# Patient Record
Sex: FEMALE | Race: BLACK OR AFRICAN AMERICAN | NOT HISPANIC OR LATINO | URBAN - METROPOLITAN AREA
[De-identification: names, ages, dates, MRNs, and addresses within clinical notes are randomized per-mention and may not be internally consistent; named-entity substitution may affect disease eponyms.]

---

## 2017-02-04 ENCOUNTER — INPATIENT (INPATIENT)
Facility: HOSPITAL | Age: 82
LOS: 3 days | Discharge: HOME | End: 2017-02-08
Attending: INTERNAL MEDICINE | Admitting: INTERNAL MEDICINE
Payer: MEDICARE

## 2017-02-04 VITALS
WEIGHT: 134.92 LBS | SYSTOLIC BLOOD PRESSURE: 211 MMHG | DIASTOLIC BLOOD PRESSURE: 105 MMHG | RESPIRATION RATE: 13 BRPM | OXYGEN SATURATION: 99 % | HEART RATE: 60 BPM | HEIGHT: 65 IN

## 2017-02-04 LAB
ALBUMIN SERPL ELPH-MCNC: 3.6 G/DL — SIGNIFICANT CHANGE UP (ref 3.3–5)
ALP SERPL-CCNC: 61 U/L — SIGNIFICANT CHANGE UP (ref 40–120)
ALT FLD-CCNC: 41 U/L — SIGNIFICANT CHANGE UP (ref 12–78)
ANION GAP SERPL CALC-SCNC: 7 MMOL/L — SIGNIFICANT CHANGE UP (ref 5–17)
APTT BLD: 31.5 SEC — SIGNIFICANT CHANGE UP (ref 27.5–37.4)
AST SERPL-CCNC: 39 U/L — HIGH (ref 15–37)
BASOPHILS # BLD AUTO: 0.1 K/UL — SIGNIFICANT CHANGE UP (ref 0–0.2)
BASOPHILS NFR BLD AUTO: 1.7 % — SIGNIFICANT CHANGE UP (ref 0–2)
BILIRUB SERPL-MCNC: 1.1 MG/DL — SIGNIFICANT CHANGE UP (ref 0.2–1.2)
BLD GP AB SCN SERPL QL: SIGNIFICANT CHANGE UP
BUN SERPL-MCNC: 10 MG/DL — SIGNIFICANT CHANGE UP (ref 7–23)
CALCIUM SERPL-MCNC: 8.5 MG/DL — SIGNIFICANT CHANGE UP (ref 8.5–10.1)
CHLORIDE SERPL-SCNC: 108 MMOL/L — SIGNIFICANT CHANGE UP (ref 96–108)
CK MB BLD-MCNC: 1.5 % — SIGNIFICANT CHANGE UP (ref 0–3.5)
CK MB CFR SERPL CALC: 1.2 NG/ML — SIGNIFICANT CHANGE UP (ref 0.5–3.6)
CK SERPL-CCNC: 80 U/L — SIGNIFICANT CHANGE UP (ref 26–192)
CO2 SERPL-SCNC: 31 MMOL/L — SIGNIFICANT CHANGE UP (ref 22–31)
CREAT SERPL-MCNC: 0.93 MG/DL — SIGNIFICANT CHANGE UP (ref 0.5–1.3)
EOSINOPHIL # BLD AUTO: 0.1 K/UL — SIGNIFICANT CHANGE UP (ref 0–0.5)
EOSINOPHIL NFR BLD AUTO: 1.3 % — SIGNIFICANT CHANGE UP (ref 0–6)
GLUCOSE SERPL-MCNC: 114 MG/DL — HIGH (ref 70–99)
HCT VFR BLD CALC: 41.1 % — SIGNIFICANT CHANGE UP (ref 34.5–45)
HGB BLD-MCNC: 13.9 G/DL — SIGNIFICANT CHANGE UP (ref 11.5–15.5)
INR BLD: 1.29 RATIO — HIGH (ref 0.88–1.16)
LYMPHOCYTES # BLD AUTO: 2.8 K/UL — SIGNIFICANT CHANGE UP (ref 1–3.3)
LYMPHOCYTES # BLD AUTO: 52.8 % — HIGH (ref 13–44)
MCHC RBC-ENTMCNC: 28.2 PG — SIGNIFICANT CHANGE UP (ref 27–34)
MCHC RBC-ENTMCNC: 33.7 GM/DL — SIGNIFICANT CHANGE UP (ref 32–36)
MCV RBC AUTO: 83.6 FL — SIGNIFICANT CHANGE UP (ref 80–100)
MONOCYTES # BLD AUTO: 0.4 K/UL — SIGNIFICANT CHANGE UP (ref 0–0.9)
MONOCYTES NFR BLD AUTO: 7.3 % — SIGNIFICANT CHANGE UP (ref 2–14)
NEUTROPHILS # BLD AUTO: 1.9 K/UL — SIGNIFICANT CHANGE UP (ref 1.8–7.4)
NEUTROPHILS NFR BLD AUTO: 36.9 % — LOW (ref 43–77)
PLATELET # BLD AUTO: 200 K/UL — SIGNIFICANT CHANGE UP (ref 150–400)
POTASSIUM SERPL-MCNC: 3.8 MMOL/L — SIGNIFICANT CHANGE UP (ref 3.5–5.3)
POTASSIUM SERPL-SCNC: 3.8 MMOL/L — SIGNIFICANT CHANGE UP (ref 3.5–5.3)
PROT SERPL-MCNC: 7 GM/DL — SIGNIFICANT CHANGE UP (ref 6–8.3)
PROTHROM AB SERPL-ACNC: 14.5 SEC — HIGH (ref 10–13.1)
RBC # BLD: 4.92 M/UL — SIGNIFICANT CHANGE UP (ref 3.8–5.2)
RBC # FLD: 13.1 % — SIGNIFICANT CHANGE UP (ref 11–15)
SODIUM SERPL-SCNC: 146 MMOL/L — HIGH (ref 135–145)
TROPONIN I SERPL-MCNC: 0.14 NG/ML — HIGH (ref 0.01–0.04)
WBC # BLD: 5.3 K/UL — SIGNIFICANT CHANGE UP (ref 3.8–10.5)
WBC # FLD AUTO: 5.3 K/UL — SIGNIFICANT CHANGE UP (ref 3.8–10.5)

## 2017-02-04 PROCEDURE — 99291 CRITICAL CARE FIRST HOUR: CPT

## 2017-02-04 PROCEDURE — 70450 CT HEAD/BRAIN W/O DYE: CPT | Mod: 26

## 2017-02-04 PROCEDURE — 71010: CPT | Mod: 26

## 2017-02-04 RX ORDER — ASPIRIN/CALCIUM CARB/MAGNESIUM 324 MG
325 TABLET ORAL ONCE
Qty: 0 | Refills: 0 | Status: COMPLETED | OUTPATIENT
Start: 2017-02-04 | End: 2017-02-04

## 2017-02-04 RX ORDER — HYDRALAZINE HCL 50 MG
10 TABLET ORAL ONCE
Qty: 0 | Refills: 0 | Status: COMPLETED | OUTPATIENT
Start: 2017-02-04 | End: 2017-02-04

## 2017-02-04 RX ORDER — HEPARIN SODIUM 5000 [USP'U]/ML
INJECTION INTRAVENOUS; SUBCUTANEOUS
Qty: 25000 | Refills: 0 | Status: DISCONTINUED | OUTPATIENT
Start: 2017-02-04 | End: 2017-02-05

## 2017-02-04 RX ORDER — SODIUM CHLORIDE 9 MG/ML
3 INJECTION INTRAMUSCULAR; INTRAVENOUS; SUBCUTANEOUS ONCE
Qty: 0 | Refills: 0 | Status: COMPLETED | OUTPATIENT
Start: 2017-02-04 | End: 2017-02-04

## 2017-02-04 RX ADMIN — Medication 325 MILLIGRAM(S): at 20:15

## 2017-02-04 RX ADMIN — Medication 0.1 MILLIGRAM(S): at 20:15

## 2017-02-04 RX ADMIN — SODIUM CHLORIDE 3 MILLILITER(S): 9 INJECTION INTRAMUSCULAR; INTRAVENOUS; SUBCUTANEOUS at 20:10

## 2017-02-04 RX ADMIN — HEPARIN SODIUM 750 UNIT(S)/HR: 5000 INJECTION INTRAVENOUS; SUBCUTANEOUS at 21:44

## 2017-02-04 RX ADMIN — Medication 0.1 MILLIGRAM(S): at 22:26

## 2017-02-04 NOTE — ED ADULT NURSE NOTE - OBJECTIVE STATEMENT
left sided facial droop with weakness at 6:30 pm.awake .talking in [place],family at bedside

## 2017-02-04 NOTE — ED PROVIDER NOTE - OBJECTIVE STATEMENT
81yoF; with pmh signif for HTN; now p/w slurred speech and left sided weakness. patient last known normal at 1800.  at 1830 family noticed patient with slurred speech, left sided facial droop and left arm weakness. upon arrival to ED patient with residual slurred speech. upon return from CT patient back to baseline with NIH normal. denies headache. denies cp/sob/palp. denies abd pain. denies numbness/tingling. denies focal weakness currently. denies back pain. denies f/c/s.

## 2017-02-04 NOTE — ED PROVIDER NOTE - CRITICAL CARE PROVIDED
direct patient care (not related to procedure)/additional history taking/documentation/interpretation of diagnostic studies/consultation with other physicians

## 2017-02-04 NOTE — ED PROVIDER NOTE - PHYSICAL EXAMINATION
Gen: Alert, NAD  Head: NC, AT, PERRL, EOMI, normal lids/conjunctiva  ENT: B TM WNL, normal hearing, patent oropharynx without erythema/exudate, uvula midline  Neck: +supple, no tenderness/meningismus/JVD, +Trachea midline  Pulm: Bilateral BS, normal resp effort, no wheeze/stridor/retractions  CV: RRR, no M/R/G, +dist pulses  Abd: soft, NT/ND, +BS, no hepatosplenomegaly  Mskel: no edema/erythema/cyanosis  Skin: no rash  Neuro: see stroke exam above

## 2017-02-04 NOTE — ED ADULT TRIAGE NOTE - CHIEF COMPLAINT QUOTE
Patient BIBA: Patient was unable to speak with Left sided facial droop. Symptoms began during dinner at 630pm Patient evealuated by Dr. Healy and sent to CT prior to VS measurement

## 2017-02-04 NOTE — CONSULT NOTE ADULT - SUBJECTIVE AND OBJECTIVE BOX
HPI:  HPI:  81 year old woman with PMH of HTN; now p/w slurred speech and left sided weakness. patient last known normal at 1800.  at 1830 family noticed patient with slurred speech, left sided facial droop and left arm weakness. upon arrival to ED patient with residual slurred speech. upon return from CT patient back to baseline with NIH normal. denies headache. denies cp/sob/palp. denies abd pain. denies numbness/tingling. denies focal weakness currently. denies back pain. denies f/c/s.    As per patient, was on losartan but stopped taking "months ago" for unclear reasons.  As per son, he takes care of her at home and would like to discuss HHA. (05 Feb 2017 00:41)      Chief Complaint:  Patient is a 81y old  Female who presents with a chief complaint of weakness, facial droop (05 Feb 2017 00:41)      Review of Systems:      ENT:  No sore throat, pain, runny nose, dysphagia  CV:  No pain, palpitations, hypo/hypertension  Resp:  No dyspnea, cough, tachypnea, wheezing  GI:  No pain, nausea, vomiting, diarrhea, constipation  :  No pain, bleeding, incontinence, nocturia             Social History/Family History  SOCHX:   tobacco,  -  alcohol    FMHX: FA/MO  - contributory       Discussed with:  PMD, Family    Physical Exam:    Vital Signs:  Vital Signs Last 24 Hrs  T(C): 36, Max: 36.5 (02-05 @ 02:40)  T(F): 96.8, Max: 97.7 (02-05 @ 02:40)  HR: 41 (40 - 60)  BP: 182/81 (176/98 - 220/101)  BP(mean): --  RR: 17 (13 - 24)  SpO2: 97% (97% - 100%)  Daily Height in cm: 165.1 (05 Feb 2017 02:40)    Daily   I&O's Summary    I & Os for current day (as of 05 Feb 2017 09:07)  =============================================  IN: 153 ml / OUT: 500 ml / NET: -347 ml      Chest:  Full & symmetric excursion, no increased effort, breath sounds clear  Cardiovascular:  Regular rhythm, S1, S2, no murmur/rub/S3/S4, no carotid/femoral/abdominal bruit, radial/pedal pulses 2+, no edema  Abdomen:  Soft, non-tender, non-distended, normoactive bowel sounds, no HSM  Extremities:  Gait & station:   Digits:   Nails:   Joints, Bones, Muscles:   ROM:   Stability:  Neuro/Psych:  weakness, speech slur    Laboratory:                          14.7   4.2   )-----------( 193      ( 05 Feb 2017 08:25 )             44.2     05 Feb 2017 08:25    144    |  106    |  8      ----------------------------<  74     3.3     |  31     |  0.77     Ca    8.8        05 Feb 2017 08:25    TPro  7.0    /  Alb  3.6    /  TBili  1.1    /  DBili  x      /  AST  39     /  ALT  41     /  AlkPhos  61     04 Feb 2017 19:58      CARDIAC MARKERS ( 05 Feb 2017 04:54 )  .150 ng/mL / x     / 39 U/L / x     / 0.8 ng/mL  CARDIAC MARKERS ( 04 Feb 2017 19:58 )  .145 ng/mL / x     / 80 U/L / x     / 1.2 ng/mL      CAPILLARY BLOOD GLUCOSE  131 (04 Feb 2017 19:14)    LIVER FUNCTIONS - ( 04 Feb 2017 19:58 )  Alb: 3.6 g/dL / Pro: 7.0 gm/dL / ALK PHOS: 61 U/L / ALT: 41 U/L / AST: 39 U/L / GGT: x           PT/INR - ( 04 Feb 2017 19:58 )   PT: 14.5 sec;   INR: 1.29 ratio         PTT - ( 05 Feb 2017 08:25 )  PTT:41.1 sec          Assessment:  HTN  TIA/r/o CVA, Neuro noted  Heparin initiated  ASA  Slow reduction in SBP

## 2017-02-04 NOTE — CONSULT NOTE ADULT - ASSESSMENT
consulr dict  awake alert speech fleunt arm leg 4/5 new afib had slurred sepeech  and facial weakness resolved no tpa  discuss with family at bed side symptoms resolved no tpa  for work up mri brai n echo new afibb iv heparin cardiology eval .

## 2017-02-04 NOTE — ED PROVIDER NOTE - CARE PLAN
Principal Discharge DX:	TIA (transient ischemic attack)  Secondary Diagnosis:	New onset atrial fibrillation  Secondary Diagnosis:	Hypertensive urgency, malignant

## 2017-02-05 DIAGNOSIS — I48.91 UNSPECIFIED ATRIAL FIBRILLATION: ICD-10-CM

## 2017-02-05 DIAGNOSIS — R00.1 BRADYCARDIA, UNSPECIFIED: ICD-10-CM

## 2017-02-05 DIAGNOSIS — G45.9 TRANSIENT CEREBRAL ISCHEMIC ATTACK, UNSPECIFIED: ICD-10-CM

## 2017-02-05 DIAGNOSIS — I10 ESSENTIAL (PRIMARY) HYPERTENSION: ICD-10-CM

## 2017-02-05 DIAGNOSIS — Z29.9 ENCOUNTER FOR PROPHYLACTIC MEASURES, UNSPECIFIED: ICD-10-CM

## 2017-02-05 LAB
ANION GAP SERPL CALC-SCNC: 7 MMOL/L — SIGNIFICANT CHANGE UP (ref 5–17)
APTT BLD: 41.1 SEC — HIGH (ref 27.5–37.4)
APTT BLD: 44.5 SEC — HIGH (ref 27.5–37.4)
APTT BLD: 59.9 SEC — HIGH (ref 27.5–37.4)
BUN SERPL-MCNC: 8 MG/DL — SIGNIFICANT CHANGE UP (ref 7–23)
CALCIUM SERPL-MCNC: 8.8 MG/DL — SIGNIFICANT CHANGE UP (ref 8.5–10.1)
CHLORIDE SERPL-SCNC: 106 MMOL/L — SIGNIFICANT CHANGE UP (ref 96–108)
CHOLEST SERPL-MCNC: 180 MG/DL — SIGNIFICANT CHANGE UP (ref 10–199)
CK MB BLD-MCNC: 2.1 % — SIGNIFICANT CHANGE UP (ref 0–3.5)
CK MB CFR SERPL CALC: 0.8 NG/ML — SIGNIFICANT CHANGE UP (ref 0.5–3.6)
CK SERPL-CCNC: 39 U/L — SIGNIFICANT CHANGE UP (ref 26–192)
CO2 SERPL-SCNC: 31 MMOL/L — SIGNIFICANT CHANGE UP (ref 22–31)
CREAT SERPL-MCNC: 0.77 MG/DL — SIGNIFICANT CHANGE UP (ref 0.5–1.3)
GLUCOSE SERPL-MCNC: 74 MG/DL — SIGNIFICANT CHANGE UP (ref 70–99)
HCT VFR BLD CALC: 38.2 % — SIGNIFICANT CHANGE UP (ref 34.5–45)
HCT VFR BLD CALC: 39.8 % — SIGNIFICANT CHANGE UP (ref 34.5–45)
HCT VFR BLD CALC: 44.2 % — SIGNIFICANT CHANGE UP (ref 34.5–45)
HDLC SERPL-MCNC: 52 MG/DL — SIGNIFICANT CHANGE UP (ref 40–125)
HGB BLD-MCNC: 13.2 G/DL — SIGNIFICANT CHANGE UP (ref 11.5–15.5)
HGB BLD-MCNC: 13.7 G/DL — SIGNIFICANT CHANGE UP (ref 11.5–15.5)
HGB BLD-MCNC: 14.7 G/DL — SIGNIFICANT CHANGE UP (ref 11.5–15.5)
LIPID PNL WITH DIRECT LDL SERPL: 116 MG/DL — SIGNIFICANT CHANGE UP
MCHC RBC-ENTMCNC: 27.7 PG — SIGNIFICANT CHANGE UP (ref 27–34)
MCHC RBC-ENTMCNC: 28.1 PG — SIGNIFICANT CHANGE UP (ref 27–34)
MCHC RBC-ENTMCNC: 28.4 PG — SIGNIFICANT CHANGE UP (ref 27–34)
MCHC RBC-ENTMCNC: 33.2 GM/DL — SIGNIFICANT CHANGE UP (ref 32–36)
MCHC RBC-ENTMCNC: 34.5 GM/DL — SIGNIFICANT CHANGE UP (ref 32–36)
MCHC RBC-ENTMCNC: 34.6 GM/DL — SIGNIFICANT CHANGE UP (ref 32–36)
MCV RBC AUTO: 81.5 FL — SIGNIFICANT CHANGE UP (ref 80–100)
MCV RBC AUTO: 82 FL — SIGNIFICANT CHANGE UP (ref 80–100)
MCV RBC AUTO: 83.4 FL — SIGNIFICANT CHANGE UP (ref 80–100)
PLATELET # BLD AUTO: 176 K/UL — SIGNIFICANT CHANGE UP (ref 150–400)
PLATELET # BLD AUTO: 193 K/UL — SIGNIFICANT CHANGE UP (ref 150–400)
PLATELET # BLD AUTO: 201 K/UL — SIGNIFICANT CHANGE UP (ref 150–400)
POTASSIUM SERPL-MCNC: 3.3 MMOL/L — LOW (ref 3.5–5.3)
POTASSIUM SERPL-SCNC: 3.3 MMOL/L — LOW (ref 3.5–5.3)
RBC # BLD: 4.66 M/UL — SIGNIFICANT CHANGE UP (ref 3.8–5.2)
RBC # BLD: 4.88 M/UL — SIGNIFICANT CHANGE UP (ref 3.8–5.2)
RBC # BLD: 5.3 M/UL — HIGH (ref 3.8–5.2)
RBC # FLD: 12.5 % — SIGNIFICANT CHANGE UP (ref 11–15)
RBC # FLD: 12.9 % — SIGNIFICANT CHANGE UP (ref 11–15)
RBC # FLD: 13.2 % — SIGNIFICANT CHANGE UP (ref 11–15)
SODIUM SERPL-SCNC: 144 MMOL/L — SIGNIFICANT CHANGE UP (ref 135–145)
T4 FREE SERPL-MCNC: 1.3 NG/DL — SIGNIFICANT CHANGE UP (ref 0.9–1.8)
TOTAL CHOLESTEROL/HDL RATIO MEASUREMENT: 3.5 RATIO — SIGNIFICANT CHANGE UP (ref 3.3–7.1)
TRIGL SERPL-MCNC: 62 MG/DL — SIGNIFICANT CHANGE UP (ref 10–149)
TROPONIN I SERPL-MCNC: 0.15 NG/ML — HIGH (ref 0.01–0.04)
TSH SERPL-MCNC: 2.47 UU/ML — SIGNIFICANT CHANGE UP (ref 0.36–3.74)
WBC # BLD: 4.2 K/UL — SIGNIFICANT CHANGE UP (ref 3.8–10.5)
WBC # BLD: 4.3 K/UL — SIGNIFICANT CHANGE UP (ref 3.8–10.5)
WBC # BLD: 4.7 K/UL — SIGNIFICANT CHANGE UP (ref 3.8–10.5)
WBC # FLD AUTO: 4.2 K/UL — SIGNIFICANT CHANGE UP (ref 3.8–10.5)
WBC # FLD AUTO: 4.3 K/UL — SIGNIFICANT CHANGE UP (ref 3.8–10.5)
WBC # FLD AUTO: 4.7 K/UL — SIGNIFICANT CHANGE UP (ref 3.8–10.5)

## 2017-02-05 RX ORDER — LOSARTAN POTASSIUM 100 MG/1
50 TABLET, FILM COATED ORAL DAILY
Qty: 0 | Refills: 0 | Status: DISCONTINUED | OUTPATIENT
Start: 2017-02-05 | End: 2017-02-05

## 2017-02-05 RX ORDER — HYDRALAZINE HCL 50 MG
25 TABLET ORAL EVERY 8 HOURS
Qty: 0 | Refills: 0 | Status: DISCONTINUED | OUTPATIENT
Start: 2017-02-05 | End: 2017-02-07

## 2017-02-05 RX ORDER — HEPARIN SODIUM 5000 [USP'U]/ML
INJECTION INTRAVENOUS; SUBCUTANEOUS
Qty: 25000 | Refills: 0 | Status: DISCONTINUED | OUTPATIENT
Start: 2017-02-05 | End: 2017-02-08

## 2017-02-05 RX ORDER — SIMVASTATIN 20 MG/1
40 TABLET, FILM COATED ORAL AT BEDTIME
Qty: 0 | Refills: 0 | Status: DISCONTINUED | OUTPATIENT
Start: 2017-02-05 | End: 2017-02-08

## 2017-02-05 RX ORDER — HEPARIN SODIUM 5000 [USP'U]/ML
3200 INJECTION INTRAVENOUS; SUBCUTANEOUS ONCE
Qty: 0 | Refills: 0 | Status: DISCONTINUED | OUTPATIENT
Start: 2017-02-05 | End: 2017-02-05

## 2017-02-05 RX ORDER — HYDRALAZINE HCL 50 MG
10 TABLET ORAL EVERY 8 HOURS
Qty: 0 | Refills: 0 | Status: DISCONTINUED | OUTPATIENT
Start: 2017-02-05 | End: 2017-02-05

## 2017-02-05 RX ORDER — POTASSIUM CHLORIDE 20 MEQ
20 PACKET (EA) ORAL ONCE
Qty: 0 | Refills: 0 | Status: COMPLETED | OUTPATIENT
Start: 2017-02-05 | End: 2017-02-05

## 2017-02-05 RX ORDER — HYDRALAZINE HCL 50 MG
20 TABLET ORAL EVERY 4 HOURS
Qty: 0 | Refills: 0 | Status: DISCONTINUED | OUTPATIENT
Start: 2017-02-05 | End: 2017-02-08

## 2017-02-05 RX ORDER — HEPARIN SODIUM 5000 [USP'U]/ML
INJECTION INTRAVENOUS; SUBCUTANEOUS
Qty: 25000 | Refills: 0 | Status: DISCONTINUED | OUTPATIENT
Start: 2017-02-05 | End: 2017-02-05

## 2017-02-05 RX ORDER — HEPARIN SODIUM 5000 [USP'U]/ML
3200 INJECTION INTRAVENOUS; SUBCUTANEOUS EVERY 6 HOURS
Qty: 0 | Refills: 0 | Status: DISCONTINUED | OUTPATIENT
Start: 2017-02-05 | End: 2017-02-05

## 2017-02-05 RX ORDER — ASPIRIN/CALCIUM CARB/MAGNESIUM 324 MG
81 TABLET ORAL DAILY
Qty: 0 | Refills: 0 | Status: DISCONTINUED | OUTPATIENT
Start: 2017-02-05 | End: 2017-02-08

## 2017-02-05 RX ADMIN — LOSARTAN POTASSIUM 50 MILLIGRAM(S): 100 TABLET, FILM COATED ORAL at 05:43

## 2017-02-05 RX ADMIN — Medication 20 MILLIGRAM(S): at 21:59

## 2017-02-05 RX ADMIN — Medication 81 MILLIGRAM(S): at 11:09

## 2017-02-05 RX ADMIN — Medication 20 MILLIEQUIVALENT(S): at 17:29

## 2017-02-05 RX ADMIN — Medication 10 MILLIGRAM(S): at 17:26

## 2017-02-05 RX ADMIN — HEPARIN SODIUM 650 UNIT(S)/HR: 5000 INJECTION INTRAVENOUS; SUBCUTANEOUS at 05:42

## 2017-02-05 RX ADMIN — HEPARIN SODIUM 650 UNIT(S)/HR: 5000 INJECTION INTRAVENOUS; SUBCUTANEOUS at 12:19

## 2017-02-05 RX ADMIN — SIMVASTATIN 40 MILLIGRAM(S): 20 TABLET, FILM COATED ORAL at 22:12

## 2017-02-05 NOTE — H&P ADULT. - NEGATIVE CARDIOVASCULAR SYMPTOMS
no peripheral edema/no paroxysmal nocturnal dyspnea/no claudication/no palpitations/no dyspnea on exertion/no orthopnea/no chest pain

## 2017-02-05 NOTE — H&P ADULT. - PROBLEM SELECTOR PLAN 2
Telemetry Monitoring, Vital signs as per unit protocol  Start Heparin Drip, monitor PTT/INR Q6hrs, bleeding precautions  Serial cardiac enzyme with EKG x7khawsd x 3 sets--> first set mildly elevated  Get TSH, Free T4  Monitor BMP/electrolytes and supplement as needed  Get transthoracic ECHO in am  Cardiology evaluation  DVT prophylaxis with Heparin drip  Diet: DASH

## 2017-02-05 NOTE — H&P ADULT. - NEUROLOGICAL DETAILS
responds to pain/deep reflexes intact/cranial nerves intact/no spontaneous movement/sensation intact/responds to verbal commands/alert and oriented x 3/normal strength

## 2017-02-05 NOTE — H&P ADULT. - PROBLEM SELECTOR PLAN 1
Telemetry  Neurochecks Q 2 hrly.  Aspirin 81 mg PO daily  Monitor BP Closely, to maintain -100/ DBP 60-90 (MAP ).  ECHO, carotid doppler in am.  Zocor 40mg PO QHS.  Aspiration, Fall, Seizure precautions.  Neurology consult.  PT Evaluation  Speech & swallow evaluation in am.  Rehab Consult  Repeat CT Head in 48 hrs if symptoms persists.

## 2017-02-05 NOTE — H&P ADULT. - NEGATIVE NEUROLOGICAL SYMPTOMS
no loss of sensation/no difficulty walking/no tremors/no syncope/no headache/no facial palsy/no generalized seizures/no vertigo/no weakness/no hemiparesis/no paresthesias/no confusion/no loss of consciousness/no focal seizures

## 2017-02-05 NOTE — H&P ADULT. - HISTORY OF PRESENT ILLNESS
81 year old woman with PMH of HTN; now p/w slurred speech and left sided weakness. patient last known normal at 1800.  at 1830 family noticed patient with slurred speech, left sided facial droop and left arm weakness. upon arrival to ED patient with residual slurred speech. upon return from CT patient back to baseline with NIH normal. denies headache. denies cp/sob/palp. denies abd pain. denies numbness/tingling. denies focal weakness currently. denies back pain. denies f/c/s.    As per patient, was on losartan but stopped taking "months ago" for unclear reasons.  As per son, he takes care of her at home and would like to discuss HHA.

## 2017-02-05 NOTE — ED ADULT NURSE REASSESSMENT NOTE - NS ED NURSE REASSESS COMMENT FT1
made aware of heart rate of 40,spoke with nurses on the floor 2c ,spoke with Munira in 2C,aware that patient is going up

## 2017-02-06 DIAGNOSIS — R79.89 OTHER SPECIFIED ABNORMAL FINDINGS OF BLOOD CHEMISTRY: ICD-10-CM

## 2017-02-06 LAB
ANION GAP SERPL CALC-SCNC: 7 MMOL/L — SIGNIFICANT CHANGE UP (ref 5–17)
APTT BLD: 39.7 SEC — HIGH (ref 27.5–37.4)
APTT BLD: 53.5 SEC — HIGH (ref 27.5–37.4)
BUN SERPL-MCNC: 10 MG/DL — SIGNIFICANT CHANGE UP (ref 7–23)
CALCIUM SERPL-MCNC: 8.9 MG/DL — SIGNIFICANT CHANGE UP (ref 8.5–10.1)
CHLORIDE SERPL-SCNC: 105 MMOL/L — SIGNIFICANT CHANGE UP (ref 96–108)
CK SERPL-CCNC: 44 U/L — SIGNIFICANT CHANGE UP (ref 26–192)
CO2 SERPL-SCNC: 29 MMOL/L — SIGNIFICANT CHANGE UP (ref 22–31)
CREAT SERPL-MCNC: 0.88 MG/DL — SIGNIFICANT CHANGE UP (ref 0.5–1.3)
GLUCOSE SERPL-MCNC: 166 MG/DL — HIGH (ref 70–99)
HBA1C BLD-MCNC: 5.9 % — HIGH (ref 4–5.6)
HCT VFR BLD CALC: 44.6 % — SIGNIFICANT CHANGE UP (ref 34.5–45)
HGB BLD-MCNC: 15 G/DL — SIGNIFICANT CHANGE UP (ref 11.5–15.5)
MAGNESIUM SERPL-MCNC: 2.3 MG/DL — SIGNIFICANT CHANGE UP (ref 1.8–2.4)
MCHC RBC-ENTMCNC: 28 PG — SIGNIFICANT CHANGE UP (ref 27–34)
MCHC RBC-ENTMCNC: 33.6 GM/DL — SIGNIFICANT CHANGE UP (ref 32–36)
MCV RBC AUTO: 83.3 FL — SIGNIFICANT CHANGE UP (ref 80–100)
PLATELET # BLD AUTO: 231 K/UL — SIGNIFICANT CHANGE UP (ref 150–400)
POTASSIUM SERPL-MCNC: 3.6 MMOL/L — SIGNIFICANT CHANGE UP (ref 3.5–5.3)
POTASSIUM SERPL-SCNC: 3.6 MMOL/L — SIGNIFICANT CHANGE UP (ref 3.5–5.3)
RBC # BLD: 5.36 M/UL — HIGH (ref 3.8–5.2)
RBC # FLD: 13.1 % — SIGNIFICANT CHANGE UP (ref 11–15)
SODIUM SERPL-SCNC: 141 MMOL/L — SIGNIFICANT CHANGE UP (ref 135–145)
TROPONIN I SERPL-MCNC: 0.17 NG/ML — HIGH (ref 0.01–0.04)
WBC # BLD: 5.8 K/UL — SIGNIFICANT CHANGE UP (ref 3.8–10.5)
WBC # FLD AUTO: 5.8 K/UL — SIGNIFICANT CHANGE UP (ref 3.8–10.5)

## 2017-02-06 PROCEDURE — 93880 EXTRACRANIAL BILAT STUDY: CPT | Mod: 26

## 2017-02-06 PROCEDURE — 70551 MRI BRAIN STEM W/O DYE: CPT | Mod: 26

## 2017-02-06 PROCEDURE — 99233 SBSQ HOSP IP/OBS HIGH 50: CPT

## 2017-02-06 RX ADMIN — Medication 20 MILLIGRAM(S): at 16:54

## 2017-02-06 RX ADMIN — Medication 25 MILLIGRAM(S): at 22:30

## 2017-02-06 RX ADMIN — Medication 81 MILLIGRAM(S): at 11:34

## 2017-02-06 RX ADMIN — Medication 25 MILLIGRAM(S): at 05:16

## 2017-02-06 RX ADMIN — HEPARIN SODIUM 550 UNIT(S)/HR: 5000 INJECTION INTRAVENOUS; SUBCUTANEOUS at 12:00

## 2017-02-06 RX ADMIN — SIMVASTATIN 40 MILLIGRAM(S): 20 TABLET, FILM COATED ORAL at 22:30

## 2017-02-06 RX ADMIN — HEPARIN SODIUM 550 UNIT(S)/HR: 5000 INJECTION INTRAVENOUS; SUBCUTANEOUS at 19:28

## 2017-02-06 NOTE — DISCHARGE NOTE ADULT - CARE PROVIDERS DIRECT ADDRESSES
,DirectAddress_Unknown,[email or web address],DirectAddress_Unknown,DirectAddress_Unknown

## 2017-02-06 NOTE — PHYSICAL THERAPY INITIAL EVALUATION ADULT - MODIFIED CLINICAL TEST OF SENSORY INTEGRATION IN BALANCE TEST
Barthel Index: Feeding Score _10__, Bathing Score _5__, Grooming Score _5__, Dressing Score _10__, Bowels Score _10__, Bladder Score _10__, Toilet Score _10__, Transfers Score _15__, Mobility Score _15__, Stairs Score _5__,     Total Score _95__

## 2017-02-06 NOTE — DISCHARGE NOTE ADULT - PLAN OF CARE
avoid future episodes take medications as directed. eat low sodium/low fat diet.  follow with PCP and Neurology Dr. Lamb take medications as directed and follow with cardiologist.  Patient and her son Pepito counseled and educated about possible side effects of anticoagulation and in case of bleeding to go to nearest ED immediately. Resolved  Pt. counseled and educated for dietary and medications compliance. take medications as directed.  eat low sodium diet  check BP at home 1-2 times every day.  Follow with PCP and cardiology,  Your target BP is less than 130 Resolved.  Beta blockers not started given recent bradycardia upon admission.  Follow with cardiologist for further work up as outpatient in setting of acute stroke and accelerated HTN, likely demand ischemia, follow with cardiology as outpatient for further work up as outpatient.

## 2017-02-06 NOTE — DISCHARGE NOTE ADULT - PROVIDER TOKENS
TOKEN:'[id number]',TOKEN:'[id number]',FREE:[LAST:[PCP],PHONE:[(   )    -],FAX:[(   )    -],ADDRESS:[Follow with PCP Dr. Hyde x 3 days]]

## 2017-02-06 NOTE — DISCHARGE NOTE ADULT - PATIENT PORTAL LINK FT
“You can access the [facility] Patient Portal, offered by [facility], by registering with the following website: [email or web address]”

## 2017-02-06 NOTE — DISCHARGE NOTE ADULT - SECONDARY DIAGNOSIS.
New onset atrial fibrillation Hypertensive urgency, malignant Essential hypertension Bradycardia Elevated troponin

## 2017-02-06 NOTE — DISCHARGE NOTE ADULT - ADDITIONAL INSTRUCTIONS
Follow with PCP Dr. Dugan x 3 days  Follow with Cardiology x 1 week  Follow with Neurology x 1 week.

## 2017-02-06 NOTE — DISCHARGE NOTE ADULT - MEDICATION SUMMARY - MEDICATIONS TO STOP TAKING
I will STOP taking the medications listed below when I get home from the hospital:    losartan 50 mg oral tablet  -- 1 tab(s) by mouth once a day

## 2017-02-06 NOTE — DISCHARGE NOTE ADULT - NS AS DC STROKE ED MATERIALS
Stroke Education Booklet/Call 911 for Stroke/Risk Factors for Stroke/Need for Followup After Discharge/Stroke Warning Signs and Symptoms/Prescribed Medications

## 2017-02-06 NOTE — DISCHARGE NOTE ADULT - HOSPITAL COURSE
82 y/o F with PMH of HTN, non compliance to medications(for few months stopped losartan as she was in [place] and ran out of meds, did not want to buy out of pocket), p/w left sided weakness and slurred speech, resolved while coming back from CT/radiology, CTH negative for acute pathology, MRI with acute and subacute strokes, on arrival she was also found to be in A. Fib with elevated Troponins, in setting of TIA/accelerated HTN. Also in accelerated HTN, NIHSS during ED provider encounter normal, neurology and cardiology consulted, aspirin/statin started, anti HTNves started, monitored on telemetry, had PT evaluation, also s/p heparin gtt for a. fib, was bradycardiac upon arrival to ED, but HR stable during stay, now clear for Oral AC as per discussion with neurology Dr. Lamb, discussed with cardiology recommended coumadin vs xarelto as per family/pt. choice, had a detailed discussion about different options for oral AC as well as possible side effects and benefits of long term AC, patient and family wishes to start long term AC and want to start xarelto, heparin gtt D/C'd and started xarelto.  As per PT DC disposition home with outpatient PT, Pt. with no residual weakness, no speech difficulty.  d/w cardio/neuro-> stable for DC home today, son and patient in agreement.

## 2017-02-06 NOTE — DISCHARGE NOTE ADULT - CONDITION (STATED IN TERMS THAT PERMIT A SPECIFIC MEASURABLE COMPARISON WITH CONDITION ON ADMISSION):
Patient is stable: tolerating diet, voiding, ambulating, denies pain   70 minutes spent in direct patient care including physical examination, discharge instructions , medication instructions and follow up, patient and son Pepito verbalized understanding and agreed.

## 2017-02-06 NOTE — DISCHARGE NOTE ADULT - CARE PLAN
Principal Discharge DX:	Cerebrovascular accident (CVA) due to embolism of left middle cerebral artery  Goal:	avoid future episodes  Instructions for follow-up, activity and diet:	take medications as directed. eat low sodium/low fat diet.  follow with PCP and Neurology Dr. Lamb  Secondary Diagnosis:	New onset atrial fibrillation  Instructions for follow-up, activity and diet:	take medications as directed and follow with cardiologist.  Patient and her son Pepito counseled and educated about possible side effects of anticoagulation and in case of bleeding to go to nearest ED immediately.  Secondary Diagnosis:	Hypertensive urgency, malignant  Instructions for follow-up, activity and diet:	Resolved  Pt. counseled and educated for dietary and medications compliance.  Secondary Diagnosis:	Essential hypertension  Instructions for follow-up, activity and diet:	take medications as directed.  eat low sodium diet  check BP at home 1-2 times every day.  Follow with PCP and cardiology,  Your target BP is less than 130  Secondary Diagnosis:	Bradycardia  Instructions for follow-up, activity and diet:	Resolved.  Beta blockers not started given recent bradycardia upon admission.  Follow with cardiologist for further work up as outpatient  Secondary Diagnosis:	Elevated troponin  Instructions for follow-up, activity and diet:	in setting of acute stroke and accelerated HTN, likely demand ischemia, follow with cardiology as outpatient for further work up as outpatient.

## 2017-02-06 NOTE — DISCHARGE NOTE ADULT - CONDITIONS AT DISCHARGE
Called, but had to leave a v/m.  Pt will need to seek his pmd.     Patient is stable: tolerating diet, voiding, ambulating, denies pain   70 minutes spent in direct patient care including physical examination, discharge instructions , medication instructions and follow up, patient and son Pepito verbalized understanding and agreed.

## 2017-02-06 NOTE — DISCHARGE NOTE ADULT - MEDICATION SUMMARY - MEDICATIONS TO TAKE
I will START or STAY ON the medications listed below when I get home from the hospital:    aspirin 81 mg oral tablet, chewable  -- 1 tab(s) by mouth once a day  -- Indication: For stroke    Xarelto 20 mg oral tablet  -- 1 tab(s) by mouth once a day with evening meal.  -- Check with your doctor before becoming pregnant.  It is very important that you take or use this exactly as directed.  Do not skip doses or discontinue unless directed by your doctor.  Obtain medical advice before taking any non-prescription drugs as some may affect the action of this medication.  Take with food.    -- Indication: For atrial fibrillation    simvastatin 40 mg oral tablet  -- 1 tab(s) by mouth once a day (at bedtime)  -- Indication: For for cholesterol    hydroCHLOROthiazide 12.5 mg oral capsule  -- 1 cap(s) by mouth once a day  -- Indication: For Essential hypertension    hydrALAZINE 50 mg oral tablet  -- 1 tab(s) by mouth 3 times a day, HOLD if systolic BP less than 120  -- Indication: For Essential hypertension

## 2017-02-06 NOTE — PHYSICAL THERAPY INITIAL EVALUATION ADULT - PERTINENT HX OF CURRENT PROBLEM, REHAB EVAL
Patient admitted to hospital secondary to sudden onset of L sided weakenss and slurred speech found by patient's son Pepito.

## 2017-02-07 LAB
APTT BLD: 35.4 SEC — SIGNIFICANT CHANGE UP (ref 27.5–37.4)
APTT BLD: 42.5 SEC — HIGH (ref 27.5–37.4)
APTT BLD: 55.7 SEC — HIGH (ref 27.5–37.4)
HCT VFR BLD CALC: 45.1 % — HIGH (ref 34.5–45)
HGB BLD-MCNC: 15.1 G/DL — SIGNIFICANT CHANGE UP (ref 11.5–15.5)
MCHC RBC-ENTMCNC: 27.6 PG — SIGNIFICANT CHANGE UP (ref 27–34)
MCHC RBC-ENTMCNC: 33.6 GM/DL — SIGNIFICANT CHANGE UP (ref 32–36)
MCV RBC AUTO: 82.3 FL — SIGNIFICANT CHANGE UP (ref 80–100)
PLATELET # BLD AUTO: 254 K/UL — SIGNIFICANT CHANGE UP (ref 150–400)
RBC # BLD: 5.48 M/UL — HIGH (ref 3.8–5.2)
RBC # FLD: 12.7 % — SIGNIFICANT CHANGE UP (ref 11–15)
WBC # BLD: 6.5 K/UL — SIGNIFICANT CHANGE UP (ref 3.8–10.5)
WBC # FLD AUTO: 6.5 K/UL — SIGNIFICANT CHANGE UP (ref 3.8–10.5)

## 2017-02-07 PROCEDURE — 99223 1ST HOSP IP/OBS HIGH 75: CPT

## 2017-02-07 PROCEDURE — 99233 SBSQ HOSP IP/OBS HIGH 50: CPT

## 2017-02-07 RX ORDER — HYDRALAZINE HCL 50 MG
50 TABLET ORAL EVERY 8 HOURS
Qty: 0 | Refills: 0 | Status: DISCONTINUED | OUTPATIENT
Start: 2017-02-07 | End: 2017-02-08

## 2017-02-07 RX ORDER — RIVAROXABAN 15 MG-20MG
1 KIT ORAL
Qty: 30 | Refills: 0 | OUTPATIENT
Start: 2017-02-07 | End: 2017-03-09

## 2017-02-07 RX ADMIN — SIMVASTATIN 40 MILLIGRAM(S): 20 TABLET, FILM COATED ORAL at 21:16

## 2017-02-07 RX ADMIN — Medication 50 MILLIGRAM(S): at 21:16

## 2017-02-07 RX ADMIN — HEPARIN SODIUM 650 UNIT(S)/HR: 5000 INJECTION INTRAVENOUS; SUBCUTANEOUS at 04:02

## 2017-02-07 RX ADMIN — Medication 81 MILLIGRAM(S): at 11:29

## 2017-02-07 RX ADMIN — Medication 25 MILLIGRAM(S): at 13:08

## 2017-02-07 RX ADMIN — HEPARIN SODIUM 550 UNIT(S)/HR: 5000 INJECTION INTRAVENOUS; SUBCUTANEOUS at 18:12

## 2017-02-07 RX ADMIN — HEPARIN SODIUM 550 UNIT(S)/HR: 5000 INJECTION INTRAVENOUS; SUBCUTANEOUS at 11:24

## 2017-02-08 VITALS
OXYGEN SATURATION: 99 % | DIASTOLIC BLOOD PRESSURE: 63 MMHG | RESPIRATION RATE: 18 BRPM | SYSTOLIC BLOOD PRESSURE: 123 MMHG | HEART RATE: 79 BPM | TEMPERATURE: 98 F

## 2017-02-08 DIAGNOSIS — I63.9 CEREBRAL INFARCTION, UNSPECIFIED: ICD-10-CM

## 2017-02-08 LAB
APTT BLD: 127.6 SEC — CRITICAL HIGH (ref 27.5–37.4)
APTT BLD: 36.6 SEC — SIGNIFICANT CHANGE UP (ref 27.5–37.4)
HCT VFR BLD CALC: 45.8 % — HIGH (ref 34.5–45)
HGB BLD-MCNC: 15.3 G/DL — SIGNIFICANT CHANGE UP (ref 11.5–15.5)
MCHC RBC-ENTMCNC: 27.9 PG — SIGNIFICANT CHANGE UP (ref 27–34)
MCHC RBC-ENTMCNC: 33.4 GM/DL — SIGNIFICANT CHANGE UP (ref 32–36)
MCV RBC AUTO: 83.6 FL — SIGNIFICANT CHANGE UP (ref 80–100)
PLATELET # BLD AUTO: 228 K/UL — SIGNIFICANT CHANGE UP (ref 150–400)
RBC # BLD: 5.48 M/UL — HIGH (ref 3.8–5.2)
RBC # FLD: 13.2 % — SIGNIFICANT CHANGE UP (ref 11–15)
WBC # BLD: 7 K/UL — SIGNIFICANT CHANGE UP (ref 3.8–10.5)
WBC # FLD AUTO: 7 K/UL — SIGNIFICANT CHANGE UP (ref 3.8–10.5)

## 2017-02-08 PROCEDURE — 99239 HOSP IP/OBS DSCHRG MGMT >30: CPT

## 2017-02-08 RX ORDER — HYDRALAZINE HCL 50 MG
1 TABLET ORAL
Qty: 90 | Refills: 0
Start: 2017-02-08 | End: 2017-03-10

## 2017-02-08 RX ORDER — HYDRALAZINE HCL 50 MG
50 TABLET ORAL
Qty: 0 | Refills: 0 | Status: DISCONTINUED | OUTPATIENT
Start: 2017-02-08 | End: 2017-02-08

## 2017-02-08 RX ORDER — LOSARTAN POTASSIUM 100 MG/1
1 TABLET, FILM COATED ORAL
Qty: 0 | Refills: 0 | COMMUNITY

## 2017-02-08 RX ORDER — RIVAROXABAN 15 MG-20MG
1 KIT ORAL
Qty: 30 | Refills: 0 | OUTPATIENT
Start: 2017-02-08 | End: 2017-03-10

## 2017-02-08 RX ORDER — RIVAROXABAN 15 MG-20MG
20 KIT ORAL DAILY
Qty: 0 | Refills: 0 | Status: DISCONTINUED | OUTPATIENT
Start: 2017-02-08 | End: 2017-02-08

## 2017-02-08 RX ORDER — ASPIRIN/CALCIUM CARB/MAGNESIUM 324 MG
1 TABLET ORAL
Qty: 30 | Refills: 0 | OUTPATIENT
Start: 2017-02-08 | End: 2017-03-10

## 2017-02-08 RX ORDER — SIMVASTATIN 20 MG/1
1 TABLET, FILM COATED ORAL
Qty: 30 | Refills: 0 | OUTPATIENT
Start: 2017-02-08 | End: 2017-03-10

## 2017-02-08 RX ADMIN — RIVAROXABAN 20 MILLIGRAM(S): KIT at 12:19

## 2017-02-08 RX ADMIN — Medication 50 MILLIGRAM(S): at 05:14

## 2017-02-08 RX ADMIN — HEPARIN SODIUM 350 UNIT(S)/HR: 5000 INJECTION INTRAVENOUS; SUBCUTANEOUS at 02:28

## 2017-02-08 RX ADMIN — HEPARIN SODIUM 0 UNIT(S)/HR: 5000 INJECTION INTRAVENOUS; SUBCUTANEOUS at 01:16

## 2017-02-08 RX ADMIN — Medication 81 MILLIGRAM(S): at 12:19

## 2017-02-08 NOTE — PROGRESS NOTE ADULT - ATTENDING COMMENTS
Dc plan for am, if remains stable, will follow with cardio/neuro.
for Dc home with outpatient PT  70 minutes spent in direct patient care including physical examination, discharge instructions , medication instructions and follow up, patient and son both verbalized understanding and agreed.
labs and imaging reviewed, care discussed with consultants ,and family

## 2017-02-08 NOTE — PROGRESS NOTE ADULT - PROBLEM SELECTOR PLAN 5
likely demand ischemia in setting of TIA and elevated BP  cardio following
likely demand ischemia in setting of TIA and elevated BP  cardio following, recommend outpatient f/u
likely demand ischemia in setting of TIA and elevated BP  follow TTE  cardio following

## 2017-02-08 NOTE — PROGRESS NOTE ADULT - SUBJECTIVE AND OBJECTIVE BOX
Assessment:  HTN  TIA/r/o CVA, Neuro noted  Heparin initiated  ASA  Slow reduction in SBP  Added Hydralazine IVP as needed  HR noted, Clonidine was given, now held, slow improvement HR,   HR improved off Clonidine  SBP remains labile, will monitor  Long term AC recommended, however, given her age, Eliquis may not be best choice, Xarelto or Warfarin are better choices. To be discussed with her care takers as to f/u and best choice for her.
Assessment:  HTN  TIA/r/o CVA, Neuro noted  Heparin initiated  ASA  Slow reduction in SBP  Added Hydralazine IVP as needed  HR noted, Clonidine was given, now held, slow improvement HR,   HR improved off Clonidine  SBP remains labile, will monitor  Long term AC recommended, however, given her age, Xarelto started
Assessment:  HTN  TIA/r/o CVA, Neuro noted  Heparin initiated  ASA  Slow reduction in SBP  Added Hydralazine IVP as needed  HR noted, Clonidine was given, now held, slow improvement HR, Will monitor closely over next 24 hours
Assessment:  HTN  TIA/r/o CVA, Neuro noted  Heparin initiated  ASA  Slow reduction in SBP  Added Hydralazine IVP as needed  HR noted, Clonidine was given, now held, slow improvement HR,     HR improved off Clonidine  SBP remains labile, will monitor
CHIEF COMPLAINT/INTERVAL HISTORY:    Patient is a 81y old  Female who presents with a chief complaint of weakness, facial droop (06 Feb 2017 09:40)      HPI:  81 year old woman with PMH of HTN; now p/w slurred speech and left sided weakness. patient last known normal at 1800.  at 1830 family noticed patient with slurred speech, left sided facial droop and left arm weakness. upon arrival to ED patient with residual slurred speech. upon return from CT patient back to baseline with NIH normal. denies headache. denies cp/sob/palp. denies abd pain. denies numbness/tingling. denies focal weakness currently. denies back pain. denies f/c/s.    As per patient, was on losartan but stopped taking "months ago" for unclear reasons.  As per son, he takes care of her at home and would like to discuss HHA. (05 Feb 2017 00:41)      SUBJECTIVE & OBJECTIVE: Pt seen and examined at bedside.   Denies chest pain/SOB, nausea/vomiting/diarrhea, No cough, dizziness, HA or blurry vision, all other ROS negative.      Vital Signs Last 24 Hrs  T(C): 37.1, Max: 37.1 (02-06 @ 05:06)  T(F): 98.8, Max: 98.8 (02-06 @ 05:06)  HR: 74 (53 - 75)  BP: 174/80 (162/82 - 192/90)  BP(mean): --  ABP: --  ABP(mean): --  RR: 17 (17 - 17)  SpO2: 96% (96% - 100%)        MEDICATIONS  (STANDING):  aspirin  chewable 81milliGRAM(s) Oral daily  simvastatin 40milliGRAM(s) Oral at bedtime  heparin  Infusion. Unit(s)/Hr IV Continuous <Continuous>  hydrALAZINE 25milliGRAM(s) Oral every 8 hours    MEDICATIONS  (PRN):  hydrALAZINE Injectable 20milliGRAM(s) IV Push every 4 hours PRN only if SBP greater than 180        PHYSICAL EXAM:    GENERAL: NAD, AAOx3  HEAD:  Atraumatic, Normocephalic  EYES: EOMI, PERRLA, conjunctiva and sclera clear  ENMT: Moist mucous membranes  NECK: Supple, No JVD  NERVOUS SYSTEM:  Alert & Oriented X3, Motor Strength 5/5 B/L upper and lower extremities; speech clear  CHEST/LUNG: Clear to auscultation bilaterally; No rales, rhonchi, wheezing, or rubs  HEART: irregular rhythm, mild bradycardia on arrival, now HR better in 70s   ABDOMEN: Soft, Nontender, Nondistended; Bowel sounds present  EXTREMITIES:  no cyanosis or edema    LABS:                        15.0   5.8   )-----------( 231      ( 06 Feb 2017 11:17 )             44.6     06 Feb 2017 11:17    141    |  105    |  10     ----------------------------<  166    3.6     |  29     |  0.88     Ca    8.9        06 Feb 2017 11:17  Mg     2.3       06 Feb 2017 11:17    TPro  7.0    /  Alb  3.6    /  TBili  1.1    /  DBili  x      /  AST  39     /  ALT  41     /  AlkPhos  61     04 Feb 2017 19:58    PT/INR - ( 04 Feb 2017 19:58 )   PT: 14.5 sec;   INR: 1.29 ratio         PTT - ( 06 Feb 2017 11:18 )  PTT:53.5 sec      CT head:  IMPRESSION:     No acute intracranial hemorrhage, mass effect or CT evidence for acute   territorial infarction.      carotid doppler:  IMPRESSION:   No hemodynamically significant carotidartery stenoses.
CHIEF COMPLAINT/INTERVAL HISTORY:    Patient is a 81y old  Female who presents with a chief complaint of weakness, facial droop (06 Feb 2017 09:40)      HPI:  81 year old woman with PMH of HTN; now p/w slurred speech and left sided weakness. patient last known normal at 1800.  at 1830 family noticed patient with slurred speech, left sided facial droop and left arm weakness. upon arrival to ED patient with residual slurred speech. upon return from CT patient back to baseline with NIH normal. denies headache. denies cp/sob/palp. denies abd pain. denies numbness/tingling. denies focal weakness currently. denies back pain. denies f/c/s.    As per patient, was on losartan but stopped taking "months ago" for unclear reasons.  As per son, he takes care of her at home and would like to discuss HHA. (05 Feb 2017 00:41)      SUBJECTIVE & OBJECTIVE: Pt seen and examined at bedside.   Spoke to Pt. with help of Creole interpretor Aroldo  Pt. denies chest pain/SOB, nausea/vomiting/diarrhea, No cough, dizziness, HA or blurry vision, all other ROS negative.  No weakness/dizziness       Vital Signs Last 24 Hrs  T(C): 36.6, Max: 36.8 (02-06 @ 16:45)  T(F): 97.8, Max: 98.2 (02-06 @ 16:45)  HR: 78 (61 - 78)  BP: 171/112 (137/64 - 192/91)  BP(mean): --  ABP: --  ABP(mean): --  RR: 17 (16 - 17)  SpO2: 100% (97% - 100%)        MEDICATIONS  (STANDING):  aspirin  chewable 81milliGRAM(s) Oral daily  simvastatin 40milliGRAM(s) Oral at bedtime  heparin  Infusion. Unit(s)/Hr IV Continuous <Continuous>  hydrALAZINE 25milliGRAM(s) Oral every 8 hours    MEDICATIONS  (PRN):  hydrALAZINE Injectable 20milliGRAM(s) IV Push every 4 hours PRN only if SBP greater than 180          MEDICATIONS  (STANDING):  aspirin  chewable 81milliGRAM(s) Oral daily  simvastatin 40milliGRAM(s) Oral at bedtime  heparin  Infusion. Unit(s)/Hr IV Continuous <Continuous>  hydrALAZINE 25milliGRAM(s) Oral every 8 hours    MEDICATIONS  (PRN):  hydrALAZINE Injectable 20milliGRAM(s) IV Push every 4 hours PRN only if SBP greater than 180        PHYSICAL EXAM:    GENERAL: NAD, AAOx3  HEAD:  Atraumatic, Normocephalic  EYES: EOMI, PERRLA, conjunctiva and sclera clear  ENMT: Moist mucous membranes  NECK: Supple, No JVD  NERVOUS SYSTEM:  Alert & Oriented X3, Motor Strength 5/5 B/L upper and lower extremities; speech clear  CHEST/LUNG: Clear to auscultation bilaterally; No rales, rhonchi, wheezing, or rubs  HEART: irregular rhythm; regular rate, S1, S2 present  ABDOMEN: Soft, Nontender, Nondistended; Bowel sounds present  EXTREMITIES:  No cyanosis, or edema    LABS:                        15.1   6.5   )-----------( 254      ( 07 Feb 2017 08:19 )             45.1     06 Feb 2017 11:17    141    |  105    |  10     ----------------------------<  166    3.6     |  29     |  0.88     Ca    8.9        06 Feb 2017 11:17  Mg     2.3       06 Feb 2017 11:17      PTT - ( 07 Feb 2017 11:02 )  PTT:55.7 sec  PTT - ( 07 Feb 2017 11:02 )  PTT:55.7 sec
CHIEF COMPLAINT/INTERVAL HISTORY:    Patient is a 81y old  Female who presents with a chief complaint of weakness, facial droop (06 Feb 2017 09:40)      HPI:  81 year old woman with PMH of HTN; now p/w slurred speech and left sided weakness. patient last known normal at 1800.  at 1830 family noticed patient with slurred speech, left sided facial droop and left arm weakness. upon arrival to ED patient with residual slurred speech. upon return from CT patient back to baseline with NIH normal. denies headache. denies cp/sob/palp. denies abd pain. denies numbness/tingling. denies focal weakness currently. denies back pain. denies f/c/s.    As per patient, was on losartan but stopped taking "months ago" for unclear reasons.  As per son, he takes care of her at home and would like to discuss HHA. (05 Feb 2017 00:41)      SUBJECTIVE & OBJECTIVE: Pt seen and examined at bedside. No weakness/numbness or speech difficulty.  Denies chest pain/SOB, nausea/vomiting/diarrhea, No cough, dizziness, HA or blurry vision, all other ROS negative.  (creol interpretor unit manager Mr. Graves, pt. does not like using cyra com phone)       Vital Signs Last 24 Hrs  T(C): 36.5, Max: 37.1 (02-07 @ 17:40)  T(F): 97.7, Max: 98.8 (02-07 @ 17:40)  HR: 79 (64 - 79)  BP: 123/63 (123/63 - 189/89)  BP(mean): --  ABP: --  ABP(mean): --  RR: 18 (17 - 18)  SpO2: 99% (99% - 100%)        MEDICATIONS  (STANDING):  aspirin  chewable 81milliGRAM(s) Oral daily  simvastatin 40milliGRAM(s) Oral at bedtime  hydrochlorothiazide    Capsule 12.5milliGRAM(s) Oral daily  hydrALAZINE 50milliGRAM(s) Oral four times a day  rivaroxaban 20milliGRAM(s) Oral daily    MEDICATIONS  (PRN):  hydrALAZINE Injectable 20milliGRAM(s) IV Push every 4 hours PRN only if SBP greater than 180        PHYSICAL EXAM:    GENERAL: NAD, AAOx3  HEAD:  Atraumatic, Normocephalic  EYES: EOMI, PERRLA, conjunctiva and sclera clear  ENMT: Moist mucous membranes  NECK: Supple, No JVD  NERVOUS SYSTEM:  Alert & Oriented X3, Motor Strength 4/5 B/L upper and lower extremities; no facial weakness, speech normal, cranial nerves intact  CHEST/LUNG: Clear to auscultation bilaterally; No rales, rhonchi, wheezing, or rubs  HEART: Irregular rhythm; S1/S2, rate controlled  ABDOMEN: Soft, Nontender, Nondistended; Bowel sounds present  EXTREMITIES:  no cyanosis, or edema    LABS:                        15.3   7.0   )-----------( 228      ( 08 Feb 2017 08:25 )             45.8           PTT - ( 08 Feb 2017 09:34 )  PTT:36.6 sec    MRI    IMPRESSION:  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    No acute intracranial hemorrhage

## 2017-02-08 NOTE — PROGRESS NOTE ADULT - PROBLEM SELECTOR PLAN 4
Resolved  will avoid BB for now given recent bradycardia.
Resolved  will avoid BB for now given recent bradycardia.
Resolved  will avoid BB for now given recent bradycardia.  Keep tele monitoring.

## 2017-02-08 NOTE — PROGRESS NOTE ADULT - PROVIDER SPECIALTY LIST ADULT
Cardiology
Hospitalist
Neurology
Cardiology

## 2017-02-08 NOTE — PROGRESS NOTE ADULT - ASSESSMENT
awaek alert speech fluent arm leg 4/5 tia for mri brain echo doppler  asa 81 mg  pt
awake alert speech  fluent ledft cva hx of afib  on eliquis 2,5 mg bid  arm leg 4/5 no seziure  will follow in office
awakem alertr  speech fluent  afib in iv heparin mri brain left small multiple  infarct  embolic  for anticoagulation  arm leg 4/5
80 y/o F with PMH of HTN, non compliance to medications p/w left sided weakness and slurred speech, resolved now, CTH negative for acute pathology, MRI with acute and subacute strokes, on arrival she was also found to be in A. Fib with elevated Troponins, in setting of TIA.
80 y/o F with PMH of HTN, non compliance to medications p/w left sided weakness and slurred speech, resolved now, CTH negative for acute pathology, c/w TIA, also found to be in A. Fib with elevated Troponins, in setting of TIA.
82 y/o F with PMH of HTN, non compliance to medications p/w left sided weakness and slurred speech, resolved now, CTH negative for acute pathology, MRI with acute and subacute strokes in left MCA territory, on arrival she was also found to be in A. Fib with elevated Troponins, in setting of TIA.

## 2017-02-08 NOTE — PROGRESS NOTE ADULT - NSHPATTENDINGPLANDISCUSS_GEN_ALL_CORE
Pepito fernandez
Son Pepito in detail over phone, all questions answered, he agreed with plan of care.
patient, son, CM/SW. RN

## 2017-02-08 NOTE — PROGRESS NOTE ADULT - PROBLEM SELECTOR PLAN 2
as above  rate controlled
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 and son both aware she is at increased risk for  bleeding including but not limited to intracranial bleed, paralysis, hemorrhagic stroke, Gi bleed and death. They wish to start xarelto for atrial fibrillation.
heparin gtt, cardio following.  monitor PTT  will follow MRI brain and echo, will discuss with Cardio for starting oral AC

## 2017-02-10 DIAGNOSIS — R00.1 BRADYCARDIA, UNSPECIFIED: ICD-10-CM

## 2017-02-10 DIAGNOSIS — I24.8 OTHER FORMS OF ACUTE ISCHEMIC HEART DISEASE: ICD-10-CM

## 2017-02-10 DIAGNOSIS — R79.1 ABNORMAL COAGULATION PROFILE: ICD-10-CM

## 2017-02-10 DIAGNOSIS — I16.0 HYPERTENSIVE URGENCY: ICD-10-CM

## 2017-02-10 DIAGNOSIS — I48.91 UNSPECIFIED ATRIAL FIBRILLATION: ICD-10-CM

## 2017-02-10 DIAGNOSIS — Z79.82 LONG TERM (CURRENT) USE OF ASPIRIN: ICD-10-CM

## 2017-02-10 DIAGNOSIS — I63.412 CEREBRAL INFARCTION DUE TO EMBOLISM OF LEFT MIDDLE CEREBRAL ARTERY: ICD-10-CM

## 2017-02-10 DIAGNOSIS — Z79.01 LONG TERM (CURRENT) USE OF ANTICOAGULANTS: ICD-10-CM

## 2017-02-10 DIAGNOSIS — Z91.14 PATIENT'S OTHER NONCOMPLIANCE WITH MEDICATION REGIMEN: ICD-10-CM

## 2017-02-10 DIAGNOSIS — R79.89 OTHER SPECIFIED ABNORMAL FINDINGS OF BLOOD CHEMISTRY: ICD-10-CM

## 2017-02-10 DIAGNOSIS — G45.9 TRANSIENT CEREBRAL ISCHEMIC ATTACK, UNSPECIFIED: ICD-10-CM

## 2017-08-30 NOTE — PATIENT PROFILE ADULT. - ABILITY TO HEAR (WITH HEARING AID OR HEARING APPLIANCE IF NORMALLY USED):
Labs pending - urine normal.   Note given that was seen today. Stool negative for blood. Start omeprazole 1 daily. Stop ibuprofen, take Tylenol if needed for the headache. Decrease smoking. Stop caffeine, marijuana, and alcohol.   Recheck in 2 weeks If blood is coming out in your stool, you may have a lower GI tract problem or a very fast upper GI tract bleed. Bleeding from the GI tract can be bright red.  Or it may look dark and tarry. Tests may also find blood in your stool that can’t be seen with th Adequate: hears normal conversation without difficulty · Signs of fluid loss (dehydration). These include a dry, sticky mouth, decreased urine output; and very dark urine. · Belly (abdominal) pain   Date Last Reviewed: 7/1/2016  © [id number] The [address] [place] [address], [address] [place] [address].

## 2018-02-11 NOTE — PROGRESS NOTE ADULT - PROBLEM SELECTOR PLAN 1
s/p trach per CCT team    will need peg GI consult noted    continue with care pulmonary [facility] per Dr. watson (pulm)
Neurology following  d/w Dr. Lamb , MRI/C. doppler , echo results reviewed and d/w neuro, as per neuro Ok to switch to Oral AC, ana  d/w cardio Dr. Navarrete, suggest to start xarelto vs coumadin , called family, await discussion with them to decide about AC, will also have SW to check with pharmacy/insurance for approval for Noval AC.  echo pending.  neuro checks  tele monitoring  PT evaluation  check HbA1C  avoid extreme lowering of BP in setting of TIA.  ASA/statin
acute stroke  ASA/statin  neuro/cardio eval appreciated  PT eval appreciated  for Dc home today
Neurology following  d/w Dr. Lamb to hold heparin drip for few hours to get MRI brain   carotid doppler result noted  echo pending.  neuro checks  tele monitoring  PT evaluation  check HbA1C  avoid extreme lowering of BP in setting of TIA.  ASA/statin

## 2019-10-01 ENCOUNTER — OUTPATIENT (OUTPATIENT)
Dept: OUTPATIENT SERVICES | Facility: HOSPITAL | Age: 84
LOS: 1 days | End: 2019-10-01
Payer: MEDICARE

## 2019-10-01 PROCEDURE — G9001: CPT

## 2019-10-22 ENCOUNTER — INPATIENT (INPATIENT)
Facility: HOSPITAL | Age: 84
LOS: 2 days | Discharge: HOME HEALTH | End: 2019-10-25
Attending: GENERAL ACUTE CARE HOSPITAL | Admitting: GENERAL ACUTE CARE HOSPITAL
Payer: MEDICARE

## 2019-10-22 VITALS
HEART RATE: 67 BPM | SYSTOLIC BLOOD PRESSURE: 200 MMHG | OXYGEN SATURATION: 99 % | TEMPERATURE: 98 F | RESPIRATION RATE: 16 BRPM | DIASTOLIC BLOOD PRESSURE: 87 MMHG

## 2019-10-22 DIAGNOSIS — G45.9 TRANSIENT CEREBRAL ISCHEMIC ATTACK, UNSPECIFIED: ICD-10-CM

## 2019-10-22 DIAGNOSIS — E78.5 HYPERLIPIDEMIA, UNSPECIFIED: ICD-10-CM

## 2019-10-22 DIAGNOSIS — I16.0 HYPERTENSIVE URGENCY: ICD-10-CM

## 2019-10-22 DIAGNOSIS — I48.20 CHRONIC ATRIAL FIBRILLATION, UNSPECIFIED: ICD-10-CM

## 2019-10-22 PROBLEM — I10 ESSENTIAL (PRIMARY) HYPERTENSION: Chronic | Status: ACTIVE | Noted: 2017-02-04

## 2019-10-22 LAB
ALBUMIN SERPL ELPH-MCNC: 4.2 G/DL — SIGNIFICANT CHANGE UP (ref 3.3–5)
ALP SERPL-CCNC: 61 U/L — SIGNIFICANT CHANGE UP (ref 40–120)
ALT FLD-CCNC: 22 U/L — SIGNIFICANT CHANGE UP (ref 12–78)
ANION GAP SERPL CALC-SCNC: 4 MMOL/L — LOW (ref 5–17)
APTT BLD: 41.1 SEC — HIGH (ref 28.5–37)
AST SERPL-CCNC: 20 U/L — SIGNIFICANT CHANGE UP (ref 15–37)
BASOPHILS # BLD AUTO: 0.04 K/UL — SIGNIFICANT CHANGE UP (ref 0–0.2)
BASOPHILS NFR BLD AUTO: 0.7 % — SIGNIFICANT CHANGE UP (ref 0–2)
BILIRUB SERPL-MCNC: 1.2 MG/DL — SIGNIFICANT CHANGE UP (ref 0.2–1.2)
BUN SERPL-MCNC: 12 MG/DL — SIGNIFICANT CHANGE UP (ref 7–23)
CALCIUM SERPL-MCNC: 9.4 MG/DL — SIGNIFICANT CHANGE UP (ref 8.5–10.1)
CHLORIDE SERPL-SCNC: 109 MMOL/L — HIGH (ref 96–108)
CO2 SERPL-SCNC: 31 MMOL/L — SIGNIFICANT CHANGE UP (ref 22–31)
CREAT SERPL-MCNC: 0.82 MG/DL — SIGNIFICANT CHANGE UP (ref 0.5–1.3)
EOSINOPHIL # BLD AUTO: 0.12 K/UL — SIGNIFICANT CHANGE UP (ref 0–0.5)
EOSINOPHIL NFR BLD AUTO: 2 % — SIGNIFICANT CHANGE UP (ref 0–6)
GLUCOSE BLDC GLUCOMTR-MCNC: 86 MG/DL — SIGNIFICANT CHANGE UP (ref 70–99)
GLUCOSE SERPL-MCNC: 87 MG/DL — SIGNIFICANT CHANGE UP (ref 70–99)
HCT VFR BLD CALC: 43.4 % — SIGNIFICANT CHANGE UP (ref 34.5–45)
HGB BLD-MCNC: 13.3 G/DL — SIGNIFICANT CHANGE UP (ref 11.5–15.5)
IMM GRANULOCYTES NFR BLD AUTO: 0 % — SIGNIFICANT CHANGE UP (ref 0–1.5)
INR BLD: 2.61 RATIO — HIGH (ref 0.88–1.16)
LACTATE SERPL-SCNC: 1.4 MMOL/L — SIGNIFICANT CHANGE UP (ref 0.7–2)
LYMPHOCYTES # BLD AUTO: 3.23 K/UL — SIGNIFICANT CHANGE UP (ref 1–3.3)
LYMPHOCYTES # BLD AUTO: 54.3 % — HIGH (ref 13–44)
MCHC RBC-ENTMCNC: 26.5 PG — LOW (ref 27–34)
MCHC RBC-ENTMCNC: 30.6 GM/DL — LOW (ref 32–36)
MCV RBC AUTO: 86.6 FL — SIGNIFICANT CHANGE UP (ref 80–100)
MONOCYTES # BLD AUTO: 0.54 K/UL — SIGNIFICANT CHANGE UP (ref 0–0.9)
MONOCYTES NFR BLD AUTO: 9.1 % — SIGNIFICANT CHANGE UP (ref 2–14)
NEUTROPHILS # BLD AUTO: 2.02 K/UL — SIGNIFICANT CHANGE UP (ref 1.8–7.4)
NEUTROPHILS NFR BLD AUTO: 33.9 % — LOW (ref 43–77)
NRBC # BLD: 0 /100 WBCS — SIGNIFICANT CHANGE UP (ref 0–0)
PLATELET # BLD AUTO: 257 K/UL — SIGNIFICANT CHANGE UP (ref 150–400)
POTASSIUM SERPL-MCNC: 3.6 MMOL/L — SIGNIFICANT CHANGE UP (ref 3.5–5.3)
POTASSIUM SERPL-SCNC: 3.6 MMOL/L — SIGNIFICANT CHANGE UP (ref 3.5–5.3)
PROT SERPL-MCNC: 7.9 GM/DL — SIGNIFICANT CHANGE UP (ref 6–8.3)
PROTHROM AB SERPL-ACNC: 30.1 SEC — HIGH (ref 10–12.9)
RBC # BLD: 5.01 M/UL — SIGNIFICANT CHANGE UP (ref 3.8–5.2)
RBC # FLD: 14.7 % — HIGH (ref 10.3–14.5)
SODIUM SERPL-SCNC: 144 MMOL/L — SIGNIFICANT CHANGE UP (ref 135–145)
TROPONIN I SERPL-MCNC: 0.24 NG/ML — HIGH (ref 0.01–0.04)
WBC # BLD: 5.95 K/UL — SIGNIFICANT CHANGE UP (ref 3.8–10.5)
WBC # FLD AUTO: 5.95 K/UL — SIGNIFICANT CHANGE UP (ref 3.8–10.5)

## 2019-10-22 PROCEDURE — 99285 EMERGENCY DEPT VISIT HI MDM: CPT

## 2019-10-22 PROCEDURE — 71045 X-RAY EXAM CHEST 1 VIEW: CPT | Mod: 26

## 2019-10-22 PROCEDURE — 99223 1ST HOSP IP/OBS HIGH 75: CPT | Mod: AI

## 2019-10-22 PROCEDURE — 70498 CT ANGIOGRAPHY NECK: CPT | Mod: 26

## 2019-10-22 PROCEDURE — 70450 CT HEAD/BRAIN W/O DYE: CPT | Mod: 26,59

## 2019-10-22 PROCEDURE — 93880 EXTRACRANIAL BILAT STUDY: CPT | Mod: 26

## 2019-10-22 PROCEDURE — 93010 ELECTROCARDIOGRAM REPORT: CPT

## 2019-10-22 PROCEDURE — 70496 CT ANGIOGRAPHY HEAD: CPT | Mod: 26

## 2019-10-22 RX ORDER — HYDRALAZINE HCL 50 MG
10 TABLET ORAL ONCE
Refills: 0 | Status: COMPLETED | OUTPATIENT
Start: 2019-10-22 | End: 2019-10-22

## 2019-10-22 RX ORDER — SIMVASTATIN 20 MG/1
40 TABLET, FILM COATED ORAL AT BEDTIME
Refills: 0 | Status: DISCONTINUED | OUTPATIENT
Start: 2019-10-22 | End: 2019-10-25

## 2019-10-22 RX ORDER — CARVEDILOL PHOSPHATE 80 MG/1
6.25 CAPSULE, EXTENDED RELEASE ORAL EVERY 12 HOURS
Refills: 0 | Status: DISCONTINUED | OUTPATIENT
Start: 2019-10-22 | End: 2019-10-23

## 2019-10-22 RX ORDER — ASPIRIN/CALCIUM CARB/MAGNESIUM 324 MG
81 TABLET ORAL DAILY
Refills: 0 | Status: DISCONTINUED | OUTPATIENT
Start: 2019-10-22 | End: 2019-10-25

## 2019-10-22 RX ORDER — RIVAROXABAN 15 MG-20MG
20 KIT ORAL
Refills: 0 | Status: DISCONTINUED | OUTPATIENT
Start: 2019-10-22 | End: 2019-10-25

## 2019-10-22 RX ORDER — HYDRALAZINE HCL 50 MG
50 TABLET ORAL THREE TIMES A DAY
Refills: 0 | Status: DISCONTINUED | OUTPATIENT
Start: 2019-10-22 | End: 2019-10-25

## 2019-10-22 RX ORDER — HYDROCHLOROTHIAZIDE 25 MG
12.5 TABLET ORAL DAILY
Refills: 0 | Status: DISCONTINUED | OUTPATIENT
Start: 2019-10-22 | End: 2019-10-23

## 2019-10-22 RX ADMIN — CARVEDILOL PHOSPHATE 6.25 MILLIGRAM(S): 80 CAPSULE, EXTENDED RELEASE ORAL at 20:23

## 2019-10-22 RX ADMIN — Medication 50 MILLIGRAM(S): at 20:23

## 2019-10-22 RX ADMIN — Medication 10 MILLIGRAM(S): at 16:03

## 2019-10-22 NOTE — H&P ADULT - PROBLEM SELECTOR PLAN 1
- admit to observation, Hospitalist service Dr. Prakash  - Diet: Cardiac Diet  - Activity: bedrest  - possible D/C in AM  - Folic Acid, TSH, T4, Lipid profile, HgA1C in AM  - UA x stat  - c/w Hydralazine 50mg TID, HCTZ 12.5mg, will add Coreg

## 2019-10-22 NOTE — ED ADULT NURSE REASSESSMENT NOTE - NS ED NURSE REASSESS COMMENT FT1
Pt back from ct scan bp elevated Dr akbar aware pt medicated as ordered will continue to monitor pt family at bedside

## 2019-10-22 NOTE — H&P ADULT - NSHPLABSRESULTS_GEN_ALL_CORE
LABS:                        13.3   5.95  )-----------( 257      ( 22 Oct 2019 14:04 )             43.4     10-22    144  |  109<H>  |  12  ----------------------------<  87  3.6   |  31  |  0.82    Ca    9.4      22 Oct 2019 14:04    TPro  7.9  /  Alb  4.2  /  TBili  1.2  /  DBili  x   /  AST  20  /  ALT  22  /  AlkPhos  61  10-22    PT/INR - ( 22 Oct 2019 14:04 )   PT: 30.1 sec;   INR: 2.61 ratio         PTT - ( 22 Oct 2019 14:04 )  PTT:41.1 sec  CARDIAC MARKERS ( 22 Oct 2019 14:04 )  .242 ng/mL / x     / x     / x     / x    - EKG reviewed A.Fib @ 57bpm w/ +LBBB.  QRS interval 132ms.  QT/QTC interval: 438/426ms

## 2019-10-22 NOTE — H&P ADULT - ATTENDING COMMENTS
Patient seen and examined with PA    Vitals noted. Hypertensive on arrival. BP 200s/100s    General: NAD   Neuro:  Alert & oriented x 3, clear speech, no facial droop, strength 5/5 throughout, finger-nose neg   HEENT: NCAT, EOMI, conjunctiva clear  CV: irreg irreg   Lung: CTAB  Abdomen: soft, NTND. Normactive BS  Extremities: no pedal edema or calf tenderness noted b/l     Labs and imaging reviewed.    Hypertensive emergency vs TIA  BP control   Cardiology consult  Neurology consult       the rest of management per h&p , agree with above Patient seen and examined with PA    Vitals noted. Hypertensive on arrival. BP 200s/100s    General: NAD   Neuro:  Alert & oriented x 3, clear speech, no facial droop, strength 5/5 throughout, finger-nose neg   HEENT: NCAT, EOMI, conjunctiva clear  CV: irreg irreg   Lung: CTAB  Abdomen: soft, NTND. Normactive BS  Extremities: no pedal edema or calf tenderness noted b/l     Labs and imaging reviewed.    Hypertensive emergency vs TIA  unclear if patient compliant with meds, per daughters at bedside they do not believe she is.   BP control   Cardiology consult  Neurology consult       the rest of management per h&p , agree with above

## 2019-10-22 NOTE — H&P ADULT - NSHPPHYSICALEXAM_GEN_ALL_CORE
T(F): 98.2 (10-22-19 @ 15:28), Max: 98.2 (10-22-19 @ 15:28)  HR: 75 (10-22-19 @ 16:34) (67 - 75)  BP: 184/83 (10-22-19 @ 16:34) (184/83 - 215/95)  RR: 21 (10-22-19 @ 16:34) (16 - 24)  SpO2: 98% (10-22-19 @ 16:34) (98% - 100%)  Wt(kg): --    PHYSICAL EXAM:  General: NAD  Neuro:  Alert & oriented x 3  HEENT: NCAT, EOMI, conjunctiva clear  CV: +irregular Irregular no R/G/M  Lung: clear to ausculation bilaterally, respirations nonlabored, good inspiratory effort  Abdomen: soft, NTND. Normactive BS  Extremities: no pedal edema or calf tenderness noted

## 2019-10-22 NOTE — ED ADULT NURSE NOTE - OBJECTIVE STATEMENT
pt BIBA complaining of dizziness and not feeling well since this am. history of htn and dm. blood pressure 200/97 at triage. fs 86 at triage. Complaining of dizziness and not feeling well since this am. history of htn and dm. blood pressure 200/97 at triage.

## 2019-10-22 NOTE — ED ADULT TRIAGE NOTE - CHIEF COMPLAINT QUOTE
pt BIBA complaining of dizziness and not feeling well since this am. history of htn and dm. blood pressure 200/97 at triage. fs 86 at triage.

## 2019-10-22 NOTE — H&P ADULT - NSICDXPASTMEDICALHX_GEN_ALL_CORE_FT
PAST MEDICAL HISTORY:  Atrial fibrillation     Elevated cholesterol     HTN (hypertension)     Hyperlipidemia

## 2019-10-22 NOTE — ED PROVIDER NOTE - CARE PLAN
Principal Discharge DX:	Chest pain  Secondary Diagnosis:	HTN (hypertension)  Secondary Diagnosis:	Afib

## 2019-10-22 NOTE — ED PROVIDER NOTE - OBJECTIVE STATEMENT
85 yo female with history of cva? 4 years ago presents with possible episode of slowed speech sometime this morning, witnessed by aide who is not present. Patient drank coffee instead of usual tea and ate noddle soup. Patient denies chest pain, sob, nausea, vomiting, syncope, fall, headache, weakness, slurred speech. Daughter at bedside with patient, denies any complaints.

## 2019-10-22 NOTE — ED ADULT NURSE NOTE - CHIEF COMPLAINT QUOTE
[facility] [place] [facility]  [address] [place] [address] [address] [id number]  Cardiology Follow Up    Yamel Jose  1946  [id number]      1  Hypertension, essential     2  Thoracic aortic aneurysm without rupture [facility])         Chief Complaint   Patient presents with    Follow-up    Palpitations       Interval History:   Patient presents for follow-up visit  Patient denies any history of chest pain shortness of breath  Patient denies any history of leg edema or orthopnea PND  No history of presyncope syncope  Patient states compliance with the present list of medications  Patient's anxiety is stable    Patient Active Problem List   Diagnosis    Hypertension, essential    Thoracic aortic aneurysm without rupture [facility])     Past Medical History:   Diagnosis Date    Anxiety     Bradycardia     Cerebral infarction ([facility] )     Diabetes mellitus ([facility] )     Diabetic neuropathy ([facility] )     Dilated aortic root ([facility] )     Essential hypertension     Exercise-induced shortness of breath      Social History     Social History    Marital status:       Spouse name: N/A    Number of children: N/A    Years of education: N/A     Occupational History    Retired      Social History Main Topics    Smoking status: Never Smoker    Smokeless tobacco: Never Used    Alcohol use Yes      Comment: rarely consumes alcohol    Drug use: No    Sexual activity: Not on file     Other Topics Concern    Not on file     Social History Narrative    No narrative on file      Family History   Problem Relation Age of Onset    Ovarian cancer Mother     Cancer Sister      Past Surgical History:   Procedure Laterality Date    APPENDECTOMY         Current Outpatient Prescriptions:     amLODIPine (NORVASC) 10 mg tablet, daily, Disp: , Rfl:     aspirin (ASPIR-LOW) 81 mg EC tablet, Take 1 tablet by mouth daily, Disp: , Rfl:     gabapentin (NEURONTIN) 300 mg capsule, Take 1 capsule by mouth 2 (two) times a day, Disp: pt BIBA complaining of dizziness and not feeling well since this am. history of htn and dm. blood pressure 200/97 at triage. fs 86 at triage. , Rfl:     glipiZIDE (GLUCOTROL XL) 2 5 mg 24 hr tablet, Take 1 tablet by mouth daily, Disp: , Rfl:     losartan (COZAAR) 50 mg tablet, one, Disp: , Rfl:     metoprolol succinate (TOPROL-XL) 50 mg 24 hr tablet, Take 50 mg by mouth 2 (two) times a day, Disp: , Rfl: 0    sitaGLIPtin (JANUVIA) 100 mg tablet, Take 1 tablet by mouth daily, Disp: , Rfl:   No Known Allergies    Labs:  No visits with results within 2 Month(s) from this visit     Latest known visit with results is:   Conversion Encounter Outpatient on 08/01/2015   Component Date Value    GLUCOSE, GLUCOMETER 08/02/2015 127     Sodium 08/02/2015 142     Potassium 08/02/2015 3 9     Chloride 08/02/2015 110*    CO2 08/02/2015 24     Anion Gap 08/02/2015 8     Glucose 08/02/2015 123     BUN 08/02/2015 13     Calcium 08/02/2015 9 1     Creatinine 08/02/2015 1 05     AAGFR 08/02/2015 >60     NAAGFR 08/02/2015 >60     Triglycerides 08/02/2015 74     Cholesterol 08/02/2015 144     HDL 08/02/2015 50     LDL Calculated 08/02/2015 79     Troponin I 08/02/2015 <0 04     GLUCOSE, GLUCOMETER 08/02/2015 149*    WBC 08/02/2015 3 63*    RBC 08/02/2015 5 45     Hemoglobin 08/02/2015 15 0     Hematocrit 08/02/2015 45 4     MCV 08/02/2015 83     MCH 08/02/2015 27 5     MCHC 08/02/2015 33 0     RDW 08/02/2015 13 6     Platelets 90/62/9740 158     MPV 08/02/2015 10 3     Troponin I 08/01/2015 <0 04     GLUCOSE, GLUCOMETER 08/01/2015 162*    Troponin I 08/01/2015 <0 04     Magnesium 08/01/2015 2 1     Sodium 08/01/2015 141     Potassium 08/01/2015 3 7     Chloride 08/01/2015 108     CO2 08/01/2015 25     Anion Gap 08/01/2015 8     Total Bilirubin 08/01/2015 0 30     Total Protein 08/01/2015 7 7     Alkaline Phosphatase 08/01/2015 83     ALT 08/01/2015 31     AST 08/01/2015 15     Glucose 08/01/2015 155*    Albumin 08/01/2015 3 7     BUN 08/01/2015 11     Calcium 08/01/2015 9 2     Creatinine 08/01/2015 1 18     AAGFR 08/01/2015 >60  NAAGFR 08/01/2015 >60     Protime 08/01/2015 13 4     INR 08/01/2015 1 04     PTT 08/01/2015 28     WBC 08/01/2015 3 74*    RBC 08/01/2015 5 57     Hemoglobin 08/01/2015 15 8     Hematocrit 08/01/2015 45 9     MCV 08/01/2015 82     MCH 08/01/2015 28 4     MCHC 08/01/2015 34 4     RDW 08/01/2015 13 4     Platelets 86/27/6466 158     nRBC 08/01/2015 0     Neutrophils Relative 08/01/2015 41*    Lymphocytes Relative 08/01/2015 51*    Monocytes Relative 08/01/2015 7     Eosinophils Relative 08/01/2015 1     Neutrophils Absolute 08/01/2015 1 53*    Lymphocytes Absolute 08/01/2015 1 91     Monocytes Absolute 08/01/2015 0 26     Eosinophils Absolute 08/01/2015 0 04     Basophils Absolute 08/01/2015 0 01     MPV 08/01/2015 10 2     Ventricular Rate 08/01/2015 63     Atrial Rate 08/01/2015 63     ME Interval 08/01/2015 188     QRSD Interval 08/01/2015 74     QT Interval 08/01/2015 424     QTC Interval 08/01/2015 433     P Axis 08/01/2015 61     QRS Axis 08/01/2015 5     T Wave Axis 08/01/2015 21     Ventricular Rate 08/02/2015 57     Atrial Rate 08/02/2015 57     ME Interval 08/02/2015 220     QRSD Interval 08/02/2015 82     QT Interval 08/02/2015 448     QTC Interval 08/02/2015 436     P Axis 08/02/2015 45     QRS Axis 08/02/2015 -11     T Wave Axis 08/02/2015 -2      Imaging: No results found      Review of Systems:  Review of Systems   REVIEW OF SYSTEMS:  Constitutional:  Denies fever or chills   Eyes:  Denies change in visual acuity   HENT:  Denies nasal congestion or sore throat   Respiratory:  Denies cough or shortness of breath   Cardiovascular:  Denies chest pain or edema   GI:  Denies abdominal pain, nausea, vomiting, bloody stools or diarrhea   :  Denies dysuria, frequency, difficulty in micturition and nocturia  Musculoskeletal:  Denies back pain or joint pain   Neurologic:  Denies headache, focal weakness or sensory changes   Endocrine:  Denies polyuria or polydipsia Lymphatic:  Denies swollen glands   Psychiatric:   anxiety     Physical Exam:    /74   Pulse 58   Ht 5' 11" (1 803 m)   Wt 97 5 kg (215 lb)   SpO2 98%   BMI 29 99 kg/m²     Physical Exam   PHYSICAL EXAM:  General:  Patient is not in acute distress   Head: Normocephalic, Atraumatic  HEENT:  Both pupils normal-size atraumatic, normocephalic, nonicteric  Neck:  JVP not raised  Trachea central  No carotid bruit  Respiratory:  normal breath sounds no crackles  no rhonchi  Cardiovascular:  Regular rate and rhythm no S3 no murmurs  GI:  Abdomen soft nontender  No organomegaly  Lymphatic:  No cervical or inguinal lymphadenopathy  Neurologic:  Patient is awake alert, oriented   Grossly nonfocal    Discussion/Summary:  Patient overall doing well from a cardiovascular standpoint  Symptoms to watch out from cardiac standpoint which would indicate the need for further cardiac evaluation also discussed with the patient  Previous cardiovascular studies reviewed  Patient does have thoracic aortic aneurysm which has been followed with serial imaging  Follow-up in 6 months  Follow-up with primary care physician  Patient had a few questions which were answered

## 2019-10-22 NOTE — ED ADULT NURSE NOTE - NSIMPLEMENTINTERV_GEN_ALL_ED
Implemented All Fall with Harm Risk Interventions:  [place] to call system. Call bell, personal items and telephone within reach. Instruct patient to call for assistance. Room bathroom lighting operational. Non-slip footwear when patient is off stretcher. Physically safe environment: no spills, clutter or unnecessary equipment. Stretcher in lowest position, wheels locked, appropriate side rails in place. Provide visual cue, wrist band, yellow gown, etc. Monitor gait and stability. Monitor for mental status changes and reorient to person, place, and time. Review medications for side effects contributing to fall risk. Reinforce activity limits and safety measures with patient and family. Provide visual clues: red socks.

## 2019-10-22 NOTE — H&P ADULT - HISTORY OF PRESENT ILLNESS
83 y/o Female w/ PMHX of HLD, HTN, A.Fib (on Xarelto) presenting w/ slurred speech today.  Family member at bedside states slurred speech started today, improved after 15-20 mins, BIBA to the hospital for further evaluation, found to have systolic blood pressure of 200s.  It is unclear if patient is taking her medications daily.  Patient is placed on observation for TIA and Hypertensive Urgency.

## 2019-10-22 NOTE — H&P ADULT - ASSESSMENT
85 y/o Female w/ PMHX of HLD, HTN, A.Fib (on Xarelto) presenting w/ slurred speech today.  Family member at bedside states slurred speech started today, improved after 15-20 mins, BIBA to the hospital for further evaluation, found to have systolic blood pressure of 200s.  It is unclear if patient is taking her medications daily.  Patient is placed on observation for TIA and Hypertensive Urgency.

## 2019-10-23 DIAGNOSIS — I63.9 CEREBRAL INFARCTION, UNSPECIFIED: ICD-10-CM

## 2019-10-23 DIAGNOSIS — R00.1 BRADYCARDIA, UNSPECIFIED: ICD-10-CM

## 2019-10-23 LAB
ALBUMIN SERPL ELPH-MCNC: 3.6 G/DL — SIGNIFICANT CHANGE UP (ref 3.3–5)
ALP SERPL-CCNC: 40 U/L — SIGNIFICANT CHANGE UP (ref 40–120)
ALT FLD-CCNC: 15 U/L — SIGNIFICANT CHANGE UP (ref 12–78)
ANION GAP SERPL CALC-SCNC: 4 MMOL/L — LOW (ref 5–17)
APPEARANCE UR: ABNORMAL
AST SERPL-CCNC: 22 U/L — SIGNIFICANT CHANGE UP (ref 15–37)
BACTERIA # UR AUTO: ABNORMAL
BILIRUB SERPL-MCNC: 1.5 MG/DL — HIGH (ref 0.2–1.2)
BILIRUB UR-MCNC: NEGATIVE — SIGNIFICANT CHANGE UP
BUN SERPL-MCNC: 9 MG/DL — SIGNIFICANT CHANGE UP (ref 7–23)
CALCIUM SERPL-MCNC: 9.1 MG/DL — SIGNIFICANT CHANGE UP (ref 8.5–10.1)
CHLORIDE SERPL-SCNC: 109 MMOL/L — HIGH (ref 96–108)
CHOLEST SERPL-MCNC: 127 MG/DL — SIGNIFICANT CHANGE UP (ref 10–199)
CO2 SERPL-SCNC: 29 MMOL/L — SIGNIFICANT CHANGE UP (ref 22–31)
COLOR SPEC: YELLOW — SIGNIFICANT CHANGE UP
CREAT SERPL-MCNC: 0.64 MG/DL — SIGNIFICANT CHANGE UP (ref 0.5–1.3)
DIFF PNL FLD: ABNORMAL
EPI CELLS # UR: ABNORMAL
FOLATE RBC-MCNC: 1208 NG/ML — SIGNIFICANT CHANGE UP (ref 499–1504)
GLUCOSE SERPL-MCNC: 84 MG/DL — SIGNIFICANT CHANGE UP (ref 70–99)
GLUCOSE UR QL: NEGATIVE MG/DL — SIGNIFICANT CHANGE UP
GRAN CASTS # UR COMP ASSIST: ABNORMAL /LPF
HCT VFR BLD CALC: 41 % — SIGNIFICANT CHANGE UP (ref 34.5–45)
HCT VFR BLD CALC: 41.9 % — SIGNIFICANT CHANGE UP (ref 34.5–45)
HDLC SERPL-MCNC: 50 MG/DL — SIGNIFICANT CHANGE UP
HGB BLD-MCNC: 12.7 G/DL — SIGNIFICANT CHANGE UP (ref 11.5–15.5)
KETONES UR-MCNC: ABNORMAL
LEUKOCYTE ESTERASE UR-ACNC: ABNORMAL
LIPID PNL WITH DIRECT LDL SERPL: 65 MG/DL — SIGNIFICANT CHANGE UP
MCHC RBC-ENTMCNC: 26.4 PG — LOW (ref 27–34)
MCHC RBC-ENTMCNC: 31 GM/DL — LOW (ref 32–36)
MCV RBC AUTO: 85.2 FL — SIGNIFICANT CHANGE UP (ref 80–100)
NITRITE UR-MCNC: NEGATIVE — SIGNIFICANT CHANGE UP
NRBC # BLD: 0 /100 WBCS — SIGNIFICANT CHANGE UP (ref 0–0)
PH UR: 8 — SIGNIFICANT CHANGE UP (ref 5–8)
PLATELET # BLD AUTO: 235 K/UL — SIGNIFICANT CHANGE UP (ref 150–400)
POTASSIUM SERPL-MCNC: 3.9 MMOL/L — SIGNIFICANT CHANGE UP (ref 3.5–5.3)
POTASSIUM SERPL-SCNC: 3.9 MMOL/L — SIGNIFICANT CHANGE UP (ref 3.5–5.3)
PROT SERPL-MCNC: 6.9 GM/DL — SIGNIFICANT CHANGE UP (ref 6–8.3)
PROT UR-MCNC: 15 MG/DL
RBC # BLD: 4.81 M/UL — SIGNIFICANT CHANGE UP (ref 3.8–5.2)
RBC # FLD: 14.6 % — HIGH (ref 10.3–14.5)
RBC CASTS # UR COMP ASSIST: SIGNIFICANT CHANGE UP /HPF (ref 0–4)
SODIUM SERPL-SCNC: 142 MMOL/L — SIGNIFICANT CHANGE UP (ref 135–145)
SP GR SPEC: 1.01 — SIGNIFICANT CHANGE UP (ref 1.01–1.02)
T4 AB SER-ACNC: 7.4 UG/DL — SIGNIFICANT CHANGE UP (ref 4.6–12)
TOTAL CHOLESTEROL/HDL RATIO MEASUREMENT: 2.5 RATIO — LOW (ref 3.3–7.1)
TRIGL SERPL-MCNC: 59 MG/DL — SIGNIFICANT CHANGE UP (ref 10–149)
TROPONIN I SERPL-MCNC: 0.25 NG/ML — HIGH (ref 0.01–0.04)
TSH SERPL-MCNC: 2.71 UU/ML — SIGNIFICANT CHANGE UP (ref 0.36–3.74)
UROBILINOGEN FLD QL: NEGATIVE MG/DL — SIGNIFICANT CHANGE UP
WBC # BLD: 4.99 K/UL — SIGNIFICANT CHANGE UP (ref 3.8–10.5)
WBC # FLD AUTO: 4.99 K/UL — SIGNIFICANT CHANGE UP (ref 3.8–10.5)
WBC UR QL: ABNORMAL

## 2019-10-23 PROCEDURE — 99283 EMERGENCY DEPT VISIT LOW MDM: CPT

## 2019-10-23 PROCEDURE — 70551 MRI BRAIN STEM W/O DYE: CPT | Mod: 26

## 2019-10-23 PROCEDURE — 93306 TTE W/DOPPLER COMPLETE: CPT | Mod: 26

## 2019-10-23 PROCEDURE — 99223 1ST HOSP IP/OBS HIGH 75: CPT

## 2019-10-23 PROCEDURE — 12345: CPT | Mod: NC

## 2019-10-23 RX ORDER — CARVEDILOL PHOSPHATE 80 MG/1
3.12 CAPSULE, EXTENDED RELEASE ORAL EVERY 12 HOURS
Refills: 0 | Status: DISCONTINUED | OUTPATIENT
Start: 2019-10-23 | End: 2019-10-24

## 2019-10-23 RX ORDER — HYDROCHLOROTHIAZIDE 25 MG
12.5 TABLET ORAL ONCE
Refills: 0 | Status: COMPLETED | OUTPATIENT
Start: 2019-10-23 | End: 2019-10-24

## 2019-10-23 RX ORDER — HYDROCHLOROTHIAZIDE 25 MG
25 TABLET ORAL DAILY
Refills: 0 | Status: DISCONTINUED | OUTPATIENT
Start: 2019-10-24 | End: 2019-10-25

## 2019-10-23 RX ADMIN — CARVEDILOL PHOSPHATE 6.25 MILLIGRAM(S): 80 CAPSULE, EXTENDED RELEASE ORAL at 05:36

## 2019-10-23 RX ADMIN — RIVAROXABAN 20 MILLIGRAM(S): KIT at 17:19

## 2019-10-23 RX ADMIN — SIMVASTATIN 40 MILLIGRAM(S): 20 TABLET, FILM COATED ORAL at 00:05

## 2019-10-23 RX ADMIN — Medication 81 MILLIGRAM(S): at 12:14

## 2019-10-23 RX ADMIN — Medication 50 MILLIGRAM(S): at 21:32

## 2019-10-23 RX ADMIN — Medication 50 MILLIGRAM(S): at 05:35

## 2019-10-23 RX ADMIN — Medication 12.5 MILLIGRAM(S): at 05:36

## 2019-10-23 RX ADMIN — Medication 50 MILLIGRAM(S): at 14:43

## 2019-10-23 RX ADMIN — SIMVASTATIN 40 MILLIGRAM(S): 20 TABLET, FILM COATED ORAL at 21:32

## 2019-10-23 RX ADMIN — CARVEDILOL PHOSPHATE 3.12 MILLIGRAM(S): 80 CAPSULE, EXTENDED RELEASE ORAL at 17:19

## 2019-10-23 NOTE — PHYSICAL THERAPY INITIAL EVALUATION ADULT - PERTINENT HX OF CURRENT PROBLEM, REHAB EVAL
Patient admitted with slurred speech, found to have tiny acute cortically based infarct in L inferior frontal lobe.

## 2019-10-23 NOTE — PROGRESS NOTE ADULT - SUBJECTIVE AND OBJECTIVE BOX
Patient is a 84y old  Female who presents with a chief complaint of Slurred Speech (23 Oct 2019 12:58)      INTERVAL HPI/OVERNIGHT EVENTS: dysarthria improving, eating well     MEDICATIONS  (STANDING):  aspirin  chewable 81 milliGRAM(s) Oral daily  carvedilol 3.125 milliGRAM(s) Oral every 12 hours  hydrALAZINE 50 milliGRAM(s) Oral three times a day  hydrochlorothiazide 12.5 milliGRAM(s) Oral daily  rivaroxaban 20 milliGRAM(s) Oral with dinner  simvastatin 40 milliGRAM(s) Oral at bedtime    MEDICATIONS  (PRN):      Allergies    No Known Allergies    Intolerances        REVIEW OF SYSTEMS:  CONSTITUTIONAL: No fever, weight loss, or fatigue  EYES: No eye pain, visual disturbances, or discharge  ENMT:  No difficulty hearing, tinnitus, vertigo; No sinus or throat pain  NECK: No pain or stiffness  BREASTS: No pain, masses, or nipple discharge  RESPIRATORY: No cough, wheezing, chills or hemoptysis; No shortness of breath  CARDIOVASCULAR: No chest pain, palpitations, dizziness, or leg swelling  GASTROINTESTINAL: No abdominal or epigastric pain. No nausea, vomiting, or hematemesis; No diarrhea or constipation. No melena or hematochezia.  GENITOURINARY: No dysuria, frequency, hematuria, or incontinence  NEUROLOGICAL: No headaches, memory loss, loss of strength, numbness, or tremors  SKIN: No itching, burning, rashes, or lesions   LYMPH NODES: No enlarged glands  ENDOCRINE: No heat or cold intolerance; No hair loss  MUSCULOSKELETAL: No joint pain or swelling; No muscle, back, or extremity pain  PSYCHIATRIC: No depression, anxiety, mood swings, or difficulty sleeping  HEME/LYMPH: No easy bruising, or bleeding gums  ALLERGY AND IMMUNOLOGIC: No hives or eczema    Vital Signs Last 24 Hrs  T(C): 35.8 (23 Oct 2019 11:30), Max: 37.6 (22 Oct 2019 18:14)  T(F): 96.5 (23 Oct 2019 11:30), Max: 99.7 (22 Oct 2019 18:14)  HR: 61 (23 Oct 2019 14:38) (55 - 75)  BP: 177/82 (23 Oct 2019 14:38) (134/65 - 209/-)  BP(mean): --  RR: 20 (23 Oct 2019 11:30) (16 - 25)  SpO2: 100% (23 Oct 2019 11:30) (95% - 100%)    PHYSICAL EXAM:  GENERAL: NAD, well-groomed, well-developed  HEAD:  Atraumatic, Normocephalic  EYES: EOMI, PERRLA, conjunctiva and sclera clear  ENMT: No tonsillar erythema, exudates, or enlargement; Moist mucous membranes, Good dentition, No lesions  NECK: Supple, No JVD, Normal thyroid  NERVOUS SYSTEM:  Alert & Oriented X3, otor Strength 5/5 B/L upper and lower extremities; DTRs 2+ intact and symmetric.  CHEST/LUNG: Clear to percussion bilaterally; No rales, rhonchi, wheezing, or rubs  HEART: Regular rate and rhythm; No murmurs, rubs, or gallops  ABDOMEN: Soft, Nontender, Nondistended; Bowel sounds present  EXTREMITIES:  2+ Peripheral Pulses, No clubbing, cyanosis, or edema  LYMPH: No lymphadenopathy noted  SKIN: No rashes or lesions    LABS:                        x      x     )-----------( x        ( 23 Oct 2019 08:39 )             41.9     10-23    142  |  109<H>  |  9   ----------------------------<  84  3.9   |  29  |  0.64    Ca    9.1      23 Oct 2019 06:26    TPro  6.9  /  Alb  3.6  /  TBili  1.5<H>  /  DBili  x   /  AST  22  /  ALT  15  /  AlkPhos  40  10-23    PT/INR - ( 22 Oct 2019 14:04 )   PT: 30.1 sec;   INR: 2.61 ratio         PTT - ( 22 Oct 2019 14:04 )  PTT:41.1 sec  Urinalysis Basic - ( 23 Oct 2019 07:43 )    Color: Yellow / Appearance: Slightly Turbid / S.010 / pH: x  Gluc: x / Ketone: Trace  / Bili: Negative / Urobili: Negative mg/dL   Blood: x / Protein: 15 mg/dL / Nitrite: Negative   Leuk Esterase: Trace / RBC: TNTC /HPF / WBC 6-10   Sq Epi: x / Non Sq Epi: Moderate / Bacteria: Few      CAPILLARY BLOOD GLUCOSE          RADIOLOGY & ADDITIONAL TESTS:    Imaging Personally Reviewed:  [ ] YES  [ ] NO    Consultant(s) Notes Reviewed:  [ ] YES  [ ] NO    Care Discussed with Consultants/Other Providers [ ] YES  [ ] NO

## 2019-10-23 NOTE — PHYSICAL THERAPY INITIAL EVALUATION ADULT - CRITERIA FOR SKILLED THERAPEUTIC INTERVENTIONS
therapy frequency/anticipated discharge recommendation/impairments found/rehab potential/functional limitations in following categories/predicted duration of therapy intervention/risk reduction/prevention

## 2019-10-23 NOTE — PHYSICAL THERAPY INITIAL EVALUATION ADULT - BALANCE TRAINING, PT EVAL
Patient will be normal in static and dynamic standing balance without device in 2 weeks to improve safety and decrease risk of falls. Patient will ascend/descend 1 flight of stairs with R rail up/L rail down independently to safely navigate home environment.

## 2019-10-23 NOTE — PHYSICAL THERAPY INITIAL EVALUATION ADULT - ADDITIONAL COMMENTS
As per patient's daughter Nickie, patient lives in an apartment with no steps to enter and 1 flight up, + rail. Patient's daughter report patient uses a cane.

## 2019-10-23 NOTE — PROGRESS NOTE ADULT - ASSESSMENT
85 y/o Female w/ PMHX of HLD, HTN, A.Fib (on Xarelto) presenting w/ slurred speech today.  Family member at bedside states slurred speech started today, improved after 15-20 mins, BIBA to the hospital for further evaluation, found to have systolic blood pressure of 200s.  It is unclear if patient is taking her medications daily.  MRI is positive for acute CVA and remains HDS. Eating well and family feels her speech is back to normal

## 2019-10-23 NOTE — CONSULT NOTE ADULT - ASSESSMENT
Subjective Complaints:      Consult requested by ER doctor:                  Attending:     History of Present Illness:  Chief Complaint/Reason for Admission:  History of Present Illness:  HPI:  85 y/o Female w/ PMHX of HLD, HTN, A.Fib (on Xarelto) presenting w/ slurred speech today.  Family member at bedside states slurred speech started today, improved after 15-20 mins, BIBA to the hospital for further evaluation, found to have systolic blood pressure of 200s.  It is unclear if patient is taking her medications daily.  Patient is placed on observation for TIA and Hypertensive Urgency. (22 Oct 2019 18:04)        PAST MEDICAL & SURGICAL HISTORY:  Hyperlipidemia  Atrial fibrillation  Elevated cholesterol  HTN (hypertension)  No significant past surgical history  84yFemale    MEDICATIONS  (STANDING):  aspirin  chewable 81 milliGRAM(s) Oral daily  carvedilol 3.125 milliGRAM(s) Oral every 12 hours  hydrALAZINE 50 milliGRAM(s) Oral three times a day  hydrochlorothiazide 25 milliGRAM(s) Oral daily  hydrochlorothiazide 12.5 milliGRAM(s) Oral once  rivaroxaban 20 milliGRAM(s) Oral with dinner  simvastatin 40 milliGRAM(s) Oral at bedtime    MEDICATIONS  (PRN):      Allergies    No Known Allergies    Intolerances      FAMILY HISTORY:      REVIEW OF SYSTEMS:  General:  No wt loss, fevers, chills, night sweats  Eyes:  Good vision, no reported pain  ENT:  No sore throat, pain, runny nose, dysphagia  CV:  No pain, palpitatioins, hypo/hypertension  Resp:  No dyspnea, cough, tachypnea, wheezing  GI:  No pain, nausea, vomiting, diarrhea, constipatiion  :  No pain, bleeding, incontinence, nocturia  Muscle:  No pain, weakness  Breast:  No pain, abscess, mass, discharge  Neuro:  No weakness, tingling, memory problems  Psych:  No fatigue, insomnia, mood problems, depression  Endocrine:  No polyuria, polydypsia, cold/heat intolerance  Heme:  No petechiae, ecchymosis, easy bruisability  Skin:  No rash, tattoos, scars, edema      Vital Signs Last 24 Hrs  T(C): 36.4 (23 Oct 2019 16:13), Max: 37 (23 Oct 2019 00:33)  T(F): 97.5 (23 Oct 2019 16:13), Max: 98.6 (23 Oct 2019 00:33)  HR: 66 (23 Oct 2019 21:29) (55 - 70)  BP: 166/72 (23 Oct 2019 21:29) (134/65 - 177/82)  BP(mean): --  RR: 18 (23 Oct 2019 18:51) (16 - 20)  SpO2: 99% (23 Oct 2019 18:51) (95% - 100%)    GENERAL PHYSICAL EXAM:  General:  Appears stated age, well-groomed, well-nourished, no distress  HEENT:  NC/AT, patent nares w/ pink mucosa, OP clear w/o lesions, PERRL, EOMI, conjunctivae clear, no thyromegaly, nodules, adenopathy, no JVD  Chest:  Full & symmetric excursion, no increased effort, breath sounds clear  Cardiovascular:  Regular rhythm, S1, S2, no murmur/rub/S3/S4, no carotid/femoral/abdominal bruit, radial/pedal pulses 2+, no edema  Abdomen:  Soft, non-tender, non-distended, normoactive bowel sounds, no HSM  Extremities:  Gait & station:   Digits:   Nails:   Joints, Bones, Muscles:   ROM:   Stability:  Skin:  No rash/erythema/ecchymoses/petechiae/wounds/abscess/warm/dry  Musculoskeletal:  Full ROM in all joints w/o swelling/tenderness/effusion    NEUROLOGICAL EXAM:  HENT:  Normocephalic head; atraumatic head.  Neck supple.  ENT: normal looking.  Mental State:    Alert.  Fully oriented to person, place and date.  Coherent.  Speech clear and intact.  Cooperative.  Responds appropriately.    Cranial Nerves:  II-XII:   Pupils round and reactive to light and accommodation.  Extraocular movements full.  Visual fields full (no homonymous hemianopsia).  Visual acuity wnl.  Facial symmetry intact.  Tongue midline.  Motor Functions:  Moves all extremities.  No pronator drift of UE.  Claps hand well.  Hand  intact bilaterally.  Ambulatory.    Sensory Functions:   Intact to touch and pinprick to face and extremities.    Reflexes:  Deep tendon reflexes normoactive to biceps, knees and ankles.  Babinski absent (present).  Cerebellar Testing:    Finger to nose intact.  Nystagmus absent.  Neurovascular: Carotid auscultation full without bruits.      LABS:                        x      x     )-----------( x        ( 23 Oct 2019 08:39 )             41.9     10-    142  |  109<H>  |  9   ----------------------------<  84  3.9   |  29  |  0.64    Ca    9.1      23 Oct 2019 06:26    TPro  6.9  /  Alb  3.6  /  TBili  1.5<H>  /  DBili  x   /  AST  22  /  ALT  15  /  AlkPhos  40  10    PT/INR - ( 22 Oct 2019 14:04 )   PT: 30.1 sec;   INR: 2.61 ratio         PTT - ( 22 Oct 2019 14:04 )  PTT:41.1 sec    Urinalysis Basic - ( 23 Oct 2019 07:43 )    Color: Yellow / Appearance: Slightly Turbid / S.010 / pH: x  Gluc: x / Ketone: Trace  / Bili: Negative / Urobili: Negative mg/dL   Blood: x / Protein: 15 mg/dL / Nitrite: Negative   Leuk Esterase: Trace / RBC: TNTC /HPF / WBC 6-10   Sq Epi: x / Non Sq Epi: Moderate / Bacteria: Few        RADIOLOGY & ADDITIONAL STUDIES:      Assessment & Opinion: events noted  awaekmalert speech fluent   folows commands arm leg 4/5 sesnoory intact mri   brain noted left small,cva  for echo doppler afib on xeralto     Recommendations:  Brain MRI.  Carotid doppler.  Echocardiogram.  EEG.   DVT prophylaxis as ordered. will follow   Medications:
85 y/o lady w/ PMH of HTN, HL, A.Fib (on Xarelto), prior CVA; presenting w/ slurred speech.  Improved after 15-20 mins; BIBA to the hospital for further evaluation, found to have systolic blood pressure of 200s.  Started on coreg for improved BP/HR control.  Asked to eval bradycardia.  EKG: afib 57bpm  Tele: afib 30-40s overnight; HR 60s this AM; 6 beats NSVT    -would monitor on tele for now; no PPM at this time.  -decrease bb; switch to either coreg 3.125mg BID or metoprolol 12.5mg BID  -brief episode of NSVT on higher dose of bb; monitor on lower dose  -permissive HTN w/ h/o CVA  -cont Xarelto for Afib  -2D echo to r/o ASD/PFO w hx of Afib and CVA (bubble study)

## 2019-10-23 NOTE — CONSULT NOTE ADULT - SUBJECTIVE AND OBJECTIVE BOX
CARDIOLOGY CONSULT NOTE    Patient is a 84y Female with a known history of :  Hyperlipidemia, unspecified hyperlipidemia type (E78.5)  Chronic atrial fibrillation (I48.20)  TIA (transient ischemic attack) (G45.9)  Hypertensive urgency (I16.0)    HPI:  83 y/o Female w/ PMHX of HLD, HTN, A.Fib (on Xarelto) presenting w/ slurred speech today.  Family member at bedside states slurred speech started today, improved after 15-20 mins, BIBA to the hospital for further evaluation, found to have systolic blood pressure of 200s.  It is unclear if patient is taking her medications daily.  Patient is placed on observation for TIA and Hypertensive Urgency. (22 Oct 2019 18:04)    EKG: afib 57bpm  Tele: afib 30-40s overnight; HR 60s this AM; 6 beats NSVT    REVIEW OF SYSTEMS:    CONSTITUTIONAL: No fever, weight loss, or fatigue  EYES: No eye pain, visual disturbances, or discharge  ENMT:  No difficulty hearing, tinnitus, vertigo; No sinus or throat pain  NECK: No pain or stiffness  BREASTS: No pain, masses, or nipple discharge  RESPIRATORY: No cough, wheezing, chills or hemoptysis; No shortness of breath  CARDIOVASCULAR: No chest pain, palpitations, dizziness, or leg swelling  GASTROINTESTINAL: No abdominal or epigastric pain. No nausea, vomiting, or hematemesis; No diarrhea or constipation. No melena or hematochezia.  GENITOURINARY: No dysuria, frequency, hematuria, or incontinence  NEUROLOGICAL: No headaches, memory loss, loss of strength, numbness, or tremors  SKIN: No itching, burning, rashes, or lesions   LYMPH NODES: No enlarged glands  ENDOCRINE: No heat or cold intolerance; No hair loss  MUSCULOSKELETAL: No joint pain or swelling; No muscle, back, or extremity pain  PSYCHIATRIC: No depression, anxiety, mood swings, or difficulty sleeping  HEME/LYMPH: No easy bruising, or bleeding gums  ALLERGY AND IMMUNOLOGIC: No hives or eczema    MEDICATIONS  (STANDING):  aspirin  chewable 81 milliGRAM(s) Oral daily  carvedilol 6.25 milliGRAM(s) Oral every 12 hours  hydrALAZINE 50 milliGRAM(s) Oral three times a day  hydrochlorothiazide 12.5 milliGRAM(s) Oral daily  rivaroxaban 20 milliGRAM(s) Oral with dinner  simvastatin 40 milliGRAM(s) Oral at bedtime    MEDICATIONS  (PRN):      ALLERGIES: No Known Allergies      FAMILY HISTORY:      PHYSICAL EXAMINATION:  -----------------------------  T(C): 35.8 (10-23-19 @ 11:30), Max: 37.6 (10-22-19 @ 18:14)  HR: 57 (10-23-19 @ 11:30) (55 - 75)  BP: 172/82 (10-23-19 @ 11:30) (134/65 - 215/95)  RR: 20 (10-23-19 @ 11:30) (16 - 25)  SpO2: 100% (10-23-19 @ 11:30) (95% - 100%)  Wt(kg): --    Height (cm): 162.6 (10-22 @ 20:32)  Weight (kg): 55.1 (10-22 @ 20:32)  BMI (kg/m2): 20.8 (10-22 @ 20:32)  BSA (m2): 1.58 (10-22 @ 20:32)    Constitutional: well developed, normal appearance, well groomed, well nourished, no deformities and no acute distress.   Eyes: the conjunctiva exhibited no abnormalities and the eyelids demonstrated no xanthelasmas.   HEENT: normal oral mucosa, no oral pallor and no oral cyanosis.   Neck: normal jugular venous A waves present, normal jugular venous V waves present and no jugular venous ramirez A waves.   Pulmonary: no respiratory distress, normal respiratory rhythm and effort, no accessory muscle use and lungs were clear to auscultation bilaterally.   Cardiovascular: heart rate and rhythm were normal, normal S1 and S2 and no murmur, gallop, rub, heave or thrill are present.   Abdomen: soft, non-tender, no hepato-splenomegaly and no abdominal mass palpated.   Musculoskeletal: the gait could not be assessed..   Extremities: no clubbing of the fingernails, no localized cyanosis, no petechial hemorrhages and no ischemic changes.   Skin: normal skin color and pigmentation, no rash, no venous stasis, no skin lesions, no skin ulcer and no xanthoma was observed.   Psychiatric: oriented to person, place, and time, the affect was normal, the mood was normal and not feeling anxious.     LABS:   --------  10-23    142  |  109<H>  |  9   ----------------------------<  84  3.9   |  29  |  0.64    Ca    9.1      23 Oct 2019 06:26    TPro  6.9  /  Alb  3.6  /  TBili  1.5<H>  /  DBili  x   /  AST  22  /  ALT  15  /  AlkPhos  40  10-23                         x      x     )-----------( x        ( 23 Oct 2019 08:39 )             41.9     PT/INR - ( 22 Oct 2019 14:04 )   PT: 30.1 sec;   INR: 2.61 ratio         PTT - ( 22 Oct 2019 14:04 )  PTT:41.1 sec    10-22 @ 14:04 CPK total:--, CKMB --, Troponin I - .242 ng/mL<H>    127 mg/dL, 65 mg/dL, 50 mg/dL, 59 mg/dL        RADIOLOGY:  -----------------    ECG:      Tele: afib 30-40s overnight; HR 60s this AM; 6 beats NSVT CARDIOLOGY CONSULT NOTE    Patient is a 84y Female with a known history of :  Hyperlipidemia, unspecified hyperlipidemia type (E78.5)  Chronic atrial fibrillation (I48.20)  TIA (transient ischemic attack) (G45.9)  Hypertensive urgency (I16.0)    HPI:  85 y/o lady w/ PMH of HTN, HL, A.Fib (on Xarelto), prior CVA; presenting w/ slurred speech.  Improved after 15-20 mins; BIBA to the hospital for further evaluation, found to have systolic blood pressure of 200s.  Started on coreg for improved BP/HR control.  Asked to eval bradycardia.  EKG: afib 57bpm  Tele: afib 30-40s overnight; HR 60s this AM; 6 beats NSVT    REVIEW OF SYSTEMS:  CONSTITUTIONAL: No fever, weight loss, or fatigue  EYES: No eye pain, visual disturbances, or discharge  ENMT:  No difficulty hearing, tinnitus, vertigo; No sinus or throat pain  NECK: No pain or stiffness  BREASTS: No pain, masses, or nipple discharge  RESPIRATORY: No cough, wheezing, chills or hemoptysis; No shortness of breath  CARDIOVASCULAR: No chest pain, palpitations, dizziness, or leg swelling  GASTROINTESTINAL: No abdominal or epigastric pain. No nausea, vomiting, or hematemesis; No diarrhea or constipation. No melena or hematochezia.  GENITOURINARY: No dysuria, frequency, hematuria, or incontinence  NEUROLOGICAL: No headaches, memory loss, loss of strength, numbness, or tremors  SKIN: No itching, burning, rashes, or lesions   LYMPH NODES: No enlarged glands  ENDOCRINE: No heat or cold intolerance; No hair loss  MUSCULOSKELETAL: No joint pain or swelling; No muscle, back, or extremity pain  PSYCHIATRIC: No depression, anxiety, mood swings, or difficulty sleeping  HEME/LYMPH: No easy bruising, or bleeding gums  ALLERGY AND IMMUNOLOGIC: No hives or eczema    MEDICATIONS  (STANDING):  aspirin  chewable 81 milliGRAM(s) Oral daily  carvedilol 6.25 milliGRAM(s) Oral every 12 hours  hydrALAZINE 50 milliGRAM(s) Oral three times a day  hydrochlorothiazide 12.5 milliGRAM(s) Oral daily  rivaroxaban 20 milliGRAM(s) Oral with dinner  simvastatin 40 milliGRAM(s) Oral at bedtime    MEDICATIONS  (PRN):      ALLERGIES: No Known Allergies      FAMILY HISTORY:      PHYSICAL EXAMINATION:  -----------------------------  T(C): 35.8 (10-23-19 @ 11:30), Max: 37.6 (10-22-19 @ 18:14)  HR: 57 (10-23-19 @ 11:30) (55 - 75)  BP: 172/82 (10-23-19 @ 11:30) (134/65 - 215/95)  RR: 20 (10-23-19 @ 11:30) (16 - 25)  SpO2: 100% (10-23-19 @ 11:30) (95% - 100%)    Height (cm): 162.6 (10-22 @ 20:32)  Weight (kg): 55.1 (10-22 @ 20:32)  BMI (kg/m2): 20.8 (10-22 @ 20:32)  BSA (m2): 1.58 (10-22 @ 20:32)    Constitutional: well developed, normal appearance, well groomed, well nourished, no deformities and no acute distress.   Eyes: the conjunctiva exhibited no abnormalities and the eyelids demonstrated no xanthelasmas.   HEENT: normal oral mucosa, no oral pallor and no oral cyanosis.   Neck: normal jugular venous A waves present, normal jugular venous V waves present and no jugular venous ramirez A waves.   Pulmonary: no respiratory distress, normal respiratory rhythm and effort, no accessory muscle use and lungs were clear to auscultation bilaterally.   Cardiovascular: heart rate and rhythm were normal, normal S1 and S2 and no murmur, gallop, rub, heave or thrill are present.   Abdomen: soft, non-tender, no hepato-splenomegaly and no abdominal mass palpated.   Musculoskeletal: the gait could not be assessed..   Extremities: no clubbing of the fingernails, no localized cyanosis, no petechial hemorrhages and no ischemic changes.   Skin: normal skin color and pigmentation, no rash, no venous stasis, no skin lesions, no skin ulcer and no xanthoma was observed.   Psychiatric: oriented to person, place, and time, the affect was normal, the mood was normal and not feeling anxious.     LABS:   --------  10-23    142  |  109<H>  |  9   ----------------------------<  84  3.9   |  29  |  0.64    Ca    9.1      23 Oct 2019 06:26    TPro  6.9  /  Alb  3.6  /  TBili  1.5<H>  /  DBili  x   /  AST  22  /  ALT  15  /  AlkPhos  40  10-23                         x      x     )-----------( x        ( 23 Oct 2019 08:39 )             41.9     PT/INR - ( 22 Oct 2019 14:04 )   PT: 30.1 sec;   INR: 2.61 ratio         PTT - ( 22 Oct 2019 14:04 )  PTT:41.1 sec    10-22 @ 14:04 CPK total:--, CKMB --, Troponin I - .242 ng/mL<H>    127 mg/dL, 65 mg/dL, 50 mg/dL, 59 mg/dL        RADIOLOGY:  -----------------    Tele: afib 30-40s overnight; HR 60s this AM; 6 beats NSVT

## 2019-10-24 LAB
ANION GAP SERPL CALC-SCNC: 7 MMOL/L — SIGNIFICANT CHANGE UP (ref 5–17)
BUN SERPL-MCNC: 19 MG/DL — SIGNIFICANT CHANGE UP (ref 7–23)
CALCIUM SERPL-MCNC: 8.9 MG/DL — SIGNIFICANT CHANGE UP (ref 8.5–10.1)
CHLORIDE SERPL-SCNC: 107 MMOL/L — SIGNIFICANT CHANGE UP (ref 96–108)
CO2 SERPL-SCNC: 28 MMOL/L — SIGNIFICANT CHANGE UP (ref 22–31)
CREAT SERPL-MCNC: 0.88 MG/DL — SIGNIFICANT CHANGE UP (ref 0.5–1.3)
GLUCOSE SERPL-MCNC: 86 MG/DL — SIGNIFICANT CHANGE UP (ref 70–99)
HCT VFR BLD CALC: 40.2 % — SIGNIFICANT CHANGE UP (ref 34.5–45)
HGB BLD-MCNC: 12.6 G/DL — SIGNIFICANT CHANGE UP (ref 11.5–15.5)
MAGNESIUM SERPL-MCNC: 2.2 MG/DL — SIGNIFICANT CHANGE UP (ref 1.6–2.6)
MCHC RBC-ENTMCNC: 26.6 PG — LOW (ref 27–34)
MCHC RBC-ENTMCNC: 31.3 GM/DL — LOW (ref 32–36)
MCV RBC AUTO: 85 FL — SIGNIFICANT CHANGE UP (ref 80–100)
NRBC # BLD: 0 /100 WBCS — SIGNIFICANT CHANGE UP (ref 0–0)
PHOSPHATE SERPL-MCNC: 3.8 MG/DL — SIGNIFICANT CHANGE UP (ref 2.5–4.5)
PLATELET # BLD AUTO: 233 K/UL — SIGNIFICANT CHANGE UP (ref 150–400)
POTASSIUM SERPL-MCNC: 3.6 MMOL/L — SIGNIFICANT CHANGE UP (ref 3.5–5.3)
POTASSIUM SERPL-SCNC: 3.6 MMOL/L — SIGNIFICANT CHANGE UP (ref 3.5–5.3)
RBC # BLD: 4.73 M/UL — SIGNIFICANT CHANGE UP (ref 3.8–5.2)
RBC # FLD: 14.5 % — SIGNIFICANT CHANGE UP (ref 10.3–14.5)
SODIUM SERPL-SCNC: 142 MMOL/L — SIGNIFICANT CHANGE UP (ref 135–145)
WBC # BLD: 5.03 K/UL — SIGNIFICANT CHANGE UP (ref 3.8–10.5)
WBC # FLD AUTO: 5.03 K/UL — SIGNIFICANT CHANGE UP (ref 3.8–10.5)

## 2019-10-24 PROCEDURE — 99233 SBSQ HOSP IP/OBS HIGH 50: CPT

## 2019-10-24 PROCEDURE — 99232 SBSQ HOSP IP/OBS MODERATE 35: CPT

## 2019-10-24 PROCEDURE — 93308 TTE F-UP OR LMTD: CPT | Mod: 26

## 2019-10-24 RX ORDER — HYDRALAZINE HCL 50 MG
10 TABLET ORAL ONCE
Refills: 0 | Status: COMPLETED | OUTPATIENT
Start: 2019-10-24 | End: 2019-10-24

## 2019-10-24 RX ORDER — POTASSIUM CHLORIDE 20 MEQ
40 PACKET (EA) ORAL ONCE
Refills: 0 | Status: COMPLETED | OUTPATIENT
Start: 2019-10-24 | End: 2019-10-24

## 2019-10-24 RX ADMIN — RIVAROXABAN 20 MILLIGRAM(S): KIT at 18:22

## 2019-10-24 RX ADMIN — SIMVASTATIN 40 MILLIGRAM(S): 20 TABLET, FILM COATED ORAL at 22:05

## 2019-10-24 RX ADMIN — Medication 50 MILLIGRAM(S): at 06:33

## 2019-10-24 RX ADMIN — Medication 50 MILLIGRAM(S): at 22:05

## 2019-10-24 RX ADMIN — Medication 10 MILLIGRAM(S): at 15:38

## 2019-10-24 RX ADMIN — Medication 12.5 MILLIGRAM(S): at 12:20

## 2019-10-24 RX ADMIN — Medication 81 MILLIGRAM(S): at 12:04

## 2019-10-24 RX ADMIN — Medication 40 MILLIEQUIVALENT(S): at 12:04

## 2019-10-24 RX ADMIN — Medication 25 MILLIGRAM(S): at 06:33

## 2019-10-24 RX ADMIN — Medication 50 MILLIGRAM(S): at 15:35

## 2019-10-24 NOTE — PROGRESS NOTE ADULT - ASSESSMENT
83 y/o Female w/ PMHX of HLD, HTN, A.Fib (on Xarelto) presenting w/ slurred speech today.  Family member at bedside states slurred speech started today, improved after 15-20 mins, BIBA to the hospital for further evaluation, found to have systolic blood pressure of 200s.  It is unclear if patient is taking her medications daily.  MRI is positive for acute CVA and remains HDS. no focal deficits. Eating well and family feels her speech is back to normal.   WIll trend tele and bp till tomorrow   f.u bubble study.   likely d/c tomorrow with op pt and neuro f.u

## 2019-10-24 NOTE — PROGRESS NOTE ADULT - PROBLEM SELECTOR PLAN 1
- likely 2/2 to cva. will allow for permissive htn for now, but still above 160  -c/w Hydralazine 50mg TID, increase HCTZ 12.5mg to 25 mg, add amlodipine 5 mg, dc  Coreg started by admitter but hr went to the 30-40s
- likely 2/2 to cva. will allow for permissive htn for now   - Diet: Cardiac Diet  - Folic Acid, TSH, T4, Lipid profile, HgA1C i  - c/w Hydralazine 50mg TID, HCTZ 12.5mg, dc  Coreg started by admitter but hr went to the 30-40s overnight.

## 2019-10-24 NOTE — SWALLOW BEDSIDE ASSESSMENT ADULT - SWALLOW EVAL: PATIENT/FAMILY GOALS STATEMENT
pt stated "they took blood yesterday and my arm is still bothering me" pt stated "they took blood yesterday and my arm is still bothering me", pt denies any difficulty with eating or drinking

## 2019-10-24 NOTE — PROGRESS NOTE ADULT - SUBJECTIVE AND OBJECTIVE BOX
Neurology Progress Note    No acute events. Dysarthria resolved in 20 minutes at time of presentation.     Neuro Exam: AOx2. Follows commands. No dysarthria. Moving all four extremities.    MRI brain- tiny acute left inferior frontal lobe  Carotid doppler- Moderate soft and calcified plaque bilaterally  CTA head/neck- no significant stenosis  Echo- EF normal  LDL- 65  HgbA1c-     A/P:  Acute small left frontal lobe stroke  HTN  A-fib  HLD    - continue Xarelto, aspirin, and Zocor for secondary stroke prevention  - PT  - D/C planning

## 2019-10-24 NOTE — SWALLOW BEDSIDE ASSESSMENT ADULT - SLP GENERAL OBSERVATIONS
pt was seen bedside alert and oriented x3, pt responded to autobiographical questions, verbalized wants and followed directions. noted good speech intelligibility and vocal quality.

## 2019-10-24 NOTE — PROGRESS NOTE ADULT - SUBJECTIVE AND OBJECTIVE BOX
Patient is a 84y old  Female who presents with a chief complaint of Slurred Speech (23 Oct 2019 21:58)    PAST MEDICAL & SURGICAL HISTORY:  Hyperlipidemia  Atrial fibrillation  Elevated cholesterol  HTN (hypertension)  No significant past surgical history    INTERVAL HISTORY: Resting in bed, not in any acute distress   	  MEDICATIONS:  MEDICATIONS  (STANDING):  aspirin  chewable 81 milliGRAM(s) Oral daily  carvedilol 3.125 milliGRAM(s) Oral every 12 hours  hydrALAZINE 50 milliGRAM(s) Oral three times a day  hydrochlorothiazide 25 milliGRAM(s) Oral daily  hydrochlorothiazide 12.5 milliGRAM(s) Oral once  rivaroxaban 20 milliGRAM(s) Oral with dinner  simvastatin 40 milliGRAM(s) Oral at bedtime    MEDICATIONS  (PRN):    Vitals:  T(F): 97.8 (10-24-19 @ 05:37), Max: 97.8 (10-23-19 @ 21:29)  HR: 52 (10-24-19 @ 05:37) (48 - 66)  BP: 174/69 (10-24-19 @ 05:37) (140/56 - 177/82)  RR: 18 (10-24-19 @ 05:37) (18 - 20)  SpO2: 98% (10-24-19 @ 05:37) (97% - 100%)    10-23 @ 07:01  -  10-24 @ 07:00  --------------------------------------------------------  IN:    Oral Fluid: 118 mL  Total IN: 118 mL    OUT:  Total OUT: 0 mL    Total NET: 118 mL    Weight (kg): 55.1 (10-22 @ 20:32)  BMI (kg/m2): 20.8 (10-22 @ 20:32)    PHYSICAL EXAM:  Neuro: Awake, responsive  CV: S1 S2 RRR  Lungs: CTABL  GI: Soft, BS +, ND, NT  Extremities: No edema    TELEMETRY: atrial fibrillation  	  RADIOLOGY: < from: MR Head No Cont (10.23.19 @ 11:14) >  Tiny acute cortically-based infarct in the left inferior frontal lobe. No   associated hemorrhage.    < end of copied text >  < from: US Duplex Carotid Arteries Complete, Bilateral (10.22.19 @ 19:39) >  Moderate soft and calcified plaque bilaterally. No   hemodynamically significant carotid artery stenoses.     < end of copied text >    < from: Xray Chest 1 View- PORTABLE-Urgent (10.22.19 @ 14:34) >  Severe cardiac enlargement. No acute airspace disease suggested    < end of copied text >  DIAGNOSTIC TESTING:    [ ] Echocardiogram:  [ ]  Catheterization:  [ ] Stress Test:      LABS:	 	    CARDIAC MARKERS:  Troponin I, Serum: .251 ng/mL (10-23 @ 16:48)  Troponin I, Serum: .242 ng/mL (10-22 @ 14:04)    24 Oct 2019 07:11    142    |  107    |  19     ----------------------------<  86     3.6     |  28     |  0.88   23 Oct 2019 06:26    142    |  109    |  9      ----------------------------<  84     3.9     |  29     |  0.64   22 Oct 2019 14:04    144    |  109    |  12     ----------------------------<  87     3.6     |  31     |  0.82     Ca    8.9        24 Oct 2019 07:11  Phos  3.8       24 Oct 2019 07:11  Mg     2.2       24 Oct 2019 07:11    TPro  6.9    /  Alb  3.6    /  TBili  1.5    /  DBili  x      /  AST  22     /  ALT  15     /  AlkPhos  40     23 Oct 2019 06:26                          12.6   5.03  )-----------( 233      ( 24 Oct 2019 07:11 )             40.2 ,                       x      x     )-----------( x        ( 23 Oct 2019 08:39 )             41.9 ,                       12.7   4.99  )-----------( 235      ( 23 Oct 2019 06:26 )             41.0 ,                       13.3   5.95  )-----------( 257      ( 22 Oct 2019 14:04 )             43.4     Lipid Profile: 10-23 @ 08:38  HDL/Total Cholesterol: --  HDL Chol:              50 mg/dL  Serum Chol:            127 mg/dL  Direct LDL:            65 mg/dL  Triglycerides:         59 mg/dL    TSH: Thyroid Stimulating Hormone, Serum: 2.710 uU/mL (10-23 @ 06:26)  INR: 2.61 ratio (10-22 @ 14:04) Patient is a 84y old  Female who presents with a chief complaint of Slurred Speech (23 Oct 2019 21:58)    PAST MEDICAL & SURGICAL HISTORY:  Hyperlipidemia  Atrial fibrillation  Elevated cholesterol  HTN (hypertension)  No significant past surgical history    INTERVAL HISTORY: Resting in bed, not in any acute distress   	  MEDICATIONS:  MEDICATIONS  (STANDING):  aspirin  chewable 81 milliGRAM(s) Oral daily  carvedilol 3.125 milliGRAM(s) Oral every 12 hours  hydrALAZINE 50 milliGRAM(s) Oral three times a day  hydrochlorothiazide 25 milliGRAM(s) Oral daily  hydrochlorothiazide 12.5 milliGRAM(s) Oral once  rivaroxaban 20 milliGRAM(s) Oral with dinner  simvastatin 40 milliGRAM(s) Oral at bedtime    MEDICATIONS  (PRN):    Vitals:  T(F): 97.8 (10-24-19 @ 05:37), Max: 97.8 (10-23-19 @ 21:29)  HR: 52 (10-24-19 @ 05:37) (48 - 66)  BP: 174/69 (10-24-19 @ 05:37) (140/56 - 177/82)  RR: 18 (10-24-19 @ 05:37) (18 - 20)  SpO2: 98% (10-24-19 @ 05:37) (97% - 100%)    10-23 @ 07:01  -  10-24 @ 07:00  --------------------------------------------------------  IN:    Oral Fluid: 118 mL  Total IN: 118 mL    OUT:  Total OUT: 0 mL    Total NET: 118 mL    Weight (kg): 55.1 (10-22 @ 20:32)  BMI (kg/m2): 20.8 (10-22 @ 20:32)    PHYSICAL EXAM:  Neuro: Awake, responsive  CV: S1 S2 RRR  Lungs: CTABL  GI: Soft, BS +, ND, NT  Extremities: No edema    TELEMETRY: atrial fibrillation  	  RADIOLOGY: < from: MR Head No Cont (10.23.19 @ 11:14) >  Tiny acute cortically-based infarct in the left inferior frontal lobe. No   associated hemorrhage.    < end of copied text >  < from: US Duplex Carotid Arteries Complete, Bilateral (10.22.19 @ 19:39) >  Moderate soft and calcified plaque bilaterally. No   hemodynamically significant carotid artery stenoses.     < end of copied text >    < from: Xray Chest 1 View- PORTABLE-Urgent (10.22.19 @ 14:34) >  Severe cardiac enlargement. No acute airspace disease suggested    < end of copied text >  DIAGNOSTIC TESTING:    [ x] Echocardiogram: < from: TTE Echo Doppler w/o Cont (10.23.19 @ 08:41) >   1. Left ventricular ejection fraction, by visual estimation, is 50 to   55%.   2. Mildly decreased segmental left ventricular systolic function.   3. There is mild concentric left ventricular hypertrophy.   4. Mild mitral and pulmonic valve regurgitation.   5. Mild-moderate tricuspid regurgitation.   6. Estimated pulmonary artery systolic pressure is 50.8 mmHg assuming a   right atrial pressure of 5 mmHg, which is consistent with moderate   pulmonary hypertension.    < end of copied text >    LABS:	 	    CARDIAC MARKERS:  Troponin I, Serum: .251 ng/mL (10-23 @ 16:48)  Troponin I, Serum: .242 ng/mL (10-22 @ 14:04)    24 Oct 2019 07:11    142    |  107    |  19     ----------------------------<  86     3.6     |  28     |  0.88   23 Oct 2019 06:26    142    |  109    |  9      ----------------------------<  84     3.9     |  29     |  0.64   22 Oct 2019 14:04    144    |  109    |  12     ----------------------------<  87     3.6     |  31     |  0.82     Ca    8.9        24 Oct 2019 07:11  Phos  3.8       24 Oct 2019 07:11  Mg     2.2       24 Oct 2019 07:11    TPro  6.9    /  Alb  3.6    /  TBili  1.5    /  DBili  x      /  AST  22     /  ALT  15     /  AlkPhos  40     23 Oct 2019 06:26                          12.6   5.03  )-----------( 233      ( 24 Oct 2019 07:11 )             40.2 ,                       x      x     )-----------( x        ( 23 Oct 2019 08:39 )             41.9 ,                       12.7   4.99  )-----------( 235      ( 23 Oct 2019 06:26 )             41.0 ,                       13.3   5.95  )-----------( 257      ( 22 Oct 2019 14:04 )             43.4     Lipid Profile: 10-23 @ 08:38  HDL/Total Cholesterol: --  HDL Chol:              50 mg/dL  Serum Chol:            127 mg/dL  Direct LDL:            65 mg/dL  Triglycerides:         59 mg/dL    TSH: Thyroid Stimulating Hormone, Serum: 2.710 uU/mL (10-23 @ 06:26)  INR: 2.61 ratio (10-22 @ 14:04) Patient is a 84y old  Female who presents with a chief complaint of Slurred Speech (23 Oct 2019 21:58)    PAST MEDICAL & SURGICAL HISTORY:  Hyperlipidemia  Atrial fibrillation  Elevated cholesterol  HTN (hypertension)  No significant past surgical history    INTERVAL HISTORY: Resting in bed, not in any acute distress   	  MEDICATIONS:  MEDICATIONS  (STANDING):  aspirin  chewable 81 milliGRAM(s) Oral daily  carvedilol 3.125 milliGRAM(s) Oral every 12 hours  hydrALAZINE 50 milliGRAM(s) Oral three times a day  hydrochlorothiazide 25 milliGRAM(s) Oral daily  hydrochlorothiazide 12.5 milliGRAM(s) Oral once  rivaroxaban 20 milliGRAM(s) Oral with dinner  simvastatin 40 milliGRAM(s) Oral at bedtime    MEDICATIONS  (PRN):    Vitals:  T(F): 97.8 (10-24-19 @ 05:37), Max: 97.8 (10-23-19 @ 21:29)  HR: 52 (10-24-19 @ 05:37) (48 - 66)  BP: 174/69 (10-24-19 @ 05:37) (140/56 - 177/82)  RR: 18 (10-24-19 @ 05:37) (18 - 20)  SpO2: 98% (10-24-19 @ 05:37) (97% - 100%)    10-23 @ 07:01  -  10-24 @ 07:00  --------------------------------------------------------  IN:    Oral Fluid: 118 mL  Total IN: 118 mL    OUT:  Total OUT: 0 mL    Total NET: 118 mL    Weight (kg): 55.1 (10-22 @ 20:32)  BMI (kg/m2): 20.8 (10-22 @ 20:32)    PHYSICAL EXAM:  Neuro: Awake, responsive  CV: S1 S2 irregular   Lungs: CTABL  GI: Soft, BS +, ND, NT  Extremities: No edema    TELEMETRY: atrial fibrillation  	  RADIOLOGY: < from: MR Head No Cont (10.23.19 @ 11:14) >  Tiny acute cortically-based infarct in the left inferior frontal lobe. No   associated hemorrhage.    < end of copied text >  < from: US Duplex Carotid Arteries Complete, Bilateral (10.22.19 @ 19:39) >  Moderate soft and calcified plaque bilaterally. No   hemodynamically significant carotid artery stenoses.     < end of copied text >    < from: Xray Chest 1 View- PORTABLE-Urgent (10.22.19 @ 14:34) >  Severe cardiac enlargement. No acute airspace disease suggested    < end of copied text >  DIAGNOSTIC TESTING:    [ x] Echocardiogram: < from: TTE Echo Doppler w/o Cont (10.23.19 @ 08:41) >   1. Left ventricular ejection fraction, by visual estimation, is 50 to   55%.   2. Mildly decreased segmental left ventricular systolic function.   3. There is mild concentric left ventricular hypertrophy.   4. Mild mitral and pulmonic valve regurgitation.   5. Mild-moderate tricuspid regurgitation.   6. Estimated pulmonary artery systolic pressure is 50.8 mmHg assuming a   right atrial pressure of 5 mmHg, which is consistent with moderate   pulmonary hypertension.    < end of copied text >    LABS:	 	    CARDIAC MARKERS:  Troponin I, Serum: .251 ng/mL (10-23 @ 16:48)  Troponin I, Serum: .242 ng/mL (10-22 @ 14:04)    24 Oct 2019 07:11    142    |  107    |  19     ----------------------------<  86     3.6     |  28     |  0.88   23 Oct 2019 06:26    142    |  109    |  9      ----------------------------<  84     3.9     |  29     |  0.64   22 Oct 2019 14:04    144    |  109    |  12     ----------------------------<  87     3.6     |  31     |  0.82     Ca    8.9        24 Oct 2019 07:11  Phos  3.8       24 Oct 2019 07:11  Mg     2.2       24 Oct 2019 07:11    TPro  6.9    /  Alb  3.6    /  TBili  1.5    /  DBili  x      /  AST  22     /  ALT  15     /  AlkPhos  40     23 Oct 2019 06:26                          12.6   5.03  )-----------( 233      ( 24 Oct 2019 07:11 )             40.2 ,                       x      x     )-----------( x        ( 23 Oct 2019 08:39 )             41.9 ,                       12.7   4.99  )-----------( 235      ( 23 Oct 2019 06:26 )             41.0 ,                       13.3   5.95  )-----------( 257      ( 22 Oct 2019 14:04 )             43.4     Lipid Profile: 10-23 @ 08:38  HDL/Total Cholesterol: --  HDL Chol:              50 mg/dL  Serum Chol:            127 mg/dL  Direct LDL:            65 mg/dL  Triglycerides:         59 mg/dL    TSH: Thyroid Stimulating Hormone, Serum: 2.710 uU/mL (10-23 @ 06:26)  INR: 2.61 ratio (10-22 @ 14:04)

## 2019-10-24 NOTE — SWALLOW BEDSIDE ASSESSMENT ADULT - H & P REVIEW
83 y/o Female w/ PMHX of HLD, HTN, A.Fib (on Xarelto) presenting w/ slurred speech today.  Family member at bedside states slurred speech started today, improved after 15-20 mins, BIBA to the hospital for further evaluation, found to have systolic blood pressure of 200s.  It is unclear if patient is taking her medications daily.  Patient is placed on observation for TIA and Hypertensive Urgency./yes

## 2019-10-24 NOTE — PROGRESS NOTE ADULT - PROBLEM SELECTOR PLAN 5
possibly  2/2 to starting coreg. d/c coreg. hr improving   cardiology consult appreciated   trend tele till tomorrow   mild elevated troponin likely 2.2 to demand ischemia and hypertension
possibly  2/2 to starting coreg. d/c coreg   cardiology consult   trend tele  trend troponin

## 2019-10-24 NOTE — PROGRESS NOTE ADULT - SUBJECTIVE AND OBJECTIVE BOX
Patient is a 84y old  Female who presents with a chief complaint of Slurred Speech (23 Oct 2019 12:58)      INTERVAL HPI/OVERNIGHT EVENTS: none     MEDICATIONS  (STANDING):  aspirin  chewable 81 milliGRAM(s) Oral daily  hydrALAZINE 50 milliGRAM(s) Oral three times a day  hydrochlorothiazide 25 milliGRAM(s) Oral daily  rivaroxaban 20 milliGRAM(s) Oral with dinner  simvastatin 40 milliGRAM(s) Oral at bedtime    MEDICATIONS  (PRN):        Allergies    No Known Allergies    Intolerances        REVIEW OF SYSTEMS:  CONSTITUTIONAL: No fever, weight loss, or fatigue  EYES: No eye pain, visual disturbances, or discharge  ENMT:  No difficulty hearing, tinnitus, vertigo; No sinus or throat pain  NECK: No pain or stiffness  BREASTS: No pain, masses, or nipple discharge  RESPIRATORY: No cough, wheezing, chills or hemoptysis; No shortness of breath  CARDIOVASCULAR: No chest pain, palpitations, dizziness, or leg swelling  GASTROINTESTINAL: No abdominal or epigastric pain. No nausea, vomiting, or hematemesis; No diarrhea or constipation. No melena or hematochezia.  GENITOURINARY: No dysuria, frequency, hematuria, or incontinence  NEUROLOGICAL: No headaches, memory loss, loss of strength, numbness, or tremors  SKIN: No itching, burning, rashes, or lesions   LYMPH NODES: No enlarged glands  ENDOCRINE: No heat or cold intolerance; No hair loss  MUSCULOSKELETAL: No joint pain or swelling; No muscle, back, or extremity pain  PSYCHIATRIC: No depression, anxiety, mood swings, or difficulty sleeping  HEME/LYMPH: No easy bruising, or bleeding gums  ALLERGY AND IMMUNOLOGIC: No hives or eczema    Vital Signs Last 24 Hrs  T(C): 36.4 (24 Oct 2019 12:00), Max: 36.6 (23 Oct 2019 21:29)  T(F): 97.5 (24 Oct 2019 12:00), Max: 97.8 (23 Oct 2019 21:29)  HR: 66 (24 Oct 2019 12:00) (48 - 66)  BP: 192/76 (24 Oct 2019 12:00) (140/56 - 192/76)  BP(mean): --  RR: 22 (24 Oct 2019 12:00) (18 - 22)  SpO2: 96% (24 Oct 2019 12:00) (96% - 99%)    PHYSICAL EXAM:  GENERAL: NAD, well-groomed, well-developed  HEAD:  Atraumatic, Normocephalic  EYES: EOMI, PERRLA, conjunctiva and sclera clear  ENMT: No tonsillar erythema, exudates, or enlargement; Moist mucous membranes, Good dentition, No lesions  NECK: Supple, No JVD, Normal thyroid  NERVOUS SYSTEM:  Alert & Oriented X3, otor Strength 5/5 B/L upper and lower extremities; DTRs 2+ intact and symmetric.  CHEST/LUNG: Clear to percussion bilaterally; No rales, rhonchi, wheezing, or rubs  HEART: Regular rate and rhythm; No murmurs, rubs, or gallops  ABDOMEN: Soft, Nontender, Nondistended; Bowel sounds present  EXTREMITIES:  2+ Peripheral Pulses, No clubbing, cyanosis, or edema  LYMPH: No lymphadenopathy noted  SKIN: No rashes or lesions    LABS:                                    12.6   5.03  )-----------( 233      ( 24 Oct 2019 07:11 )             40.2     10-24    142  |  107  |  19  ----------------------------<  86  3.6   |  28  |  0.88    Ca    8.9      24 Oct 2019 07:11  Phos  3.8     10-24  Mg     2.2     10-24    TPro  6.9  /  Alb  3.6  /  TBili  1.5<H>  /  DBili  x   /  AST  22  /  ALT  15  /  AlkPhos  40  10-23    PT/INR - ( 22 Oct 2019 14:04 )   PT: 30.1 sec;   INR: 2.61 ratio         PTT - ( 22 Oct 2019 14:04 )  PTT:41.1 sec  Urinalysis Basic - ( 23 Oct 2019 07:43 )    Color: Yellow / Appearance: Slightly Turbid / S.010 / pH: x  Gluc: x / Ketone: Trace  / Bili: Negative / Urobili: Negative mg/dL   Blood: x / Protein: 15 mg/dL / Nitrite: Negative   Leuk Esterase: Trace / RBC: TNTC /HPF / WBC 6-10   Sq Epi: x / Non Sq Epi: Moderate / Bacteria: Few          CAPILLARY BLOOD GLUCOSE          RADIOLOGY & ADDITIONAL TESTS:    Imaging Personally Reviewed:  [ ] YES  [ ] NO    Consultant(s) Notes Reviewed:  [ ] YES  [ ] NO    Care Discussed with Consultants/Other Providers [ ] YES  [ ] NO

## 2019-10-24 NOTE — SWALLOW BEDSIDE ASSESSMENT ADULT - COMMENTS
MRI Head No Contrast 10/23/2019 IMPRESSION: Tiny acute cortically-based infarct in the left inferior frontal lobe. No associated hemorrhage.  CXR 10/23/2019 IMPRESSION: Severe cardiac enlargement. No acute airspace disease suggested

## 2019-10-24 NOTE — PROGRESS NOTE ADULT - ASSESSMENT
85 y/o lady w/ PMH of HTN, HL, A.Fib (on Xarelto), prior CVA; presenting w/ slurred speech.  Improved after 15-20 mins; BIBA to the hospital for further evaluation, found to have systolic blood pressure of 200s with acute CVA  MRI Brain: Tiny acute cortically-based infarct in the left inferior frontal lobe  Started on coreg for improved BP/HR control.  Asked to eval bradycardia.  EKG: afib 57bpm  Tele: afib 40s-50S overnight; HR 50s this AM; 6 beats NSVT 10/23/20    -would monitor on tele for now; no PPM at this time.  -decrease bb; switch to either coreg 3.125mg BID or metoprolol 12.5mg BID  -brief episode of NSVT on higher dose of bb; monitor on lower dose  -permissive HTN w/ h/o CVA  -cont Xarelto for Afib  -2D echo to r/o ASD/PFO w hx of Afib and CVA (bubble study) 83 y/o lady w/ PMH of HTN, HL, A.Fib (on Xarelto), prior CVA; presenting w/ slurred speech.  Improved after 15-20 mins; BIBA to the hospital for further evaluation, found to have systolic blood pressure of 200s with acute CVA  MRI Brain: Tiny acute cortically-based infarct in the left inferior frontal lobe  Started on coreg for improved BP/HR control.  Asked to eval bradycardia.  EKG: afib 57bpm  Tele: afib 40s-50S overnight; HR 50s this AM; 6 beats NSVT 10/23/20    Plan  -would monitor on tele for now; no PPM at this time.  -bbl held this am for low HR  -cont with asa/sattin  -Currently on hydralazine/HCTZ for BP  -permissive HTN w/ acute CVA  -cont Xarelto for Afib  -2D echo to r/o ASD/PFO w hx of Afib and CVA (bubble study) 85 y/o lady w/ PMH of HTN, HL, A.Fib (on Xarelto), prior CVA; presenting w/ slurred speech.  Improved after 15-20 mins; BIBA to the hospital for further evaluation, found to have systolic blood pressure of 200s with acute CVA  MRI Brain: Tiny acute cortically-based infarct in the left inferior frontal lobe  Started on coreg for improved BP/HR control.  Asked to eval bradycardia.  EKG: afib 57bpm  ECHO; EF 50-55%, mild AR, mild-mod TR, mod pHTN  Tele: afib 40s-50S overnight; HR 50s this AM; 6 beats NSVT 10/23/20, no further events     Plan  -would monitor on tele for now; no PPM at this time.  -bbl held this am for low HR  -cont with asa/ statin  -Currently on hydralazine/HCTZ for BP  -permissive HTN w/ acute CVA  -cont Xarelto for Afib  -2D echo to r/o ASD/PFO w hx of Afib and CVA (bubble study) 83 y/o lady w/ PMH of HTN, HL, A.Fib (on Xarelto), prior CVA; presenting w/ slurred speech.  Improved after 15-20 mins; BIBA to the hospital for further evaluation, found to have systolic blood pressure of 200s with acute CVA  MRI Brain: Tiny acute cortically-based infarct in the left inferior frontal lobe  Started on coreg for improved BP/HR control.  Asked to eval bradycardia.  EKG: afib 57bpm  ECHO; EF 50-55%, mild KS, mild-mod TR, mod pHTN  Tele: afib 40s-50S overnight; HR 50s-60s this AM; 6 beats NSVT 10/23/20, no further events     Plan  -would monitor on tele for now; no PPM at this time.  -bbl held this am for low HR, will consider discontinuing coreg in setting of bradycardia, events of bradycardia in the past as well and was not on any AVN blockers    -cont with asa/ statin  -Currently on hydralazine/HCTZ for BP  -permissive HTN w/ acute CVA  -cont Xarelto for Afib  -2D echo to r/o ASD/PFO w hx of Afib and CVA (bubble study) 83 y/o lady w/ PMH of HTN, HL, A.Fib (on Xarelto), prior CVA; presenting w/ slurred speech.  Improved after 15-20 mins; BIBA to the hospital for further evaluation, found to have systolic blood pressure of 200s with acute CVA  MRI Brain: Tiny acute cortically-based infarct in the left inferior frontal lobe  Started on coreg for improved BP/HR control.  Asked to eval bradycardia.  EKG: afib 57bpm  ECHO; EF 50-55%, mild OK, mild-mod TR, mod pHTN  Tele: afib 40s-50S overnight; HR 50s-60s this AM; 6 beats NSVT 10/23/20, no further events     Plan  -would monitor on tele for now; no PPM at this time.  -bbl held this am for low HR, will consider discontinuing coreg in setting of bradycardia, events of bradycardia in the past as well and was not on any AVN blockers    -cont with asa/ statin  -Currently on hydralazine/HCTZ for BP  -permissive HTN w/ acute CVA  -cont Xarelto for Afib  -2D echo to r/o ASD/PFO w hx of Afib and CVA (bubble study)  -pt will follow up with Dr. Uribe as outpatient

## 2019-10-25 VITALS
HEART RATE: 62 BPM | DIASTOLIC BLOOD PRESSURE: 65 MMHG | SYSTOLIC BLOOD PRESSURE: 164 MMHG | RESPIRATION RATE: 18 BRPM | TEMPERATURE: 98 F | OXYGEN SATURATION: 99 %

## 2019-10-25 LAB
CHOLEST SERPL-MCNC: 127 MG/DL — SIGNIFICANT CHANGE UP (ref 10–199)
HBA1C BLD-MCNC: 5.7 % — HIGH (ref 4–5.6)
HDLC SERPL-MCNC: 51 MG/DL — SIGNIFICANT CHANGE UP
LIPID PNL WITH DIRECT LDL SERPL: 64 MG/DL — SIGNIFICANT CHANGE UP
TOTAL CHOLESTEROL/HDL RATIO MEASUREMENT: 2.5 RATIO — LOW (ref 3.3–7.1)
TRIGL SERPL-MCNC: 58 MG/DL — SIGNIFICANT CHANGE UP (ref 10–149)

## 2019-10-25 PROCEDURE — 99239 HOSP IP/OBS DSCHRG MGMT >30: CPT

## 2019-10-25 RX ORDER — HYDRALAZINE HCL 50 MG
1 TABLET ORAL
Qty: 90 | Refills: 0
Start: 2019-10-25 | End: 2019-11-23

## 2019-10-25 RX ADMIN — Medication 81 MILLIGRAM(S): at 12:28

## 2019-10-25 RX ADMIN — Medication 25 MILLIGRAM(S): at 05:54

## 2019-10-25 RX ADMIN — Medication 50 MILLIGRAM(S): at 17:32

## 2019-10-25 RX ADMIN — RIVAROXABAN 20 MILLIGRAM(S): KIT at 17:32

## 2019-10-25 RX ADMIN — Medication 50 MILLIGRAM(S): at 05:52

## 2019-10-25 NOTE — OCCUPATIONAL THERAPY INITIAL EVALUATION ADULT - LIVES WITH, PROFILE
alone/in a private house with no steps to enter. Once inside, pt has to negotiate a flight of stairs to access her apartment. The bathroom has a tub/shower combination, fixed shower head , shower chair and standard toilet.

## 2019-10-25 NOTE — OCCUPATIONAL THERAPY INITIAL EVALUATION ADULT - RANGE OF MOTION EXAMINATION, UPPER EXTREMITY
Left UE Passive ROM was WNL (within normal limits)/Left UE Active ROM was WNL (within normal limits)/Right UE Active ROM was WFL (within functional limits)/Right UE Passive ROM was WFL  (within functional limits)

## 2019-10-25 NOTE — DISCHARGE NOTE PROVIDER - NSDCCPCAREPLAN_GEN_ALL_CORE_FT
PRINCIPAL DISCHARGE DIAGNOSIS  Diagnosis: Cerebrovascular accident (CVA)  Assessment and Plan of Treatment:       SECONDARY DISCHARGE DIAGNOSES  Diagnosis: Afib  Assessment and Plan of Treatment:     Diagnosis: HTN (hypertension)  Assessment and Plan of Treatment:

## 2019-10-25 NOTE — OCCUPATIONAL THERAPY INITIAL EVALUATION ADULT - SOCIAL CONCERNS
Pt voiced concerns about her recovery at home.  Pt has a very supportive family/Complex psychosocial needs/coping issues

## 2019-10-25 NOTE — PROGRESS NOTE ADULT - SUBJECTIVE AND OBJECTIVE BOX
Patient is a 84y old  Female who presents with a chief complaint of Slurred Speech (24 Oct 2019 14:00)    PAST MEDICAL & SURGICAL HISTORY:  Hyperlipidemia  Atrial fibrillation  Elevated cholesterol  CVA  HTN (hypertension)  No significant past surgical history    INTERVAL HISTORY: Resting in bed, not in any distress   	  MEDICATIONS:  MEDICATIONS  (STANDING):  aspirin  chewable 81 milliGRAM(s) Oral daily  hydrALAZINE 50 milliGRAM(s) Oral three times a day  hydrochlorothiazide 25 milliGRAM(s) Oral daily  rivaroxaban 20 milliGRAM(s) Oral with dinner  simvastatin 40 milliGRAM(s) Oral at bedtime    Vitals:  T(F): 96.8 (10-25-19 @ 05:22), Max: 98.8 (10-24-19 @ 17:04)  HR: 60 (10-25-19 @ 05:22) (59 - 67)  BP: 167/80 (10-25-19 @ 05:22) (148/68 - 192/76)  RR: 17 (10-25-19 @ 05:22) (17 - 22)  SpO2: 96% (10-25-19 @ 05:22) (96% - 97%)    10-24 @ 07:01  -  10-25 @ 07:00  --------------------------------------------------------  IN:    Oral Fluid: 720 mL  Total IN: 720 mL    OUT:  Total OUT: 0 mL    Total NET: 720 mL    Weight (kg): 55.1 (10-22 @ 20:32)  BMI (kg/m2): 20.8 (10-22 @ 20:32)    PHYSICAL EXAM:  Neuro: Awake, responsive  CV: S1 S2 irregular   Lungs: CTABL  GI: Soft, BS +, ND, NT  Extremities: No edema    TELEMETRY: atrial fibrillation     RADIOLOGY:  < from: MR Head No Cont (10.23.19 @ 11:14) >    Tiny acute cortically-based infarct in the left inferior frontal lobe. No   associated hemorrhage.    < end of copied text >    DIAGNOSTIC TESTING:    [x ] Echocardiogram: < from: TTE Echo Doppler w/o Cont (10.23.19 @ 08:41) >   1. Left ventricular ejection fraction, by visual estimation, is 50 to   55%.   2. Mildly decreased segmental left ventricular systolic function.   3. There is mild concentric left ventricular hypertrophy.   4. Mild mitral and pulmonic valve regurgitation.   5. Mild-moderate tricuspid regurgitation.   6. Estimated pulmonary artery systolic pressure is 50.8 mmHg assuming a   right atrial pressure of 5 mmHg, which is consistent with moderate   pulmonary hypertension.    < end of copied text >    LABS:	 	    CARDIAC MARKERS:  Troponin I, Serum: .251 ng/mL (10-23 @ 16:48)  Troponin I, Serum: .242 ng/mL (10-22 @ 14:04)    24 Oct 2019 07:11    142    |  107    |  19     ----------------------------<  86     3.6     |  28     |  0.88   23 Oct 2019 06:26    142    |  109    |  9      ----------------------------<  84     3.9     |  29     |  0.64   22 Oct 2019 14:04    144    |  109    |  12     ----------------------------<  87     3.6     |  31     |  0.82     Ca    8.9        24 Oct 2019 07:11  Phos  3.8       24 Oct 2019 07:11  Mg     2.2       24 Oct 2019 07:11                          12.6   5.03  )-----------( 233      ( 24 Oct 2019 07:11 )             40.2 ,                       x      x     )-----------( x        ( 23 Oct 2019 08:39 )             41.9 ,                       12.7   4.99  )-----------( 235      ( 23 Oct 2019 06:26 )             41.0 ,                       13.3   5.95  )-----------( 257      ( 22 Oct 2019 14:04 )             43.4     Lipid Profile: 10-25 @ 09:33  HDL/Total Cholesterol: --  HDL Chol:              51 mg/dL  Serum Chol:            127 mg/dL  Direct LDL:            64 mg/dL  Triglycerides:         58 mg/dL    HgA1c: Hemoglobin A1C, Whole Blood: 5.7 % (10-25 @ 09:45)    TSH: Thyroid Stimulating Hormone, Serum: 2.710 uU/mL (10-23 @ 06:26)    INR: 2.61 ratio (10-22 @ 14:04)

## 2019-10-25 NOTE — DISCHARGE NOTE PROVIDER - HOSPITAL COURSE
85 y/o Female w/ PMHX of HLD, HTN, A.Fib (on Xarelto) presenting w/ slurred speech today.  Family member at bedside states slurred speech started today, improved after 15-20 mins, BIBA to the hospital for further evaluation, found to have systolic blood pressure of 200s.  It is unclear if patient is taking her medications daily.  MRI is positive for acute CVA and remains HDS. no focal deficits. Eating well and family feels her speech is back to normal.     ECHO with bubble study negative             DISCHARGE DIAGNOSES:             Problem/Plan - 1:    ·  Problem: Hypertensive urgency.  Plan: - likely 2/2 to cva. will allow for permissive htn for now, but still above 160    -c/w Hydralazine 50mg TID, HCTZ 25 mg     coreg held sec to hr went to the 30-40s.         Problem/Plan - 2:    ·  Problem: CVA (cerebral vascular accident).  Plan: asa/statin     neuro consult , Dr. Lupis amos bubble study echo, carotid shows moderate plaque     - outpatient PT     LDL 65.         Problem/Plan - 3:    ·  Problem: Chronic atrial fibrillation.  Plan: on xarelto. son reports shes compliant.         Problem/Plan - 4:    ·  Problem: Hyperlipidemia, unspecified hyperlipidemia type.         Problem/Plan - 5:    ·  Problem: Bradycardia.  Plan: possibly  2/2 to starting coreg. d/c coreg. hr improving     cardiology consult appreciated     trend tele till tomorrow     mild elevated troponin likely 2.2 to demand ischemia and hypertension.         RETURN PARAMETERS DISCUSSED WITH PATIENT, PATIENT EXPRESSED UNDERSTANDING AND IS AGREEABLE.        Discharge time : 40 min

## 2019-10-25 NOTE — OCCUPATIONAL THERAPY INITIAL EVALUATION ADULT - ADDITIONAL COMMENTS
Prior to admission, pt was functioning in her roles, self sufficient & ambulating independently with SAC sometime. Pt has a HHA for 4 hours daily to assist with ADL management. Pt's son and daughter check on her after the HHA leaves each day. Pt is able to reach out of base of support in sitting / standing without loss of balance . Dexterity and fine motor skills are diminished in right hand. Pt is right hand dominant and wears glasses for reading.

## 2019-10-25 NOTE — DISCHARGE NOTE PROVIDER - PROVIDER TOKENS
FREE:[LAST:[PCP],PHONE:[(   )    -],FAX:[(   )    -],ADDRESS:[Dr. Lux Hyde MD  [address] [place] [address]],FOLLOWUP:[1 week]]

## 2019-10-25 NOTE — DISCHARGE NOTE NURSING/CASE MANAGEMENT/SOCIAL WORK - NSDCPEPTSTRK_GEN_ALL_CORE
Need for follow up after discharge/Stroke education booklet/Call 911 for stroke/Prescribed medications/Risk factors for stroke/Stroke support groups for patients, families, and friends/Stroke warning signs and symptoms/Signs and symptoms of stroke

## 2019-10-25 NOTE — DISCHARGE NOTE PROVIDER - CARE PROVIDER_API CALL
PCP,   Dr. Lux Hyde MD  [address] [place] [address]  Phone: (   )    -  Fax: (   )    -  Follow Up Time: 1 week

## 2019-10-25 NOTE — DISCHARGE NOTE NURSING/CASE MANAGEMENT/SOCIAL WORK - PATIENT PORTAL LINK FT
You can access the FollowMyHealth Patient Portal offered by [facility] by registering at the following website: [email or web address]. By joining [facility]’s FollowMyHealth portal, you will also be able to view your health information using other applications (apps) compatible with our system.

## 2019-10-25 NOTE — PROGRESS NOTE ADULT - ASSESSMENT
85 y/o lady w/ PMH of HTN, HL, A.Fib (on Xarelto), prior CVA; presenting w/ slurred speech.  Improved after 15-20 mins; BIBA to the hospital for further evaluation, found to have systolic blood pressure of 200s with acute CVA  MRI Brain: Tiny acute cortically-based infarct in the left inferior frontal lobe  Started on coreg for improved BP/HR control.  Asked to eval bradycardia.  EKG: afib 57bpm  ECHO; EF 50-55%, mild TN, mild-mod TR, mod pHTN  Tele: afib 50S overnight; HR 50s-60s this AM    Plan  -Avoid AVN blockers in setting of bradycardia   -cont with asa/ statin  -Currently on hydralazine/HCTZ for BP  -permissive HTN w/ acute CVA  -cont Xarelto for Afib  -2D echo with bubbles to r/o ASD/PFO w hx of Afib and CVA done, pending read   -pt will follow up with Dr. Uribe as outpatient 85 y/o lady w/ PMH of HTN, HL, A.Fib (on Xarelto), prior CVA; presenting w/ slurred speech.  Improved after 15-20 mins; BIBA to the hospital for further evaluation, found to have systolic blood pressure of 200s with acute CVA  MRI Brain: Tiny acute cortically-based infarct in the left inferior frontal lobe  Started on coreg for improved BP/HR control.  Asked to eval bradycardia.  EKG: afib 57bpm  ECHO; EF 50-55%, mild FL, mild-mod TR, mod pHTN  Tele: afib 50S overnight; HR 50s-60s this AM    Plan  -Avoid AVN blockers in setting of bradycardia   -cont with asa/ statin  -Currently on hydralazine/HCTZ for BP, can add Norvasc if needed for better BP control    -permissive HTN w/ acute CVA  -cont Xarelto for Afib  -2D echo with bubbles to r/o ASD/PFO w hx of Afib and CVA done, pending read   -pt will follow up with Dr. Uribe as outpatient

## 2019-10-25 NOTE — OCCUPATIONAL THERAPY INITIAL EVALUATION ADULT - GENERAL OBSERVATIONS, REHAB EVAL
Pt was seen for initial OT consult, encountered standing without any adaptive ; pt is on cardiac monitor. IV heplock to right elbow. Pt denied any pain, numbness of tingling. Pt was AA&Ox4, cooperative & followed commands in Cymro creole.Translation was provided by pt's son Williams Hernandez via telephone. Pt moves all extremites without difficulties,  make needs & wants known. Pt presents with mild deficits with activity tolerance ,balance, ADL management and functional mobility.

## 2019-10-25 NOTE — OCCUPATIONAL THERAPY INITIAL EVALUATION ADULT - PERTINENT HX OF CURRENT PROBLEM, REHAB EVAL
Pt presented to ER due to acute onset of neurological deficit. Pt is diagnosed with dizziness. MRI on 10/23/19 results confirm tiny acute cortically-based infarct in the left inferior frontal lobe

## 2019-10-25 NOTE — OCCUPATIONAL THERAPY INITIAL EVALUATION ADULT - PLANNED THERAPY INTERVENTIONS, OT EVAL
strengthening/stretching/fine motor coordination training/parent/caregiver training.../ADL retraining/bed mobility training/neuromuscular re-education/balance training/energy conservation techniques/cognitive, visual perceptual/transfer training

## 2019-10-29 DIAGNOSIS — I48.20 CHRONIC ATRIAL FIBRILLATION, UNSPECIFIED: ICD-10-CM

## 2019-10-29 DIAGNOSIS — I63.9 CEREBRAL INFARCTION, UNSPECIFIED: ICD-10-CM

## 2019-10-29 DIAGNOSIS — R47.81 SLURRED SPEECH: ICD-10-CM

## 2019-10-29 DIAGNOSIS — I10 ESSENTIAL (PRIMARY) HYPERTENSION: ICD-10-CM

## 2019-10-29 DIAGNOSIS — Z79.01 LONG TERM (CURRENT) USE OF ANTICOAGULANTS: ICD-10-CM

## 2019-10-29 DIAGNOSIS — E78.5 HYPERLIPIDEMIA, UNSPECIFIED: ICD-10-CM

## 2019-10-29 DIAGNOSIS — Z79.82 LONG TERM (CURRENT) USE OF ASPIRIN: ICD-10-CM

## 2019-10-29 DIAGNOSIS — I47.2 VENTRICULAR TACHYCARDIA: ICD-10-CM

## 2019-10-29 DIAGNOSIS — I24.8 OTHER FORMS OF ACUTE ISCHEMIC HEART DISEASE: ICD-10-CM

## 2019-10-29 DIAGNOSIS — R00.1 BRADYCARDIA, UNSPECIFIED: ICD-10-CM

## 2019-10-29 DIAGNOSIS — R47.1 DYSARTHRIA AND ANARTHRIA: ICD-10-CM

## 2019-10-29 DIAGNOSIS — I16.0 HYPERTENSIVE URGENCY: ICD-10-CM

## 2019-10-30 DIAGNOSIS — Z71.89 OTHER SPECIFIED COUNSELING: ICD-10-CM

## 2019-10-30 PROBLEM — Z00.00 ENCOUNTER FOR PREVENTIVE HEALTH EXAMINATION: Status: ACTIVE | Noted: 2019-10-30

## 2019-11-25 NOTE — PATIENT PROFILE ADULT. - HEALTHCARE INFORMATION NEEDED, PROFILE
patient ate 1 chocolate pudding without nausea or vomiting. patients aid and sister at bedside. patient family educated on plan of care, awaiting attending evaluation.
none

## 2020-03-02 NOTE — ED ADULT NURSE NOTE - BREATH SOUNDS, MLM
Clear
Keith: 32 yof woke up with left sided headache. Pt was diagnosed with migraines almost 10 years ago but only takes tylenol with good relief. Pt is 7 weeks post-partum and started to have typical headache but tylenol did not help. Pt saw neurologist 5 years ago, negative MRI at that time, no f/u since. Pt is well appearing no distress, PERRL, EOMI, FROM of neck no meningismus. clear lungs, normal cardiac, abd osft and non tender, neuro exam unremarkable. Pt appears to have typical migraine type symptoms, will treat and if not better consider imaging. If better, outpt neuro follow up and list given. I spoke with pt for need for f/u and possible repeat imaging, small change of psuedotumor but if improved, outpt followup.

## 2022-01-13 NOTE — PROGRESS NOTE ADULT - SUBJECTIVE AND OBJECTIVE BOX
Neurology Progress Note    No acute events.     Neuro Exam: AOx2. Follows commands. No dysarthria. Moving all four extremities.    MRI brain- tiny acute left inferior frontal lobe  Carotid doppler- Moderate soft and calcified plaque bilaterally  CTA head/neck- no significant stenosis  Echo- EF normal  LDL- 65  HgbA1c- 5.7    A/P:  Acute small left frontal lobe stroke  HTN  A-fib  HLD    - continue Xarelto, aspirin, and Zocor for secondary stroke prevention  - PT  - D/C planning Render In Strict Bullet Format?: No Initiate Treatment: pharmacy compounding accessory Bid(Chemo Cream)\\nQuantity: 30.0 g  Days Supply: 30\\nSig: Calcipotriene/fluorouracil cream 0.005%/4.5% cream Detail Level: Zone Initiate Treatment: triamcinolone acetonide 0.1 % topical cream BID

## 2022-01-19 NOTE — H&P ADULT - PROBLEM SELECTOR PROBLEM 1
Patient is needing visit today to discuss positive Covid test and medication. Patient complains of cough and body aches.      Medication Reconciliation Complete    Karen Neumann LPN  1/19/2022 2:19 PM  
Hypertensive urgency

## 2022-09-23 NOTE — ED ADULT NURSE NOTE - GASTROINTESTINAL ASSESSMENT
Clinic Care Coordination Contact  [facility]/Voicemail    Clinical Data: Patient discharged from [facility] 9/13/22 with [facility] home infusion/nursing services. Patient was seen in clinic on 9/16/22 by Dr. Zepeda for her post hospital/TCU follow up appointment. CC RN reaching out to patient as she is an identified care coordination patient.     Outreach attempted x 2.  Left message on patient's voicemail with call back information and requested return call.    Plan: Care Coordinator sent care coordination introduction letter on 9/21/22 via [employer]. Care Coordinator will do no further outreaches at this time.    Erum Soto RN, BSN, PHN Care Coordinator  [place], [place], and [place]   Phone: [phone]      - - -

## 2022-11-07 NOTE — PATIENT PROFILE ADULT - NSPROMEDSBROUGHTTOHOSP_GEN_A_NUR
LOV 11/12/2021  Last labs 12/9/21    NOV 11/28/2022  Labs pending for MD review if appropriate     no

## 2023-01-01 ENCOUNTER — INPATIENT (INPATIENT)
Facility: HOSPITAL | Age: 88
LOS: 76 days | End: 2024-01-11
Attending: HOSPITALIST | Admitting: HOSPITALIST
Payer: MEDICARE

## 2023-01-01 VITALS
OXYGEN SATURATION: 100 % | DIASTOLIC BLOOD PRESSURE: 87 MMHG | RESPIRATION RATE: 24 BRPM | WEIGHT: 159.84 LBS | HEIGHT: 64 IN | SYSTOLIC BLOOD PRESSURE: 178 MMHG | TEMPERATURE: 98 F | HEART RATE: 88 BPM

## 2023-01-01 DIAGNOSIS — Z51.5 ENCOUNTER FOR PALLIATIVE CARE: ICD-10-CM

## 2023-01-01 DIAGNOSIS — I48.91 UNSPECIFIED ATRIAL FIBRILLATION: ICD-10-CM

## 2023-01-01 DIAGNOSIS — I63.511 CEREBRAL INFARCTION DUE TO UNSPECIFIED OCCLUSION OR STENOSIS OF RIGHT MIDDLE CEREBRAL ARTERY: ICD-10-CM

## 2023-01-01 DIAGNOSIS — J96.01 ACUTE RESPIRATORY FAILURE WITH HYPOXIA: ICD-10-CM

## 2023-01-01 LAB
A1C WITH ESTIMATED AVERAGE GLUCOSE RESULT: 5.5 % — SIGNIFICANT CHANGE UP (ref 4–5.6)
A1C WITH ESTIMATED AVERAGE GLUCOSE RESULT: 5.5 % — SIGNIFICANT CHANGE UP (ref 4–5.6)
ALBUMIN SERPL ELPH-MCNC: 1.4 G/DL — LOW (ref 3.3–5)
ALBUMIN SERPL ELPH-MCNC: 1.4 G/DL — LOW (ref 3.3–5)
ALBUMIN SERPL ELPH-MCNC: 1.8 G/DL — LOW (ref 3.3–5)
ALBUMIN SERPL ELPH-MCNC: 1.8 G/DL — LOW (ref 3.3–5)
ALBUMIN SERPL ELPH-MCNC: 2 G/DL — LOW (ref 3.3–5)
ALBUMIN SERPL ELPH-MCNC: 2 G/DL — LOW (ref 3.3–5)
ALBUMIN SERPL ELPH-MCNC: 2.1 G/DL — LOW (ref 3.3–5)
ALBUMIN SERPL ELPH-MCNC: 2.1 G/DL — LOW (ref 3.3–5)
ALBUMIN SERPL ELPH-MCNC: 2.2 G/DL — LOW (ref 3.3–5)
ALBUMIN SERPL ELPH-MCNC: 2.2 G/DL — LOW (ref 3.3–5)
ALBUMIN SERPL ELPH-MCNC: 2.3 G/DL — LOW (ref 3.3–5)
ALBUMIN SERPL ELPH-MCNC: 2.3 G/DL — LOW (ref 3.3–5)
ALBUMIN SERPL ELPH-MCNC: 2.4 G/DL — LOW (ref 3.3–5)
ALBUMIN SERPL ELPH-MCNC: 2.5 G/DL — LOW (ref 3.3–5)
ALBUMIN SERPL ELPH-MCNC: 2.5 G/DL — LOW (ref 3.3–5)
ALBUMIN SERPL ELPH-MCNC: 2.6 G/DL — LOW (ref 3.3–5)
ALBUMIN SERPL ELPH-MCNC: 2.6 G/DL — LOW (ref 3.3–5)
ALBUMIN SERPL ELPH-MCNC: 2.7 G/DL — LOW (ref 3.3–5)
ALBUMIN SERPL ELPH-MCNC: 2.7 G/DL — LOW (ref 3.3–5)
ALBUMIN SERPL ELPH-MCNC: 2.8 G/DL — LOW (ref 3.3–5)
ALBUMIN SERPL ELPH-MCNC: 3.4 G/DL — SIGNIFICANT CHANGE UP (ref 3.3–5)
ALBUMIN SERPL ELPH-MCNC: 3.4 G/DL — SIGNIFICANT CHANGE UP (ref 3.3–5)
ALBUMIN SERPL ELPH-MCNC: 3.5 G/DL — SIGNIFICANT CHANGE UP (ref 3.3–5)
ALBUMIN SERPL ELPH-MCNC: 3.5 G/DL — SIGNIFICANT CHANGE UP (ref 3.3–5)
ALP SERPL-CCNC: 120 U/L — SIGNIFICANT CHANGE UP (ref 40–120)
ALP SERPL-CCNC: 120 U/L — SIGNIFICANT CHANGE UP (ref 40–120)
ALP SERPL-CCNC: 123 U/L — HIGH (ref 40–120)
ALP SERPL-CCNC: 123 U/L — HIGH (ref 40–120)
ALP SERPL-CCNC: 19 U/L — LOW (ref 40–120)
ALP SERPL-CCNC: 19 U/L — LOW (ref 40–120)
ALP SERPL-CCNC: 32 U/L — LOW (ref 40–120)
ALP SERPL-CCNC: 32 U/L — LOW (ref 40–120)
ALP SERPL-CCNC: 41 U/L — SIGNIFICANT CHANGE UP (ref 40–120)
ALP SERPL-CCNC: 41 U/L — SIGNIFICANT CHANGE UP (ref 40–120)
ALP SERPL-CCNC: 42 U/L — SIGNIFICANT CHANGE UP (ref 40–120)
ALP SERPL-CCNC: 42 U/L — SIGNIFICANT CHANGE UP (ref 40–120)
ALP SERPL-CCNC: 45 U/L — SIGNIFICANT CHANGE UP (ref 40–120)
ALP SERPL-CCNC: 45 U/L — SIGNIFICANT CHANGE UP (ref 40–120)
ALP SERPL-CCNC: 47 U/L — SIGNIFICANT CHANGE UP (ref 40–120)
ALP SERPL-CCNC: 47 U/L — SIGNIFICANT CHANGE UP (ref 40–120)
ALP SERPL-CCNC: 48 U/L — SIGNIFICANT CHANGE UP (ref 40–120)
ALP SERPL-CCNC: 48 U/L — SIGNIFICANT CHANGE UP (ref 40–120)
ALP SERPL-CCNC: 49 U/L — SIGNIFICANT CHANGE UP (ref 40–120)
ALP SERPL-CCNC: 50 U/L — SIGNIFICANT CHANGE UP (ref 40–120)
ALP SERPL-CCNC: 50 U/L — SIGNIFICANT CHANGE UP (ref 40–120)
ALP SERPL-CCNC: 51 U/L — SIGNIFICANT CHANGE UP (ref 40–120)
ALP SERPL-CCNC: 51 U/L — SIGNIFICANT CHANGE UP (ref 40–120)
ALP SERPL-CCNC: 52 U/L — SIGNIFICANT CHANGE UP (ref 40–120)
ALP SERPL-CCNC: 52 U/L — SIGNIFICANT CHANGE UP (ref 40–120)
ALP SERPL-CCNC: 55 U/L — SIGNIFICANT CHANGE UP (ref 40–120)
ALP SERPL-CCNC: 55 U/L — SIGNIFICANT CHANGE UP (ref 40–120)
ALP SERPL-CCNC: 58 U/L — SIGNIFICANT CHANGE UP (ref 40–120)
ALP SERPL-CCNC: 58 U/L — SIGNIFICANT CHANGE UP (ref 40–120)
ALP SERPL-CCNC: 63 U/L — SIGNIFICANT CHANGE UP (ref 40–120)
ALP SERPL-CCNC: 63 U/L — SIGNIFICANT CHANGE UP (ref 40–120)
ALP SERPL-CCNC: 64 U/L — SIGNIFICANT CHANGE UP (ref 40–120)
ALP SERPL-CCNC: 64 U/L — SIGNIFICANT CHANGE UP (ref 40–120)
ALP SERPL-CCNC: 81 U/L — SIGNIFICANT CHANGE UP (ref 40–120)
ALP SERPL-CCNC: 81 U/L — SIGNIFICANT CHANGE UP (ref 40–120)
ALP SERPL-CCNC: 91 U/L — SIGNIFICANT CHANGE UP (ref 40–120)
ALP SERPL-CCNC: 91 U/L — SIGNIFICANT CHANGE UP (ref 40–120)
ALP SERPL-CCNC: 92 U/L — SIGNIFICANT CHANGE UP (ref 40–120)
ALP SERPL-CCNC: 92 U/L — SIGNIFICANT CHANGE UP (ref 40–120)
ALT FLD-CCNC: 10 U/L — LOW (ref 12–78)
ALT FLD-CCNC: 10 U/L — LOW (ref 12–78)
ALT FLD-CCNC: 129 U/L — HIGH (ref 12–78)
ALT FLD-CCNC: 129 U/L — HIGH (ref 12–78)
ALT FLD-CCNC: 13 U/L — SIGNIFICANT CHANGE UP (ref 12–78)
ALT FLD-CCNC: 13 U/L — SIGNIFICANT CHANGE UP (ref 12–78)
ALT FLD-CCNC: 137 U/L — HIGH (ref 12–78)
ALT FLD-CCNC: 137 U/L — HIGH (ref 12–78)
ALT FLD-CCNC: 144 U/L — HIGH (ref 12–78)
ALT FLD-CCNC: 144 U/L — HIGH (ref 12–78)
ALT FLD-CCNC: 158 U/L — HIGH (ref 12–78)
ALT FLD-CCNC: 158 U/L — HIGH (ref 12–78)
ALT FLD-CCNC: 17 U/L — SIGNIFICANT CHANGE UP (ref 12–78)
ALT FLD-CCNC: 17 U/L — SIGNIFICANT CHANGE UP (ref 12–78)
ALT FLD-CCNC: 18 U/L — SIGNIFICANT CHANGE UP (ref 12–78)
ALT FLD-CCNC: 18 U/L — SIGNIFICANT CHANGE UP (ref 12–78)
ALT FLD-CCNC: 31 U/L — SIGNIFICANT CHANGE UP (ref 12–78)
ALT FLD-CCNC: 32 U/L — SIGNIFICANT CHANGE UP (ref 12–78)
ALT FLD-CCNC: 32 U/L — SIGNIFICANT CHANGE UP (ref 12–78)
ALT FLD-CCNC: 33 U/L — SIGNIFICANT CHANGE UP (ref 12–78)
ALT FLD-CCNC: 33 U/L — SIGNIFICANT CHANGE UP (ref 12–78)
ALT FLD-CCNC: 36 U/L — SIGNIFICANT CHANGE UP (ref 12–78)
ALT FLD-CCNC: 39 U/L — SIGNIFICANT CHANGE UP (ref 12–78)
ALT FLD-CCNC: 39 U/L — SIGNIFICANT CHANGE UP (ref 12–78)
ALT FLD-CCNC: 41 U/L — SIGNIFICANT CHANGE UP (ref 12–78)
ALT FLD-CCNC: 41 U/L — SIGNIFICANT CHANGE UP (ref 12–78)
ALT FLD-CCNC: 42 U/L — SIGNIFICANT CHANGE UP (ref 12–78)
ALT FLD-CCNC: 42 U/L — SIGNIFICANT CHANGE UP (ref 12–78)
ALT FLD-CCNC: 46 U/L — SIGNIFICANT CHANGE UP (ref 12–78)
ALT FLD-CCNC: 46 U/L — SIGNIFICANT CHANGE UP (ref 12–78)
ALT FLD-CCNC: 49 U/L — SIGNIFICANT CHANGE UP (ref 12–78)
ALT FLD-CCNC: 49 U/L — SIGNIFICANT CHANGE UP (ref 12–78)
ALT FLD-CCNC: 53 U/L — SIGNIFICANT CHANGE UP (ref 12–78)
ALT FLD-CCNC: 53 U/L — SIGNIFICANT CHANGE UP (ref 12–78)
ALT FLD-CCNC: 70 U/L — SIGNIFICANT CHANGE UP (ref 12–78)
ALT FLD-CCNC: 70 U/L — SIGNIFICANT CHANGE UP (ref 12–78)
AMMONIA BLD-MCNC: 50 UMOL/L — HIGH (ref 11–32)
AMMONIA BLD-MCNC: 50 UMOL/L — HIGH (ref 11–32)
AMPHET UR-MCNC: NEGATIVE — SIGNIFICANT CHANGE UP
AMPHET UR-MCNC: NEGATIVE — SIGNIFICANT CHANGE UP
ANION GAP SERPL CALC-SCNC: 1 MMOL/L — LOW (ref 5–17)
ANION GAP SERPL CALC-SCNC: 1 MMOL/L — LOW (ref 5–17)
ANION GAP SERPL CALC-SCNC: 2 MMOL/L — LOW (ref 5–17)
ANION GAP SERPL CALC-SCNC: 3 MMOL/L — LOW (ref 5–17)
ANION GAP SERPL CALC-SCNC: 4 MMOL/L — LOW (ref 5–17)
ANION GAP SERPL CALC-SCNC: 5 MMOL/L — SIGNIFICANT CHANGE UP (ref 5–17)
ANION GAP SERPL CALC-SCNC: 6 MMOL/L — SIGNIFICANT CHANGE UP (ref 5–17)
ANION GAP SERPL CALC-SCNC: 6 MMOL/L — SIGNIFICANT CHANGE UP (ref 5–17)
ANION GAP SERPL CALC-SCNC: 7 MMOL/L — SIGNIFICANT CHANGE UP (ref 5–17)
ANION GAP SERPL CALC-SCNC: 7 MMOL/L — SIGNIFICANT CHANGE UP (ref 5–17)
ANION GAP SERPL CALC-SCNC: 8 MMOL/L — SIGNIFICANT CHANGE UP (ref 5–17)
ANION GAP SERPL CALC-SCNC: 8 MMOL/L — SIGNIFICANT CHANGE UP (ref 5–17)
ANION GAP SERPL CALC-SCNC: 9 MMOL/L — SIGNIFICANT CHANGE UP (ref 5–17)
ANION GAP SERPL CALC-SCNC: 9 MMOL/L — SIGNIFICANT CHANGE UP (ref 5–17)
APPEARANCE UR: CLEAR — SIGNIFICANT CHANGE UP
APPEARANCE UR: CLEAR — SIGNIFICANT CHANGE UP
APTT BLD: 30.7 SEC — SIGNIFICANT CHANGE UP (ref 24.5–35.6)
APTT BLD: 30.7 SEC — SIGNIFICANT CHANGE UP (ref 24.5–35.6)
AST SERPL-CCNC: 102 U/L — HIGH (ref 15–37)
AST SERPL-CCNC: 102 U/L — HIGH (ref 15–37)
AST SERPL-CCNC: 105 U/L — HIGH (ref 15–37)
AST SERPL-CCNC: 105 U/L — HIGH (ref 15–37)
AST SERPL-CCNC: 106 U/L — HIGH (ref 15–37)
AST SERPL-CCNC: 106 U/L — HIGH (ref 15–37)
AST SERPL-CCNC: 121 U/L — HIGH (ref 15–37)
AST SERPL-CCNC: 121 U/L — HIGH (ref 15–37)
AST SERPL-CCNC: 128 U/L — HIGH (ref 15–37)
AST SERPL-CCNC: 128 U/L — HIGH (ref 15–37)
AST SERPL-CCNC: 129 U/L — HIGH (ref 15–37)
AST SERPL-CCNC: 129 U/L — HIGH (ref 15–37)
AST SERPL-CCNC: 132 U/L — HIGH (ref 15–37)
AST SERPL-CCNC: 132 U/L — HIGH (ref 15–37)
AST SERPL-CCNC: 17 U/L — SIGNIFICANT CHANGE UP (ref 15–37)
AST SERPL-CCNC: 17 U/L — SIGNIFICANT CHANGE UP (ref 15–37)
AST SERPL-CCNC: 204 U/L — HIGH (ref 15–37)
AST SERPL-CCNC: 204 U/L — HIGH (ref 15–37)
AST SERPL-CCNC: 24 U/L — SIGNIFICANT CHANGE UP (ref 15–37)
AST SERPL-CCNC: 24 U/L — SIGNIFICANT CHANGE UP (ref 15–37)
AST SERPL-CCNC: 26 U/L — SIGNIFICANT CHANGE UP (ref 15–37)
AST SERPL-CCNC: 26 U/L — SIGNIFICANT CHANGE UP (ref 15–37)
AST SERPL-CCNC: 31 U/L — SIGNIFICANT CHANGE UP (ref 15–37)
AST SERPL-CCNC: 31 U/L — SIGNIFICANT CHANGE UP (ref 15–37)
AST SERPL-CCNC: 39 U/L — HIGH (ref 15–37)
AST SERPL-CCNC: 39 U/L — HIGH (ref 15–37)
AST SERPL-CCNC: 72 U/L — HIGH (ref 15–37)
AST SERPL-CCNC: 72 U/L — HIGH (ref 15–37)
AST SERPL-CCNC: 76 U/L — HIGH (ref 15–37)
AST SERPL-CCNC: 76 U/L — HIGH (ref 15–37)
AST SERPL-CCNC: 81 U/L — HIGH (ref 15–37)
AST SERPL-CCNC: 81 U/L — HIGH (ref 15–37)
AST SERPL-CCNC: 85 U/L — HIGH (ref 15–37)
AST SERPL-CCNC: 87 U/L — HIGH (ref 15–37)
AST SERPL-CCNC: 98 U/L — HIGH (ref 15–37)
AST SERPL-CCNC: 98 U/L — HIGH (ref 15–37)
BACTERIA # UR AUTO: ABNORMAL
BACTERIA # UR AUTO: ABNORMAL
BARBITURATES UR SCN-MCNC: NEGATIVE — SIGNIFICANT CHANGE UP
BARBITURATES UR SCN-MCNC: NEGATIVE — SIGNIFICANT CHANGE UP
BASE EXCESS BLDA CALC-SCNC: 1 MMOL/L — SIGNIFICANT CHANGE UP (ref -2–3)
BASE EXCESS BLDA CALC-SCNC: 1 MMOL/L — SIGNIFICANT CHANGE UP (ref -2–3)
BASE EXCESS BLDA CALC-SCNC: 1.1 MMOL/L — SIGNIFICANT CHANGE UP (ref -2–3)
BASE EXCESS BLDA CALC-SCNC: 1.1 MMOL/L — SIGNIFICANT CHANGE UP (ref -2–3)
BASOPHILS # BLD AUTO: 0.01 K/UL — SIGNIFICANT CHANGE UP (ref 0–0.2)
BASOPHILS # BLD AUTO: 0.01 K/UL — SIGNIFICANT CHANGE UP (ref 0–0.2)
BASOPHILS # BLD AUTO: 0.02 K/UL — SIGNIFICANT CHANGE UP (ref 0–0.2)
BASOPHILS # BLD AUTO: 0.03 K/UL — SIGNIFICANT CHANGE UP (ref 0–0.2)
BASOPHILS # BLD AUTO: 0.03 K/UL — SIGNIFICANT CHANGE UP (ref 0–0.2)
BASOPHILS # BLD AUTO: 0.04 K/UL — SIGNIFICANT CHANGE UP (ref 0–0.2)
BASOPHILS # BLD AUTO: 0.04 K/UL — SIGNIFICANT CHANGE UP (ref 0–0.2)
BASOPHILS # BLD AUTO: 0.05 K/UL — SIGNIFICANT CHANGE UP (ref 0–0.2)
BASOPHILS # BLD AUTO: 0.06 K/UL — SIGNIFICANT CHANGE UP (ref 0–0.2)
BASOPHILS # BLD AUTO: 0.07 K/UL — SIGNIFICANT CHANGE UP (ref 0–0.2)
BASOPHILS # BLD AUTO: 0.07 K/UL — SIGNIFICANT CHANGE UP (ref 0–0.2)
BASOPHILS NFR BLD AUTO: 0.1 % — SIGNIFICANT CHANGE UP (ref 0–2)
BASOPHILS NFR BLD AUTO: 0.1 % — SIGNIFICANT CHANGE UP (ref 0–2)
BASOPHILS NFR BLD AUTO: 0.2 % — SIGNIFICANT CHANGE UP (ref 0–2)
BASOPHILS NFR BLD AUTO: 0.3 % — SIGNIFICANT CHANGE UP (ref 0–2)
BASOPHILS NFR BLD AUTO: 0.4 % — SIGNIFICANT CHANGE UP (ref 0–2)
BASOPHILS NFR BLD AUTO: 0.5 % — SIGNIFICANT CHANGE UP (ref 0–2)
BASOPHILS NFR BLD AUTO: 0.5 % — SIGNIFICANT CHANGE UP (ref 0–2)
BASOPHILS NFR BLD AUTO: 0.6 % — SIGNIFICANT CHANGE UP (ref 0–2)
BASOPHILS NFR BLD AUTO: 0.6 % — SIGNIFICANT CHANGE UP (ref 0–2)
BASOPHILS NFR BLD AUTO: 1 % — SIGNIFICANT CHANGE UP (ref 0–2)
BASOPHILS NFR BLD AUTO: 1 % — SIGNIFICANT CHANGE UP (ref 0–2)
BENZODIAZ UR-MCNC: NEGATIVE — SIGNIFICANT CHANGE UP
BENZODIAZ UR-MCNC: NEGATIVE — SIGNIFICANT CHANGE UP
BILIRUB SERPL-MCNC: 0.6 MG/DL — SIGNIFICANT CHANGE UP (ref 0.2–1.2)
BILIRUB SERPL-MCNC: 0.7 MG/DL — SIGNIFICANT CHANGE UP (ref 0.2–1.2)
BILIRUB SERPL-MCNC: 0.8 MG/DL — SIGNIFICANT CHANGE UP (ref 0.2–1.2)
BILIRUB SERPL-MCNC: 0.9 MG/DL — SIGNIFICANT CHANGE UP (ref 0.2–1.2)
BILIRUB SERPL-MCNC: 0.9 MG/DL — SIGNIFICANT CHANGE UP (ref 0.2–1.2)
BILIRUB SERPL-MCNC: 1 MG/DL — SIGNIFICANT CHANGE UP (ref 0.2–1.2)
BILIRUB SERPL-MCNC: 1.5 MG/DL — HIGH (ref 0.2–1.2)
BILIRUB SERPL-MCNC: 1.5 MG/DL — HIGH (ref 0.2–1.2)
BILIRUB SERPL-MCNC: 1.7 MG/DL — HIGH (ref 0.2–1.2)
BILIRUB SERPL-MCNC: 1.7 MG/DL — HIGH (ref 0.2–1.2)
BILIRUB UR-MCNC: NEGATIVE — SIGNIFICANT CHANGE UP
BILIRUB UR-MCNC: NEGATIVE — SIGNIFICANT CHANGE UP
BLD GP AB SCN SERPL QL: SIGNIFICANT CHANGE UP
BLOOD GAS COMMENTS ARTERIAL: SIGNIFICANT CHANGE UP
BUN SERPL-MCNC: 14 MG/DL — SIGNIFICANT CHANGE UP (ref 7–23)
BUN SERPL-MCNC: 15 MG/DL — SIGNIFICANT CHANGE UP (ref 7–23)
BUN SERPL-MCNC: 15 MG/DL — SIGNIFICANT CHANGE UP (ref 7–23)
BUN SERPL-MCNC: 16 MG/DL — SIGNIFICANT CHANGE UP (ref 7–23)
BUN SERPL-MCNC: 20 MG/DL — SIGNIFICANT CHANGE UP (ref 7–23)
BUN SERPL-MCNC: 21 MG/DL — SIGNIFICANT CHANGE UP (ref 7–23)
BUN SERPL-MCNC: 21 MG/DL — SIGNIFICANT CHANGE UP (ref 7–23)
BUN SERPL-MCNC: 22 MG/DL — SIGNIFICANT CHANGE UP (ref 7–23)
BUN SERPL-MCNC: 23 MG/DL — SIGNIFICANT CHANGE UP (ref 7–23)
BUN SERPL-MCNC: 23 MG/DL — SIGNIFICANT CHANGE UP (ref 7–23)
BUN SERPL-MCNC: 24 MG/DL — HIGH (ref 7–23)
BUN SERPL-MCNC: 25 MG/DL — HIGH (ref 7–23)
BUN SERPL-MCNC: 27 MG/DL — HIGH (ref 7–23)
BUN SERPL-MCNC: 27 MG/DL — HIGH (ref 7–23)
BUN SERPL-MCNC: 28 MG/DL — HIGH (ref 7–23)
BUN SERPL-MCNC: 29 MG/DL — HIGH (ref 7–23)
BUN SERPL-MCNC: 42 MG/DL — HIGH (ref 7–23)
BUN SERPL-MCNC: 42 MG/DL — HIGH (ref 7–23)
BUN SERPL-MCNC: 52 MG/DL — HIGH (ref 7–23)
BUN SERPL-MCNC: 52 MG/DL — HIGH (ref 7–23)
CALCIUM SERPL-MCNC: 10.1 MG/DL — SIGNIFICANT CHANGE UP (ref 8.5–10.1)
CALCIUM SERPL-MCNC: 10.1 MG/DL — SIGNIFICANT CHANGE UP (ref 8.5–10.1)
CALCIUM SERPL-MCNC: 7.2 MG/DL — LOW (ref 8.5–10.1)
CALCIUM SERPL-MCNC: 7.2 MG/DL — LOW (ref 8.5–10.1)
CALCIUM SERPL-MCNC: 8 MG/DL — LOW (ref 8.5–10.1)
CALCIUM SERPL-MCNC: 8 MG/DL — LOW (ref 8.5–10.1)
CALCIUM SERPL-MCNC: 8.4 MG/DL — LOW (ref 8.5–10.1)
CALCIUM SERPL-MCNC: 8.4 MG/DL — LOW (ref 8.5–10.1)
CALCIUM SERPL-MCNC: 8.5 MG/DL — SIGNIFICANT CHANGE UP (ref 8.5–10.1)
CALCIUM SERPL-MCNC: 8.5 MG/DL — SIGNIFICANT CHANGE UP (ref 8.5–10.1)
CALCIUM SERPL-MCNC: 8.6 MG/DL — SIGNIFICANT CHANGE UP (ref 8.5–10.1)
CALCIUM SERPL-MCNC: 8.6 MG/DL — SIGNIFICANT CHANGE UP (ref 8.5–10.1)
CALCIUM SERPL-MCNC: 8.8 MG/DL — SIGNIFICANT CHANGE UP (ref 8.5–10.1)
CALCIUM SERPL-MCNC: 8.8 MG/DL — SIGNIFICANT CHANGE UP (ref 8.5–10.1)
CALCIUM SERPL-MCNC: 8.9 MG/DL — SIGNIFICANT CHANGE UP (ref 8.5–10.1)
CALCIUM SERPL-MCNC: 8.9 MG/DL — SIGNIFICANT CHANGE UP (ref 8.5–10.1)
CALCIUM SERPL-MCNC: 9 MG/DL — SIGNIFICANT CHANGE UP (ref 8.5–10.1)
CALCIUM SERPL-MCNC: 9.1 MG/DL — SIGNIFICANT CHANGE UP (ref 8.5–10.1)
CALCIUM SERPL-MCNC: 9.2 MG/DL — SIGNIFICANT CHANGE UP (ref 8.5–10.1)
CALCIUM SERPL-MCNC: 9.6 MG/DL — SIGNIFICANT CHANGE UP (ref 8.5–10.1)
CALCIUM SERPL-MCNC: 9.7 MG/DL — SIGNIFICANT CHANGE UP (ref 8.5–10.1)
CALCIUM SERPL-MCNC: 9.7 MG/DL — SIGNIFICANT CHANGE UP (ref 8.5–10.1)
CALCIUM SERPL-MCNC: 9.8 MG/DL — SIGNIFICANT CHANGE UP (ref 8.5–10.1)
CALCIUM SERPL-MCNC: 9.8 MG/DL — SIGNIFICANT CHANGE UP (ref 8.5–10.1)
CALCIUM SERPL-MCNC: <3 MG/DL (ref 8.5–10.1)
CALCIUM SERPL-MCNC: <3 MG/DL (ref 8.5–10.1)
CHLORIDE SERPL-SCNC: 100 MMOL/L — SIGNIFICANT CHANGE UP (ref 96–108)
CHLORIDE SERPL-SCNC: 102 MMOL/L — SIGNIFICANT CHANGE UP (ref 96–108)
CHLORIDE SERPL-SCNC: 102 MMOL/L — SIGNIFICANT CHANGE UP (ref 96–108)
CHLORIDE SERPL-SCNC: 103 MMOL/L — SIGNIFICANT CHANGE UP (ref 96–108)
CHLORIDE SERPL-SCNC: 104 MMOL/L — SIGNIFICANT CHANGE UP (ref 96–108)
CHLORIDE SERPL-SCNC: 107 MMOL/L — SIGNIFICANT CHANGE UP (ref 96–108)
CHLORIDE SERPL-SCNC: 107 MMOL/L — SIGNIFICANT CHANGE UP (ref 96–108)
CHLORIDE SERPL-SCNC: 108 MMOL/L — SIGNIFICANT CHANGE UP (ref 96–108)
CHLORIDE SERPL-SCNC: 109 MMOL/L — HIGH (ref 96–108)
CHLORIDE SERPL-SCNC: 110 MMOL/L — HIGH (ref 96–108)
CHLORIDE SERPL-SCNC: 111 MMOL/L — HIGH (ref 96–108)
CHLORIDE SERPL-SCNC: 111 MMOL/L — HIGH (ref 96–108)
CHLORIDE SERPL-SCNC: 121 MMOL/L — HIGH (ref 96–108)
CHLORIDE SERPL-SCNC: 121 MMOL/L — HIGH (ref 96–108)
CHOLEST SERPL-MCNC: 120 MG/DL — SIGNIFICANT CHANGE UP
CHOLEST SERPL-MCNC: 120 MG/DL — SIGNIFICANT CHANGE UP
CK MB BLD-MCNC: 1.7 % — SIGNIFICANT CHANGE UP (ref 0–3.5)
CK MB BLD-MCNC: 1.7 % — SIGNIFICANT CHANGE UP (ref 0–3.5)
CK MB CFR SERPL CALC: 1.7 NG/ML — SIGNIFICANT CHANGE UP (ref 0.5–3.6)
CK MB CFR SERPL CALC: 1.7 NG/ML — SIGNIFICANT CHANGE UP (ref 0.5–3.6)
CK SERPL-CCNC: 99 U/L — SIGNIFICANT CHANGE UP (ref 26–192)
CK SERPL-CCNC: 99 U/L — SIGNIFICANT CHANGE UP (ref 26–192)
CO2 BLDA-SCNC: 27 MMOL/L — HIGH (ref 19–24)
CO2 BLDA-SCNC: 27 MMOL/L — HIGH (ref 19–24)
CO2 BLDA-SCNC: 30 MMOL/L — HIGH (ref 19–24)
CO2 BLDA-SCNC: 30 MMOL/L — HIGH (ref 19–24)
CO2 SERPL-SCNC: 18 MMOL/L — LOW (ref 22–31)
CO2 SERPL-SCNC: 18 MMOL/L — LOW (ref 22–31)
CO2 SERPL-SCNC: 22 MMOL/L — SIGNIFICANT CHANGE UP (ref 22–31)
CO2 SERPL-SCNC: 22 MMOL/L — SIGNIFICANT CHANGE UP (ref 22–31)
CO2 SERPL-SCNC: 25 MMOL/L — SIGNIFICANT CHANGE UP (ref 22–31)
CO2 SERPL-SCNC: 25 MMOL/L — SIGNIFICANT CHANGE UP (ref 22–31)
CO2 SERPL-SCNC: 26 MMOL/L — SIGNIFICANT CHANGE UP (ref 22–31)
CO2 SERPL-SCNC: 26 MMOL/L — SIGNIFICANT CHANGE UP (ref 22–31)
CO2 SERPL-SCNC: 27 MMOL/L — SIGNIFICANT CHANGE UP (ref 22–31)
CO2 SERPL-SCNC: 28 MMOL/L — SIGNIFICANT CHANGE UP (ref 22–31)
CO2 SERPL-SCNC: 29 MMOL/L — SIGNIFICANT CHANGE UP (ref 22–31)
CO2 SERPL-SCNC: 29 MMOL/L — SIGNIFICANT CHANGE UP (ref 22–31)
CO2 SERPL-SCNC: 30 MMOL/L — SIGNIFICANT CHANGE UP (ref 22–31)
CO2 SERPL-SCNC: 31 MMOL/L — SIGNIFICANT CHANGE UP (ref 22–31)
CO2 SERPL-SCNC: 32 MMOL/L — HIGH (ref 22–31)
CO2 SERPL-SCNC: 34 MMOL/L — HIGH (ref 22–31)
CO2 SERPL-SCNC: 34 MMOL/L — HIGH (ref 22–31)
CO2 SERPL-SCNC: 36 MMOL/L — HIGH (ref 22–31)
CO2 SERPL-SCNC: 36 MMOL/L — HIGH (ref 22–31)
COCAINE METAB.OTHER UR-MCNC: NEGATIVE — SIGNIFICANT CHANGE UP
COCAINE METAB.OTHER UR-MCNC: NEGATIVE — SIGNIFICANT CHANGE UP
COLOR SPEC: YELLOW — SIGNIFICANT CHANGE UP
COLOR SPEC: YELLOW — SIGNIFICANT CHANGE UP
CREAT SERPL-MCNC: 0.36 MG/DL — LOW (ref 0.5–1.3)
CREAT SERPL-MCNC: 0.36 MG/DL — LOW (ref 0.5–1.3)
CREAT SERPL-MCNC: 0.53 MG/DL — SIGNIFICANT CHANGE UP (ref 0.5–1.3)
CREAT SERPL-MCNC: 0.53 MG/DL — SIGNIFICANT CHANGE UP (ref 0.5–1.3)
CREAT SERPL-MCNC: 0.56 MG/DL — SIGNIFICANT CHANGE UP (ref 0.5–1.3)
CREAT SERPL-MCNC: 0.56 MG/DL — SIGNIFICANT CHANGE UP (ref 0.5–1.3)
CREAT SERPL-MCNC: 0.57 MG/DL — SIGNIFICANT CHANGE UP (ref 0.5–1.3)
CREAT SERPL-MCNC: 0.57 MG/DL — SIGNIFICANT CHANGE UP (ref 0.5–1.3)
CREAT SERPL-MCNC: 0.58 MG/DL — SIGNIFICANT CHANGE UP (ref 0.5–1.3)
CREAT SERPL-MCNC: 0.58 MG/DL — SIGNIFICANT CHANGE UP (ref 0.5–1.3)
CREAT SERPL-MCNC: 0.64 MG/DL — SIGNIFICANT CHANGE UP (ref 0.5–1.3)
CREAT SERPL-MCNC: 0.64 MG/DL — SIGNIFICANT CHANGE UP (ref 0.5–1.3)
CREAT SERPL-MCNC: 0.66 MG/DL — SIGNIFICANT CHANGE UP (ref 0.5–1.3)
CREAT SERPL-MCNC: 0.66 MG/DL — SIGNIFICANT CHANGE UP (ref 0.5–1.3)
CREAT SERPL-MCNC: 0.71 MG/DL — SIGNIFICANT CHANGE UP (ref 0.5–1.3)
CREAT SERPL-MCNC: 0.71 MG/DL — SIGNIFICANT CHANGE UP (ref 0.5–1.3)
CREAT SERPL-MCNC: 0.73 MG/DL — SIGNIFICANT CHANGE UP (ref 0.5–1.3)
CREAT SERPL-MCNC: 0.73 MG/DL — SIGNIFICANT CHANGE UP (ref 0.5–1.3)
CREAT SERPL-MCNC: 0.75 MG/DL — SIGNIFICANT CHANGE UP (ref 0.5–1.3)
CREAT SERPL-MCNC: 0.75 MG/DL — SIGNIFICANT CHANGE UP (ref 0.5–1.3)
CREAT SERPL-MCNC: 0.78 MG/DL — SIGNIFICANT CHANGE UP (ref 0.5–1.3)
CREAT SERPL-MCNC: 0.83 MG/DL — SIGNIFICANT CHANGE UP (ref 0.5–1.3)
CREAT SERPL-MCNC: 0.83 MG/DL — SIGNIFICANT CHANGE UP (ref 0.5–1.3)
CREAT SERPL-MCNC: 0.86 MG/DL — SIGNIFICANT CHANGE UP (ref 0.5–1.3)
CREAT SERPL-MCNC: 0.86 MG/DL — SIGNIFICANT CHANGE UP (ref 0.5–1.3)
CREAT SERPL-MCNC: 0.89 MG/DL — SIGNIFICANT CHANGE UP (ref 0.5–1.3)
CREAT SERPL-MCNC: 0.91 MG/DL — SIGNIFICANT CHANGE UP (ref 0.5–1.3)
CREAT SERPL-MCNC: 0.92 MG/DL — SIGNIFICANT CHANGE UP (ref 0.5–1.3)
CREAT SERPL-MCNC: 0.92 MG/DL — SIGNIFICANT CHANGE UP (ref 0.5–1.3)
CREAT SERPL-MCNC: 0.94 MG/DL — SIGNIFICANT CHANGE UP (ref 0.5–1.3)
CREAT SERPL-MCNC: 0.94 MG/DL — SIGNIFICANT CHANGE UP (ref 0.5–1.3)
CREAT SERPL-MCNC: 0.97 MG/DL — SIGNIFICANT CHANGE UP (ref 0.5–1.3)
CREAT SERPL-MCNC: 0.97 MG/DL — SIGNIFICANT CHANGE UP (ref 0.5–1.3)
CREAT SERPL-MCNC: 0.99 MG/DL — SIGNIFICANT CHANGE UP (ref 0.5–1.3)
CREAT SERPL-MCNC: 0.99 MG/DL — SIGNIFICANT CHANGE UP (ref 0.5–1.3)
CREAT SERPL-MCNC: 1.08 MG/DL — SIGNIFICANT CHANGE UP (ref 0.5–1.3)
CREAT SERPL-MCNC: 1.08 MG/DL — SIGNIFICANT CHANGE UP (ref 0.5–1.3)
CREAT SERPL-MCNC: 1.49 MG/DL — HIGH (ref 0.5–1.3)
CREAT SERPL-MCNC: 1.49 MG/DL — HIGH (ref 0.5–1.3)
CULTURE RESULTS: ABNORMAL
CULTURE RESULTS: ABNORMAL
CULTURE RESULTS: NO GROWTH — SIGNIFICANT CHANGE UP
CULTURE RESULTS: NO GROWTH — SIGNIFICANT CHANGE UP
CULTURE RESULTS: SIGNIFICANT CHANGE UP
DIFF PNL FLD: NEGATIVE — SIGNIFICANT CHANGE UP
DIFF PNL FLD: NEGATIVE — SIGNIFICANT CHANGE UP
EGFR: 34 ML/MIN/1.73M2 — LOW
EGFR: 34 ML/MIN/1.73M2 — LOW
EGFR: 50 ML/MIN/1.73M2 — LOW
EGFR: 50 ML/MIN/1.73M2 — LOW
EGFR: 55 ML/MIN/1.73M2 — LOW
EGFR: 55 ML/MIN/1.73M2 — LOW
EGFR: 57 ML/MIN/1.73M2 — LOW
EGFR: 57 ML/MIN/1.73M2 — LOW
EGFR: 59 ML/MIN/1.73M2 — LOW
EGFR: 59 ML/MIN/1.73M2 — LOW
EGFR: 60 ML/MIN/1.73M2 — SIGNIFICANT CHANGE UP
EGFR: 60 ML/MIN/1.73M2 — SIGNIFICANT CHANGE UP
EGFR: 61 ML/MIN/1.73M2 — SIGNIFICANT CHANGE UP
EGFR: 63 ML/MIN/1.73M2 — SIGNIFICANT CHANGE UP
EGFR: 65 ML/MIN/1.73M2 — SIGNIFICANT CHANGE UP
EGFR: 65 ML/MIN/1.73M2 — SIGNIFICANT CHANGE UP
EGFR: 68 ML/MIN/1.73M2 — SIGNIFICANT CHANGE UP
EGFR: 68 ML/MIN/1.73M2 — SIGNIFICANT CHANGE UP
EGFR: 73 ML/MIN/1.73M2 — SIGNIFICANT CHANGE UP
EGFR: 77 ML/MIN/1.73M2 — SIGNIFICANT CHANGE UP
EGFR: 77 ML/MIN/1.73M2 — SIGNIFICANT CHANGE UP
EGFR: 80 ML/MIN/1.73M2 — SIGNIFICANT CHANGE UP
EGFR: 80 ML/MIN/1.73M2 — SIGNIFICANT CHANGE UP
EGFR: 82 ML/MIN/1.73M2 — SIGNIFICANT CHANGE UP
EGFR: 82 ML/MIN/1.73M2 — SIGNIFICANT CHANGE UP
EGFR: 85 ML/MIN/1.73M2 — SIGNIFICANT CHANGE UP
EGFR: 88 ML/MIN/1.73M2 — SIGNIFICANT CHANGE UP
EGFR: 89 ML/MIN/1.73M2 — SIGNIFICANT CHANGE UP
EGFR: 89 ML/MIN/1.73M2 — SIGNIFICANT CHANGE UP
EGFR: 98 ML/MIN/1.73M2 — SIGNIFICANT CHANGE UP
EGFR: 98 ML/MIN/1.73M2 — SIGNIFICANT CHANGE UP
EOSINOPHIL # BLD AUTO: 0 K/UL — SIGNIFICANT CHANGE UP (ref 0–0.5)
EOSINOPHIL # BLD AUTO: 0.01 K/UL — SIGNIFICANT CHANGE UP (ref 0–0.5)
EOSINOPHIL # BLD AUTO: 0.01 K/UL — SIGNIFICANT CHANGE UP (ref 0–0.5)
EOSINOPHIL # BLD AUTO: 0.02 K/UL — SIGNIFICANT CHANGE UP (ref 0–0.5)
EOSINOPHIL # BLD AUTO: 0.07 K/UL — SIGNIFICANT CHANGE UP (ref 0–0.5)
EOSINOPHIL # BLD AUTO: 0.07 K/UL — SIGNIFICANT CHANGE UP (ref 0–0.5)
EOSINOPHIL # BLD AUTO: 0.18 K/UL — SIGNIFICANT CHANGE UP (ref 0–0.5)
EOSINOPHIL # BLD AUTO: 0.18 K/UL — SIGNIFICANT CHANGE UP (ref 0–0.5)
EOSINOPHIL # BLD AUTO: 0.27 K/UL — SIGNIFICANT CHANGE UP (ref 0–0.5)
EOSINOPHIL # BLD AUTO: 0.27 K/UL — SIGNIFICANT CHANGE UP (ref 0–0.5)
EOSINOPHIL # BLD AUTO: 0.29 K/UL — SIGNIFICANT CHANGE UP (ref 0–0.5)
EOSINOPHIL # BLD AUTO: 0.29 K/UL — SIGNIFICANT CHANGE UP (ref 0–0.5)
EOSINOPHIL # BLD AUTO: 0.31 K/UL — SIGNIFICANT CHANGE UP (ref 0–0.5)
EOSINOPHIL # BLD AUTO: 0.31 K/UL — SIGNIFICANT CHANGE UP (ref 0–0.5)
EOSINOPHIL NFR BLD AUTO: 0 % — SIGNIFICANT CHANGE UP (ref 0–6)
EOSINOPHIL NFR BLD AUTO: 0.1 % — SIGNIFICANT CHANGE UP (ref 0–6)
EOSINOPHIL NFR BLD AUTO: 0.2 % — SIGNIFICANT CHANGE UP (ref 0–6)
EOSINOPHIL NFR BLD AUTO: 0.2 % — SIGNIFICANT CHANGE UP (ref 0–6)
EOSINOPHIL NFR BLD AUTO: 1.4 % — SIGNIFICANT CHANGE UP (ref 0–6)
EOSINOPHIL NFR BLD AUTO: 1.4 % — SIGNIFICANT CHANGE UP (ref 0–6)
EOSINOPHIL NFR BLD AUTO: 1.5 % — SIGNIFICANT CHANGE UP (ref 0–6)
EOSINOPHIL NFR BLD AUTO: 1.5 % — SIGNIFICANT CHANGE UP (ref 0–6)
EOSINOPHIL NFR BLD AUTO: 2.4 % — SIGNIFICANT CHANGE UP (ref 0–6)
EOSINOPHIL NFR BLD AUTO: 2.4 % — SIGNIFICANT CHANGE UP (ref 0–6)
EOSINOPHIL NFR BLD AUTO: 2.6 % — SIGNIFICANT CHANGE UP (ref 0–6)
EOSINOPHIL NFR BLD AUTO: 2.6 % — SIGNIFICANT CHANGE UP (ref 0–6)
EOSINOPHIL NFR BLD AUTO: 2.8 % — SIGNIFICANT CHANGE UP (ref 0–6)
EOSINOPHIL NFR BLD AUTO: 2.8 % — SIGNIFICANT CHANGE UP (ref 0–6)
EPI CELLS # UR: SIGNIFICANT CHANGE UP
EPI CELLS # UR: SIGNIFICANT CHANGE UP
ESTIMATED AVERAGE GLUCOSE: 111 MG/DL — SIGNIFICANT CHANGE UP (ref 68–114)
ESTIMATED AVERAGE GLUCOSE: 111 MG/DL — SIGNIFICANT CHANGE UP (ref 68–114)
ETHANOL SERPL-MCNC: <10 MG/DL — SIGNIFICANT CHANGE UP (ref 0–10)
ETHANOL SERPL-MCNC: <10 MG/DL — SIGNIFICANT CHANGE UP (ref 0–10)
GAS PNL BLDA: SIGNIFICANT CHANGE UP
GLUCOSE BLDC GLUCOMTR-MCNC: 101 MG/DL — HIGH (ref 70–99)
GLUCOSE BLDC GLUCOMTR-MCNC: 101 MG/DL — HIGH (ref 70–99)
GLUCOSE BLDC GLUCOMTR-MCNC: 186 MG/DL — HIGH (ref 70–99)
GLUCOSE BLDC GLUCOMTR-MCNC: 186 MG/DL — HIGH (ref 70–99)
GLUCOSE BLDC GLUCOMTR-MCNC: 198 MG/DL — HIGH (ref 70–99)
GLUCOSE BLDC GLUCOMTR-MCNC: 198 MG/DL — HIGH (ref 70–99)
GLUCOSE BLDC GLUCOMTR-MCNC: 210 MG/DL — HIGH (ref 70–99)
GLUCOSE BLDC GLUCOMTR-MCNC: 210 MG/DL — HIGH (ref 70–99)
GLUCOSE BLDC GLUCOMTR-MCNC: 95 MG/DL — SIGNIFICANT CHANGE UP (ref 70–99)
GLUCOSE BLDC GLUCOMTR-MCNC: 95 MG/DL — SIGNIFICANT CHANGE UP (ref 70–99)
GLUCOSE SERPL-MCNC: 109 MG/DL — HIGH (ref 70–99)
GLUCOSE SERPL-MCNC: 109 MG/DL — HIGH (ref 70–99)
GLUCOSE SERPL-MCNC: 112 MG/DL — HIGH (ref 70–99)
GLUCOSE SERPL-MCNC: 112 MG/DL — HIGH (ref 70–99)
GLUCOSE SERPL-MCNC: 118 MG/DL — HIGH (ref 70–99)
GLUCOSE SERPL-MCNC: 118 MG/DL — HIGH (ref 70–99)
GLUCOSE SERPL-MCNC: 122 MG/DL — HIGH (ref 70–99)
GLUCOSE SERPL-MCNC: 122 MG/DL — HIGH (ref 70–99)
GLUCOSE SERPL-MCNC: 123 MG/DL — HIGH (ref 70–99)
GLUCOSE SERPL-MCNC: 123 MG/DL — HIGH (ref 70–99)
GLUCOSE SERPL-MCNC: 125 MG/DL — HIGH (ref 70–99)
GLUCOSE SERPL-MCNC: 125 MG/DL — HIGH (ref 70–99)
GLUCOSE SERPL-MCNC: 128 MG/DL — HIGH (ref 70–99)
GLUCOSE SERPL-MCNC: 128 MG/DL — HIGH (ref 70–99)
GLUCOSE SERPL-MCNC: 135 MG/DL — HIGH (ref 70–99)
GLUCOSE SERPL-MCNC: 135 MG/DL — HIGH (ref 70–99)
GLUCOSE SERPL-MCNC: 141 MG/DL — HIGH (ref 70–99)
GLUCOSE SERPL-MCNC: 141 MG/DL — HIGH (ref 70–99)
GLUCOSE SERPL-MCNC: 142 MG/DL — HIGH (ref 70–99)
GLUCOSE SERPL-MCNC: 142 MG/DL — HIGH (ref 70–99)
GLUCOSE SERPL-MCNC: 144 MG/DL — HIGH (ref 70–99)
GLUCOSE SERPL-MCNC: 144 MG/DL — HIGH (ref 70–99)
GLUCOSE SERPL-MCNC: 145 MG/DL — HIGH (ref 70–99)
GLUCOSE SERPL-MCNC: 145 MG/DL — HIGH (ref 70–99)
GLUCOSE SERPL-MCNC: 147 MG/DL — HIGH (ref 70–99)
GLUCOSE SERPL-MCNC: 147 MG/DL — HIGH (ref 70–99)
GLUCOSE SERPL-MCNC: 152 MG/DL — HIGH (ref 70–99)
GLUCOSE SERPL-MCNC: 152 MG/DL — HIGH (ref 70–99)
GLUCOSE SERPL-MCNC: 153 MG/DL — HIGH (ref 70–99)
GLUCOSE SERPL-MCNC: 153 MG/DL — HIGH (ref 70–99)
GLUCOSE SERPL-MCNC: 154 MG/DL — HIGH (ref 70–99)
GLUCOSE SERPL-MCNC: 154 MG/DL — HIGH (ref 70–99)
GLUCOSE SERPL-MCNC: 157 MG/DL — HIGH (ref 70–99)
GLUCOSE SERPL-MCNC: 157 MG/DL — HIGH (ref 70–99)
GLUCOSE SERPL-MCNC: 159 MG/DL — HIGH (ref 70–99)
GLUCOSE SERPL-MCNC: 159 MG/DL — HIGH (ref 70–99)
GLUCOSE SERPL-MCNC: 161 MG/DL — HIGH (ref 70–99)
GLUCOSE SERPL-MCNC: 161 MG/DL — HIGH (ref 70–99)
GLUCOSE SERPL-MCNC: 162 MG/DL — HIGH (ref 70–99)
GLUCOSE SERPL-MCNC: 180 MG/DL — HIGH (ref 70–99)
GLUCOSE SERPL-MCNC: 180 MG/DL — HIGH (ref 70–99)
GLUCOSE SERPL-MCNC: 190 MG/DL — HIGH (ref 70–99)
GLUCOSE SERPL-MCNC: 190 MG/DL — HIGH (ref 70–99)
GLUCOSE SERPL-MCNC: 195 MG/DL — HIGH (ref 70–99)
GLUCOSE SERPL-MCNC: 195 MG/DL — HIGH (ref 70–99)
GLUCOSE UR QL: NEGATIVE MG/DL — SIGNIFICANT CHANGE UP
GLUCOSE UR QL: NEGATIVE MG/DL — SIGNIFICANT CHANGE UP
GRAM STN FLD: ABNORMAL
HCO3 BLDA-SCNC: 26 MMOL/L — SIGNIFICANT CHANGE UP (ref 21–28)
HCO3 BLDA-SCNC: 26 MMOL/L — SIGNIFICANT CHANGE UP (ref 21–28)
HCO3 BLDA-SCNC: 28 MMOL/L — SIGNIFICANT CHANGE UP (ref 21–28)
HCO3 BLDA-SCNC: 28 MMOL/L — SIGNIFICANT CHANGE UP (ref 21–28)
HCT VFR BLD CALC: 31.2 % — LOW (ref 34.5–45)
HCT VFR BLD CALC: 31.2 % — LOW (ref 34.5–45)
HCT VFR BLD CALC: 31.3 % — LOW (ref 34.5–45)
HCT VFR BLD CALC: 31.3 % — LOW (ref 34.5–45)
HCT VFR BLD CALC: 32.8 % — LOW (ref 34.5–45)
HCT VFR BLD CALC: 32.8 % — LOW (ref 34.5–45)
HCT VFR BLD CALC: 33.7 % — LOW (ref 34.5–45)
HCT VFR BLD CALC: 33.7 % — LOW (ref 34.5–45)
HCT VFR BLD CALC: 36.2 % — SIGNIFICANT CHANGE UP (ref 34.5–45)
HCT VFR BLD CALC: 36.2 % — SIGNIFICANT CHANGE UP (ref 34.5–45)
HCT VFR BLD CALC: 37.1 % — SIGNIFICANT CHANGE UP (ref 34.5–45)
HCT VFR BLD CALC: 37.1 % — SIGNIFICANT CHANGE UP (ref 34.5–45)
HCT VFR BLD CALC: 37.3 % — SIGNIFICANT CHANGE UP (ref 34.5–45)
HCT VFR BLD CALC: 37.3 % — SIGNIFICANT CHANGE UP (ref 34.5–45)
HCT VFR BLD CALC: 37.5 % — SIGNIFICANT CHANGE UP (ref 34.5–45)
HCT VFR BLD CALC: 37.5 % — SIGNIFICANT CHANGE UP (ref 34.5–45)
HCT VFR BLD CALC: 37.7 % — SIGNIFICANT CHANGE UP (ref 34.5–45)
HCT VFR BLD CALC: 37.7 % — SIGNIFICANT CHANGE UP (ref 34.5–45)
HCT VFR BLD CALC: 38.6 % — SIGNIFICANT CHANGE UP (ref 34.5–45)
HCT VFR BLD CALC: 38.6 % — SIGNIFICANT CHANGE UP (ref 34.5–45)
HCT VFR BLD CALC: 38.8 % — SIGNIFICANT CHANGE UP (ref 34.5–45)
HCT VFR BLD CALC: 38.8 % — SIGNIFICANT CHANGE UP (ref 34.5–45)
HCT VFR BLD CALC: 39 % — SIGNIFICANT CHANGE UP (ref 34.5–45)
HCT VFR BLD CALC: 39 % — SIGNIFICANT CHANGE UP (ref 34.5–45)
HCT VFR BLD CALC: 39.1 % — SIGNIFICANT CHANGE UP (ref 34.5–45)
HCT VFR BLD CALC: 39.1 % — SIGNIFICANT CHANGE UP (ref 34.5–45)
HCT VFR BLD CALC: 39.3 % — SIGNIFICANT CHANGE UP (ref 34.5–45)
HCT VFR BLD CALC: 39.8 % — SIGNIFICANT CHANGE UP (ref 34.5–45)
HCT VFR BLD CALC: 39.8 % — SIGNIFICANT CHANGE UP (ref 34.5–45)
HCT VFR BLD CALC: 39.9 % — SIGNIFICANT CHANGE UP (ref 34.5–45)
HCT VFR BLD CALC: 39.9 % — SIGNIFICANT CHANGE UP (ref 34.5–45)
HCT VFR BLD CALC: 40 % — SIGNIFICANT CHANGE UP (ref 34.5–45)
HCT VFR BLD CALC: 40 % — SIGNIFICANT CHANGE UP (ref 34.5–45)
HCT VFR BLD CALC: 41.4 % — SIGNIFICANT CHANGE UP (ref 34.5–45)
HCT VFR BLD CALC: 41.4 % — SIGNIFICANT CHANGE UP (ref 34.5–45)
HCT VFR BLD CALC: 42.7 % — SIGNIFICANT CHANGE UP (ref 34.5–45)
HCT VFR BLD CALC: 42.7 % — SIGNIFICANT CHANGE UP (ref 34.5–45)
HCT VFR BLD CALC: 42.8 % — SIGNIFICANT CHANGE UP (ref 34.5–45)
HCT VFR BLD CALC: 42.8 % — SIGNIFICANT CHANGE UP (ref 34.5–45)
HDLC SERPL-MCNC: 49 MG/DL — LOW
HDLC SERPL-MCNC: 49 MG/DL — LOW
HGB BLD-MCNC: 10.2 G/DL — LOW (ref 11.5–15.5)
HGB BLD-MCNC: 10.2 G/DL — LOW (ref 11.5–15.5)
HGB BLD-MCNC: 10.5 G/DL — LOW (ref 11.5–15.5)
HGB BLD-MCNC: 10.5 G/DL — LOW (ref 11.5–15.5)
HGB BLD-MCNC: 11.4 G/DL — LOW (ref 11.5–15.5)
HGB BLD-MCNC: 11.4 G/DL — LOW (ref 11.5–15.5)
HGB BLD-MCNC: 11.5 G/DL — SIGNIFICANT CHANGE UP (ref 11.5–15.5)
HGB BLD-MCNC: 11.9 G/DL — SIGNIFICANT CHANGE UP (ref 11.5–15.5)
HGB BLD-MCNC: 12.1 G/DL — SIGNIFICANT CHANGE UP (ref 11.5–15.5)
HGB BLD-MCNC: 12.2 G/DL — SIGNIFICANT CHANGE UP (ref 11.5–15.5)
HGB BLD-MCNC: 12.3 G/DL — SIGNIFICANT CHANGE UP (ref 11.5–15.5)
HGB BLD-MCNC: 12.3 G/DL — SIGNIFICANT CHANGE UP (ref 11.5–15.5)
HGB BLD-MCNC: 12.4 G/DL — SIGNIFICANT CHANGE UP (ref 11.5–15.5)
HGB BLD-MCNC: 12.7 G/DL — SIGNIFICANT CHANGE UP (ref 11.5–15.5)
HGB BLD-MCNC: 12.7 G/DL — SIGNIFICANT CHANGE UP (ref 11.5–15.5)
HGB BLD-MCNC: 12.8 G/DL — SIGNIFICANT CHANGE UP (ref 11.5–15.5)
HGB BLD-MCNC: 13.2 G/DL — SIGNIFICANT CHANGE UP (ref 11.5–15.5)
HGB BLD-MCNC: 13.2 G/DL — SIGNIFICANT CHANGE UP (ref 11.5–15.5)
HGB BLD-MCNC: 13.4 G/DL — SIGNIFICANT CHANGE UP (ref 11.5–15.5)
HGB BLD-MCNC: 13.4 G/DL — SIGNIFICANT CHANGE UP (ref 11.5–15.5)
HGB BLD-MCNC: 9.5 G/DL — LOW (ref 11.5–15.5)
HGB BLD-MCNC: 9.5 G/DL — LOW (ref 11.5–15.5)
HGB BLD-MCNC: 9.7 G/DL — LOW (ref 11.5–15.5)
HGB BLD-MCNC: 9.7 G/DL — LOW (ref 11.5–15.5)
HOROWITZ INDEX BLDA+IHG-RTO: 100 — SIGNIFICANT CHANGE UP
HOROWITZ INDEX BLDA+IHG-RTO: 100 — SIGNIFICANT CHANGE UP
HOROWITZ INDEX BLDA+IHG-RTO: 50 — SIGNIFICANT CHANGE UP
HOROWITZ INDEX BLDA+IHG-RTO: 50 — SIGNIFICANT CHANGE UP
IMM GRANULOCYTES NFR BLD AUTO: 0.2 % — SIGNIFICANT CHANGE UP (ref 0–0.9)
IMM GRANULOCYTES NFR BLD AUTO: 0.5 % — SIGNIFICANT CHANGE UP (ref 0–0.9)
IMM GRANULOCYTES NFR BLD AUTO: 0.7 % — SIGNIFICANT CHANGE UP (ref 0–0.9)
IMM GRANULOCYTES NFR BLD AUTO: 0.8 % — SIGNIFICANT CHANGE UP (ref 0–0.9)
IMM GRANULOCYTES NFR BLD AUTO: 0.8 % — SIGNIFICANT CHANGE UP (ref 0–0.9)
INR BLD: 1.11 RATIO — SIGNIFICANT CHANGE UP (ref 0.85–1.18)
INR BLD: 1.11 RATIO — SIGNIFICANT CHANGE UP (ref 0.85–1.18)
KETONES UR-MCNC: NEGATIVE — SIGNIFICANT CHANGE UP
KETONES UR-MCNC: NEGATIVE — SIGNIFICANT CHANGE UP
LACTATE SERPL-SCNC: 1.8 MMOL/L — SIGNIFICANT CHANGE UP (ref 0.7–2)
LACTATE SERPL-SCNC: 1.8 MMOL/L — SIGNIFICANT CHANGE UP (ref 0.7–2)
LACTATE SERPL-SCNC: 2.3 MMOL/L — HIGH (ref 0.7–2)
LACTATE SERPL-SCNC: 2.3 MMOL/L — HIGH (ref 0.7–2)
LACTATE SERPL-SCNC: 3 MMOL/L — HIGH (ref 0.7–2)
LACTATE SERPL-SCNC: 3 MMOL/L — HIGH (ref 0.7–2)
LEGIONELLA AG UR QL: NEGATIVE — SIGNIFICANT CHANGE UP
LEGIONELLA AG UR QL: NEGATIVE — SIGNIFICANT CHANGE UP
LEUKOCYTE ESTERASE UR-ACNC: NEGATIVE — SIGNIFICANT CHANGE UP
LEUKOCYTE ESTERASE UR-ACNC: NEGATIVE — SIGNIFICANT CHANGE UP
LIPID PNL WITH DIRECT LDL SERPL: 54 MG/DL — SIGNIFICANT CHANGE UP
LIPID PNL WITH DIRECT LDL SERPL: 54 MG/DL — SIGNIFICANT CHANGE UP
LYMPHOCYTES # BLD AUTO: 0.57 K/UL — LOW (ref 1–3.3)
LYMPHOCYTES # BLD AUTO: 0.57 K/UL — LOW (ref 1–3.3)
LYMPHOCYTES # BLD AUTO: 0.79 K/UL — LOW (ref 1–3.3)
LYMPHOCYTES # BLD AUTO: 0.79 K/UL — LOW (ref 1–3.3)
LYMPHOCYTES # BLD AUTO: 1.13 K/UL — SIGNIFICANT CHANGE UP (ref 1–3.3)
LYMPHOCYTES # BLD AUTO: 1.13 K/UL — SIGNIFICANT CHANGE UP (ref 1–3.3)
LYMPHOCYTES # BLD AUTO: 1.32 K/UL — SIGNIFICANT CHANGE UP (ref 1–3.3)
LYMPHOCYTES # BLD AUTO: 1.32 K/UL — SIGNIFICANT CHANGE UP (ref 1–3.3)
LYMPHOCYTES # BLD AUTO: 1.37 K/UL — SIGNIFICANT CHANGE UP (ref 1–3.3)
LYMPHOCYTES # BLD AUTO: 1.37 K/UL — SIGNIFICANT CHANGE UP (ref 1–3.3)
LYMPHOCYTES # BLD AUTO: 1.58 K/UL — SIGNIFICANT CHANGE UP (ref 1–3.3)
LYMPHOCYTES # BLD AUTO: 1.58 K/UL — SIGNIFICANT CHANGE UP (ref 1–3.3)
LYMPHOCYTES # BLD AUTO: 1.88 K/UL — SIGNIFICANT CHANGE UP (ref 1–3.3)
LYMPHOCYTES # BLD AUTO: 1.88 K/UL — SIGNIFICANT CHANGE UP (ref 1–3.3)
LYMPHOCYTES # BLD AUTO: 1.99 K/UL — SIGNIFICANT CHANGE UP (ref 1–3.3)
LYMPHOCYTES # BLD AUTO: 1.99 K/UL — SIGNIFICANT CHANGE UP (ref 1–3.3)
LYMPHOCYTES # BLD AUTO: 11.4 % — LOW (ref 13–44)
LYMPHOCYTES # BLD AUTO: 11.4 % — LOW (ref 13–44)
LYMPHOCYTES # BLD AUTO: 13.7 % — SIGNIFICANT CHANGE UP (ref 13–44)
LYMPHOCYTES # BLD AUTO: 13.7 % — SIGNIFICANT CHANGE UP (ref 13–44)
LYMPHOCYTES # BLD AUTO: 13.8 % — SIGNIFICANT CHANGE UP (ref 13–44)
LYMPHOCYTES # BLD AUTO: 13.8 % — SIGNIFICANT CHANGE UP (ref 13–44)
LYMPHOCYTES # BLD AUTO: 14.2 % — SIGNIFICANT CHANGE UP (ref 13–44)
LYMPHOCYTES # BLD AUTO: 14.2 % — SIGNIFICANT CHANGE UP (ref 13–44)
LYMPHOCYTES # BLD AUTO: 16.8 % — SIGNIFICANT CHANGE UP (ref 13–44)
LYMPHOCYTES # BLD AUTO: 16.8 % — SIGNIFICANT CHANGE UP (ref 13–44)
LYMPHOCYTES # BLD AUTO: 2.31 K/UL — SIGNIFICANT CHANGE UP (ref 1–3.3)
LYMPHOCYTES # BLD AUTO: 2.31 K/UL — SIGNIFICANT CHANGE UP (ref 1–3.3)
LYMPHOCYTES # BLD AUTO: 21 % — SIGNIFICANT CHANGE UP (ref 13–44)
LYMPHOCYTES # BLD AUTO: 21 % — SIGNIFICANT CHANGE UP (ref 13–44)
LYMPHOCYTES # BLD AUTO: 3.12 K/UL — SIGNIFICANT CHANGE UP (ref 1–3.3)
LYMPHOCYTES # BLD AUTO: 3.12 K/UL — SIGNIFICANT CHANGE UP (ref 1–3.3)
LYMPHOCYTES # BLD AUTO: 4.4 % — LOW (ref 13–44)
LYMPHOCYTES # BLD AUTO: 4.4 % — LOW (ref 13–44)
LYMPHOCYTES # BLD AUTO: 62.5 % — HIGH (ref 13–44)
LYMPHOCYTES # BLD AUTO: 62.5 % — HIGH (ref 13–44)
LYMPHOCYTES # BLD AUTO: 7.8 % — LOW (ref 13–44)
LYMPHOCYTES # BLD AUTO: 7.8 % — LOW (ref 13–44)
LYMPHOCYTES # BLD AUTO: 9.8 % — LOW (ref 13–44)
LYMPHOCYTES # BLD AUTO: 9.8 % — LOW (ref 13–44)
MAGNESIUM SERPL-MCNC: 1.4 MG/DL — LOW (ref 1.6–2.6)
MAGNESIUM SERPL-MCNC: 1.4 MG/DL — LOW (ref 1.6–2.6)
MAGNESIUM SERPL-MCNC: 1.9 MG/DL — SIGNIFICANT CHANGE UP (ref 1.6–2.6)
MAGNESIUM SERPL-MCNC: 1.9 MG/DL — SIGNIFICANT CHANGE UP (ref 1.6–2.6)
MAGNESIUM SERPL-MCNC: 2.2 MG/DL — SIGNIFICANT CHANGE UP (ref 1.6–2.6)
MAGNESIUM SERPL-MCNC: 2.2 MG/DL — SIGNIFICANT CHANGE UP (ref 1.6–2.6)
MAGNESIUM SERPL-MCNC: 2.3 MG/DL — SIGNIFICANT CHANGE UP (ref 1.6–2.6)
MAGNESIUM SERPL-MCNC: 2.4 MG/DL — SIGNIFICANT CHANGE UP (ref 1.6–2.6)
MAGNESIUM SERPL-MCNC: 2.5 MG/DL — SIGNIFICANT CHANGE UP (ref 1.6–2.6)
MAGNESIUM SERPL-MCNC: 2.6 MG/DL — SIGNIFICANT CHANGE UP (ref 1.6–2.6)
MAGNESIUM SERPL-MCNC: 2.7 MG/DL — HIGH (ref 1.6–2.6)
MAGNESIUM SERPL-MCNC: 2.8 MG/DL — HIGH (ref 1.6–2.6)
MAGNESIUM SERPL-MCNC: 2.8 MG/DL — HIGH (ref 1.6–2.6)
MCHC RBC-ENTMCNC: 26.7 PG — LOW (ref 27–34)
MCHC RBC-ENTMCNC: 26.8 PG — LOW (ref 27–34)
MCHC RBC-ENTMCNC: 26.8 PG — LOW (ref 27–34)
MCHC RBC-ENTMCNC: 27 PG — SIGNIFICANT CHANGE UP (ref 27–34)
MCHC RBC-ENTMCNC: 27.1 PG — SIGNIFICANT CHANGE UP (ref 27–34)
MCHC RBC-ENTMCNC: 27.1 PG — SIGNIFICANT CHANGE UP (ref 27–34)
MCHC RBC-ENTMCNC: 27.2 PG — SIGNIFICANT CHANGE UP (ref 27–34)
MCHC RBC-ENTMCNC: 27.3 PG — SIGNIFICANT CHANGE UP (ref 27–34)
MCHC RBC-ENTMCNC: 27.3 PG — SIGNIFICANT CHANGE UP (ref 27–34)
MCHC RBC-ENTMCNC: 27.4 PG — SIGNIFICANT CHANGE UP (ref 27–34)
MCHC RBC-ENTMCNC: 27.6 PG — SIGNIFICANT CHANGE UP (ref 27–34)
MCHC RBC-ENTMCNC: 27.7 PG — SIGNIFICANT CHANGE UP (ref 27–34)
MCHC RBC-ENTMCNC: 27.7 PG — SIGNIFICANT CHANGE UP (ref 27–34)
MCHC RBC-ENTMCNC: 27.9 PG — SIGNIFICANT CHANGE UP (ref 27–34)
MCHC RBC-ENTMCNC: 27.9 PG — SIGNIFICANT CHANGE UP (ref 27–34)
MCHC RBC-ENTMCNC: 29.8 G/DL — LOW (ref 32–36)
MCHC RBC-ENTMCNC: 29.8 G/DL — LOW (ref 32–36)
MCHC RBC-ENTMCNC: 30 G/DL — LOW (ref 32–36)
MCHC RBC-ENTMCNC: 30 G/DL — LOW (ref 32–36)
MCHC RBC-ENTMCNC: 30.2 G/DL — LOW (ref 32–36)
MCHC RBC-ENTMCNC: 30.2 G/DL — LOW (ref 32–36)
MCHC RBC-ENTMCNC: 30.4 G/DL — LOW (ref 32–36)
MCHC RBC-ENTMCNC: 30.4 G/DL — LOW (ref 32–36)
MCHC RBC-ENTMCNC: 30.7 G/DL — LOW (ref 32–36)
MCHC RBC-ENTMCNC: 30.7 G/DL — LOW (ref 32–36)
MCHC RBC-ENTMCNC: 30.8 G/DL — LOW (ref 32–36)
MCHC RBC-ENTMCNC: 30.8 G/DL — LOW (ref 32–36)
MCHC RBC-ENTMCNC: 30.9 G/DL — LOW (ref 32–36)
MCHC RBC-ENTMCNC: 30.9 G/DL — LOW (ref 32–36)
MCHC RBC-ENTMCNC: 31 G/DL — LOW (ref 32–36)
MCHC RBC-ENTMCNC: 31.1 G/DL — LOW (ref 32–36)
MCHC RBC-ENTMCNC: 31.2 G/DL — LOW (ref 32–36)
MCHC RBC-ENTMCNC: 31.2 G/DL — LOW (ref 32–36)
MCHC RBC-ENTMCNC: 31.3 G/DL — LOW (ref 32–36)
MCHC RBC-ENTMCNC: 31.3 G/DL — LOW (ref 32–36)
MCHC RBC-ENTMCNC: 31.4 G/DL — LOW (ref 32–36)
MCHC RBC-ENTMCNC: 31.4 G/DL — LOW (ref 32–36)
MCHC RBC-ENTMCNC: 31.8 G/DL — LOW (ref 32–36)
MCHC RBC-ENTMCNC: 32.2 G/DL — SIGNIFICANT CHANGE UP (ref 32–36)
MCHC RBC-ENTMCNC: 32.2 G/DL — SIGNIFICANT CHANGE UP (ref 32–36)
MCHC RBC-ENTMCNC: 32.5 G/DL — SIGNIFICANT CHANGE UP (ref 32–36)
MCHC RBC-ENTMCNC: 32.5 G/DL — SIGNIFICANT CHANGE UP (ref 32–36)
MCHC RBC-ENTMCNC: 32.6 G/DL — SIGNIFICANT CHANGE UP (ref 32–36)
MCHC RBC-ENTMCNC: 32.6 G/DL — SIGNIFICANT CHANGE UP (ref 32–36)
MCHC RBC-ENTMCNC: 33 G/DL — SIGNIFICANT CHANGE UP (ref 32–36)
MCHC RBC-ENTMCNC: 33 G/DL — SIGNIFICANT CHANGE UP (ref 32–36)
MCV RBC AUTO: 83.8 FL — SIGNIFICANT CHANGE UP (ref 80–100)
MCV RBC AUTO: 83.8 FL — SIGNIFICANT CHANGE UP (ref 80–100)
MCV RBC AUTO: 84.4 FL — SIGNIFICANT CHANGE UP (ref 80–100)
MCV RBC AUTO: 84.4 FL — SIGNIFICANT CHANGE UP (ref 80–100)
MCV RBC AUTO: 84.8 FL — SIGNIFICANT CHANGE UP (ref 80–100)
MCV RBC AUTO: 84.8 FL — SIGNIFICANT CHANGE UP (ref 80–100)
MCV RBC AUTO: 84.9 FL — SIGNIFICANT CHANGE UP (ref 80–100)
MCV RBC AUTO: 84.9 FL — SIGNIFICANT CHANGE UP (ref 80–100)
MCV RBC AUTO: 85.8 FL — SIGNIFICANT CHANGE UP (ref 80–100)
MCV RBC AUTO: 85.8 FL — SIGNIFICANT CHANGE UP (ref 80–100)
MCV RBC AUTO: 86.1 FL — SIGNIFICANT CHANGE UP (ref 80–100)
MCV RBC AUTO: 86.1 FL — SIGNIFICANT CHANGE UP (ref 80–100)
MCV RBC AUTO: 86.4 FL — SIGNIFICANT CHANGE UP (ref 80–100)
MCV RBC AUTO: 86.4 FL — SIGNIFICANT CHANGE UP (ref 80–100)
MCV RBC AUTO: 86.9 FL — SIGNIFICANT CHANGE UP (ref 80–100)
MCV RBC AUTO: 87 FL — SIGNIFICANT CHANGE UP (ref 80–100)
MCV RBC AUTO: 87 FL — SIGNIFICANT CHANGE UP (ref 80–100)
MCV RBC AUTO: 87.2 FL — SIGNIFICANT CHANGE UP (ref 80–100)
MCV RBC AUTO: 87.2 FL — SIGNIFICANT CHANGE UP (ref 80–100)
MCV RBC AUTO: 87.5 FL — SIGNIFICANT CHANGE UP (ref 80–100)
MCV RBC AUTO: 87.7 FL — SIGNIFICANT CHANGE UP (ref 80–100)
MCV RBC AUTO: 87.7 FL — SIGNIFICANT CHANGE UP (ref 80–100)
MCV RBC AUTO: 87.9 FL — SIGNIFICANT CHANGE UP (ref 80–100)
MCV RBC AUTO: 87.9 FL — SIGNIFICANT CHANGE UP (ref 80–100)
MCV RBC AUTO: 88 FL — SIGNIFICANT CHANGE UP (ref 80–100)
MCV RBC AUTO: 88 FL — SIGNIFICANT CHANGE UP (ref 80–100)
MCV RBC AUTO: 89.3 FL — SIGNIFICANT CHANGE UP (ref 80–100)
MCV RBC AUTO: 89.3 FL — SIGNIFICANT CHANGE UP (ref 80–100)
MCV RBC AUTO: 89.5 FL — SIGNIFICANT CHANGE UP (ref 80–100)
MCV RBC AUTO: 89.5 FL — SIGNIFICANT CHANGE UP (ref 80–100)
MCV RBC AUTO: 89.9 FL — SIGNIFICANT CHANGE UP (ref 80–100)
MCV RBC AUTO: 89.9 FL — SIGNIFICANT CHANGE UP (ref 80–100)
MCV RBC AUTO: 90.2 FL — SIGNIFICANT CHANGE UP (ref 80–100)
MCV RBC AUTO: 90.2 FL — SIGNIFICANT CHANGE UP (ref 80–100)
MCV RBC AUTO: 90.8 FL — SIGNIFICANT CHANGE UP (ref 80–100)
MCV RBC AUTO: 90.8 FL — SIGNIFICANT CHANGE UP (ref 80–100)
METHADONE UR-MCNC: NEGATIVE — SIGNIFICANT CHANGE UP
METHADONE UR-MCNC: NEGATIVE — SIGNIFICANT CHANGE UP
MONOCYTES # BLD AUTO: 0.4 K/UL — SIGNIFICANT CHANGE UP (ref 0–0.9)
MONOCYTES # BLD AUTO: 0.4 K/UL — SIGNIFICANT CHANGE UP (ref 0–0.9)
MONOCYTES # BLD AUTO: 0.7 K/UL — SIGNIFICANT CHANGE UP (ref 0–0.9)
MONOCYTES # BLD AUTO: 0.7 K/UL — SIGNIFICANT CHANGE UP (ref 0–0.9)
MONOCYTES # BLD AUTO: 0.85 K/UL — SIGNIFICANT CHANGE UP (ref 0–0.9)
MONOCYTES # BLD AUTO: 0.85 K/UL — SIGNIFICANT CHANGE UP (ref 0–0.9)
MONOCYTES # BLD AUTO: 1.04 K/UL — HIGH (ref 0–0.9)
MONOCYTES # BLD AUTO: 1.04 K/UL — HIGH (ref 0–0.9)
MONOCYTES # BLD AUTO: 1.13 K/UL — HIGH (ref 0–0.9)
MONOCYTES # BLD AUTO: 1.13 K/UL — HIGH (ref 0–0.9)
MONOCYTES # BLD AUTO: 1.14 K/UL — HIGH (ref 0–0.9)
MONOCYTES # BLD AUTO: 1.14 K/UL — HIGH (ref 0–0.9)
MONOCYTES # BLD AUTO: 1.34 K/UL — HIGH (ref 0–0.9)
MONOCYTES # BLD AUTO: 1.34 K/UL — HIGH (ref 0–0.9)
MONOCYTES # BLD AUTO: 1.35 K/UL — HIGH (ref 0–0.9)
MONOCYTES # BLD AUTO: 1.35 K/UL — HIGH (ref 0–0.9)
MONOCYTES # BLD AUTO: 1.37 K/UL — HIGH (ref 0–0.9)
MONOCYTES # BLD AUTO: 1.37 K/UL — HIGH (ref 0–0.9)
MONOCYTES # BLD AUTO: 2.69 K/UL — HIGH (ref 0–0.9)
MONOCYTES # BLD AUTO: 2.69 K/UL — HIGH (ref 0–0.9)
MONOCYTES NFR BLD AUTO: 10.9 % — SIGNIFICANT CHANGE UP (ref 2–14)
MONOCYTES NFR BLD AUTO: 10.9 % — SIGNIFICANT CHANGE UP (ref 2–14)
MONOCYTES NFR BLD AUTO: 11.2 % — SIGNIFICANT CHANGE UP (ref 2–14)
MONOCYTES NFR BLD AUTO: 11.2 % — SIGNIFICANT CHANGE UP (ref 2–14)
MONOCYTES NFR BLD AUTO: 11.4 % — SIGNIFICANT CHANGE UP (ref 2–14)
MONOCYTES NFR BLD AUTO: 11.4 % — SIGNIFICANT CHANGE UP (ref 2–14)
MONOCYTES NFR BLD AUTO: 11.7 % — SIGNIFICANT CHANGE UP (ref 2–14)
MONOCYTES NFR BLD AUTO: 11.7 % — SIGNIFICANT CHANGE UP (ref 2–14)
MONOCYTES NFR BLD AUTO: 12.2 % — SIGNIFICANT CHANGE UP (ref 2–14)
MONOCYTES NFR BLD AUTO: 12.2 % — SIGNIFICANT CHANGE UP (ref 2–14)
MONOCYTES NFR BLD AUTO: 18.5 % — HIGH (ref 2–14)
MONOCYTES NFR BLD AUTO: 18.5 % — HIGH (ref 2–14)
MONOCYTES NFR BLD AUTO: 6.3 % — SIGNIFICANT CHANGE UP (ref 2–14)
MONOCYTES NFR BLD AUTO: 6.3 % — SIGNIFICANT CHANGE UP (ref 2–14)
MONOCYTES NFR BLD AUTO: 7.6 % — SIGNIFICANT CHANGE UP (ref 2–14)
MONOCYTES NFR BLD AUTO: 7.6 % — SIGNIFICANT CHANGE UP (ref 2–14)
MONOCYTES NFR BLD AUTO: 8 % — SIGNIFICANT CHANGE UP (ref 2–14)
MONOCYTES NFR BLD AUTO: 8 % — SIGNIFICANT CHANGE UP (ref 2–14)
MONOCYTES NFR BLD AUTO: 8.7 % — SIGNIFICANT CHANGE UP (ref 2–14)
MONOCYTES NFR BLD AUTO: 8.7 % — SIGNIFICANT CHANGE UP (ref 2–14)
MRSA PCR RESULT.: SIGNIFICANT CHANGE UP
MRSA PCR RESULT.: SIGNIFICANT CHANGE UP
NEUTROPHILS # BLD AUTO: 1.34 K/UL — LOW (ref 1.8–7.4)
NEUTROPHILS # BLD AUTO: 1.34 K/UL — LOW (ref 1.8–7.4)
NEUTROPHILS # BLD AUTO: 11.15 K/UL — HIGH (ref 1.8–7.4)
NEUTROPHILS # BLD AUTO: 11.15 K/UL — HIGH (ref 1.8–7.4)
NEUTROPHILS # BLD AUTO: 6.89 K/UL — SIGNIFICANT CHANGE UP (ref 1.8–7.4)
NEUTROPHILS # BLD AUTO: 6.89 K/UL — SIGNIFICANT CHANGE UP (ref 1.8–7.4)
NEUTROPHILS # BLD AUTO: 7.18 K/UL — SIGNIFICANT CHANGE UP (ref 1.8–7.4)
NEUTROPHILS # BLD AUTO: 7.18 K/UL — SIGNIFICANT CHANGE UP (ref 1.8–7.4)
NEUTROPHILS # BLD AUTO: 8.02 K/UL — HIGH (ref 1.8–7.4)
NEUTROPHILS # BLD AUTO: 8.02 K/UL — HIGH (ref 1.8–7.4)
NEUTROPHILS # BLD AUTO: 8.2 K/UL — HIGH (ref 1.8–7.4)
NEUTROPHILS # BLD AUTO: 8.2 K/UL — HIGH (ref 1.8–7.4)
NEUTROPHILS # BLD AUTO: 8.45 K/UL — HIGH (ref 1.8–7.4)
NEUTROPHILS # BLD AUTO: 8.45 K/UL — HIGH (ref 1.8–7.4)
NEUTROPHILS # BLD AUTO: 8.99 K/UL — HIGH (ref 1.8–7.4)
NEUTROPHILS # BLD AUTO: 8.99 K/UL — HIGH (ref 1.8–7.4)
NEUTROPHILS # BLD AUTO: 9 K/UL — HIGH (ref 1.8–7.4)
NEUTROPHILS # BLD AUTO: 9 K/UL — HIGH (ref 1.8–7.4)
NEUTROPHILS # BLD AUTO: 9.63 K/UL — HIGH (ref 1.8–7.4)
NEUTROPHILS # BLD AUTO: 9.63 K/UL — HIGH (ref 1.8–7.4)
NEUTROPHILS NFR BLD AUTO: 26.9 % — LOW (ref 43–77)
NEUTROPHILS NFR BLD AUTO: 26.9 % — LOW (ref 43–77)
NEUTROPHILS NFR BLD AUTO: 62.9 % — SIGNIFICANT CHANGE UP (ref 43–77)
NEUTROPHILS NFR BLD AUTO: 62.9 % — SIGNIFICANT CHANGE UP (ref 43–77)
NEUTROPHILS NFR BLD AUTO: 66.5 % — SIGNIFICANT CHANGE UP (ref 43–77)
NEUTROPHILS NFR BLD AUTO: 66.5 % — SIGNIFICANT CHANGE UP (ref 43–77)
NEUTROPHILS NFR BLD AUTO: 72 % — SIGNIFICANT CHANGE UP (ref 43–77)
NEUTROPHILS NFR BLD AUTO: 72 % — SIGNIFICANT CHANGE UP (ref 43–77)
NEUTROPHILS NFR BLD AUTO: 74.9 % — SIGNIFICANT CHANGE UP (ref 43–77)
NEUTROPHILS NFR BLD AUTO: 74.9 % — SIGNIFICANT CHANGE UP (ref 43–77)
NEUTROPHILS NFR BLD AUTO: 75 % — SIGNIFICANT CHANGE UP (ref 43–77)
NEUTROPHILS NFR BLD AUTO: 75 % — SIGNIFICANT CHANGE UP (ref 43–77)
NEUTROPHILS NFR BLD AUTO: 75.8 % — SIGNIFICANT CHANGE UP (ref 43–77)
NEUTROPHILS NFR BLD AUTO: 75.8 % — SIGNIFICANT CHANGE UP (ref 43–77)
NEUTROPHILS NFR BLD AUTO: 77.6 % — HIGH (ref 43–77)
NEUTROPHILS NFR BLD AUTO: 77.6 % — HIGH (ref 43–77)
NEUTROPHILS NFR BLD AUTO: 80.4 % — HIGH (ref 43–77)
NEUTROPHILS NFR BLD AUTO: 80.4 % — HIGH (ref 43–77)
NEUTROPHILS NFR BLD AUTO: 86.2 % — HIGH (ref 43–77)
NEUTROPHILS NFR BLD AUTO: 86.2 % — HIGH (ref 43–77)
NITRITE UR-MCNC: NEGATIVE — SIGNIFICANT CHANGE UP
NITRITE UR-MCNC: NEGATIVE — SIGNIFICANT CHANGE UP
NON HDL CHOLESTEROL: 71 MG/DL — SIGNIFICANT CHANGE UP
NON HDL CHOLESTEROL: 71 MG/DL — SIGNIFICANT CHANGE UP
NRBC # BLD: 0 /100 WBCS — SIGNIFICANT CHANGE UP (ref 0–0)
OPIATES UR-MCNC: NEGATIVE — SIGNIFICANT CHANGE UP
OPIATES UR-MCNC: NEGATIVE — SIGNIFICANT CHANGE UP
PCO2 BLDA: 42 MMHG — SIGNIFICANT CHANGE UP (ref 32–46)
PCO2 BLDA: 42 MMHG — SIGNIFICANT CHANGE UP (ref 32–46)
PCO2 BLDA: 55 MMHG — HIGH (ref 32–46)
PCO2 BLDA: 55 MMHG — HIGH (ref 32–46)
PCP SPEC-MCNC: SIGNIFICANT CHANGE UP
PCP SPEC-MCNC: SIGNIFICANT CHANGE UP
PCP UR-MCNC: NEGATIVE — SIGNIFICANT CHANGE UP
PCP UR-MCNC: NEGATIVE — SIGNIFICANT CHANGE UP
PH BLDA: 7.32 — LOW (ref 7.35–7.45)
PH BLDA: 7.32 — LOW (ref 7.35–7.45)
PH BLDA: 7.4 — SIGNIFICANT CHANGE UP (ref 7.35–7.45)
PH BLDA: 7.4 — SIGNIFICANT CHANGE UP (ref 7.35–7.45)
PH UR: 7 — SIGNIFICANT CHANGE UP (ref 5–8)
PH UR: 7 — SIGNIFICANT CHANGE UP (ref 5–8)
PHOSPHATE SERPL-MCNC: 1 MG/DL — CRITICAL LOW (ref 2.5–4.5)
PHOSPHATE SERPL-MCNC: 1 MG/DL — CRITICAL LOW (ref 2.5–4.5)
PHOSPHATE SERPL-MCNC: 2.2 MG/DL — LOW (ref 2.5–4.5)
PHOSPHATE SERPL-MCNC: 2.2 MG/DL — LOW (ref 2.5–4.5)
PHOSPHATE SERPL-MCNC: 2.4 MG/DL — LOW (ref 2.5–4.5)
PHOSPHATE SERPL-MCNC: 2.5 MG/DL — SIGNIFICANT CHANGE UP (ref 2.5–4.5)
PHOSPHATE SERPL-MCNC: 2.5 MG/DL — SIGNIFICANT CHANGE UP (ref 2.5–4.5)
PHOSPHATE SERPL-MCNC: 2.6 MG/DL — SIGNIFICANT CHANGE UP (ref 2.5–4.5)
PHOSPHATE SERPL-MCNC: 2.7 MG/DL — SIGNIFICANT CHANGE UP (ref 2.5–4.5)
PHOSPHATE SERPL-MCNC: 2.8 MG/DL — SIGNIFICANT CHANGE UP (ref 2.5–4.5)
PHOSPHATE SERPL-MCNC: 2.8 MG/DL — SIGNIFICANT CHANGE UP (ref 2.5–4.5)
PHOSPHATE SERPL-MCNC: 2.9 MG/DL — SIGNIFICANT CHANGE UP (ref 2.5–4.5)
PHOSPHATE SERPL-MCNC: 3.1 MG/DL — SIGNIFICANT CHANGE UP (ref 2.5–4.5)
PHOSPHATE SERPL-MCNC: 3.2 MG/DL — SIGNIFICANT CHANGE UP (ref 2.5–4.5)
PHOSPHATE SERPL-MCNC: 3.3 MG/DL — SIGNIFICANT CHANGE UP (ref 2.5–4.5)
PHOSPHATE SERPL-MCNC: 3.3 MG/DL — SIGNIFICANT CHANGE UP (ref 2.5–4.5)
PHOSPHATE SERPL-MCNC: 3.9 MG/DL — SIGNIFICANT CHANGE UP (ref 2.5–4.5)
PHOSPHATE SERPL-MCNC: 3.9 MG/DL — SIGNIFICANT CHANGE UP (ref 2.5–4.5)
PLATELET # BLD AUTO: 168 K/UL — SIGNIFICANT CHANGE UP (ref 150–400)
PLATELET # BLD AUTO: 168 K/UL — SIGNIFICANT CHANGE UP (ref 150–400)
PLATELET # BLD AUTO: 198 K/UL — SIGNIFICANT CHANGE UP (ref 150–400)
PLATELET # BLD AUTO: 198 K/UL — SIGNIFICANT CHANGE UP (ref 150–400)
PLATELET # BLD AUTO: 216 K/UL — SIGNIFICANT CHANGE UP (ref 150–400)
PLATELET # BLD AUTO: 216 K/UL — SIGNIFICANT CHANGE UP (ref 150–400)
PLATELET # BLD AUTO: 230 K/UL — SIGNIFICANT CHANGE UP (ref 150–400)
PLATELET # BLD AUTO: 230 K/UL — SIGNIFICANT CHANGE UP (ref 150–400)
PLATELET # BLD AUTO: 233 K/UL — SIGNIFICANT CHANGE UP (ref 150–400)
PLATELET # BLD AUTO: 233 K/UL — SIGNIFICANT CHANGE UP (ref 150–400)
PLATELET # BLD AUTO: 251 K/UL — SIGNIFICANT CHANGE UP (ref 150–400)
PLATELET # BLD AUTO: 251 K/UL — SIGNIFICANT CHANGE UP (ref 150–400)
PLATELET # BLD AUTO: 252 K/UL — SIGNIFICANT CHANGE UP (ref 150–400)
PLATELET # BLD AUTO: 252 K/UL — SIGNIFICANT CHANGE UP (ref 150–400)
PLATELET # BLD AUTO: 259 K/UL — SIGNIFICANT CHANGE UP (ref 150–400)
PLATELET # BLD AUTO: 259 K/UL — SIGNIFICANT CHANGE UP (ref 150–400)
PLATELET # BLD AUTO: 266 K/UL — SIGNIFICANT CHANGE UP (ref 150–400)
PLATELET # BLD AUTO: 266 K/UL — SIGNIFICANT CHANGE UP (ref 150–400)
PLATELET # BLD AUTO: 302 K/UL — SIGNIFICANT CHANGE UP (ref 150–400)
PLATELET # BLD AUTO: 302 K/UL — SIGNIFICANT CHANGE UP (ref 150–400)
PLATELET # BLD AUTO: 324 K/UL — SIGNIFICANT CHANGE UP (ref 150–400)
PLATELET # BLD AUTO: 324 K/UL — SIGNIFICANT CHANGE UP (ref 150–400)
PLATELET # BLD AUTO: 328 K/UL — SIGNIFICANT CHANGE UP (ref 150–400)
PLATELET # BLD AUTO: 328 K/UL — SIGNIFICANT CHANGE UP (ref 150–400)
PLATELET # BLD AUTO: 339 K/UL — SIGNIFICANT CHANGE UP (ref 150–400)
PLATELET # BLD AUTO: 339 K/UL — SIGNIFICANT CHANGE UP (ref 150–400)
PLATELET # BLD AUTO: 353 K/UL — SIGNIFICANT CHANGE UP (ref 150–400)
PLATELET # BLD AUTO: 353 K/UL — SIGNIFICANT CHANGE UP (ref 150–400)
PLATELET # BLD AUTO: 367 K/UL — SIGNIFICANT CHANGE UP (ref 150–400)
PLATELET # BLD AUTO: 367 K/UL — SIGNIFICANT CHANGE UP (ref 150–400)
PLATELET # BLD AUTO: 403 K/UL — HIGH (ref 150–400)
PLATELET # BLD AUTO: 403 K/UL — HIGH (ref 150–400)
PLATELET # BLD AUTO: 422 K/UL — HIGH (ref 150–400)
PLATELET # BLD AUTO: 422 K/UL — HIGH (ref 150–400)
PLATELET # BLD AUTO: 432 K/UL — HIGH (ref 150–400)
PLATELET # BLD AUTO: 432 K/UL — HIGH (ref 150–400)
PLATELET # BLD AUTO: 454 K/UL — HIGH (ref 150–400)
PLATELET # BLD AUTO: 454 K/UL — HIGH (ref 150–400)
PLATELET # BLD AUTO: 460 K/UL — HIGH (ref 150–400)
PLATELET # BLD AUTO: 460 K/UL — HIGH (ref 150–400)
PLATELET # BLD AUTO: 478 K/UL — HIGH (ref 150–400)
PLATELET # BLD AUTO: 478 K/UL — HIGH (ref 150–400)
PO2 BLDA: 116 MMHG — HIGH (ref 83–108)
PO2 BLDA: 116 MMHG — HIGH (ref 83–108)
PO2 BLDA: 146 MMHG — HIGH (ref 83–108)
PO2 BLDA: 146 MMHG — HIGH (ref 83–108)
POTASSIUM SERPL-MCNC: 3 MMOL/L — LOW (ref 3.5–5.3)
POTASSIUM SERPL-MCNC: 3 MMOL/L — LOW (ref 3.5–5.3)
POTASSIUM SERPL-MCNC: 3.1 MMOL/L — LOW (ref 3.5–5.3)
POTASSIUM SERPL-MCNC: 3.1 MMOL/L — LOW (ref 3.5–5.3)
POTASSIUM SERPL-MCNC: 3.2 MMOL/L — LOW (ref 3.5–5.3)
POTASSIUM SERPL-MCNC: 3.2 MMOL/L — LOW (ref 3.5–5.3)
POTASSIUM SERPL-MCNC: 3.7 MMOL/L — SIGNIFICANT CHANGE UP (ref 3.5–5.3)
POTASSIUM SERPL-MCNC: 3.7 MMOL/L — SIGNIFICANT CHANGE UP (ref 3.5–5.3)
POTASSIUM SERPL-MCNC: 3.8 MMOL/L — SIGNIFICANT CHANGE UP (ref 3.5–5.3)
POTASSIUM SERPL-MCNC: 3.8 MMOL/L — SIGNIFICANT CHANGE UP (ref 3.5–5.3)
POTASSIUM SERPL-MCNC: 3.9 MMOL/L — SIGNIFICANT CHANGE UP (ref 3.5–5.3)
POTASSIUM SERPL-MCNC: 3.9 MMOL/L — SIGNIFICANT CHANGE UP (ref 3.5–5.3)
POTASSIUM SERPL-MCNC: 4 MMOL/L — SIGNIFICANT CHANGE UP (ref 3.5–5.3)
POTASSIUM SERPL-MCNC: 4.1 MMOL/L — SIGNIFICANT CHANGE UP (ref 3.5–5.3)
POTASSIUM SERPL-MCNC: 4.2 MMOL/L — SIGNIFICANT CHANGE UP (ref 3.5–5.3)
POTASSIUM SERPL-MCNC: 4.2 MMOL/L — SIGNIFICANT CHANGE UP (ref 3.5–5.3)
POTASSIUM SERPL-MCNC: 4.3 MMOL/L — SIGNIFICANT CHANGE UP (ref 3.5–5.3)
POTASSIUM SERPL-MCNC: 4.3 MMOL/L — SIGNIFICANT CHANGE UP (ref 3.5–5.3)
POTASSIUM SERPL-MCNC: 4.4 MMOL/L — SIGNIFICANT CHANGE UP (ref 3.5–5.3)
POTASSIUM SERPL-MCNC: 4.5 MMOL/L — SIGNIFICANT CHANGE UP (ref 3.5–5.3)
POTASSIUM SERPL-MCNC: 4.5 MMOL/L — SIGNIFICANT CHANGE UP (ref 3.5–5.3)
POTASSIUM SERPL-MCNC: 4.6 MMOL/L — SIGNIFICANT CHANGE UP (ref 3.5–5.3)
POTASSIUM SERPL-MCNC: 4.9 MMOL/L — SIGNIFICANT CHANGE UP (ref 3.5–5.3)
POTASSIUM SERPL-MCNC: 4.9 MMOL/L — SIGNIFICANT CHANGE UP (ref 3.5–5.3)
POTASSIUM SERPL-MCNC: 6.1 MMOL/L — HIGH (ref 3.5–5.3)
POTASSIUM SERPL-MCNC: 6.1 MMOL/L — HIGH (ref 3.5–5.3)
POTASSIUM SERPL-SCNC: 3 MMOL/L — LOW (ref 3.5–5.3)
POTASSIUM SERPL-SCNC: 3 MMOL/L — LOW (ref 3.5–5.3)
POTASSIUM SERPL-SCNC: 3.1 MMOL/L — LOW (ref 3.5–5.3)
POTASSIUM SERPL-SCNC: 3.1 MMOL/L — LOW (ref 3.5–5.3)
POTASSIUM SERPL-SCNC: 3.2 MMOL/L — LOW (ref 3.5–5.3)
POTASSIUM SERPL-SCNC: 3.2 MMOL/L — LOW (ref 3.5–5.3)
POTASSIUM SERPL-SCNC: 3.7 MMOL/L — SIGNIFICANT CHANGE UP (ref 3.5–5.3)
POTASSIUM SERPL-SCNC: 3.7 MMOL/L — SIGNIFICANT CHANGE UP (ref 3.5–5.3)
POTASSIUM SERPL-SCNC: 3.8 MMOL/L — SIGNIFICANT CHANGE UP (ref 3.5–5.3)
POTASSIUM SERPL-SCNC: 3.8 MMOL/L — SIGNIFICANT CHANGE UP (ref 3.5–5.3)
POTASSIUM SERPL-SCNC: 3.9 MMOL/L — SIGNIFICANT CHANGE UP (ref 3.5–5.3)
POTASSIUM SERPL-SCNC: 3.9 MMOL/L — SIGNIFICANT CHANGE UP (ref 3.5–5.3)
POTASSIUM SERPL-SCNC: 4 MMOL/L — SIGNIFICANT CHANGE UP (ref 3.5–5.3)
POTASSIUM SERPL-SCNC: 4.1 MMOL/L — SIGNIFICANT CHANGE UP (ref 3.5–5.3)
POTASSIUM SERPL-SCNC: 4.2 MMOL/L — SIGNIFICANT CHANGE UP (ref 3.5–5.3)
POTASSIUM SERPL-SCNC: 4.2 MMOL/L — SIGNIFICANT CHANGE UP (ref 3.5–5.3)
POTASSIUM SERPL-SCNC: 4.3 MMOL/L — SIGNIFICANT CHANGE UP (ref 3.5–5.3)
POTASSIUM SERPL-SCNC: 4.3 MMOL/L — SIGNIFICANT CHANGE UP (ref 3.5–5.3)
POTASSIUM SERPL-SCNC: 4.4 MMOL/L — SIGNIFICANT CHANGE UP (ref 3.5–5.3)
POTASSIUM SERPL-SCNC: 4.5 MMOL/L — SIGNIFICANT CHANGE UP (ref 3.5–5.3)
POTASSIUM SERPL-SCNC: 4.5 MMOL/L — SIGNIFICANT CHANGE UP (ref 3.5–5.3)
POTASSIUM SERPL-SCNC: 4.6 MMOL/L — SIGNIFICANT CHANGE UP (ref 3.5–5.3)
POTASSIUM SERPL-SCNC: 4.9 MMOL/L — SIGNIFICANT CHANGE UP (ref 3.5–5.3)
POTASSIUM SERPL-SCNC: 4.9 MMOL/L — SIGNIFICANT CHANGE UP (ref 3.5–5.3)
POTASSIUM SERPL-SCNC: 6.1 MMOL/L — HIGH (ref 3.5–5.3)
POTASSIUM SERPL-SCNC: 6.1 MMOL/L — HIGH (ref 3.5–5.3)
PROCALCITONIN SERPL-MCNC: 0.05 NG/ML — SIGNIFICANT CHANGE UP (ref 0.02–0.1)
PROCALCITONIN SERPL-MCNC: 0.05 NG/ML — SIGNIFICANT CHANGE UP (ref 0.02–0.1)
PROCALCITONIN SERPL-MCNC: 0.17 NG/ML — HIGH (ref 0.02–0.1)
PROCALCITONIN SERPL-MCNC: 0.17 NG/ML — HIGH (ref 0.02–0.1)
PROT SERPL-MCNC: 3.3 GM/DL — LOW (ref 6–8.3)
PROT SERPL-MCNC: 3.3 GM/DL — LOW (ref 6–8.3)
PROT SERPL-MCNC: 6.1 GM/DL — SIGNIFICANT CHANGE UP (ref 6–8.3)
PROT SERPL-MCNC: 6.1 GM/DL — SIGNIFICANT CHANGE UP (ref 6–8.3)
PROT SERPL-MCNC: 6.2 GM/DL — SIGNIFICANT CHANGE UP (ref 6–8.3)
PROT SERPL-MCNC: 6.2 GM/DL — SIGNIFICANT CHANGE UP (ref 6–8.3)
PROT SERPL-MCNC: 6.3 GM/DL — SIGNIFICANT CHANGE UP (ref 6–8.3)
PROT SERPL-MCNC: 6.3 GM/DL — SIGNIFICANT CHANGE UP (ref 6–8.3)
PROT SERPL-MCNC: 6.4 GM/DL — SIGNIFICANT CHANGE UP (ref 6–8.3)
PROT SERPL-MCNC: 6.5 GM/DL — SIGNIFICANT CHANGE UP (ref 6–8.3)
PROT SERPL-MCNC: 6.5 GM/DL — SIGNIFICANT CHANGE UP (ref 6–8.3)
PROT SERPL-MCNC: 6.6 GM/DL — SIGNIFICANT CHANGE UP (ref 6–8.3)
PROT SERPL-MCNC: 6.7 GM/DL — SIGNIFICANT CHANGE UP (ref 6–8.3)
PROT SERPL-MCNC: 6.7 GM/DL — SIGNIFICANT CHANGE UP (ref 6–8.3)
PROT SERPL-MCNC: 6.8 GM/DL — SIGNIFICANT CHANGE UP (ref 6–8.3)
PROT SERPL-MCNC: 6.9 GM/DL — SIGNIFICANT CHANGE UP (ref 6–8.3)
PROT SERPL-MCNC: 6.9 GM/DL — SIGNIFICANT CHANGE UP (ref 6–8.3)
PROT SERPL-MCNC: 7.1 GM/DL — SIGNIFICANT CHANGE UP (ref 6–8.3)
PROT SERPL-MCNC: 7.2 GM/DL — SIGNIFICANT CHANGE UP (ref 6–8.3)
PROT SERPL-MCNC: 7.4 GM/DL — SIGNIFICANT CHANGE UP (ref 6–8.3)
PROT SERPL-MCNC: 7.4 GM/DL — SIGNIFICANT CHANGE UP (ref 6–8.3)
PROT SERPL-MCNC: 8 GM/DL — SIGNIFICANT CHANGE UP (ref 6–8.3)
PROT SERPL-MCNC: 8 GM/DL — SIGNIFICANT CHANGE UP (ref 6–8.3)
PROT UR-MCNC: 30 MG/DL
PROT UR-MCNC: 30 MG/DL
PROTHROM AB SERPL-ACNC: 13.3 SEC — HIGH (ref 9.5–13)
PROTHROM AB SERPL-ACNC: 13.3 SEC — HIGH (ref 9.5–13)
RAPID RVP RESULT: SIGNIFICANT CHANGE UP
RAPID RVP RESULT: SIGNIFICANT CHANGE UP
RBC # BLD: 3.47 M/UL — LOW (ref 3.8–5.2)
RBC # BLD: 3.47 M/UL — LOW (ref 3.8–5.2)
RBC # BLD: 3.58 M/UL — LOW (ref 3.8–5.2)
RBC # BLD: 3.58 M/UL — LOW (ref 3.8–5.2)
RBC # BLD: 3.65 M/UL — LOW (ref 3.8–5.2)
RBC # BLD: 3.65 M/UL — LOW (ref 3.8–5.2)
RBC # BLD: 3.93 M/UL — SIGNIFICANT CHANGE UP (ref 3.8–5.2)
RBC # BLD: 3.93 M/UL — SIGNIFICANT CHANGE UP (ref 3.8–5.2)
RBC # BLD: 4.22 M/UL — SIGNIFICANT CHANGE UP (ref 3.8–5.2)
RBC # BLD: 4.22 M/UL — SIGNIFICANT CHANGE UP (ref 3.8–5.2)
RBC # BLD: 4.23 M/UL — SIGNIFICANT CHANGE UP (ref 3.8–5.2)
RBC # BLD: 4.23 M/UL — SIGNIFICANT CHANGE UP (ref 3.8–5.2)
RBC # BLD: 4.29 M/UL — SIGNIFICANT CHANGE UP (ref 3.8–5.2)
RBC # BLD: 4.29 M/UL — SIGNIFICANT CHANGE UP (ref 3.8–5.2)
RBC # BLD: 4.41 M/UL — SIGNIFICANT CHANGE UP (ref 3.8–5.2)
RBC # BLD: 4.42 M/UL — SIGNIFICANT CHANGE UP (ref 3.8–5.2)
RBC # BLD: 4.42 M/UL — SIGNIFICANT CHANGE UP (ref 3.8–5.2)
RBC # BLD: 4.45 M/UL — SIGNIFICANT CHANGE UP (ref 3.8–5.2)
RBC # BLD: 4.46 M/UL — SIGNIFICANT CHANGE UP (ref 3.8–5.2)
RBC # BLD: 4.46 M/UL — SIGNIFICANT CHANGE UP (ref 3.8–5.2)
RBC # BLD: 4.49 M/UL — SIGNIFICANT CHANGE UP (ref 3.8–5.2)
RBC # BLD: 4.49 M/UL — SIGNIFICANT CHANGE UP (ref 3.8–5.2)
RBC # BLD: 4.52 M/UL — SIGNIFICANT CHANGE UP (ref 3.8–5.2)
RBC # BLD: 4.52 M/UL — SIGNIFICANT CHANGE UP (ref 3.8–5.2)
RBC # BLD: 4.56 M/UL — SIGNIFICANT CHANGE UP (ref 3.8–5.2)
RBC # BLD: 4.56 M/UL — SIGNIFICANT CHANGE UP (ref 3.8–5.2)
RBC # BLD: 4.62 M/UL — SIGNIFICANT CHANGE UP (ref 3.8–5.2)
RBC # BLD: 4.62 M/UL — SIGNIFICANT CHANGE UP (ref 3.8–5.2)
RBC # BLD: 4.63 M/UL — SIGNIFICANT CHANGE UP (ref 3.8–5.2)
RBC # BLD: 4.89 M/UL — SIGNIFICANT CHANGE UP (ref 3.8–5.2)
RBC # BLD: 4.89 M/UL — SIGNIFICANT CHANGE UP (ref 3.8–5.2)
RBC # BLD: 4.91 M/UL — SIGNIFICANT CHANGE UP (ref 3.8–5.2)
RBC # BLD: 4.91 M/UL — SIGNIFICANT CHANGE UP (ref 3.8–5.2)
RBC # FLD: 14.9 % — HIGH (ref 10.3–14.5)
RBC # FLD: 15 % — HIGH (ref 10.3–14.5)
RBC # FLD: 15 % — HIGH (ref 10.3–14.5)
RBC # FLD: 15.1 % — HIGH (ref 10.3–14.5)
RBC # FLD: 15.1 % — HIGH (ref 10.3–14.5)
RBC # FLD: 15.2 % — HIGH (ref 10.3–14.5)
RBC # FLD: 15.3 % — HIGH (ref 10.3–14.5)
RBC # FLD: 15.4 % — HIGH (ref 10.3–14.5)
RBC # FLD: 15.5 % — HIGH (ref 10.3–14.5)
RBC # FLD: 15.5 % — HIGH (ref 10.3–14.5)
RBC # FLD: 15.7 % — HIGH (ref 10.3–14.5)
RBC # FLD: 15.7 % — HIGH (ref 10.3–14.5)
RBC # FLD: 15.9 % — HIGH (ref 10.3–14.5)
RBC # FLD: 15.9 % — HIGH (ref 10.3–14.5)
RBC # FLD: 16.2 % — HIGH (ref 10.3–14.5)
RBC # FLD: 16.2 % — HIGH (ref 10.3–14.5)
RBC # FLD: 17.2 % — HIGH (ref 10.3–14.5)
RBC # FLD: 17.2 % — HIGH (ref 10.3–14.5)
RBC # FLD: 17.3 % — HIGH (ref 10.3–14.5)
RBC # FLD: 17.3 % — HIGH (ref 10.3–14.5)
RBC # FLD: 17.5 % — HIGH (ref 10.3–14.5)
RBC # FLD: 17.5 % — HIGH (ref 10.3–14.5)
RBC # FLD: 18.4 % — HIGH (ref 10.3–14.5)
RBC # FLD: 18.4 % — HIGH (ref 10.3–14.5)
RBC CASTS # UR COMP ASSIST: SIGNIFICANT CHANGE UP /HPF (ref 0–4)
RBC CASTS # UR COMP ASSIST: SIGNIFICANT CHANGE UP /HPF (ref 0–4)
S AUREUS DNA NOSE QL NAA+PROBE: DETECTED
S AUREUS DNA NOSE QL NAA+PROBE: DETECTED
SAO2 % BLDA: 98.7 % — HIGH (ref 94–98)
SAO2 % BLDA: 98.7 % — HIGH (ref 94–98)
SAO2 % BLDA: 99.3 % — HIGH (ref 94–98)
SAO2 % BLDA: 99.3 % — HIGH (ref 94–98)
SARS-COV-2 RNA SPEC QL NAA+PROBE: SIGNIFICANT CHANGE UP
SARS-COV-2 RNA SPEC QL NAA+PROBE: SIGNIFICANT CHANGE UP
SODIUM SERPL-SCNC: 132 MMOL/L — LOW (ref 135–145)
SODIUM SERPL-SCNC: 132 MMOL/L — LOW (ref 135–145)
SODIUM SERPL-SCNC: 134 MMOL/L — LOW (ref 135–145)
SODIUM SERPL-SCNC: 134 MMOL/L — LOW (ref 135–145)
SODIUM SERPL-SCNC: 135 MMOL/L — SIGNIFICANT CHANGE UP (ref 135–145)
SODIUM SERPL-SCNC: 136 MMOL/L — SIGNIFICANT CHANGE UP (ref 135–145)
SODIUM SERPL-SCNC: 137 MMOL/L — SIGNIFICANT CHANGE UP (ref 135–145)
SODIUM SERPL-SCNC: 137 MMOL/L — SIGNIFICANT CHANGE UP (ref 135–145)
SODIUM SERPL-SCNC: 140 MMOL/L — SIGNIFICANT CHANGE UP (ref 135–145)
SODIUM SERPL-SCNC: 141 MMOL/L — SIGNIFICANT CHANGE UP (ref 135–145)
SODIUM SERPL-SCNC: 142 MMOL/L — SIGNIFICANT CHANGE UP (ref 135–145)
SODIUM SERPL-SCNC: 143 MMOL/L — SIGNIFICANT CHANGE UP (ref 135–145)
SODIUM SERPL-SCNC: 144 MMOL/L — SIGNIFICANT CHANGE UP (ref 135–145)
SODIUM SERPL-SCNC: 145 MMOL/L — SIGNIFICANT CHANGE UP (ref 135–145)
SODIUM SERPL-SCNC: 146 MMOL/L — HIGH (ref 135–145)
SODIUM SERPL-SCNC: 146 MMOL/L — HIGH (ref 135–145)
SODIUM SERPL-SCNC: 149 MMOL/L — HIGH (ref 135–145)
SODIUM SERPL-SCNC: 149 MMOL/L — HIGH (ref 135–145)
SP GR SPEC: 1.01 — SIGNIFICANT CHANGE UP (ref 1.01–1.02)
SP GR SPEC: 1.01 — SIGNIFICANT CHANGE UP (ref 1.01–1.02)
SPECIMEN SOURCE: SIGNIFICANT CHANGE UP
THC UR QL: NEGATIVE — SIGNIFICANT CHANGE UP
THC UR QL: NEGATIVE — SIGNIFICANT CHANGE UP
TRIGL SERPL-MCNC: 87 MG/DL — SIGNIFICANT CHANGE UP
TRIGL SERPL-MCNC: 87 MG/DL — SIGNIFICANT CHANGE UP
TROPONIN I, HIGH SENSITIVITY RESULT: 380.8 NG/L — HIGH
TROPONIN I, HIGH SENSITIVITY RESULT: 380.8 NG/L — HIGH
TROPONIN I, HIGH SENSITIVITY RESULT: 425.7 NG/L — HIGH
TROPONIN I, HIGH SENSITIVITY RESULT: 425.7 NG/L — HIGH
TROPONIN I, HIGH SENSITIVITY RESULT: 432.9 NG/L — HIGH
TROPONIN I, HIGH SENSITIVITY RESULT: 432.9 NG/L — HIGH
TSH SERPL-MCNC: 1.03 UU/ML — SIGNIFICANT CHANGE UP (ref 0.36–3.74)
TSH SERPL-MCNC: 1.03 UU/ML — SIGNIFICANT CHANGE UP (ref 0.36–3.74)
UROBILINOGEN FLD QL: NEGATIVE MG/DL — SIGNIFICANT CHANGE UP
UROBILINOGEN FLD QL: NEGATIVE MG/DL — SIGNIFICANT CHANGE UP
WBC # BLD: 10.19 K/UL — SIGNIFICANT CHANGE UP (ref 3.8–10.5)
WBC # BLD: 10.19 K/UL — SIGNIFICANT CHANGE UP (ref 3.8–10.5)
WBC # BLD: 10.97 K/UL — HIGH (ref 3.8–10.5)
WBC # BLD: 10.97 K/UL — HIGH (ref 3.8–10.5)
WBC # BLD: 10.98 K/UL — HIGH (ref 3.8–10.5)
WBC # BLD: 10.98 K/UL — HIGH (ref 3.8–10.5)
WBC # BLD: 11.14 K/UL — HIGH (ref 3.8–10.5)
WBC # BLD: 11.14 K/UL — HIGH (ref 3.8–10.5)
WBC # BLD: 11.15 K/UL — HIGH (ref 3.8–10.5)
WBC # BLD: 11.16 K/UL — HIGH (ref 3.8–10.5)
WBC # BLD: 11.16 K/UL — HIGH (ref 3.8–10.5)
WBC # BLD: 11.26 K/UL — HIGH (ref 3.8–10.5)
WBC # BLD: 11.26 K/UL — HIGH (ref 3.8–10.5)
WBC # BLD: 11.57 K/UL — HIGH (ref 3.8–10.5)
WBC # BLD: 11.57 K/UL — HIGH (ref 3.8–10.5)
WBC # BLD: 12.02 K/UL — HIGH (ref 3.8–10.5)
WBC # BLD: 12.02 K/UL — HIGH (ref 3.8–10.5)
WBC # BLD: 12.93 K/UL — HIGH (ref 3.8–10.5)
WBC # BLD: 14.51 K/UL — HIGH (ref 3.8–10.5)
WBC # BLD: 14.51 K/UL — HIGH (ref 3.8–10.5)
WBC # BLD: 25.03 K/UL — HIGH (ref 3.8–10.5)
WBC # BLD: 25.03 K/UL — HIGH (ref 3.8–10.5)
WBC # BLD: 4.99 K/UL — SIGNIFICANT CHANGE UP (ref 3.8–10.5)
WBC # BLD: 4.99 K/UL — SIGNIFICANT CHANGE UP (ref 3.8–10.5)
WBC # BLD: 6.94 K/UL — SIGNIFICANT CHANGE UP (ref 3.8–10.5)
WBC # BLD: 6.94 K/UL — SIGNIFICANT CHANGE UP (ref 3.8–10.5)
WBC # BLD: 7.17 K/UL — SIGNIFICANT CHANGE UP (ref 3.8–10.5)
WBC # BLD: 7.17 K/UL — SIGNIFICANT CHANGE UP (ref 3.8–10.5)
WBC # BLD: 7.41 K/UL — SIGNIFICANT CHANGE UP (ref 3.8–10.5)
WBC # BLD: 7.41 K/UL — SIGNIFICANT CHANGE UP (ref 3.8–10.5)
WBC # BLD: 9.55 K/UL — SIGNIFICANT CHANGE UP (ref 3.8–10.5)
WBC # BLD: 9.55 K/UL — SIGNIFICANT CHANGE UP (ref 3.8–10.5)
WBC # BLD: 9.57 K/UL — SIGNIFICANT CHANGE UP (ref 3.8–10.5)
WBC # BLD: 9.57 K/UL — SIGNIFICANT CHANGE UP (ref 3.8–10.5)
WBC # BLD: 9.66 K/UL — SIGNIFICANT CHANGE UP (ref 3.8–10.5)
WBC # BLD: 9.66 K/UL — SIGNIFICANT CHANGE UP (ref 3.8–10.5)
WBC # FLD AUTO: 10.19 K/UL — SIGNIFICANT CHANGE UP (ref 3.8–10.5)
WBC # FLD AUTO: 10.19 K/UL — SIGNIFICANT CHANGE UP (ref 3.8–10.5)
WBC # FLD AUTO: 10.97 K/UL — HIGH (ref 3.8–10.5)
WBC # FLD AUTO: 10.97 K/UL — HIGH (ref 3.8–10.5)
WBC # FLD AUTO: 10.98 K/UL — HIGH (ref 3.8–10.5)
WBC # FLD AUTO: 10.98 K/UL — HIGH (ref 3.8–10.5)
WBC # FLD AUTO: 11.14 K/UL — HIGH (ref 3.8–10.5)
WBC # FLD AUTO: 11.14 K/UL — HIGH (ref 3.8–10.5)
WBC # FLD AUTO: 11.15 K/UL — HIGH (ref 3.8–10.5)
WBC # FLD AUTO: 11.16 K/UL — HIGH (ref 3.8–10.5)
WBC # FLD AUTO: 11.16 K/UL — HIGH (ref 3.8–10.5)
WBC # FLD AUTO: 11.26 K/UL — HIGH (ref 3.8–10.5)
WBC # FLD AUTO: 11.26 K/UL — HIGH (ref 3.8–10.5)
WBC # FLD AUTO: 11.57 K/UL — HIGH (ref 3.8–10.5)
WBC # FLD AUTO: 11.57 K/UL — HIGH (ref 3.8–10.5)
WBC # FLD AUTO: 12.02 K/UL — HIGH (ref 3.8–10.5)
WBC # FLD AUTO: 12.02 K/UL — HIGH (ref 3.8–10.5)
WBC # FLD AUTO: 12.93 K/UL — HIGH (ref 3.8–10.5)
WBC # FLD AUTO: 14.51 K/UL — HIGH (ref 3.8–10.5)
WBC # FLD AUTO: 14.51 K/UL — HIGH (ref 3.8–10.5)
WBC # FLD AUTO: 25.03 K/UL — HIGH (ref 3.8–10.5)
WBC # FLD AUTO: 25.03 K/UL — HIGH (ref 3.8–10.5)
WBC # FLD AUTO: 4.99 K/UL — SIGNIFICANT CHANGE UP (ref 3.8–10.5)
WBC # FLD AUTO: 4.99 K/UL — SIGNIFICANT CHANGE UP (ref 3.8–10.5)
WBC # FLD AUTO: 6.94 K/UL — SIGNIFICANT CHANGE UP (ref 3.8–10.5)
WBC # FLD AUTO: 6.94 K/UL — SIGNIFICANT CHANGE UP (ref 3.8–10.5)
WBC # FLD AUTO: 7.17 K/UL — SIGNIFICANT CHANGE UP (ref 3.8–10.5)
WBC # FLD AUTO: 7.17 K/UL — SIGNIFICANT CHANGE UP (ref 3.8–10.5)
WBC # FLD AUTO: 7.41 K/UL — SIGNIFICANT CHANGE UP (ref 3.8–10.5)
WBC # FLD AUTO: 7.41 K/UL — SIGNIFICANT CHANGE UP (ref 3.8–10.5)
WBC # FLD AUTO: 9.55 K/UL — SIGNIFICANT CHANGE UP (ref 3.8–10.5)
WBC # FLD AUTO: 9.55 K/UL — SIGNIFICANT CHANGE UP (ref 3.8–10.5)
WBC # FLD AUTO: 9.57 K/UL — SIGNIFICANT CHANGE UP (ref 3.8–10.5)
WBC # FLD AUTO: 9.57 K/UL — SIGNIFICANT CHANGE UP (ref 3.8–10.5)
WBC # FLD AUTO: 9.66 K/UL — SIGNIFICANT CHANGE UP (ref 3.8–10.5)
WBC # FLD AUTO: 9.66 K/UL — SIGNIFICANT CHANGE UP (ref 3.8–10.5)
WBC UR QL: SIGNIFICANT CHANGE UP
WBC UR QL: SIGNIFICANT CHANGE UP

## 2023-01-01 PROCEDURE — 99232 SBSQ HOSP IP/OBS MODERATE 35: CPT

## 2023-01-01 PROCEDURE — 71045 X-RAY EXAM CHEST 1 VIEW: CPT | Mod: 26

## 2023-01-01 PROCEDURE — 99291 CRITICAL CARE FIRST HOUR: CPT

## 2023-01-01 PROCEDURE — 99233 SBSQ HOSP IP/OBS HIGH 50: CPT

## 2023-01-01 PROCEDURE — 0042T: CPT | Mod: MA

## 2023-01-01 PROCEDURE — 71045 X-RAY EXAM CHEST 1 VIEW: CPT | Mod: 26,77

## 2023-01-01 PROCEDURE — 99222 1ST HOSP IP/OBS MODERATE 55: CPT

## 2023-01-01 PROCEDURE — 70498 CT ANGIOGRAPHY NECK: CPT | Mod: 26,MA

## 2023-01-01 PROCEDURE — 99291 CRITICAL CARE FIRST HOUR: CPT | Mod: FS

## 2023-01-01 PROCEDURE — 93010 ELECTROCARDIOGRAM REPORT: CPT

## 2023-01-01 PROCEDURE — 93306 TTE W/DOPPLER COMPLETE: CPT | Mod: 26

## 2023-01-01 PROCEDURE — 99223 1ST HOSP IP/OBS HIGH 75: CPT

## 2023-01-01 PROCEDURE — 99497 ADVNCD CARE PLAN 30 MIN: CPT | Mod: 25

## 2023-01-01 PROCEDURE — 93880 EXTRACRANIAL BILAT STUDY: CPT | Mod: 26

## 2023-01-01 PROCEDURE — 70450 CT HEAD/BRAIN W/O DYE: CPT | Mod: 26

## 2023-01-01 PROCEDURE — 99291 CRITICAL CARE FIRST HOUR: CPT | Mod: FT,25

## 2023-01-01 PROCEDURE — 70496 CT ANGIOGRAPHY HEAD: CPT | Mod: 26,MA

## 2023-01-01 PROCEDURE — 70450 CT HEAD/BRAIN W/O DYE: CPT | Mod: 26,MA,XU

## 2023-01-01 PROCEDURE — 95816 EEG AWAKE AND DROWSY: CPT | Mod: 26

## 2023-01-01 PROCEDURE — 31500 INSERT EMERGENCY AIRWAY: CPT

## 2023-01-01 RX ORDER — LEVETIRACETAM 250 MG/1
1000 TABLET, FILM COATED ORAL ONCE
Refills: 0 | Status: COMPLETED | OUTPATIENT
Start: 2023-01-01 | End: 2023-01-01

## 2023-01-01 RX ORDER — POTASSIUM CHLORIDE 20 MEQ
10 PACKET (EA) ORAL ONCE
Refills: 0 | Status: COMPLETED | OUTPATIENT
Start: 2023-01-01 | End: 2023-01-01

## 2023-01-01 RX ORDER — ERYTHROMYCIN BASE 5 MG/GRAM
1 OINTMENT (GRAM) OPHTHALMIC (EYE) THREE TIMES A DAY
Refills: 0 | Status: COMPLETED | OUTPATIENT
Start: 2023-01-01 | End: 2023-01-01

## 2023-01-01 RX ORDER — FENTANYL CITRATE 50 UG/ML
0.5 INJECTION INTRAVENOUS
Qty: 2500 | Refills: 0 | Status: DISCONTINUED | OUTPATIENT
Start: 2023-01-01 | End: 2023-01-01

## 2023-01-01 RX ORDER — CHLORHEXIDINE GLUCONATE 213 G/1000ML
1 SOLUTION TOPICAL
Refills: 0 | Status: DISCONTINUED | OUTPATIENT
Start: 2023-01-01 | End: 2024-01-01

## 2023-01-01 RX ORDER — INFLUENZA VIRUS VACCINE 15; 15; 15; 15 UG/.5ML; UG/.5ML; UG/.5ML; UG/.5ML
0.7 SUSPENSION INTRAMUSCULAR ONCE
Refills: 0 | Status: DISCONTINUED | OUTPATIENT
Start: 2023-01-01 | End: 2024-01-01

## 2023-01-01 RX ORDER — POTASSIUM CHLORIDE 20 MEQ
40 PACKET (EA) ORAL ONCE
Refills: 0 | Status: COMPLETED | OUTPATIENT
Start: 2023-01-01 | End: 2023-01-01

## 2023-01-01 RX ORDER — HYDRALAZINE HCL 50 MG
100 TABLET ORAL EVERY 8 HOURS
Refills: 0 | Status: DISCONTINUED | OUTPATIENT
Start: 2023-01-01 | End: 2024-01-01

## 2023-01-01 RX ORDER — CHLORHEXIDINE GLUCONATE 213 G/1000ML
15 SOLUTION TOPICAL EVERY 12 HOURS
Refills: 0 | Status: DISCONTINUED | OUTPATIENT
Start: 2023-01-01 | End: 2024-01-01

## 2023-01-01 RX ORDER — ATORVASTATIN CALCIUM 80 MG/1
80 TABLET, FILM COATED ORAL AT BEDTIME
Refills: 0 | Status: DISCONTINUED | OUTPATIENT
Start: 2023-01-01 | End: 2024-01-01

## 2023-01-01 RX ORDER — ALBUMIN HUMAN 25 %
VIAL (ML) INTRAVENOUS
Refills: 0 | Status: DISCONTINUED | OUTPATIENT
Start: 2023-01-01 | End: 2023-01-01

## 2023-01-01 RX ORDER — AMLODIPINE BESYLATE 2.5 MG/1
10 TABLET ORAL DAILY
Refills: 0 | Status: DISCONTINUED | OUTPATIENT
Start: 2023-01-01 | End: 2024-01-01

## 2023-01-01 RX ORDER — CHLORHEXIDINE GLUCONATE 213 G/1000ML
15 SOLUTION TOPICAL EVERY 12 HOURS
Refills: 0 | Status: DISCONTINUED | OUTPATIENT
Start: 2023-01-01 | End: 2023-01-01

## 2023-01-01 RX ORDER — CEFTRIAXONE 500 MG/1
1000 INJECTION, POWDER, FOR SOLUTION INTRAMUSCULAR; INTRAVENOUS EVERY 24 HOURS
Refills: 0 | Status: COMPLETED | OUTPATIENT
Start: 2023-01-01 | End: 2023-01-01

## 2023-01-01 RX ORDER — HEPARIN SODIUM 5000 [USP'U]/ML
5000 INJECTION INTRAVENOUS; SUBCUTANEOUS EVERY 12 HOURS
Refills: 0 | Status: DISCONTINUED | OUTPATIENT
Start: 2023-01-01 | End: 2023-01-01

## 2023-01-01 RX ORDER — PANTOPRAZOLE SODIUM 20 MG/1
40 TABLET, DELAYED RELEASE ORAL DAILY
Refills: 0 | Status: DISCONTINUED | OUTPATIENT
Start: 2023-01-01 | End: 2023-01-01

## 2023-01-01 RX ORDER — TENECTEPLASE 50 MG
18 KIT INTRAVENOUS ONCE
Refills: 0 | Status: DISCONTINUED | OUTPATIENT
Start: 2023-01-01 | End: 2023-01-01

## 2023-01-01 RX ORDER — HYDRALAZINE HCL 50 MG
10 TABLET ORAL EVERY 4 HOURS
Refills: 0 | Status: DISCONTINUED | OUTPATIENT
Start: 2023-01-01 | End: 2023-01-01

## 2023-01-01 RX ORDER — POTASSIUM PHOSPHATE, MONOBASIC POTASSIUM PHOSPHATE, DIBASIC 236; 224 MG/ML; MG/ML
15 INJECTION, SOLUTION INTRAVENOUS ONCE
Refills: 0 | Status: COMPLETED | OUTPATIENT
Start: 2023-01-01 | End: 2023-01-01

## 2023-01-01 RX ORDER — HYDRALAZINE HCL 50 MG
25 TABLET ORAL EVERY 8 HOURS
Refills: 0 | Status: DISCONTINUED | OUTPATIENT
Start: 2023-01-01 | End: 2023-01-01

## 2023-01-01 RX ORDER — CALCIUM GLUCONATE 100 MG/ML
2 VIAL (ML) INTRAVENOUS ONCE
Refills: 0 | Status: COMPLETED | OUTPATIENT
Start: 2023-01-01 | End: 2023-01-01

## 2023-01-01 RX ORDER — SCOPALAMINE 1 MG/3D
1 PATCH, EXTENDED RELEASE TRANSDERMAL
Refills: 0 | Status: DISCONTINUED | OUTPATIENT
Start: 2023-01-01 | End: 2024-01-01

## 2023-01-01 RX ORDER — ETOMIDATE 2 MG/ML
22 INJECTION INTRAVENOUS ONCE
Refills: 0 | Status: COMPLETED | OUTPATIENT
Start: 2023-01-01 | End: 2023-01-01

## 2023-01-01 RX ORDER — NICARDIPINE HYDROCHLORIDE 30 MG/1
5 CAPSULE, EXTENDED RELEASE ORAL
Qty: 40 | Refills: 0 | Status: DISCONTINUED | OUTPATIENT
Start: 2023-01-01 | End: 2023-01-01

## 2023-01-01 RX ORDER — SODIUM CHLORIDE 9 MG/ML
4 INJECTION INTRAMUSCULAR; INTRAVENOUS; SUBCUTANEOUS EVERY 6 HOURS
Refills: 0 | Status: COMPLETED | OUTPATIENT
Start: 2023-01-01 | End: 2023-01-01

## 2023-01-01 RX ORDER — ASPIRIN/CALCIUM CARB/MAGNESIUM 324 MG
81 TABLET ORAL DAILY
Refills: 0 | Status: DISCONTINUED | OUTPATIENT
Start: 2023-01-01 | End: 2024-01-01

## 2023-01-01 RX ORDER — SODIUM,POTASSIUM PHOSPHATES 278-250MG
1 POWDER IN PACKET (EA) ORAL ONCE
Refills: 0 | Status: COMPLETED | OUTPATIENT
Start: 2023-01-01 | End: 2023-01-01

## 2023-01-01 RX ORDER — ALBUMIN HUMAN 25 %
100 VIAL (ML) INTRAVENOUS ONCE
Refills: 0 | Status: COMPLETED | OUTPATIENT
Start: 2023-01-01 | End: 2023-01-01

## 2023-01-01 RX ORDER — HYDRALAZINE HCL 50 MG
100 TABLET ORAL EVERY 8 HOURS
Refills: 0 | Status: DISCONTINUED | OUTPATIENT
Start: 2023-01-01 | End: 2023-01-01

## 2023-01-01 RX ORDER — PROPOFOL 10 MG/ML
5 INJECTION, EMULSION INTRAVENOUS
Qty: 1000 | Refills: 0 | Status: DISCONTINUED | OUTPATIENT
Start: 2023-01-01 | End: 2023-01-01

## 2023-01-01 RX ORDER — ROCURONIUM BROMIDE 10 MG/ML
75 VIAL (ML) INTRAVENOUS ONCE
Refills: 0 | Status: COMPLETED | OUTPATIENT
Start: 2023-01-01 | End: 2023-01-01

## 2023-01-01 RX ORDER — POLYETHYLENE GLYCOL 3350 17 G/17G
17 POWDER, FOR SOLUTION ORAL DAILY
Refills: 0 | Status: DISCONTINUED | OUTPATIENT
Start: 2023-01-01 | End: 2023-01-01

## 2023-01-01 RX ORDER — ENOXAPARIN SODIUM 100 MG/ML
40 INJECTION SUBCUTANEOUS EVERY 24 HOURS
Refills: 0 | Status: DISCONTINUED | OUTPATIENT
Start: 2023-01-01 | End: 2024-01-01

## 2023-01-01 RX ORDER — ALBUMIN HUMAN 25 %
50 VIAL (ML) INTRAVENOUS EVERY 6 HOURS
Refills: 0 | Status: DISCONTINUED | OUTPATIENT
Start: 2023-01-01 | End: 2023-01-01

## 2023-01-01 RX ORDER — SODIUM CHLORIDE 9 MG/ML
10 INJECTION INTRAMUSCULAR; INTRAVENOUS; SUBCUTANEOUS ONCE
Refills: 0 | Status: DISCONTINUED | OUTPATIENT
Start: 2023-01-01 | End: 2023-01-01

## 2023-01-01 RX ORDER — SODIUM CHLORIDE 9 MG/ML
1000 INJECTION, SOLUTION INTRAVENOUS ONCE
Refills: 0 | Status: COMPLETED | OUTPATIENT
Start: 2023-01-01 | End: 2023-01-01

## 2023-01-01 RX ORDER — ACETAMINOPHEN 500 MG
650 TABLET ORAL EVERY 6 HOURS
Refills: 0 | Status: DISCONTINUED | OUTPATIENT
Start: 2023-01-01 | End: 2024-01-01

## 2023-01-01 RX ORDER — POLYETHYLENE GLYCOL 3350 17 G/17G
17 POWDER, FOR SOLUTION ORAL DAILY
Refills: 0 | Status: DISCONTINUED | OUTPATIENT
Start: 2023-01-01 | End: 2024-01-01

## 2023-01-01 RX ORDER — BACITRACIN ZINC 500 UNIT/G
1 OINTMENT IN PACKET (EA) TOPICAL EVERY 12 HOURS
Refills: 0 | Status: COMPLETED | OUTPATIENT
Start: 2023-01-01 | End: 2023-01-01

## 2023-01-01 RX ORDER — SODIUM ZIRCONIUM CYCLOSILICATE 10 G/10G
10 POWDER, FOR SUSPENSION ORAL ONCE
Refills: 0 | Status: DISCONTINUED | OUTPATIENT
Start: 2023-01-01 | End: 2023-01-01

## 2023-01-01 RX ORDER — FENTANYL CITRATE 50 UG/ML
50 INJECTION INTRAVENOUS ONCE
Refills: 0 | Status: DISCONTINUED | OUTPATIENT
Start: 2023-01-01 | End: 2023-01-01

## 2023-01-01 RX ORDER — AMLODIPINE BESYLATE 2.5 MG/1
5 TABLET ORAL DAILY
Refills: 0 | Status: DISCONTINUED | OUTPATIENT
Start: 2023-01-01 | End: 2023-01-01

## 2023-01-01 RX ORDER — SODIUM CHLORIDE 9 MG/ML
500 INJECTION, SOLUTION INTRAVENOUS ONCE
Refills: 0 | Status: COMPLETED | OUTPATIENT
Start: 2023-01-01 | End: 2023-01-01

## 2023-01-01 RX ORDER — AMLODIPINE BESYLATE 2.5 MG/1
5 TABLET ORAL ONCE
Refills: 0 | Status: COMPLETED | OUTPATIENT
Start: 2023-01-01 | End: 2023-01-01

## 2023-01-01 RX ORDER — SENNA PLUS 8.6 MG/1
2 TABLET ORAL AT BEDTIME
Refills: 0 | Status: DISCONTINUED | OUTPATIENT
Start: 2023-01-01 | End: 2024-01-01

## 2023-01-01 RX ORDER — ACETAMINOPHEN 500 MG
1000 TABLET ORAL ONCE
Refills: 0 | Status: COMPLETED | OUTPATIENT
Start: 2023-01-01 | End: 2023-01-01

## 2023-01-01 RX ORDER — MORPHINE SULFATE 50 MG/1
2 CAPSULE, EXTENDED RELEASE ORAL EVERY 4 HOURS
Refills: 0 | Status: DISCONTINUED | OUTPATIENT
Start: 2023-01-01 | End: 2023-01-01

## 2023-01-01 RX ADMIN — ENOXAPARIN SODIUM 40 MILLIGRAM(S): 100 INJECTION SUBCUTANEOUS at 22:27

## 2023-01-01 RX ADMIN — Medication 100 MILLIGRAM(S): at 21:11

## 2023-01-01 RX ADMIN — ATORVASTATIN CALCIUM 80 MILLIGRAM(S): 80 TABLET, FILM COATED ORAL at 21:02

## 2023-01-01 RX ADMIN — PANTOPRAZOLE SODIUM 40 MILLIGRAM(S): 20 TABLET, DELAYED RELEASE ORAL at 11:32

## 2023-01-01 RX ADMIN — Medication 100 MILLIGRAM(S): at 21:24

## 2023-01-01 RX ADMIN — SCOPALAMINE 1 PATCH: 1 PATCH, EXTENDED RELEASE TRANSDERMAL at 19:06

## 2023-01-01 RX ADMIN — AMLODIPINE BESYLATE 10 MILLIGRAM(S): 2.5 TABLET ORAL at 06:00

## 2023-01-01 RX ADMIN — POLYETHYLENE GLYCOL 3350 17 GRAM(S): 17 POWDER, FOR SOLUTION ORAL at 12:14

## 2023-01-01 RX ADMIN — ATORVASTATIN CALCIUM 80 MILLIGRAM(S): 80 TABLET, FILM COATED ORAL at 21:28

## 2023-01-01 RX ADMIN — SENNA PLUS 2 TABLET(S): 8.6 TABLET ORAL at 21:22

## 2023-01-01 RX ADMIN — PANTOPRAZOLE SODIUM 40 MILLIGRAM(S): 20 TABLET, DELAYED RELEASE ORAL at 12:23

## 2023-01-01 RX ADMIN — Medication 100 MILLIGRAM(S): at 13:42

## 2023-01-01 RX ADMIN — Medication 100 MILLIGRAM(S): at 13:05

## 2023-01-01 RX ADMIN — SCOPALAMINE 1 PATCH: 1 PATCH, EXTENDED RELEASE TRANSDERMAL at 20:30

## 2023-01-01 RX ADMIN — SCOPALAMINE 1 PATCH: 1 PATCH, EXTENDED RELEASE TRANSDERMAL at 06:13

## 2023-01-01 RX ADMIN — Medication 650 MILLIGRAM(S): at 23:35

## 2023-01-01 RX ADMIN — PANTOPRAZOLE SODIUM 40 MILLIGRAM(S): 20 TABLET, DELAYED RELEASE ORAL at 12:51

## 2023-01-01 RX ADMIN — Medication 100 MILLIGRAM(S): at 14:06

## 2023-01-01 RX ADMIN — Medication 40 MILLIEQUIVALENT(S): at 04:03

## 2023-01-01 RX ADMIN — CHLORHEXIDINE GLUCONATE 1 APPLICATION(S): 213 SOLUTION TOPICAL at 05:25

## 2023-01-01 RX ADMIN — POLYETHYLENE GLYCOL 3350 17 GRAM(S): 17 POWDER, FOR SOLUTION ORAL at 12:40

## 2023-01-01 RX ADMIN — Medication 100 MILLIGRAM(S): at 04:52

## 2023-01-01 RX ADMIN — AMLODIPINE BESYLATE 10 MILLIGRAM(S): 2.5 TABLET ORAL at 05:26

## 2023-01-01 RX ADMIN — CHLORHEXIDINE GLUCONATE 15 MILLILITER(S): 213 SOLUTION TOPICAL at 17:26

## 2023-01-01 RX ADMIN — Medication 100 MILLIGRAM(S): at 14:37

## 2023-01-01 RX ADMIN — Medication 1 APPLICATION(S): at 21:59

## 2023-01-01 RX ADMIN — SCOPALAMINE 1 PATCH: 1 PATCH, EXTENDED RELEASE TRANSDERMAL at 19:29

## 2023-01-01 RX ADMIN — ATORVASTATIN CALCIUM 80 MILLIGRAM(S): 80 TABLET, FILM COATED ORAL at 21:21

## 2023-01-01 RX ADMIN — Medication 1 APPLICATION(S): at 05:16

## 2023-01-01 RX ADMIN — Medication 100 MILLIGRAM(S): at 21:12

## 2023-01-01 RX ADMIN — Medication 100 MILLIGRAM(S): at 05:15

## 2023-01-01 RX ADMIN — Medication 100 MILLIGRAM(S): at 15:01

## 2023-01-01 RX ADMIN — CHLORHEXIDINE GLUCONATE 15 MILLILITER(S): 213 SOLUTION TOPICAL at 18:45

## 2023-01-01 RX ADMIN — ENOXAPARIN SODIUM 40 MILLIGRAM(S): 100 INJECTION SUBCUTANEOUS at 22:26

## 2023-01-01 RX ADMIN — AMLODIPINE BESYLATE 10 MILLIGRAM(S): 2.5 TABLET ORAL at 05:24

## 2023-01-01 RX ADMIN — Medication 81 MILLIGRAM(S): at 11:33

## 2023-01-01 RX ADMIN — Medication 100 MILLIGRAM(S): at 13:34

## 2023-01-01 RX ADMIN — Medication 1 APPLICATION(S): at 21:13

## 2023-01-01 RX ADMIN — Medication 25 MILLIGRAM(S): at 14:27

## 2023-01-01 RX ADMIN — Medication 1 APPLICATION(S): at 21:18

## 2023-01-01 RX ADMIN — CHLORHEXIDINE GLUCONATE 15 MILLILITER(S): 213 SOLUTION TOPICAL at 05:18

## 2023-01-01 RX ADMIN — Medication 100 MILLIGRAM(S): at 13:50

## 2023-01-01 RX ADMIN — FENTANYL CITRATE 3.16 MICROGRAM(S)/KG/HR: 50 INJECTION INTRAVENOUS at 01:24

## 2023-01-01 RX ADMIN — SCOPALAMINE 1 PATCH: 1 PATCH, EXTENDED RELEASE TRANSDERMAL at 13:47

## 2023-01-01 RX ADMIN — CHLORHEXIDINE GLUCONATE 1 APPLICATION(S): 213 SOLUTION TOPICAL at 05:04

## 2023-01-01 RX ADMIN — Medication 100 MILLIGRAM(S): at 13:51

## 2023-01-01 RX ADMIN — CHLORHEXIDINE GLUCONATE 15 MILLILITER(S): 213 SOLUTION TOPICAL at 05:15

## 2023-01-01 RX ADMIN — AMLODIPINE BESYLATE 10 MILLIGRAM(S): 2.5 TABLET ORAL at 05:33

## 2023-01-01 RX ADMIN — CHLORHEXIDINE GLUCONATE 15 MILLILITER(S): 213 SOLUTION TOPICAL at 05:11

## 2023-01-01 RX ADMIN — ATORVASTATIN CALCIUM 80 MILLIGRAM(S): 80 TABLET, FILM COATED ORAL at 21:09

## 2023-01-01 RX ADMIN — ENOXAPARIN SODIUM 40 MILLIGRAM(S): 100 INJECTION SUBCUTANEOUS at 21:20

## 2023-01-01 RX ADMIN — Medication 81 MILLIGRAM(S): at 14:27

## 2023-01-01 RX ADMIN — Medication 81 MILLIGRAM(S): at 11:12

## 2023-01-01 RX ADMIN — Medication 1 APPLICATION(S): at 17:51

## 2023-01-01 RX ADMIN — CHLORHEXIDINE GLUCONATE 15 MILLILITER(S): 213 SOLUTION TOPICAL at 17:20

## 2023-01-01 RX ADMIN — CHLORHEXIDINE GLUCONATE 15 MILLILITER(S): 213 SOLUTION TOPICAL at 06:19

## 2023-01-01 RX ADMIN — ATORVASTATIN CALCIUM 80 MILLIGRAM(S): 80 TABLET, FILM COATED ORAL at 21:10

## 2023-01-01 RX ADMIN — SCOPALAMINE 1 PATCH: 1 PATCH, EXTENDED RELEASE TRANSDERMAL at 19:00

## 2023-01-01 RX ADMIN — NICARDIPINE HYDROCHLORIDE 5 MG/HR: 30 CAPSULE, EXTENDED RELEASE ORAL at 07:20

## 2023-01-01 RX ADMIN — Medication 81 MILLIGRAM(S): at 11:06

## 2023-01-01 RX ADMIN — HEPARIN SODIUM 5000 UNIT(S): 5000 INJECTION INTRAVENOUS; SUBCUTANEOUS at 17:31

## 2023-01-01 RX ADMIN — Medication 100 MILLIGRAM(S): at 21:22

## 2023-01-01 RX ADMIN — CHLORHEXIDINE GLUCONATE 15 MILLILITER(S): 213 SOLUTION TOPICAL at 05:01

## 2023-01-01 RX ADMIN — ATORVASTATIN CALCIUM 80 MILLIGRAM(S): 80 TABLET, FILM COATED ORAL at 21:22

## 2023-01-01 RX ADMIN — CHLORHEXIDINE GLUCONATE 15 MILLILITER(S): 213 SOLUTION TOPICAL at 05:42

## 2023-01-01 RX ADMIN — POLYETHYLENE GLYCOL 3350 17 GRAM(S): 17 POWDER, FOR SOLUTION ORAL at 12:10

## 2023-01-01 RX ADMIN — Medication 81 MILLIGRAM(S): at 13:36

## 2023-01-01 RX ADMIN — SCOPALAMINE 1 PATCH: 1 PATCH, EXTENDED RELEASE TRANSDERMAL at 20:06

## 2023-01-01 RX ADMIN — Medication 25 MILLIGRAM(S): at 05:08

## 2023-01-01 RX ADMIN — Medication 81 MILLIGRAM(S): at 13:18

## 2023-01-01 RX ADMIN — Medication 25 MILLIGRAM(S): at 21:04

## 2023-01-01 RX ADMIN — CHLORHEXIDINE GLUCONATE 15 MILLILITER(S): 213 SOLUTION TOPICAL at 05:30

## 2023-01-01 RX ADMIN — Medication 100 MILLIGRAM(S): at 05:04

## 2023-01-01 RX ADMIN — SENNA PLUS 2 TABLET(S): 8.6 TABLET ORAL at 21:09

## 2023-01-01 RX ADMIN — CHLORHEXIDINE GLUCONATE 15 MILLILITER(S): 213 SOLUTION TOPICAL at 18:36

## 2023-01-01 RX ADMIN — CHLORHEXIDINE GLUCONATE 15 MILLILITER(S): 213 SOLUTION TOPICAL at 17:45

## 2023-01-01 RX ADMIN — CHLORHEXIDINE GLUCONATE 15 MILLILITER(S): 213 SOLUTION TOPICAL at 05:02

## 2023-01-01 RX ADMIN — Medication 1000 MILLIGRAM(S): at 20:00

## 2023-01-01 RX ADMIN — Medication 100 MILLIGRAM(S): at 05:51

## 2023-01-01 RX ADMIN — CHLORHEXIDINE GLUCONATE 1 APPLICATION(S): 213 SOLUTION TOPICAL at 05:15

## 2023-01-01 RX ADMIN — CHLORHEXIDINE GLUCONATE 1 APPLICATION(S): 213 SOLUTION TOPICAL at 05:10

## 2023-01-01 RX ADMIN — Medication 81 MILLIGRAM(S): at 11:25

## 2023-01-01 RX ADMIN — Medication 81 MILLIGRAM(S): at 12:07

## 2023-01-01 RX ADMIN — Medication 650 MILLIGRAM(S): at 23:05

## 2023-01-01 RX ADMIN — ATORVASTATIN CALCIUM 80 MILLIGRAM(S): 80 TABLET, FILM COATED ORAL at 21:36

## 2023-01-01 RX ADMIN — ATORVASTATIN CALCIUM 80 MILLIGRAM(S): 80 TABLET, FILM COATED ORAL at 22:57

## 2023-01-01 RX ADMIN — Medication 100 MILLIGRAM(S): at 21:48

## 2023-01-01 RX ADMIN — ENOXAPARIN SODIUM 40 MILLIGRAM(S): 100 INJECTION SUBCUTANEOUS at 21:48

## 2023-01-01 RX ADMIN — ATORVASTATIN CALCIUM 80 MILLIGRAM(S): 80 TABLET, FILM COATED ORAL at 21:41

## 2023-01-01 RX ADMIN — Medication 100 MILLIGRAM(S): at 05:16

## 2023-01-01 RX ADMIN — CHLORHEXIDINE GLUCONATE 15 MILLILITER(S): 213 SOLUTION TOPICAL at 17:15

## 2023-01-01 RX ADMIN — Medication 100 MILLIGRAM(S): at 16:11

## 2023-01-01 RX ADMIN — Medication 650 MILLIGRAM(S): at 23:00

## 2023-01-01 RX ADMIN — CHLORHEXIDINE GLUCONATE 15 MILLILITER(S): 213 SOLUTION TOPICAL at 05:25

## 2023-01-01 RX ADMIN — CHLORHEXIDINE GLUCONATE 15 MILLILITER(S): 213 SOLUTION TOPICAL at 17:03

## 2023-01-01 RX ADMIN — Medication 100 MILLIGRAM(S): at 05:25

## 2023-01-01 RX ADMIN — SENNA PLUS 2 TABLET(S): 8.6 TABLET ORAL at 21:43

## 2023-01-01 RX ADMIN — Medication 50 MILLILITER(S): at 06:08

## 2023-01-01 RX ADMIN — NICARDIPINE HYDROCHLORIDE 25 MG/HR: 30 CAPSULE, EXTENDED RELEASE ORAL at 07:20

## 2023-01-01 RX ADMIN — CHLORHEXIDINE GLUCONATE 15 MILLILITER(S): 213 SOLUTION TOPICAL at 17:13

## 2023-01-01 RX ADMIN — Medication 100 MILLIGRAM(S): at 15:18

## 2023-01-01 RX ADMIN — Medication 650 MILLIGRAM(S): at 05:04

## 2023-01-01 RX ADMIN — Medication 100 MILLIGRAM(S): at 05:01

## 2023-01-01 RX ADMIN — CHLORHEXIDINE GLUCONATE 15 MILLILITER(S): 213 SOLUTION TOPICAL at 05:41

## 2023-01-01 RX ADMIN — LEVETIRACETAM 400 MILLIGRAM(S): 250 TABLET, FILM COATED ORAL at 04:51

## 2023-01-01 RX ADMIN — Medication 100 MILLIGRAM(S): at 05:35

## 2023-01-01 RX ADMIN — Medication 81 MILLIGRAM(S): at 11:26

## 2023-01-01 RX ADMIN — SENNA PLUS 2 TABLET(S): 8.6 TABLET ORAL at 21:58

## 2023-01-01 RX ADMIN — Medication 1 APPLICATION(S): at 22:32

## 2023-01-01 RX ADMIN — SENNA PLUS 2 TABLET(S): 8.6 TABLET ORAL at 21:21

## 2023-01-01 RX ADMIN — SENNA PLUS 2 TABLET(S): 8.6 TABLET ORAL at 21:03

## 2023-01-01 RX ADMIN — ATORVASTATIN CALCIUM 80 MILLIGRAM(S): 80 TABLET, FILM COATED ORAL at 21:25

## 2023-01-01 RX ADMIN — CHLORHEXIDINE GLUCONATE 15 MILLILITER(S): 213 SOLUTION TOPICAL at 05:31

## 2023-01-01 RX ADMIN — CHLORHEXIDINE GLUCONATE 15 MILLILITER(S): 213 SOLUTION TOPICAL at 17:24

## 2023-01-01 RX ADMIN — HEPARIN SODIUM 5000 UNIT(S): 5000 INJECTION INTRAVENOUS; SUBCUTANEOUS at 06:01

## 2023-01-01 RX ADMIN — ATORVASTATIN CALCIUM 80 MILLIGRAM(S): 80 TABLET, FILM COATED ORAL at 22:52

## 2023-01-01 RX ADMIN — CHLORHEXIDINE GLUCONATE 1 APPLICATION(S): 213 SOLUTION TOPICAL at 05:18

## 2023-01-01 RX ADMIN — AMLODIPINE BESYLATE 10 MILLIGRAM(S): 2.5 TABLET ORAL at 04:52

## 2023-01-01 RX ADMIN — ENOXAPARIN SODIUM 40 MILLIGRAM(S): 100 INJECTION SUBCUTANEOUS at 21:18

## 2023-01-01 RX ADMIN — POLYETHYLENE GLYCOL 3350 17 GRAM(S): 17 POWDER, FOR SOLUTION ORAL at 12:06

## 2023-01-01 RX ADMIN — Medication 1 APPLICATION(S): at 17:15

## 2023-01-01 RX ADMIN — Medication 100 MILLIGRAM(S): at 14:22

## 2023-01-01 RX ADMIN — CHLORHEXIDINE GLUCONATE 1 APPLICATION(S): 213 SOLUTION TOPICAL at 05:51

## 2023-01-01 RX ADMIN — CHLORHEXIDINE GLUCONATE 15 MILLILITER(S): 213 SOLUTION TOPICAL at 17:18

## 2023-01-01 RX ADMIN — HEPARIN SODIUM 5000 UNIT(S): 5000 INJECTION INTRAVENOUS; SUBCUTANEOUS at 17:47

## 2023-01-01 RX ADMIN — CHLORHEXIDINE GLUCONATE 15 MILLILITER(S): 213 SOLUTION TOPICAL at 06:01

## 2023-01-01 RX ADMIN — Medication 1 APPLICATION(S): at 17:12

## 2023-01-01 RX ADMIN — SODIUM CHLORIDE 500 MILLILITER(S): 9 INJECTION, SOLUTION INTRAVENOUS at 03:32

## 2023-01-01 RX ADMIN — Medication 650 MILLIGRAM(S): at 19:16

## 2023-01-01 RX ADMIN — CHLORHEXIDINE GLUCONATE 1 APPLICATION(S): 213 SOLUTION TOPICAL at 05:01

## 2023-01-01 RX ADMIN — CHLORHEXIDINE GLUCONATE 15 MILLILITER(S): 213 SOLUTION TOPICAL at 05:16

## 2023-01-01 RX ADMIN — Medication 10 MILLIGRAM(S): at 16:31

## 2023-01-01 RX ADMIN — Medication 10 MILLIGRAM(S): at 11:48

## 2023-01-01 RX ADMIN — CHLORHEXIDINE GLUCONATE 15 MILLILITER(S): 213 SOLUTION TOPICAL at 06:00

## 2023-01-01 RX ADMIN — Medication 100 MILLIGRAM(S): at 14:21

## 2023-01-01 RX ADMIN — HEPARIN SODIUM 5000 UNIT(S): 5000 INJECTION INTRAVENOUS; SUBCUTANEOUS at 17:03

## 2023-01-01 RX ADMIN — NICARDIPINE HYDROCHLORIDE 25 MG/HR: 30 CAPSULE, EXTENDED RELEASE ORAL at 04:52

## 2023-01-01 RX ADMIN — SCOPALAMINE 1 PATCH: 1 PATCH, EXTENDED RELEASE TRANSDERMAL at 19:30

## 2023-01-01 RX ADMIN — ATORVASTATIN CALCIUM 80 MILLIGRAM(S): 80 TABLET, FILM COATED ORAL at 22:08

## 2023-01-01 RX ADMIN — Medication 100 MILLIGRAM(S): at 22:08

## 2023-01-01 RX ADMIN — Medication 81 MILLIGRAM(S): at 11:10

## 2023-01-01 RX ADMIN — Medication 100 MILLIGRAM(S): at 21:43

## 2023-01-01 RX ADMIN — Medication 100 MILLIGRAM(S): at 05:45

## 2023-01-01 RX ADMIN — ATORVASTATIN CALCIUM 80 MILLIGRAM(S): 80 TABLET, FILM COATED ORAL at 21:50

## 2023-01-01 RX ADMIN — HEPARIN SODIUM 5000 UNIT(S): 5000 INJECTION INTRAVENOUS; SUBCUTANEOUS at 06:51

## 2023-01-01 RX ADMIN — Medication 100 MILLIGRAM(S): at 22:03

## 2023-01-01 RX ADMIN — ENOXAPARIN SODIUM 40 MILLIGRAM(S): 100 INJECTION SUBCUTANEOUS at 21:32

## 2023-01-01 RX ADMIN — HEPARIN SODIUM 5000 UNIT(S): 5000 INJECTION INTRAVENOUS; SUBCUTANEOUS at 17:18

## 2023-01-01 RX ADMIN — Medication 25 MILLIGRAM(S): at 15:26

## 2023-01-01 RX ADMIN — CHLORHEXIDINE GLUCONATE 15 MILLILITER(S): 213 SOLUTION TOPICAL at 05:14

## 2023-01-01 RX ADMIN — ENOXAPARIN SODIUM 40 MILLIGRAM(S): 100 INJECTION SUBCUTANEOUS at 21:04

## 2023-01-01 RX ADMIN — SENNA PLUS 2 TABLET(S): 8.6 TABLET ORAL at 21:12

## 2023-01-01 RX ADMIN — CHLORHEXIDINE GLUCONATE 1 APPLICATION(S): 213 SOLUTION TOPICAL at 05:31

## 2023-01-01 RX ADMIN — CHLORHEXIDINE GLUCONATE 1 APPLICATION(S): 213 SOLUTION TOPICAL at 10:37

## 2023-01-01 RX ADMIN — Medication 650 MILLIGRAM(S): at 18:41

## 2023-01-01 RX ADMIN — Medication 650 MILLIGRAM(S): at 13:15

## 2023-01-01 RX ADMIN — ENOXAPARIN SODIUM 40 MILLIGRAM(S): 100 INJECTION SUBCUTANEOUS at 22:51

## 2023-01-01 RX ADMIN — SCOPALAMINE 1 PATCH: 1 PATCH, EXTENDED RELEASE TRANSDERMAL at 07:46

## 2023-01-01 RX ADMIN — CHLORHEXIDINE GLUCONATE 15 MILLILITER(S): 213 SOLUTION TOPICAL at 17:09

## 2023-01-01 RX ADMIN — SENNA PLUS 2 TABLET(S): 8.6 TABLET ORAL at 22:04

## 2023-01-01 RX ADMIN — SODIUM CHLORIDE 1000 MILLILITER(S): 9 INJECTION, SOLUTION INTRAVENOUS at 11:33

## 2023-01-01 RX ADMIN — Medication 100 MILLIGRAM(S): at 21:18

## 2023-01-01 RX ADMIN — SODIUM CHLORIDE 4 MILLILITER(S): 9 INJECTION INTRAMUSCULAR; INTRAVENOUS; SUBCUTANEOUS at 05:25

## 2023-01-01 RX ADMIN — ENOXAPARIN SODIUM 40 MILLIGRAM(S): 100 INJECTION SUBCUTANEOUS at 21:24

## 2023-01-01 RX ADMIN — HEPARIN SODIUM 5000 UNIT(S): 5000 INJECTION INTRAVENOUS; SUBCUTANEOUS at 05:25

## 2023-01-01 RX ADMIN — CHLORHEXIDINE GLUCONATE 15 MILLILITER(S): 213 SOLUTION TOPICAL at 06:51

## 2023-01-01 RX ADMIN — Medication 10 MILLIGRAM(S): at 04:30

## 2023-01-01 RX ADMIN — CHLORHEXIDINE GLUCONATE 1 APPLICATION(S): 213 SOLUTION TOPICAL at 03:45

## 2023-01-01 RX ADMIN — SENNA PLUS 2 TABLET(S): 8.6 TABLET ORAL at 21:05

## 2023-01-01 RX ADMIN — Medication 25 MILLIGRAM(S): at 05:43

## 2023-01-01 RX ADMIN — ATORVASTATIN CALCIUM 80 MILLIGRAM(S): 80 TABLET, FILM COATED ORAL at 22:22

## 2023-01-01 RX ADMIN — ATORVASTATIN CALCIUM 80 MILLIGRAM(S): 80 TABLET, FILM COATED ORAL at 21:12

## 2023-01-01 RX ADMIN — CHLORHEXIDINE GLUCONATE 15 MILLILITER(S): 213 SOLUTION TOPICAL at 04:00

## 2023-01-01 RX ADMIN — Medication 100 MILLIGRAM(S): at 22:52

## 2023-01-01 RX ADMIN — Medication 1 APPLICATION(S): at 05:05

## 2023-01-01 RX ADMIN — Medication 81 MILLIGRAM(S): at 11:48

## 2023-01-01 RX ADMIN — Medication 100 MILLIGRAM(S): at 14:27

## 2023-01-01 RX ADMIN — CHLORHEXIDINE GLUCONATE 15 MILLILITER(S): 213 SOLUTION TOPICAL at 17:23

## 2023-01-01 RX ADMIN — CHLORHEXIDINE GLUCONATE 1 APPLICATION(S): 213 SOLUTION TOPICAL at 05:05

## 2023-01-01 RX ADMIN — Medication 650 MILLIGRAM(S): at 04:30

## 2023-01-01 RX ADMIN — Medication 100 MILLIGRAM(S): at 22:00

## 2023-01-01 RX ADMIN — ATORVASTATIN CALCIUM 80 MILLIGRAM(S): 80 TABLET, FILM COATED ORAL at 21:04

## 2023-01-01 RX ADMIN — POLYETHYLENE GLYCOL 3350 17 GRAM(S): 17 POWDER, FOR SOLUTION ORAL at 11:19

## 2023-01-01 RX ADMIN — MORPHINE SULFATE 2 MILLIGRAM(S): 50 CAPSULE, EXTENDED RELEASE ORAL at 00:14

## 2023-01-01 RX ADMIN — PANTOPRAZOLE SODIUM 40 MILLIGRAM(S): 20 TABLET, DELAYED RELEASE ORAL at 11:22

## 2023-01-01 RX ADMIN — Medication 100 MILLIGRAM(S): at 05:21

## 2023-01-01 RX ADMIN — ATORVASTATIN CALCIUM 80 MILLIGRAM(S): 80 TABLET, FILM COATED ORAL at 21:24

## 2023-01-01 RX ADMIN — ATORVASTATIN CALCIUM 80 MILLIGRAM(S): 80 TABLET, FILM COATED ORAL at 21:03

## 2023-01-01 RX ADMIN — Medication 100 MILLIGRAM(S): at 13:39

## 2023-01-01 RX ADMIN — ATORVASTATIN CALCIUM 80 MILLIGRAM(S): 80 TABLET, FILM COATED ORAL at 21:26

## 2023-01-01 RX ADMIN — Medication 100 MILLIGRAM(S): at 05:10

## 2023-01-01 RX ADMIN — ENOXAPARIN SODIUM 40 MILLIGRAM(S): 100 INJECTION SUBCUTANEOUS at 21:41

## 2023-01-01 RX ADMIN — SCOPALAMINE 1 PATCH: 1 PATCH, EXTENDED RELEASE TRANSDERMAL at 07:15

## 2023-01-01 RX ADMIN — SENNA PLUS 2 TABLET(S): 8.6 TABLET ORAL at 22:03

## 2023-01-01 RX ADMIN — Medication 100 MILLIGRAM(S): at 22:06

## 2023-01-01 RX ADMIN — AMLODIPINE BESYLATE 10 MILLIGRAM(S): 2.5 TABLET ORAL at 05:06

## 2023-01-01 RX ADMIN — Medication 100 MILLIGRAM(S): at 21:09

## 2023-01-01 RX ADMIN — AMLODIPINE BESYLATE 5 MILLIGRAM(S): 2.5 TABLET ORAL at 18:30

## 2023-01-01 RX ADMIN — Medication 81 MILLIGRAM(S): at 11:29

## 2023-01-01 RX ADMIN — Medication 81 MILLIGRAM(S): at 11:51

## 2023-01-01 RX ADMIN — Medication 25 MILLIGRAM(S): at 21:23

## 2023-01-01 RX ADMIN — AMLODIPINE BESYLATE 5 MILLIGRAM(S): 2.5 TABLET ORAL at 05:14

## 2023-01-01 RX ADMIN — SCOPALAMINE 1 PATCH: 1 PATCH, EXTENDED RELEASE TRANSDERMAL at 20:00

## 2023-01-01 RX ADMIN — SENNA PLUS 2 TABLET(S): 8.6 TABLET ORAL at 22:00

## 2023-01-01 RX ADMIN — ENOXAPARIN SODIUM 40 MILLIGRAM(S): 100 INJECTION SUBCUTANEOUS at 22:00

## 2023-01-01 RX ADMIN — CHLORHEXIDINE GLUCONATE 1 APPLICATION(S): 213 SOLUTION TOPICAL at 05:37

## 2023-01-01 RX ADMIN — Medication 100 MILLIGRAM(S): at 13:38

## 2023-01-01 RX ADMIN — Medication 100 MILLIGRAM(S): at 21:23

## 2023-01-01 RX ADMIN — Medication 100 MILLIGRAM(S): at 14:24

## 2023-01-01 RX ADMIN — POLYETHYLENE GLYCOL 3350 17 GRAM(S): 17 POWDER, FOR SOLUTION ORAL at 11:40

## 2023-01-01 RX ADMIN — AMLODIPINE BESYLATE 10 MILLIGRAM(S): 2.5 TABLET ORAL at 05:45

## 2023-01-01 RX ADMIN — POLYETHYLENE GLYCOL 3350 17 GRAM(S): 17 POWDER, FOR SOLUTION ORAL at 11:25

## 2023-01-01 RX ADMIN — CHLORHEXIDINE GLUCONATE 1 APPLICATION(S): 213 SOLUTION TOPICAL at 05:17

## 2023-01-01 RX ADMIN — SCOPALAMINE 1 PATCH: 1 PATCH, EXTENDED RELEASE TRANSDERMAL at 08:00

## 2023-01-01 RX ADMIN — Medication 100 MILLIGRAM(S): at 05:17

## 2023-01-01 RX ADMIN — Medication 81 MILLIGRAM(S): at 11:53

## 2023-01-01 RX ADMIN — Medication 100 MILLIGRAM(S): at 14:00

## 2023-01-01 RX ADMIN — ATORVASTATIN CALCIUM 80 MILLIGRAM(S): 80 TABLET, FILM COATED ORAL at 21:06

## 2023-01-01 RX ADMIN — CHLORHEXIDINE GLUCONATE 15 MILLILITER(S): 213 SOLUTION TOPICAL at 17:14

## 2023-01-01 RX ADMIN — AMLODIPINE BESYLATE 5 MILLIGRAM(S): 2.5 TABLET ORAL at 05:24

## 2023-01-01 RX ADMIN — Medication 10 MILLIGRAM(S): at 17:53

## 2023-01-01 RX ADMIN — Medication 100 MILLIGRAM(S): at 05:14

## 2023-01-01 RX ADMIN — CHLORHEXIDINE GLUCONATE 1 APPLICATION(S): 213 SOLUTION TOPICAL at 11:10

## 2023-01-01 RX ADMIN — ATORVASTATIN CALCIUM 80 MILLIGRAM(S): 80 TABLET, FILM COATED ORAL at 21:58

## 2023-01-01 RX ADMIN — Medication 81 MILLIGRAM(S): at 11:39

## 2023-01-01 RX ADMIN — Medication 100 MILLIGRAM(S): at 21:50

## 2023-01-01 RX ADMIN — ATORVASTATIN CALCIUM 80 MILLIGRAM(S): 80 TABLET, FILM COATED ORAL at 22:16

## 2023-01-01 RX ADMIN — SCOPALAMINE 1 PATCH: 1 PATCH, EXTENDED RELEASE TRANSDERMAL at 14:58

## 2023-01-01 RX ADMIN — Medication 10 MILLIGRAM(S): at 02:09

## 2023-01-01 RX ADMIN — CHLORHEXIDINE GLUCONATE 1 APPLICATION(S): 213 SOLUTION TOPICAL at 03:13

## 2023-01-01 RX ADMIN — CHLORHEXIDINE GLUCONATE 15 MILLILITER(S): 213 SOLUTION TOPICAL at 17:12

## 2023-01-01 RX ADMIN — SENNA PLUS 2 TABLET(S): 8.6 TABLET ORAL at 21:18

## 2023-01-01 RX ADMIN — POLYETHYLENE GLYCOL 3350 17 GRAM(S): 17 POWDER, FOR SOLUTION ORAL at 11:45

## 2023-01-01 RX ADMIN — CEFTRIAXONE 100 MILLIGRAM(S): 500 INJECTION, POWDER, FOR SOLUTION INTRAMUSCULAR; INTRAVENOUS at 14:46

## 2023-01-01 RX ADMIN — SENNA PLUS 2 TABLET(S): 8.6 TABLET ORAL at 22:12

## 2023-01-01 RX ADMIN — ATORVASTATIN CALCIUM 80 MILLIGRAM(S): 80 TABLET, FILM COATED ORAL at 21:11

## 2023-01-01 RX ADMIN — CEFTRIAXONE 100 MILLIGRAM(S): 500 INJECTION, POWDER, FOR SOLUTION INTRAMUSCULAR; INTRAVENOUS at 14:27

## 2023-01-01 RX ADMIN — CHLORHEXIDINE GLUCONATE 1 APPLICATION(S): 213 SOLUTION TOPICAL at 05:30

## 2023-01-01 RX ADMIN — Medication 100 MILLIGRAM(S): at 13:26

## 2023-01-01 RX ADMIN — Medication 100 MILLIGRAM(S): at 05:43

## 2023-01-01 RX ADMIN — HEPARIN SODIUM 5000 UNIT(S): 5000 INJECTION INTRAVENOUS; SUBCUTANEOUS at 06:07

## 2023-01-01 RX ADMIN — CHLORHEXIDINE GLUCONATE 15 MILLILITER(S): 213 SOLUTION TOPICAL at 05:43

## 2023-01-01 RX ADMIN — ENOXAPARIN SODIUM 40 MILLIGRAM(S): 100 INJECTION SUBCUTANEOUS at 21:43

## 2023-01-01 RX ADMIN — CHLORHEXIDINE GLUCONATE 1 APPLICATION(S): 213 SOLUTION TOPICAL at 06:00

## 2023-01-01 RX ADMIN — CHLORHEXIDINE GLUCONATE 15 MILLILITER(S): 213 SOLUTION TOPICAL at 17:06

## 2023-01-01 RX ADMIN — POLYETHYLENE GLYCOL 3350 17 GRAM(S): 17 POWDER, FOR SOLUTION ORAL at 11:51

## 2023-01-01 RX ADMIN — POTASSIUM PHOSPHATE, MONOBASIC POTASSIUM PHOSPHATE, DIBASIC 62.5 MILLIMOLE(S): 236; 224 INJECTION, SOLUTION INTRAVENOUS at 04:24

## 2023-01-01 RX ADMIN — SENNA PLUS 2 TABLET(S): 8.6 TABLET ORAL at 21:24

## 2023-01-01 RX ADMIN — Medication 100 MILLIGRAM(S): at 21:21

## 2023-01-01 RX ADMIN — CHLORHEXIDINE GLUCONATE 1 APPLICATION(S): 213 SOLUTION TOPICAL at 06:20

## 2023-01-01 RX ADMIN — Medication 100 MILLIGRAM(S): at 22:04

## 2023-01-01 RX ADMIN — AMLODIPINE BESYLATE 5 MILLIGRAM(S): 2.5 TABLET ORAL at 05:01

## 2023-01-01 RX ADMIN — SCOPALAMINE 1 PATCH: 1 PATCH, EXTENDED RELEASE TRANSDERMAL at 19:28

## 2023-01-01 RX ADMIN — AMLODIPINE BESYLATE 10 MILLIGRAM(S): 2.5 TABLET ORAL at 05:51

## 2023-01-01 RX ADMIN — SCOPALAMINE 1 PATCH: 1 PATCH, EXTENDED RELEASE TRANSDERMAL at 07:00

## 2023-01-01 RX ADMIN — CEFTRIAXONE 100 MILLIGRAM(S): 500 INJECTION, POWDER, FOR SOLUTION INTRAMUSCULAR; INTRAVENOUS at 14:21

## 2023-01-01 RX ADMIN — CHLORHEXIDINE GLUCONATE 1 APPLICATION(S): 213 SOLUTION TOPICAL at 06:33

## 2023-01-01 RX ADMIN — Medication 81 MILLIGRAM(S): at 12:00

## 2023-01-01 RX ADMIN — Medication 81 MILLIGRAM(S): at 11:34

## 2023-01-01 RX ADMIN — CHLORHEXIDINE GLUCONATE 15 MILLILITER(S): 213 SOLUTION TOPICAL at 17:31

## 2023-01-01 RX ADMIN — Medication 650 MILLIGRAM(S): at 13:32

## 2023-01-01 RX ADMIN — Medication 1 PACKET(S): at 08:23

## 2023-01-01 RX ADMIN — CHLORHEXIDINE GLUCONATE 15 MILLILITER(S): 213 SOLUTION TOPICAL at 05:17

## 2023-01-01 RX ADMIN — POLYETHYLENE GLYCOL 3350 17 GRAM(S): 17 POWDER, FOR SOLUTION ORAL at 13:36

## 2023-01-01 RX ADMIN — Medication 81 MILLIGRAM(S): at 12:55

## 2023-01-01 RX ADMIN — ATORVASTATIN CALCIUM 80 MILLIGRAM(S): 80 TABLET, FILM COATED ORAL at 21:29

## 2023-01-01 RX ADMIN — NICARDIPINE HYDROCHLORIDE 25 MG/HR: 30 CAPSULE, EXTENDED RELEASE ORAL at 05:35

## 2023-01-01 RX ADMIN — CHLORHEXIDINE GLUCONATE 15 MILLILITER(S): 213 SOLUTION TOPICAL at 17:08

## 2023-01-01 RX ADMIN — CHLORHEXIDINE GLUCONATE 15 MILLILITER(S): 213 SOLUTION TOPICAL at 17:30

## 2023-01-01 RX ADMIN — CHLORHEXIDINE GLUCONATE 15 MILLILITER(S): 213 SOLUTION TOPICAL at 17:32

## 2023-01-01 RX ADMIN — ENOXAPARIN SODIUM 40 MILLIGRAM(S): 100 INJECTION SUBCUTANEOUS at 21:05

## 2023-01-01 RX ADMIN — Medication 100 MILLIGRAM(S): at 05:22

## 2023-01-01 RX ADMIN — CHLORHEXIDINE GLUCONATE 15 MILLILITER(S): 213 SOLUTION TOPICAL at 17:10

## 2023-01-01 RX ADMIN — HEPARIN SODIUM 5000 UNIT(S): 5000 INJECTION INTRAVENOUS; SUBCUTANEOUS at 17:02

## 2023-01-01 RX ADMIN — ATORVASTATIN CALCIUM 80 MILLIGRAM(S): 80 TABLET, FILM COATED ORAL at 22:03

## 2023-01-01 RX ADMIN — SENNA PLUS 2 TABLET(S): 8.6 TABLET ORAL at 21:48

## 2023-01-01 RX ADMIN — Medication 100 MILLIGRAM(S): at 13:31

## 2023-01-01 RX ADMIN — Medication 100 MILLIGRAM(S): at 14:51

## 2023-01-01 RX ADMIN — Medication 650 MILLIGRAM(S): at 05:05

## 2023-01-01 RX ADMIN — Medication 100 MILLIGRAM(S): at 14:10

## 2023-01-01 RX ADMIN — CHLORHEXIDINE GLUCONATE 1 APPLICATION(S): 213 SOLUTION TOPICAL at 07:12

## 2023-01-01 RX ADMIN — ATORVASTATIN CALCIUM 80 MILLIGRAM(S): 80 TABLET, FILM COATED ORAL at 21:05

## 2023-01-01 RX ADMIN — Medication 1 APPLICATION(S): at 15:22

## 2023-01-01 RX ADMIN — Medication 81 MILLIGRAM(S): at 12:23

## 2023-01-01 RX ADMIN — CHLORHEXIDINE GLUCONATE 15 MILLILITER(S): 213 SOLUTION TOPICAL at 05:24

## 2023-01-01 RX ADMIN — HEPARIN SODIUM 5000 UNIT(S): 5000 INJECTION INTRAVENOUS; SUBCUTANEOUS at 05:36

## 2023-01-01 RX ADMIN — CHLORHEXIDINE GLUCONATE 15 MILLILITER(S): 213 SOLUTION TOPICAL at 05:21

## 2023-01-01 RX ADMIN — HEPARIN SODIUM 5000 UNIT(S): 5000 INJECTION INTRAVENOUS; SUBCUTANEOUS at 06:21

## 2023-01-01 RX ADMIN — Medication 81 MILLIGRAM(S): at 11:18

## 2023-01-01 RX ADMIN — ATORVASTATIN CALCIUM 80 MILLIGRAM(S): 80 TABLET, FILM COATED ORAL at 21:20

## 2023-01-01 RX ADMIN — CHLORHEXIDINE GLUCONATE 1 APPLICATION(S): 213 SOLUTION TOPICAL at 06:01

## 2023-01-01 RX ADMIN — ENOXAPARIN SODIUM 40 MILLIGRAM(S): 100 INJECTION SUBCUTANEOUS at 21:07

## 2023-01-01 RX ADMIN — Medication 100 MILLIGRAM(S): at 05:32

## 2023-01-01 RX ADMIN — CHLORHEXIDINE GLUCONATE 1 APPLICATION(S): 213 SOLUTION TOPICAL at 05:13

## 2023-01-01 RX ADMIN — CHLORHEXIDINE GLUCONATE 1 APPLICATION(S): 213 SOLUTION TOPICAL at 06:40

## 2023-01-01 RX ADMIN — Medication 100 MILLIGRAM(S): at 13:58

## 2023-01-01 RX ADMIN — Medication 100 MILLIGRAM(S): at 15:44

## 2023-01-01 RX ADMIN — ATORVASTATIN CALCIUM 80 MILLIGRAM(S): 80 TABLET, FILM COATED ORAL at 21:43

## 2023-01-01 RX ADMIN — SENNA PLUS 2 TABLET(S): 8.6 TABLET ORAL at 21:06

## 2023-01-01 RX ADMIN — POLYETHYLENE GLYCOL 3350 17 GRAM(S): 17 POWDER, FOR SOLUTION ORAL at 11:23

## 2023-01-01 RX ADMIN — CHLORHEXIDINE GLUCONATE 15 MILLILITER(S): 213 SOLUTION TOPICAL at 05:12

## 2023-01-01 RX ADMIN — PROPOFOL 2.18 MICROGRAM(S)/KG/MIN: 10 INJECTION, EMULSION INTRAVENOUS at 04:50

## 2023-01-01 RX ADMIN — CEFTRIAXONE 100 MILLIGRAM(S): 500 INJECTION, POWDER, FOR SOLUTION INTRAMUSCULAR; INTRAVENOUS at 13:58

## 2023-01-01 RX ADMIN — PANTOPRAZOLE SODIUM 40 MILLIGRAM(S): 20 TABLET, DELAYED RELEASE ORAL at 11:20

## 2023-01-01 RX ADMIN — CHLORHEXIDINE GLUCONATE 15 MILLILITER(S): 213 SOLUTION TOPICAL at 05:36

## 2023-01-01 RX ADMIN — CHLORHEXIDINE GLUCONATE 15 MILLILITER(S): 213 SOLUTION TOPICAL at 05:07

## 2023-01-01 RX ADMIN — CHLORHEXIDINE GLUCONATE 15 MILLILITER(S): 213 SOLUTION TOPICAL at 05:44

## 2023-01-01 RX ADMIN — CEFTRIAXONE 100 MILLIGRAM(S): 500 INJECTION, POWDER, FOR SOLUTION INTRAMUSCULAR; INTRAVENOUS at 15:01

## 2023-01-01 RX ADMIN — CHLORHEXIDINE GLUCONATE 15 MILLILITER(S): 213 SOLUTION TOPICAL at 17:25

## 2023-01-01 RX ADMIN — AMLODIPINE BESYLATE 5 MILLIGRAM(S): 2.5 TABLET ORAL at 05:38

## 2023-01-01 RX ADMIN — Medication 650 MILLIGRAM(S): at 18:25

## 2023-01-01 RX ADMIN — SCOPALAMINE 1 PATCH: 1 PATCH, EXTENDED RELEASE TRANSDERMAL at 13:44

## 2023-01-01 RX ADMIN — Medication 25 MILLIGRAM(S): at 13:33

## 2023-01-01 RX ADMIN — ENOXAPARIN SODIUM 40 MILLIGRAM(S): 100 INJECTION SUBCUTANEOUS at 22:04

## 2023-01-01 RX ADMIN — Medication 100 MILLIGRAM(S): at 05:13

## 2023-01-01 RX ADMIN — SODIUM CHLORIDE 4 MILLILITER(S): 9 INJECTION INTRAMUSCULAR; INTRAVENOUS; SUBCUTANEOUS at 17:14

## 2023-01-01 RX ADMIN — CHLORHEXIDINE GLUCONATE 15 MILLILITER(S): 213 SOLUTION TOPICAL at 05:52

## 2023-01-01 RX ADMIN — ATORVASTATIN CALCIUM 80 MILLIGRAM(S): 80 TABLET, FILM COATED ORAL at 21:18

## 2023-01-01 RX ADMIN — AMLODIPINE BESYLATE 10 MILLIGRAM(S): 2.5 TABLET ORAL at 05:17

## 2023-01-01 RX ADMIN — CHLORHEXIDINE GLUCONATE 1 APPLICATION(S): 213 SOLUTION TOPICAL at 06:08

## 2023-01-01 RX ADMIN — SCOPALAMINE 1 PATCH: 1 PATCH, EXTENDED RELEASE TRANSDERMAL at 14:02

## 2023-01-01 RX ADMIN — Medication 100 MILLIGRAM(S): at 06:12

## 2023-01-01 RX ADMIN — Medication 25 MILLIGRAM(S): at 06:22

## 2023-01-01 RX ADMIN — ATORVASTATIN CALCIUM 80 MILLIGRAM(S): 80 TABLET, FILM COATED ORAL at 21:40

## 2023-01-01 RX ADMIN — CHLORHEXIDINE GLUCONATE 15 MILLILITER(S): 213 SOLUTION TOPICAL at 17:02

## 2023-01-01 RX ADMIN — CHLORHEXIDINE GLUCONATE 1 APPLICATION(S): 213 SOLUTION TOPICAL at 03:07

## 2023-01-01 RX ADMIN — AMLODIPINE BESYLATE 10 MILLIGRAM(S): 2.5 TABLET ORAL at 05:31

## 2023-01-01 RX ADMIN — CHLORHEXIDINE GLUCONATE 1 APPLICATION(S): 213 SOLUTION TOPICAL at 05:02

## 2023-01-01 RX ADMIN — CHLORHEXIDINE GLUCONATE 1 APPLICATION(S): 213 SOLUTION TOPICAL at 04:00

## 2023-01-01 RX ADMIN — SCOPALAMINE 1 PATCH: 1 PATCH, EXTENDED RELEASE TRANSDERMAL at 19:42

## 2023-01-01 RX ADMIN — POLYETHYLENE GLYCOL 3350 17 GRAM(S): 17 POWDER, FOR SOLUTION ORAL at 11:37

## 2023-01-01 RX ADMIN — AMLODIPINE BESYLATE 5 MILLIGRAM(S): 2.5 TABLET ORAL at 05:16

## 2023-01-01 RX ADMIN — Medication 81 MILLIGRAM(S): at 11:47

## 2023-01-01 RX ADMIN — Medication 81 MILLIGRAM(S): at 12:15

## 2023-01-01 RX ADMIN — HEPARIN SODIUM 5000 UNIT(S): 5000 INJECTION INTRAVENOUS; SUBCUTANEOUS at 05:24

## 2023-01-01 RX ADMIN — ENOXAPARIN SODIUM 40 MILLIGRAM(S): 100 INJECTION SUBCUTANEOUS at 21:16

## 2023-01-01 RX ADMIN — Medication 100 MILLIGRAM(S): at 05:42

## 2023-01-01 RX ADMIN — Medication 100 MILLIGRAM(S): at 17:04

## 2023-01-01 RX ADMIN — CHLORHEXIDINE GLUCONATE 1 APPLICATION(S): 213 SOLUTION TOPICAL at 04:51

## 2023-01-01 RX ADMIN — HEPARIN SODIUM 5000 UNIT(S): 5000 INJECTION INTRAVENOUS; SUBCUTANEOUS at 17:08

## 2023-01-01 RX ADMIN — CHLORHEXIDINE GLUCONATE 15 MILLILITER(S): 213 SOLUTION TOPICAL at 17:48

## 2023-01-01 RX ADMIN — Medication 100 MILLIGRAM(S): at 06:51

## 2023-01-01 RX ADMIN — Medication 81 MILLIGRAM(S): at 11:45

## 2023-01-01 RX ADMIN — Medication 100 MILLIGRAM(S): at 21:16

## 2023-01-01 RX ADMIN — CHLORHEXIDINE GLUCONATE 15 MILLILITER(S): 213 SOLUTION TOPICAL at 05:10

## 2023-01-01 RX ADMIN — SCOPALAMINE 1 PATCH: 1 PATCH, EXTENDED RELEASE TRANSDERMAL at 07:45

## 2023-01-01 RX ADMIN — ENOXAPARIN SODIUM 40 MILLIGRAM(S): 100 INJECTION SUBCUTANEOUS at 21:09

## 2023-01-01 RX ADMIN — Medication 100 MILLIGRAM(S): at 05:12

## 2023-01-01 RX ADMIN — CHLORHEXIDINE GLUCONATE 1 APPLICATION(S): 213 SOLUTION TOPICAL at 08:00

## 2023-01-01 RX ADMIN — Medication 650 MILLIGRAM(S): at 05:58

## 2023-01-01 RX ADMIN — Medication 100 MILLIGRAM(S): at 05:58

## 2023-01-01 RX ADMIN — Medication 25 MILLIGRAM(S): at 21:39

## 2023-01-01 RX ADMIN — CHLORHEXIDINE GLUCONATE 1 APPLICATION(S): 213 SOLUTION TOPICAL at 05:14

## 2023-01-01 RX ADMIN — HEPARIN SODIUM 5000 UNIT(S): 5000 INJECTION INTRAVENOUS; SUBCUTANEOUS at 05:37

## 2023-01-01 RX ADMIN — ATORVASTATIN CALCIUM 80 MILLIGRAM(S): 80 TABLET, FILM COATED ORAL at 21:31

## 2023-01-01 RX ADMIN — Medication 40 MILLIEQUIVALENT(S): at 10:10

## 2023-01-01 RX ADMIN — PANTOPRAZOLE SODIUM 40 MILLIGRAM(S): 20 TABLET, DELAYED RELEASE ORAL at 11:05

## 2023-01-01 RX ADMIN — Medication 100 MILLIGRAM(S): at 05:09

## 2023-01-01 RX ADMIN — ENOXAPARIN SODIUM 40 MILLIGRAM(S): 100 INJECTION SUBCUTANEOUS at 21:50

## 2023-01-01 RX ADMIN — SCOPALAMINE 1 PATCH: 1 PATCH, EXTENDED RELEASE TRANSDERMAL at 19:20

## 2023-01-01 RX ADMIN — CHLORHEXIDINE GLUCONATE 15 MILLILITER(S): 213 SOLUTION TOPICAL at 17:29

## 2023-01-01 RX ADMIN — CHLORHEXIDINE GLUCONATE 1 APPLICATION(S): 213 SOLUTION TOPICAL at 04:44

## 2023-01-01 RX ADMIN — CHLORHEXIDINE GLUCONATE 1 APPLICATION(S): 213 SOLUTION TOPICAL at 04:59

## 2023-01-01 RX ADMIN — CHLORHEXIDINE GLUCONATE 15 MILLILITER(S): 213 SOLUTION TOPICAL at 06:12

## 2023-01-01 RX ADMIN — ATORVASTATIN CALCIUM 80 MILLIGRAM(S): 80 TABLET, FILM COATED ORAL at 21:32

## 2023-01-01 RX ADMIN — Medication 25 MILLIGRAM(S): at 05:36

## 2023-01-01 RX ADMIN — Medication 81 MILLIGRAM(S): at 11:28

## 2023-01-01 RX ADMIN — SENNA PLUS 2 TABLET(S): 8.6 TABLET ORAL at 21:39

## 2023-01-01 RX ADMIN — CHLORHEXIDINE GLUCONATE 15 MILLILITER(S): 213 SOLUTION TOPICAL at 05:04

## 2023-01-01 RX ADMIN — ETOMIDATE 22 MILLIGRAM(S): 2 INJECTION INTRAVENOUS at 04:17

## 2023-01-01 RX ADMIN — ATORVASTATIN CALCIUM 80 MILLIGRAM(S): 80 TABLET, FILM COATED ORAL at 21:23

## 2023-01-01 RX ADMIN — Medication 25 MILLIGRAM(S): at 21:58

## 2023-01-01 RX ADMIN — CHLORHEXIDINE GLUCONATE 15 MILLILITER(S): 213 SOLUTION TOPICAL at 17:16

## 2023-01-01 RX ADMIN — Medication 81 MILLIGRAM(S): at 11:21

## 2023-01-01 RX ADMIN — POLYETHYLENE GLYCOL 3350 17 GRAM(S): 17 POWDER, FOR SOLUTION ORAL at 11:28

## 2023-01-01 RX ADMIN — PANTOPRAZOLE SODIUM 40 MILLIGRAM(S): 20 TABLET, DELAYED RELEASE ORAL at 11:48

## 2023-01-01 RX ADMIN — CHLORHEXIDINE GLUCONATE 15 MILLILITER(S): 213 SOLUTION TOPICAL at 18:30

## 2023-01-01 RX ADMIN — HEPARIN SODIUM 5000 UNIT(S): 5000 INJECTION INTRAVENOUS; SUBCUTANEOUS at 17:19

## 2023-01-01 RX ADMIN — SENNA PLUS 2 TABLET(S): 8.6 TABLET ORAL at 21:15

## 2023-01-01 RX ADMIN — AMLODIPINE BESYLATE 10 MILLIGRAM(S): 2.5 TABLET ORAL at 05:12

## 2023-01-01 RX ADMIN — HEPARIN SODIUM 5000 UNIT(S): 5000 INJECTION INTRAVENOUS; SUBCUTANEOUS at 16:18

## 2023-01-01 RX ADMIN — Medication 81 MILLIGRAM(S): at 11:11

## 2023-01-01 RX ADMIN — AMLODIPINE BESYLATE 10 MILLIGRAM(S): 2.5 TABLET ORAL at 05:04

## 2023-01-01 RX ADMIN — HEPARIN SODIUM 5000 UNIT(S): 5000 INJECTION INTRAVENOUS; SUBCUTANEOUS at 17:09

## 2023-01-01 RX ADMIN — Medication 100 MILLIGRAM(S): at 13:32

## 2023-01-01 RX ADMIN — CHLORHEXIDINE GLUCONATE 15 MILLILITER(S): 213 SOLUTION TOPICAL at 17:53

## 2023-01-01 RX ADMIN — SCOPALAMINE 1 PATCH: 1 PATCH, EXTENDED RELEASE TRANSDERMAL at 07:27

## 2023-01-01 RX ADMIN — Medication 81 MILLIGRAM(S): at 12:13

## 2023-01-01 RX ADMIN — CHLORHEXIDINE GLUCONATE 1 APPLICATION(S): 213 SOLUTION TOPICAL at 05:03

## 2023-01-01 RX ADMIN — Medication 100 MILLIGRAM(S): at 05:28

## 2023-01-01 RX ADMIN — CHLORHEXIDINE GLUCONATE 15 MILLILITER(S): 213 SOLUTION TOPICAL at 17:27

## 2023-01-01 RX ADMIN — ENOXAPARIN SODIUM 40 MILLIGRAM(S): 100 INJECTION SUBCUTANEOUS at 21:11

## 2023-01-01 RX ADMIN — ENOXAPARIN SODIUM 40 MILLIGRAM(S): 100 INJECTION SUBCUTANEOUS at 21:17

## 2023-01-01 RX ADMIN — ATORVASTATIN CALCIUM 80 MILLIGRAM(S): 80 TABLET, FILM COATED ORAL at 21:16

## 2023-01-01 RX ADMIN — Medication 81 MILLIGRAM(S): at 11:23

## 2023-01-01 RX ADMIN — SCOPALAMINE 1 PATCH: 1 PATCH, EXTENDED RELEASE TRANSDERMAL at 11:40

## 2023-01-01 RX ADMIN — CHLORHEXIDINE GLUCONATE 1 APPLICATION(S): 213 SOLUTION TOPICAL at 05:21

## 2023-01-01 RX ADMIN — Medication 100 MILLIGRAM(S): at 21:41

## 2023-01-01 RX ADMIN — SODIUM CHLORIDE 4 MILLILITER(S): 9 INJECTION INTRAMUSCULAR; INTRAVENOUS; SUBCUTANEOUS at 05:05

## 2023-01-01 RX ADMIN — SCOPALAMINE 1 PATCH: 1 PATCH, EXTENDED RELEASE TRANSDERMAL at 07:33

## 2023-01-01 RX ADMIN — AMLODIPINE BESYLATE 5 MILLIGRAM(S): 2.5 TABLET ORAL at 05:02

## 2023-01-01 RX ADMIN — ENOXAPARIN SODIUM 40 MILLIGRAM(S): 100 INJECTION SUBCUTANEOUS at 22:08

## 2023-01-01 RX ADMIN — Medication 10 MILLIGRAM(S): at 09:19

## 2023-01-01 RX ADMIN — Medication 81 MILLIGRAM(S): at 11:46

## 2023-01-01 RX ADMIN — CHLORHEXIDINE GLUCONATE 1 APPLICATION(S): 213 SOLUTION TOPICAL at 05:11

## 2023-01-01 RX ADMIN — CHLORHEXIDINE GLUCONATE 15 MILLILITER(S): 213 SOLUTION TOPICAL at 05:28

## 2023-01-01 RX ADMIN — Medication 100 MILLIGRAM(S): at 05:02

## 2023-01-01 RX ADMIN — ATORVASTATIN CALCIUM 80 MILLIGRAM(S): 80 TABLET, FILM COATED ORAL at 22:00

## 2023-01-01 RX ADMIN — CHLORHEXIDINE GLUCONATE 15 MILLILITER(S): 213 SOLUTION TOPICAL at 17:43

## 2023-01-01 RX ADMIN — Medication 400 MILLIGRAM(S): at 18:41

## 2023-01-01 RX ADMIN — Medication 100 MILLIGRAM(S): at 21:15

## 2023-01-01 RX ADMIN — SENNA PLUS 2 TABLET(S): 8.6 TABLET ORAL at 21:16

## 2023-01-01 RX ADMIN — ATORVASTATIN CALCIUM 80 MILLIGRAM(S): 80 TABLET, FILM COATED ORAL at 21:48

## 2023-01-01 RX ADMIN — ENOXAPARIN SODIUM 40 MILLIGRAM(S): 100 INJECTION SUBCUTANEOUS at 21:10

## 2023-01-01 RX ADMIN — POLYETHYLENE GLYCOL 3350 17 GRAM(S): 17 POWDER, FOR SOLUTION ORAL at 12:05

## 2023-01-01 RX ADMIN — ENOXAPARIN SODIUM 40 MILLIGRAM(S): 100 INJECTION SUBCUTANEOUS at 21:03

## 2023-01-01 RX ADMIN — CHLORHEXIDINE GLUCONATE 1 APPLICATION(S): 213 SOLUTION TOPICAL at 03:14

## 2023-01-01 RX ADMIN — CHLORHEXIDINE GLUCONATE 15 MILLILITER(S): 213 SOLUTION TOPICAL at 17:33

## 2023-01-01 RX ADMIN — Medication 650 MILLIGRAM(S): at 11:43

## 2023-01-01 RX ADMIN — Medication 650 MILLIGRAM(S): at 03:09

## 2023-01-01 RX ADMIN — ENOXAPARIN SODIUM 40 MILLIGRAM(S): 100 INJECTION SUBCUTANEOUS at 21:22

## 2023-01-01 RX ADMIN — Medication 100 MILLIGRAM(S): at 21:02

## 2023-01-01 RX ADMIN — PANTOPRAZOLE SODIUM 40 MILLIGRAM(S): 20 TABLET, DELAYED RELEASE ORAL at 11:53

## 2023-01-01 RX ADMIN — Medication 100 MILLIGRAM(S): at 13:36

## 2023-01-01 RX ADMIN — SCOPALAMINE 1 PATCH: 1 PATCH, EXTENDED RELEASE TRANSDERMAL at 14:20

## 2023-01-01 RX ADMIN — ATORVASTATIN CALCIUM 80 MILLIGRAM(S): 80 TABLET, FILM COATED ORAL at 22:26

## 2023-01-01 RX ADMIN — Medication 100 MILLIGRAM(S): at 13:35

## 2023-01-01 RX ADMIN — FENTANYL CITRATE 3.16 MICROGRAM(S)/KG/HR: 50 INJECTION INTRAVENOUS at 21:39

## 2023-01-01 RX ADMIN — SCOPALAMINE 1 PATCH: 1 PATCH, EXTENDED RELEASE TRANSDERMAL at 17:15

## 2023-01-01 RX ADMIN — Medication 100 MILLIGRAM(S): at 21:03

## 2023-01-01 RX ADMIN — Medication 81 MILLIGRAM(S): at 12:14

## 2023-01-01 RX ADMIN — PANTOPRAZOLE SODIUM 40 MILLIGRAM(S): 20 TABLET, DELAYED RELEASE ORAL at 11:33

## 2023-01-01 RX ADMIN — AMLODIPINE BESYLATE 5 MILLIGRAM(S): 2.5 TABLET ORAL at 05:04

## 2023-01-01 RX ADMIN — SENNA PLUS 2 TABLET(S): 8.6 TABLET ORAL at 21:17

## 2023-01-01 RX ADMIN — SODIUM CHLORIDE 4 MILLILITER(S): 9 INJECTION INTRAMUSCULAR; INTRAVENOUS; SUBCUTANEOUS at 11:00

## 2023-01-01 RX ADMIN — SENNA PLUS 2 TABLET(S): 8.6 TABLET ORAL at 21:19

## 2023-01-01 RX ADMIN — CHLORHEXIDINE GLUCONATE 15 MILLILITER(S): 213 SOLUTION TOPICAL at 05:13

## 2023-01-01 RX ADMIN — Medication 100 MILLIGRAM(S): at 21:20

## 2023-01-01 RX ADMIN — ENOXAPARIN SODIUM 40 MILLIGRAM(S): 100 INJECTION SUBCUTANEOUS at 21:58

## 2023-01-01 RX ADMIN — Medication 81 MILLIGRAM(S): at 11:04

## 2023-01-01 RX ADMIN — POLYETHYLENE GLYCOL 3350 17 GRAM(S): 17 POWDER, FOR SOLUTION ORAL at 11:16

## 2023-01-01 RX ADMIN — Medication 100 MILLIGRAM(S): at 21:31

## 2023-01-01 RX ADMIN — CHLORHEXIDINE GLUCONATE 1 APPLICATION(S): 213 SOLUTION TOPICAL at 05:44

## 2023-01-01 RX ADMIN — Medication 1 APPLICATION(S): at 21:16

## 2023-01-01 RX ADMIN — Medication 81 MILLIGRAM(S): at 12:49

## 2023-01-01 RX ADMIN — CHLORHEXIDINE GLUCONATE 1 APPLICATION(S): 213 SOLUTION TOPICAL at 06:02

## 2023-01-01 RX ADMIN — AMLODIPINE BESYLATE 10 MILLIGRAM(S): 2.5 TABLET ORAL at 06:13

## 2023-01-01 RX ADMIN — Medication 81 MILLIGRAM(S): at 11:55

## 2023-01-01 RX ADMIN — Medication 100 MILLIGRAM(S): at 06:41

## 2023-01-01 RX ADMIN — SENNA PLUS 2 TABLET(S): 8.6 TABLET ORAL at 22:07

## 2023-01-01 RX ADMIN — Medication 100 MILLIGRAM(S): at 21:29

## 2023-01-01 RX ADMIN — Medication 25 MILLIGRAM(S): at 15:01

## 2023-01-01 RX ADMIN — HEPARIN SODIUM 5000 UNIT(S): 5000 INJECTION INTRAVENOUS; SUBCUTANEOUS at 06:00

## 2023-01-01 RX ADMIN — CHLORHEXIDINE GLUCONATE 1 APPLICATION(S): 213 SOLUTION TOPICAL at 03:18

## 2023-01-01 RX ADMIN — Medication 1 PACKET(S): at 04:30

## 2023-01-01 RX ADMIN — AMLODIPINE BESYLATE 10 MILLIGRAM(S): 2.5 TABLET ORAL at 06:02

## 2023-01-01 RX ADMIN — Medication 650 MILLIGRAM(S): at 04:09

## 2023-01-01 RX ADMIN — Medication 100 MILLIGRAM(S): at 22:12

## 2023-01-01 RX ADMIN — CHLORHEXIDINE GLUCONATE 15 MILLILITER(S): 213 SOLUTION TOPICAL at 04:52

## 2023-01-01 RX ADMIN — ATORVASTATIN CALCIUM 80 MILLIGRAM(S): 80 TABLET, FILM COATED ORAL at 21:15

## 2023-01-01 RX ADMIN — Medication 40 MILLIEQUIVALENT(S): at 07:31

## 2023-01-01 RX ADMIN — ATORVASTATIN CALCIUM 80 MILLIGRAM(S): 80 TABLET, FILM COATED ORAL at 21:08

## 2023-01-01 RX ADMIN — Medication 100 MILLIGRAM(S): at 21:06

## 2023-01-01 RX ADMIN — CHLORHEXIDINE GLUCONATE 15 MILLILITER(S): 213 SOLUTION TOPICAL at 05:45

## 2023-01-01 RX ADMIN — CHLORHEXIDINE GLUCONATE 1 APPLICATION(S): 213 SOLUTION TOPICAL at 04:04

## 2023-01-01 RX ADMIN — Medication 81 MILLIGRAM(S): at 12:40

## 2023-01-01 RX ADMIN — Medication 100 MILLIGRAM(S): at 21:05

## 2023-01-01 RX ADMIN — POLYETHYLENE GLYCOL 3350 17 GRAM(S): 17 POWDER, FOR SOLUTION ORAL at 11:29

## 2023-01-01 RX ADMIN — AMLODIPINE BESYLATE 10 MILLIGRAM(S): 2.5 TABLET ORAL at 05:10

## 2023-01-01 RX ADMIN — SENNA PLUS 2 TABLET(S): 8.6 TABLET ORAL at 21:04

## 2023-01-01 RX ADMIN — MORPHINE SULFATE 2 MILLIGRAM(S): 50 CAPSULE, EXTENDED RELEASE ORAL at 00:13

## 2023-01-01 RX ADMIN — Medication 81 MILLIGRAM(S): at 11:07

## 2023-01-01 RX ADMIN — Medication 25 MILLIGRAM(S): at 21:15

## 2023-01-01 RX ADMIN — PANTOPRAZOLE SODIUM 40 MILLIGRAM(S): 20 TABLET, DELAYED RELEASE ORAL at 11:17

## 2023-01-01 RX ADMIN — POLYETHYLENE GLYCOL 3350 17 GRAM(S): 17 POWDER, FOR SOLUTION ORAL at 11:46

## 2023-01-01 RX ADMIN — CHLORHEXIDINE GLUCONATE 1 APPLICATION(S): 213 SOLUTION TOPICAL at 05:09

## 2023-01-01 RX ADMIN — ENOXAPARIN SODIUM 40 MILLIGRAM(S): 100 INJECTION SUBCUTANEOUS at 21:51

## 2023-01-01 RX ADMIN — Medication 81 MILLIGRAM(S): at 12:05

## 2023-01-01 RX ADMIN — CHLORHEXIDINE GLUCONATE 1 APPLICATION(S): 213 SOLUTION TOPICAL at 05:38

## 2023-01-01 RX ADMIN — CHLORHEXIDINE GLUCONATE 1 APPLICATION(S): 213 SOLUTION TOPICAL at 05:00

## 2023-01-01 RX ADMIN — Medication 100 MILLIGRAM(S): at 21:04

## 2023-01-01 RX ADMIN — Medication 81 MILLIGRAM(S): at 12:09

## 2023-01-01 RX ADMIN — Medication 100 MILLIGRAM(S): at 13:12

## 2023-01-01 RX ADMIN — CHLORHEXIDINE GLUCONATE 15 MILLILITER(S): 213 SOLUTION TOPICAL at 05:22

## 2023-01-01 RX ADMIN — Medication 200 GRAM(S): at 04:30

## 2023-01-01 RX ADMIN — SCOPALAMINE 1 PATCH: 1 PATCH, EXTENDED RELEASE TRANSDERMAL at 00:51

## 2023-01-01 RX ADMIN — ENOXAPARIN SODIUM 40 MILLIGRAM(S): 100 INJECTION SUBCUTANEOUS at 21:29

## 2023-01-01 RX ADMIN — Medication 100 MILLIGRAM(S): at 23:00

## 2023-01-01 RX ADMIN — PANTOPRAZOLE SODIUM 40 MILLIGRAM(S): 20 TABLET, DELAYED RELEASE ORAL at 11:25

## 2023-01-01 RX ADMIN — Medication 100 MILLIGRAM(S): at 16:18

## 2023-01-01 RX ADMIN — Medication 100 MILLIGRAM(S): at 13:11

## 2023-01-01 RX ADMIN — HEPARIN SODIUM 5000 UNIT(S): 5000 INJECTION INTRAVENOUS; SUBCUTANEOUS at 17:01

## 2023-01-01 RX ADMIN — CHLORHEXIDINE GLUCONATE 1 APPLICATION(S): 213 SOLUTION TOPICAL at 13:00

## 2023-01-01 RX ADMIN — SCOPALAMINE 1 PATCH: 1 PATCH, EXTENDED RELEASE TRANSDERMAL at 14:08

## 2023-01-01 RX ADMIN — Medication 81 MILLIGRAM(S): at 11:22

## 2023-01-01 RX ADMIN — Medication 100 MILLIGRAM(S): at 21:08

## 2023-01-01 RX ADMIN — AMLODIPINE BESYLATE 5 MILLIGRAM(S): 2.5 TABLET ORAL at 06:56

## 2023-01-01 RX ADMIN — ENOXAPARIN SODIUM 40 MILLIGRAM(S): 100 INJECTION SUBCUTANEOUS at 21:25

## 2023-01-01 RX ADMIN — Medication 100 MILLIGRAM(S): at 05:53

## 2023-01-01 RX ADMIN — CHLORHEXIDINE GLUCONATE 15 MILLILITER(S): 213 SOLUTION TOPICAL at 17:55

## 2023-01-01 RX ADMIN — Medication 100 MILLIGRAM(S): at 05:18

## 2023-01-01 RX ADMIN — AMLODIPINE BESYLATE 10 MILLIGRAM(S): 2.5 TABLET ORAL at 05:36

## 2023-01-01 RX ADMIN — HEPARIN SODIUM 5000 UNIT(S): 5000 INJECTION INTRAVENOUS; SUBCUTANEOUS at 05:30

## 2023-01-01 RX ADMIN — Medication 1 APPLICATION(S): at 05:45

## 2023-01-01 RX ADMIN — Medication 100 MILLIGRAM(S): at 06:19

## 2023-01-01 RX ADMIN — Medication 100 MILLIEQUIVALENT(S): at 08:55

## 2023-01-01 RX ADMIN — Medication 1 APPLICATION(S): at 15:02

## 2023-01-01 RX ADMIN — POLYETHYLENE GLYCOL 3350 17 GRAM(S): 17 POWDER, FOR SOLUTION ORAL at 11:26

## 2023-01-01 RX ADMIN — SCOPALAMINE 1 PATCH: 1 PATCH, EXTENDED RELEASE TRANSDERMAL at 07:35

## 2023-01-01 RX ADMIN — Medication 10 MILLIGRAM(S): at 19:52

## 2023-01-01 RX ADMIN — Medication 10 MILLIGRAM(S): at 22:01

## 2023-01-01 RX ADMIN — Medication 650 MILLIGRAM(S): at 05:33

## 2023-01-01 RX ADMIN — Medication 81 MILLIGRAM(S): at 11:30

## 2023-01-01 RX ADMIN — CHLORHEXIDINE GLUCONATE 15 MILLILITER(S): 213 SOLUTION TOPICAL at 05:08

## 2023-01-01 RX ADMIN — AMLODIPINE BESYLATE 10 MILLIGRAM(S): 2.5 TABLET ORAL at 06:20

## 2023-01-01 RX ADMIN — FENTANYL CITRATE 3.16 MICROGRAM(S)/KG/HR: 50 INJECTION INTRAVENOUS at 10:10

## 2023-01-01 RX ADMIN — AMLODIPINE BESYLATE 10 MILLIGRAM(S): 2.5 TABLET ORAL at 05:22

## 2023-01-01 RX ADMIN — Medication 1 APPLICATION(S): at 17:30

## 2023-01-01 RX ADMIN — Medication 100 MILLIGRAM(S): at 14:07

## 2023-01-01 RX ADMIN — CHLORHEXIDINE GLUCONATE 1 APPLICATION(S): 213 SOLUTION TOPICAL at 06:54

## 2023-01-01 RX ADMIN — Medication 1 APPLICATION(S): at 05:10

## 2023-01-01 RX ADMIN — HEPARIN SODIUM 5000 UNIT(S): 5000 INJECTION INTRAVENOUS; SUBCUTANEOUS at 06:56

## 2023-01-01 RX ADMIN — Medication 81 MILLIGRAM(S): at 12:16

## 2023-01-01 RX ADMIN — SENNA PLUS 2 TABLET(S): 8.6 TABLET ORAL at 21:25

## 2023-01-01 RX ADMIN — CHLORHEXIDINE GLUCONATE 1 APPLICATION(S): 213 SOLUTION TOPICAL at 05:32

## 2023-01-01 RX ADMIN — Medication 100 MILLIGRAM(S): at 13:08

## 2023-01-01 RX ADMIN — ENOXAPARIN SODIUM 40 MILLIGRAM(S): 100 INJECTION SUBCUTANEOUS at 21:28

## 2023-01-01 RX ADMIN — POLYETHYLENE GLYCOL 3350 17 GRAM(S): 17 POWDER, FOR SOLUTION ORAL at 11:32

## 2023-01-01 RX ADMIN — Medication 81 MILLIGRAM(S): at 11:19

## 2023-01-01 RX ADMIN — SENNA PLUS 2 TABLET(S): 8.6 TABLET ORAL at 21:29

## 2023-01-01 RX ADMIN — ENOXAPARIN SODIUM 40 MILLIGRAM(S): 100 INJECTION SUBCUTANEOUS at 22:52

## 2023-01-01 RX ADMIN — CHLORHEXIDINE GLUCONATE 15 MILLILITER(S): 213 SOLUTION TOPICAL at 08:56

## 2023-01-01 RX ADMIN — CHLORHEXIDINE GLUCONATE 15 MILLILITER(S): 213 SOLUTION TOPICAL at 17:19

## 2023-01-01 RX ADMIN — Medication 650 MILLIGRAM(S): at 05:45

## 2023-01-01 RX ADMIN — SODIUM CHLORIDE 4 MILLILITER(S): 9 INJECTION INTRAMUSCULAR; INTRAVENOUS; SUBCUTANEOUS at 00:28

## 2023-01-01 RX ADMIN — AMLODIPINE BESYLATE 10 MILLIGRAM(S): 2.5 TABLET ORAL at 05:53

## 2023-01-01 RX ADMIN — Medication 650 MILLIGRAM(S): at 00:00

## 2023-01-01 RX ADMIN — CHLORHEXIDINE GLUCONATE 15 MILLILITER(S): 213 SOLUTION TOPICAL at 05:05

## 2023-01-01 RX ADMIN — Medication 81 MILLIGRAM(S): at 12:47

## 2023-01-01 RX ADMIN — SENNA PLUS 2 TABLET(S): 8.6 TABLET ORAL at 21:23

## 2023-01-01 RX ADMIN — SENNA PLUS 2 TABLET(S): 8.6 TABLET ORAL at 21:28

## 2023-01-01 RX ADMIN — Medication 100 MILLIGRAM(S): at 13:41

## 2023-01-01 RX ADMIN — SCOPALAMINE 1 PATCH: 1 PATCH, EXTENDED RELEASE TRANSDERMAL at 16:00

## 2023-01-01 RX ADMIN — Medication 650 MILLIGRAM(S): at 12:07

## 2023-01-01 RX ADMIN — HEPARIN SODIUM 5000 UNIT(S): 5000 INJECTION INTRAVENOUS; SUBCUTANEOUS at 05:17

## 2023-01-01 RX ADMIN — ENOXAPARIN SODIUM 40 MILLIGRAM(S): 100 INJECTION SUBCUTANEOUS at 21:37

## 2023-01-01 RX ADMIN — Medication 100 MILLIGRAM(S): at 05:44

## 2023-01-01 RX ADMIN — SENNA PLUS 2 TABLET(S): 8.6 TABLET ORAL at 21:02

## 2023-01-01 RX ADMIN — Medication 81 MILLIGRAM(S): at 12:43

## 2023-01-01 RX ADMIN — CHLORHEXIDINE GLUCONATE 15 MILLILITER(S): 213 SOLUTION TOPICAL at 06:02

## 2023-01-01 RX ADMIN — AMLODIPINE BESYLATE 10 MILLIGRAM(S): 2.5 TABLET ORAL at 05:18

## 2023-01-01 RX ADMIN — SCOPALAMINE 1 PATCH: 1 PATCH, EXTENDED RELEASE TRANSDERMAL at 06:35

## 2023-01-01 RX ADMIN — CHLORHEXIDINE GLUCONATE 15 MILLILITER(S): 213 SOLUTION TOPICAL at 17:36

## 2023-01-01 RX ADMIN — AMLODIPINE BESYLATE 10 MILLIGRAM(S): 2.5 TABLET ORAL at 05:05

## 2023-01-01 RX ADMIN — SODIUM CHLORIDE 4 MILLILITER(S): 9 INJECTION INTRAMUSCULAR; INTRAVENOUS; SUBCUTANEOUS at 23:04

## 2023-01-01 RX ADMIN — ATORVASTATIN CALCIUM 80 MILLIGRAM(S): 80 TABLET, FILM COATED ORAL at 21:51

## 2023-01-01 RX ADMIN — Medication 100 MILLIGRAM(S): at 17:08

## 2023-01-01 RX ADMIN — POLYETHYLENE GLYCOL 3350 17 GRAM(S): 17 POWDER, FOR SOLUTION ORAL at 11:22

## 2023-01-01 RX ADMIN — SENNA PLUS 2 TABLET(S): 8.6 TABLET ORAL at 22:26

## 2023-01-01 RX ADMIN — FENTANYL CITRATE 50 MICROGRAM(S): 50 INJECTION INTRAVENOUS at 13:20

## 2023-01-01 RX ADMIN — ATORVASTATIN CALCIUM 80 MILLIGRAM(S): 80 TABLET, FILM COATED ORAL at 22:27

## 2023-01-01 RX ADMIN — HEPARIN SODIUM 5000 UNIT(S): 5000 INJECTION INTRAVENOUS; SUBCUTANEOUS at 17:55

## 2023-01-01 RX ADMIN — CHLORHEXIDINE GLUCONATE 15 MILLILITER(S): 213 SOLUTION TOPICAL at 17:07

## 2023-01-01 RX ADMIN — Medication 100 MILLIGRAM(S): at 05:05

## 2023-01-01 RX ADMIN — Medication 1 APPLICATION(S): at 14:00

## 2023-01-01 RX ADMIN — ENOXAPARIN SODIUM 40 MILLIGRAM(S): 100 INJECTION SUBCUTANEOUS at 21:01

## 2023-01-01 RX ADMIN — SCOPALAMINE 1 PATCH: 1 PATCH, EXTENDED RELEASE TRANSDERMAL at 19:05

## 2023-01-01 RX ADMIN — CHLORHEXIDINE GLUCONATE 15 MILLILITER(S): 213 SOLUTION TOPICAL at 17:47

## 2023-01-01 RX ADMIN — Medication 100 MILLIGRAM(S): at 14:23

## 2023-01-01 RX ADMIN — PANTOPRAZOLE SODIUM 40 MILLIGRAM(S): 20 TABLET, DELAYED RELEASE ORAL at 11:37

## 2023-01-01 RX ADMIN — AMLODIPINE BESYLATE 10 MILLIGRAM(S): 2.5 TABLET ORAL at 05:15

## 2023-01-01 RX ADMIN — SCOPALAMINE 1 PATCH: 1 PATCH, EXTENDED RELEASE TRANSDERMAL at 07:32

## 2023-01-01 RX ADMIN — Medication 100 MILLIGRAM(S): at 13:22

## 2023-01-01 RX ADMIN — SCOPALAMINE 1 PATCH: 1 PATCH, EXTENDED RELEASE TRANSDERMAL at 14:33

## 2023-01-01 RX ADMIN — SENNA PLUS 2 TABLET(S): 8.6 TABLET ORAL at 22:52

## 2023-01-01 RX ADMIN — POLYETHYLENE GLYCOL 3350 17 GRAM(S): 17 POWDER, FOR SOLUTION ORAL at 12:09

## 2023-01-01 RX ADMIN — POLYETHYLENE GLYCOL 3350 17 GRAM(S): 17 POWDER, FOR SOLUTION ORAL at 11:06

## 2023-01-01 RX ADMIN — AMLODIPINE BESYLATE 5 MILLIGRAM(S): 2.5 TABLET ORAL at 06:41

## 2023-01-01 RX ADMIN — AMLODIPINE BESYLATE 5 MILLIGRAM(S): 2.5 TABLET ORAL at 09:37

## 2023-01-01 RX ADMIN — Medication 100 MILLIGRAM(S): at 22:33

## 2023-01-01 RX ADMIN — HEPARIN SODIUM 5000 UNIT(S): 5000 INJECTION INTRAVENOUS; SUBCUTANEOUS at 17:29

## 2023-01-01 RX ADMIN — Medication 100 MILLIGRAM(S): at 06:02

## 2023-01-01 RX ADMIN — Medication 100 MILLIGRAM(S): at 22:26

## 2023-01-01 RX ADMIN — AMLODIPINE BESYLATE 10 MILLIGRAM(S): 2.5 TABLET ORAL at 05:11

## 2023-01-01 RX ADMIN — Medication 100 MILLIGRAM(S): at 21:17

## 2023-01-01 RX ADMIN — CHLORHEXIDINE GLUCONATE 1 APPLICATION(S): 213 SOLUTION TOPICAL at 05:53

## 2023-01-01 RX ADMIN — Medication 25 MILLIGRAM(S): at 22:03

## 2023-01-01 RX ADMIN — MORPHINE SULFATE 2 MILLIGRAM(S): 50 CAPSULE, EXTENDED RELEASE ORAL at 01:13

## 2023-01-01 RX ADMIN — CHLORHEXIDINE GLUCONATE 1 APPLICATION(S): 213 SOLUTION TOPICAL at 05:16

## 2023-01-01 RX ADMIN — CHLORHEXIDINE GLUCONATE 15 MILLILITER(S): 213 SOLUTION TOPICAL at 05:32

## 2023-01-01 RX ADMIN — CHLORHEXIDINE GLUCONATE 15 MILLILITER(S): 213 SOLUTION TOPICAL at 17:01

## 2023-01-01 RX ADMIN — ENOXAPARIN SODIUM 40 MILLIGRAM(S): 100 INJECTION SUBCUTANEOUS at 21:12

## 2023-01-01 RX ADMIN — SCOPALAMINE 1 PATCH: 1 PATCH, EXTENDED RELEASE TRANSDERMAL at 15:50

## 2023-01-01 RX ADMIN — Medication 81 MILLIGRAM(S): at 12:10

## 2023-01-01 RX ADMIN — FENTANYL CITRATE 50 MICROGRAM(S): 50 INJECTION INTRAVENOUS at 13:58

## 2023-01-01 RX ADMIN — Medication 1 APPLICATION(S): at 06:01

## 2023-01-01 RX ADMIN — SODIUM CHLORIDE 4 MILLILITER(S): 9 INJECTION INTRAMUSCULAR; INTRAVENOUS; SUBCUTANEOUS at 11:35

## 2023-01-01 RX ADMIN — Medication 650 MILLIGRAM(S): at 12:13

## 2023-01-01 RX ADMIN — HEPARIN SODIUM 5000 UNIT(S): 5000 INJECTION INTRAVENOUS; SUBCUTANEOUS at 05:12

## 2023-01-01 RX ADMIN — ATORVASTATIN CALCIUM 80 MILLIGRAM(S): 80 TABLET, FILM COATED ORAL at 21:39

## 2023-01-01 RX ADMIN — Medication 25 MILLIGRAM(S): at 05:09

## 2023-01-01 RX ADMIN — SCOPALAMINE 1 PATCH: 1 PATCH, EXTENDED RELEASE TRANSDERMAL at 17:22

## 2023-01-01 RX ADMIN — Medication 100 MILLIGRAM(S): at 21:25

## 2023-01-01 RX ADMIN — SENNA PLUS 2 TABLET(S): 8.6 TABLET ORAL at 22:56

## 2023-01-01 RX ADMIN — Medication 1 APPLICATION(S): at 05:19

## 2023-01-01 RX ADMIN — CHLORHEXIDINE GLUCONATE 1 APPLICATION(S): 213 SOLUTION TOPICAL at 11:34

## 2023-01-01 RX ADMIN — Medication 650 MILLIGRAM(S): at 13:07

## 2023-01-01 RX ADMIN — POLYETHYLENE GLYCOL 3350 17 GRAM(S): 17 POWDER, FOR SOLUTION ORAL at 14:59

## 2023-01-01 RX ADMIN — Medication 81 MILLIGRAM(S): at 11:58

## 2023-01-01 RX ADMIN — SODIUM CHLORIDE 4 MILLILITER(S): 9 INJECTION INTRAMUSCULAR; INTRAVENOUS; SUBCUTANEOUS at 17:11

## 2023-01-01 RX ADMIN — Medication 81 MILLIGRAM(S): at 11:40

## 2023-01-01 RX ADMIN — Medication 100 MILLIGRAM(S): at 05:06

## 2023-01-01 RX ADMIN — AMLODIPINE BESYLATE 5 MILLIGRAM(S): 2.5 TABLET ORAL at 06:02

## 2023-01-01 RX ADMIN — SCOPALAMINE 1 PATCH: 1 PATCH, EXTENDED RELEASE TRANSDERMAL at 08:59

## 2023-01-01 RX ADMIN — Medication 100 MILLIGRAM(S): at 13:23

## 2023-01-01 RX ADMIN — Medication 25 MILLIGRAM(S): at 05:12

## 2023-01-01 RX ADMIN — ATORVASTATIN CALCIUM 80 MILLIGRAM(S): 80 TABLET, FILM COATED ORAL at 22:33

## 2023-01-01 RX ADMIN — CHLORHEXIDINE GLUCONATE 15 MILLILITER(S): 213 SOLUTION TOPICAL at 17:51

## 2023-01-01 RX ADMIN — Medication 100 MILLIGRAM(S): at 21:07

## 2023-01-01 RX ADMIN — Medication 81 MILLIGRAM(S): at 12:08

## 2023-01-01 RX ADMIN — CHLORHEXIDINE GLUCONATE 15 MILLILITER(S): 213 SOLUTION TOPICAL at 17:44

## 2023-01-01 RX ADMIN — POLYETHYLENE GLYCOL 3350 17 GRAM(S): 17 POWDER, FOR SOLUTION ORAL at 11:18

## 2023-01-01 RX ADMIN — SCOPALAMINE 1 PATCH: 1 PATCH, EXTENDED RELEASE TRANSDERMAL at 19:19

## 2023-01-01 RX ADMIN — Medication 100 MILLIGRAM(S): at 14:58

## 2023-01-01 RX ADMIN — HEPARIN SODIUM 5000 UNIT(S): 5000 INJECTION INTRAVENOUS; SUBCUTANEOUS at 05:43

## 2023-01-01 RX ADMIN — AMLODIPINE BESYLATE 10 MILLIGRAM(S): 2.5 TABLET ORAL at 05:43

## 2023-01-01 RX ADMIN — ENOXAPARIN SODIUM 40 MILLIGRAM(S): 100 INJECTION SUBCUTANEOUS at 21:14

## 2023-01-01 RX ADMIN — Medication 81 MILLIGRAM(S): at 12:51

## 2023-01-01 RX ADMIN — HEPARIN SODIUM 5000 UNIT(S): 5000 INJECTION INTRAVENOUS; SUBCUTANEOUS at 17:26

## 2023-01-01 RX ADMIN — ENOXAPARIN SODIUM 40 MILLIGRAM(S): 100 INJECTION SUBCUTANEOUS at 21:30

## 2023-01-01 RX ADMIN — ENOXAPARIN SODIUM 40 MILLIGRAM(S): 100 INJECTION SUBCUTANEOUS at 21:23

## 2023-01-01 RX ADMIN — PROPOFOL 2.18 MICROGRAM(S)/KG/MIN: 10 INJECTION, EMULSION INTRAVENOUS at 07:20

## 2023-01-01 RX ADMIN — PANTOPRAZOLE SODIUM 40 MILLIGRAM(S): 20 TABLET, DELAYED RELEASE ORAL at 11:19

## 2023-01-01 RX ADMIN — Medication 100 MILLIGRAM(S): at 13:24

## 2023-01-01 RX ADMIN — Medication 81 MILLIGRAM(S): at 12:06

## 2023-01-01 RX ADMIN — HEPARIN SODIUM 5000 UNIT(S): 5000 INJECTION INTRAVENOUS; SUBCUTANEOUS at 05:42

## 2023-01-01 RX ADMIN — CHLORHEXIDINE GLUCONATE 1 APPLICATION(S): 213 SOLUTION TOPICAL at 04:10

## 2023-01-01 RX ADMIN — Medication 81 MILLIGRAM(S): at 11:20

## 2023-01-01 RX ADMIN — Medication 100 MILLIGRAM(S): at 05:24

## 2023-01-01 RX ADMIN — AMLODIPINE BESYLATE 5 MILLIGRAM(S): 2.5 TABLET ORAL at 05:43

## 2023-01-01 RX ADMIN — Medication 10 MILLIGRAM(S): at 11:46

## 2023-01-01 RX ADMIN — Medication 25 MILLIGRAM(S): at 14:44

## 2023-01-01 RX ADMIN — CHLORHEXIDINE GLUCONATE 15 MILLILITER(S): 213 SOLUTION TOPICAL at 06:41

## 2023-01-01 RX ADMIN — CHLORHEXIDINE GLUCONATE 15 MILLILITER(S): 213 SOLUTION TOPICAL at 05:09

## 2023-01-01 RX ADMIN — HEPARIN SODIUM 5000 UNIT(S): 5000 INJECTION INTRAVENOUS; SUBCUTANEOUS at 05:08

## 2023-01-01 RX ADMIN — MORPHINE SULFATE 2 MILLIGRAM(S): 50 CAPSULE, EXTENDED RELEASE ORAL at 01:24

## 2023-01-01 RX ADMIN — CHLORHEXIDINE GLUCONATE 15 MILLILITER(S): 213 SOLUTION TOPICAL at 05:51

## 2023-01-01 RX ADMIN — CHLORHEXIDINE GLUCONATE 1 APPLICATION(S): 213 SOLUTION TOPICAL at 05:12

## 2023-01-01 RX ADMIN — AMLODIPINE BESYLATE 10 MILLIGRAM(S): 2.5 TABLET ORAL at 05:28

## 2023-01-01 RX ADMIN — HEPARIN SODIUM 5000 UNIT(S): 5000 INJECTION INTRAVENOUS; SUBCUTANEOUS at 06:02

## 2023-01-01 RX ADMIN — Medication 100 MILLIGRAM(S): at 22:56

## 2023-01-01 RX ADMIN — Medication 650 MILLIGRAM(S): at 04:58

## 2023-01-01 RX ADMIN — Medication 100 MILLIGRAM(S): at 06:56

## 2023-01-01 RX ADMIN — ENOXAPARIN SODIUM 40 MILLIGRAM(S): 100 INJECTION SUBCUTANEOUS at 21:21

## 2023-01-01 RX ADMIN — CHLORHEXIDINE GLUCONATE 15 MILLILITER(S): 213 SOLUTION TOPICAL at 05:26

## 2023-01-01 RX ADMIN — ENOXAPARIN SODIUM 40 MILLIGRAM(S): 100 INJECTION SUBCUTANEOUS at 22:03

## 2023-01-01 RX ADMIN — CHLORHEXIDINE GLUCONATE 15 MILLILITER(S): 213 SOLUTION TOPICAL at 16:18

## 2023-01-01 RX ADMIN — HEPARIN SODIUM 5000 UNIT(S): 5000 INJECTION INTRAVENOUS; SUBCUTANEOUS at 17:10

## 2023-01-01 RX ADMIN — AMLODIPINE BESYLATE 10 MILLIGRAM(S): 2.5 TABLET ORAL at 05:14

## 2023-01-01 RX ADMIN — Medication 25 MILLIGRAM(S): at 05:27

## 2023-01-01 RX ADMIN — ENOXAPARIN SODIUM 40 MILLIGRAM(S): 100 INJECTION SUBCUTANEOUS at 22:57

## 2023-01-01 RX ADMIN — CHLORHEXIDINE GLUCONATE 15 MILLILITER(S): 213 SOLUTION TOPICAL at 06:20

## 2023-01-01 RX ADMIN — HEPARIN SODIUM 5000 UNIT(S): 5000 INJECTION INTRAVENOUS; SUBCUTANEOUS at 17:56

## 2023-01-01 RX ADMIN — Medication 100 MILLIGRAM(S): at 06:00

## 2023-01-01 RX ADMIN — AMLODIPINE BESYLATE 10 MILLIGRAM(S): 2.5 TABLET ORAL at 05:01

## 2023-01-01 RX ADMIN — Medication 650 MILLIGRAM(S): at 13:36

## 2023-01-01 RX ADMIN — CHLORHEXIDINE GLUCONATE 15 MILLILITER(S): 213 SOLUTION TOPICAL at 17:52

## 2023-01-01 RX ADMIN — AMLODIPINE BESYLATE 10 MILLIGRAM(S): 2.5 TABLET ORAL at 05:41

## 2023-01-01 RX ADMIN — POLYETHYLENE GLYCOL 3350 17 GRAM(S): 17 POWDER, FOR SOLUTION ORAL at 11:39

## 2023-01-01 RX ADMIN — SENNA PLUS 2 TABLET(S): 8.6 TABLET ORAL at 21:31

## 2023-01-01 RX ADMIN — PANTOPRAZOLE SODIUM 40 MILLIGRAM(S): 20 TABLET, DELAYED RELEASE ORAL at 11:45

## 2023-01-01 RX ADMIN — Medication 10 MILLIGRAM(S): at 02:20

## 2023-01-01 RX ADMIN — Medication 1 APPLICATION(S): at 05:14

## 2023-01-01 RX ADMIN — SENNA PLUS 2 TABLET(S): 8.6 TABLET ORAL at 21:41

## 2023-01-01 RX ADMIN — CHLORHEXIDINE GLUCONATE 1 APPLICATION(S): 213 SOLUTION TOPICAL at 18:00

## 2023-01-01 RX ADMIN — CHLORHEXIDINE GLUCONATE 15 MILLILITER(S): 213 SOLUTION TOPICAL at 17:56

## 2023-01-01 RX ADMIN — ENOXAPARIN SODIUM 40 MILLIGRAM(S): 100 INJECTION SUBCUTANEOUS at 21:40

## 2023-01-01 RX ADMIN — Medication 81 MILLIGRAM(S): at 11:16

## 2023-01-01 RX ADMIN — AMLODIPINE BESYLATE 10 MILLIGRAM(S): 2.5 TABLET ORAL at 05:20

## 2023-01-01 RX ADMIN — PANTOPRAZOLE SODIUM 40 MILLIGRAM(S): 20 TABLET, DELAYED RELEASE ORAL at 12:06

## 2023-01-01 RX ADMIN — HEPARIN SODIUM 5000 UNIT(S): 5000 INJECTION INTRAVENOUS; SUBCUTANEOUS at 17:32

## 2023-01-01 RX ADMIN — Medication 100 MILLIGRAM(S): at 22:21

## 2023-01-01 RX ADMIN — Medication 100 MILLIGRAM(S): at 21:58

## 2023-01-01 RX ADMIN — Medication 10 MILLIGRAM(S): at 16:54

## 2023-01-01 RX ADMIN — Medication 81 MILLIGRAM(S): at 11:36

## 2023-01-01 RX ADMIN — CHLORHEXIDINE GLUCONATE 1 APPLICATION(S): 213 SOLUTION TOPICAL at 23:30

## 2023-01-01 RX ADMIN — SCOPALAMINE 1 PATCH: 1 PATCH, EXTENDED RELEASE TRANSDERMAL at 06:43

## 2023-01-01 RX ADMIN — Medication 75 MILLIGRAM(S): at 04:18

## 2023-01-01 RX ADMIN — ATORVASTATIN CALCIUM 80 MILLIGRAM(S): 80 TABLET, FILM COATED ORAL at 22:04

## 2023-01-01 RX ADMIN — PANTOPRAZOLE SODIUM 40 MILLIGRAM(S): 20 TABLET, DELAYED RELEASE ORAL at 11:47

## 2023-01-01 RX ADMIN — HEPARIN SODIUM 5000 UNIT(S): 5000 INJECTION INTRAVENOUS; SUBCUTANEOUS at 05:13

## 2023-01-01 RX ADMIN — Medication 25 MILLIGRAM(S): at 06:01

## 2023-01-01 RX ADMIN — Medication 100 MILLIGRAM(S): at 17:32

## 2023-01-01 RX ADMIN — CHLORHEXIDINE GLUCONATE 15 MILLILITER(S): 213 SOLUTION TOPICAL at 06:07

## 2023-01-01 RX ADMIN — PANTOPRAZOLE SODIUM 40 MILLIGRAM(S): 20 TABLET, DELAYED RELEASE ORAL at 11:29

## 2023-01-01 RX ADMIN — ATORVASTATIN CALCIUM 80 MILLIGRAM(S): 80 TABLET, FILM COATED ORAL at 23:00

## 2023-01-01 RX ADMIN — CHLORHEXIDINE GLUCONATE 1 APPLICATION(S): 213 SOLUTION TOPICAL at 04:05

## 2023-01-01 RX ADMIN — AMLODIPINE BESYLATE 5 MILLIGRAM(S): 2.5 TABLET ORAL at 06:51

## 2023-01-01 RX ADMIN — CHLORHEXIDINE GLUCONATE 15 MILLILITER(S): 213 SOLUTION TOPICAL at 06:55

## 2023-01-01 RX ADMIN — Medication 100 MILLIGRAM(S): at 14:20

## 2023-01-01 RX ADMIN — AMLODIPINE BESYLATE 10 MILLIGRAM(S): 2.5 TABLET ORAL at 05:16

## 2023-01-01 RX ADMIN — Medication 100 MILLIGRAM(S): at 13:33

## 2023-01-01 RX ADMIN — CHLORHEXIDINE GLUCONATE 15 MILLILITER(S): 213 SOLUTION TOPICAL at 05:34

## 2023-01-01 RX ADMIN — Medication 81 MILLIGRAM(S): at 11:32

## 2023-01-01 RX ADMIN — ENOXAPARIN SODIUM 40 MILLIGRAM(S): 100 INJECTION SUBCUTANEOUS at 22:33

## 2023-01-01 RX ADMIN — Medication 100 MILLIGRAM(S): at 13:03

## 2023-01-01 RX ADMIN — AMLODIPINE BESYLATE 5 MILLIGRAM(S): 2.5 TABLET ORAL at 05:15

## 2023-01-01 RX ADMIN — AMLODIPINE BESYLATE 5 MILLIGRAM(S): 2.5 TABLET ORAL at 06:00

## 2023-01-01 RX ADMIN — POLYETHYLENE GLYCOL 3350 17 GRAM(S): 17 POWDER, FOR SOLUTION ORAL at 11:24

## 2023-01-01 RX ADMIN — Medication 650 MILLIGRAM(S): at 11:19

## 2023-01-01 RX ADMIN — Medication 650 MILLIGRAM(S): at 17:26

## 2023-01-01 RX ADMIN — AMLODIPINE BESYLATE 5 MILLIGRAM(S): 2.5 TABLET ORAL at 05:10

## 2023-01-01 RX ADMIN — Medication 1 APPLICATION(S): at 13:05

## 2023-01-06 NOTE — PROGRESS NOTE ADULT - PROBLEM SELECTOR PLAN 2
asa/statin   neuro consult , Dr. Lupis Lamb  fEhsanu bubble study echo, carotid shows moderate plaque   - outpatient PT   LDL 65
asa/statin   neuro consult , Dr. Lupis Lamb  fjose armando echo, carotid   LDL 65
Yes

## 2023-03-16 NOTE — ED ADULT NURSE NOTE - CHIEF COMPLAINT QUOTE
Patient BIBA: Patient was unable to speak with Left sided facial droop. Symptoms began during dinner at 630pm Patient evealuated by Dr. Healy and sent to CT prior to VS measurement History/Exam/Medical Decision Making

## 2023-04-04 NOTE — ED ADULT NURSE NOTE - BOWEL SOUNDS RLQ
Chronic, unstable. Will provide ergocalciferol 34042 iu once weekly for 12 weeks, followed by once daily vit d3 otc paired with calcium to optimize absorption.     present

## 2023-10-26 PROBLEM — E78.00 PURE HYPERCHOLESTEROLEMIA, UNSPECIFIED: Chronic | Status: ACTIVE | Noted: 2019-10-22

## 2023-10-26 PROBLEM — E78.5 HYPERLIPIDEMIA, UNSPECIFIED: Chronic | Status: ACTIVE | Noted: 2019-10-22

## 2023-10-26 PROBLEM — I48.91 UNSPECIFIED ATRIAL FIBRILLATION: Chronic | Status: ACTIVE | Noted: 2019-10-22

## 2023-10-26 NOTE — ED ADULT NURSE NOTE - OBJECTIVE STATEMENT
Pt BIBA- from home. Pt brought in with AMS and difficulty breathing. Last seen well by aid at 1am as per family. EMS found her unresponsive, not verbal now but responsive to pain. Pt  Right AC#18g- reports HTN and bradycardia during transport. stroke code called. Dr. Nash and respiratory at bedside at this time. Pt labs drawn and sent and pt placed on cardiac monitor at this time as per MD. PMHX afib, HTN, HLD. Family poor historian.  med list: Hydralazine, Torsemide, Edarbi, Isosorbide, Simvastatin, HCTZ, xeralto

## 2023-10-26 NOTE — PATIENT PROFILE ADULT - FALL HARM RISK - HARM RISK INTERVENTIONS

## 2023-10-26 NOTE — ED PROVIDER NOTE - NIH STROKE SCALE: 5A. MOTOR ARM, LEFT, QM
negative - no pain, no limited range of motion (3) No effort against gravity; limb falls (2) Some effort against gravity; limb cannot get to or maintain (if cued) 90 (or 45) degrees, drifts down to bed, but has some effort against gravity

## 2023-10-26 NOTE — ED ADULT TRIAGE NOTE - PATIENT/CAREGIVER ACCEPTED INTERPRETER SERVICES
----- Message from Mikaela Paniagua sent at 8/17/2017 11:37 AM CDT -----  Test results.   Call [phone].   no

## 2023-10-26 NOTE — ED ADULT NURSE NOTE - NSFALLHARMRISKINTERV_ED_ALL_ED

## 2023-10-26 NOTE — ED ADULT TRIAGE NOTE - CHIEF COMPLAINT QUOTE
BIBA- from home  difficulty breathing. Last seen by aid at 2am. EMS found her unresponsive, not verbal and POX BIBA- from home  difficulty breathing. Last seen by aid at 2am. EMS found her unresponsive, not verbal now but responsive to pain  EMS , Left hand #20g, Right AC#18g- reports HTN and bradycardia during transport  HX unknown- family poor historian  med list: Hydralazine, Torsemide, Edarbi, Isosorbide, Simvastatin, HCTZ

## 2023-10-26 NOTE — ED PROVIDER NOTE - NIH STROKE SCALE: 5B. MOTOR ARM, RIGHT, QM
(3) No effort against gravity; limb falls (1) Drift; limb holds 90 (or 45) degrees, but drifts down before full 10 secs; does not hit bed or other support

## 2023-10-26 NOTE — CONSULT NOTE ADULT - SUBJECTIVE AND OBJECTIVE BOX
BIBEMS from home this morning.  History from Admission H&P    <Start of quote(s) from H&P>  "Reason for Admission: Ischemic Stroke, Poor MS requiring Intubation  History of Present Illness:   Ms. Longoria is an 88 year old female with a PMH of HTN, HLD and afib on xarelto who presented to [facility] [place] earlier this morning after she was found altered, nonverbal and with right gaze deviation when she woke up. At baseline pt is ambulatory and verbal. Pt required intubation in ED for airway protection given poor MS. NIHSS calculated as 30 at that time. Pt not a candidate for TNK or transfer for intervention per stroke neurologist. She remains intubated on nicardipine gtt. ICU team consulted.     Review of Systems:  Unable to obtain due to: Altered mentation, Severe illness/injury   . . .       Home Medications:   * Patient Currently Takes Medications as of 25-Oct-2019 15:33 documented in Structured Notes  · 	hydroCHLOROthiazide 25 mg oral tablet: 1 tab(s) orally once a day  · 	hydrALAZINE 50 mg oral tablet: 1 tab(s) orally 3 times a day, HOLD if systolic BP less than 120  · 	Xarelto 20 mg oral tablet: 1 tab(s) orally once a day with evening meal.  · 	aspirin 81 mg oral tablet, chewable: 1 tab(s) orally once a day  · 	simvastatin 40 mg oral tablet: 1 tab(s) orally once a day (at bedtime)   . . .   PAST MEDICAL HISTORY:    Atrial fibrillation   Elevated cholesterol   HTN (hypertension)   Hyperlipidemia.     PAST SURGICAL HISTORY:  No significant past surgical history."  <End of quote(s) from H&P>    Per radiology report of CT head:  "FINDINGS:  There is no CT evidence of acute intracranial hemorrhage, mass effect or   midline shift.    A small infarct involving the left corona radiata and left lentiform   nucleus is new from 10/22/2019, but does not appear to be acute, within   limits of CT technique.  A small chronic infarct involving right corona   radiata and/or right lentiform nucleus is stable from 02/22/2019.  A   small chronic transcortical infarct in the left frontal convexity   (3:36-37) is stable from 10/22/2019. There is no acute loss of gray   matter-white matter differentiation.    There is mild to moderate generalized cerebral volume loss and moderate   periventricular white matter hypoattenuation compatible with chronic  microvascular ischemic disease.    The visualized paranasal sinuses are grossly clear.    The mastoids are clear bilaterally.    The calvarium, skull base and orbits appear within normal limits.    There is fluid in the pharynx, which is nonspecific in the setting of   intubation.    IMPRESSION:  No evidence of acute intracranial hemorrhage, mass effect or large acute   territorial infarct, within limits of noncontrast CT technique."      Per radiology report of CTP brain and CTAs head and neck:  ""     BIBEMS from home this morning.  History from Admission H&P    <Start of quote(s) from H&P>  "Reason for Admission: Ischemic Stroke, Poor MS requiring Intubation  History of Present Illness:   Ms. Longoria is an 88 year old female with a PMH of HTN, HLD and afib on xarelto who presented to [facility] earlier this morning after she was found altered, nonverbal and with right gaze deviation when she woke up. At baseline pt is ambulatory and verbal. Pt required intubation in ED for airway protection given poor MS. NIHSS calculated as 30 at that time. Pt not a candidate for TNK or transfer for intervention per stroke neurologist. She remains intubated on nicardipine gtt. ICU team consulted.     Review of Systems:  Unable to obtain due to: Altered mentation, Severe illness/injury   . . .       Home Medications:   * Patient Currently Takes Medications as of 25-Oct-2019 15:33 documented in Structured Notes  · 	hydroCHLOROthiazide 25 mg oral tablet: 1 tab(s) orally once a day  · 	hydrALAZINE 50 mg oral tablet: 1 tab(s) orally 3 times a day, HOLD if systolic BP less than 120  · 	Xarelto 20 mg oral tablet: 1 tab(s) orally once a day with evening meal.  · 	aspirin 81 mg oral tablet, chewable: 1 tab(s) orally once a day  · 	simvastatin 40 mg oral tablet: 1 tab(s) orally once a day (at bedtime)   . . .   PAST MEDICAL HISTORY:    Atrial fibrillation   Elevated cholesterol   HTN (hypertension)   Hyperlipidemia.     PAST SURGICAL HISTORY:  No significant past surgical history."  <End of quote(s) from H&P>    Per radiology report of CT head:  "FINDINGS:  There is no CT evidence of acute intracranial hemorrhage, mass effect or   midline shift.    A small infarct involving the left corona radiata and left lentiform   nucleus is new from 10/22/2019, but does not appear to be acute, within   limits of CT technique.  A small chronic infarct involving right corona   radiata and/or right lentiform nucleus is stable from 02/22/2019.  A   small chronic transcortical infarct in the left frontal convexity   (3:36-37) is stable from 10/22/2019. There is no acute loss of gray   matter-white matter differentiation.    There is mild to moderate generalized cerebral volume loss and moderate   periventricular white matter hypoattenuation compatible with chronic  microvascular ischemic disease.    The visualized paranasal sinuses are grossly clear.    The mastoids are clear bilaterally.    The calvarium, skull base and orbits appear within normal limits.    There is fluid in the pharynx, which is nonspecific in the setting of   intubation.    IMPRESSION:  No evidence of acute intracranial hemorrhage, mass effect or large acute   territorial infarct, within limits of noncontrast CT technique."      Per radiology report of CTP brain and CTAs head and neck:  "CT PERFUSION: CT perfusion is consistent with large acute infarct in the   right middle cerebral artery vascular territory and small to moderate   amount of surrounding ischemic penumbra.  Hypoperfusion index is 0.3.    Core infarct (CBF <  30%:): 136 mL  Penumbra (Tmax >6s): 194 mL  Mismatched volume: 58 mL  Mismatched ratio: 1.4      CT ANGIOGRAPHY NECK:  1.  The right internal carotid artery is occluded approximately 1.5 cm   distal to its origin, possibly due to underlying dissection.  2.  Endotracheal tube is low in position, with its tip approximate 1 cm   above the slade.  3.  Diffuse ill-defined groundglass opacity in both lungs, which may   represent pulmonary edema, infection and/or atelectasis.  4.  There is a discrete right upper lobe groundglass opacity measuring   1.9 x 1.4 cm; this may represent a focus of infection, inflammation,   edema, hemorrhage, fibrosis or malignancy.  A follow-up chest CT is   warranted in three months to assess for resolution.    CT ANGIOGRAPHY BRAIN: The right internal carotid arteries occluded.    There is no significant flow related opacification within the proximal   right middle cerebral artery.  There is mild reconstitution of flow in   some distal branch vessels of the inferior division of the right middle   cerebral artery.  The right anterior cerebral artery fills across the   anterior communicating artery."    EXAMINATION    Semi-recumbent in bed in ICU on vent support via orotracheal tube.  On continuous IV infusion of fentanyl 0.5mcg/kg/min.  Eyelids closed.  No response to noise or voice.      Firm nailbed pressure R hand results in Rue flexion and withdrawal, and internal rotation of the LUE.  No response on stimulation of L hand.  R hallux nailbed pressure results in bilateral LE flexion.  L hallux nailbed pressure results in LLE flexion.      Pupils <3mm.  Trace oculocephalic responses.  Weak bilateral corneal reflexes; bilateral nasociliary reflexes.      Occasional spontaneous cough.      No spontaneous facial or limb movements observed.  No tremors or AIMs.    Reflex                           Right    Left   Comment    Biceps                             2+       3  Triceps                            0?       0?  Wyatt                      absent  absent  Suprapatellar                   2+      2+  Patellar                            2+      2+  Gastroc                            0        0  Plantar                       extensor extensor

## 2023-10-26 NOTE — H&P ADULT - NS ATTEND AMEND GEN_ALL_CORE FT
Mrs. Maia Hernandez with history of HTN, a fib on xarelto presents with aphasia and respiratory distress. Intubated in the ER for MCA and R ICA stroke.   Neuro:  -Patient with dense MCA and R ICA stroke. Did not receive TPA as she is on xarelto outpatient.  -Aspirin, lipitor  -MRI pending  -Neuro to follow  -Awakens off sedation, but does not make purposeful movements/follow commands    CV:  -Bradycardic on propofol.   -EKG without over ischemia.  -Elevated troponin, will trend.     Respiratory:  -Lungs are CTA. GGOs noted on CT and opacities on CXR,  -Will cover with ceftriaxone for aspiration pna.   -Titrate ventilator to ABGs.    GI:  -OGT with feeds started.    Nephro:  -Creatinine WNL.  -Trend UOP.    Endo:  -Hemoglobin a1c, tsh in lab.    Heme:  -No active issues. Patient on xarelto at home. Will start tomorrow as patient Mrs. Maia Hernandez with history of HTN, a fib on xarelto presents with aphasia and respiratory distress. Intubated in the ER for MCA and R ICA stroke.   Neuro:  -Patient with dense MCA and R ICA stroke. Did not receive TPA as she is on xarelto outpatient.  -Aspirin, lipitor  -MRI pending  -Neuro to follow  -Awakens off sedation, but does not make purposeful movements/follow commands    CV:  -Bradycardic on propofol.   -EKG without over ischemia.  -Elevated troponin, will trend.     Respiratory:  -Lungs are CTA. GGOs noted on CT and opacities on CXR,  -Will cover with ceftriaxone for aspiration pna.   -Titrate ventilator to ABGs.    GI:  -OGT with feeds started.    Nephro:  -Creatinine WNL.  -Trend UOP.  -Anion gap metabolic acidosis with elevated lactate, Trending.     Endo:  -Hemoglobin a1c, tsh in lab.    Heme:  -No active issues. Patient on xarelto at home. Will start tomorrow as patient with large stroke.     ID:  -GGOs and opacities on chest imaging. Will start ceftriaxone. F/u sputum cx, ucx, UA negative, legionella.    Ethics:  -Patient full code. Discussed with children at bedside.

## 2023-10-26 NOTE — ED PROVIDER NOTE - PROGRESS NOTE DETAILS
confirmed with daughter that pt. was last seen normal by HHA between 1:30 am and 2 am tonight  pt. was ambulatory, verbal, responsive, and pt.'s current mental status is not normal  panic call received from stroke neurologist regarding contrast CT brain which shows occlusion of R ICA and very little flow to MCA  ordered TKA and nursing is preparing dose  on phone with tele stroke now pt. is not a candidate for tenecteplase per stroke neurology   will likely be transferred to [facility] pending imaging review by stroke neurology   awaiting callback  family informed of all updates (in waiting area) up to this time and plan of care thus far pt. is not a candidate for intravascular clot retrieval   ICU consulted for admission   family aware of diagnosis/prognosis, which is not good at this time pt. accepted to ICU for admit now pt. is not a candidate for intravascular clot retrieval, no indication for transfer at this time, no intervention for dissection warranted at this time   ICU consulted for admission   family aware of diagnosis/prognosis, which is not good at this time

## 2023-10-26 NOTE — ED PROVIDER NOTE - CARE PLAN
Principal Discharge DX:	AMS (altered mental status)   1 Principal Discharge DX:	AMS (altered mental status)  Secondary Diagnosis:	Embolic stroke

## 2023-10-26 NOTE — ED ADULT NURSE NOTE - CHIEF COMPLAINT QUOTE
BIBA- from home  difficulty breathing. Last seen by aid at 2am. EMS found her unresponsive, not verbal now but responsive to pain  EMS , Left hand #20g, Right AC#18g- reports HTN and bradycardia during transport  HX unknown- family poor historian  med list: Hydralazine, Torsemide, Edarbi, Isosorbide, Simvastatin, HCTZ

## 2023-10-26 NOTE — CONSULT NOTE ADULT - ASSESSMENT
Large acute R MCA territory ischemic infarct.      R ICA occlusion.      Suspect cardioembolism in the setting of a fib and not having taken prescribed anticoagulant for about one and a hlaf weeks (ran out of medication; not refilled).      Neurologic examination limited by sedative effect of fentanyl.      No anticoagulation or antiplatelet agents at this time due to risk of hemorrhagic conversion of large volume infarct.          Pat Van M.D.   - Department of Neurology  [place] [facility] [employer] [facility] [employer] [facility]

## 2023-10-26 NOTE — ED PROVIDER NOTE - CLINICAL SUMMARY MEDICAL DECISION MAKING FREE TEXT BOX
89 yo F with AMS, R gauze palsy, acute change in MS, likely CVA, code stroke called   -cbc, cmp, coags, ammonia, etoh, drug screen, lipid profile, finger stick, trop, type and screen, CTA head/neck, monitor, sz precuations, intubate for airway protection, propofol, nicardipine drip to control pressure, keppra sz prophylaxis   -f/u results, reeval, likely TRN for stroke

## 2023-10-26 NOTE — H&P ADULT - HISTORY OF PRESENT ILLNESS
Ms. Longoria is an 88 year old female with a PMH of HTN, HLD and afib on xarelto who presented to [facility] earlier this morning after she was found altered, nonverbal and with right gaze deviation when she woke up. Pt required intubation in ED for airway protection given poor MS. NIHSS calculated as 30 at that time. Pt not a candidate for TNK or transfer for intervention per stroke neurologist. She remains intubated on nicardipine gtt. ICU team consulted. Ms. Longoria is an 88 year old female with a PMH of HTN, HLD and afib on xarelto who presented to [facility] earlier this morning after she was found altered, nonverbal and with right gaze deviation when she woke up LKN at 0200. At baseline pt is ambulatory and verbal. Pt required intubation in ED for airway protection given poor MS. NIHSS calculated as 30 at that time. Pt not a candidate for TNK or transfer for intervention per stroke neurologist. She remains intubated on nicardipine gtt. ICU team consulted. Ms. Longoria is an 88 year old female with a PMH of HTN, HLD and afib on xarelto who presented to [facility] earlier this morning after she was found altered, nonverbal and with right gaze deviation when she woke up. At baseline pt is ambulatory and verbal. Pt required intubation in ED for airway protection given poor MS. NIHSS calculated as 30 at that time. Pt not a candidate for TNK or transfer for intervention per stroke neurologist. She remains intubated on nicardipine gtt. ICU team consulted.

## 2023-10-26 NOTE — H&P ADULT - ASSESSMENT
88 year old female with a PMH of HTN, HLD and afib on xarelto presenting to ICU for tx of:    CVA, ischemic  Intubation for airway protection  HTN      Plan  Admit to ICU, monitor vitals per policy  maintain ett, abg prn, sat/sbt when tolerating, wean settings as tolerated   88 year old female with a PMH of HTN, HLD and afib on xarelto presenting to ICU for tx of:    CVA, ischemic  Intubation for airway protection  ?right ICA occlusion 2/2 ?dissection  HTN      Plan  Admit to ICU, monitor vitals per policy  maintain ett, abg prn, sat/sbt when tolerating, wean settings as tolerated  titrate nicardipine gtt goal sbp ~160 mmhg given high sbp 220 mmhg  neurology consult  start asa statin when able, ?dissection to right ICA   avoid hyperthermia and hyper/hypoglycemia  scd for dvt prophylaxis until further notice  ppi for gi prophylaxis  GOC  full code status      d/w eicu attending      CRITICAL CARE TIME SPENT:  60 minutes of critical care time spent providing medical care for patient's acute illness/conditions that impairs at least one vital organ system and/or poses a high risk of imminent or life threatening deterioration in the patient's condition. It includes time spent evaluating and treating the patient's acute illness as well as time spent reviewing labs, radiology, discussing goals of care with patient and/or patient's family, and discussing the case with a multidisciplinary team, in an effort to prevent further life threatening deterioration or end organ damage. This time is independent of any procedures performed.     88 year old female with a PMH of HTN, HLD and afib on xarelto presenting to ICU for tx of:    CVA, ischemic  Intubation for airway protection  ?right ICA occlusion 2/2 ?dissection  HTN      Plan  Admit to ICU, monitor vitals per policy  maintain ett, abg prn, sat/sbt when tolerating, wean settings as tolerated  titrate nicardipine gtt goal sbp ~160 mmhg given high sbp 220 mmhg  neurology consult  start asa statin when able, ?dissection to right ICA   avoid hyperthermia and hyper/hypoglycemia  NPO for now monitor blood glucose levels  i/o's, monitor renal function  trend labs, am labs  scd for dvt prophylaxis until further notice  ppi for gi prophylaxis  GOC  full code status      d/w eicu attending      CRITICAL CARE TIME SPENT:  60 minutes of critical care time spent providing medical care for patient's acute illness/conditions that impairs at least one vital organ system and/or poses a high risk of imminent or life threatening deterioration in the patient's condition. It includes time spent evaluating and treating the patient's acute illness as well as time spent reviewing labs, radiology, discussing goals of care with patient and/or patient's family, and discussing the case with a multidisciplinary team, in an effort to prevent further life threatening deterioration or end organ damage. This time is independent of any procedures performed.

## 2023-10-26 NOTE — ED PROVIDER NOTE - OBJECTIVE STATEMENT
87 yo F with AMS on waking, non-verbal, R gaze palsy, not providing history, history from EMS.  Last seen normal by HHA at 2 am.  Pt. is normally verbal and ambulatory.  No inciting event.   ROS: unobtainable from patient   PMH:  HLD, HTN, A.Fib (on Xarelto); Meds: See EMR for list; SH: Denies smoking/drinking/drug use

## 2023-10-26 NOTE — H&P ADULT - NSHPPHYSICALEXAM_GEN_ALL_CORE
T(C): 36.4 (10-26-23 @ 04:20), Max: 36.4 (10-26-23 @ 03:59)  HR: 75 (10-26-23 @ 05:52) (55 - 88)  BP: 161/71 (10-26-23 @ 05:52) (158/61 - 223/104)  RR: 25 (10-26-23 @ 05:52) (14 - 25)  SpO2: 100% (10-26-23 @ 05:52) (98% - 100%)    CONSTITUTIONAL: Well groomed, no apparent distress  EYES: PERRLA and symmetric, EOMI, No conjunctival or scleral injection, non-icteric  ENMT: Oral mucosa with moist membranes. Normal dentition; no pharyngeal injection or exudates             NECK: Supple, symmetric and without tracheal deviation   RESP: No respiratory distress, no use of accessory muscles; CTA b/l, no WRR  CV: RRR, +S1S2, no MRG; no JVD; no peripheral edema  GI: Soft, NT, ND, no rebound, no guarding; no palpable masses; no hepatosplenomegaly; no hernia palpated  LYMPH: No cervical LAD or tenderness; no axillary LAD or tenderness; no inguinal LAD or tenderness  MSK: Normal gait; No digital clubbing or cyanosis; examination of the (head/neck/spine/ribs/pelvis, RUE, LUE, RLE, LLE) without misalignment,            Normal ROM without pain, no spinal tenderness, normal muscle strength/tone  SKIN: No rashes or ulcers noted; no subcutaneous nodules or induration palpable  NEURO: CN II-XII intact; normal reflexes in upper and lower extremities, sensation intact in upper and lower extremities b/l to light touch   PSYCH: Appropriate insight/judgment; A+O x 3, mood and affect appropriate, recent/remote memory intact T(C): 36.4 (10-26-23 @ 04:20), Max: 36.4 (10-26-23 @ 03:59)  HR: 75 (10-26-23 @ 05:52) (55 - 88)  BP: 161/71 (10-26-23 @ 05:52) (158/61 - 223/104)  RR: 25 (10-26-23 @ 05:52) (14 - 25)  SpO2: 100% (10-26-23 @ 05:52) (98% - 100%)    CONSTITUTIONAL: Elderly female, intubated, follows command to RUE  EYES: b/l pupils 1.5 mm non reactive  RESP: No respiratory distress, no use of accessory muscles; CTA b/l, no WRR  CV: RRR, +S1S2 noted  GI: Soft, NT, ND, no rebound, no guarding  LYMPH: No cervical LAD or tenderness; no axillary LAD or tenderness; no inguinal LAD or tenderness  MSK: pt not moving extremities, normal ROM  SKIN: No rashes or ulcers noted; no subcutaneous nodules or induration palpable  NEURO: unresponsive, able to squeeze RUE on command  PSYCH: MARY intubated

## 2023-10-26 NOTE — ED PROVIDER NOTE - PHYSICAL EXAMINATION
Vitals: HTN at 178/87, otherwise WNL  Gen: minimally responsive, + decorticate posturing of RUE  Head: ncat, 3mm reactive pupils, R guaze palsy present   Neck: supple, no lymphadenopathy, no midline deviation  Heart: rrr, no m/r/g  Lungs: CTA b/l, no rales/ronchi/wheezes  Abd: soft, nontender, non-distended, no rebound or guarding  Ext: no clubbing/cyanosis/edema  Neuro: minimally responsive, non-verbal Vitals: HTN at 178/87, otherwise WNL  Gen: minimally responsive, + decorticate posturing of RUE  Head: ncat, 3mm reactive pupils, R guaze palsy present   Neck: supple, no lymphadenopathy, no midline deviation  Heart: rrr, no m/r/g  Lungs: CTA b/l, no rales/ronchi/wheezes  Abd: soft, nontender, non-distended, no rebound or guarding  Ext: no clubbing/cyanosis/edema  Neuro: minimally responsive, non-verbal, withdraws from pain, LUE weaker than R

## 2023-10-26 NOTE — ED PROVIDER NOTE - INTERPRETATION
A-T Advancement Flap Text: The defect edges were debeveled with a #15 scalpel blade.  Given the location of the defect, shape of the defect and the proximity to free margins an A-T advancement flap was deemed most appropriate.  Using a sterile surgical marker, an appropriate advancement flap was drawn incorporating the defect and placing the expected incisions within the relaxed skin tension lines where possible.    The area thus outlined was incised deep to adipose tissue with a #15 scalpel blade.  The skin margins were undermined to an appropriate distance in all directions utilizing iris scissors. EKG performed in ED, a-fib at 68, frequent PVCs, No acute ischemic changes, normal intervals

## 2023-10-27 NOTE — PROGRESS NOTE ADULT - ASSESSMENT
89 y/o F w/hypertension, HLD and Afib on AC admitted for acute CVA, not a candidate for TNK or endovascular intervention. Intubated for airway protection. CT chest with pulmonary nodule.    - Neuro consult  - ASA/Statin  - Continue mechanical ventilation, wean as tolerated (mental status is barrier to extubation)  - Continue empiric abx, follow up cultures  - DVT prophylaxis  - GOC discussions    I have personally provided 35 minutes of attending critical care time excluding procedures. 87 y/o F w/hypertension, HLD and Afib on AC admitted for acute CVA, not a candidate for TNK or endovascular intervention. Intubated for respiratory failure in setting of stroke. CT chest with pulmonary nodule.    - Neuro consult  - ASA/Statin  - Continue mechanical ventilation, wean as tolerated (mental status is barrier to extubation)  - Continue empiric abx, follow up cultures  - DVT prophylaxis  - Repeat CT scan as outpatient for follow up of nodule if patient improves  - GOC discussions    I have personally provided 35 minutes of attending critical care time excluding procedures.

## 2023-10-27 NOTE — PROGRESS NOTE ADULT - SUBJECTIVE AND OBJECTIVE BOX
HPI:  Ms. Longoria is an 88 year old female with a PMH of HTN, HLD and afib on xarelto who presented to [facility] earlier this morning after she was found altered, nonverbal and with right gaze deviation when she woke up. At baseline pt is ambulatory and verbal. Pt required intubation in ED for airway protection given poor MS. NIHSS calculated as 30 at that time. Pt not a candidate for TNK or transfer for intervention per stroke neurologist. She remains intubated on nicardipine gtt. ICU team consulted. (26 Oct 2023 05:50)      24 hr events: Admitted for acute stroke and intubated for airway protection.    ## ROS:  [x] unable to obtain    ## Vitals  ICU Vital Signs Last 24 Hrs  T(C): 36.8 (27 Oct 2023 08:00), Max: 38.8 (26 Oct 2023 19:00)  T(F): 98.2 (27 Oct 2023 08:00), Max: 101.8 (26 Oct 2023 19:00)  HR: 59 (27 Oct 2023 09:20) (43 - 84)  BP: 168/57 (27 Oct 2023 09:00) (120/52 - 202/100)  BP(mean): 86 (27 Oct 2023 09:00) (73 - 127)  ABP: --  ABP(mean): --  RR: 17 (27 Oct 2023 09:00) (13 - 25)  SpO2: 100% (27 Oct 2023 09:20) (98% - 100%)    O2 Parameters below as of 27 Oct 2023 09:00  Patient On (Oxygen Delivery Method): ventilator    O2 Concentration (%): 40        ## Physical Exam:  Gen:  HEENT:  Resp:  CV:  Abd:  Ext:  Neuro:    ## Vent Data  Mode: AC/ CMV (Assist Control/ Continuous Mandatory Ventilation)  RR (machine): 14  TV (machine): 400  FiO2: 40  PEEP: 5  ITime: 0.8  MAP: 8  PIP: 17      ## Labs:  Chem:  10-27    132<L>  |  103  |  16  ----------------------------<  125<H>  4.9   |  27  |  0.91    Ca    9.0      27 Oct 2023 02:10  Phos  3.2     10-27  Mg     2.4     10-27    TPro  7.2  /  Alb  3.5  /  TBili  1.7<H>  /  DBili  x   /  AST  24  /  ALT  17  /  AlkPhos  41  10-27    LIVER FUNCTIONS - ( 27 Oct 2023 02:10 )  Alb: 3.5 g/dL / Pro: 7.2 gm/dL / ALK PHOS: 41 U/L / ALT: 17 U/L / AST: 24 U/L / GGT: x           CBC:                        12.2   9.57  )-----------( 230      ( 27 Oct 2023 02:10 )             37.5     Coags:  PT/INR - ( 26 Oct 2023 04:08 )   PT: 13.3 sec;   INR: 1.11 ratio         PTT - ( 26 Oct 2023 04:08 )  PTT:30.7 sec    Urinalysis Basic - ( 27 Oct 2023 02:10 )    Color: x / Appearance: x / SG: x / pH: x  Gluc: 125 mg/dL / Ketone: x  / Bili: x / Urobili: x   Blood: x / Protein: x / Nitrite: x   Leuk Esterase: x / RBC: x / WBC x   Sq Epi: x / Non Sq Epi: x / Bacteria: x        ## Cardiac  CARDIAC MARKERS ( 26 Oct 2023 16:57 )  x     / x     / 99 U/L / x     / 1.7 ng/mL        ## Blood Gas  ABG - ( 26 Oct 2023 08:31 )  pH, Arterial: 7.40  pH, Blood: x     /  pCO2: 42    /  pO2: 146   / HCO3: 26    / Base Excess: 1.0   /  SaO2: 99.3                #I/Os  I&O's Detail    26 Oct 2023 07:01  -  27 Oct 2023 07:00  --------------------------------------------------------  IN:    FentaNYL: 112 mL    IV PiggyBack: 150 mL    Propofol: 15.2 mL    Vital High Protein: 20 mL    Vital HN: 270 mL  Total IN: 567.2 mL    OUT:    Indwelling Catheter - Urethral (mL): 750 mL  Total OUT: 750 mL    Total NET: -182.8 mL      27 Oct 2023 07:01  -  27 Oct 2023 09:36  --------------------------------------------------------  IN:    Vital HN: 30 mL  Total IN: 30 mL    OUT:  Total OUT: 0 mL    Total NET: 30 mL          ## Imaging:    ## Medications:  MEDICATIONS  (STANDING):  aspirin  chewable 81 milliGRAM(s) Oral daily  atorvastatin 80 milliGRAM(s) Oral at bedtime  cefTRIAXone   IVPB 1000 milliGRAM(s) IV Intermittent every 24 hours  chlorhexidine 0.12% Liquid 15 milliLiter(s) Oral Mucosa every 12 hours  chlorhexidine 2% Cloths 1 Application(s) Topical <User Schedule>  fentaNYL   Infusion. 0.5 MICROgram(s)/kG/Hr (3.16 mL/Hr) IV Continuous <Continuous>  heparin   Injectable 5000 Unit(s) SubCutaneous every 12 hours  influenza  Vaccine (HIGH DOSE) 0.7 milliLiter(s) IntraMuscular once  pantoprazole  Injectable 40 milliGRAM(s) IV Push daily    MEDICATIONS  (PRN):       HPI:  Ms. Longoria is an 88 year old female with a PMH of HTN, HLD and afib on xarelto who presented to [facility] earlier this morning after she was found altered, nonverbal and with right gaze deviation when she woke up. At baseline pt is ambulatory and verbal. Pt required intubation in ED for airway protection given poor MS. NIHSS calculated as 30 at that time. Pt not a candidate for TNK or transfer for intervention per stroke neurologist. She remains intubated on nicardipine gtt. ICU team consulted. (26 Oct 2023 05:50)      24 hr events: Admitted for acute stroke and intubated for airway protection.    ## ROS:  [x] unable to obtain    ## Vitals  ICU Vital Signs Last 24 Hrs  T(C): 36.8 (27 Oct 2023 08:00), Max: 38.8 (26 Oct 2023 19:00)  T(F): 98.2 (27 Oct 2023 08:00), Max: 101.8 (26 Oct 2023 19:00)  HR: 59 (27 Oct 2023 09:20) (43 - 84)  BP: 168/57 (27 Oct 2023 09:00) (120/52 - 202/100)  BP(mean): 86 (27 Oct 2023 09:00) (73 - 127)  ABP: --  ABP(mean): --  RR: 17 (27 Oct 2023 09:00) (13 - 25)  SpO2: 100% (27 Oct 2023 09:20) (98% - 100%)    O2 Parameters below as of 27 Oct 2023 09:00  Patient On (Oxygen Delivery Method): ventilator    O2 Concentration (%): 40        ## Physical Exam:  Gen: Elderly female lying in bed, NAD, intubated  HEENT: NC/AT sclerae anicteric. ET tube in place  Resp: Mechanical breath sounds b/l  CV: S1, S2  Abd: Soft, + BS  Ext: WWP  Neuro: Unarousable    ## Vent Data  Mode: AC/ CMV (Assist Control/ Continuous Mandatory Ventilation)  RR (machine): 14  TV (machine): 400  FiO2: 40  PEEP: 5  ITime: 0.8  MAP: 8  PIP: 17      ## Labs:  Chem:  10-27    132<L>  |  103  |  16  ----------------------------<  125<H>  4.9   |  27  |  0.91    Ca    9.0      27 Oct 2023 02:10  Phos  3.2     10-27  Mg     2.4     10-27    TPro  7.2  /  Alb  3.5  /  TBili  1.7<H>  /  DBili  x   /  AST  24  /  ALT  17  /  AlkPhos  41  10-27    LIVER FUNCTIONS - ( 27 Oct 2023 02:10 )  Alb: 3.5 g/dL / Pro: 7.2 gm/dL / ALK PHOS: 41 U/L / ALT: 17 U/L / AST: 24 U/L / GGT: x           CBC:                        12.2   9.57  )-----------( 230      ( 27 Oct 2023 02:10 )             37.5     Coags:  PT/INR - ( 26 Oct 2023 04:08 )   PT: 13.3 sec;   INR: 1.11 ratio         PTT - ( 26 Oct 2023 04:08 )  PTT:30.7 sec    Urinalysis Basic - ( 27 Oct 2023 02:10 )    Color: x / Appearance: x / SG: x / pH: x  Gluc: 125 mg/dL / Ketone: x  / Bili: x / Urobili: x   Blood: x / Protein: x / Nitrite: x   Leuk Esterase: x / RBC: x / WBC x   Sq Epi: x / Non Sq Epi: x / Bacteria: x        ## Cardiac  CARDIAC MARKERS ( 26 Oct 2023 16:57 )  x     / x     / 99 U/L / x     / 1.7 ng/mL        ## Blood Gas  ABG - ( 26 Oct 2023 08:31 )  pH, Arterial: 7.40  pH, Blood: x     /  pCO2: 42    /  pO2: 146   / HCO3: 26    / Base Excess: 1.0   /  SaO2: 99.3                #I/Os  I&O's Detail    26 Oct 2023 07:01  -  27 Oct 2023 07:00  --------------------------------------------------------  IN:    FentaNYL: 112 mL    IV PiggyBack: 150 mL    Propofol: 15.2 mL    Vital High Protein: 20 mL    Vital HN: 270 mL  Total IN: 567.2 mL    OUT:    Indwelling Catheter - Urethral (mL): 750 mL  Total OUT: 750 mL    Total NET: -182.8 mL      27 Oct 2023 07:01  -  27 Oct 2023 09:36  --------------------------------------------------------  IN:    Vital HN: 30 mL  Total IN: 30 mL    OUT:  Total OUT: 0 mL    Total NET: 30 mL          ## Imaging:    ## Medications:  MEDICATIONS  (STANDING):  aspirin  chewable 81 milliGRAM(s) Oral daily  atorvastatin 80 milliGRAM(s) Oral at bedtime  cefTRIAXone   IVPB 1000 milliGRAM(s) IV Intermittent every 24 hours  chlorhexidine 0.12% Liquid 15 milliLiter(s) Oral Mucosa every 12 hours  chlorhexidine 2% Cloths 1 Application(s) Topical <User Schedule>  fentaNYL   Infusion. 0.5 MICROgram(s)/kG/Hr (3.16 mL/Hr) IV Continuous <Continuous>  heparin   Injectable 5000 Unit(s) SubCutaneous every 12 hours  influenza  Vaccine (HIGH DOSE) 0.7 milliLiter(s) IntraMuscular once  pantoprazole  Injectable 40 milliGRAM(s) IV Push daily    MEDICATIONS  (PRN):       HPI:  Ms. Longoria is an 88 year old female with a PMH of HTN, HLD and afib on xarelto who presented to [facility] earlier this morning after she was found altered, nonverbal and with right gaze deviation when she woke up. At baseline pt is ambulatory and verbal. Pt required intubation in ED for airway protection given poor MS. NIHSS calculated as 30 at that time. Pt not a candidate for TNK or transfer for intervention per stroke neurologist. She remains intubated on nicardipine gtt. ICU team consulted. (26 Oct 2023 05:50)      24 hr events: Admitted for acute stroke and intubated.    ## ROS:  [x] unable to obtain    ## Vitals  ICU Vital Signs Last 24 Hrs  T(C): 36.8 (27 Oct 2023 08:00), Max: 38.8 (26 Oct 2023 19:00)  T(F): 98.2 (27 Oct 2023 08:00), Max: 101.8 (26 Oct 2023 19:00)  HR: 59 (27 Oct 2023 09:20) (43 - 84)  BP: 168/57 (27 Oct 2023 09:00) (120/52 - 202/100)  BP(mean): 86 (27 Oct 2023 09:00) (73 - 127)  ABP: --  ABP(mean): --  RR: 17 (27 Oct 2023 09:00) (13 - 25)  SpO2: 100% (27 Oct 2023 09:20) (98% - 100%)    O2 Parameters below as of 27 Oct 2023 09:00  Patient On (Oxygen Delivery Method): ventilator    O2 Concentration (%): 40        ## Physical Exam:  Gen: Elderly female lying in bed, NAD, intubated  HEENT: NC/AT sclerae anicteric. ET tube in place  Resp: Mechanical breath sounds b/l  CV: S1, S2  Abd: Soft, + BS  Ext: WWP  Neuro: Unarousable    ## Vent Data  Mode: AC/ CMV (Assist Control/ Continuous Mandatory Ventilation)  RR (machine): 14  TV (machine): 400  FiO2: 40  PEEP: 5  ITime: 0.8  MAP: 8  PIP: 17      ## Labs:  Chem:  10-27    132<L>  |  103  |  16  ----------------------------<  125<H>  4.9   |  27  |  0.91    Ca    9.0      27 Oct 2023 02:10  Phos  3.2     10-27  Mg     2.4     10-27    TPro  7.2  /  Alb  3.5  /  TBili  1.7<H>  /  DBili  x   /  AST  24  /  ALT  17  /  AlkPhos  41  10-27    LIVER FUNCTIONS - ( 27 Oct 2023 02:10 )  Alb: 3.5 g/dL / Pro: 7.2 gm/dL / ALK PHOS: 41 U/L / ALT: 17 U/L / AST: 24 U/L / GGT: x           CBC:                        12.2   9.57  )-----------( 230      ( 27 Oct 2023 02:10 )             37.5     Coags:  PT/INR - ( 26 Oct 2023 04:08 )   PT: 13.3 sec;   INR: 1.11 ratio         PTT - ( 26 Oct 2023 04:08 )  PTT:30.7 sec    Urinalysis Basic - ( 27 Oct 2023 02:10 )    Color: x / Appearance: x / SG: x / pH: x  Gluc: 125 mg/dL / Ketone: x  / Bili: x / Urobili: x   Blood: x / Protein: x / Nitrite: x   Leuk Esterase: x / RBC: x / WBC x   Sq Epi: x / Non Sq Epi: x / Bacteria: x        ## Cardiac  CARDIAC MARKERS ( 26 Oct 2023 16:57 )  x     / x     / 99 U/L / x     / 1.7 ng/mL        ## Blood Gas  ABG - ( 26 Oct 2023 08:31 )  pH, Arterial: 7.40  pH, Blood: x     /  pCO2: 42    /  pO2: 146   / HCO3: 26    / Base Excess: 1.0   /  SaO2: 99.3                #I/Os  I&O's Detail    26 Oct 2023 07:01  -  27 Oct 2023 07:00  --------------------------------------------------------  IN:    FentaNYL: 112 mL    IV PiggyBack: 150 mL    Propofol: 15.2 mL    Vital High Protein: 20 mL    Vital HN: 270 mL  Total IN: 567.2 mL    OUT:    Indwelling Catheter - Urethral (mL): 750 mL  Total OUT: 750 mL    Total NET: -182.8 mL      27 Oct 2023 07:01  -  27 Oct 2023 09:36  --------------------------------------------------------  IN:    Vital HN: 30 mL  Total IN: 30 mL    OUT:  Total OUT: 0 mL    Total NET: 30 mL          ## Imaging:    ## Medications:  MEDICATIONS  (STANDING):  aspirin  chewable 81 milliGRAM(s) Oral daily  atorvastatin 80 milliGRAM(s) Oral at bedtime  cefTRIAXone   IVPB 1000 milliGRAM(s) IV Intermittent every 24 hours  chlorhexidine 0.12% Liquid 15 milliLiter(s) Oral Mucosa every 12 hours  chlorhexidine 2% Cloths 1 Application(s) Topical <User Schedule>  fentaNYL   Infusion. 0.5 MICROgram(s)/kG/Hr (3.16 mL/Hr) IV Continuous <Continuous>  heparin   Injectable 5000 Unit(s) SubCutaneous every 12 hours  influenza  Vaccine (HIGH DOSE) 0.7 milliLiter(s) IntraMuscular once  pantoprazole  Injectable 40 milliGRAM(s) IV Push daily    MEDICATIONS  (PRN):

## 2023-10-28 NOTE — CONSULT NOTE ADULT - SUBJECTIVE AND OBJECTIVE BOX
Neurology Consult    Reason for Consult: Patient is a 88y old  Female who presents with a chief complaint of ALTERED MENTAL STATUS EMBOLIC STROKE     (28 Oct 2023 15:07)      HPI:  Ms. Longoria is an 88 year old female with a PMH of HTN, HLD and afib on xarelto who presented to [facility] earlier this morning after she was found altered, nonverbal and with right gaze deviation when she woke up. At baseline pt is ambulatory and verbal. Pt required intubation in ED for airway protection given poor MS. NIHSS calculated as 30 at that time. Pt not a candidate for TNK or transfer for intervention per stroke neurologist. She remains intubated on nicardipine gtt. ICU team consulted. (26 Oct 2023 05:50)       PAST MEDICAL & SURGICAL HISTORY:  HTN (hypertension)      Elevated cholesterol      Atrial fibrillation      Hyperlipidemia          Allergies: Allergies    No Known Allergies    Intolerances        Social History: Denies toxic habits including tobacco, ETOH or illicit drugs.    Family History: FAMILY HISTORY:  . No family history of strokes    Medications: MEDICATIONS  (STANDING):  aspirin  chewable 81 milliGRAM(s) Oral daily  atorvastatin 80 milliGRAM(s) Oral at bedtime  cefTRIAXone   IVPB 1000 milliGRAM(s) IV Intermittent every 24 hours  chlorhexidine 0.12% Liquid 15 milliLiter(s) Oral Mucosa every 12 hours  chlorhexidine 2% Cloths 1 Application(s) Topical <User Schedule>  heparin   Injectable 5000 Unit(s) SubCutaneous every 12 hours  hydrALAZINE 25 milliGRAM(s) Oral every 8 hours  influenza  Vaccine (HIGH DOSE) 0.7 milliLiter(s) IntraMuscular once  pantoprazole  Injectable 40 milliGRAM(s) IV Push daily  senna 2 Tablet(s) Oral at bedtime    MEDICATIONS  (PRN):  acetaminophen     Tablet .. 650 milliGRAM(s) Oral every 6 hours PRN Temp greater or equal to 38C (100.4F)  hydrALAZINE Injectable 10 milliGRAM(s) IV Push every 4 hours PRN systolic>180      Review of Systems:  CONSTITUTIONAL:  No weight loss, fever, chills, weakness or fatigue.  HEENT:  Eyes:  No visual loss, blurred vision, double vision or yellow sclera. Ears, Nose, Throat:  No hearing loss, sneezing, congestion, runny nose or sore throat.  SKIN:  No rash or itching.  CARDIOVASCULAR:  No chest pain, chest pressure or chest discomfort. No palpitations or edema.  RESPIRATORY:  No shortness of breath, cough or sputum.  GASTROINTESTINAL:  No anorexia, nausea, vomiting or diarrhea. No abdominal pain or blood.  GENITOURINARY:  No burning on urination or incontinence   NEUROLOGICAL:  No headache, dizziness, syncope, paralysis, ataxia, numbness or tingling in the extremities. No change in bowel or bladder control. no limb weakness. no vision changes.   MUSCULOSKELETAL:  No muscle, back pain, joint pain or stiffness.  HEMATOLOGIC:  No anemia, bleeding or bruising.  LYMPHATICS:  No enlarged nodes. No history of splenectomy.  PSYCHIATRIC:  No history of depression or anxiety.  ENDOCRINOLOGIC:  No reports of sweating, cold or heat intolerance. No polyuria or polydipsia.      Vitals:  Vital Signs Last 24 Hrs  T(C): 38.5 (28 Oct 2023 23:25), Max: 39 (28 Oct 2023 03:00)  T(F): 101.3 (28 Oct 2023 23:25), Max: 102.2 (28 Oct 2023 03:00)  HR: 73 (28 Oct 2023 21:20) (47 - 86)  BP: 182/61 (28 Oct 2023 19:00) (147/55 - 199/62)  BP(mean): 95 (28 Oct 2023 19:00) (78 - 130)  RR: 21 (28 Oct 2023 19:00) (13 - 25)  SpO2: 100% (28 Oct 2023 21:20) (96% - 100%)        General Exam:   General Appearance: Appropriately dressed and in no acute distress       Head: Normocephalic, atraumatic and no dysmorphic features  Ear, Nose, and Throat: Moist mucous membranes  CVS: S1S2+  Resp: No SOB, no wheeze or rhonchi  GI: soft NT/ND  Extremities: No edema or cyanosis  Skin: No bruises or rashes     Neurological Exam:  Mental Status: Awake, alert and oriented x 3.  Able to follow simple and complex verbal commands. Able to name and repeat. fluent speech. No obvious aphasia or dysarthria noted.   Cranial Nerves: PERRL, EOMI, VFFC, sensation V1-V3 intact,  no obvious facial asymmetry, equal elevation of palate, scm/trap 5/5, tongue is midline on protrusion. no obvious papilledema on fundoscopic exam. hearing is grossly intact.   Motor: Normal bulk, tone and strength throughout. Fine finger movements were intact and symmetric. no tremors or drift noted.    Sensation: Intact to light touch and pinprick throughout. no right/left confusion. no extinction to tactile on DSS. Romberg was negative.   Reflexes: 1+ throughout at biceps, brachioradialis, triceps, patellars and ankles bilaterally and equal. No clonus. R toe and L toe were both downgoing.  Coordination: No dysmetria on FNF or HKS  Gait: Narrow based and steady. Able to tandem. no limitations in gait.     Data/Labs/Imaging which I personally reviewed.     Labs:     CBC Full  -  ( 28 Oct 2023 02:45 )  WBC Count : 14.51 K/uL  RBC Count : 4.41 M/uL  Hemoglobin : 12.1 g/dL  Hematocrit : 38.6 %  Platelet Count - Automated : 198 K/uL  Mean Cell Volume : 87.5 fl  Mean Cell Hemoglobin : 27.4 pg  Mean Cell Hemoglobin Concentration : 31.3 g/dL  Auto Neutrophil # : 9.63 K/uL  Auto Lymphocyte # : 1.99 K/uL  Auto Monocyte # : 2.69 K/uL  Auto Eosinophil # : 0.02 K/uL  Auto Basophil # : 0.06 K/uL  Auto Neutrophil % : 66.5 %  Auto Lymphocyte % : 13.7 %  Auto Monocyte % : 18.5 %  Auto Eosinophil % : 0.1 %  Auto Basophil % : 0.4 %    10-28    136  |  103  |  23  ----------------------------<  122<H>  4.4   |  28  |  0.99    Ca    8.6      28 Oct 2023 03:35  Phos  2.5     10-28  Mg     2.4     10-28    TPro  6.4  /  Alb  2.8<L>  /  TBili  1.5<H>  /  DBili  x   /  AST  26  /  ALT  13  /  AlkPhos  32<L>  10-28    LIVER FUNCTIONS - ( 28 Oct 2023 03:35 )  Alb: 2.8 g/dL / Pro: 6.4 gm/dL / ALK PHOS: 32 U/L / ALT: 13 U/L / AST: 26 U/L / GGT: x             Urinalysis Basic - ( 28 Oct 2023 03:35 )    Color: x / Appearance: x / SG: x / pH: x  Gluc: 122 mg/dL / Ketone: x  / Bili: x / Urobili: x   Blood: x / Protein: x / Nitrite: x   Leuk Esterase: x / RBC: x / WBC x   Sq Epi: x / Non Sq Epi: x / Bacteria: x           Neurology Consult    Reason for Consult: Patient is a 88y old  Female who presents with a chief complaint of ALTERED MENTAL STATUS EMBOLIC STROKE     (28 Oct 2023 15:07)      HPI:  Ms. Longoria is an 88 year old female with a PMH of HTN, HLD and afib on xarelto who presented to [facility] earlier this morning after she was found altered, nonverbal and with right gaze deviation when she woke up. At baseline pt is ambulatory and verbal. Pt required intubation in ED for airway protection given poor MS. NIHSS calculated as 30 at that time. Pt not a candidate for TNK or transfer for intervention per stroke neurologist. She remains intubated on nicardipine gtt. ICU team consulted. (26 Oct 2023 05:50)       PAST MEDICAL & SURGICAL HISTORY:  HTN (hypertension)      Elevated cholesterol      Atrial fibrillation      Hyperlipidemia          Allergies: Allergies    No Known Allergies    Intolerances        Social History: Denies toxic habits including tobacco, ETOH or illicit drugs.    Family History: FAMILY HISTORY:  . No family history of strokes    Medications: MEDICATIONS  (STANDING):  aspirin  chewable 81 milliGRAM(s) Oral daily  atorvastatin 80 milliGRAM(s) Oral at bedtime  cefTRIAXone   IVPB 1000 milliGRAM(s) IV Intermittent every 24 hours  chlorhexidine 0.12% Liquid 15 milliLiter(s) Oral Mucosa every 12 hours  chlorhexidine 2% Cloths 1 Application(s) Topical <User Schedule>  heparin   Injectable 5000 Unit(s) SubCutaneous every 12 hours  hydrALAZINE 25 milliGRAM(s) Oral every 8 hours  influenza  Vaccine (HIGH DOSE) 0.7 milliLiter(s) IntraMuscular once  pantoprazole  Injectable 40 milliGRAM(s) IV Push daily  senna 2 Tablet(s) Oral at bedtime    MEDICATIONS  (PRN):  acetaminophen     Tablet .. 650 milliGRAM(s) Oral every 6 hours PRN Temp greater or equal to 38C (100.4F)  hydrALAZINE Injectable 10 milliGRAM(s) IV Push every 4 hours PRN systolic>180      Review of Systems:  unable to obtain      Vitals:  Vital Signs Last 24 Hrs  T(C): 38.5 (28 Oct 2023 23:25), Max: 39 (28 Oct 2023 03:00)  T(F): 101.3 (28 Oct 2023 23:25), Max: 102.2 (28 Oct 2023 03:00)  HR: 73 (28 Oct 2023 21:20) (47 - 86)  BP: 182/61 (28 Oct 2023 19:00) (147/55 - 199/62)  BP(mean): 95 (28 Oct 2023 19:00) (78 - 130)  RR: 21 (28 Oct 2023 19:00) (13 - 25)  SpO2: 100% (28 Oct 2023 21:20) (96% - 100%)        General Exam:   General Appearance: Appropriately dressed and in no acute distress       Head: Normocephalic, atraumatic and no dysmorphic features  Ear, Nose, and Throat: Moist mucous membranes  CVS: S1S2+  Resp: mechcanical ventilation  GI: soft NT/ND  Extremities: No edema or cyanosis  Skin: No bruises or rashes     Neurological Exam:  Mental Status: Intubated, not sedated. No spont eye opening. No verbal output, does not follow commands.   Cranial Nerves: PERRL 1-2mm, + VOR, no facial asymmetry    Motor: dec tone throughout, no spont movemnt noted   Sensation: WD to noxious in RUE, RLE. LLE triple flexon        Data/Labs/Imaging which I personally reviewed.     Labs:     CBC Full  -  ( 28 Oct 2023 02:45 )  WBC Count : 14.51 K/uL  RBC Count : 4.41 M/uL  Hemoglobin : 12.1 g/dL  Hematocrit : 38.6 %  Platelet Count - Automated : 198 K/uL  Mean Cell Volume : 87.5 fl  Mean Cell Hemoglobin : 27.4 pg  Mean Cell Hemoglobin Concentration : 31.3 g/dL  Auto Neutrophil # : 9.63 K/uL  Auto Lymphocyte # : 1.99 K/uL  Auto Monocyte # : 2.69 K/uL  Auto Eosinophil # : 0.02 K/uL  Auto Basophil # : 0.06 K/uL  Auto Neutrophil % : 66.5 %  Auto Lymphocyte % : 13.7 %  Auto Monocyte % : 18.5 %  Auto Eosinophil % : 0.1 %  Auto Basophil % : 0.4 %    10-28    136  |  103  |  23  ----------------------------<  122<H>  4.4   |  28  |  0.99    Ca    8.6      28 Oct 2023 03:35  Phos  2.5     10-28  Mg     2.4     10-28    TPro  6.4  /  Alb  2.8<L>  /  TBili  1.5<H>  /  DBili  x   /  AST  26  /  ALT  13  /  AlkPhos  32<L>  10-28    LIVER FUNCTIONS - ( 28 Oct 2023 03:35 )  Alb: 2.8 g/dL / Pro: 6.4 gm/dL / ALK PHOS: 32 U/L / ALT: 13 U/L / AST: 26 U/L / GGT: x             Urinalysis Basic - ( 28 Oct 2023 03:35 )    Color: x / Appearance: x / SG: x / pH: x  Gluc: 122 mg/dL / Ketone: x  / Bili: x / Urobili: x   Blood: x / Protein: x / Nitrite: x   Leuk Esterase: x / RBC: x / WBC x   Sq Epi: x / Non Sq Epi: x / Bacteria: x      < from: CT Head No Cont (10.22.19 @ 15:57) >  IMPRESSION:    1)  chronic ischemic changes in both hemispheres with volume loss. There   are no new infarct as compared to prior CT. This may be further assessed   with MR imaging.  2)  no hemorrhage or mass identified.    < end of copied text >  < from: CT Angio Head w/ IV Cont (10.22.19 @ 15:57) >  IMPRESSION    1. There is intimal thickening and calcified plaque in both carotid   bifurcations in the neck, but without significant stenosis. No evidence   of carotid vertebral occlusion or dissection.  2. Intracranially, there are atherosclerotic changes in the anterior and   posterior circulation, but without significant stenosis or occlusion.    < end of copied text >  < from: CT Brain Stroke Protocol (10.26.23 @ 04:37) >  IMPRESSION:  No evidence of acute intracranial hemorrhage, mass effect or large acute   territorial infarct, within limits of noncontrast CT technique.    < end of copied text >  < from: CT Angio Neck Stroke Protocol w/ IV Cont (10.26.23 @ 04:53) >  CT ANGIOGRAPHY NECK:  1.  The right internal carotid artery is occluded approximately 1.5 cm   distal to its origin, possibly due to underlying dissection.  2.  Endotracheal tube is low in position, with its tip approximate 1 cm   above the slade.  3.  Diffuse ill-defined groundglass opacity in both lungs, which may   represent pulmonary edema, infection and/or atelectasis.  4.  There is a discrete right upper lobe groundglass opacity measuring   1.9 x 1.4 cm; this may represent a focus of infection, inflammation,   edema, hemorrhage, fibrosis or malignancy.  A follow-up chest CT is   warranted in three months to assess for resolution.    CT ANGIOGRAPHY BRAIN: The right internal carotid arteries occluded.    There is no significant flow related opacification within the proximal   right middle cerebral artery.  There is mild reconstitution of flow in   some distal branch vessels of the inferior division of the right middle   cerebral artery.  The right anterior cerebral artery fills across the   anterior communicating artery.    < end of copied text >  < from: CT Brain Perfusion Maps Stroke (10.26.23 @ 04:54) >  CT PERFUSION: CT perfusion is consistent with large acute infarct in the   right middle cerebral artery vascular territory and small to moderate   amount of surrounding ischemic penumbra.  Hypoperfusion index is 0.3.    Core infarct (CBF <  30%:): 136 mL  Penumbra (Tmax >6s): 194 mL  Mismatched volume: 58 mL  Mismatched ratio: 1.4    < end of copied text >

## 2023-10-28 NOTE — DIETITIAN INITIAL EVALUATION ADULT - OTHER INFO
Pt admitted with acute CVA. Intubated in ED 10/26.   Remains intubated on vent support.  RD remains available.

## 2023-10-28 NOTE — DIETITIAN INITIAL EVALUATION ADULT - NS FNS DIET ORDER
Diet, NPO with Tube Feed:   Tube Feeding Modality: Orogastric  Vital AF  Total Volume for 24 Hours (mL): 900  Continuous  Starting Tube Feed Rate {mL per Hour}: 10  Increase Tube Feed Rate by (mL): 10     Every 6 hours  Until Goal Tube Feed Rate (mL per Hour): 50  Tube Feed Duration (in Hours): 18  Tube Feed Start Time: 17:00  Tube Feed Stop Time: 11:00 (10-26-23 @ 11:31) [Active]

## 2023-10-28 NOTE — DIETITIAN INITIAL EVALUATION ADULT - NSFNSGIIOFT_GEN_A_CORE
10-27-23 @ 07:01  -  10-28-23 @ 07:00  --------------------------------------------------------  OUT:  Total OUT: 0 mL    Total NET: 740 mL      10-28-23 @ 07:01  -  10-28-23 @ 15:07  --------------------------------------------------------  OUT:  Total OUT: 0 mL    Total NET: 100 mL

## 2023-10-28 NOTE — DIETITIAN INITIAL EVALUATION ADULT - PERTINENT MEDS FT
MEDICATIONS  (STANDING):  aspirin  chewable 81 milliGRAM(s) Oral daily  atorvastatin 80 milliGRAM(s) Oral at bedtime  cefTRIAXone   IVPB 1000 milliGRAM(s) IV Intermittent every 24 hours  chlorhexidine 0.12% Liquid 15 milliLiter(s) Oral Mucosa every 12 hours  chlorhexidine 2% Cloths 1 Application(s) Topical <User Schedule>  heparin   Injectable 5000 Unit(s) SubCutaneous every 12 hours  hydrALAZINE 25 milliGRAM(s) Oral every 8 hours  influenza  Vaccine (HIGH DOSE) 0.7 milliLiter(s) IntraMuscular once  pantoprazole  Injectable 40 milliGRAM(s) IV Push daily  senna 2 Tablet(s) Oral at bedtime    MEDICATIONS  (PRN):  acetaminophen     Tablet .. 650 milliGRAM(s) Oral every 6 hours PRN Temp greater or equal to 38C (100.4F)  hydrALAZINE Injectable 10 milliGRAM(s) IV Push every 4 hours PRN systolic>180

## 2023-10-28 NOTE — PROGRESS NOTE ADULT - ASSESSMENT
87 y/o F w/hypertension, HLD and Afib on AC admitted for acute CVA, not a candidate for TNK or endovascular intervention. Intubated for respiratory failure in setting of stroke. CT chest with pulmonary nodule.    - Neuro consult  - ASA/Statin  - Continue mechanical ventilation, wean as tolerated (mental status is barrier to extubation)  - Complete course of abx for possible PNA  - DVT prophylaxis  - Repeat CT scan as outpatient for follow up of nodule if patient improves  - GOC discussions, began discussions re: trach vs palliative extubation, family to discuss amongst themselves, but will likely opt for trach/PEG    I have personally provided 35 minutes of attending critical care time excluding procedures.   89 y/o F w/hypertension, HLD and Afib on AC admitted for acute CVA, not a candidate for TNK or endovascular intervention. Intubated for respiratory failure in setting of stroke. CT chest with pulmonary nodule.    - Neuro consult  - ASA/Statin  - Continue mechanical ventilation, wean as tolerated (mental status is barrier to extubation)  - Complete course of abx for possible PNA  - Repeat labs, current sample was likely drawn from a line containing fluids  - DVT prophylaxis  - Repeat CT scan as outpatient for follow up of nodule if patient improves  - GOC discussions, began discussions re: trach vs palliative extubation, family to discuss amongst themselves, but will likely opt for trach/PEG    I have personally provided 35 minutes of attending critical care time excluding procedures.

## 2023-10-28 NOTE — DIETITIAN INITIAL EVALUATION ADULT - ENTERAL
Recommend Vital HP @ 55ml/hr x 18 hours which provides 990ml total volume, 990kcal, 86.4g protein, 828ml water

## 2023-10-28 NOTE — PROGRESS NOTE ADULT - SUBJECTIVE AND OBJECTIVE BOX
HPI:  Ms. Longoria is an 88 year old female with a PMH of HTN, HLD and afib on xarelto who presented to [facility] earlier this morning after she was found altered, nonverbal and with right gaze deviation when she woke up. At baseline pt is ambulatory and verbal. Pt required intubation in ED for airway protection given poor MS. NIHSS calculated as 30 at that time. Pt not a candidate for TNK or transfer for intervention per stroke neurologist. She remains intubated on nicardipine gtt. ICU team consulted. (26 Oct 2023 05:50)      24 hr events: No acute events.     ## ROS:  [x ] unable to obtain      ## Vitals  ICU Vital Signs Last 24 Hrs  T(C): 37.9 (28 Oct 2023 08:00), Max: 39 (28 Oct 2023 03:00)  T(F): 100.2 (28 Oct 2023 08:00), Max: 102.2 (28 Oct 2023 03:00)  HR: 72 (28 Oct 2023 08:00) (45 - 72)  BP: 188/110 (28 Oct 2023 08:00) (126/46 - 188/110)  BP(mean): 130 (28 Oct 2023 08:00) (69 - 130)  ABP: --  ABP(mean): --  RR: 15 (28 Oct 2023 08:00) (14 - 17)  SpO2: 100% (28 Oct 2023 07:00) (96% - 100%)    O2 Parameters below as of 27 Oct 2023 19:01  Patient On (Oxygen Delivery Method): ventilator, tv 400, peep 5, rate 14,fio2 40    O2 Concentration (%): 40        ## Physical Exam:  Gen: Elderly female lying in bed, NAD, intubated  HEENT: NC/AT sclerae anicteric. ET tube in place  Resp: Mechanical breath sounds b/l  CV: S1, S2  Abd: Soft, + BS  Ext: WWP  Neuro: Unarousable    ## Vent Data  Mode: CPAP with PS  FiO2: 30  PEEP: 5  PS: 10  ITime: 1  MAP: 7  PIP: 15      ## Labs:  Chem:  10-28    136  |  103  |  23  ----------------------------<  122<H>  4.4   |  28  |  0.99    Ca    8.6      28 Oct 2023 03:35  Phos  2.5     10-28  Mg     2.4     10-28    TPro  6.4  /  Alb  2.8<L>  /  TBili  1.5<H>  /  DBili  x   /  AST  26  /  ALT  13  /  AlkPhos  32<L>  10-28    LIVER FUNCTIONS - ( 28 Oct 2023 03:35 )  Alb: 2.8 g/dL / Pro: 6.4 gm/dL / ALK PHOS: 32 U/L / ALT: 13 U/L / AST: 26 U/L / GGT: x           CBC:                        12.1   14.51 )-----------( 198      ( 28 Oct 2023 02:45 )             38.6     Coags:      Urinalysis Basic - ( 28 Oct 2023 03:35 )    Color: x / Appearance: x / SG: x / pH: x  Gluc: 122 mg/dL / Ketone: x  / Bili: x / Urobili: x   Blood: x / Protein: x / Nitrite: x   Leuk Esterase: x / RBC: x / WBC x   Sq Epi: x / Non Sq Epi: x / Bacteria: x        ## Cardiac  CARDIAC MARKERS ( 26 Oct 2023 16:57 )  x     / x     / 99 U/L / x     / 1.7 ng/mL        ## Blood Gas  ABG - ( 26 Oct 2023 08:31 )  pH, Arterial: 7.40  pH, Blood: x     /  pCO2: 42    /  pO2: 146   / HCO3: 26    / Base Excess: 1.0   /  SaO2: 99.3                #I/Os  I&O's Detail    27 Oct 2023 07:01  -  28 Oct 2023 07:00  --------------------------------------------------------  IN:    FentaNYL: 138.6 mL    IV PiggyBack: 100 mL    Vital High Protein: 610 mL    Vital HN: 130 mL  Total IN: 978.6 mL    OUT:    Indwelling Catheter - Urethral (mL): 320 mL  Total OUT: 320 mL    Total NET: 658.6 mL          ## Imaging:    ## Medications:  MEDICATIONS  (STANDING):  albumin human 25% IVPB 50 milliLiter(s) IV Intermittent every 6 hours  albumin human 25% IVPB      aspirin  chewable 81 milliGRAM(s) Oral daily  atorvastatin 80 milliGRAM(s) Oral at bedtime  cefTRIAXone   IVPB 1000 milliGRAM(s) IV Intermittent every 24 hours  chlorhexidine 0.12% Liquid 15 milliLiter(s) Oral Mucosa every 12 hours  chlorhexidine 2% Cloths 1 Application(s) Topical <User Schedule>  fentaNYL   Infusion. 0.5 MICROgram(s)/kG/Hr (3.16 mL/Hr) IV Continuous <Continuous>  heparin   Injectable 5000 Unit(s) SubCutaneous every 12 hours  influenza  Vaccine (HIGH DOSE) 0.7 milliLiter(s) IntraMuscular once  pantoprazole  Injectable 40 milliGRAM(s) IV Push daily  senna 2 Tablet(s) Oral at bedtime    MEDICATIONS  (PRN):  acetaminophen     Tablet .. 650 milliGRAM(s) Oral every 6 hours PRN Temp greater or equal to 38C (100.4F)

## 2023-10-28 NOTE — CONSULT NOTE ADULT - ASSESSMENT
88 year old female with a PMH of HTN, HLD and afib on xarelto who did not take medications for past 1 week prior to hospitalization here with R MCA infarct.   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 Can consider repeat CTH to help with GOC discussion  - obtain EEG to r/o nonconvulsive status epilepticus as not waking up off sedation   - Palliative consult / GOC discussion    Alesha Hilton DO  Vascular Neurology  Office [phone]

## 2023-10-28 NOTE — DIETITIAN INITIAL EVALUATION ADULT - ENERGY INTAKE
Tube feed held at time of visit as new container being prepped for hanging. Tube feed Vital AF (as ordered) is on  backorder and is substituted by Vital HP- will enter new order as such. See below for recommendations. Tube feed held at time of visit as per 18 hour run cycle. Tube feed Vital AF (as ordered) is on  backorder and is substituted by Vital HP- will enter new order as such. See below for recommendations.

## 2023-10-28 NOTE — DIETITIAN INITIAL EVALUATION ADULT - PERTINENT LABORATORY DATA
10-28    136  |  103  |  23  ----------------------------<  122<H>  4.4   |  28  |  0.99    Ca    8.6      28 Oct 2023 03:35  Phos  2.5     10-28  Mg     2.4     10-28    TPro  6.4  /  Alb  2.8<L>  /  TBili  1.5<H>  /  DBili  x   /  AST  26  /  ALT  13  /  AlkPhos  32<L>  10-28  A1C with Estimated Average Glucose Result: 5.5 % (10-26-23 @ 10:20)

## 2023-10-29 NOTE — PROGRESS NOTE ADULT - SUBJECTIVE AND OBJECTIVE BOX
HPI:  Ms. Longoria is an 88 year old female with a PMH of HTN, HLD and afib on xarelto who presented to [facility] earlier this morning after she was found altered, nonverbal and with right gaze deviation when she woke up. At baseline pt is ambulatory and verbal. Pt required intubation in ED for airway protection given poor MS. NIHSS calculated as 30 at that time. Pt not a candidate for TNK or transfer for intervention per stroke neurologist. She remains intubated on nicardipine gtt. ICU team consulted. (26 Oct 2023 05:50)      24 hr events: No acute events.     ## ROS:  [x ] unable to obtain    ## Vitals  ICU Vital Signs Last 24 Hrs  T(C): 37.3 (29 Oct 2023 08:00), Max: 38.8 (28 Oct 2023 11:46)  T(F): 99.1 (29 Oct 2023 08:00), Max: 101.8 (28 Oct 2023 11:46)  HR: 68 (29 Oct 2023 08:00) (52 - 99)  BP: 168/64 (29 Oct 2023 08:00) (147/50 - 201/51)  BP(mean): 94 (29 Oct 2023 08:00) (76 - 112)  ABP: --  ABP(mean): --  RR: 21 (29 Oct 2023 08:00) (13 - 29)  SpO2: 97% (29 Oct 2023 08:00) (97% - 100%)        ## Physical Exam:  Gen: Elderly female lying in bed, NAD, intubated  HEENT: NC/AT sclerae anicteric. ET tube in place  Resp: Mechanical breath sounds b/l  CV: S1, S2  Abd: Soft, + BS  Ext: WWP  Neuro: Unarousable    ## Vent Data  Mode: AC/ CMV (Assist Control/ Continuous Mandatory Ventilation)  RR (machine): 14  TV (machine): 400  FiO2: 30  PEEP: 5  ITime: 1  MAP: 8  PIP: 20      ## Labs:  Chem:  10-29    144  |  121<H>  |  15  ----------------------------<  128<H>  3.1<L>   |  18<L>  |  0.36<L>    Ca    <3.0<!!>      29 Oct 2023 02:14  Phos  1.0     10-29  Mg     1.4     10-29    TPro  3.3<L>  /  Alb  1.4<L>  /  TBili  0.9  /  DBili  x   /  AST  31  /  ALT  10<L>  /  AlkPhos  19<L>  10-29    LIVER FUNCTIONS - ( 29 Oct 2023 02:14 )  Alb: 1.4 g/dL / Pro: 3.3 gm/dL / ALK PHOS: 19 U/L / ALT: 10 U/L / AST: 31 U/L / GGT: x           CBC:                        9.7    12.93 )-----------( 168      ( 29 Oct 2023 02:14 )             31.2     Coags:      Urinalysis Basic - ( 29 Oct 2023 02:14 )    Color: x / Appearance: x / SG: x / pH: x  Gluc: 128 mg/dL / Ketone: x  / Bili: x / Urobili: x   Blood: x / Protein: x / Nitrite: x   Leuk Esterase: x / RBC: x / WBC x   Sq Epi: x / Non Sq Epi: x / Bacteria: x        ## Cardiac        ## Blood Gas      #I/Os  I&O's Detail    28 Oct 2023 07:01  -  29 Oct 2023 07:00  --------------------------------------------------------  IN:    Enteral Tube Flush: 175 mL    FentaNYL: 12.6 mL    IV PiggyBack: 150 mL    Vital High Protein: 860 mL  Total IN: 1197.6 mL    OUT:    Indwelling Catheter - Urethral (mL): 840 mL  Total OUT: 840 mL    Total NET: 357.6 mL          ## Imaging:    ## Medications:  MEDICATIONS  (STANDING):  aspirin  chewable 81 milliGRAM(s) Oral daily  atorvastatin 80 milliGRAM(s) Oral at bedtime  cefTRIAXone   IVPB 1000 milliGRAM(s) IV Intermittent every 24 hours  chlorhexidine 0.12% Liquid 15 milliLiter(s) Oral Mucosa every 12 hours  chlorhexidine 2% Cloths 1 Application(s) Topical <User Schedule>  heparin   Injectable 5000 Unit(s) SubCutaneous every 12 hours  hydrALAZINE 25 milliGRAM(s) Oral every 8 hours  influenza  Vaccine (HIGH DOSE) 0.7 milliLiter(s) IntraMuscular once  pantoprazole  Injectable 40 milliGRAM(s) IV Push daily  senna 2 Tablet(s) Oral at bedtime    MEDICATIONS  (PRN):  acetaminophen     Tablet .. 650 milliGRAM(s) Oral every 6 hours PRN Temp greater or equal to 38C (100.4F)  hydrALAZINE Injectable 10 milliGRAM(s) IV Push every 4 hours PRN systolic>180

## 2023-10-29 NOTE — PROGRESS NOTE ADULT - ASSESSMENT
87 y/o F w/hypertension, HLD and Afib on AC admitted for acute CVA, not a candidate for TNK or endovascular intervention. Intubated for respiratory failure in setting of stroke. CT chest with pulmonary nodule.    - Appreciate neuro recs  - EEG  - ASA/Statin  - Continue mechanical ventilation, wean as tolerated (mental status is barrier to extubation)  - Complete course of abx for possible PNA  - DVT prophylaxis  - Repeat CT scan as outpatient for follow up of nodule if patient improves  - GOC discussions, began discussions re: trach vs palliative extubation, family to discuss amongst themselves, but will likely opt for trach/PEG    I have personally provided 35 minutes of attending critical care time excluding procedures.

## 2023-10-29 NOTE — EEG REPORT - NS EEG TEXT BOX
ALBA PETERSON MRN-[id number]     Study Date: 		1055AM 10-29-23  Duration in hours:  x 22 min    --------------------------------------------------------------------------------------------------  History:  CC/ HPI Patient is a 88y old  Female who presents with a chief complaint of Ischemic Stroke, Poor MS requiring Intubation (29 Oct 2023 08:25)    MEDICATIONS  (STANDING):  aspirin  chewable 81 milliGRAM(s) Oral daily  atorvastatin 80 milliGRAM(s) Oral at bedtime  cefTRIAXone   IVPB 1000 milliGRAM(s) IV Intermittent every 24 hours  chlorhexidine 0.12% Liquid 15 milliLiter(s) Oral Mucosa every 12 hours  chlorhexidine 2% Cloths 1 Application(s) Topical <User Schedule>  heparin   Injectable 5000 Unit(s) SubCutaneous every 12 hours  hydrALAZINE 25 milliGRAM(s) Oral every 8 hours  influenza  Vaccine (HIGH DOSE) 0.7 milliLiter(s) IntraMuscular once  pantoprazole  Injectable 40 milliGRAM(s) IV Push daily  senna 2 Tablet(s) Oral at bedtime    --------------------------------------------------------------------------------------------------  Study Interpretation:    [Abbreviation Key:  PDR=alpha rhythm/posterior dominant rhythm. A-P=anterior posterior.  Amplitude: ‘very low’:<20; ‘low’:20-49; ‘medium’:; ‘high’:>150uV.  Persistence for periodic/rhythmic patterns (% of epoch) ‘rare’:<1%; ‘occasional’:1-10%; ‘frequent’:10-50%; ‘abundant’:50-90%; ‘continuous’:>90%.  Persistence for sporadic discharges: ‘rare’:<1/hr; ‘occasional’:1/min-1/hr; ‘frequent’:>1/min; ‘abundant’:>1/10 sec.  RPP=rhythmic and periodic patterns; GRDA=generalized rhythmic delta activity; FIRDA=frontal intermittent GRDA; LRDA=lateralized rhythmic delta activity; TIRDA=temporal intermittent rhythmic delta activity;  LPD=PLED=lateralized periodic discharges; GPD=generalized periodic discharges; BIPDs =bilateral independent periodic discharges; Mf=multifocal; SIRPDs=stimulus induced rhythmic, periodic, or ictal appearing discharges; BIRDs=brief potentially ictal rhythmic discharges >4 Hz, lasting .5-10s; PFA (paroxysmal bursts >13 Hz or =8 Hz <10s).  Modifiers: +F=with fast component; +S=with spike component; +R=with rhythmic component.  S-B=burst suppression pattern.  Max=maximal. N1-drowsy; N2-stage II sleep; N3-slow wave sleep. SSS/BETS=small sharp spikes/benign epileptiform transients of sleep. HV=hyperventilation; PS=photic stimulation]    FINDINGS:      Background:  Continuity: continuous  Symmetry: asymmetric  PDR: absent   Reactivity: absent   Voltage: normal (between 20-150uV)  Anterior Posterior Gradient: absent   Other background findings: none  Breach: absent    Background Slowing:  Generalized slowing: diffuse irregular delta and theta activity.  Focal slowing: continuous right hemispheric irregular delta slowing was present.    State Changes:   -N2 sleep transients were not recorded.    Sporadic Epileptiform Discharges:    None    Rhythmic and Periodic Patterns (RPPs):  Periodic discharges seen intermittently over the left hemisphere, sometimes with triphasic morphology, 0.5-1 hz.    Electrographic and Electroclinical seizures:  None    Other Clinical Events:  None    Activation Procedures:   -Hyperventilation was not performed.    -Photic stimulation was not performed.     Artifacts:  Intermittent myogenic and movement artifacts were noted.    ECG:  The heart rate on single channel ECG was predominantly between 60-70 BPM.    EEG Classification / Summary:  Abnormal EEG study  1. Periodic discharges seen intermittently over the left hemisphere, sometimes with triphasic morphology  2. Focal continuous right hemispheric slowing  3. Moderate generalized background slowing    -----------------------------------------------------------------------------------------------------    Clinical Impression:  1. While the periodic pattern noted above may represent lateralized periodic discharges indicating epileptic potential in the left hemisphere, more likely these represent generalized periodic discharges with triphasic morphology, consistent with metabolic etiology, that are poorly expressed on the right due to the effects of the stroke.   2. Structural abnormality in right hemisphere  3. Moderate diffuse/multi-focal cerebral dysfunction, not specific as to etiology.    Betito Pavon MD  Epilepsy Fellow, [facility]      -------------------------------------------------------------------------------------------------------  [facility] EEG Reading Room Ph#: [phone]  Epilepsy Answering Service after 5PM and before 8:30AM: Ph#: [phone]   ALBA PETERSON MRN-[id number]     Study Date: 		1055AM 10-29-23  Duration in hours:  x 22 min    --------------------------------------------------------------------------------------------------  History:  CC/ HPI Patient is a 88y old  Female who presents with a chief complaint of Ischemic Stroke, Poor MS requiring Intubation (29 Oct 2023 08:25)    MEDICATIONS  (STANDING):  aspirin  chewable 81 milliGRAM(s) Oral daily  atorvastatin 80 milliGRAM(s) Oral at bedtime  cefTRIAXone   IVPB 1000 milliGRAM(s) IV Intermittent every 24 hours  chlorhexidine 0.12% Liquid 15 milliLiter(s) Oral Mucosa every 12 hours  chlorhexidine 2% Cloths 1 Application(s) Topical <User Schedule>  heparin   Injectable 5000 Unit(s) SubCutaneous every 12 hours  hydrALAZINE 25 milliGRAM(s) Oral every 8 hours  influenza  Vaccine (HIGH DOSE) 0.7 milliLiter(s) IntraMuscular once  pantoprazole  Injectable 40 milliGRAM(s) IV Push daily  senna 2 Tablet(s) Oral at bedtime    --------------------------------------------------------------------------------------------------  Study Interpretation:    [Abbreviation Key:  PDR=alpha rhythm/posterior dominant rhythm. A-P=anterior posterior.  Amplitude: ‘very low’:<20; ‘low’:20-49; ‘medium’:; ‘high’:>150uV.  Persistence for periodic/rhythmic patterns (% of epoch) ‘rare’:<1%; ‘occasional’:1-10%; ‘frequent’:10-50%; ‘abundant’:50-90%; ‘continuous’:>90%.  Persistence for sporadic discharges: ‘rare’:<1/hr; ‘occasional’:1/min-1/hr; ‘frequent’:>1/min; ‘abundant’:>1/10 sec.  RPP=rhythmic and periodic patterns; GRDA=generalized rhythmic delta activity; FIRDA=frontal intermittent GRDA; LRDA=lateralized rhythmic delta activity; TIRDA=temporal intermittent rhythmic delta activity;  LPD=PLED=lateralized periodic discharges; GPD=generalized periodic discharges; BIPDs =bilateral independent periodic discharges; Mf=multifocal; SIRPDs=stimulus induced rhythmic, periodic, or ictal appearing discharges; BIRDs=brief potentially ictal rhythmic discharges >4 Hz, lasting .5-10s; PFA (paroxysmal bursts >13 Hz or =8 Hz <10s).  Modifiers: +F=with fast component; +S=with spike component; +R=with rhythmic component.  S-B=burst suppression pattern.  Max=maximal. N1-drowsy; N2-stage II sleep; N3-slow wave sleep. SSS/BETS=small sharp spikes/benign epileptiform transients of sleep. HV=hyperventilation; PS=photic stimulation]    FINDINGS:      Background:  Continuity: continuous  Symmetry: asymmetric  PDR: absent   Reactivity: absent   Voltage: normal (between 20-150uV)  Anterior Posterior Gradient: absent   Other background findings: none  Breach: absent    Background Slowing:  Generalized slowing: diffuse irregular delta and theta activity.  Focal slowing: continuous right hemispheric irregular delta slowing was present.    State Changes:   -N2 sleep transients were not recorded.    Sporadic Epileptiform Discharges:    None    Rhythmic and Periodic Patterns (RPPs):  Intermittent periodic discharges seen intermittently over the left hemisphere, sometimes with triphasic morphology, 0.5-1 hz.    Electrographic and Electroclinical seizures:  None    Other Clinical Events:  None    Activation Procedures:   -Hyperventilation was not performed.    -Photic stimulation was not performed.     Artifacts:  Intermittent myogenic and movement artifacts were noted.    ECG:  The heart rate on single channel ECG was predominantly between 60-70 BPM.    EEG Classification / Summary:  Abnormal EEG study  1. Intermittent periodic discharges seen intermittently over the left hemisphere, sometimes with triphasic morphology  2. Focal continuous right hemispheric slowing  3. Moderate generalized background slowing    -----------------------------------------------------------------------------------------------------    Clinical Impression:  1. While the periodic pattern noted above may represent lateralized periodic discharges indicating epileptic potential in the left hemisphere, more likely these represent generalized periodic discharges with triphasic morphology, consistent with metabolic etiology, that are poorly expressed on the right due to the effects of the stroke.   2. Structural abnormality in right hemisphere  3. Moderate diffuse/multi-focal cerebral dysfunction, not specific as to etiology.    Betito Pavon MD  Epilepsy Fellow, [facility]      -------------------------------------------------------------------------------------------------------  [facility] EEG Reading Room Ph#: [phone]  Epilepsy Answering Service after 5PM and before 8:30AM: Ph#: [phone]   ALBA PETERSON MRN-[id number]     Study Date: 		1055AM 10-29-23  Duration in hours:  x 22 min    --------------------------------------------------------------------------------------------------  History:  CC/ HPI Patient is a 88y old  Female who presents with a chief complaint of Ischemic Stroke, Poor MS requiring Intubation (29 Oct 2023 08:25)    MEDICATIONS  (STANDING):  aspirin  chewable 81 milliGRAM(s) Oral daily  atorvastatin 80 milliGRAM(s) Oral at bedtime  cefTRIAXone   IVPB 1000 milliGRAM(s) IV Intermittent every 24 hours  chlorhexidine 0.12% Liquid 15 milliLiter(s) Oral Mucosa every 12 hours  chlorhexidine 2% Cloths 1 Application(s) Topical <User Schedule>  heparin   Injectable 5000 Unit(s) SubCutaneous every 12 hours  hydrALAZINE 25 milliGRAM(s) Oral every 8 hours  influenza  Vaccine (HIGH DOSE) 0.7 milliLiter(s) IntraMuscular once  pantoprazole  Injectable 40 milliGRAM(s) IV Push daily  senna 2 Tablet(s) Oral at bedtime    --------------------------------------------------------------------------------------------------  Study Interpretation:    [Abbreviation Key:  PDR=alpha rhythm/posterior dominant rhythm. A-P=anterior posterior.  Amplitude: ‘very low’:<20; ‘low’:20-49; ‘medium’:; ‘high’:>150uV.  Persistence for periodic/rhythmic patterns (% of epoch) ‘rare’:<1%; ‘occasional’:1-10%; ‘frequent’:10-50%; ‘abundant’:50-90%; ‘continuous’:>90%.  Persistence for sporadic discharges: ‘rare’:<1/hr; ‘occasional’:1/min-1/hr; ‘frequent’:>1/min; ‘abundant’:>1/10 sec.  RPP=rhythmic and periodic patterns; GRDA=generalized rhythmic delta activity; FIRDA=frontal intermittent GRDA; LRDA=lateralized rhythmic delta activity; TIRDA=temporal intermittent rhythmic delta activity;  LPD=PLED=lateralized periodic discharges; GPD=generalized periodic discharges; BIPDs =bilateral independent periodic discharges; Mf=multifocal; SIRPDs=stimulus induced rhythmic, periodic, or ictal appearing discharges; BIRDs=brief potentially ictal rhythmic discharges >4 Hz, lasting .5-10s; PFA (paroxysmal bursts >13 Hz or =8 Hz <10s).  Modifiers: +F=with fast component; +S=with spike component; +R=with rhythmic component.  S-B=burst suppression pattern.  Max=maximal. N1-drowsy; N2-stage II sleep; N3-slow wave sleep. SSS/BETS=small sharp spikes/benign epileptiform transients of sleep. HV=hyperventilation; PS=photic stimulation]    FINDINGS:      Background:  Continuity: continuous  Symmetry: asymmetric  PDR: absent   Reactivity: absent   Voltage: normal (between 20-150uV)  Anterior Posterior Gradient: absent   Other background findings: none  Breach: absent    Background Slowing:  Generalized slowing: diffuse irregular delta and theta activity.  Focal slowing: continuous right hemispheric irregular delta slowing was present.    State Changes:   -N2 sleep transients were not recorded.    Sporadic Epileptiform Discharges:    None    Rhythmic and Periodic Patterns (RPPs):  Intermittent periodic discharges seen intermittently over the left hemisphere, sometimes with triphasic morphology, 0.5-1 hz.    Electrographic and Electroclinical seizures:  None    Other Clinical Events:  None    Activation Procedures:   -Hyperventilation was not performed.    -Photic stimulation was not performed.     Artifacts:  Intermittent myogenic and movement artifacts were noted.    ECG:  The heart rate on single channel ECG was predominantly between 60-70 BPM.    EEG Classification / Summary:  Abnormal EEG study  1. Intermittent periodic discharges seen intermittently over the left hemisphere, sometimes with triphasic morphology  2. Focal continuous right hemispheric slowing  3. Moderate generalized background slowing    -----------------------------------------------------------------------------------------------------    Clinical Impression:  1. While the periodic pattern noted above may represent lateralized periodic discharges indicating epileptic potential in the left hemisphere, more likely these represent generalized periodic discharges with triphasic morphology, consistent with metabolic etiology, that are poorly expressed on the right due to the effects of the stroke.   2. Structural abnormality in right hemisphere  3. Moderate diffuse/multi-focal cerebral dysfunction, not specific as to etiology.    Betito Pavon MD  Epilepsy Fellow, [facility]    Jason Mccarthy MD PhD  Director, Epilepsy Division, [facility]     -------------------------------------------------------------------------------------------------------  [facility] EEG Reading Room Ph#: [phone]  Epilepsy Answering Service after 5PM and before 8:30AM: Ph#: [phone]

## 2023-10-30 NOTE — CONSULT NOTE ADULT - CONVERSATION DETAILS
Met with daughter Nickie Thomas, grandson Pete Campos and pt dank who is an RN. Michelle Lopez NP had just reviewed the findings of the CT of this AM with family and stressed the grave situation     Dgt states that her brother Pepito Campos is the family decision maker. She believes that he is the HCP but non the less states "that he makes final decision"     Family  understands the severity of the patients present situation and is aware that she will not regain her pre hospitalization mental status nor will she breathe on her own again. Nickie states that family is very faithful and believes in miracles. She is aware that anything shy of a miracle, the situation is moribund     Discussed next steps to include trach and PGT placement with transfer to LTC vs Palliative intubation to allow patient to peacefully die with her dignity intact.     Discussed with CCU team who are aware that family will be arriving this evening to discuss above options.  called for prayer and bedside support

## 2023-10-30 NOTE — CONSULT NOTE ADULT - PROBLEM SELECTOR RECOMMENDATION 4
support to family, awaiting decision for elective extubation vs trach and PGT placement   Son (decision maker) will arrive at hospital tonight after 5 PM to discuss with ICU staff

## 2023-10-30 NOTE — PROGRESS NOTE ADULT - SUBJECTIVE AND OBJECTIVE BOX
HPI:  Ms. Longoria is an 88 year old female with a PMH of HTN, HLD and afib on xarelto who presented to [facility] earlier this morning after she was found altered, nonverbal and with right gaze deviation when she woke up. At baseline pt is ambulatory and verbal. Pt required intubation in ED for airway protection given poor MS. NIHSS calculated as 30 at that time. Pt not a candidate for TNK or transfer for intervention per stroke neurologist. She remains intubated on nicardipine gtt. ICU team consulted. (26 Oct 2023 05:50)      24 hr events:      ## Labs:  CBC:                        11.5   12.93 )-----------( 233      ( 30 Oct 2023 02:20 )             36.2     Chem:  10-30    137  |  102  |  25<H>  ----------------------------<  152<H>  4.1   |  30  |  0.73    Ca    9.6      30 Oct 2023 02:20  Phos  2.4     10-30  Mg     2.6     10-30    TPro  7.1  /  Alb  2.8<L>  /  TBili  1.0  /  DBili  x   /  AST  98<H>  /  ALT  31  /  AlkPhos  42  10-30    Coags:          ## Imaging:    ## Medications:    hydrALAZINE 25 milliGRAM(s) Oral every 8 hours  hydrALAZINE Injectable 10 milliGRAM(s) IV Push every 4 hours PRN      atorvastatin 80 milliGRAM(s) Oral at bedtime    aspirin  chewable 81 milliGRAM(s) Oral daily  heparin   Injectable 5000 Unit(s) SubCutaneous every 12 hours    bisacodyl Suppository 10 milliGRAM(s) Rectal daily PRN  pantoprazole  Injectable 40 milliGRAM(s) IV Push daily  polyethylene glycol 3350 17 Gram(s) Oral daily  senna 2 Tablet(s) Oral at bedtime    acetaminophen     Tablet .. 650 milliGRAM(s) Oral every 6 hours PRN      ## Vitals:  T(C): 35.8 (10-30-23 @ 19:06), Max: 37.8 (10-29-23 @ 20:00)  HR: 68 (10-30-23 @ 19:06) (56 - 117)  BP: 172/70 (10-30-23 @ 19:00) (146/66 - 221/137)  BP(mean): 101 (10-30-23 @ 19:00) (89 - 159)  RR: 21 (10-30-23 @ 19:06) (14 - 26)  SpO2: 100% (10-30-23 @ 19:06) (99% - 100%)  Wt(kg): --  Vent: Mode: AC/ CMV (Assist Control/ Continuous Mandatory Ventilation), RR (machine): 14, RR (patient): 17, TV (machine): 400, FiO2: 30, PEEP: 5, PIP: 19  ABG:       10-29 @ 07:01  -  10-30 @ 07:00  --------------------------------------------------------  IN: 1335 mL / OUT: 1315 mL / NET: 20 mL    10-30 @ 07:01  -  10-30 @ 19:29  --------------------------------------------------------  IN: 520 mL / OUT: 610 mL / NET: -90 mL          ## P/E:  Gen: lying comfortably in bed in no apparent distress  Mouth:   Neck:  Lungs:   Heart:   Abd:  Ext:  Neuro:    CENTRAL LINE: [ ] YES [ ] NO  LOCATION:   DATE INSERTED:  REMOVE: [ ] YES [ ] NO      PATEL: [ ] YES [ ] NO    DATE INSERTED:  REMOVE:  [ ] YES [ ] NO      A-LINE:  [ ] YES [ ] NO  LOCATION:   DATE INSERTED:  REMOVE:  [ ] YES [ ] NO  EXPLAIN:    GLOBAL ISSUE/BEST PRACTICE:  Analgesia:  Sedation:  HOB elevation: yes  Stress ulcer prophylaxis:  VTE prophylaxis:  Oral Care:  Glycemic control:  Nutrition:    CODE STATUS: [ ] full code  [ ] DNR  [ ] DNI  [ ] MOLST  Goals of care discussion: [ ] yes  HPI:  Pt is an 89 yo F with h/o A fib on Xarelto, HTN and HLD who presented to [facility] after she was found altered, nonverbal and with right gaze deviation when she woke up. At baseline pt is ambulatory and verbal. Pt required intubation in ER for airway protection given poor MS. Pt assessed by Stroke team: GIO ~4 hours ago, on Rivaroxaban, presents with AMS/weakness with NIHSS of 30 on ED arrival.  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  Given age, extended time window, oral anticoagulant use, large infarct, and high NIHSS, tPA relatively contraindicated/very high risk for heme and unlikely to be net beneficial.  D/w endovascular fellow Reanna, imaging reviewed, not a candidate for endovascular intervention. Pt admitted to the ICU. Today pt noted with change in neuro exam and sent urgent for CT head: large acute infarcts right JIMMY and MCA territories. Significant associated mass effect R -> L and associated herniation.      ## Labs:  CBC:                        11.5   12.93 )-----------( 233      ( 30 Oct 2023 02:20 )             36.2     Chem:  10-30    137  |  102  |  25<H>  ----------------------------<  152<H>  4.1   |  30  |  0.73    Ca    9.6      30 Oct 2023 02:20  Phos  2.4     10-30  Mg     2.6     10-30    TPro  7.1  /  Alb  2.8<L>  /  TBili  1.0  /  DBili  x   /  AST  98<H>  /  ALT  31  /  AlkPhos  42  10-30    Coags:          ## Imaging:    ## Medications:    hydrALAZINE 25 milliGRAM(s) Oral every 8 hours  hydrALAZINE Injectable 10 milliGRAM(s) IV Push every 4 hours PRN      atorvastatin 80 milliGRAM(s) Oral at bedtime    aspirin  chewable 81 milliGRAM(s) Oral daily  heparin   Injectable 5000 Unit(s) SubCutaneous every 12 hours    bisacodyl Suppository 10 milliGRAM(s) Rectal daily PRN  pantoprazole  Injectable 40 milliGRAM(s) IV Push daily  polyethylene glycol 3350 17 Gram(s) Oral daily  senna 2 Tablet(s) Oral at bedtime    acetaminophen     Tablet .. 650 milliGRAM(s) Oral every 6 hours PRN      ## Vitals:  T(C): 35.8 (10-30-23 @ 19:06), Max: 37.8 (10-29-23 @ 20:00)  HR: 68 (10-30-23 @ 19:06) (56 - 117)  BP: 172/70 (10-30-23 @ 19:00) (146/66 - 221/137)  BP(mean): 101 (10-30-23 @ 19:00) (89 - 159)  RR: 21 (10-30-23 @ 19:06) (14 - 26)  SpO2: 100% (10-30-23 @ 19:06) (99% - 100%)  Wt(kg): --  Vent: Mode: AC/ CMV (Assist Control/ Continuous Mandatory Ventilation), RR (machine): 14, RR (patient): 17, TV (machine): 400, FiO2: 30, PEEP: 5, PIP: 19  ABG:       10-29 @ 07:01  -  10-30 @ 07:00  --------------------------------------------------------  IN: 1335 mL / OUT: 1315 mL / NET: 20 mL    10-30 @ 07:01  -  10-30 @ 19:29  --------------------------------------------------------  IN: 520 mL / OUT: 610 mL / NET: -90 mL          ## P/E:  Gen: lying comfortably in bed in no apparent distress  Mouth: (+) ETT/OGT  Lungs: Rhonchi  Heart: Irregular  Abd: Soft/+BS/ Non-tender  Ext: L hand edema  Neuro: Pupils fixed/ (+) gag/cough reflexes/ responsive to painful stimuli/ (+) spont resp    CENTRAL LINE: [ ] YES [ ] NO  LOCATION:   DATE INSERTED:  REMOVE: [ ] YES [ ] NO      PATEL: [ ] YES [ ] NO    DATE INSERTED:  REMOVE:  [ ] YES [ ] NO      A-LINE:  [ ] YES [ ] NO  LOCATION:   DATE INSERTED:  REMOVE:  [ ] YES [ ] NO  EXPLAIN:    CODE STATUS: [x ] full code  [ ] DNR  [ ] DNI  [ ] MOLST  Goals of care discussion: [ ] yes

## 2023-10-30 NOTE — PROGRESS NOTE ADULT - ASSESSMENT
87 y/o female with pmhx of afib on xarelto, HTN, HLD who presented on 10/26 with AMS, right gaze deviation, aphasic intubated in ED for airway protection. CT with R ICA occlusion and possible dissection with large core infarct on CTP. did not receive tpa due to anticoagulant use, age, extended window, large infarct. deemed not candidate for mechanical intervention.    1. acute CVA  2. hypoxic respiratory failure    - family does not want trach, but does not want to withdraw care at this time  - continue lugn protective vent strategy. on minimal fio2 settings. mental status precludes extubation  - high risk for herniation. allow sodium to continue to uptrend to target at least > 140 though will not change/improve tissue ischemia  - EEG without seizures

## 2023-10-30 NOTE — PROGRESS NOTE ADULT - ASSESSMENT
88 year old female with a PMH of HTN, HLD and afib on xarelto who did not take medications for past 1 week prior to hospitalization here with R MCA infarct.   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 Can consider repeat CTH to help with GOC discussion  - EEG as above  - Palliative consult / GOC discussion    Alesha Hilton DO  Vascular Neurology  Office [phone]        88 year old female with a PMH of HTN, HLD and afib on xarelto who did not take medications for past 1 week prior to hospitalization here with R MCA infarct.   CTH neg  CTA with R ICA occlusion  CTP with large R MCA core infarct, not ammenable to thrombectomy   EEG with LPDs over the L hemisphere, R hemispheric slowing, no seizures seen  Pupils near fixed and dilated o/e    R MCA/ICA infarct 2/2 R ICA occlusion, likely cardioembolic from AF    Plan:  - repeat CTH now  - ASA 81mg  - hold AC for 2 weeks from date of stroke - risk for hemorrhagic conversion  - EEG as above  - Palliative consult / GOC discussion    D/w daughter at bedside.     Alesha Hilton DO  Vascular Neurology  Office [phone]

## 2023-10-30 NOTE — CHART NOTE - NSCHARTNOTEFT_GEN_A_CORE
[facility] conversation with 3 children at bedside, Otoniel Beyer and Their sister.  I updated the family as to the patient's CT results showing a large hemispheric infarct. I informed them that the likelihood of meaningful recovery was virtually 0.  We discussed the patient's goals of care.  Otoniel stated that he did not want his mother to suffer and therefore would not want a tracheostomy.  I explained comfort care but Otoniel stated he did not want that either and saw nothing wrong with his mother being intubated indefinitely.  I demonstrated that she is likely feeling pain and that leaving her intubated would increase that pain over both comfort care and tracheostomy. The conversation ended with Otoniel stating that he would like to leave everything as it currently is and they will let us know if their plans change.  total time spent 45 minutes

## 2023-10-30 NOTE — CONSULT NOTE ADULT - PROBLEM SELECTOR RECOMMENDATION 2
pt intubated in ED as she was unable to manage her airway   remains intubated with progression of  stroke  Mode: AC/ CMV (Assist Control/ Continuous Mandatory Ventilation)  RR: 14 TV  400  FiO2: 30 PEEP: 5 MAP: 8  pending family decision elective extubation vs trach and PGT

## 2023-10-30 NOTE — CONSULT NOTE ADULT - PROBLEM SELECTOR RECOMMENDATION 9
CTA with R ICA occlusion  Initial CTP on admission  with large R MCA core infarct, not amenable to thrombectomy   today  with progressing  R JIMMY and MCA territory infarct with significant mass effect and associated herniation 2/2 R ICA occlusion, likely cardioembolic from AF  Followed by neuro with conservative treatment measures

## 2023-10-30 NOTE — CONSULT NOTE ADULT - SUBJECTIVE AND OBJECTIVE BOX
ALBA PETERSON  MRN-[id number]  : 1935    HPI:  Ms. Peterson is an 88 year old female with a PMH of HTN, HLD and afib on xarelto who presented to [facility] earlier this morning after she was found altered, nonverbal and with right gaze deviation when she woke up. At baseline pt is ambulatory and verbal. Pt required intubation in ED for airway protection given poor MS. NIHSS calculated as 30 at that time. Pt not a candidate for TNK or transfer for intervention per stroke neurologist. She remains intubated on nicardipine gtt. ICU team consulted. (26 Oct 2023 05:50)    PAST MEDICAL & SURGICAL HISTORY:  HTN (hypertension)  Elevated cholesterol  Atrial fibrillation  Hyperlipidemia    FAMILY HISTORY:  Reviewed and found non contributory in mother or father    SOCIAL HISTORY:     ROS:    Unable to attain due to:                      Dyspnea (Shruthi 0-10):                        N/V (Y/N):                              Secretions (Y/N) :                Agitation(Y/N):   Pain (Y/N):       http://geriatrictoolkit.[place].[facility]/cog/painad.pdf  https://www.[facility].gov.au/__data/assets/pdf_file/0018/268550/[place]_Pain_Scale_Final.pdf  -Provocation/Palliation:  -Quality/Quantity:  -Radiating:  -Severity:  -Timing/Frequency:  -Impact on ADLs:    General:  Denied  HEENT:    Denied  Neck:  Denied  CVS:  Denied  Resp:  Denied  GI:  Denied Last BM:   :  Denied  Musc:  Denied  Neuro:  Denied  Psych:  Denied  Skin:  Denied  Lymph:  Denied    Allergies    No Known Allergies    Intolerances    Opiate Naive (Y/N):   -iStop reviewed (Y/N): Yes. No Rx found on iStop review:  | Reference #: [id number]     Medications:      MEDICATIONS  (STANDING):  aspirin  chewable 81 milliGRAM(s) Oral daily  atorvastatin 80 milliGRAM(s) Oral at bedtime  cefTRIAXone   IVPB 1000 milliGRAM(s) IV Intermittent every 24 hours  chlorhexidine 0.12% Liquid 15 milliLiter(s) Oral Mucosa every 12 hours  chlorhexidine 2% Cloths 1 Application(s) Topical <User Schedule>  heparin   Injectable 5000 Unit(s) SubCutaneous every 12 hours  hydrALAZINE 25 milliGRAM(s) Oral every 8 hours  influenza  Vaccine (HIGH DOSE) 0.7 milliLiter(s) IntraMuscular once  pantoprazole  Injectable 40 milliGRAM(s) IV Push daily  polyethylene glycol 3350 17 Gram(s) Oral daily  senna 2 Tablet(s) Oral at bedtime    MEDICATIONS  (PRN):  acetaminophen     Tablet .. 650 milliGRAM(s) Oral every 6 hours PRN Temp greater or equal to 38C (100.4F)  bisacodyl Suppository 10 milliGRAM(s) Rectal daily PRN Constipation  hydrALAZINE Injectable 10 milliGRAM(s) IV Push every 4 hours PRN systolic>180    Labs:    CBC:                        11.5   12.93 )-----------( 233      ( 30 Oct 2023 02:20 )             36.2     CMP:    10-30    137  |  102  |  25<H>  ----------------------------<  152<H>  4.1   |  30  |  0.73    Ca    9.6      30 Oct 2023 02:20  Phos  2.4     10-30  Mg     2.6     10-30    TPro  7.1  /  Alb  2.8<L>  /  TBili  1.0  /  DBili  x   /  AST  98<H>  /  ALT  31  /  AlkPhos  42  10-30        Urinalysis Basic - ( 30 Oct 2023 02:20 )    Color: x / Appearance: x / SG: x / pH: x  Gluc: 152 mg/dL / Ketone: x  / Bili: x / Urobili: x   Blood: x / Protein: x / Nitrite: x   Leuk Esterase: x / RBC: x / WBC x   Sq Epi: x / Non Sq Epi: x / Bacteria: x    Imaging:     < from: CT Brain Perfusion Maps Stroke (10.26.23 @ 04:54) >  ACC: [id number] EXAM:  CT ANGIO NECK STROKE PROTCL IC   ORDERED BY: LAURA NASH     ACC: [id number] EXAM:  CT BRAIN PERFUSION MAPS STROKE   ORDERED BY: LAURA NASH     ACC: [id number] EXAM:  CT ANGIO BRAIN STROKE PROTC IC   ORDERED BY: LAURA NASH     PROCEDURE DATE:  10/26/2023          INTERPRETATION:  CLINICAL INFORMATION: Stroke code.  Altered mental   status.  Patient withdraws to pain.    COMPARISON: CT angiography neck and brain from 10/22/2019    IMPRESSION:    CT PERFUSION: CT perfusion is consistent with large acute infarct in the right middle cerebral artery vascular territory and small to moderate amount of surrounding ischemic penumbra.  Hypoperfusion index is 0.3.    Core infarct (CBF <  30%:): 136 mL  Penumbra (Tmax >6s): 194 mL  Mismatched volume: 58 mL  Mismatched ratio: 1.4    CT ANGIOGRAPHY NECK:  1.  The right internal carotid artery is occluded approximately 1.5 cm   distal to its origin, possibly due to underlying dissection.  2.  Endotracheal tube is low in position, with its tip approximate 1 cm   above the slade.  3.  Diffuse ill-defined groundglass opacity in both lungs, which may   represent pulmonary edema, infection and/or atelectasis.  4.  There is a discrete right upper lobe groundglass opacity measuring   1.9 x 1.4 cm; this may represent a focus of infection, inflammation,   edema, hemorrhage, fibrosis or malignancy.  A follow-up chest CT is   warranted in three months to assess for resolution.    CT ANGIOGRAPHY BRAIN: The right internal carotid arteries occluded.    There is no significant flow related opacification within the proximal   right middle cerebral artery.  There is mild reconstitution of flow in   some distal branch vessels of the inferior division of the right middle   cerebral artery.  The right anterior cerebral artery fills across the   anterior communicating artery.    I discussed the major findings with Dr. Nash in the [facility]   Emergency Department on 10/26/2023 at 4:55 AM.  Read back verification   was obtained.    ACC: [id number] EXAM:  ECHO TTE WO CON COMP W DOPP                          PROCEDURE DATE:  10/26/2023      INTERPRETATION:  TRANSTHORACIC ECHOCARDIOGRAM REPORT  Patient Name:   ALBA PETERSON Patient Location: [facility] Rec #:  [id number]      Accession #:      [id number]  Account #:      [id number]      Height:           63.8 in 162.0 cm  YOB: 1935        Weight:           138.9 lb 63.00 kg  Patient Age:    88 years        BSA:              1.67 m²  Patient Gender: F               BP:               175/60 mmHg    Date of Exam:        10/26/2023 4:32:15 PM    Summary:   1. Left ventricular ejection fraction, by visual estimation, is 55 to   60%.   2. Technically adequate study.   3. Normal global left ventricular systolic function.   4. Mildly increased LV wall thickness.   5. Normal left ventricular internal cavity size.   6. Spectral Doppler shows impaired relaxation pattern of left   ventricular myocardial filling (Grade I diastolic dysfunction).   7. Normal right ventricular size and function.   8. Mild to moderately enlarged left atrium.   9. Moderately enlarged right atrium.  10. Small pericardial effusion.  11. Mild mitral valve regurgitation.  12. Mild thickening and calcification of the anterior and posterior   mitral valve leaflets.  13. Mild tricuspid regurgitation.  14. Sclerotic aortic valve with normal opening.  15. Mild to moderate pulmonic valve regurgitation.  16. Increased relative wall thickness with normal mass index consistent   with left ventricular concentric remodeling.    < from: Xray Chest 1 View- PORTABLE-Urgent (Xray Chest 1 View- PORTABLE-Urgent .) (10.26.23 @ 18:54) >    ACC: [id number] EXAM:  XR CHEST PORTABLE URGENT 1V   ORDERED BY: DELIA HUFF     PROCEDURE DATE:  10/26/2023      INTERPRETATION:  Chest one view    COMPARISON STUDY: Earlier the same day    IMPRESSION:  Right lung clearing. Progression of left effusion.      PEx:  T(C): 36 (10-30-23 @ 09:00), Max: 37.8 (10-29-23 @ 20:00)  HR: 63 (10-30-23 @ 09:02) (56 - 109)  BP: 200/93 (10-30-23 @ 09:00) (146/66 - 217/62)  RR: 16 (10-30-23 @ 09:00) (16 - 31)  SpO2: 100% (10-30-23 @ 09:02) (100% - 100%)  Daily Weight in k.3 (30 Oct 2023 04:00)    General:   HEENT:    Neck:   CVS:   Resp:   GI:    :    Musc:     Neuro:   Psych:     Skin:  Lymph:  Preadmit Karnofsky:  %           Current Karnofsky:     %  http://www.npcrc.org/files/news/karnofsky_performance_scale.pdf   http://www.npcrc.org/files/news/palliative_performance_scale_PPSv2.pdf  Cachexia (Y/N):   BMI:23.9    Advanced Directives: Full Code    Decision maker:  Legal surrogate:    GOALS OF CARE DISCUSSION       Palliative care info/counseling provided	           Family meeting       Advanced Directives addressed please see GOC        discussion     ALBA PETERSON  [id number]  : 1935    HPI:  88 year old female with a PMH of HTN, HLD and afib on xarelto who presented to [facility] on 10/26/2023  after she was found altered, nonverbal and with right gaze deviation that began earlier that morning. Per pt daughter Nickie, pt had been on Xaralto but had not received for an unknown time (she believes it was days to a week) because she "ran out of pills"  At baseline pt is ambulatory and verbal. Pt required intubation in ED for airway protection given poor MS. NIHSS calculated as 30 at that time.     Initial CT revealed  large acute infarct in the right middle cerebral artery vascular territory and small to moderate amount of surrounding ischemic penumbra. Pt wasnot a candidate for TNK or transfer for intervention per stroke neurologist. Pt was transferred to CCU for specialized level of care.    This AM stroke code  called following new onset of abnormal cardiac rhythm F/U CT brain shows a large acute infarcts right JIMMY and MCA territories. with significant associated mass effect and associated herniation.    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[employer] and family is of great geena    ROS:    Unable to attain due to: AMS                      Dyspnea (Shruthi 0-10):  on ventilatory support                       N/V (Y/N):  N                            Secretions (Y/N) :  N              Agitation(Y/N): no agitation, not sedated   Pain (Y/N): unable to assess, appears comfortable       http://geriatrictoolkit.[place].[facility]/cog/painad.pdf  https://www.[facility].gov.au/__data/assets/pdf_file/0018/173607/[place]_Pain_Scale_Final.pdf  -Provocation/Palliation:  -Quality/Quantity:  -Radiating:  -Severity:  -Timing/Frequency:  -Impact on ADLs:    Allergies    No Known Allergies    Intolerances    Opiate Naive (Y/N): Y  -iStop reviewed (Y/N): Yes. No Rx found on iStop review:  | Reference #: [id number]     Medications:      MEDICATIONS  (STANDING):  aspirin  chewable 81 milliGRAM(s) Oral daily  atorvastatin 80 milliGRAM(s) Oral at bedtime  cefTRIAXone   IVPB 1000 milliGRAM(s) IV Intermittent every 24 hours  chlorhexidine 0.12% Liquid 15 milliLiter(s) Oral Mucosa every 12 hours  chlorhexidine 2% Cloths 1 Application(s) Topical <User Schedule>  heparin   Injectable 5000 Unit(s) SubCutaneous every 12 hours  hydrALAZINE 25 milliGRAM(s) Oral every 8 hours  influenza  Vaccine (HIGH DOSE) 0.7 milliLiter(s) IntraMuscular once  pantoprazole  Injectable 40 milliGRAM(s) IV Push daily  polyethylene glycol 3350 17 Gram(s) Oral daily  senna 2 Tablet(s) Oral at bedtime    MEDICATIONS  (PRN):  acetaminophen     Tablet .. 650 milliGRAM(s) Oral every 6 hours PRN Temp greater or equal to 38C (100.4F)  bisacodyl Suppository 10 milliGRAM(s) Rectal daily PRN Constipation  hydrALAZINE Injectable 10 milliGRAM(s) IV Push every 4 hours PRN systolic>180    Labs:    CBC:                        11.5   12.93 )-----------( 233      ( 30 Oct 2023 02:20 )             36.2     CMP:    10    137  |  102  |  25<H>  ----------------------------<  152<H>  4.1   |  30  |  0.73    Ca    9.6      30 Oct 2023 02:20  Phos  2.4     10-30  Mg     2.6     10-30    TPro  7.1  /  Alb  2.8<L>  /  TBili  1.0  /  DBili  x   /  AST  98<H>  /  ALT  31  /  AlkPhos  42  10-30        Urinalysis Basic - ( 30 Oct 2023 02:20 )    Color: x / Appearance: x / SG: x / pH: x  Gluc: 152 mg/dL / Ketone: x  / Bili: x / Urobili: x   Blood: x / Protein: x / Nitrite: x   Leuk Esterase: x / RBC: x / WBC x   Sq Epi: x / Non Sq Epi: x / Bacteria: x    Imaging:     < from: CT Brain Stroke Protocol (10.30.23 @ 11:02) >  ACC: [id number] EXAM:  CT BRAIN STROKE PROTOCOL   ORDERED BY: DEENA ANDREWS     PROCEDURE DATE:  10/30/2023      INTERPRETATION:  CT HEAD STROKE PROTOCOL    CLINICAL HISTORY: CVA AMS      COMPARISON:  Exam is compared to prior study of 10/26/2023    IMPRESSION:  Large acute infarcts right JIMMY and MCA territories. Significant associated mass effect and associated herniation.    < from: CT Brain Perfusion Maps Stroke (10.26.23 @ 04:54) >  ACC: [id number] EXAM:  CT ANGIO NECK STROKE PROTCL IC   ORDERED BY: LAURA VILLALOBOS     ACC: [id number] EXAM:  CT BRAIN PERFUSION MAPS STROKE   ORDERED BY: LARUA VILLALOBOS     ACC: [id number] EXAM:  CT ANGIO BRAIN STROKE PROTC IC   ORDERED BY: LAURA VILLALOBOS     PROCEDURE DATE:  10/26/2023      INTERPRETATION:  CLINICAL INFORMATION: Stroke code.  Altered mental   status.  Patient withdraws to pain.    COMPARISON: CT angiography neck and brain from 10/22/2019    IMPRESSION:    CT PERFUSION: CT perfusion is consistent with large acute infarct in the right middle cerebral artery vascular territory and small to moderate amount of surrounding ischemic penumbra.  Hypoperfusion index is 0.3.    Core infarct (CBF <  30%:): 136 mL  Penumbra (Tmax >6s): 194 mL  Mismatched volume: 58 mL  Mismatched ratio: 1.4    CT ANGIOGRAPHY NECK:  1.  The right internal carotid artery is occluded approximately 1.5 cm   distal to its origin, possibly due to underlying dissection.  2.  Endotracheal tube is low in position, with its tip approximate 1 cm above the slade.  3.  Diffuse ill-defined groundglass opacity in both lungs, which may represent pulmonary edema, infection and/or atelectasis.  4.  There is a discrete right upper lobe groundglass opacity measuring 1.9 x 1.4 cm; this may represent a focus of infection, inflammation,   edema, hemorrhage, fibrosis or malignancy.  A follow-up chest CT is warranted in three months to assess for resolution.    CT ANGIOGRAPHY BRAIN: The right internal carotid arteries occluded.    There is no significant flow related opacification within the proximal right middle cerebral artery.  There is mild reconstitution of flow in some distal branch vessels of the inferior division of the right middle   cerebral artery.  The right anterior cerebral artery fills across the anterior communicating artery.    ACC: [id number] EXAM:  ECHO TTE WO CON COMP W DOPP                          PROCEDURE DATE:  10/26/2023      INTERPRETATION:  TRANSTHORACIC ECHOCARDIOGRAM REPORT  Patient Name:   ALBA PETERSON Patient Location: [facility] Rec #:  [id number]      Accession #:      [id number]  Account #:      [id number]      Height:           63.8 in 162.0 cm  YOB: 1935        Weight:           138.9 lb 63.00 kg  Patient Age:    88 years        BSA:              1.67 m²  Patient Gender: F               BP:               175/60 mmHg    Date of Exam:        10/26/2023 4:32:15 PM    Summary:   1. Left ventricular ejection fraction, by visual estimation, is 55 to   60%.   2. Technically adequate study.   3. Normal global left ventricular systolic function.   4. Mildly increased LV wall thickness.   5. Normal left ventricular internal cavity size.   6. Spectral Doppler shows impaired relaxation pattern of left   ventricular myocardial filling (Grade I diastolic dysfunction).   7. Normal right ventricular size and function.   8. Mild to moderately enlarged left atrium.   9. Moderately enlarged right atrium.  10. Small pericardial effusion.  11. Mild mitral valve regurgitation.  12. Mild thickening and calcification of the anterior and posterior   mitral valve leaflets.  13. Mild tricuspid regurgitation.  14. Sclerotic aortic valve with normal opening.  15. Mild to moderate pulmonic valve regurgitation.  16. Increased relative wall thickness with normal mass index consistent   with left ventricular concentric remodeling.    < from: Xray Chest 1 View- PORTABLE-Urgent (Xray Chest 1 View- PORTABLE-Urgent .) (10.26.23 @ 18:54) >    ACC: [id number] EXAM:  XR CHEST PORTABLE URGENT 1V   ORDERED BY: DELIA HUFF     PROCEDURE DATE:  10/26/2023      INTERPRETATION:  Chest one view    COMPARISON STUDY: Earlier the same day    IMPRESSION:  Right lung clearing. Progression of left effusion.    PEx:  T(C): 36 (10-30-23 @ 09:00), Max: 37.8 (10-29-23 @ 20:00)  HR: 63 (10-30-23 @ 09:02) (56 - 109)  BP: 200/93 (10-30-23 @ 09:00) (146/66 - 217/62)  RR: 16 (10-30-23 @ 09:00) (16 - 31)  SpO2: 100% (10-30-23 @ 09:02) (100% - 100%)  Daily Weight in k.3 (30 Oct 2023 04:00)    General: ill appearing female in bed on ventilatory support  HEENT: B/L pupisl fixed and dilated, +ETT   Neck: supple  CVS: A Fib on monitor   Resp: intubated  GI: soft, NT   : wells in place    Neuro: obtunded, not on sedation   Skin: diaphoretic     Preadmit Karnofsky:  50% Current Karnofsky: 10 %  http://www.npcrc.org/files/news/karnofsky_performance_scale.pdf   http://www.npcrc.org/files/news/palliative_performance_scale_PPSv2.pdf  Cachexia (Y/N):   BMI:23.9    Advanced Directives: Full Code    Decision maker: Pepito Thomas  Legal surrogate: Pepito Thomas referred to as a surrogate at this time until family provides HCP paperwork. All pt children can defer to Pepito as decision maker      GOALS OF CARE DISCUSSION       Palliative care info/counseling provided	           Family meeting       Advanced Directives addressed please see GOC        discussion

## 2023-10-30 NOTE — PROGRESS NOTE ADULT - SUBJECTIVE AND OBJECTIVE BOX
Neurology Progress Note    S: Patient seen and examined. No new events overnight. patient denied CP, SOB, HA or pain.     Medication:  acetaminophen     Tablet .. 650 milliGRAM(s) Oral every 6 hours PRN  aspirin  chewable 81 milliGRAM(s) Oral daily  atorvastatin 80 milliGRAM(s) Oral at bedtime  bisacodyl Suppository 10 milliGRAM(s) Rectal daily PRN  cefTRIAXone   IVPB 1000 milliGRAM(s) IV Intermittent every 24 hours  chlorhexidine 0.12% Liquid 15 milliLiter(s) Oral Mucosa every 12 hours  chlorhexidine 2% Cloths 1 Application(s) Topical <User Schedule>  heparin   Injectable 5000 Unit(s) SubCutaneous every 12 hours  hydrALAZINE 25 milliGRAM(s) Oral every 8 hours  hydrALAZINE Injectable 10 milliGRAM(s) IV Push every 4 hours PRN  influenza  Vaccine (HIGH DOSE) 0.7 milliLiter(s) IntraMuscular once  pantoprazole  Injectable 40 milliGRAM(s) IV Push daily  polyethylene glycol 3350 17 Gram(s) Oral daily  senna 2 Tablet(s) Oral at bedtime      Vitals:  Vital Signs Last 24 Hrs  T(C): 36.1 (30 Oct 2023 08:00), Max: 37.8 (29 Oct 2023 20:00)  T(F): 97 (30 Oct 2023 08:00), Max: 100 (29 Oct 2023 20:00)  HR: 109 (30 Oct 2023 08:00) (56 - 109)  BP: 185/70 (30 Oct 2023 08:00) (146/66 - 217/62)  BP(mean): 106 (30 Oct 2023 08:00) (89 - 125)  RR: 18 (30 Oct 2023 08:00) (16 - 31)  SpO2: 100% (30 Oct 2023 08:00) (100% - 100%)      General Exam:   General Appearance: Appropriately dressed and in no acute distress       Head: Normocephalic, atraumatic and no dysmorphic features  Ear, Nose, and Throat: Moist mucous membranes  CVS: S1S2+  Resp: mechcanical ventilation  GI: soft NT/ND  Extremities: No edema or cyanosis  Skin: No bruises or rashes     Neurological Exam:  Mental Status: Intubated, not sedated. No spont eye opening. No verbal output, does not follow commands.   Cranial Nerves: PERRL 1-2mm, + VOR, no facial asymmetry    Motor: dec tone throughout, no spont movemnt noted   Sensation: WD to noxious in RUE, RLE. LLE triple flexon     I personally reviewed the below data/images/labs:      CBC Full  -  ( 30 Oct 2023 02:20 )  WBC Count : 12.93 K/uL  RBC Count : 4.29 M/uL  Hemoglobin : 11.5 g/dL  Hematocrit : 36.2 %  Platelet Count - Automated : 233 K/uL  Mean Cell Volume : 84.4 fl  Mean Cell Hemoglobin : 26.8 pg  Mean Cell Hemoglobin Concentration : 31.8 g/dL  Auto Neutrophil # : x  Auto Lymphocyte # : x  Auto Monocyte # : x  Auto Eosinophil # : x  Auto Basophil # : x  Auto Neutrophil % : x  Auto Lymphocyte % : x  Auto Monocyte % : x  Auto Eosinophil % : x  Auto Basophil % : x    10-30    137  |  102  |  25<H>  ----------------------------<  152<H>  4.1   |  30  |  0.73    Ca    9.6      30 Oct 2023 02:20  Phos  2.4     10-30  Mg     2.6     10-30    TPro  7.1  /  Alb  2.8<L>  /  TBili  1.0  /  DBili  x   /  AST  98<H>  /  ALT  31  /  AlkPhos  42  10-30    LIVER FUNCTIONS - ( 30 Oct 2023 02:20 )  Alb: 2.8 g/dL / Pro: 7.1 gm/dL / ALK PHOS: 42 U/L / ALT: 31 U/L / AST: 98 U/L / GGT: x             Urinalysis Basic - ( 30 Oct 2023 02:20 )    Color: x / Appearance: x / SG: x / pH: x  Gluc: 152 mg/dL / Ketone: x  / Bili: x / Urobili: x   Blood: x / Protein: x / Nitrite: x   Leuk Esterase: x / RBC: x / WBC x   Sq Epi: x / Non Sq Epi: x / Bacteria: x    < from: CT Head No Cont (10.22.19 @ 15:57) >  IMPRESSION:    1)  chronic ischemic changes in both hemispheres with volume loss. There   are no new infarct as compared to prior CT. This may be further assessed   with MR imaging.  2)  no hemorrhage or mass identified.    < end of copied text >  < from: CT Angio Head w/ IV Cont (10.22.19 @ 15:57) >  IMPRESSION    1. There is intimal thickening and calcified plaque in both carotid   bifurcations in the neck, but without significant stenosis. No evidence   of carotid vertebral occlusion or dissection.  2. Intracranially, there are atherosclerotic changes in the anterior and   posterior circulation, but without significant stenosis or occlusion.    < end of copied text >  < from: CT Brain Stroke Protocol (10.26.23 @ 04:37) >  IMPRESSION:  No evidence of acute intracranial hemorrhage, mass effect or large acute   territorial infarct, within limits of noncontrast CT technique.    < end of copied text >  < from: CT Angio Neck Stroke Protocol w/ IV Cont (10.26.23 @ 04:53) >  CT ANGIOGRAPHY NECK:  1.  The right internal carotid artery is occluded approximately 1.5 cm   distal to its origin, possibly due to underlying dissection.  2.  Endotracheal tube is low in position, with its tip approximate 1 cm   above the slade.  3.  Diffuse ill-defined groundglass opacity in both lungs, which may   represent pulmonary edema, infection and/or atelectasis.  4.  There is a discrete right upper lobe groundglass opacity measuring   1.9 x 1.4 cm; this may represent a focus of infection, inflammation,   edema, hemorrhage, fibrosis or malignancy.  A follow-up chest CT is   warranted in three months to assess for resolution.    CT ANGIOGRAPHY BRAIN: The right internal carotid arteries occluded.    There is no significant flow related opacification within the proximal   right middle cerebral artery.  There is mild reconstitution of flow in   some distal branch vessels of the inferior division of the right middle   cerebral artery.  The right anterior cerebral artery fills across the   anterior communicating artery.    < end of copied text >  < from: CT Brain Perfusion Maps Stroke (10.26.23 @ 04:54) >  CT PERFUSION: CT perfusion is consistent with large acute infarct in the   right middle cerebral artery vascular territory and small to moderate   amount of surrounding ischemic penumbra.  Hypoperfusion index is 0.3.    Core infarct (CBF <  30%:): 136 mL  Penumbra (Tmax >6s): 194 mL  Mismatched volume: 58 mL  Mismatched ratio: 1.4    < end of copied text >      < from: CT Head No Cont (10.22.19 @ 15:57) >  IMPRESSION:    1)  chronic ischemic changes in both hemispheres with volume loss. There   are no new infarct as compared to prior CT. This may be further assessed   with MR imaging.  2)  no hemorrhage or mass identified.    < end of copied text >  < from: CT Angio Head w/ IV Cont (10.22.19 @ 15:57) >  IMPRESSION    1. There is intimal thickening and calcified plaque in both carotid   bifurcations in the neck, but without significant stenosis. No evidence   of carotid vertebral occlusion or dissection.  2. Intracranially, there are atherosclerotic changes in the anterior and   posterior circulation, but without significant stenosis or occlusion.    < end of copied text >  < from: CT Brain Stroke Protocol (10.26.23 @ 04:37) >  IMPRESSION:  No evidence of acute intracranial hemorrhage, mass effect or large acute   territorial infarct, within limits of noncontrast CT technique.    < end of copied text >  < from: CT Angio Neck Stroke Protocol w/ IV Cont (10.26.23 @ 04:53) >  CT ANGIOGRAPHY NECK:  1.  The right internal carotid artery is occluded approximately 1.5 cm   distal to its origin, possibly due to underlying dissection.  2.  Endotracheal tube is low in position, with its tip approximate 1 cm   above the slade.  3.  Diffuse ill-defined groundglass opacity in both lungs, which may   represent pulmonary edema, infection and/or atelectasis.  4.  There is a discrete right upper lobe groundglass opacity measuring   1.9 x 1.4 cm; this may represent a focus of infection, inflammation,   edema, hemorrhage, fibrosis or malignancy.  A follow-up chest CT is   warranted in three months to assess for resolution.    CT ANGIOGRAPHY BRAIN: The right internal carotid arteries occluded.    There is no significant flow related opacification within the proximal   right middle cerebral artery.  There is mild reconstitution of flow in   some distal branch vessels of the inferior division of the right middle   cerebral artery.  The right anterior cerebral artery fills across the   anterior communicating artery.    < end of copied text >  < from: CT Brain Perfusion Maps Stroke (10.26.23 @ 04:54) >  CT PERFUSION: CT perfusion is consistent with large acute infarct in the   right middle cerebral artery vascular territory and small to moderate   amount of surrounding ischemic penumbra.  Hypoperfusion index is 0.3.    Core infarct (CBF <  30%:): 136 mL  Penumbra (Tmax >6s): 194 mL  Mismatched volume: 58 mL  Mismatched ratio: 1.4    < end of copied text >      < from: CT Head No Cont (10.22.19 @ 15:57) >  IMPRESSION:    1)  chronic ischemic changes in both hemispheres with volume loss. There   are no new infarct as compared to prior CT. This may be further assessed   with MR imaging.  2)  no hemorrhage or mass identified.    < end of copied text >  < from: CT Angio Head w/ IV Cont (10.22.19 @ 15:57) >  IMPRESSION    1. There is intimal thickening and calcified plaque in both carotid   bifurcations in the neck, but without significant stenosis. No evidence   of carotid vertebral occlusion or dissection.  2. Intracranially, there are atherosclerotic changes in the anterior and   posterior circulation, but without significant stenosis or occlusion.    < end of copied text >  < from: CT Brain Stroke Protocol (10.26.23 @ 04:37) >  IMPRESSION:  No evidence of acute intracranial hemorrhage, mass effect or large acute   territorial infarct, within limits of noncontrast CT technique.    < end of copied text >  < from: CT Angio Neck Stroke Protocol w/ IV Cont (10.26.23 @ 04:53) >  CT ANGIOGRAPHY NECK:  1.  The right internal carotid artery is occluded approximately 1.5 cm   distal to its origin, possibly due to underlying dissection.  2.  Endotracheal tube is low in position, with its tip approximate 1 cm   above the slade.  3.  Diffuse ill-defined groundglass opacity in both lungs, which may   represent pulmonary edema, infection and/or atelectasis.  4.  There is a discrete right upper lobe groundglass opacity measuring   1.9 x 1.4 cm; this may represent a focus of infection, inflammation,   edema, hemorrhage, fibrosis or malignancy.  A follow-up chest CT is   warranted in three months to assess for resolution.    CT ANGIOGRAPHY BRAIN: The right internal carotid arteries occluded.    There is no significant flow related opacification within the proximal   right middle cerebral artery.  There is mild reconstitution of flow in   some distal branch vessels of the inferior division of the right middle   cerebral artery.  The right anterior cerebral artery fills across the   anterior communicating artery.    < end of copied text >  < from: CT Brain Perfusion Maps Stroke (10.26.23 @ 04:54) >  CT PERFUSION: CT perfusion is consistent with large acute infarct in the   right middle cerebral artery vascular territory and small to moderate   amount of surrounding ischemic penumbra.  Hypoperfusion index is 0.3.    Core infarct (CBF <  30%:): 136 mL  Penumbra (Tmax >6s): 194 mL  Mismatched volume: 58 mL  Mismatched ratio: 1.4    < end of copied text >    EEG Classification / Summary:  Abnormal EEG study  1. Intermittent periodic discharges seen intermittently over the left hemisphere, sometimes with triphasic morphology  2. Focal continuous right hemispheric slowing  3. Moderate generalized background slowing    -----------------------------------------------------------------------------------------------------    Clinical Impression:  1. While the periodic pattern noted above may represent lateralized periodic discharges indicating epileptic potential in the left hemisphere, more likely these represent generalized periodic discharges with triphasic morphology, consistent with metabolic etiology, that are poorly expressed on the right due to the effects of the stroke.   2. Structural abnormality in right hemisphere  3. Moderate diffuse/multi-focal cerebral dysfunction, not specific as to etiology.   Neurology Progress Note    S: Patient seen and examined.     Medication:  acetaminophen     Tablet .. 650 milliGRAM(s) Oral every 6 hours PRN  aspirin  chewable 81 milliGRAM(s) Oral daily  atorvastatin 80 milliGRAM(s) Oral at bedtime  bisacodyl Suppository 10 milliGRAM(s) Rectal daily PRN  cefTRIAXone   IVPB 1000 milliGRAM(s) IV Intermittent every 24 hours  chlorhexidine 0.12% Liquid 15 milliLiter(s) Oral Mucosa every 12 hours  chlorhexidine 2% Cloths 1 Application(s) Topical <User Schedule>  heparin   Injectable 5000 Unit(s) SubCutaneous every 12 hours  hydrALAZINE 25 milliGRAM(s) Oral every 8 hours  hydrALAZINE Injectable 10 milliGRAM(s) IV Push every 4 hours PRN  influenza  Vaccine (HIGH DOSE) 0.7 milliLiter(s) IntraMuscular once  pantoprazole  Injectable 40 milliGRAM(s) IV Push daily  polyethylene glycol 3350 17 Gram(s) Oral daily  senna 2 Tablet(s) Oral at bedtime      Vitals:  Vital Signs Last 24 Hrs  T(C): 36.1 (30 Oct 2023 08:00), Max: 37.8 (29 Oct 2023 20:00)  T(F): 97 (30 Oct 2023 08:00), Max: 100 (29 Oct 2023 20:00)  HR: 109 (30 Oct 2023 08:00) (56 - 109)  BP: 185/70 (30 Oct 2023 08:00) (146/66 - 217/62)  BP(mean): 106 (30 Oct 2023 08:00) (89 - 125)  RR: 18 (30 Oct 2023 08:00) (16 - 31)  SpO2: 100% (30 Oct 2023 08:00) (100% - 100%)      General Exam:   General Appearance: intubated  Head: Normocephalic, atraumatic and no dysmorphic features  Ear, Nose, and Throat: Moist mucous membranes  CVS: tachycardic  Resp: mechcanical ventilation  GI: soft NT/ND  Extremities: No edema or cyanosis  Skin: No bruises or rashes     Neurological Exam:  Mental Status: Intubated, not sedated. No spont eye opening. No verbal output, does not follow commands.   Cranial Nerves: L pupil fixed and dilated, R pupil dilated, very minimally reactive, no VOR, no facial asymmetry , weak cough/gag  Motor: dec tone throughout, no spont movemnt noted   Sensation: WD to noxious in RUE, RLE. LLE triple flexon     I personally reviewed the below data/images/labs:      CBC Full  -  ( 30 Oct 2023 02:20 )  WBC Count : 12.93 K/uL  RBC Count : 4.29 M/uL  Hemoglobin : 11.5 g/dL  Hematocrit : 36.2 %  Platelet Count - Automated : 233 K/uL  Mean Cell Volume : 84.4 fl  Mean Cell Hemoglobin : 26.8 pg  Mean Cell Hemoglobin Concentration : 31.8 g/dL  Auto Neutrophil # : x  Auto Lymphocyte # : x  Auto Monocyte # : x  Auto Eosinophil # : x  Auto Basophil # : x  Auto Neutrophil % : x  Auto Lymphocyte % : x  Auto Monocyte % : x  Auto Eosinophil % : x  Auto Basophil % : x    10-30    137  |  102  |  25<H>  ----------------------------<  152<H>  4.1   |  30  |  0.73    Ca    9.6      30 Oct 2023 02:20  Phos  2.4     10-30  Mg     2.6     10-30    TPro  7.1  /  Alb  2.8<L>  /  TBili  1.0  /  DBili  x   /  AST  98<H>  /  ALT  31  /  AlkPhos  42  10-30    LIVER FUNCTIONS - ( 30 Oct 2023 02:20 )  Alb: 2.8 g/dL / Pro: 7.1 gm/dL / ALK PHOS: 42 U/L / ALT: 31 U/L / AST: 98 U/L / GGT: x             Urinalysis Basic - ( 30 Oct 2023 02:20 )    Color: x / Appearance: x / SG: x / pH: x  Gluc: 152 mg/dL / Ketone: x  / Bili: x / Urobili: x   Blood: x / Protein: x / Nitrite: x   Leuk Esterase: x / RBC: x / WBC x   Sq Epi: x / Non Sq Epi: x / Bacteria: x    < from: CT Head No Cont (10.22.19 @ 15:57) >  IMPRESSION:    1)  chronic ischemic changes in both hemispheres with volume loss. There   are no new infarct as compared to prior CT. This may be further assessed   with MR imaging.  2)  no hemorrhage or mass identified.    < end of copied text >  < from: CT Angio Head w/ IV Cont (10.22.19 @ 15:57) >  IMPRESSION    1. There is intimal thickening and calcified plaque in both carotid   bifurcations in the neck, but without significant stenosis. No evidence   of carotid vertebral occlusion or dissection.  2. Intracranially, there are atherosclerotic changes in the anterior and   posterior circulation, but without significant stenosis or occlusion.    < end of copied text >  < from: CT Brain Stroke Protocol (10.26.23 @ 04:37) >  IMPRESSION:  No evidence of acute intracranial hemorrhage, mass effect or large acute   territorial infarct, within limits of noncontrast CT technique.    < end of copied text >  < from: CT Angio Neck Stroke Protocol w/ IV Cont (10.26.23 @ 04:53) >  CT ANGIOGRAPHY NECK:  1.  The right internal carotid artery is occluded approximately 1.5 cm   distal to its origin, possibly due to underlying dissection.  2.  Endotracheal tube is low in position, with its tip approximate 1 cm   above the slade.  3.  Diffuse ill-defined groundglass opacity in both lungs, which may   represent pulmonary edema, infection and/or atelectasis.  4.  There is a discrete right upper lobe groundglass opacity measuring   1.9 x 1.4 cm; this may represent a focus of infection, inflammation,   edema, hemorrhage, fibrosis or malignancy.  A follow-up chest CT is   warranted in three months to assess for resolution.    CT ANGIOGRAPHY BRAIN: The right internal carotid arteries occluded.    There is no significant flow related opacification within the proximal   right middle cerebral artery.  There is mild reconstitution of flow in   some distal branch vessels of the inferior division of the right middle   cerebral artery.  The right anterior cerebral artery fills across the   anterior communicating artery.    < end of copied text >  < from: CT Brain Perfusion Maps Stroke (10.26.23 @ 04:54) >  CT PERFUSION: CT perfusion is consistent with large acute infarct in the   right middle cerebral artery vascular territory and small to moderate   amount of surrounding ischemic penumbra.  Hypoperfusion index is 0.3.    Core infarct (CBF <  30%:): 136 mL  Penumbra (Tmax >6s): 194 mL  Mismatched volume: 58 mL  Mismatched ratio: 1.4    < end of copied text >      < from: CT Head No Cont (10.22.19 @ 15:57) >  IMPRESSION:    1)  chronic ischemic changes in both hemispheres with volume loss. There   are no new infarct as compared to prior CT. This may be further assessed   with MR imaging.  2)  no hemorrhage or mass identified.    < end of copied text >  < from: CT Angio Head w/ IV Cont (10.22.19 @ 15:57) >  IMPRESSION    1. There is intimal thickening and calcified plaque in both carotid   bifurcations in the neck, but without significant stenosis. No evidence   of carotid vertebral occlusion or dissection.  2. Intracranially, there are atherosclerotic changes in the anterior and   posterior circulation, but without significant stenosis or occlusion.    < end of copied text >  < from: CT Brain Stroke Protocol (10.26.23 @ 04:37) >  IMPRESSION:  No evidence of acute intracranial hemorrhage, mass effect or large acute   territorial infarct, within limits of noncontrast CT technique.    < end of copied text >  < from: CT Angio Neck Stroke Protocol w/ IV Cont (10.26.23 @ 04:53) >  CT ANGIOGRAPHY NECK:  1.  The right internal carotid artery is occluded approximately 1.5 cm   distal to its origin, possibly due to underlying dissection.  2.  Endotracheal tube is low in position, with its tip approximate 1 cm   above the slade.  3.  Diffuse ill-defined groundglass opacity in both lungs, which may   represent pulmonary edema, infection and/or atelectasis.  4.  There is a discrete right upper lobe groundglass opacity measuring   1.9 x 1.4 cm; this may represent a focus of infection, inflammation,   edema, hemorrhage, fibrosis or malignancy.  A follow-up chest CT is   warranted in three months to assess for resolution.    CT ANGIOGRAPHY BRAIN: The right internal carotid arteries occluded.    There is no significant flow related opacification within the proximal   right middle cerebral artery.  There is mild reconstitution of flow in   some distal branch vessels of the inferior division of the right middle   cerebral artery.  The right anterior cerebral artery fills across the   anterior communicating artery.    < end of copied text >  < from: CT Brain Perfusion Maps Stroke (10.26.23 @ 04:54) >  CT PERFUSION: CT perfusion is consistent with large acute infarct in the   right middle cerebral artery vascular territory and small to moderate   amount of surrounding ischemic penumbra.  Hypoperfusion index is 0.3.    Core infarct (CBF <  30%:): 136 mL  Penumbra (Tmax >6s): 194 mL  Mismatched volume: 58 mL  Mismatched ratio: 1.4    < end of copied text >      < from: CT Head No Cont (10.22.19 @ 15:57) >  IMPRESSION:    1)  chronic ischemic changes in both hemispheres with volume loss. There   are no new infarct as compared to prior CT. This may be further assessed   with MR imaging.  2)  no hemorrhage or mass identified.    < end of copied text >  < from: CT Angio Head w/ IV Cont (10.22.19 @ 15:57) >  IMPRESSION    1. There is intimal thickening and calcified plaque in both carotid   bifurcations in the neck, but without significant stenosis. No evidence   of carotid vertebral occlusion or dissection.  2. Intracranially, there are atherosclerotic changes in the anterior and   posterior circulation, but without significant stenosis or occlusion.    < end of copied text >  < from: CT Brain Stroke Protocol (10.26.23 @ 04:37) >  IMPRESSION:  No evidence of acute intracranial hemorrhage, mass effect or large acute   territorial infarct, within limits of noncontrast CT technique.    < end of copied text >  < from: CT Angio Neck Stroke Protocol w/ IV Cont (10.26.23 @ 04:53) >  CT ANGIOGRAPHY NECK:  1.  The right internal carotid artery is occluded approximately 1.5 cm   distal to its origin, possibly due to underlying dissection.  2.  Endotracheal tube is low in position, with its tip approximate 1 cm   above the slade.  3.  Diffuse ill-defined groundglass opacity in both lungs, which may   represent pulmonary edema, infection and/or atelectasis.  4.  There is a discrete right upper lobe groundglass opacity measuring   1.9 x 1.4 cm; this may represent a focus of infection, inflammation,   edema, hemorrhage, fibrosis or malignancy.  A follow-up chest CT is   warranted in three months to assess for resolution.    CT ANGIOGRAPHY BRAIN: The right internal carotid arteries occluded.    There is no significant flow related opacification within the proximal   right middle cerebral artery.  There is mild reconstitution of flow in   some distal branch vessels of the inferior division of the right middle   cerebral artery.  The right anterior cerebral artery fills across the   anterior communicating artery.    < end of copied text >  < from: CT Brain Perfusion Maps Stroke (10.26.23 @ 04:54) >  CT PERFUSION: CT perfusion is consistent with large acute infarct in the   right middle cerebral artery vascular territory and small to moderate   amount of surrounding ischemic penumbra.  Hypoperfusion index is 0.3.    Core infarct (CBF <  30%:): 136 mL  Penumbra (Tmax >6s): 194 mL  Mismatched volume: 58 mL  Mismatched ratio: 1.4    < end of copied text >    EEG Classification / Summary:  Abnormal EEG study  1. Intermittent periodic discharges seen intermittently over the left hemisphere, sometimes with triphasic morphology  2. Focal continuous right hemispheric slowing  3. Moderate generalized background slowing    -----------------------------------------------------------------------------------------------------    Clinical Impression:  1. While the periodic pattern noted above may represent lateralized periodic discharges indicating epileptic potential in the left hemisphere, more likely these represent generalized periodic discharges with triphasic morphology, consistent with metabolic etiology, that are poorly expressed on the right due to the effects of the stroke.   2. Structural abnormality in right hemisphere  3. Moderate diffuse/multi-focal cerebral dysfunction, not specific as to etiology.

## 2023-10-30 NOTE — CONSULT NOTE ADULT - ASSESSMENT
88 year old female with a PMH of HTN, HLD and afib on xarelto who did not take medications for past 1 week prior to hospitalization here with progressing  R JIMMY/MCA infarct  likely cardioembolic from atrial fibrillation

## 2023-10-30 NOTE — INITIAL ORGAN DONATION REFERRAL - NSORGANDONATIONCLINICALTRIGGER_GEN_ALL_CORE
[facility] referral at the request of bedside RN Karina Vora; pt meeting criteria. [facility] rep:Kristin Conner; [facility] #[phone]

## 2023-10-30 NOTE — PROGRESS NOTE ADULT - SUBJECTIVE AND OBJECTIVE BOX
Patient is a 88y old  Female who presents with a chief complaint of Ischemic Stroke, Poor MS requiring Intubation (30 Oct 2023 19:28)      BRIEF HOSPITAL COURSE: 89 y/o female with pmhx of afib on xarelto, HTN, HLD who presented on 10/26 with AMS, right gaze deviation, aphasic intubated in ED for airway protection. CT with R ICA occlusion and possible dissection with large core infarct on CTP. did not receive tpa due to anticoagulant use, age, extended window, large infarct. deemed not candidate for mechanical intervention. admitted to ICU    Events last 24 hours: repeat head CT today with worsening right JIMMY and MCA infarcts with significant mass effect and signs of herniation.     PAST MEDICAL & SURGICAL HISTORY:  HTN (hypertension)      Elevated cholesterol      Atrial fibrillation      Hyperlipidemia          Review of Systems:  unable to obtain due to AMS      Medications:    hydrALAZINE 25 milliGRAM(s) Oral every 8 hours  hydrALAZINE Injectable 10 milliGRAM(s) IV Push every 4 hours PRN      acetaminophen     Tablet .. 650 milliGRAM(s) Oral every 6 hours PRN      aspirin  chewable 81 milliGRAM(s) Oral daily  heparin   Injectable 5000 Unit(s) SubCutaneous every 12 hours    bisacodyl Suppository 10 milliGRAM(s) Rectal daily PRN  pantoprazole  Injectable 40 milliGRAM(s) IV Push daily  polyethylene glycol 3350 17 Gram(s) Oral daily  senna 2 Tablet(s) Oral at bedtime      atorvastatin 80 milliGRAM(s) Oral at bedtime      influenza  Vaccine (HIGH DOSE) 0.7 milliLiter(s) IntraMuscular once    chlorhexidine 0.12% Liquid 15 milliLiter(s) Oral Mucosa every 12 hours  chlorhexidine 2% Cloths 1 Application(s) Topical <User Schedule>        Mode: AC/ CMV (Assist Control/ Continuous Mandatory Ventilation)  RR (machine): 14  TV (machine): 400  FiO2: 30  PEEP: 5  ITime: 1  MAP: 7  PIP: 19      ICU Vital Signs Last 24 Hrs  T(C): 36.1 (30 Oct 2023 20:00), Max: 37.8 (29 Oct 2023 22:00)  T(F): 97 (30 Oct 2023 20:00), Max: 100 (29 Oct 2023 22:00)  HR: 80 (30 Oct 2023 21:32) (56 - 117)  BP: 146/67 (30 Oct 2023 20:00) (146/66 - 221/137)  BP(mean): 90 (30 Oct 2023 20:00) (89 - 159)  ABP: --  ABP(mean): --  RR: 16 (30 Oct 2023 20:00) (14 - 26)  SpO2: 100% (30 Oct 2023 21:32) (99% - 100%)    O2 Parameters below as of 30 Oct 2023 20:00  Patient On (Oxygen Delivery Method): ventilator,  RR 14 Peep 5    O2 Concentration (%): 30            I&O's Detail    29 Oct 2023 07:01  -  30 Oct 2023 07:00  --------------------------------------------------------  IN:    Enteral Tube Flush: 240 mL    IV PiggyBack: 50 mL    Vital High Protein: 1045 mL  Total IN: 1335 mL    OUT:    Indwelling Catheter - Urethral (mL): 1315 mL  Total OUT: 1315 mL    Total NET: 20 mL      30 Oct 2023 07:01  -  30 Oct 2023 21:49  --------------------------------------------------------  IN:    Enteral Tube Flush: 390 mL    IV PiggyBack: 50 mL    Vital High Protein: 220 mL  Total IN: 660 mL    OUT:    Indwelling Catheter - Urethral (mL): 730 mL  Total OUT: 730 mL    Total NET: -70 mL            LABS:                        11.5   12.93 )-----------( 233      ( 30 Oct 2023 02:20 )             36.2     10-30    137  |  102  |  25<H>  ----------------------------<  152<H>  4.1   |  30  |  0.73    Ca    9.6      30 Oct 2023 02:20  Phos  2.4     10-30  Mg     2.6     10-30    TPro  7.1  /  Alb  2.8<L>  /  TBili  1.0  /  DBili  x   /  AST  98<H>  /  ALT  31  /  AlkPhos  42  10-30          CAPILLARY BLOOD GLUCOSE          Urinalysis Basic - ( 30 Oct 2023 02:20 )    Color: x / Appearance: x / SG: x / pH: x  Gluc: 152 mg/dL / Ketone: x  / Bili: x / Urobili: x   Blood: x / Protein: x / Nitrite: x   Leuk Esterase: x / RBC: x / WBC x   Sq Epi: x / Non Sq Epi: x / Bacteria: x      CULTURES:  Culture Results:   No growth (10-26 @ 18:30)  Culture Results:   No growth at 72 Hours (10-26 @ 18:26)  Culture Results:   No growth at 72 Hours (10-26 @ 18:20)  Rapid RVP Result: NotDetec (10-26 @ 18:20)  Culture Results:   Normal Respiratory Priya present (10-26 @ 12:50)      Physical Examination:    General: No acute distress.      HEENT: Pupils equal, nonreactive.  Symmetric.    PULM: Clear to auscultation bilaterally, no significant sputum production    NECK: Supple, no lymphadenopathy, trachea midline    CVS: Regular rate and rhythm, no murmurs, rubs, or gallops    ABD: Soft, nondistended, nontender, normoactive bowel sounds, no masses    EXT: No edema, nontender    SKIN: Warm and well perfused, no rashes noted.    NEURO: unresponsive. no withdrawal to noxious stimuli      RADIOLOGY:     IMPRESSION:  Large acute infarcts right JIMMY and MCA territories. Significant   associated mass effect and associated herniation.    Findings discussed with SALVADOR Garduno at 11:17 AM on 10/30/2023    --- End of Report ---            KELSEA MCCORMICK MD; Attending Radiologist  This document has been electronically signed. Oct 30 2023 11:26AM    CRITICAL CARE TIME SPENT:   33 minutes of critical care time spent providing medical care for patient's acute illness/conditions that impairs at least one vital organ system and/or poses a high risk of imminent or life threatening deterioration in the patient's condition. It includes time spent evaluating and treating the patient's acute illness as well as time spent reviewing labs, radiology, discussing goals of care with patient and/or patient's family, and discussing the case with a multidisciplinary team in an effort to prevent further life threatening deterioration or end organ damage. This time is independent of any procedures performed.

## 2023-10-31 NOTE — PROGRESS NOTE ADULT - ASSESSMENT
Pt is an 89 yo F with h/o A fib on Xarelto, HTN and HLD who presented to [facility] after she was found altered, nonverbal and with right gaze deviation when she woke up. At baseline pt is ambulatory and verbal. Pt required intubation in ER for airway protection given poor MS. Pt assessed by Stroke team: GIO ~4 hours ago, on Rivaroxaban, presents with AMS/weakness with NIHSS of 30 on ED arrival.  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  Given age, extended time window, oral anticoagulant use, large infarct, and high NIHSS, tPA relatively contraindicated/very high risk for heme and unlikely to be net beneficial.  D/w endovascular fellow Reanna, imaging reviewed, not a candidate for endovascular intervention. Pt admitted to the ICU. Today pt noted with change in neuro exam and sent urgent for CT head: large acute infarcts right JIMMY and MCA territories. Significant associated mass effect R -> L and associated herniation.     Resp: Cont current vent settings  CVS: Permissive HTN  HEME: DVT prophylaxis with sq Heparin  FEN: Cont enteral feeds/ Avoid hyponatremia  Endo: Goal Glu 140-180  Renal: Follow BUN/Cr and UO  Neuro/Social: Supportive care; pt with extremely poor neurological prognosis with increased risk to progress to brain death/ Family updated they are accepting of pt dying naturally but currently do not want to withdraw care/ Neuro f/u prn

## 2023-10-31 NOTE — PROGRESS NOTE ADULT - SUBJECTIVE AND OBJECTIVE BOX
HPI:  Pt is an 87 yo F with h/o A fib on Xarelto, HTN and HLD who presented to [facility] after she was found altered, nonverbal and with right gaze deviation when she woke up. At baseline pt is ambulatory and verbal. Pt required intubation in ER for airway protection given poor MS. Pt assessed by Stroke team: GIO ~4 hours ago, on Rivaroxaban, presents with AMS/weakness with NIHSS of 30 on ED arrival.  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  Given age, extended time window, oral anticoagulant use, large infarct, and high NIHSS, tPA relatively contraindicated/very high risk for heme and unlikely to be net beneficial.  D/w endovascular fellow Reanna, imaging reviewed, not a candidate for endovascular intervention. Pt admitted to the ICU. Today pt noted with change in neuro exam and sent urgent for CT head: large acute infarcts right JIMMY and MCA territories. Significant associated mass effect R -> L and associated herniation.       ## Labs:  CBC:                        12.8   9.66  )-----------( 266      ( 31 Oct 2023 02:35 )             39.3     Chem:  10-31    142  |  104  |  29<H>  ----------------------------<  144<H>  4.6   |  31  |  0.78    Ca    9.8      31 Oct 2023 02:35  Phos  2.6     10-31  Mg     2.6     10-31    TPro  7.4  /  Alb  2.7<L>  /  TBili  0.7  /  DBili  x   /  AST  102<H>  /  ALT  36  /  AlkPhos  49  10-31    Coags:          ## Imaging:    ## Medications:    hydrALAZINE 25 milliGRAM(s) Oral every 8 hours  hydrALAZINE Injectable 10 milliGRAM(s) IV Push every 4 hours PRN      atorvastatin 80 milliGRAM(s) Oral at bedtime    aspirin  chewable 81 milliGRAM(s) Oral daily  heparin   Injectable 5000 Unit(s) SubCutaneous every 12 hours    bisacodyl Suppository 10 milliGRAM(s) Rectal daily PRN  pantoprazole  Injectable 40 milliGRAM(s) IV Push daily  polyethylene glycol 3350 17 Gram(s) Oral daily  senna 2 Tablet(s) Oral at bedtime    acetaminophen     Tablet .. 650 milliGRAM(s) Oral every 6 hours PRN      ## Vitals:  T(C): 36.5 (10-31-23 @ 23:00), Max: 37.4 (10-31-23 @ 02:00)  HR: 89 (10-31-23 @ 23:00) (68 - 130)  BP: 152/80 (10-31-23 @ 23:00) (139/56 - 201/188)  BP(mean): 99 (10-31-23 @ 23:00) (74 - 195)  RR: 14 (10-31-23 @ 23:00) (14 - 34)  SpO2: 100% (10-31-23 @ 23:00) (100% - 100%)  Wt(kg): --  Vent: Mode: AC/ CMV (Assist Control/ Continuous Mandatory Ventilation), RR (machine): 14, RR (patient): 15, TV (machine): 400, FiO2: 30, PEEP: 5, PIP: 19  ABG:       10-30 @ 07:01  -  10-31 @ 07:00  --------------------------------------------------------  IN: 940 mL / OUT: 1090 mL / NET: -150 mL    10-31 @ 07:01  -  10-31 @ 23:20  --------------------------------------------------------  IN: 30 mL / OUT: 815 mL / NET: -785 mL          ## P/E:  Gen: lying comfortably in bed in no apparent distress  Mouth: (+) ETT/OGT  Lungs: CTA  Heart: Irregular  Abd: Soft/+BS/ Non-tender  Ext: No sign edema  Neuro: Pupils fixed/ (+) gag/cough reflexes/ (+) spont resp    CENTRAL LINE: [ ] YES [ ] NO  LOCATION:   DATE INSERTED:  REMOVE: [ ] YES [ ] NO      AMANDA: [ ] YES [ ] NO    DATE INSERTED:  REMOVE:  [ ] YES [ ] NO      A-LINE:  [ ] YES [ ] NO  LOCATION:   DATE INSERTED:  REMOVE:  [ ] YES [ ] NO  EXPLAIN:    CODE STATUS: [x ] full code  [ ] DNR  [ ] DNI  [ ] MOLST  Goals of care discussion: [ ] yes

## 2023-10-31 NOTE — PROGRESS NOTE ADULT - SUBJECTIVE AND OBJECTIVE BOX
Neurology Progress Note    S: Patient seen and examined.     Medications: MEDICATIONS  (STANDING):  aspirin  chewable 81 milliGRAM(s) Oral daily  atorvastatin 80 milliGRAM(s) Oral at bedtime  chlorhexidine 0.12% Liquid 15 milliLiter(s) Oral Mucosa every 12 hours  chlorhexidine 2% Cloths 1 Application(s) Topical <User Schedule>  heparin   Injectable 5000 Unit(s) SubCutaneous every 12 hours  hydrALAZINE 25 milliGRAM(s) Oral every 8 hours  influenza  Vaccine (HIGH DOSE) 0.7 milliLiter(s) IntraMuscular once  pantoprazole  Injectable 40 milliGRAM(s) IV Push daily  polyethylene glycol 3350 17 Gram(s) Oral daily  senna 2 Tablet(s) Oral at bedtime    MEDICATIONS  (PRN):  acetaminophen     Tablet .. 650 milliGRAM(s) Oral every 6 hours PRN Temp greater or equal to 38C (100.4F)  bisacodyl Suppository 10 milliGRAM(s) Rectal daily PRN Constipation  hydrALAZINE Injectable 10 milliGRAM(s) IV Push every 4 hours PRN systolic>180       Vitals:  Vital Signs Last 24 Hrs  T(C): 37 (31 Oct 2023 12:00), Max: 37.4 (31 Oct 2023 02:00)  T(F): 98.6 (31 Oct 2023 12:00), Max: 99.3 (31 Oct 2023 02:00)  HR: 87 (31 Oct 2023 13:00) (68 - 108)  BP: 149/58 (31 Oct 2023 13:00) (146/67 - 221/137)  BP(mean): 83 (31 Oct 2023 13:00) (83 - 195)  RR: 15 (31 Oct 2023 13:00) (15 - 26)  SpO2: 100% (31 Oct 2023 13:00) (100% - 100%)    Parameters below as of 31 Oct 2023 13:00  Patient On (Oxygen Delivery Method): ventilator    O2 Concentration (%): 30        General Exam:   General Appearance: intubated  Head: Normocephalic, atraumatic and no dysmorphic features  Ear, Nose, and Throat: Moist mucous membranes  CVS: tachycardic  Resp: mechcanical ventilation  GI: soft NT/ND  Extremities: No edema or cyanosis  Skin: No bruises or rashes     Neurological Exam:  Mental Status: Intubated, not sedated. No spont eye opening. No verbal output, does not follow commands.   Cranial Nerves: pupils fixed and dilated, no VOR, no facial asymmetry , weak cough/gag  Motor: dec tone throughout, no spont movemnt noted   Sensation: WD to noxious in RUE, RLE. LLE triple flexon     I personally reviewed the below data/images/labs:    LABS:                          12.8   9.66  )-----------( 266      ( 31 Oct 2023 02:35 )             39.3     10-31    142  |  104  |  29<H>  ----------------------------<  144<H>  4.6   |  31  |  0.78    Ca    9.8      31 Oct 2023 02:35  Phos  2.6     10-31  Mg     2.6     10-31    TPro  7.4  /  Alb  2.7<L>  /  TBili  0.7  /  DBili  x   /  AST  102<H>  /  ALT  36  /  AlkPhos  49  10-31    LIVER FUNCTIONS - ( 31 Oct 2023 02:35 )  Alb: 2.7 g/dL / Pro: 7.4 gm/dL / ALK PHOS: 49 U/L / ALT: 36 U/L / AST: 102 U/L / GGT: x             Urinalysis Basic - ( 31 Oct 2023 02:35 )    Color: x / Appearance: x / SG: x / pH: x  Gluc: 144 mg/dL / Ketone: x  / Bili: x / Urobili: x   Blood: x / Protein: x / Nitrite: x   Leuk Esterase: x / RBC: x / WBC x   Sq Epi: x / Non Sq Epi: x / Bacteria: x        < from: CT Head No Cont (10.22.19 @ 15:57) >  IMPRESSION:    1)  chronic ischemic changes in both hemispheres with volume loss. There   are no new infarct as compared to prior CT. This may be further assessed   with MR imaging.  2)  no hemorrhage or mass identified.    < end of copied text >  < from: CT Angio Head w/ IV Cont (10.22.19 @ 15:57) >  IMPRESSION    1. There is intimal thickening and calcified plaque in both carotid   bifurcations in the neck, but without significant stenosis. No evidence   of carotid vertebral occlusion or dissection.  2. Intracranially, there are atherosclerotic changes in the anterior and   posterior circulation, but without significant stenosis or occlusion.    < end of copied text >  < from: CT Brain Stroke Protocol (10.26.23 @ 04:37) >  IMPRESSION:  No evidence of acute intracranial hemorrhage, mass effect or large acute   territorial infarct, within limits of noncontrast CT technique.    < end of copied text >  < from: CT Angio Neck Stroke Protocol w/ IV Cont (10.26.23 @ 04:53) >  CT ANGIOGRAPHY NECK:  1.  The right internal carotid artery is occluded approximately 1.5 cm   distal to its origin, possibly due to underlying dissection.  2.  Endotracheal tube is low in position, with its tip approximate 1 cm   above the slade.  3.  Diffuse ill-defined groundglass opacity in both lungs, which may   represent pulmonary edema, infection and/or atelectasis.  4.  There is a discrete right upper lobe groundglass opacity measuring   1.9 x 1.4 cm; this may represent a focus of infection, inflammation,   edema, hemorrhage, fibrosis or malignancy.  A follow-up chest CT is   warranted in three months to assess for resolution.    CT ANGIOGRAPHY BRAIN: The right internal carotid arteries occluded.    There is no significant flow related opacification within the proximal   right middle cerebral artery.  There is mild reconstitution of flow in   some distal branch vessels of the inferior division of the right middle   cerebral artery.  The right anterior cerebral artery fills across the   anterior communicating artery.    < end of copied text >  < from: CT Brain Perfusion Maps Stroke (10.26.23 @ 04:54) >  CT PERFUSION: CT perfusion is consistent with large acute infarct in the   right middle cerebral artery vascular territory and small to moderate   amount of surrounding ischemic penumbra.  Hypoperfusion index is 0.3.    Core infarct (CBF <  30%:): 136 mL  Penumbra (Tmax >6s): 194 mL  Mismatched volume: 58 mL  Mismatched ratio: 1.4    < end of copied text >      < from: CT Head No Cont (10.22.19 @ 15:57) >  IMPRESSION:    1)  chronic ischemic changes in both hemispheres with volume loss. There   are no new infarct as compared to prior CT. This may be further assessed   with MR imaging.  2)  no hemorrhage or mass identified.    < end of copied text >  < from: CT Angio Head w/ IV Cont (10.22.19 @ 15:57) >  IMPRESSION    1. There is intimal thickening and calcified plaque in both carotid   bifurcations in the neck, but without significant stenosis. No evidence   of carotid vertebral occlusion or dissection.  2. Intracranially, there are atherosclerotic changes in the anterior and   posterior circulation, but without significant stenosis or occlusion.    < end of copied text >  < from: CT Brain Stroke Protocol (10.26.23 @ 04:37) >  IMPRESSION:  No evidence of acute intracranial hemorrhage, mass effect or large acute   territorial infarct, within limits of noncontrast CT technique.    < end of copied text >  < from: CT Angio Neck Stroke Protocol w/ IV Cont (10.26.23 @ 04:53) >  CT ANGIOGRAPHY NECK:  1.  The right internal carotid artery is occluded approximately 1.5 cm   distal to its origin, possibly due to underlying dissection.  2.  Endotracheal tube is low in position, with its tip approximate 1 cm   above the slade.  3.  Diffuse ill-defined groundglass opacity in both lungs, which may   represent pulmonary edema, infection and/or atelectasis.  4.  There is a discrete right upper lobe groundglass opacity measuring   1.9 x 1.4 cm; this may represent a focus of infection, inflammation,   edema, hemorrhage, fibrosis or malignancy.  A follow-up chest CT is   warranted in three months to assess for resolution.    CT ANGIOGRAPHY BRAIN: The right internal carotid arteries occluded.    There is no significant flow related opacification within the proximal   right middle cerebral artery.  There is mild reconstitution of flow in   some distal branch vessels of the inferior division of the right middle   cerebral artery.  The right anterior cerebral artery fills across the   anterior communicating artery.    < end of copied text >  < from: CT Brain Perfusion Maps Stroke (10.26.23 @ 04:54) >  CT PERFUSION: CT perfusion is consistent with large acute infarct in the   right middle cerebral artery vascular territory and small to moderate   amount of surrounding ischemic penumbra.  Hypoperfusion index is 0.3.    Core infarct (CBF <  30%:): 136 mL  Penumbra (Tmax >6s): 194 mL  Mismatched volume: 58 mL  Mismatched ratio: 1.4    < end of copied text >      < from: CT Head No Cont (10.22.19 @ 15:57) >  IMPRESSION:    1)  chronic ischemic changes in both hemispheres with volume loss. There   are no new infarct as compared to prior CT. This may be further assessed   with MR imaging.  2)  no hemorrhage or mass identified.    < end of copied text >  < from: CT Angio Head w/ IV Cont (10.22.19 @ 15:57) >  IMPRESSION    1. There is intimal thickening and calcified plaque in both carotid   bifurcations in the neck, but without significant stenosis. No evidence   of carotid vertebral occlusion or dissection.  2. Intracranially, there are atherosclerotic changes in the anterior and   posterior circulation, but without significant stenosis or occlusion.    < end of copied text >  < from: CT Brain Stroke Protocol (10.26.23 @ 04:37) >  IMPRESSION:  No evidence of acute intracranial hemorrhage, mass effect or large acute   territorial infarct, within limits of noncontrast CT technique.    < end of copied text >  < from: CT Angio Neck Stroke Protocol w/ IV Cont (10.26.23 @ 04:53) >  CT ANGIOGRAPHY NECK:  1.  The right internal carotid artery is occluded approximately 1.5 cm   distal to its origin, possibly due to underlying dissection.  2.  Endotracheal tube is low in position, with its tip approximate 1 cm   above the slade.  3.  Diffuse ill-defined groundglass opacity in both lungs, which may   represent pulmonary edema, infection and/or atelectasis.  4.  There is a discrete right upper lobe groundglass opacity measuring   1.9 x 1.4 cm; this may represent a focus of infection, inflammation,   edema, hemorrhage, fibrosis or malignancy.  A follow-up chest CT is   warranted in three months to assess for resolution.    CT ANGIOGRAPHY BRAIN: The right internal carotid arteries occluded.    There is no significant flow related opacification within the proximal   right middle cerebral artery.  There is mild reconstitution of flow in   some distal branch vessels of the inferior division of the right middle   cerebral artery.  The right anterior cerebral artery fills across the   anterior communicating artery.    < end of copied text >  < from: CT Brain Perfusion Maps Stroke (10.26.23 @ 04:54) >  CT PERFUSION: CT perfusion is consistent with large acute infarct in the   right middle cerebral artery vascular territory and small to moderate   amount of surrounding ischemic penumbra.  Hypoperfusion index is 0.3.    Core infarct (CBF <  30%:): 136 mL  Penumbra (Tmax >6s): 194 mL  Mismatched volume: 58 mL  Mismatched ratio: 1.4    < end of copied text >    EEG Classification / Summary:  Abnormal EEG study  1. Intermittent periodic discharges seen intermittently over the left hemisphere, sometimes with triphasic morphology  2. Focal continuous right hemispheric slowing  3. Moderate generalized background slowing    -----------------------------------------------------------------------------------------------------    Clinical Impression:  1. While the periodic pattern noted above may represent lateralized periodic discharges indicating epileptic potential in the left hemisphere, more likely these represent generalized periodic discharges with triphasic morphology, consistent with metabolic etiology, that are poorly expressed on the right due to the effects of the stroke.   2. Structural abnormality in right hemisphere  3. Moderate diffuse/multi-focal cerebral dysfunction, not specific as to etiology.    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 Significant   associated mass effect and associated herniation.      < end of copied text >

## 2023-11-01 NOTE — PROGRESS NOTE ADULT - SUBJECTIVE AND OBJECTIVE BOX
# CC: Patient is a 88y old  Female who presents with a chief complaint of Ischemic Stroke, Poor MS requiring Intubation (01 Nov 2023 16:26)      ## HPI:  Ms. Longoria is an 88 year old female with a PMH of HTN, HLD and afib on xarelto who presented to [facility] earlier this morning after she was found altered, nonverbal and with right gaze deviation when she woke up. At baseline pt is ambulatory and verbal. Pt required intubation in ED for airway protection given poor MS. NIHSS calculated as 30 at that time. Pt not a candidate for TNK or transfer for intervention per stroke neurologist. She remains intubated on nicardipine gtt. ICU team consulted. (26 Oct 2023 05:50)      **O/N:**    ## ROS:    ## Labs:  ** CBC: **                        13.4   10.19 )-----------( 302      ( 01 Nov 2023 02:15 )             42.7     ** Chem:  **  11-01    144  |  107  |  28<H>  ----------------------------<  162<H>  4.4   |  31  |  0.92    Ca    10.1      01 Nov 2023 02:15  Phos  3.1     11-01  Mg     2.6     11-01    TPro  7.1  /  Alb  2.6<L>  /  TBili  0.8  /  DBili  x   /  AST  87<H>  /  ALT  41  /  AlkPhos  47  11-01    ** Coags: **      CAPILLARY BLOOD GLUCOSE            Culture - Urine (collected 26 Oct 2023 18:30)  Source: Catheterized Catheterized  Final Report (27 Oct 2023 22:48):    No growth    Culture - Blood (collected 26 Oct 2023 18:26)  Source: .Blood Blood  Final Report (01 Nov 2023 02:00):    No growth at 5 days    Culture - Blood (collected 26 Oct 2023 18:20)  Source: .Blood Blood  Final Report (01 Nov 2023 02:00):    No growth at 5 days    Culture - Sputum (collected 26 Oct 2023 12:50)  Source: ET Tube ET Tube  Gram Stain (28 Oct 2023 17:07):    No squamous epithelial cells per low power field    Rare polymorphonuclear leukocytes per low power field    Rare Gram Positive Cocci in Pairs and Chains per oil power field    Rare Gram Negative Rods per oil power field    Rare Gram Positive Rods per oil power field  Final Report (28 Oct 2023 17:07):    Normal Respiratory Priya present        ## Imaging:    ## Medications:  hydrALAZINE 25 milliGRAM(s) Oral every 8 hours  hydrALAZINE Injectable 10 milliGRAM(s) IV Push every 4 hours PRN        acetaminophen     Tablet .. 650 milliGRAM(s) Oral every 6 hours PRN      aspirin  chewable 81 milliGRAM(s) Oral daily  heparin   Injectable 5000 Unit(s) SubCutaneous every 12 hours    bisacodyl Suppository 10 milliGRAM(s) Rectal daily PRN  pantoprazole  Injectable 40 milliGRAM(s) IV Push daily  polyethylene glycol 3350 17 Gram(s) Oral daily  senna 2 Tablet(s) Oral at bedtime      atorvastatin 80 milliGRAM(s) Oral at bedtime    influenza  Vaccine (HIGH DOSE) 0.7 milliLiter(s) IntraMuscular once        ## O/E:  ICU Vital Signs Last 24 Hrs  T(C): 37 (01 Nov 2023 15:00), Max: 37 (01 Nov 2023 15:00)  T(F): 98.6 (01 Nov 2023 15:00), Max: 98.6 (01 Nov 2023 15:00)  HR: 84 (01 Nov 2023 16:00) (70 - 110)  BP: 170/68 (01 Nov 2023 16:00) (140/63 - 212/93)  BP(mean): 99 (01 Nov 2023 16:00) (85 - 120)  ABP: --  ABP(mean): --  RR: 26 (01 Nov 2023 16:00) (14 - 34)  SpO2: 99% (01 Nov 2023 16:00) (98% - 100%)    O2 Parameters below as of 01 Nov 2023 16:00  Patient On (Oxygen Delivery Method): ventilator    O2 Concentration (%): 25      I&O's Summary    31 Oct 2023 07:01  -  01 Nov 2023 07:00  --------------------------------------------------------  IN: 500 mL / OUT: 1075 mL / NET: -575 mL    01 Nov 2023 07:01  -  01 Nov 2023 16:57  --------------------------------------------------------  IN: 385 mL / OUT: 150 mL / NET: 235 mL      Mode: AC/ CMV (Assist Control/ Continuous Mandatory Ventilation), RR (machine): 14, TV (machine): 0.4, FiO2: 30, PEEP: 5, ITime: 0.8, MAP: 10, PIP: 21  Gen: lying comfortably in bed in no apparent distress  HEENT: PERRL, EOMI  Resp: CTA B/L no c/r/w  CVS: S1S2 no m/r/g  Abd: soft NT/ND +BS  Ext: no c/c/e  Neuro: A&Ox3    ## Code status:  Goals of care discussion: [x] yes [ ] no  [x] full code  [ ] DNR  [ ] DNI  [ ] MIKE # CC: Patient is a 88y old  Female who presents with a chief complaint of Ischemic Stroke, Poor MS requiring Intubation (01 Nov 2023 16:26)      ## HPI:  Ms. Longoria is an 88 year old female with a PMH of HTN, HLD and afib on xarelto who presented to [facility] earlier this morning after she was found altered, nonverbal and with right gaze deviation when she woke up. At baseline pt is ambulatory and verbal. Pt required intubation in ED for airway protection given poor MS. NIHSS calculated as 30 at that time. Pt not a candidate for TNK or transfer for intervention per stroke neurologist. She remains intubated on nicardipine gtt. ICU team consulted. (26 Oct 2023 05:50)      **O/N:**  Remains intubated    ## ROS:  Unobtainable due to mental status and intubation    ## Labs:  ** CBC: **                        13.4   10.19 )-----------( 302      ( 01 Nov 2023 02:15 )             42.7     ** Chem:  **  11-01    144  |  107  |  28<H>  ----------------------------<  162<H>  4.4   |  31  |  0.92    Ca    10.1      01 Nov 2023 02:15  Phos  3.1     11-01  Mg     2.6     11-01    TPro  7.1  /  Alb  2.6<L>  /  TBili  0.8  /  DBili  x   /  AST  87<H>  /  ALT  41  /  AlkPhos  47  11-01      Culture - Urine (collected 26 Oct 2023 18:30)  Source: Catheterized Catheterized  Final Report (27 Oct 2023 22:48):    No growth    Culture - Blood (collected 26 Oct 2023 18:26)  Source: .Blood Blood  Final Report (01 Nov 2023 02:00):    No growth at 5 days    Culture - Blood (collected 26 Oct 2023 18:20)  Source: .Blood Blood  Final Report (01 Nov 2023 02:00):    No growth at 5 days    Culture - Sputum (collected 26 Oct 2023 12:50)  Source: ET Tube ET Tube  Gram Stain (28 Oct 2023 17:07):    No squamous epithelial cells per low power field    Rare polymorphonuclear leukocytes per low power field    Rare Gram Positive Cocci in Pairs and Chains per oil power field    Rare Gram Negative Rods per oil power field    Rare Gram Positive Rods per oil power field  Final Report (28 Oct 2023 17:07):    Normal Respiratory Priya present        ## Imaging:    ## Medications:  hydrALAZINE 25 milliGRAM(s) Oral every 8 hours  hydrALAZINE Injectable 10 milliGRAM(s) IV Push every 4 hours PRN        acetaminophen     Tablet .. 650 milliGRAM(s) Oral every 6 hours PRN      aspirin  chewable 81 milliGRAM(s) Oral daily  heparin   Injectable 5000 Unit(s) SubCutaneous every 12 hours    bisacodyl Suppository 10 milliGRAM(s) Rectal daily PRN  pantoprazole  Injectable 40 milliGRAM(s) IV Push daily  polyethylene glycol 3350 17 Gram(s) Oral daily  senna 2 Tablet(s) Oral at bedtime      atorvastatin 80 milliGRAM(s) Oral at bedtime    influenza  Vaccine (HIGH DOSE) 0.7 milliLiter(s) IntraMuscular once        ## O/E:  ICU Vital Signs Last 24 Hrs  T(C): 37 (01 Nov 2023 15:00), Max: 37 (01 Nov 2023 15:00)  T(F): 98.6 (01 Nov 2023 15:00), Max: 98.6 (01 Nov 2023 15:00)  HR: 84 (01 Nov 2023 16:00) (70 - 110)  BP: 170/68 (01 Nov 2023 16:00) (140/63 - 212/93)  BP(mean): 99 (01 Nov 2023 16:00) (85 - 120)  ABP: --  ABP(mean): --  RR: 26 (01 Nov 2023 16:00) (14 - 34)  SpO2: 99% (01 Nov 2023 16:00) (98% - 100%)    O2 Parameters below as of 01 Nov 2023 16:00  Patient On (Oxygen Delivery Method): ventilator    O2 Concentration (%): 25      I&O's Summary    31 Oct 2023 07:01  -  01 Nov 2023 07:00  --------------------------------------------------------  IN: 500 mL / OUT: 1075 mL / NET: -575 mL    01 Nov 2023 07:01  -  01 Nov 2023 16:57  --------------------------------------------------------  IN: 385 mL / OUT: 150 mL / NET: 235 mL      Mode: AC/ CMV (Assist Control/ Continuous Mandatory Ventilation), RR (machine): 14, TV (machine): 0.4, FiO2: 30, PEEP: 5, ITime: 0.8, MAP: 10, PIP: 21  Gen: orotracheally intubated on full vent  HEENT: pupils fixed  Resp: mechanical breath sounds B/L  CVS: S1S2 no m/r/g  Abd: soft NT/ND +BS  Ext: no c/c/e  Neuro: +cough +breathes over vent    ## Code status:  Goals of care discussion: [x] yes [ ] no  [x] full code  [ ] DNR  [ ] DNI  [ ] MIKE

## 2023-11-01 NOTE — PROGRESS NOTE ADULT - ASSESSMENT
88F PMH of HTN, HLD, AF (on Xarelto) p/w AMS,(nonverbal and right gaze deviation) on wakening. In ED, intubated for airway protection / mental status, NIHSS 30. CT head showed large CVA with herniation. Due to size of infarct, not a candidate for any interventions. Brought to ICU for further management on full vent.  - Remains intubated with fixed pupils, but has cough and breathes over vent, 12/400/25%/+5  - Patient has devastating neurological injury with no change of a meaningful recovery. GOC discussions ongoing, for now agree to no trache, but having difficulty making decision to withdraw care.  - BP control with hydralazine PO + IV PRN pushes  - c/w tube feeds  - DVT ppx Hep SC    I have personally provided 45 minutes of critical care time.

## 2023-11-01 NOTE — PROGRESS NOTE ADULT - SUBJECTIVE AND OBJECTIVE BOX
Neurology Progress Note    S: Patient seen and examined.       Medications: MEDICATIONS  (STANDING):  aspirin  chewable 81 milliGRAM(s) Oral daily  atorvastatin 80 milliGRAM(s) Oral at bedtime  chlorhexidine 0.12% Liquid 15 milliLiter(s) Oral Mucosa every 12 hours  chlorhexidine 2% Cloths 1 Application(s) Topical <User Schedule>  heparin   Injectable 5000 Unit(s) SubCutaneous every 12 hours  hydrALAZINE 25 milliGRAM(s) Oral every 8 hours  influenza  Vaccine (HIGH DOSE) 0.7 milliLiter(s) IntraMuscular once  pantoprazole  Injectable 40 milliGRAM(s) IV Push daily  polyethylene glycol 3350 17 Gram(s) Oral daily  senna 2 Tablet(s) Oral at bedtime    MEDICATIONS  (PRN):  acetaminophen     Tablet .. 650 milliGRAM(s) Oral every 6 hours PRN Temp greater or equal to 38C (100.4F)  bisacodyl Suppository 10 milliGRAM(s) Rectal daily PRN Constipation  hydrALAZINE Injectable 10 milliGRAM(s) IV Push every 4 hours PRN systolic>180       Vitals:  Vital Signs Last 24 Hrs  T(C): 37 (01 Nov 2023 15:00), Max: 37 (01 Nov 2023 15:00)  T(F): 98.6 (01 Nov 2023 15:00), Max: 98.6 (01 Nov 2023 15:00)  HR: 99 (01 Nov 2023 15:00) (70 - 110)  BP: 163/77 (01 Nov 2023 15:00) (140/63 - 212/93)  BP(mean): 102 (01 Nov 2023 15:00) (85 - 120)  RR: 22 (01 Nov 2023 15:00) (14 - 34)  SpO2: 100% (01 Nov 2023 15:00) (98% - 100%)    Parameters below as of 01 Nov 2023 15:00  Patient On (Oxygen Delivery Method): ventilator    O2 Concentration (%): 30          General Exam:   General Appearance: intubated  Head: Normocephalic, atraumatic and no dysmorphic features  Ear, Nose, and Throat: Moist mucous membranes  CVS: tachycardic  Resp: mechcanical ventilation  GI: soft NT/ND  Extremities: No edema or cyanosis  Skin: No bruises or rashes     Neurological Exam:  Mental Status: Intubated, not sedated. No spont eye opening. No verbal output, does not follow commands.   Cranial Nerves: pupils fixed and dilated, no VOR, no facial asymmetry , weak cough/gag  Motor: dec tone throughout, no spont movemnt noted   Sensation: WD to noxious in RUE, RLE. LLE triple flexon     I personally reviewed the below data/images/labs:    LABS:                          12.8   9.66  )-----------( 266      ( 31 Oct 2023 02:35 )             39.3     10-31    142  |  104  |  29<H>  ----------------------------<  144<H>  4.6   |  31  |  0.78    Ca    9.8      31 Oct 2023 02:35  Phos  2.6     10-31  Mg     2.6     10-31    TPro  7.4  /  Alb  2.7<L>  /  TBili  0.7  /  DBili  x   /  AST  102<H>  /  ALT  36  /  AlkPhos  49  10-31    LIVER FUNCTIONS - ( 31 Oct 2023 02:35 )  Alb: 2.7 g/dL / Pro: 7.4 gm/dL / ALK PHOS: 49 U/L / ALT: 36 U/L / AST: 102 U/L / GGT: x             Urinalysis Basic - ( 31 Oct 2023 02:35 )    Color: x / Appearance: x / SG: x / pH: x  Gluc: 144 mg/dL / Ketone: x  / Bili: x / Urobili: x   Blood: x / Protein: x / Nitrite: x   Leuk Esterase: x / RBC: x / WBC x   Sq Epi: x / Non Sq Epi: x / Bacteria: x        < from: CT Head No Cont (10.22.19 @ 15:57) >  IMPRESSION:    1)  chronic ischemic changes in both hemispheres with volume loss. There   are no new infarct as compared to prior CT. This may be further assessed   with MR imaging.  2)  no hemorrhage or mass identified.    < end of copied text >  < from: CT Angio Head w/ IV Cont (10.22.19 @ 15:57) >  IMPRESSION    1. There is intimal thickening and calcified plaque in both carotid   bifurcations in the neck, but without significant stenosis. No evidence   of carotid vertebral occlusion or dissection.  2. Intracranially, there are atherosclerotic changes in the anterior and   posterior circulation, but without significant stenosis or occlusion.    < end of copied text >  < from: CT Brain Stroke Protocol (10.26.23 @ 04:37) >  IMPRESSION:  No evidence of acute intracranial hemorrhage, mass effect or large acute   territorial infarct, within limits of noncontrast CT technique.    < end of copied text >  < from: CT Angio Neck Stroke Protocol w/ IV Cont (10.26.23 @ 04:53) >  CT ANGIOGRAPHY NECK:  1.  The right internal carotid artery is occluded approximately 1.5 cm   distal to its origin, possibly due to underlying dissection.  2.  Endotracheal tube is low in position, with its tip approximate 1 cm   above the slade.  3.  Diffuse ill-defined groundglass opacity in both lungs, which may   represent pulmonary edema, infection and/or atelectasis.  4.  There is a discrete right upper lobe groundglass opacity measuring   1.9 x 1.4 cm; this may represent a focus of infection, inflammation,   edema, hemorrhage, fibrosis or malignancy.  A follow-up chest CT is   warranted in three months to assess for resolution.    CT ANGIOGRAPHY BRAIN: The right internal carotid arteries occluded.    There is no significant flow related opacification within the proximal   right middle cerebral artery.  There is mild reconstitution of flow in   some distal branch vessels of the inferior division of the right middle   cerebral artery.  The right anterior cerebral artery fills across the   anterior communicating artery.    < end of copied text >  < from: CT Brain Perfusion Maps Stroke (10.26.23 @ 04:54) >  CT PERFUSION: CT perfusion is consistent with large acute infarct in the   right middle cerebral artery vascular territory and small to moderate   amount of surrounding ischemic penumbra.  Hypoperfusion index is 0.3.    Core infarct (CBF <  30%:): 136 mL  Penumbra (Tmax >6s): 194 mL  Mismatched volume: 58 mL  Mismatched ratio: 1.4    < end of copied text >      < from: CT Head No Cont (10.22.19 @ 15:57) >  IMPRESSION:    1)  chronic ischemic changes in both hemispheres with volume loss. There   are no new infarct as compared to prior CT. This may be further assessed   with MR imaging.  2)  no hemorrhage or mass identified.    < end of copied text >  < from: CT Angio Head w/ IV Cont (10.22.19 @ 15:57) >  IMPRESSION    1. There is intimal thickening and calcified plaque in both carotid   bifurcations in the neck, but without significant stenosis. No evidence   of carotid vertebral occlusion or dissection.  2. Intracranially, there are atherosclerotic changes in the anterior and   posterior circulation, but without significant stenosis or occlusion.    < end of copied text >  < from: CT Brain Stroke Protocol (10.26.23 @ 04:37) >  IMPRESSION:  No evidence of acute intracranial hemorrhage, mass effect or large acute   territorial infarct, within limits of noncontrast CT technique.    < end of copied text >  < from: CT Angio Neck Stroke Protocol w/ IV Cont (10.26.23 @ 04:53) >  CT ANGIOGRAPHY NECK:  1.  The right internal carotid artery is occluded approximately 1.5 cm   distal to its origin, possibly due to underlying dissection.  2.  Endotracheal tube is low in position, with its tip approximate 1 cm   above the slade.  3.  Diffuse ill-defined groundglass opacity in both lungs, which may   represent pulmonary edema, infection and/or atelectasis.  4.  There is a discrete right upper lobe groundglass opacity measuring   1.9 x 1.4 cm; this may represent a focus of infection, inflammation,   edema, hemorrhage, fibrosis or malignancy.  A follow-up chest CT is   warranted in three months to assess for resolution.    CT ANGIOGRAPHY BRAIN: The right internal carotid arteries occluded.    There is no significant flow related opacification within the proximal   right middle cerebral artery.  There is mild reconstitution of flow in   some distal branch vessels of the inferior division of the right middle   cerebral artery.  The right anterior cerebral artery fills across the   anterior communicating artery.    < end of copied text >  < from: CT Brain Perfusion Maps Stroke (10.26.23 @ 04:54) >  CT PERFUSION: CT perfusion is consistent with large acute infarct in the   right middle cerebral artery vascular territory and small to moderate   amount of surrounding ischemic penumbra.  Hypoperfusion index is 0.3.    Core infarct (CBF <  30%:): 136 mL  Penumbra (Tmax >6s): 194 mL  Mismatched volume: 58 mL  Mismatched ratio: 1.4    < end of copied text >      < from: CT Head No Cont (10.22.19 @ 15:57) >  IMPRESSION:    1)  chronic ischemic changes in both hemispheres with volume loss. There   are no new infarct as compared to prior CT. This may be further assessed   with MR imaging.  2)  no hemorrhage or mass identified.    < end of copied text >  < from: CT Angio Head w/ IV Cont (10.22.19 @ 15:57) >  IMPRESSION    1. There is intimal thickening and calcified plaque in both carotid   bifurcations in the neck, but without significant stenosis. No evidence   of carotid vertebral occlusion or dissection.  2. Intracranially, there are atherosclerotic changes in the anterior and   posterior circulation, but without significant stenosis or occlusion.    < end of copied text >  < from: CT Brain Stroke Protocol (10.26.23 @ 04:37) >  IMPRESSION:  No evidence of acute intracranial hemorrhage, mass effect or large acute   territorial infarct, within limits of noncontrast CT technique.    < end of copied text >  < from: CT Angio Neck Stroke Protocol w/ IV Cont (10.26.23 @ 04:53) >  CT ANGIOGRAPHY NECK:  1.  The right internal carotid artery is occluded approximately 1.5 cm   distal to its origin, possibly due to underlying dissection.  2.  Endotracheal tube is low in position, with its tip approximate 1 cm   above the slade.  3.  Diffuse ill-defined groundglass opacity in both lungs, which may   represent pulmonary edema, infection and/or atelectasis.  4.  There is a discrete right upper lobe groundglass opacity measuring   1.9 x 1.4 cm; this may represent a focus of infection, inflammation,   edema, hemorrhage, fibrosis or malignancy.  A follow-up chest CT is   warranted in three months to assess for resolution.    CT ANGIOGRAPHY BRAIN: The right internal carotid arteries occluded.    There is no significant flow related opacification within the proximal   right middle cerebral artery.  There is mild reconstitution of flow in   some distal branch vessels of the inferior division of the right middle   cerebral artery.  The right anterior cerebral artery fills across the   anterior communicating artery.    < end of copied text >  < from: CT Brain Perfusion Maps Stroke (10.26.23 @ 04:54) >  CT PERFUSION: CT perfusion is consistent with large acute infarct in the   right middle cerebral artery vascular territory and small to moderate   amount of surrounding ischemic penumbra.  Hypoperfusion index is 0.3.    Core infarct (CBF <  30%:): 136 mL  Penumbra (Tmax >6s): 194 mL  Mismatched volume: 58 mL  Mismatched ratio: 1.4    < end of copied text >    EEG Classification / Summary:  Abnormal EEG study  1. Intermittent periodic discharges seen intermittently over the left hemisphere, sometimes with triphasic morphology  2. Focal continuous right hemispheric slowing  3. Moderate generalized background slowing    -----------------------------------------------------------------------------------------------------    Clinical Impression:  1. While the periodic pattern noted above may represent lateralized periodic discharges indicating epileptic potential in the left hemisphere, more likely these represent generalized periodic discharges with triphasic morphology, consistent with metabolic etiology, that are poorly expressed on the right due to the effects of the stroke.   2. Structural abnormality in right hemisphere  3. Moderate diffuse/multi-focal cerebral dysfunction, not specific as to etiology.    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 Significant   associated mass effect and associated herniation.      < end of copied text >

## 2023-11-01 NOTE — PROGRESS NOTE ADULT - ASSESSMENT
88 year old female with a PMH of HTN, HLD and afib on xarelto who did not take medications for past 1 week prior to hospitalization here with R MCA infarct.   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 Recommend calling Palliative to help family with GOC discussion. They do not want a trach as they believe their mother would not have wanted one but are not ready to withdraw care  - Na [id number]  - ok to continue Aspirin  - continue GOC discussion  Call with questions    Alesha Hilton DO  Vascular Neurology  Office [phone]        ;

## 2023-11-02 NOTE — PROGRESS NOTE ADULT - SUBJECTIVE AND OBJECTIVE BOX
Neurology Progress Note    S: Patient seen and examined.       Medications: MEDICATIONS  (STANDING):  aspirin  chewable 81 milliGRAM(s) Oral daily  atorvastatin 80 milliGRAM(s) Oral at bedtime  chlorhexidine 0.12% Liquid 15 milliLiter(s) Oral Mucosa every 12 hours  chlorhexidine 2% Cloths 1 Application(s) Topical <User Schedule>  heparin   Injectable 5000 Unit(s) SubCutaneous every 12 hours  hydrALAZINE 25 milliGRAM(s) Oral every 8 hours  influenza  Vaccine (HIGH DOSE) 0.7 milliLiter(s) IntraMuscular once  pantoprazole   Suspension 40 milliGRAM(s) Enteral Tube daily  polyethylene glycol 3350 17 Gram(s) Oral daily  senna 2 Tablet(s) Oral at bedtime    MEDICATIONS  (PRN):  acetaminophen     Tablet .. 650 milliGRAM(s) Oral every 6 hours PRN Temp greater or equal to 38C (100.4F)  bisacodyl Suppository 10 milliGRAM(s) Rectal daily PRN Constipation  hydrALAZINE Injectable 10 milliGRAM(s) IV Push every 4 hours PRN systolic>180       Vitals:  Vital Signs Last 24 Hrs  T(C): 37 (02 Nov 2023 14:38), Max: 38.5 (02 Nov 2023 04:00)  T(F): 98.6 (02 Nov 2023 14:38), Max: 101.3 (02 Nov 2023 04:00)  HR: 60 (02 Nov 2023 14:38) (60 - 118)  BP: 155/81 (02 Nov 2023 14:38) (133/64 - 184/86)  BP(mean): 98 (02 Nov 2023 14:38) (81 - 124)  RR: 22 (02 Nov 2023 14:38) (15 - 26)  SpO2: 100% (02 Nov 2023 14:38) (95% - 100%)    Parameters below as of 02 Nov 2023 08:00  Patient On (Oxygen Delivery Method): ventilator, TV-400, AC-12. PEEP-5    O2 Concentration (%): 25          General Exam:   General Appearance: intubated  Head: Normocephalic, atraumatic and no dysmorphic features  Ear, Nose, and Throat: Moist mucous membranes  CVS: tachycardic  Resp: mechcanical ventilation  GI: soft NT/ND  Extremities: No edema or cyanosis  Skin: No bruises or rashes     Neurological Exam:  Mental Status: Intubated, not sedated. No spont eye opening. No verbal output, does not follow commands.   Cranial Nerves: pupils fixed and dilated, no VOR, no facial asymmetry , weak cough/gag  Motor: dec tone throughout, no spont movemnt noted   Sensation: WD to noxious in RUE, RLE. LLE triple flexon     I personally reviewed the below data/images/labs:      LABS:                          12.7   11.57 )-----------( 339      ( 02 Nov 2023 02:50 )             40.0     11-02    145  |  108  |  52<H>  ----------------------------<  180<H>  4.4   |  34<H>  |  1.49<H>    Ca    9.6      02 Nov 2023 02:50  Phos  2.9     11-02  Mg     2.8     11-02    TPro  7.2  /  Alb  2.5<L>  /  TBili  0.6  /  DBili  x   /  AST  85<H>  /  ALT  49  /  AlkPhos  58  11-02    LIVER FUNCTIONS - ( 02 Nov 2023 02:50 )  Alb: 2.5 g/dL / Pro: 7.2 gm/dL / ALK PHOS: 58 U/L / ALT: 49 U/L / AST: 85 U/L / GGT: x             Urinalysis Basic - ( 02 Nov 2023 02:50 )    Color: x / Appearance: x / SG: x / pH: x  Gluc: 180 mg/dL / Ketone: x  / Bili: x / Urobili: x   Blood: x / Protein: x / Nitrite: x   Leuk Esterase: x / RBC: x / WBC x   Sq Epi: x / Non Sq Epi: x / Bacteria: x          < from: CT Head No Cont (10.22.19 @ 15:57) >  IMPRESSION:    1)  chronic ischemic changes in both hemispheres with volume loss. There   are no new infarct as compared to prior CT. This may be further assessed   with MR imaging.  2)  no hemorrhage or mass identified.    < end of copied text >  < from: CT Angio Head w/ IV Cont (10.22.19 @ 15:57) >  IMPRESSION    1. There is intimal thickening and calcified plaque in both carotid   bifurcations in the neck, but without significant stenosis. No evidence   of carotid vertebral occlusion or dissection.  2. Intracranially, there are atherosclerotic changes in the anterior and   posterior circulation, but without significant stenosis or occlusion.    < end of copied text >  < from: CT Brain Stroke Protocol (10.26.23 @ 04:37) >  IMPRESSION:  No evidence of acute intracranial hemorrhage, mass effect or large acute   territorial infarct, within limits of noncontrast CT technique.    < end of copied text >  < from: CT Angio Neck Stroke Protocol w/ IV Cont (10.26.23 @ 04:53) >  CT ANGIOGRAPHY NECK:  1.  The right internal carotid artery is occluded approximately 1.5 cm   distal to its origin, possibly due to underlying dissection.  2.  Endotracheal tube is low in position, with its tip approximate 1 cm   above the slade.  3.  Diffuse ill-defined groundglass opacity in both lungs, which may   represent pulmonary edema, infection and/or atelectasis.  4.  There is a discrete right upper lobe groundglass opacity measuring   1.9 x 1.4 cm; this may represent a focus of infection, inflammation,   edema, hemorrhage, fibrosis or malignancy.  A follow-up chest CT is   warranted in three months to assess for resolution.    CT ANGIOGRAPHY BRAIN: The right internal carotid arteries occluded.    There is no significant flow related opacification within the proximal   right middle cerebral artery.  There is mild reconstitution of flow in   some distal branch vessels of the inferior division of the right middle   cerebral artery.  The right anterior cerebral artery fills across the   anterior communicating artery.    < end of copied text >  < from: CT Brain Perfusion Maps Stroke (10.26.23 @ 04:54) >  CT PERFUSION: CT perfusion is consistent with large acute infarct in the   right middle cerebral artery vascular territory and small to moderate   amount of surrounding ischemic penumbra.  Hypoperfusion index is 0.3.    Core infarct (CBF <  30%:): 136 mL  Penumbra (Tmax >6s): 194 mL  Mismatched volume: 58 mL  Mismatched ratio: 1.4    < end of copied text >      < from: CT Head No Cont (10.22.19 @ 15:57) >  IMPRESSION:    1)  chronic ischemic changes in both hemispheres with volume loss. There   are no new infarct as compared to prior CT. This may be further assessed   with MR imaging.  2)  no hemorrhage or mass identified.    < end of copied text >  < from: CT Angio Head w/ IV Cont (10.22.19 @ 15:57) >  IMPRESSION    1. There is intimal thickening and calcified plaque in both carotid   bifurcations in the neck, but without significant stenosis. No evidence   of carotid vertebral occlusion or dissection.  2. Intracranially, there are atherosclerotic changes in the anterior and   posterior circulation, but without significant stenosis or occlusion.    < end of copied text >  < from: CT Brain Stroke Protocol (10.26.23 @ 04:37) >  IMPRESSION:  No evidence of acute intracranial hemorrhage, mass effect or large acute   territorial infarct, within limits of noncontrast CT technique.    < end of copied text >  < from: CT Angio Neck Stroke Protocol w/ IV Cont (10.26.23 @ 04:53) >  CT ANGIOGRAPHY NECK:  1.  The right internal carotid artery is occluded approximately 1.5 cm   distal to its origin, possibly due to underlying dissection.  2.  Endotracheal tube is low in position, with its tip approximate 1 cm   above the slade.  3.  Diffuse ill-defined groundglass opacity in both lungs, which may   represent pulmonary edema, infection and/or atelectasis.  4.  There is a discrete right upper lobe groundglass opacity measuring   1.9 x 1.4 cm; this may represent a focus of infection, inflammation,   edema, hemorrhage, fibrosis or malignancy.  A follow-up chest CT is   warranted in three months to assess for resolution.    CT ANGIOGRAPHY BRAIN: The right internal carotid arteries occluded.    There is no significant flow related opacification within the proximal   right middle cerebral artery.  There is mild reconstitution of flow in   some distal branch vessels of the inferior division of the right middle   cerebral artery.  The right anterior cerebral artery fills across the   anterior communicating artery.    < end of copied text >  < from: CT Brain Perfusion Maps Stroke (10.26.23 @ 04:54) >  CT PERFUSION: CT perfusion is consistent with large acute infarct in the   right middle cerebral artery vascular territory and small to moderate   amount of surrounding ischemic penumbra.  Hypoperfusion index is 0.3.    Core infarct (CBF <  30%:): 136 mL  Penumbra (Tmax >6s): 194 mL  Mismatched volume: 58 mL  Mismatched ratio: 1.4    < end of copied text >      < from: CT Head No Cont (10.22.19 @ 15:57) >  IMPRESSION:    1)  chronic ischemic changes in both hemispheres with volume loss. There   are no new infarct as compared to prior CT. This may be further assessed   with MR imaging.  2)  no hemorrhage or mass identified.    < end of copied text >  < from: CT Angio Head w/ IV Cont (10.22.19 @ 15:57) >  IMPRESSION    1. There is intimal thickening and calcified plaque in both carotid   bifurcations in the neck, but without significant stenosis. No evidence   of carotid vertebral occlusion or dissection.  2. Intracranially, there are atherosclerotic changes in the anterior and   posterior circulation, but without significant stenosis or occlusion.    < end of copied text >  < from: CT Brain Stroke Protocol (10.26.23 @ 04:37) >  IMPRESSION:  No evidence of acute intracranial hemorrhage, mass effect or large acute   territorial infarct, within limits of noncontrast CT technique.    < end of copied text >  < from: CT Angio Neck Stroke Protocol w/ IV Cont (10.26.23 @ 04:53) >  CT ANGIOGRAPHY NECK:  1.  The right internal carotid artery is occluded approximately 1.5 cm   distal to its origin, possibly due to underlying dissection.  2.  Endotracheal tube is low in position, with its tip approximate 1 cm   above the slade.  3.  Diffuse ill-defined groundglass opacity in both lungs, which may   represent pulmonary edema, infection and/or atelectasis.  4.  There is a discrete right upper lobe groundglass opacity measuring   1.9 x 1.4 cm; this may represent a focus of infection, inflammation,   edema, hemorrhage, fibrosis or malignancy.  A follow-up chest CT is   warranted in three months to assess for resolution.    CT ANGIOGRAPHY BRAIN: The right internal carotid arteries occluded.    There is no significant flow related opacification within the proximal   right middle cerebral artery.  There is mild reconstitution of flow in   some distal branch vessels of the inferior division of the right middle   cerebral artery.  The right anterior cerebral artery fills across the   anterior communicating artery.    < end of copied text >  < from: CT Brain Perfusion Maps Stroke (10.26.23 @ 04:54) >  CT PERFUSION: CT perfusion is consistent with large acute infarct in the   right middle cerebral artery vascular territory and small to moderate   amount of surrounding ischemic penumbra.  Hypoperfusion index is 0.3.    Core infarct (CBF <  30%:): 136 mL  Penumbra (Tmax >6s): 194 mL  Mismatched volume: 58 mL  Mismatched ratio: 1.4    < end of copied text >    EEG Classification / Summary:  Abnormal EEG study  1. Intermittent periodic discharges seen intermittently over the left hemisphere, sometimes with triphasic morphology  2. Focal continuous right hemispheric slowing  3. Moderate generalized background slowing    -----------------------------------------------------------------------------------------------------    Clinical Impression:  1. While the periodic pattern noted above may represent lateralized periodic discharges indicating epileptic potential in the left hemisphere, more likely these represent generalized periodic discharges with triphasic morphology, consistent with metabolic etiology, that are poorly expressed on the right due to the effects of the stroke.   2. Structural abnormality in right hemisphere  3. Moderate diffuse/multi-focal cerebral dysfunction, not specific as to etiology.    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 Significant   associated mass effect and associated herniation.      < end of copied text >   Neurology Progress Note    S: Patient seen and examined.       Medications: MEDICATIONS  (STANDING):  aspirin  chewable 81 milliGRAM(s) Oral daily  atorvastatin 80 milliGRAM(s) Oral at bedtime  chlorhexidine 0.12% Liquid 15 milliLiter(s) Oral Mucosa every 12 hours  chlorhexidine 2% Cloths 1 Application(s) Topical <User Schedule>  heparin   Injectable 5000 Unit(s) SubCutaneous every 12 hours  hydrALAZINE 25 milliGRAM(s) Oral every 8 hours  influenza  Vaccine (HIGH DOSE) 0.7 milliLiter(s) IntraMuscular once  pantoprazole   Suspension 40 milliGRAM(s) Enteral Tube daily  polyethylene glycol 3350 17 Gram(s) Oral daily  senna 2 Tablet(s) Oral at bedtime    MEDICATIONS  (PRN):  acetaminophen     Tablet .. 650 milliGRAM(s) Oral every 6 hours PRN Temp greater or equal to 38C (100.4F)  bisacodyl Suppository 10 milliGRAM(s) Rectal daily PRN Constipation  hydrALAZINE Injectable 10 milliGRAM(s) IV Push every 4 hours PRN systolic>180       Vitals:  Vital Signs Last 24 Hrs  T(C): 37 (02 Nov 2023 14:38), Max: 38.5 (02 Nov 2023 04:00)  T(F): 98.6 (02 Nov 2023 14:38), Max: 101.3 (02 Nov 2023 04:00)  HR: 60 (02 Nov 2023 14:38) (60 - 118)  BP: 155/81 (02 Nov 2023 14:38) (133/64 - 184/86)  BP(mean): 98 (02 Nov 2023 14:38) (81 - 124)  RR: 22 (02 Nov 2023 14:38) (15 - 26)  SpO2: 100% (02 Nov 2023 14:38) (95% - 100%)    Parameters below as of 02 Nov 2023 08:00  Patient On (Oxygen Delivery Method): ventilator, TV-400, AC-12. PEEP-5    O2 Concentration (%): 25          General Exam:   General Appearance: intubated  Head: Normocephalic, atraumatic and no dysmorphic features  Ear, Nose, and Throat: Moist mucous membranes  CVS: tachycardic  Resp: mechcanical ventilation  GI: soft NT/ND  Extremities: No edema or cyanosis  Skin: No bruises or rashes     Neurological Exam:  Mental Status: Intubated, not sedated. No spont eye opening. No verbal output, does not follow commands.   Cranial Nerves: pupils very sluggish R, L near fixed, no VOR, no facial asymmetry , weak cough/gag  Motor: dec tone throughout, no spont movemnt noted   Sensation: WD to noxious in RUE, RLE. LLE triple flexon     I personally reviewed the below data/images/labs:      LABS:                          12.7   11.57 )-----------( 339      ( 02 Nov 2023 02:50 )             40.0     11-02    145  |  108  |  52<H>  ----------------------------<  180<H>  4.4   |  34<H>  |  1.49<H>    Ca    9.6      02 Nov 2023 02:50  Phos  2.9     11-02  Mg     2.8     11-02    TPro  7.2  /  Alb  2.5<L>  /  TBili  0.6  /  DBili  x   /  AST  85<H>  /  ALT  49  /  AlkPhos  58  11-02    LIVER FUNCTIONS - ( 02 Nov 2023 02:50 )  Alb: 2.5 g/dL / Pro: 7.2 gm/dL / ALK PHOS: 58 U/L / ALT: 49 U/L / AST: 85 U/L / GGT: x             Urinalysis Basic - ( 02 Nov 2023 02:50 )    Color: x / Appearance: x / SG: x / pH: x  Gluc: 180 mg/dL / Ketone: x  / Bili: x / Urobili: x   Blood: x / Protein: x / Nitrite: x   Leuk Esterase: x / RBC: x / WBC x   Sq Epi: x / Non Sq Epi: x / Bacteria: x          < from: CT Head No Cont (10.22.19 @ 15:57) >  IMPRESSION:    1)  chronic ischemic changes in both hemispheres with volume loss. There   are no new infarct as compared to prior CT. This may be further assessed   with MR imaging.  2)  no hemorrhage or mass identified.    < end of copied text >  < from: CT Angio Head w/ IV Cont (10.22.19 @ 15:57) >  IMPRESSION    1. There is intimal thickening and calcified plaque in both carotid   bifurcations in the neck, but without significant stenosis. No evidence   of carotid vertebral occlusion or dissection.  2. Intracranially, there are atherosclerotic changes in the anterior and   posterior circulation, but without significant stenosis or occlusion.    < end of copied text >  < from: CT Brain Stroke Protocol (10.26.23 @ 04:37) >  IMPRESSION:  No evidence of acute intracranial hemorrhage, mass effect or large acute   territorial infarct, within limits of noncontrast CT technique.    < end of copied text >  < from: CT Angio Neck Stroke Protocol w/ IV Cont (10.26.23 @ 04:53) >  CT ANGIOGRAPHY NECK:  1.  The right internal carotid artery is occluded approximately 1.5 cm   distal to its origin, possibly due to underlying dissection.  2.  Endotracheal tube is low in position, with its tip approximate 1 cm   above the slade.  3.  Diffuse ill-defined groundglass opacity in both lungs, which may   represent pulmonary edema, infection and/or atelectasis.  4.  There is a discrete right upper lobe groundglass opacity measuring   1.9 x 1.4 cm; this may represent a focus of infection, inflammation,   edema, hemorrhage, fibrosis or malignancy.  A follow-up chest CT is   warranted in three months to assess for resolution.    CT ANGIOGRAPHY BRAIN: The right internal carotid arteries occluded.    There is no significant flow related opacification within the proximal   right middle cerebral artery.  There is mild reconstitution of flow in   some distal branch vessels of the inferior division of the right middle   cerebral artery.  The right anterior cerebral artery fills across the   anterior communicating artery.    < end of copied text >  < from: CT Brain Perfusion Maps Stroke (10.26.23 @ 04:54) >  CT PERFUSION: CT perfusion is consistent with large acute infarct in the   right middle cerebral artery vascular territory and small to moderate   amount of surrounding ischemic penumbra.  Hypoperfusion index is 0.3.    Core infarct (CBF <  30%:): 136 mL  Penumbra (Tmax >6s): 194 mL  Mismatched volume: 58 mL  Mismatched ratio: 1.4    < end of copied text >      < from: CT Head No Cont (10.22.19 @ 15:57) >  IMPRESSION:    1)  chronic ischemic changes in both hemispheres with volume loss. There   are no new infarct as compared to prior CT. This may be further assessed   with MR imaging.  2)  no hemorrhage or mass identified.    < end of copied text >  < from: CT Angio Head w/ IV Cont (10.22.19 @ 15:57) >  IMPRESSION    1. There is intimal thickening and calcified plaque in both carotid   bifurcations in the neck, but without significant stenosis. No evidence   of carotid vertebral occlusion or dissection.  2. Intracranially, there are atherosclerotic changes in the anterior and   posterior circulation, but without significant stenosis or occlusion.    < end of copied text >  < from: CT Brain Stroke Protocol (10.26.23 @ 04:37) >  IMPRESSION:  No evidence of acute intracranial hemorrhage, mass effect or large acute   territorial infarct, within limits of noncontrast CT technique.    < end of copied text >  < from: CT Angio Neck Stroke Protocol w/ IV Cont (10.26.23 @ 04:53) >  CT ANGIOGRAPHY NECK:  1.  The right internal carotid artery is occluded approximately 1.5 cm   distal to its origin, possibly due to underlying dissection.  2.  Endotracheal tube is low in position, with its tip approximate 1 cm   above the slade.  3.  Diffuse ill-defined groundglass opacity in both lungs, which may   represent pulmonary edema, infection and/or atelectasis.  4.  There is a discrete right upper lobe groundglass opacity measuring   1.9 x 1.4 cm; this may represent a focus of infection, inflammation,   edema, hemorrhage, fibrosis or malignancy.  A follow-up chest CT is   warranted in three months to assess for resolution.    CT ANGIOGRAPHY BRAIN: The right internal carotid arteries occluded.    There is no significant flow related opacification within the proximal   right middle cerebral artery.  There is mild reconstitution of flow in   some distal branch vessels of the inferior division of the right middle   cerebral artery.  The right anterior cerebral artery fills across the   anterior communicating artery.    < end of copied text >  < from: CT Brain Perfusion Maps Stroke (10.26.23 @ 04:54) >  CT PERFUSION: CT perfusion is consistent with large acute infarct in the   right middle cerebral artery vascular territory and small to moderate   amount of surrounding ischemic penumbra.  Hypoperfusion index is 0.3.    Core infarct (CBF <  30%:): 136 mL  Penumbra (Tmax >6s): 194 mL  Mismatched volume: 58 mL  Mismatched ratio: 1.4    < end of copied text >      < from: CT Head No Cont (10.22.19 @ 15:57) >  IMPRESSION:    1)  chronic ischemic changes in both hemispheres with volume loss. There   are no new infarct as compared to prior CT. This may be further assessed   with MR imaging.  2)  no hemorrhage or mass identified.    < end of copied text >  < from: CT Angio Head w/ IV Cont (10.22.19 @ 15:57) >  IMPRESSION    1. There is intimal thickening and calcified plaque in both carotid   bifurcations in the neck, but without significant stenosis. No evidence   of carotid vertebral occlusion or dissection.  2. Intracranially, there are atherosclerotic changes in the anterior and   posterior circulation, but without significant stenosis or occlusion.    < end of copied text >  < from: CT Brain Stroke Protocol (10.26.23 @ 04:37) >  IMPRESSION:  No evidence of acute intracranial hemorrhage, mass effect or large acute   territorial infarct, within limits of noncontrast CT technique.    < end of copied text >  < from: CT Angio Neck Stroke Protocol w/ IV Cont (10.26.23 @ 04:53) >  CT ANGIOGRAPHY NECK:  1.  The right internal carotid artery is occluded approximately 1.5 cm   distal to its origin, possibly due to underlying dissection.  2.  Endotracheal tube is low in position, with its tip approximate 1 cm   above the slade.  3.  Diffuse ill-defined groundglass opacity in both lungs, which may   represent pulmonary edema, infection and/or atelectasis.  4.  There is a discrete right upper lobe groundglass opacity measuring   1.9 x 1.4 cm; this may represent a focus of infection, inflammation,   edema, hemorrhage, fibrosis or malignancy.  A follow-up chest CT is   warranted in three months to assess for resolution.    CT ANGIOGRAPHY BRAIN: The right internal carotid arteries occluded.    There is no significant flow related opacification within the proximal   right middle cerebral artery.  There is mild reconstitution of flow in   some distal branch vessels of the inferior division of the right middle   cerebral artery.  The right anterior cerebral artery fills across the   anterior communicating artery.    < end of copied text >  < from: CT Brain Perfusion Maps Stroke (10.26.23 @ 04:54) >  CT PERFUSION: CT perfusion is consistent with large acute infarct in the   right middle cerebral artery vascular territory and small to moderate   amount of surrounding ischemic penumbra.  Hypoperfusion index is 0.3.    Core infarct (CBF <  30%:): 136 mL  Penumbra (Tmax >6s): 194 mL  Mismatched volume: 58 mL  Mismatched ratio: 1.4    < end of copied text >    EEG Classification / Summary:  Abnormal EEG study  1. Intermittent periodic discharges seen intermittently over the left hemisphere, sometimes with triphasic morphology  2. Focal continuous right hemispheric slowing  3. Moderate generalized background slowing    -----------------------------------------------------------------------------------------------------    Clinical Impression:  1. While the periodic pattern noted above may represent lateralized periodic discharges indicating epileptic potential in the left hemisphere, more likely these represent generalized periodic discharges with triphasic morphology, consistent with metabolic etiology, that are poorly expressed on the right due to the effects of the stroke.   2. Structural abnormality in right hemisphere  3. Moderate diffuse/multi-focal cerebral dysfunction, not specific as to etiology.    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 Significant   associated mass effect and associated herniation.      < end of copied text >

## 2023-11-02 NOTE — PROGRESS NOTE ADULT - ASSESSMENT
89 y/o female with pmhx of afib on xarelto, HTN, HLD now with:    1. acute CVA  2. cerebral edema  3. hypoxic respiratory failure    - on asprin  - keep sodium 145-155  - lung protective vent strategy. mental status precludes extubation  - hold a/c due to risk of hemorrhagic conversion given size of injury  -  i had an extensive conversation with patients eldest son and daughter at bedside. i reiterated the same aspects of diagnosis and prognosis that previous providers have explained to them in addition to letting them know about the new infarct. i explained that although swelling may improve, the ischemic tissue will not and her overall prognosis is poor. they understand but wwant a miracle to happen. they did, however, add that their mom would want to pass away naturally without CPR. but that they wanted to discuss with their other three siblings first prior to making a complete decision.

## 2023-11-02 NOTE — PROGRESS NOTE ADULT - SUBJECTIVE AND OBJECTIVE BOX
# CC: Patient is a 88y old  Female who presents with a chief complaint of Ischemic Stroke, Poor MS requiring Intubation (01 Nov 2023 16:57)      ## HPI:  Ms. Longoria is an 88 year old female with a PMH of HTN, HLD and afib on xarelto who presented to [facility] earlier this morning after she was found altered, nonverbal and with right gaze deviation when she woke up. At baseline pt is ambulatory and verbal. Pt required intubation in ED for airway protection given poor MS. NIHSS calculated as 30 at that time. Pt not a candidate for TNK or transfer for intervention per stroke neurologist. She remains intubated on nicardipine gtt. ICU team consulted. (26 Oct 2023 05:50)      **O/N:**    ## ROS:    ## Labs:  ** CBC: **                        12.7   11.57 )-----------( 339      ( 02 Nov 2023 02:50 )             40.0     ** Chem:  **  11-02    145  |  108  |  52<H>  ----------------------------<  180<H>  4.4   |  34<H>  |  1.49<H>    Ca    9.6      02 Nov 2023 02:50  Phos  2.9     11-02  Mg     2.8     11-02    TPro  7.2  /  Alb  2.5<L>  /  TBili  0.6  /  DBili  x   /  AST  85<H>  /  ALT  49  /  AlkPhos  58  11-02    ** Coags: **      CAPILLARY BLOOD GLUCOSE            Culture - Urine (collected 26 Oct 2023 18:30)  Source: Catheterized Catheterized  Final Report (27 Oct 2023 22:48):    No growth    Culture - Blood (collected 26 Oct 2023 18:26)  Source: .Blood Blood  Final Report (01 Nov 2023 02:00):    No growth at 5 days    Culture - Blood (collected 26 Oct 2023 18:20)  Source: .Blood Blood  Final Report (01 Nov 2023 02:00):    No growth at 5 days    Culture - Sputum (collected 26 Oct 2023 12:50)  Source: ET Tube ET Tube  Gram Stain (28 Oct 2023 17:07):    No squamous epithelial cells per low power field    Rare polymorphonuclear leukocytes per low power field    Rare Gram Positive Cocci in Pairs and Chains per oil power field    Rare Gram Negative Rods per oil power field    Rare Gram Positive Rods per oil power field  Final Report (28 Oct 2023 17:07):    Normal Respiratory Priya present        ## Imaging:    ## Medications:  hydrALAZINE 25 milliGRAM(s) Oral every 8 hours  hydrALAZINE Injectable 10 milliGRAM(s) IV Push every 4 hours PRN        acetaminophen     Tablet .. 650 milliGRAM(s) Oral every 6 hours PRN      aspirin  chewable 81 milliGRAM(s) Oral daily  heparin   Injectable 5000 Unit(s) SubCutaneous every 12 hours    bisacodyl Suppository 10 milliGRAM(s) Rectal daily PRN  pantoprazole   Suspension 40 milliGRAM(s) Enteral Tube daily  polyethylene glycol 3350 17 Gram(s) Oral daily  senna 2 Tablet(s) Oral at bedtime      atorvastatin 80 milliGRAM(s) Oral at bedtime    influenza  Vaccine (HIGH DOSE) 0.7 milliLiter(s) IntraMuscular once        ## O/E:  ICU Vital Signs Last 24 Hrs  T(C): 37.1 (02 Nov 2023 13:17), Max: 38.5 (02 Nov 2023 04:00)  T(F): 98.8 (02 Nov 2023 13:17), Max: 101.3 (02 Nov 2023 04:00)  HR: 78 (02 Nov 2023 13:17) (69 - 118)  BP: 170/78 (02 Nov 2023 13:17) (133/64 - 170/78)  BP(mean): 99 (02 Nov 2023 13:17) (81 - 124)  ABP: --  ABP(mean): --  RR: 22 (02 Nov 2023 13:17) (15 - 26)  SpO2: 100% (02 Nov 2023 13:17) (95% - 100%)    O2 Parameters below as of 02 Nov 2023 08:00  Patient On (Oxygen Delivery Method): ventilator, TV-400, AC-12. PEEP-5    O2 Concentration (%): 25      I&O's Summary    01 Nov 2023 07:01  -  02 Nov 2023 07:00  --------------------------------------------------------  IN: 1155 mL / OUT: 475 mL / NET: 680 mL    02 Nov 2023 07:01  -  02 Nov 2023 14:16  --------------------------------------------------------  IN: 1220 mL / OUT: 160 mL / NET: 1060 mL      Mode: AC/ CMV (Assist Control/ Continuous Mandatory Ventilation), RR (machine): 12, TV (machine): 400, FiO2: 30, PEEP: 5, ITime: 0.8, MAP: 9, PIP: 19  Gen: lying comfortably in bed in no apparent distress  HEENT: PERRL, EOMI  Resp: CTA B/L no c/r/w  CVS: S1S2 no m/r/g  Abd: soft NT/ND +BS  Ext: no c/c/e  Neuro: A&Ox3    ## Code status:  Goals of care discussion: [x] yes [ ] no  [x] full code  [ ] DNR  [ ] DNI  [ ] MIKE # CC: Patient is a 88y old  Female who presents with a chief complaint of Ischemic Stroke, Poor MS requiring Intubation (01 Nov 2023 16:57)      ## HPI:  Ms. Longoria is an 88 year old female with a PMH of HTN, HLD and afib on xarelto who presented to [facility] earlier this morning after she was found altered, nonverbal and with right gaze deviation when she woke up. At baseline pt is ambulatory and verbal. Pt required intubation in ED for airway protection given poor MS. NIHSS calculated as 30 at that time. Pt not a candidate for TNK or transfer for intervention per stroke neurologist. She remains intubated on nicardipine gtt. ICU team consulted. (26 Oct 2023 05:50)      **O/N:**    ## ROS:    ## Labs:  ** CBC: **                        12.7   11.57 )-----------( 339      ( 02 Nov 2023 02:50 )             40.0     ** Chem:  **  11-02    145  |  108  |  52<H>  ----------------------------<  180<H>  4.4   |  34<H>  |  1.49<H>    Ca    9.6      02 Nov 2023 02:50  Phos  2.9     11-02  Mg     2.8     11-02    TPro  7.2  /  Alb  2.5<L>  /  TBili  0.6  /  DBili  x   /  AST  85<H>  /  ALT  49  /  AlkPhos  58  11-02    ** Coags: **      CAPILLARY BLOOD GLUCOSE            Culture - Urine (collected 26 Oct 2023 18:30)  Source: Catheterized Catheterized  Final Report (27 Oct 2023 22:48):    No growth    Culture - Blood (collected 26 Oct 2023 18:26)  Source: .Blood Blood  Final Report (01 Nov 2023 02:00):    No growth at 5 days    Culture - Blood (collected 26 Oct 2023 18:20)  Source: .Blood Blood  Final Report (01 Nov 2023 02:00):    No growth at 5 days    Culture - Sputum (collected 26 Oct 2023 12:50)  Source: ET Tube ET Tube  Gram Stain (28 Oct 2023 17:07):    No squamous epithelial cells per low power field    Rare polymorphonuclear leukocytes per low power field    Rare Gram Positive Cocci in Pairs and Chains per oil power field    Rare Gram Negative Rods per oil power field    Rare Gram Positive Rods per oil power field  Final Report (28 Oct 2023 17:07):    Normal Respiratory Priya present        ## Imaging:    ## Medications:  hydrALAZINE 25 milliGRAM(s) Oral every 8 hours  hydrALAZINE Injectable 10 milliGRAM(s) IV Push every 4 hours PRN        acetaminophen     Tablet .. 650 milliGRAM(s) Oral every 6 hours PRN      aspirin  chewable 81 milliGRAM(s) Oral daily  heparin   Injectable 5000 Unit(s) SubCutaneous every 12 hours    bisacodyl Suppository 10 milliGRAM(s) Rectal daily PRN  pantoprazole   Suspension 40 milliGRAM(s) Enteral Tube daily  polyethylene glycol 3350 17 Gram(s) Oral daily  senna 2 Tablet(s) Oral at bedtime      atorvastatin 80 milliGRAM(s) Oral at bedtime    influenza  Vaccine (HIGH DOSE) 0.7 milliLiter(s) IntraMuscular once        ## O/E:  ICU Vital Signs Last 24 Hrs  T(C): 37.1 (02 Nov 2023 13:17), Max: 38.5 (02 Nov 2023 04:00)  T(F): 98.8 (02 Nov 2023 13:17), Max: 101.3 (02 Nov 2023 04:00)  HR: 78 (02 Nov 2023 13:17) (69 - 118)  BP: 170/78 (02 Nov 2023 13:17) (133/64 - 170/78)  BP(mean): 99 (02 Nov 2023 13:17) (81 - 124)  ABP: --  ABP(mean): --  RR: 22 (02 Nov 2023 13:17) (15 - 26)  SpO2: 100% (02 Nov 2023 13:17) (95% - 100%)    O2 Parameters below as of 02 Nov 2023 08:00  Patient On (Oxygen Delivery Method): ventilator, TV-400, AC-12. PEEP-5    O2 Concentration (%): 25      I&O's Summary    01 Nov 2023 07:01  -  02 Nov 2023 07:00  --------------------------------------------------------  IN: 1155 mL / OUT: 475 mL / NET: 680 mL    02 Nov 2023 07:01  -  02 Nov 2023 14:16  --------------------------------------------------------  IN: 1220 mL / OUT: 160 mL / NET: 1060 mL      Mode: AC/ CMV (Assist Control/ Continuous Mandatory Ventilation), RR (machine): 12, TV (machine): 400, FiO2: 30, PEEP: 5, ITime: 0.8, MAP: 9, PIP: 19  Gen: orotracheally intubated on full vent  HEENT: fixed pupils  Resp: mechanical breath sounds B/L  CVS: S1S2 no m/r/g  Abd: soft NT/ND +BS  Ext: no c/c/e  Neuro: breathing over vent, w/d to pain    ## Code status:  Goals of care discussion: [x] yes [ ] no  [x] full code  [ ] DNR  [ ] DNI  [ ] MIKE

## 2023-11-02 NOTE — PROGRESS NOTE ADULT - SUBJECTIVE AND OBJECTIVE BOX
Patient is a 88y old  Female who presents with a chief complaint of Ischemic Stroke, Poor MS requiring Intubation (02 Nov 2023 15:05)      BRIEF HOSPITAL COURSE: 87 y/o female with pmhx of afib on xarelto, HTN, HLD who presented on 10/26 with AMS, right gaze deviation, aphasic intubated in ED for airway protection. CT with R ICA occlusion and possible dissection with large core infarct on CTP. did not receive tpa due to anticoagulant use, age, extended window, large infarct. deemed not candidate for mechanical intervention. admitted to ICU      Events last 24 hours: pepe requested repeat CT which ws done earlier today and shows new right PCA infarct in addition to large MCA and JIMMY infarct with similar brain edema, midline shift, herniation.     PAST MEDICAL & SURGICAL HISTORY:  HTN (hypertension)      Elevated cholesterol      Atrial fibrillation      Hyperlipidemia          Review of Systems:  unable to obtain due to AMS    Medications:    hydrALAZINE 25 milliGRAM(s) Oral every 8 hours  hydrALAZINE Injectable 10 milliGRAM(s) IV Push every 4 hours PRN      acetaminophen     Tablet .. 650 milliGRAM(s) Oral every 6 hours PRN      aspirin  chewable 81 milliGRAM(s) Oral daily  heparin   Injectable 5000 Unit(s) SubCutaneous every 12 hours    bisacodyl Suppository 10 milliGRAM(s) Rectal daily PRN  pantoprazole   Suspension 40 milliGRAM(s) Enteral Tube daily  polyethylene glycol 3350 17 Gram(s) Oral daily  senna 2 Tablet(s) Oral at bedtime      atorvastatin 80 milliGRAM(s) Oral at bedtime      influenza  Vaccine (HIGH DOSE) 0.7 milliLiter(s) IntraMuscular once    chlorhexidine 0.12% Liquid 15 milliLiter(s) Oral Mucosa every 12 hours  chlorhexidine 2% Cloths 1 Application(s) Topical <User Schedule>        Mode: AC/ CMV (Assist Control/ Continuous Mandatory Ventilation)  RR (machine): 12  TV (machine): 400  FiO2: 30  PEEP: 5  ITime: 0.8  MAP: 9  PIP: 20      ICU Vital Signs Last 24 Hrs  T(C): 37 (02 Nov 2023 20:00), Max: 38.5 (02 Nov 2023 04:00)  T(F): 98.6 (02 Nov 2023 20:00), Max: 101.3 (02 Nov 2023 04:00)  HR: 73 (02 Nov 2023 20:00) (60 - 118)  BP: 153/81 (02 Nov 2023 20:00) (133/64 - 186/61)  BP(mean): 100 (02 Nov 2023 20:00) (81 - 111)  ABP: --  ABP(mean): --  RR: 20 (02 Nov 2023 20:00) (15 - 26)  SpO2: 100% (02 Nov 2023 20:00) (95% - 100%)    O2 Parameters below as of 02 Nov 2023 08:00  Patient On (Oxygen Delivery Method): ventilator, TV-400, AC-12. PEEP-5    O2 Concentration (%): 25            I&O's Detail    01 Nov 2023 07:01  -  02 Nov 2023 07:00  --------------------------------------------------------  IN:    Vital High Protein: 1155 mL  Total IN: 1155 mL    OUT:    Indwelling Catheter - Urethral (mL): 475 mL  Total OUT: 475 mL    Total NET: 680 mL      02 Nov 2023 07:01  -  02 Nov 2023 21:25  --------------------------------------------------------  IN:    Enteral Tube Flush: 100 mL    IV PiggyBack: 1000 mL    Vital High Protein: 385 mL  Total IN: 1485 mL    OUT:    Indwelling Catheter - Urethral (mL): 600 mL  Total OUT: 600 mL    Total NET: 885 mL            LABS:                        12.7   11.57 )-----------( 339      ( 02 Nov 2023 02:50 )             40.0     11-02    145  |  108  |  52<H>  ----------------------------<  180<H>  4.4   |  34<H>  |  1.49<H>    Ca    9.6      02 Nov 2023 02:50  Phos  2.9     11-02  Mg     2.8     11-02    TPro  7.2  /  Alb  2.5<L>  /  TBili  0.6  /  DBili  x   /  AST  85<H>  /  ALT  49  /  AlkPhos  58  11-02          CAPILLARY BLOOD GLUCOSE          Urinalysis Basic - ( 02 Nov 2023 02:50 )    Color: x / Appearance: x / SG: x / pH: x  Gluc: 180 mg/dL / Ketone: x  / Bili: x / Urobili: x   Blood: x / Protein: x / Nitrite: x   Leuk Esterase: x / RBC: x / WBC x   Sq Epi: x / Non Sq Epi: x / Bacteria: x      CULTURES:      Physical Examination:    General: No acute distress.      HEENT: Pupils equal, nonreactive reactive to light.  Symmetric.    PULM: Clear to auscultation bilaterally, no significant sputum production    NECK: Supple, no lymphadenopathy, trachea midline    CVS: Regular rate and rhythm, no murmurs, rubs, or gallops    ABD: Soft, nondistended, nontender, normoactive bowel sounds, no masses    EXT: No edema, nontender    SKIN: Warm and well perfused, no rashes noted.    NEURO: obtunded. no withdrawal to noxious stimuli. pupils fixed and dilated. triggers vent periodically      RADIOLOGY:     IMPRESSION:    Reidentified are 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 There is effacement of all of the   basilar cisterns. Effacement of the sulci in the bilateral cerebri,   compatible with increased brain edema.    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    Unsuccessful attempts were made to reach the ordering clinician via teams.      --- End of Report ---            RADHA GONZALEZ MD; Attending Radiologist  This document has been electronically signed. Nov 2 2023  2:45PM      CRITICAL CARE TIME SPENT: 35 minutes of critical care time spent providing medical care for patient's acute illness/conditions that impairs at least one vital organ system and/or poses a high risk of imminent or life threatening deterioration in the patient's condition. It includes time spent evaluating and treating the patient's acute illness as well as time spent reviewing labs, radiology, discussing goals of care with patient and/or patient's family, and discussing the case with a multidisciplinary team in an effort to prevent further life threatening deterioration or end organ damage. This time is independent of any procedures performed.    TIME SPENT: *** Patient is a 88y old  Female who presents with a chief complaint of Ischemic Stroke, Poor MS requiring Intubation (02 Nov 2023 15:05)      BRIEF HOSPITAL COURSE: 87 y/o female with pmhx of afib on xarelto, HTN, HLD who presented on 10/26 with AMS, right gaze deviation, aphasic intubated in ED for airway protection. CT with R ICA occlusion and possible dissection with large core infarct on CTP. did not receive tpa due to anticoagulant use, age, extended window, large infarct. deemed not candidate for mechanical intervention. admitted to ICU      Events last 24 hours: pepe requested repeat CT which ws done earlier today and shows new right PCA infarct in addition to large MCA and JIMMY infarct with similar brain edema, midline shift, herniation.     PAST MEDICAL & SURGICAL HISTORY:  HTN (hypertension)      Elevated cholesterol      Atrial fibrillation      Hyperlipidemia          Review of Systems:  unable to obtain due to AMS    Medications:    hydrALAZINE 25 milliGRAM(s) Oral every 8 hours  hydrALAZINE Injectable 10 milliGRAM(s) IV Push every 4 hours PRN      acetaminophen     Tablet .. 650 milliGRAM(s) Oral every 6 hours PRN      aspirin  chewable 81 milliGRAM(s) Oral daily  heparin   Injectable 5000 Unit(s) SubCutaneous every 12 hours    bisacodyl Suppository 10 milliGRAM(s) Rectal daily PRN  pantoprazole   Suspension 40 milliGRAM(s) Enteral Tube daily  polyethylene glycol 3350 17 Gram(s) Oral daily  senna 2 Tablet(s) Oral at bedtime      atorvastatin 80 milliGRAM(s) Oral at bedtime      influenza  Vaccine (HIGH DOSE) 0.7 milliLiter(s) IntraMuscular once    chlorhexidine 0.12% Liquid 15 milliLiter(s) Oral Mucosa every 12 hours  chlorhexidine 2% Cloths 1 Application(s) Topical <User Schedule>        Mode: AC/ CMV (Assist Control/ Continuous Mandatory Ventilation)  RR (machine): 12  TV (machine): 400  FiO2: 30  PEEP: 5  ITime: 0.8  MAP: 9  PIP: 20      ICU Vital Signs Last 24 Hrs  T(C): 37 (02 Nov 2023 20:00), Max: 38.5 (02 Nov 2023 04:00)  T(F): 98.6 (02 Nov 2023 20:00), Max: 101.3 (02 Nov 2023 04:00)  HR: 73 (02 Nov 2023 20:00) (60 - 118)  BP: 153/81 (02 Nov 2023 20:00) (133/64 - 186/61)  BP(mean): 100 (02 Nov 2023 20:00) (81 - 111)  ABP: --  ABP(mean): --  RR: 20 (02 Nov 2023 20:00) (15 - 26)  SpO2: 100% (02 Nov 2023 20:00) (95% - 100%)    O2 Parameters below as of 02 Nov 2023 08:00  Patient On (Oxygen Delivery Method): ventilator, TV-400, AC-12. PEEP-5    O2 Concentration (%): 25            I&O's Detail    01 Nov 2023 07:01  -  02 Nov 2023 07:00  --------------------------------------------------------  IN:    Vital High Protein: 1155 mL  Total IN: 1155 mL    OUT:    Indwelling Catheter - Urethral (mL): 475 mL  Total OUT: 475 mL    Total NET: 680 mL      02 Nov 2023 07:01  -  02 Nov 2023 21:25  --------------------------------------------------------  IN:    Enteral Tube Flush: 100 mL    IV PiggyBack: 1000 mL    Vital High Protein: 385 mL  Total IN: 1485 mL    OUT:    Indwelling Catheter - Urethral (mL): 600 mL  Total OUT: 600 mL    Total NET: 885 mL            LABS:                        12.7   11.57 )-----------( 339      ( 02 Nov 2023 02:50 )             40.0     11-02    145  |  108  |  52<H>  ----------------------------<  180<H>  4.4   |  34<H>  |  1.49<H>    Ca    9.6      02 Nov 2023 02:50  Phos  2.9     11-02  Mg     2.8     11-02    TPro  7.2  /  Alb  2.5<L>  /  TBili  0.6  /  DBili  x   /  AST  85<H>  /  ALT  49  /  AlkPhos  58  11-02          CAPILLARY BLOOD GLUCOSE          Urinalysis Basic - ( 02 Nov 2023 02:50 )    Color: x / Appearance: x / SG: x / pH: x  Gluc: 180 mg/dL / Ketone: x  / Bili: x / Urobili: x   Blood: x / Protein: x / Nitrite: x   Leuk Esterase: x / RBC: x / WBC x   Sq Epi: x / Non Sq Epi: x / Bacteria: x      CULTURES:      Physical Examination:    General: No acute distress.      HEENT: Pupils equal, nonreactive reactive to light.  Symmetric.    PULM: Clear to auscultation bilaterally, no significant sputum production    NECK: Supple, no lymphadenopathy, trachea midline    CVS: Regular rate and rhythm, no murmurs, rubs, or gallops    ABD: Soft, nondistended, nontender, normoactive bowel sounds, no masses    EXT: No edema, nontender    SKIN: Warm and well perfused, no rashes noted.    NEURO: obtunded. no withdrawal to noxious stimuli. pupils fixed and dilated. triggers vent periodically      RADIOLOGY:     IMPRESSION:    Reidentified are 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 There is effacement of all of the   basilar cisterns. Effacement of the sulci in the bilateral cerebri,   compatible with increased brain edema.    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    Unsuccessful attempts were made to reach the ordering clinician via teams.      --- End of Report ---            RADHA GONZALEZ MD; Attending Radiologist  This document has been electronically signed. Nov 2 2023  2:45PM      CRITICAL CARE TIME SPENT: 35 minutes of critical care time spent providing medical care for patient's acute illness/conditions that impairs at least one vital organ system and/or poses a high risk of imminent or life threatening deterioration in the patient's condition. It includes time spent evaluating and treating the patient's acute illness as well as time spent reviewing labs, radiology, discussing goals of care with patient and/or patient's family, and discussing the case with a multidisciplinary team in an effort to prevent further life threatening deterioration or end organ damage. This time is independent of any procedures performed.

## 2023-11-02 NOTE — PROGRESS NOTE ADULT - ASSESSMENT
88 year old female with a PMH of HTN, HLD and afib on xarelto who did not take medications for past 1 week prior to hospitalization here with R MCA infarct.   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

## 2023-11-02 NOTE — PROGRESS NOTE ADULT - ASSESSMENT
88F PMH of HTN, HLD, AF (on Xarelto) p/w AMS,(nonverbal and right gaze deviation) on wakening. In ED, intubated for airway protection / mental status, NIHSS 30. CT head showed large CVA with herniation. Due to size of infarct, not a candidate for any interventions. Brought to ICU for further management on full vent.  - Febrile, treating with Tylenol and ice packs  - Remains intubated with fixed pupils, but has cough and breathes over vent, 12/400/25%/+5  - Patient has devastating neurological injury with no change of a meaningful recovery. GOC discussions ongoing, for now agree to no trache, but having difficulty making decision to withdraw care. Spoke to them today, and they requested repeat head CT to help them with their decision-making process. I told them that we do not require head CT given her neuro status, but that we would get the repeat in order to help them.  - BP control with hydralazine PO + IV PRN pushes  - Urine output now minimal (20–30cc/hr), MAP remains adequate (90+), will give 1L LR  - c/w tube feeds  - DVT ppx Hep SC    I have personally provided 45 minutes of critical care time.

## 2023-11-02 NOTE — PROGRESS NOTE ADULT - SUBJECTIVE AND OBJECTIVE BOX
follow up on:  complex medical decision making related to goals of care      OVERNIGHT EVENTS: no overnight events. Daughter at bedside.     Review of systems:      Unable to obtain/Limited due to poor mental status    MEDICATIONS  (STANDING):  aspirin  chewable 81 milliGRAM(s) Oral daily  atorvastatin 80 milliGRAM(s) Oral at bedtime  chlorhexidine 0.12% Liquid 15 milliLiter(s) Oral Mucosa every 12 hours  chlorhexidine 2% Cloths 1 Application(s) Topical <User Schedule>  heparin   Injectable 5000 Unit(s) SubCutaneous every 12 hours  hydrALAZINE 25 milliGRAM(s) Oral every 8 hours  influenza  Vaccine (HIGH DOSE) 0.7 milliLiter(s) IntraMuscular once  pantoprazole   Suspension 40 milliGRAM(s) Enteral Tube daily  polyethylene glycol 3350 17 Gram(s) Oral daily  senna 2 Tablet(s) Oral at bedtime    MEDICATIONS  (PRN):  acetaminophen     Tablet .. 650 milliGRAM(s) Oral every 6 hours PRN Temp greater or equal to 38C (100.4F)  bisacodyl Suppository 10 milliGRAM(s) Rectal daily PRN Constipation  hydrALAZINE Injectable 10 milliGRAM(s) IV Push every 4 hours PRN systolic>180      PHYSICAL EXAM:  Vital Signs Last 24 Hrs  T(C): 37.1 (02 Nov 2023 13:17), Max: 38.5 (02 Nov 2023 04:00)  T(F): 98.8 (02 Nov 2023 13:17), Max: 101.3 (02 Nov 2023 04:00)  HR: 78 (02 Nov 2023 13:17) (69 - 118)  BP: 170/78 (02 Nov 2023 13:17) (133/64 - 170/78)  BP(mean): 99 (02 Nov 2023 13:17) (81 - 124)  RR: 22 (02 Nov 2023 13:17) (15 - 26)  SpO2: 100% (02 Nov 2023 13:17) (95% - 100%)    Parameters below as of 02 Nov 2023 08:00  Patient On (Oxygen Delivery Method): ventilator, TV-400, AC-12. PEEP-5    O2 Concentration (%): 25      Palliative Performance Scale/Karnofsky Score: 20      GENERAL: poorly responsive, intubated,  NAD  HEENT: Atraumatic, oropharynx clear, neck supple  CHEST/LUNG: unlabored  HEART: Regular rate and rhythm    ABDOMEN: Soft, Nontender, Nondistended   MUSCULOSKELETAL:  No  edema   NERVOUS SYSTEM:  poorly responsive, +spontaneous breathing on ventilator  SKIN: No rashes or lesions noted  Oral intake: npo/TF    LABS:                          12.7   11.57 )-----------( 339      ( 02 Nov 2023 02:50 )             40.0     11-02    145  |  108  |  52<H>  ----------------------------<  180<H>  4.4   |  34<H>  |  1.49<H>    Ca    9.6      02 Nov 2023 02:50  Phos  2.9     11-02  Mg     2.8     11-02    TPro  7.2  /  Alb  2.5<L>  /  TBili  0.6  /  DBili  x   /  AST  85<H>  /  ALT  49  /  AlkPhos  58  11-02    Urinalysis Basic - ( 02 Nov 2023 02:50 )    Color: x / Appearance: x / SG: x / pH: x  Gluc: 180 mg/dL / Ketone: x  / Bili: x / Urobili: x   Blood: x / Protein: x / Nitrite: x   Leuk Esterase: x / RBC: x / WBC x   Sq Epi: x / Non Sq Epi: x / Bacteria: x        RADIOLOGY & ADDITIONAL STUDIES:

## 2023-11-03 NOTE — PROGRESS NOTE ADULT - ASSESSMENT
89 y/o female with pmhx of afib on xarelto, HTN, HLD now with:    1. acute CVA  2. cerebral edema  3. hypoxic respiratory failure    Neuro: Massive and worsening infarcts, New and old with herniation. No hope for meaningful recovery.   CV: Permissive BP goals.  - asa  Pulm: Stuck on vent for mental status  Renal: stable/ improved DAVIE  - no more labs.   GI:Tube feeds as tolerated  Endo: Fs accaptable  ID:No issues yet  Prophylaxis: HSQ  Ethics: No meaningfull hope for revoery may progress to herniation and rachel death given Ct will follow clinically  - No more labs  - need to schedule family meeting. 87 y/o female with pmhx of afib on xarelto, HTN, HLD now with:    1. acute CVA  2. cerebral edema  3. hypoxic respiratory failure    Neuro: Massive and worsening infarcts, New and old with herniation. No hope for recovery to indepented life.   - monitro for sings of herniation. Neuro consult appreciated   CV: Permissive BP goals.  - asa  Pulm: Stuck on vent for mental status  Renal: stable/ improved DAVIE  - no more labs.   GI:Tube feeds as tolerated  Endo: Fs acceptable  ID:No issues yet  Prophylaxis: HSQ  Ethics: No meaningfully hope for recovery to independence.  May progress to herniation and rachel death given Ct will follow clinically  - LAbs QOD  - family discussing with other sibling (5 intotal)

## 2023-11-03 NOTE — PROGRESS NOTE ADULT - SUBJECTIVE AND OBJECTIVE BOX
CHIEF COMPLAINT:Patient is a 88y old  Female who presents with a chief complaint of Ischemic Stroke, Poor MS requiring Intubation (02 Nov 2023 21:24)        Interval Events:    REVIEW OF SYSTEMS:    [ ] Unable to assess ROS because ________ brain herniation....    OBJECTIVE:  ICU Vital Signs Last 24 Hrs  T(C): 37.9 (03 Nov 2023 07:00), Max: 38.4 (02 Nov 2023 09:00)  T(F): 100.2 (03 Nov 2023 07:00), Max: 101.1 (02 Nov 2023 09:00)  HR: 71 (03 Nov 2023 07:00) (60 - 86)  BP: 171/55 (03 Nov 2023 07:00) (141/71 - 186/61)  BP(mean): 86 (03 Nov 2023 07:00) (86 - 111)  ABP: --  ABP(mean): --  RR: 22 (03 Nov 2023 07:00) (14 - 25)  SpO2: 100% (03 Nov 2023 07:00) (95% - 100%)      Mode: AC/ CMV (Assist Control/ Continuous Mandatory Ventilation), RR (machine): 12, TV (machine): 400, FiO2: 25, PEEP: 5, ITime: 0.8, MAP: 9, PIP: 20    11-02 @ 07:01  -  11-03 @ 07:00  --------------------------------------------------------  IN: 2090 mL / OUT: 1200 mL / NET: 890 mL      CAPILLARY BLOOD GLUCOSE          PHYSICAL EXAM:  Gen: NAD  HEENT:   Resp: CTA B/L no c/r/w  CVS: S1S2 no m/r/g  Abd: soft NT/ND +BS  Ext: no c/c/e  Neuro: A&Ox3    LINES:    HOSPITAL MEDICATIONS:  Standing Meds:  aspirin  chewable 81 milliGRAM(s) Oral daily  atorvastatin 80 milliGRAM(s) Oral at bedtime  chlorhexidine 0.12% Liquid 15 milliLiter(s) Oral Mucosa every 12 hours  chlorhexidine 2% Cloths 1 Application(s) Topical <User Schedule>  heparin   Injectable 5000 Unit(s) SubCutaneous every 12 hours  hydrALAZINE 25 milliGRAM(s) Oral every 8 hours  influenza  Vaccine (HIGH DOSE) 0.7 milliLiter(s) IntraMuscular once  pantoprazole   Suspension 40 milliGRAM(s) Enteral Tube daily  polyethylene glycol 3350 17 Gram(s) Oral daily  senna 2 Tablet(s) Oral at bedtime      PRN Meds:  acetaminophen     Tablet .. 650 milliGRAM(s) Oral every 6 hours PRN  bisacodyl Suppository 10 milliGRAM(s) Rectal daily PRN  hydrALAZINE Injectable 10 milliGRAM(s) IV Push every 4 hours PRN      LABS:                        11.9   12.02 )-----------( 328      ( 03 Nov 2023 02:30 )             38.8     Hgb Trend: 11.9<--, 12.7<--, 13.4<--, 12.8<--, 11.5<--  11-03    143  |  108  |  42<H>  ----------------------------<  162<H>  4.1   |  31  |  0.89    Ca    9.0      03 Nov 2023 02:30  Phos  2.6     11-03  Mg     2.5     11-03    TPro  6.4  /  Alb  2.3<L>  /  TBili  0.6  /  DBili  x   /  AST  81<H>  /  ALT  46  /  AlkPhos  49  11-03    Creatinine Trend: 0.89<--, 1.49<--, 0.92<--, 0.78<--, 0.73<--, 0.89<--    Urinalysis Basic - ( 03 Nov 2023 02:30 )    Color: x / Appearance: x / SG: x / pH: x  Gluc: 162 mg/dL / Ketone: x  / Bili: x / Urobili: x   Blood: x / Protein: x / Nitrite: x   Leuk Esterase: x / RBC: x / WBC x   Sq Epi: x / Non Sq Epi: x / Bacteria: x            MICROBIOLOGY:     RADIOLOGY:  [ ] Reviewed and interpreted by me    EKG:   CHIEF COMPLAINT: Patient is a 88y old  Female who presents with a chief complaint of Ischemic Stroke, Poor MS requiring Intubation (02 Nov 2023 21:24)        Interval Events:  Repeat imaging with more strokes.   REVIEW OF SYSTEMS:    [ ] Unable to assess ROS because ________ brain herniation....    OBJECTIVE:  ICU Vital Signs Last 24 Hrs  T(C): 37.9 (03 Nov 2023 07:00), Max: 38.4 (02 Nov 2023 09:00)  T(F): 100.2 (03 Nov 2023 07:00), Max: 101.1 (02 Nov 2023 09:00)  HR: 71 (03 Nov 2023 07:00) (60 - 86)  BP: 171/55 (03 Nov 2023 07:00) (141/71 - 186/61)  BP(mean): 86 (03 Nov 2023 07:00) (86 - 111)  ABP: --  ABP(mean): --  RR: 22 (03 Nov 2023 07:00) (14 - 25)  SpO2: 100% (03 Nov 2023 07:00) (95% - 100%)      Mode: AC/ CMV (Assist Control/ Continuous Mandatory Ventilation), RR (machine): 12, TV (machine): 400, FiO2: 25, PEEP: 5, ITime: 0.8, MAP: 9, PIP: 20    11-02 @ 07:01  -  11-03 @ 07:00  --------------------------------------------------------  IN: 2090 mL / OUT: 1200 mL / NET: 890 mL      CAPILLARY BLOOD GLUCOSE          PHYSICAL EXAM:  Gen: NAD. intuabted.    HEENT:   Resp: CTA B/L no c/r/w  CVS: S1S2 no m/r/g  Abd: soft NT/ND +BS  Ext: no c/c/e  Neuro: winces to pain, postures to pain does not withdraw.       LINES:    HOSPITAL MEDICATIONS:  Standing Meds:  aspirin  chewable 81 milliGRAM(s) Oral daily  atorvastatin 80 milliGRAM(s) Oral at bedtime  chlorhexidine 0.12% Liquid 15 milliLiter(s) Oral Mucosa every 12 hours  chlorhexidine 2% Cloths 1 Application(s) Topical <User Schedule>  heparin   Injectable 5000 Unit(s) SubCutaneous every 12 hours  hydrALAZINE 25 milliGRAM(s) Oral every 8 hours  influenza  Vaccine (HIGH DOSE) 0.7 milliLiter(s) IntraMuscular once  pantoprazole   Suspension 40 milliGRAM(s) Enteral Tube daily  polyethylene glycol 3350 17 Gram(s) Oral daily  senna 2 Tablet(s) Oral at bedtime      PRN Meds:  acetaminophen     Tablet .. 650 milliGRAM(s) Oral every 6 hours PRN  bisacodyl Suppository 10 milliGRAM(s) Rectal daily PRN  hydrALAZINE Injectable 10 milliGRAM(s) IV Push every 4 hours PRN      LABS:                        11.9   12.02 )-----------( 328      ( 03 Nov 2023 02:30 )             38.8     Hgb Trend: 11.9<--, 12.7<--, 13.4<--, 12.8<--, 11.5<--  11-03    143  |  108  |  42<H>  ----------------------------<  162<H>  4.1   |  31  |  0.89    Ca    9.0      03 Nov 2023 02:30  Phos  2.6     11-03  Mg     2.5     11-03    TPro  6.4  /  Alb  2.3<L>  /  TBili  0.6  /  DBili  x   /  AST  81<H>  /  ALT  46  /  AlkPhos  49  11-03    Creatinine Trend: 0.89<--, 1.49<--, 0.92<--, 0.78<--, 0.73<--, 0.89<--    Urinalysis Basic - ( 03 Nov 2023 02:30 )    Color: x / Appearance: x / SG: x / pH: x  Gluc: 162 mg/dL / Ketone: x  / Bili: x / Urobili: x   Blood: x / Protein: x / Nitrite: x   Leuk Esterase: x / RBC: x / WBC x   Sq Epi: x / Non Sq Epi: x / Bacteria: x            MICROBIOLOGY:     RADIOLOGY:  [ ] Reviewed and interpreted by me    EKG:

## 2023-11-03 NOTE — PROGRESS NOTE ADULT - SUBJECTIVE AND OBJECTIVE BOX
Neurology Progress Note    S: Patient seen and examined.       Medications: MEDICATIONS  (STANDING):  aspirin  chewable 81 milliGRAM(s) Oral daily  atorvastatin 80 milliGRAM(s) Oral at bedtime  chlorhexidine 0.12% Liquid 15 milliLiter(s) Oral Mucosa every 12 hours  chlorhexidine 2% Cloths 1 Application(s) Topical <User Schedule>  heparin   Injectable 5000 Unit(s) SubCutaneous every 12 hours  hydrALAZINE 25 milliGRAM(s) Oral every 8 hours  influenza  Vaccine (HIGH DOSE) 0.7 milliLiter(s) IntraMuscular once  pantoprazole   Suspension 40 milliGRAM(s) Enteral Tube daily  polyethylene glycol 3350 17 Gram(s) Oral daily  senna 2 Tablet(s) Oral at bedtime    MEDICATIONS  (PRN):  acetaminophen     Tablet .. 650 milliGRAM(s) Oral every 6 hours PRN Temp greater or equal to 38C (100.4F)  bisacodyl Suppository 10 milliGRAM(s) Rectal daily PRN Constipation  hydrALAZINE Injectable 10 milliGRAM(s) IV Push every 4 hours PRN systolic>180       Vitals:  Vital Signs Last 24 Hrs  T(C): 37.9 (03 Nov 2023 10:00), Max: 38.3 (02 Nov 2023 11:00)  T(F): 100.2 (03 Nov 2023 10:00), Max: 100.9 (02 Nov 2023 11:00)  HR: 70 (03 Nov 2023 10:00) (60 - 86)  BP: 167/73 (03 Nov 2023 10:00) (141/71 - 187/68)  BP(mean): 102 (03 Nov 2023 10:00) (86 - 111)  RR: 19 (03 Nov 2023 10:00) (14 - 28)  SpO2: 100% (03 Nov 2023 10:00) (95% - 100%)    Parameters below as of 03 Nov 2023 08:06  Patient On (Oxygen Delivery Method): ventilator, V-A/C /PEEP5/RATE12    O2 Concentration (%): 25        General Exam:   General Appearance: intubated  Head: Normocephalic, atraumatic and no dysmorphic features  Ear, Nose, and Throat: Moist mucous membranes  CVS: tachycardic  Resp: mechcanical ventilation  GI: soft NT/ND  Extremities: No edema or cyanosis  Skin: No bruises or rashes     Neurological Exam:  Mental Status: Intubated, not sedated. No spont eye opening. No verbal output, does not follow commands.   Cranial Nerves: pupils very sluggish R, L near fixed, + corneals, no VOR, no facial asymmetry , weak cough/gag  Motor: dec tone throughout, no spont movemnt noted   Sensation: UE extensor posturing, LE triple flexion    I personally reviewed the below data/images/labs:      LABS:                          11.9   12.02 )-----------( 328      ( 03 Nov 2023 02:30 )             38.8     11-03    143  |  108  |  42<H>  ----------------------------<  162<H>  4.1   |  31  |  0.89    Ca    9.0      03 Nov 2023 02:30  Phos  2.6     11-03  Mg     2.5     11-03    TPro  6.4  /  Alb  2.3<L>  /  TBili  0.6  /  DBili  x   /  AST  81<H>  /  ALT  46  /  AlkPhos  49  11-03    LIVER FUNCTIONS - ( 03 Nov 2023 02:30 )  Alb: 2.3 g/dL / Pro: 6.4 gm/dL / ALK PHOS: 49 U/L / ALT: 46 U/L / AST: 81 U/L / GGT: x             Urinalysis Basic - ( 03 Nov 2023 02:30 )    Color: x / Appearance: x / SG: x / pH: x  Gluc: 162 mg/dL / Ketone: x  / Bili: x / Urobili: x   Blood: x / Protein: x / Nitrite: x   Leuk Esterase: x / RBC: x / WBC x   Sq Epi: x / Non Sq Epi: x / Bacteria: x          < from: CT Head No Cont (10.22.19 @ 15:57) >  IMPRESSION:    1)  chronic ischemic changes in both hemispheres with volume loss. There   are no new infarct as compared to prior CT. This may be further assessed   with MR imaging.  2)  no hemorrhage or mass identified.    < end of copied text >  < from: CT Angio Head w/ IV Cont (10.22.19 @ 15:57) >  IMPRESSION    1. There is intimal thickening and calcified plaque in both carotid   bifurcations in the neck, but without significant stenosis. No evidence   of carotid vertebral occlusion or dissection.  2. Intracranially, there are atherosclerotic changes in the anterior and   posterior circulation, but without significant stenosis or occlusion.    < end of copied text >  < from: CT Brain Stroke Protocol (10.26.23 @ 04:37) >  IMPRESSION:  No evidence of acute intracranial hemorrhage, mass effect or large acute   territorial infarct, within limits of noncontrast CT technique.    < end of copied text >  < from: CT Angio Neck Stroke Protocol w/ IV Cont (10.26.23 @ 04:53) >  CT ANGIOGRAPHY NECK:  1.  The right internal carotid artery is occluded approximately 1.5 cm   distal to its origin, possibly due to underlying dissection.  2.  Endotracheal tube is low in position, with its tip approximate 1 cm   above the slade.  3.  Diffuse ill-defined groundglass opacity in both lungs, which may   represent pulmonary edema, infection and/or atelectasis.  4.  There is a discrete right upper lobe groundglass opacity measuring   1.9 x 1.4 cm; this may represent a focus of infection, inflammation,   edema, hemorrhage, fibrosis or malignancy.  A follow-up chest CT is   warranted in three months to assess for resolution.    CT ANGIOGRAPHY BRAIN: The right internal carotid arteries occluded.    There is no significant flow related opacification within the proximal   right middle cerebral artery.  There is mild reconstitution of flow in   some distal branch vessels of the inferior division of the right middle   cerebral artery.  The right anterior cerebral artery fills across the   anterior communicating artery.    < end of copied text >  < from: CT Brain Perfusion Maps Stroke (10.26.23 @ 04:54) >  CT PERFUSION: CT perfusion is consistent with large acute infarct in the   right middle cerebral artery vascular territory and small to moderate   amount of surrounding ischemic penumbra.  Hypoperfusion index is 0.3.    Core infarct (CBF <  30%:): 136 mL  Penumbra (Tmax >6s): 194 mL  Mismatched volume: 58 mL  Mismatched ratio: 1.4    < end of copied text >      < from: CT Head No Cont (10.22.19 @ 15:57) >  IMPRESSION:    1)  chronic ischemic changes in both hemispheres with volume loss. There   are no new infarct as compared to prior CT. This may be further assessed   with MR imaging.  2)  no hemorrhage or mass identified.    < end of copied text >  < from: CT Angio Head w/ IV Cont (10.22.19 @ 15:57) >  IMPRESSION    1. There is intimal thickening and calcified plaque in both carotid   bifurcations in the neck, but without significant stenosis. No evidence   of carotid vertebral occlusion or dissection.  2. Intracranially, there are atherosclerotic changes in the anterior and   posterior circulation, but without significant stenosis or occlusion.    < end of copied text >  < from: CT Brain Stroke Protocol (10.26.23 @ 04:37) >  IMPRESSION:  No evidence of acute intracranial hemorrhage, mass effect or large acute   territorial infarct, within limits of noncontrast CT technique.    < end of copied text >  < from: CT Angio Neck Stroke Protocol w/ IV Cont (10.26.23 @ 04:53) >  CT ANGIOGRAPHY NECK:  1.  The right internal carotid artery is occluded approximately 1.5 cm   distal to its origin, possibly due to underlying dissection.  2.  Endotracheal tube is low in position, with its tip approximate 1 cm   above the slade.  3.  Diffuse ill-defined groundglass opacity in both lungs, which may   represent pulmonary edema, infection and/or atelectasis.  4.  There is a discrete right upper lobe groundglass opacity measuring   1.9 x 1.4 cm; this may represent a focus of infection, inflammation,   edema, hemorrhage, fibrosis or malignancy.  A follow-up chest CT is   warranted in three months to assess for resolution.    CT ANGIOGRAPHY BRAIN: The right internal carotid arteries occluded.    There is no significant flow related opacification within the proximal   right middle cerebral artery.  There is mild reconstitution of flow in   some distal branch vessels of the inferior division of the right middle   cerebral artery.  The right anterior cerebral artery fills across the   anterior communicating artery.    < end of copied text >  < from: CT Brain Perfusion Maps Stroke (10.26.23 @ 04:54) >  CT PERFUSION: CT perfusion is consistent with large acute infarct in the   right middle cerebral artery vascular territory and small to moderate   amount of surrounding ischemic penumbra.  Hypoperfusion index is 0.3.    Core infarct (CBF <  30%:): 136 mL  Penumbra (Tmax >6s): 194 mL  Mismatched volume: 58 mL  Mismatched ratio: 1.4    < end of copied text >      < from: CT Head No Cont (10.22.19 @ 15:57) >  IMPRESSION:    1)  chronic ischemic changes in both hemispheres with volume loss. There   are no new infarct as compared to prior CT. This may be further assessed   with MR imaging.  2)  no hemorrhage or mass identified.    < end of copied text >  < from: CT Angio Head w/ IV Cont (10.22.19 @ 15:57) >  IMPRESSION    1. There is intimal thickening and calcified plaque in both carotid   bifurcations in the neck, but without significant stenosis. No evidence   of carotid vertebral occlusion or dissection.  2. Intracranially, there are atherosclerotic changes in the anterior and   posterior circulation, but without significant stenosis or occlusion.    < end of copied text >  < from: CT Brain Stroke Protocol (10.26.23 @ 04:37) >  IMPRESSION:  No evidence of acute intracranial hemorrhage, mass effect or large acute   territorial infarct, within limits of noncontrast CT technique.    < end of copied text >  < from: CT Angio Neck Stroke Protocol w/ IV Cont (10.26.23 @ 04:53) >  CT ANGIOGRAPHY NECK:  1.  The right internal carotid artery is occluded approximately 1.5 cm   distal to its origin, possibly due to underlying dissection.  2.  Endotracheal tube is low in position, with its tip approximate 1 cm   above the slade.  3.  Diffuse ill-defined groundglass opacity in both lungs, which may   represent pulmonary edema, infection and/or atelectasis.  4.  There is a discrete right upper lobe groundglass opacity measuring   1.9 x 1.4 cm; this may represent a focus of infection, inflammation,   edema, hemorrhage, fibrosis or malignancy.  A follow-up chest CT is   warranted in three months to assess for resolution.    CT ANGIOGRAPHY BRAIN: The right internal carotid arteries occluded.    There is no significant flow related opacification within the proximal   right middle cerebral artery.  There is mild reconstitution of flow in   some distal branch vessels of the inferior division of the right middle   cerebral artery.  The right anterior cerebral artery fills across the   anterior communicating artery.    < end of copied text >  < from: CT Brain Perfusion Maps Stroke (10.26.23 @ 04:54) >  CT PERFUSION: CT perfusion is consistent with large acute infarct in the   right middle cerebral artery vascular territory and small to moderate   amount of surrounding ischemic penumbra.  Hypoperfusion index is 0.3.    Core infarct (CBF <  30%:): 136 mL  Penumbra (Tmax >6s): 194 mL  Mismatched volume: 58 mL  Mismatched ratio: 1.4    < end of copied text >    EEG Classification / Summary:  Abnormal EEG study  1. Intermittent periodic discharges seen intermittently over the left hemisphere, sometimes with triphasic morphology  2. Focal continuous right hemispheric slowing  3. Moderate generalized background slowing    -----------------------------------------------------------------------------------------------------    Clinical Impression:  1. While the periodic pattern noted above may represent lateralized periodic discharges indicating epileptic potential in the left hemisphere, more likely these represent generalized periodic discharges with triphasic morphology, consistent with metabolic etiology, that are poorly expressed on the right due to the effects of the stroke.   2. Structural abnormality in right hemisphere  3. Moderate diffuse/multi-focal cerebral dysfunction, not specific as to etiology.    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 Significant   associated mass effect and associated herniation.      < end of copied text >

## 2023-11-03 NOTE — PROGRESS NOTE ADULT - SUBJECTIVE AND OBJECTIVE BOX
follow up on:  complex medical decision making related to goals of care      OVERNIGHT EVENTS: CT results noted. Family at bedside.     Review of systems:        Unable to obtain/Limited due to poor mental status    MEDICATIONS  (STANDING):  aspirin  chewable 81 milliGRAM(s) Oral daily  atorvastatin 80 milliGRAM(s) Oral at bedtime  chlorhexidine 0.12% Liquid 15 milliLiter(s) Oral Mucosa every 12 hours  chlorhexidine 2% Cloths 1 Application(s) Topical <User Schedule>  heparin   Injectable 5000 Unit(s) SubCutaneous every 12 hours  hydrALAZINE 25 milliGRAM(s) Oral every 8 hours  influenza  Vaccine (HIGH DOSE) 0.7 milliLiter(s) IntraMuscular once  pantoprazole   Suspension 40 milliGRAM(s) Enteral Tube daily  polyethylene glycol 3350 17 Gram(s) Oral daily  senna 2 Tablet(s) Oral at bedtime    MEDICATIONS  (PRN):  acetaminophen     Tablet .. 650 milliGRAM(s) Oral every 6 hours PRN Temp greater or equal to 38C (100.4F)  bisacodyl Suppository 10 milliGRAM(s) Rectal daily PRN Constipation      PHYSICAL EXAM:  Vital Signs Last 24 Hrs  T(C): 38.3 (03 Nov 2023 15:00), Max: 38.3 (03 Nov 2023 14:00)  T(F): 100.9 (03 Nov 2023 15:00), Max: 100.9 (03 Nov 2023 14:00)  HR: 63 (03 Nov 2023 15:00) (61 - 81)  BP: 159/67 (03 Nov 2023 15:00) (144/75 - 187/68)  BP(mean): 93 (03 Nov 2023 15:00) (81 - 102)  RR: 23 (03 Nov 2023 15:00) (14 - 28)  SpO2: 100% (03 Nov 2023 15:00) (95% - 100%)    Parameters below as of 03 Nov 2023 08:06  Patient On (Oxygen Delivery Method): ventilator, V-A/C /PEEP5/RATE12    O2 Concentration (%): 25      Palliative Performance Scale/Karnofsky Score: 20      GENERAL: poorly responsive, intubated,  NAD  HEENT: Atraumatic, oropharynx clear, neck supple  CHEST/LUNG: unlabored  HEART: Regular rate and rhythm    ABDOMEN: Soft, Nontender, Nondistended   MUSCULOSKELETAL:  No  edema   NERVOUS SYSTEM:  poorly responsive, +spontaneous breathing on ventilator  SKIN: No rashes or lesions noted  Oral intake: npo/TF      LABS:                          11.9   12.02 )-----------( 328      ( 03 Nov 2023 02:30 )             38.8     11-03    143  |  108  |  42<H>  ----------------------------<  162<H>  4.1   |  31  |  0.89    Ca    9.0      03 Nov 2023 02:30  Phos  2.6     11-03  Mg     2.5     11-03    TPro  6.4  /  Alb  2.3<L>  /  TBili  0.6  /  DBili  x   /  AST  81<H>  /  ALT  46  /  AlkPhos  49  11-03    Urinalysis Basic - ( 03 Nov 2023 02:30 )    Color: x / Appearance: x / SG: x / pH: x  Gluc: 162 mg/dL / Ketone: x  / Bili: x / Urobili: x   Blood: x / Protein: x / Nitrite: x   Leuk Esterase: x / RBC: x / WBC x   Sq Epi: x / Non Sq Epi: x / Bacteria: x        RADIOLOGY & ADDITIONAL STUDIES:    < from: CT Head No Cont (11.02.23 @ 14:30) >  ACC: [id number] EXAM:  CT BRAIN   ORDERED BY: CINTHIA ULLOA     PROCEDURE DATE:  11/02/2023          INTERPRETATION:  Exam Date: 11/2/2023 2:30 PM    CT head without IV contrast    CLINICAL INFORMATION:  previous CVA reevaluate    TECHNIQUE: Contiguous axial sections were obtained through the head.     Coronal and sagittal reformats were obtained.    COMPARISON: CT head 10/30/2023    FINDINGS:    Reidentified are large subacute MCA and JIMMY infarcts throughout the right   frontal parietal and temporal lobes and right basal ganglia, with severe   right to left midline shift and severe mass effect upon the ventricular   system, not significantly changed. There is effacement of all of the   basilar cisterns. Effacement of the sulci in the bilateral cerebri,   compatible with increased brain edema. Right uncal herniation is   reidentified.    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    No hemorrhagic transformation is identified.    No downward herniation is present    IMPRESSION:    Reidentified are 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 There is effacement of all of the   basilar cisterns. Effacement of the sulci in the bilateral cerebri,   compatible with increased brain edema.    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    Unsuccessful attempts were made to reach the ordering clinician via teams.      --- End of Report ---            RADHA GONZALEZ MD; Attending Radiologist  This document has been electronically signed. Nov 2 2023  2:45PM    < end of copied text >

## 2023-11-03 NOTE — PROGRESS NOTE ADULT - ASSESSMENT
88 year old female with a PMH of HTN, HLD and afib on xarelto who did not take medications for past 1 week prior to hospitalization here with R MCA infarct.   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

## 2023-11-04 NOTE — PROGRESS NOTE ADULT - SUBJECTIVE AND OBJECTIVE BOX
CHIEF COMPLAINT:Patient is a 88y old  Female who presents with a chief complaint of Ischemic Stroke, Poor MS requiring Intubation (03 Nov 2023 15:31)        Interval Events:    REVIEW OF SYSTEMS:  Constitutional: [ ] negative [ ] fevers [ ] chills [ ] weight loss [ ] weight gain  HEENT: [ ] negative [ ] dry eyes [ ] eye irritation [ ] postnasal drip [ ] nasal congestion  CV: [ ] negative  [ ] chest pain [ ] orthopnea [ ] palpitations [ ] murmur  Resp: [ ] negative [ ] cough [ ] shortness of breath [ ] dyspnea [ ] wheezing [ ] sputum [ ] hemoptysis  GI: [ ] negative [ ] nausea [ ] vomiting [ ] diarrhea [ ] constipation [ ] abd pain [ ] dysphagia   : [ ] negative [ ] dysuria [ ] nocturia [ ] hematuria [ ] increased urinary frequency  Musculoskeletal: [ ] negative [ ] back pain [ ] myalgias [ ] arthralgias [ ] fracture  Skin: [ ] negative [ ] rash [ ] itch  Neurological: [ ] negative [ ] headache [ ] dizziness [ ] syncope [ ] weakness [ ] numbness  Psychiatric: [ ] negative [ ] anxiety [ ] depression  Endocrine: [ ] negative [ ] diabetes [ ] thyroid problem  Hematologic/Lymphatic: [ ] negative [ ] anemia [ ] bleeding problem  Allergic/Immunologic: [ ] negative [ ] itchy eyes [ ] nasal discharge [ ] hives [ ] angioedema  [ ] All other systems negative  [ ] Unable to assess ROS because ________    OBJECTIVE:  ICU Vital Signs Last 24 Hrs  T(C): 37.6 (04 Nov 2023 07:00), Max: 38.3 (03 Nov 2023 14:00)  T(F): 99.7 (04 Nov 2023 07:00), Max: 100.9 (03 Nov 2023 14:00)  HR: 72 (04 Nov 2023 07:00) (57 - 73)  BP: 172/71 (04 Nov 2023 07:00) (147/55 - 188/74)  BP(mean): 99 (04 Nov 2023 07:00) (81 - 115)  ABP: --  ABP(mean): --  RR: 17 (04 Nov 2023 07:00) (14 - 25)  SpO2: 100% (04 Nov 2023 07:00) (97% - 100%)      Mode: AC/ CMV (Assist Control/ Continuous Mandatory Ventilation), RR (machine): 12, TV (machine): 400, FiO2: 25, PEEP: 5, ITime: 0.8, MAP: 8, PIP: 22    11-03 @ 07:01  -  11-04 @ 07:00  --------------------------------------------------------  IN: 1125 mL / OUT: 960 mL / NET: 165 mL      CAPILLARY BLOOD GLUCOSE          PHYSICAL EXAM:  General:   HEENT:   Lymph Nodes:  Neck:   Respiratory:   Cardiovascular:   Abdomen:   Extremities:   Skin:   Neurological:  Psychiatry:    LINES:    HOSPITAL MEDICATIONS:  Standing Meds:  aspirin  chewable 81 milliGRAM(s) Oral daily  atorvastatin 80 milliGRAM(s) Oral at bedtime  chlorhexidine 0.12% Liquid 15 milliLiter(s) Oral Mucosa every 12 hours  chlorhexidine 2% Cloths 1 Application(s) Topical <User Schedule>  heparin   Injectable 5000 Unit(s) SubCutaneous every 12 hours  hydrALAZINE 25 milliGRAM(s) Oral every 8 hours  influenza  Vaccine (HIGH DOSE) 0.7 milliLiter(s) IntraMuscular once  pantoprazole   Suspension 40 milliGRAM(s) Enteral Tube daily  polyethylene glycol 3350 17 Gram(s) Oral daily  senna 2 Tablet(s) Oral at bedtime      PRN Meds:  acetaminophen     Tablet .. 650 milliGRAM(s) Oral every 6 hours PRN  bisacodyl Suppository 10 milliGRAM(s) Rectal daily PRN      LABS:                        11.9   12.02 )-----------( 328      ( 03 Nov 2023 02:30 )             38.8     Hgb Trend: 11.9<--, 12.7<--, 13.4<--, 12.8<--, 11.5<--  11-03    143  |  108  |  42<H>  ----------------------------<  162<H>  4.1   |  31  |  0.89    Ca    9.0      03 Nov 2023 02:30  Phos  2.6     11-03  Mg     2.5     11-03    TPro  6.4  /  Alb  2.3<L>  /  TBili  0.6  /  DBili  x   /  AST  81<H>  /  ALT  46  /  AlkPhos  49  11-03    Creatinine Trend: 0.89<--, 1.49<--, 0.92<--, 0.78<--, 0.73<--, 0.89<--    Urinalysis Basic - ( 03 Nov 2023 02:30 )    Color: x / Appearance: x / SG: x / pH: x  Gluc: 162 mg/dL / Ketone: x  / Bili: x / Urobili: x   Blood: x / Protein: x / Nitrite: x   Leuk Esterase: x / RBC: x / WBC x   Sq Epi: x / Non Sq Epi: x / Bacteria: x            MICROBIOLOGY:     RADIOLOGY:  [ ] Reviewed and interpreted by me    EKG:   CHIEF COMPLAINT:Patient is a 88y old  Female who presents with a chief complaint of Ischemic Stroke, Poor MS requiring Intubation (03 Nov 2023 15:31)        Interval Events:    REVIEW OF SYSTEMS:    [ ] All other systems negative  [ ] Unable to assess ROS because ________unresponsive.    OBJECTIVE:  ICU Vital Signs Last 24 Hrs  T(C): 37.6 (04 Nov 2023 07:00), Max: 38.3 (03 Nov 2023 14:00)  T(F): 99.7 (04 Nov 2023 07:00), Max: 100.9 (03 Nov 2023 14:00)  HR: 72 (04 Nov 2023 07:00) (57 - 73)  BP: 172/71 (04 Nov 2023 07:00) (147/55 - 188/74)  BP(mean): 99 (04 Nov 2023 07:00) (81 - 115)  ABP: --  ABP(mean): --  RR: 17 (04 Nov 2023 07:00) (14 - 25)  SpO2: 100% (04 Nov 2023 07:00) (97% - 100%)      Mode: AC/ CMV (Assist Control/ Continuous Mandatory Ventilation), RR (machine): 12, TV (machine): 400, FiO2: 25, PEEP: 5, ITime: 0.8, MAP: 8, PIP: 22    11-03 @ 07:01  -  11-04 @ 07:00  --------------------------------------------------------  IN: 1125 mL / OUT: 960 mL / NET: 165 mL      CAPILLARY BLOOD GLUCOSE          PHYSICAL EXAM:  Gen: NAD. intuabted.    HEENT:   Resp: CTA B/L no c/r/w  CVS: S1S2 no m/r/g  Abd: soft NT/ND +BS  Ext: no c/c/e  Neuro: winces to pain, postures to pain does not withdraw.     LINES:    HOSPITAL MEDICATIONS:  Standing Meds:  aspirin  chewable 81 milliGRAM(s) Oral daily  atorvastatin 80 milliGRAM(s) Oral at bedtime  chlorhexidine 0.12% Liquid 15 milliLiter(s) Oral Mucosa every 12 hours  chlorhexidine 2% Cloths 1 Application(s) Topical <User Schedule>  heparin   Injectable 5000 Unit(s) SubCutaneous every 12 hours  hydrALAZINE 25 milliGRAM(s) Oral every 8 hours  influenza  Vaccine (HIGH DOSE) 0.7 milliLiter(s) IntraMuscular once  pantoprazole   Suspension 40 milliGRAM(s) Enteral Tube daily  polyethylene glycol 3350 17 Gram(s) Oral daily  senna 2 Tablet(s) Oral at bedtime      PRN Meds:  acetaminophen     Tablet .. 650 milliGRAM(s) Oral every 6 hours PRN  bisacodyl Suppository 10 milliGRAM(s) Rectal daily PRN      LABS:                        11.9   12.02 )-----------( 328      ( 03 Nov 2023 02:30 )             38.8     Hgb Trend: 11.9<--, 12.7<--, 13.4<--, 12.8<--, 11.5<--  11-03    143  |  108  |  42<H>  ----------------------------<  162<H>  4.1   |  31  |  0.89    Ca    9.0      03 Nov 2023 02:30  Phos  2.6     11-03  Mg     2.5     11-03    TPro  6.4  /  Alb  2.3<L>  /  TBili  0.6  /  DBili  x   /  AST  81<H>  /  ALT  46  /  AlkPhos  49  11-03    Creatinine Trend: 0.89<--, 1.49<--, 0.92<--, 0.78<--, 0.73<--, 0.89<--    Urinalysis Basic - ( 03 Nov 2023 02:30 )    Color: x / Appearance: x / SG: x / pH: x  Gluc: 162 mg/dL / Ketone: x  / Bili: x / Urobili: x   Blood: x / Protein: x / Nitrite: x   Leuk Esterase: x / RBC: x / WBC x   Sq Epi: x / Non Sq Epi: x / Bacteria: x            MICROBIOLOGY:     RADIOLOGY:  [ ] Reviewed and interpreted by me    EKG:

## 2023-11-04 NOTE — PROGRESS NOTE ADULT - ASSESSMENT
89 y/o female with pmhx of afib on xarelto, HTN, HLD now with:    1. acute CVA  2. cerebral edema  3. hypoxic respiratory failure    Neuro: Massive and worsening infarcts, New and old with herniation. No hope for recovery to indepented life.   - monitro for sings of herniation. Neuro consult appreciated   CV: Permissive BP goals.  - asa  Pulm: Stuck on vent for mental status  Renal: stable/ improved DAVIE  - no more labs.   GI:Tube feeds as tolerated  Endo: Fs acceptable  ID:No issues yet  Prophylaxis: HSQ  Ethics: No meaningfully hope for recovery to independence.  May progress to herniation and rachel death given Ct will follow clinically  - LAbs QOD  - DNR now appreicate palliative care follow up  - family discussing with other sibling (5 intotal)

## 2023-11-05 NOTE — PROGRESS NOTE ADULT - SUBJECTIVE AND OBJECTIVE BOX
CHIEF COMPLAINT:Patient is a 88y old  Female who presents with a chief complaint of Ischemic Stroke, Poor MS requiring Intubation (04 Nov 2023 08:15)        Interval Events:    REVIEW OF SYSTEMS:  [ ] All other systems negative  [ ] Unable to assess ROS because ________Massive strokes.     OBJECTIVE:  ICU Vital Signs Last 24 Hrs  T(C): 36.8 (05 Nov 2023 06:00), Max: 38.1 (04 Nov 2023 12:00)  T(F): 98.2 (05 Nov 2023 06:00), Max: 100.6 (04 Nov 2023 12:00)  HR: 61 (05 Nov 2023 07:00) (58 - 74)  BP: 184/73 (05 Nov 2023 07:00) (144/62 - 184/73)  BP(mean): 102 (05 Nov 2023 07:00) (77 - 105)  ABP: --  ABP(mean): --  RR: 15 (05 Nov 2023 07:00) (12 - 24)  SpO2: 99% (05 Nov 2023 04:16) (97% - 100%)      Mode: AC/ CMV (Assist Control/ Continuous Mandatory Ventilation), RR (machine): 12, TV (machine): 400, FiO2: 25, PEEP: 5, ITime: 0.8, MAP: 10, PIP: 26    11-04 @ 08:01  -  11-05 @ 07:00  --------------------------------------------------------  IN: 1240 mL / OUT: 1000 mL / NET: 240 mL      CAPILLARY BLOOD GLUCOSE          PHYSICAL EXAM:  General:   HEENT:   Lymph Nodes:  Neck:   Respiratory:   Cardiovascular:   Abdomen:   Extremities:   Skin:   Neurological:  Psychiatry:    LINES:    HOSPITAL MEDICATIONS:  Standing Meds:  aspirin  chewable 81 milliGRAM(s) Oral daily  atorvastatin 80 milliGRAM(s) Oral at bedtime  chlorhexidine 0.12% Liquid 15 milliLiter(s) Oral Mucosa every 12 hours  chlorhexidine 2% Cloths 1 Application(s) Topical <User Schedule>  heparin   Injectable 5000 Unit(s) SubCutaneous every 12 hours  hydrALAZINE 25 milliGRAM(s) Oral every 8 hours  influenza  Vaccine (HIGH DOSE) 0.7 milliLiter(s) IntraMuscular once  pantoprazole   Suspension 40 milliGRAM(s) Enteral Tube daily  polyethylene glycol 3350 17 Gram(s) Oral daily  senna 2 Tablet(s) Oral at bedtime      PRN Meds:  acetaminophen     Tablet .. 650 milliGRAM(s) Oral every 6 hours PRN  bisacodyl Suppository 10 milliGRAM(s) Rectal daily PRN      LABS:                        11.9   10.98 )-----------( 367      ( 05 Nov 2023 03:15 )             39.9     Hgb Trend: 11.9<--, 11.9<--, 12.7<--, 13.4<--, 12.8<--  11-05    144  |  110<H>  |  28<H>  ----------------------------<  109<H>  4.4   |  31  |  0.91    Ca    9.2      05 Nov 2023 03:15  Phos  3.3     11-05  Mg     2.7     11-05    TPro  6.5  /  Alb  2.4<L>  /  TBili  0.8  /  DBili  x   /  AST  76<H>  /  ALT  42  /  AlkPhos  45  11-05    Creatinine Trend: 0.91<--, 0.89<--, 1.49<--, 0.92<--, 0.78<--, 0.73<--    Urinalysis Basic - ( 05 Nov 2023 03:15 )    Color: x / Appearance: x / SG: x / pH: x  Gluc: 109 mg/dL / Ketone: x  / Bili: x / Urobili: x   Blood: x / Protein: x / Nitrite: x   Leuk Esterase: x / RBC: x / WBC x   Sq Epi: x / Non Sq Epi: x / Bacteria: x            MICROBIOLOGY:     RADIOLOGY:  [ ] Reviewed and interpreted by me    EKG:   CHIEF COMPLAINT:Patient is a 88y old  Female who presents with a chief complaint of Ischemic Stroke, Poor MS requiring Intubation (04 Nov 2023 08:15)        Interval Events:    REVIEW OF SYSTEMS:  [ ] All other systems negative  [x ] Unable to assess ROS because ________Massive strokes.     OBJECTIVE:  ICU Vital Signs Last 24 Hrs  T(C): 36.8 (05 Nov 2023 06:00), Max: 38.1 (04 Nov 2023 12:00)  T(F): 98.2 (05 Nov 2023 06:00), Max: 100.6 (04 Nov 2023 12:00)  HR: 61 (05 Nov 2023 07:00) (58 - 74)  BP: 184/73 (05 Nov 2023 07:00) (144/62 - 184/73)  BP(mean): 102 (05 Nov 2023 07:00) (77 - 105)  ABP: --  ABP(mean): --  RR: 15 (05 Nov 2023 07:00) (12 - 24)  SpO2: 99% (05 Nov 2023 04:16) (97% - 100%)      Mode: AC/ CMV (Assist Control/ Continuous Mandatory Ventilation), RR (machine): 12, TV (machine): 400, FiO2: 25, PEEP: 5, ITime: 0.8, MAP: 10, PIP: 26    11-04 @ 08:01  -  11-05 @ 07:00  --------------------------------------------------------  IN: 1240 mL / OUT: 1000 mL / NET: 240 mL      CAPILLARY BLOOD GLUCOSE          PHYSICAL EXAM:  Gen: NAD. intuabted.    HEENT:   Resp: CTA B/L no c/r/w  CVS: S1S2 no m/r/g  Abd: soft NT/ND +BS  Ext: no c/c/e  Neuro: winces to pain, has hyper reflexia and  postures to pain does not withdraw.       LINES:    HOSPITAL MEDICATIONS:  Standing Meds:  aspirin  chewable 81 milliGRAM(s) Oral daily  atorvastatin 80 milliGRAM(s) Oral at bedtime  chlorhexidine 0.12% Liquid 15 milliLiter(s) Oral Mucosa every 12 hours  chlorhexidine 2% Cloths 1 Application(s) Topical <User Schedule>  heparin   Injectable 5000 Unit(s) SubCutaneous every 12 hours  hydrALAZINE 25 milliGRAM(s) Oral every 8 hours  influenza  Vaccine (HIGH DOSE) 0.7 milliLiter(s) IntraMuscular once  pantoprazole   Suspension 40 milliGRAM(s) Enteral Tube daily  polyethylene glycol 3350 17 Gram(s) Oral daily  senna 2 Tablet(s) Oral at bedtime      PRN Meds:  acetaminophen     Tablet .. 650 milliGRAM(s) Oral every 6 hours PRN  bisacodyl Suppository 10 milliGRAM(s) Rectal daily PRN      LABS:                        11.9   10.98 )-----------( 367      ( 05 Nov 2023 03:15 )             39.9     Hgb Trend: 11.9<--, 11.9<--, 12.7<--, 13.4<--, 12.8<--  11-05    144  |  110<H>  |  28<H>  ----------------------------<  109<H>  4.4   |  31  |  0.91    Ca    9.2      05 Nov 2023 03:15  Phos  3.3     11-05  Mg     2.7     11-05    TPro  6.5  /  Alb  2.4<L>  /  TBili  0.8  /  DBili  x   /  AST  76<H>  /  ALT  42  /  AlkPhos  45  11-05    Creatinine Trend: 0.91<--, 0.89<--, 1.49<--, 0.92<--, 0.78<--, 0.73<--    Urinalysis Basic - ( 05 Nov 2023 03:15 )    Color: x / Appearance: x / SG: x / pH: x  Gluc: 109 mg/dL / Ketone: x  / Bili: x / Urobili: x   Blood: x / Protein: x / Nitrite: x   Leuk Esterase: x / RBC: x / WBC x   Sq Epi: x / Non Sq Epi: x / Bacteria: x            MICROBIOLOGY:     RADIOLOGY:  [ ] Reviewed and interpreted by me    EKG:

## 2023-11-06 NOTE — PROGRESS NOTE ADULT - SUBJECTIVE AND OBJECTIVE BOX
follow up on:  complex medical decision making related to goals of care      OVERNIGHT EVENTS: no overnight events    Review of systems:        Unable to obtain/Limited due to poor mental status    MEDICATIONS  (STANDING):  aspirin  chewable 81 milliGRAM(s) Oral daily  atorvastatin 80 milliGRAM(s) Oral at bedtime  chlorhexidine 0.12% Liquid 15 milliLiter(s) Oral Mucosa every 12 hours  chlorhexidine 2% Cloths 1 Application(s) Topical <User Schedule>  heparin   Injectable 5000 Unit(s) SubCutaneous every 12 hours  hydrALAZINE 100 milliGRAM(s) Oral every 8 hours  influenza  Vaccine (HIGH DOSE) 0.7 milliLiter(s) IntraMuscular once  pantoprazole   Suspension 40 milliGRAM(s) Enteral Tube daily  polyethylene glycol 3350 17 Gram(s) Oral daily  senna 2 Tablet(s) Oral at bedtime    MEDICATIONS  (PRN):  acetaminophen     Tablet .. 650 milliGRAM(s) Oral every 6 hours PRN Temp greater or equal to 38C (100.4F)  bisacodyl Suppository 10 milliGRAM(s) Rectal daily PRN Constipation      PHYSICAL EXAM:  Vital Signs Last 24 Hrs  T(C): 36.8 (06 Nov 2023 12:00), Max: 37.5 (06 Nov 2023 08:00)  T(F): 98.2 (06 Nov 2023 12:00), Max: 99.5 (06 Nov 2023 08:00)  HR: 67 (06 Nov 2023 15:00) (55 - 87)  BP: 181/74 (06 Nov 2023 15:00) (143/61 - 190/76)  BP(mean): 100 (06 Nov 2023 15:00) (82 - 111)  RR: 17 (06 Nov 2023 15:00) (14 - 28)  SpO2: 100% (06 Nov 2023 11:57) (97% - 100%)    Parameters below as of 05 Nov 2023 19:15  Patient On (Oxygen Delivery Method): ventilator, 400/12/5    O2 Concentration (%): 25      Palliative Performance Scale/Karnofsky Score: 20      GENERAL: poorly responsive, intubated,  NAD  HEENT: Atraumatic, oropharynx clear, neck supple  CHEST/LUNG: unlabored  HEART: Regular rate and rhythm    ABDOMEN: Soft, Nontender, Nondistended   MUSCULOSKELETAL:  No  edema   NERVOUS SYSTEM:  poorly responsive, +spontaneous breathing on ventilator  SKIN: No rashes or lesions noted  Oral intake: npo/TF  LABS:                          12.1   11.16 )-----------( 403      ( 06 Nov 2023 02:50 )             39.1     11-06    143  |  110<H>  |  29<H>  ----------------------------<  157<H>  4.2   |  31  |  0.83    Ca    9.0      06 Nov 2023 02:50  Phos  2.4     11-06  Mg     2.5     11-06    TPro  6.7  /  Alb  2.4<L>  /  TBili  0.6  /  DBili  x   /  AST  72<H>  /  ALT  39  /  AlkPhos  52  11-06    Urinalysis Basic - ( 06 Nov 2023 02:50 )    Color: x / Appearance: x / SG: x / pH: x  Gluc: 157 mg/dL / Ketone: x  / Bili: x / Urobili: x   Blood: x / Protein: x / Nitrite: x   Leuk Esterase: x / RBC: x / WBC x   Sq Epi: x / Non Sq Epi: x / Bacteria: x        RADIOLOGY & ADDITIONAL STUDIES:

## 2023-11-06 NOTE — PROGRESS NOTE ADULT - SUBJECTIVE AND OBJECTIVE BOX
INTERVAL HPI/OVERNIGHT EVENTS:      CENTRAL LINE: [ ] YES [ ] NO  LOCATION:       PATEL: [ ] YES [ ] NO        A-LINE:  [ ] YES [ ] NO  LOCATION:       GLOBAL ISSUE/BEST PRACTICE:  Analgesia:   Sedation:  HOB elevation: yes  Stress ulcer prophylaxis: pantoprazole   Suspension    VTE prophylaxis: aspirin  chewable 81 milliGRAM(s) Oral daily  heparin   Injectable 5000 Unit(s) SubCutaneous every 12 hours    Oral Care: Chlorhexidine  Glycemic control:   Nutrition:Diet, NPO with Tube Feed:   Tube Feeding Modality: Nasogastric  Vital HP  Total Volume for 24 Hours (mL): 990  Continuous  Starting Tube Feed Rate mL per Hour: 40  Increase Tube Feed Rate by (mL): 10     Every 6 hours  Until Goal Tube Feed Rate (mL per Hour): 55  Tube Feed Duration (in Hours): 18  Tube Feed Start Time: 17:00  Tube Feed Stop Time: 11:00 (11-01-23 @ 13:52) [Active]          REVIEW OF SYSTEMS:  [ ] Unable to obtain because:  [x] All other systems negative      CONSTITUTIONAL: No fever, weight loss, or fatigue  EYES: No eye pain, visual disturbances, or discharge  ENMT:  No difficulty hearing, tinnitus, vertigo; No sinus or throat pain  NECK: No pain or stiffness  RESPIRATORY: No cough, wheezing, chills or hemoptysis; No shortness of breath  CARDIOVASCULAR: No chest pain, palpitations, dizziness, or leg swelling  GASTROINTESTINAL: No abdominal or epigastric pain. No nausea, vomiting, or hematemesis; No diarrhea or constipation. No melena or hematochezia.  GENITOURINARY: No dysuria, frequency, hematuria, or incontinence  NEUROLOGICAL: No headaches, memory loss, loss of strength, numbness, or tremors  SKIN: No itching, burning, rashes, or lesions     PHYSICAL EXAM:    GENERAL: NAD, well-groomed, well-developed  HEENT: NCAT, EOMI, PERRLA, conjunctiva and sclera clear; Moist mucous membranes  NECK: Supple  CHEST/LUNG: CTABL; No rales, rhonchi, wheezing, or rubs  HEART: S1S2, RRR, No murmurs, rubs, or gallops  ABDOMEN: Soft, Nontender, Nondistended; Bowel sounds present  EXTREMITIES:  2+ Peripheral Pulses, No clubbing, cyanosis, or edema  NERVOUS SYSTEM:  Alert & Oriented X3, Good concentration; Motor Strength 5/5 B/L upper and lower extremities;    ICU Vital Signs Last 24 Hrs  T(C): 37.1 (06 Nov 2023 03:00), Max: 37.2 (05 Nov 2023 15:09)  T(F): 98.7 (06 Nov 2023 03:00), Max: 99 (05 Nov 2023 15:09)  HR: 57 (06 Nov 2023 07:00) (55 - 87)  BP: 151/73 (06 Nov 2023 07:00) (143/61 - 187/78)  BP(mean): 97 (06 Nov 2023 07:00) (82 - 114)  ABP: --  ABP(mean): --  RR: 18 (06 Nov 2023 07:00) (14 - 28)  SpO2: 100% (06 Nov 2023 06:00) (97% - 100%)    O2 Parameters below as of 05 Nov 2023 19:15  Patient On (Oxygen Delivery Method): ventilator, 400/12/5    O2 Concentration (%): 25      I&O's Detail    05 Nov 2023 07:01  -  06 Nov 2023 07:00  --------------------------------------------------------  IN:    Enteral Tube Flush: 240 mL    Vital High Protein: 990 mL  Total IN: 1230 mL    OUT:    Indwelling Catheter - Urethral (mL): 735 mL  Total OUT: 735 mL    Total NET: 495 mL        MEDICATIONS  NEURO  Meds: acetaminophen     Tablet .. 650 milliGRAM(s) Oral every 6 hours PRN Temp greater or equal to 38C (100.4F)    RESPIRATORY    Meds:   CARDIOVASCULAR  Meds: hydrALAZINE 100 milliGRAM(s) Oral every 8 hours    GI/NUTRITION  Meds: bisacodyl Suppository 10 milliGRAM(s) Rectal daily PRN Constipation  pantoprazole   Suspension 40 milliGRAM(s) Enteral Tube daily  polyethylene glycol 3350 17 Gram(s) Oral daily  senna 2 Tablet(s) Oral at bedtime    GENITOURINARY  Meds:   HEMATOLOGIC  Meds: aspirin  chewable 81 milliGRAM(s) Oral daily  heparin   Injectable 5000 Unit(s) SubCutaneous every 12 hours    [x] VTE Prophylaxis  INFECTIOUS DISEASES  Meds: influenza  Vaccine (HIGH DOSE) 0.7 milliLiter(s) IntraMuscular once    ENDOCRINE  CAPILLARY BLOOD GLUCOSE        Meds:   OTHER MEDICATIONS:  chlorhexidine 0.12% Liquid 15 milliLiter(s) Oral Mucosa every 12 hours  chlorhexidine 2% Cloths 1 Application(s) Topical <User Schedule>  :    LABS:                        12.1   11.16 )-----------( 403      ( 06 Nov 2023 02:50 )             39.1      11-06    143  |  110<H>  |  29<H>  ----------------------------<  157<H>  4.2   |  31  |  0.83    Ca    9.0      06 Nov 2023 02:50  Phos  2.4     11-06  Mg     2.5     11-06    TPro  6.7  /  Alb  2.4<L>  /  TBili  0.6  /  DBili  x   /  AST  72<H>  /  ALT  39  /  AlkPhos  52  11-06      Urinalysis Basic - ( 06 Nov 2023 02:50 )    Color: x / Appearance: x / SG: x / pH: x  Gluc: 157 mg/dL / Ketone: x  / Bili: x / Urobili: x   Blood: x / Protein: x / Nitrite: x   Leuk Esterase: x / RBC: x / WBC x   Sq Epi: x / Non Sq Epi: x / Bacteria: x          Mode: AC/ CMV (Assist Control/ Continuous Mandatory Ventilation), RR (machine): 12, TV (machine): 400, FiO2: 25, PEEP: 5, ITime: 0.8, MAP: 19, PIP: 24      RADIOLOGY & ADDITIONAL STUDIES:     INTERVAL HPI / OVERNIGHT EVENTS:    No overnight events, postures.  Noted to have secretions requiring frequent suctioning.    CENTRAL LINE: [ ] YES [x ] NO  LOCATION:       PATEL: [x ] YES [ ] NO    Urinary retention    A-LINE:  [ ] YES [ x] NO  LOCATION:       GLOBAL ISSUE/BEST PRACTICE:  Analgesia:   Sedation:  HOB elevation: yes  Stress ulcer prophylaxis: pantoprazole   Suspension  VTE prophylaxis: aspirin  chewable 81 milliGRAM(s) Oral daily  heparin   Injectable 5000 Unit(s) SubCutaneous every 12 hours  Oral Care: Chlorhexidine  Glycemic control:   Nutrition:Diet, NPO with Tube Feed:   Tube Feeding Modality: Nasogastric  Vital HP  Total Volume for 24 Hours (mL): 990  Continuous  Starting Tube Feed Rate mL per Hour: 40  Increase Tube Feed Rate by (mL): 10     Every 6 hours  Until Goal Tube Feed Rate (mL per Hour): 55  Tube Feed Duration (in Hours): 18  Tube Feed Start Time: 17:00  Tube Feed Stop Time: 11:00 (11-01-23 @ 13:52) [Active]          REVIEW OF SYSTEMS:  [ x] Unable to obtain because: intubated on no sedation 2/2 multiple strokes    PHYSICAL EXAM:    Gen: intubated  HEENT: Intubated with ETT  CARD -s1s2, RRR, no M,G,R;   PULM - Coarse vented breath sounds, symmetric breath sounds;   ABD -  +BS, ND, NT, soft, no guarding, no rebound, no masses;   EXT - symmetric pulses, no edema;   NEURO - overbreathes the vent, gag and cough, posturing.      ICU Vital Signs Last 24 Hrs  T(C): 37.1 (06 Nov 2023 03:00), Max: 37.2 (05 Nov 2023 15:09)  T(F): 98.7 (06 Nov 2023 03:00), Max: 99 (05 Nov 2023 15:09)  HR: 57 (06 Nov 2023 07:00) (55 - 87)  BP: 151/73 (06 Nov 2023 07:00) (143/61 - 187/78)  BP(mean): 97 (06 Nov 2023 07:00) (82 - 114)  ABP: --  ABP(mean): --  RR: 18 (06 Nov 2023 07:00) (14 - 28)  SpO2: 100% (06 Nov 2023 06:00) (97% - 100%)    O2 Parameters below as of 05 Nov 2023 19:15  Patient On (Oxygen Delivery Method): ventilator, 400/12/5    O2 Concentration (%): 25      I&O's Detail    05 Nov 2023 07:01  -  06 Nov 2023 07:00  --------------------------------------------------------  IN:    Enteral Tube Flush: 240 mL    Vital High Protein: 990 mL  Total IN: 1230 mL    OUT:    Indwelling Catheter - Urethral (mL): 735 mL  Total OUT: 735 mL    Total NET: 495 mL        MEDICATIONS  NEURO  Meds: acetaminophen     Tablet .. 650 milliGRAM(s) Oral every 6 hours PRN Temp greater or equal to 38C (100.4F)    RESPIRATORY    Meds:   CARDIOVASCULAR  Meds: hydrALAZINE 100 milliGRAM(s) Oral every 8 hours    GI/NUTRITION  Meds: bisacodyl Suppository 10 milliGRAM(s) Rectal daily PRN Constipation  pantoprazole   Suspension 40 milliGRAM(s) Enteral Tube daily  polyethylene glycol 3350 17 Gram(s) Oral daily  senna 2 Tablet(s) Oral at bedtime    GENITOURINARY  Meds:   HEMATOLOGIC  Meds: aspirin  chewable 81 milliGRAM(s) Oral daily  heparin   Injectable 5000 Unit(s) SubCutaneous every 12 hours    [x] VTE Prophylaxis  INFECTIOUS DISEASES  Meds: influenza  Vaccine (HIGH DOSE) 0.7 milliLiter(s) IntraMuscular once    ENDOCRINE  CAPILLARY BLOOD GLUCOSE        Meds:   OTHER MEDICATIONS:  chlorhexidine 0.12% Liquid 15 milliLiter(s) Oral Mucosa every 12 hours  chlorhexidine 2% Cloths 1 Application(s) Topical <User Schedule>  :    LABS:                        12.1   11.16 )-----------( 403      ( 06 Nov 2023 02:50 )             39.1      11-06    143  |  110<H>  |  29<H>  ----------------------------<  157<H>  4.2   |  31  |  0.83    Ca    9.0      06 Nov 2023 02:50  Phos  2.4     11-06  Mg     2.5     11-06    TPro  6.7  /  Alb  2.4<L>  /  TBili  0.6  /  DBili  x   /  AST  72<H>  /  ALT  39  /  AlkPhos  52  11-06      Urinalysis Basic - ( 06 Nov 2023 02:50 )    Color: x / Appearance: x / SG: x / pH: x  Gluc: 157 mg/dL / Ketone: x  / Bili: x / Urobili: x   Blood: x / Protein: x / Nitrite: x   Leuk Esterase: x / RBC: x / WBC x   Sq Epi: x / Non Sq Epi: x / Bacteria: x          Mode: AC/ CMV (Assist Control/ Continuous Mandatory Ventilation), RR (machine): 12, TV (machine): 400, FiO2: 25, PEEP: 5, ITime: 0.8, MAP: 19, PIP: 24      RADIOLOGY & ADDITIONAL STUDIES:

## 2023-11-06 NOTE — CHART NOTE - NSCHARTNOTEFT_GEN_A_CORE
Assessment:  Pt seen on vent, pt's daughter was @ bedside, OGT feeding of Vital HP @ goal rate of 55 ml/hr x 18 hours is on hold at present as per order.  Pt adm c diagnosis of  altered mental status, embolic stroke, cerebral edema, hypoxic respiratory failure, improved DAVIE, per Palliative care consult; poor overall prognosis, poor chance for any meaningful neuro recovery given extent of strokes noted.  pt is DNR.  PMH include HLD, HTN, Afib.     Factors impacting intake: [ ] none [ ] nausea  [ ] vomiting [x ] diarrhea; loose BMs c fecal bag in place [ ] constipation  [ ]chewing problems [ ] swallowing issues  [x ] other: acute illness    Diet Prescription: Diet, NPO with Tube Feed:   Tube Feeding Modality: Nasogastric  Vital HP  Total Volume for 24 Hours (mL): 990  Continuous  Starting Tube Feed Rate {mL per Hour}: 40  Increase Tube Feed Rate by (mL): 10     Every 6 hours  Until Goal Tube Feed Rate (mL per Hour): 55  Tube Feed Duration (in Hours): 18  Tube Feed Start Time: 17:00  Tube Feed Stop Time: 11:00 (11-01-23 @ 13:52)    Intake: Vital HP @ 55 ml/hr x 18 hours=990 ml, 990 calories, 86 grams protein    Current Weight: 11/06, 57.1 kg, 10/26, 63.1 kg, c wt. loss of 6 kg noted  % Weight Change: 9.5%  11/06, 1+ (mild) generalized edema, 2+ (moderate)edema of arms noted  Pt c visible moderate orbital, temple, c limited view of pt on vent to conduct nutrition focus physical exam.    Pertinent Medications: MEDICATIONS  (STANDING):  aspirin  chewable 81 milliGRAM(s) Oral daily  atorvastatin 80 milliGRAM(s) Oral at bedtime  chlorhexidine 0.12% Liquid 15 milliLiter(s) Oral Mucosa every 12 hours  chlorhexidine 2% Cloths 1 Application(s) Topical <User Schedule>  heparin   Injectable 5000 Unit(s) SubCutaneous every 12 hours  hydrALAZINE 100 milliGRAM(s) Oral every 8 hours  influenza  Vaccine (HIGH DOSE) 0.7 milliLiter(s) IntraMuscular once  pantoprazole   Suspension 40 milliGRAM(s) Enteral Tube daily  polyethylene glycol 3350 17 Gram(s) Oral daily  senna 2 Tablet(s) Oral at bedtime    MEDICATIONS  (PRN):  acetaminophen     Tablet .. 650 milliGRAM(s) Oral every 6 hours PRN Temp greater or equal to 38C (100.4F)  bisacodyl Suppository 10 milliGRAM(s) Rectal daily PRN Constipation    Pertinent Labs: 11-06 Na143 mmol/L Glu 157 mg/dL<H> K+ 4.2 mmol/L Cr  0.83 mg/dL BUN 29 mg/dL<H> 11-06 Phos 2.4 mg/dL<L> 11-06 Alb 2.4 g/dL<L> 10-26 Chol 120 mg/dL LDL --    HDL 49 mg/dL<L> Trig 87 mg/dL11-06 ALT 39 U/L AST 72 U/L<H> Alkaline Phosphatase 52 U/L  10-26-23 @ 10:20 a1c 5.5   CAPILLARY BLOOD GLUCOSE        Skin:   11/06, WDL except ecchymotic, IAD;     Estimated Needs:   [ x] no change since previous assessment(10/28)  [ ] recalculated:     Previous Nutrition Diagnosis: (10/28)  Inadequate Energy Intake  Etiology: inadequate tube feed infusion  Signs/Symptoms: tube feed as ordered does not meet needs  Goal/Expected Outcome: Pt to meet >50% needs via tolerated route; met   Nutrition Diagnosis is [ x] ongoing  [ ] resolved [ ] not applicable       New Nutrition Diagnosis: [ x] not applicable     Interventions:   Recommend  [ ] Change Diet To:  [ ] Nutrition Supplement  [x ] Nutrition Support; continue c current enteral feeding regimen, if pt to receive enteral feeding for prolonged periods of time, recommend change to Jevity 1.2 @ 40->65 ml/hr x 18 hours = 1170 ml, 1404 calories, 65 grams protein, 944 ml free water   [x ] Other: free water flush as per medical discretion   [x] hold polyethylene glycol for loose BMs    Monitoring and Evaluation:   [ ] PO intake [ x ] Tolerance to diet prescription [ x ] weights [ x ] labs[ x ] follow up per protocol  [ ] other:

## 2023-11-06 NOTE — PROGRESS NOTE ADULT - ASSESSMENT
88 year old female with a PMH of HTN, HLD and afib on xarelto who did not take medications for past 1 week prior to hospitalization here with R MCA infarct.   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

## 2023-11-06 NOTE — PROGRESS NOTE ADULT - ASSESSMENT
89 yo F with A fib on Xarelto, HTN and HLD a/w altered mental status, nonverbal and with right gaze deviation when she woke up. At baseline pt is ambulatory and verbal. Pt required intubation in ER for airway protection given poor MS. Pt assessed by Stroke team: GIO ~4 hours ago, on Rivaroxaban, presents with AMS/weakness with NIHSS of 30 on ED arrival.  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  Given age, extended time window, oral anticoagulant use, large infarct, and high NIHSS, tPA relatively contraindicated/very high risk for heme and unlikely to be net beneficial.  D/w endovascular fellow Reanna, imaging reviewed, not a candidate for endovascular intervention. Pt admitted to the ICU. Repeat CT head: large acute infarcts right JIMMY and MCA territories. Significant associated mass effect R -> L and associated herniation on 11/2.     Neuro:   CV:  Pulm:  GI:  Renal/Metabolic:  ID:  Heme:  Endo:  Skin:  Dispo:       87 yo F with A fib on Xarelto, HTN and HLD a/w altered mental status, nonverbal and with right gaze deviation when she woke up. At baseline pt is ambulatory and verbal. Pt required intubation in ER for airway protection given poor MS. Pt assessed by Stroke team: GIO ~4 hours ago, on Rivaroxaban, presents with AMS/weakness with NIHSS of 30 on ED arrival.  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  Given age, extended time window, oral anticoagulant use, large infarct, and high NIHSS, tPA relatively contraindicated/very high risk for heme and unlikely to be net beneficial.  D/w endovascular fellow Reanna, imaging reviewed, not a candidate for endovascular intervention. Pt admitted to the ICU. Repeat CT head: large acute infarcts right JIMMY and MCA territories. Significant associated mass effect R -> L and associated herniation on 11/2.     Neuro: Massive and worsening cerebral infracts with herniation.  No hope for recovery of an independent life.    - continue to monitor for signs of herniation; neuro recs appreciated  CV: Permissive BP goals  - Continue with aspirin  Pulm: Intubated with no mental status and mental status is preventing weaning at this time.    GI: c/w tube feeds  Renal/Metabolic: DAVIE improved; continue to monitor  ID: No acute issues  Heme: HSQ  Endo: FS within acceptable range  Dispo:  Currently with persistently poor exam, noted to have multiple strokes.  Updated daughter at bedside, though noted to have 5 children and eldest son appointed as decision maker.  Palliative care following.

## 2023-11-06 NOTE — PROGRESS NOTE ADULT - SUBJECTIVE AND OBJECTIVE BOX
Neurology Progress Note    S: Patient seen and examined.       General Exam:     MEDICATIONS:    acetaminophen     Tablet .. 650 milliGRAM(s) Oral every 6 hours PRN  aspirin  chewable 81 milliGRAM(s) Oral daily  atorvastatin 80 milliGRAM(s) Oral at bedtime  bisacodyl Suppository 10 milliGRAM(s) Rectal daily PRN  chlorhexidine 0.12% Liquid 15 milliLiter(s) Oral Mucosa every 12 hours  chlorhexidine 2% Cloths 1 Application(s) Topical <User Schedule>  heparin   Injectable 5000 Unit(s) SubCutaneous every 12 hours  hydrALAZINE 100 milliGRAM(s) Oral every 8 hours  influenza  Vaccine (HIGH DOSE) 0.7 milliLiter(s) IntraMuscular once  pantoprazole   Suspension 40 milliGRAM(s) Enteral Tube daily  polyethylene glycol 3350 17 Gram(s) Oral daily  senna 2 Tablet(s) Oral at bedtime      LABS:                          12.1   11.16 )-----------( 403      ( 06 Nov 2023 02:50 )             39.1     11-06    143  |  110<H>  |  29<H>  ----------------------------<  157<H>  4.2   |  31  |  0.83    Ca    9.0      06 Nov 2023 02:50  Phos  2.4     11-06  Mg     2.5     11-06    TPro  6.7  /  Alb  2.4<L>  /  TBili  0.6  /  DBili  x   /  AST  72<H>  /  ALT  39  /  AlkPhos  52  11-06    CAPILLARY BLOOD GLUCOSE          Urinalysis Basic - ( 06 Nov 2023 02:50 )    Color: x / Appearance: x / SG: x / pH: x  Gluc: 157 mg/dL / Ketone: x  / Bili: x / Urobili: x   Blood: x / Protein: x / Nitrite: x   Leuk Esterase: x / RBC: x / WBC x   Sq Epi: x / Non Sq Epi: x / Bacteria: x      I&O's Summary    05 Nov 2023 07:01  -  06 Nov 2023 07:00  --------------------------------------------------------  IN: 1230 mL / OUT: 735 mL / NET: 495 mL    06 Nov 2023 07:01  -  06 Nov 2023 09:49  --------------------------------------------------------  IN: 110 mL / OUT: 125 mL / NET: -15 mL      Vital Signs Last 24 Hrs  T(C): 37.5 (06 Nov 2023 08:00), Max: 37.5 (06 Nov 2023 08:00)  T(F): 99.5 (06 Nov 2023 08:00), Max: 99.5 (06 Nov 2023 08:00)  HR: 58 (06 Nov 2023 09:00) (55 - 87)  BP: 164/72 (06 Nov 2023 09:00) (143/61 - 187/78)  BP(mean): 99 (06 Nov 2023 09:00) (82 - 114)  RR: 18 (06 Nov 2023 09:00) (14 - 28)  SpO2: 100% (06 Nov 2023 09:00) (97% - 100%)    Parameters below as of 05 Nov 2023 19:15  Patient On (Oxygen Delivery Method): ventilator, 400/12/5    O2 Concentration (%): 25    Neurological Exam:  Mental Status: Intubated, not sedated. No spont eye opening. No verbal output, does not follow commands.   Cranial Nerves: pupils very sluggish R, L near fixed, + corneals, no VOR, no facial asymmetry , weak cough/gag  Motor: dec tone throughout, no spont movemnt noted   Sensation: UE extensor posturing, LE triple flexion left toe up    I personally reviewed the below data/images/labs:      < from: CT Head No Cont (10.22.19 @ 15:57) >  IMPRESSION:    1)  chronic ischemic changes in both hemispheres with volume loss. There   are no new infarct as compared to prior CT. This may be further assessed   with MR imaging.  2)  no hemorrhage or mass identified.    < end of copied text >  < from: CT Angio Head w/ IV Cont (10.22.19 @ 15:57) >  IMPRESSION    1. There is intimal thickening and calcified plaque in both carotid   bifurcations in the neck, but without significant stenosis. No evidence   of carotid vertebral occlusion or dissection.  2. Intracranially, there are atherosclerotic changes in the anterior and   posterior circulation, but without significant stenosis or occlusion.    < end of copied text >  < from: CT Brain Stroke Protocol (10.26.23 @ 04:37) >  IMPRESSION:  No evidence of acute intracranial hemorrhage, mass effect or large acute   territorial infarct, within limits of noncontrast CT technique.    < end of copied text >  < from: CT Angio Neck Stroke Protocol w/ IV Cont (10.26.23 @ 04:53) >  CT ANGIOGRAPHY NECK:  1.  The right internal carotid artery is occluded approximately 1.5 cm   distal to its origin, possibly due to underlying dissection.  2.  Endotracheal tube is low in position, with its tip approximate 1 cm   above the slade.  3.  Diffuse ill-defined groundglass opacity in both lungs, which may   represent pulmonary edema, infection and/or atelectasis.  4.  There is a discrete right upper lobe groundglass opacity measuring   1.9 x 1.4 cm; this may represent a focus of infection, inflammation,   edema, hemorrhage, fibrosis or malignancy.  A follow-up chest CT is   warranted in three months to assess for resolution.    CT ANGIOGRAPHY BRAIN: The right internal carotid arteries occluded.    There is no significant flow related opacification within the proximal   right middle cerebral artery.  There is mild reconstitution of flow in   some distal branch vessels of the inferior division of the right middle   cerebral artery.  The right anterior cerebral artery fills across the   anterior communicating artery.    < end of copied text >  < from: CT Brain Perfusion Maps Stroke (10.26.23 @ 04:54) >  CT PERFUSION: CT perfusion is consistent with large acute infarct in the   right middle cerebral artery vascular territory and small to moderate   amount of surrounding ischemic penumbra.  Hypoperfusion index is 0.3.    Core infarct (CBF <  30%:): 136 mL  Penumbra (Tmax >6s): 194 mL  Mismatched volume: 58 mL  Mismatched ratio: 1.4    < end of copied text >      < from: CT Head No Cont (10.22.19 @ 15:57) >  IMPRESSION:    1)  chronic ischemic changes in both hemispheres with volume loss. There   are no new infarct as compared to prior CT. This may be further assessed   with MR imaging.  2)  no hemorrhage or mass identified.    < end of copied text >  < from: CT Angio Head w/ IV Cont (10.22.19 @ 15:57) >  IMPRESSION    1. There is intimal thickening and calcified plaque in both carotid   bifurcations in the neck, but without significant stenosis. No evidence   of carotid vertebral occlusion or dissection.  2. Intracranially, there are atherosclerotic changes in the anterior and   posterior circulation, but without significant stenosis or occlusion.    < end of copied text >  < from: CT Brain Stroke Protocol (10.26.23 @ 04:37) >  IMPRESSION:  No evidence of acute intracranial hemorrhage, mass effect or large acute   territorial infarct, within limits of noncontrast CT technique.    < end of copied text >  < from: CT Angio Neck Stroke Protocol w/ IV Cont (10.26.23 @ 04:53) >  CT ANGIOGRAPHY NECK:  1.  The right internal carotid artery is occluded approximately 1.5 cm   distal to its origin, possibly due to underlying dissection.  2.  Endotracheal tube is low in position, with its tip approximate 1 cm   above the slade.  3.  Diffuse ill-defined groundglass opacity in both lungs, which may   represent pulmonary edema, infection and/or atelectasis.  4.  There is a discrete right upper lobe groundglass opacity measuring   1.9 x 1.4 cm; this may represent a focus of infection, inflammation,   edema, hemorrhage, fibrosis or malignancy.  A follow-up chest CT is   warranted in three months to assess for resolution.    CT ANGIOGRAPHY BRAIN: The right internal carotid arteries occluded.    There is no significant flow related opacification within the proximal   right middle cerebral artery.  There is mild reconstitution of flow in   some distal branch vessels of the inferior division of the right middle   cerebral artery.  The right anterior cerebral artery fills across the   anterior communicating artery.    < end of copied text >  < from: CT Brain Perfusion Maps Stroke (10.26.23 @ 04:54) >  CT PERFUSION: CT perfusion is consistent with large acute infarct in the   right middle cerebral artery vascular territory and small to moderate   amount of surrounding ischemic penumbra.  Hypoperfusion index is 0.3.    Core infarct (CBF <  30%:): 136 mL  Penumbra (Tmax >6s): 194 mL  Mismatched volume: 58 mL  Mismatched ratio: 1.4    < end of copied text >      < from: CT Head No Cont (10.22.19 @ 15:57) >  IMPRESSION:    1)  chronic ischemic changes in both hemispheres with volume loss. There   are no new infarct as compared to prior CT. This may be further assessed   with MR imaging.  2)  no hemorrhage or mass identified.    < end of copied text >  < from: CT Angio Head w/ IV Cont (10.22.19 @ 15:57) >  IMPRESSION    1. There is intimal thickening and calcified plaque in both carotid   bifurcations in the neck, but without significant stenosis. No evidence   of carotid vertebral occlusion or dissection.  2. Intracranially, there are atherosclerotic changes in the anterior and   posterior circulation, but without significant stenosis or occlusion.    < end of copied text >  < from: CT Brain Stroke Protocol (10.26.23 @ 04:37) >  IMPRESSION:  No evidence of acute intracranial hemorrhage, mass effect or large acute   territorial infarct, within limits of noncontrast CT technique.    < end of copied text >  < from: CT Angio Neck Stroke Protocol w/ IV Cont (10.26.23 @ 04:53) >  CT ANGIOGRAPHY NECK:  1.  The right internal carotid artery is occluded approximately 1.5 cm   distal to its origin, possibly due to underlying dissection.  2.  Endotracheal tube is low in position, with its tip approximate 1 cm   above the slade.  3.  Diffuse ill-defined groundglass opacity in both lungs, which may   represent pulmonary edema, infection and/or atelectasis.  4.  There is a discrete right upper lobe groundglass opacity measuring   1.9 x 1.4 cm; this may represent a focus of infection, inflammation,   edema, hemorrhage, fibrosis or malignancy.  A follow-up chest CT is   warranted in three months to assess for resolution.    CT ANGIOGRAPHY BRAIN: The right internal carotid arteries occluded.    There is no significant flow related opacification within the proximal   right middle cerebral artery.  There is mild reconstitution of flow in   some distal branch vessels of the inferior division of the right middle   cerebral artery.  The right anterior cerebral artery fills across the   anterior communicating artery.    < end of copied text >  < from: CT Brain Perfusion Maps Stroke (10.26.23 @ 04:54) >  CT PERFUSION: CT perfusion is consistent with large acute infarct in the   right middle cerebral artery vascular territory and small to moderate   amount of surrounding ischemic penumbra.  Hypoperfusion index is 0.3.    Core infarct (CBF <  30%:): 136 mL  Penumbra (Tmax >6s): 194 mL  Mismatched volume: 58 mL  Mismatched ratio: 1.4    < end of copied text >    EEG Classification / Summary:  Abnormal EEG study  1. Intermittent periodic discharges seen intermittently over the left hemisphere, sometimes with triphasic morphology  2. Focal continuous right hemispheric slowing  3. Moderate generalized background slowing    -----------------------------------------------------------------------------------------------------    Clinical Impression:  1. While the periodic pattern noted above may represent lateralized periodic discharges indicating epileptic potential in the left hemisphere, more likely these represent generalized periodic discharges with triphasic morphology, consistent with metabolic etiology, that are poorly expressed on the right due to the effects of the stroke.   2. Structural abnormality in right hemisphere  3. Moderate diffuse/multi-focal cerebral dysfunction, not specific as to etiology.    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 Significant   associated mass effect and associated herniation.      < end of copied text >

## 2023-11-07 NOTE — PROGRESS NOTE ADULT - SUBJECTIVE AND OBJECTIVE BOX
INTERVAL HPI/OVERNIGHT EVENTS:    No Acute Overnight Events.  Placed on Pressure Support 10/5 this AM.      CENTRAL LINE: [ ] YES [x ] NO  LOCATION:       PATEL: [x ] YES [ ] NO    Urinary retention    A-LINE:  [ ] YES [ x] NO  LOCATION:       GLOBAL ISSUE/BEST PRACTICE:  Analgesia:   Sedation:  HOB elevation: yes  Stress ulcer prophylaxis: pantoprazole   Suspension  VTE prophylaxis: aspirin  chewable 81 milliGRAM(s) Oral daily  heparin   Injectable 5000 Unit(s) SubCutaneous every 12 hours  Oral Care: Chlorhexidine  Glycemic control:   Nutrition:Diet, NPO with Tube Feed:   Tube Feeding Modality: Nasogastric  Vital HP  Total Volume for 24 Hours (mL): 990  Continuous  Starting Tube Feed Rate mL per Hour: 40  Increase Tube Feed Rate by (mL): 10     Every 6 hours  Until Goal Tube Feed Rate (mL per Hour): 55  Tube Feed Duration (in Hours): 18  Tube Feed Start Time: 17:00  Tube Feed Stop Time: 11:00 (11-01-23 @ 13:52) [Active]          REVIEW OF SYSTEMS:  [ x] Unable to obtain because: intubated on no sedation 2/2 multiple strokes    PHYSICAL EXAM:    Gen: intubated  HEENT: Intubated with ETT  CARD -s1s2, RRR, no M,G,R;   PULM - Coarse vented breath sounds, symmetric breath sounds;   ABD -  +BS, ND, NT, soft, no guarding, no rebound, no masses;   EXT - symmetric pulses, no edema;   NEURO - overbreathes the vent, gag and cough, posturing.      ICU Vital Signs Last 24 Hrs  T(C): 35.8 (07 Nov 2023 11:25), Max: 37.4 (06 Nov 2023 16:00)  T(F): 96.4 (07 Nov 2023 11:25), Max: 99.4 (06 Nov 2023 16:00)  HR: 62 (07 Nov 2023 12:00) (49 - 72)  BP: 168/64 (07 Nov 2023 12:00) (158/60 - 205/73)  BP(mean): 92 (07 Nov 2023 12:00) (89 - 110)  ABP: --  ABP(mean): --  RR: 28 (07 Nov 2023 12:00) (17 - 28)  SpO2: 100% (07 Nov 2023 12:00) (100% - 100%)      I&O's Detail    06 Nov 2023 07:01  -  07 Nov 2023 07:00  --------------------------------------------------------  IN:    Enteral Tube Flush: 350 mL    Vital High Protein: 825 mL  Total IN: 1175 mL    OUT:    Indwelling Catheter - Urethral (mL): 1100 mL  Total OUT: 1100 mL    Total NET: 75 mL      07 Nov 2023 07:01  -  07 Nov 2023 13:46  --------------------------------------------------------  IN:    Vital High Protein: 220 mL  Total IN: 220 mL    OUT:    Indwelling Catheter - Urethral (mL): 150 mL  Total OUT: 150 mL    Total NET: 70 mL        MEDICATIONS  NEURO  Meds: acetaminophen     Tablet .. 650 milliGRAM(s) Oral every 6 hours PRN Temp greater or equal to 38C (100.4F)    RESPIRATORY    Meds:   CARDIOVASCULAR  Meds: amLODIPine   Tablet 5 milliGRAM(s) Oral daily  hydrALAZINE 100 milliGRAM(s) Oral every 8 hours    GI/NUTRITION  Meds: bisacodyl Suppository 10 milliGRAM(s) Rectal daily PRN Constipation  pantoprazole   Suspension 40 milliGRAM(s) Enteral Tube daily  polyethylene glycol 3350 17 Gram(s) Oral daily  senna 2 Tablet(s) Oral at bedtime    GENITOURINARY  Meds:   HEMATOLOGIC  Meds: aspirin  chewable 81 milliGRAM(s) Oral daily  heparin   Injectable 5000 Unit(s) SubCutaneous every 12 hours    [x] VTE Prophylaxis  INFECTIOUS DISEASES  Meds: influenza  Vaccine (HIGH DOSE) 0.7 milliLiter(s) IntraMuscular once    ENDOCRINE  CAPILLARY BLOOD GLUCOSE        Meds:   OTHER MEDICATIONS:  chlorhexidine 0.12% Liquid 15 milliLiter(s) Oral Mucosa every 12 hours  chlorhexidine 2% Cloths 1 Application(s) Topical <User Schedule>  :    LABS:                        12.2   11.15 )-----------( 353      ( 07 Nov 2023 03:40 )             39.3      11-07    140  |  108  |  22  ----------------------------<  161<H>  4.0   |  29  |  0.66    Ca    9.0      07 Nov 2023 03:40  Phos  2.7     11-07  Mg     2.4     11-07    TPro  6.6  /  Alb  2.4<L>  /  TBili  0.6  /  DBili  x   /  AST  85<H>  /  ALT  32  /  AlkPhos  51  11-07      Urinalysis Basic - ( 07 Nov 2023 03:40 )    Color: x / Appearance: x / SG: x / pH: x  Gluc: 161 mg/dL / Ketone: x  / Bili: x / Urobili: x   Blood: x / Protein: x / Nitrite: x   Leuk Esterase: x / RBC: x / WBC x   Sq Epi: x / Non Sq Epi: x / Bacteria: x          Mode: CPAP with PS, FiO2: 25, PEEP: 5, PS: 8, MAP: 8      RADIOLOGY & ADDITIONAL STUDIES:

## 2023-11-07 NOTE — PROGRESS NOTE ADULT - ASSESSMENT
88 year old female with a PMH of HTN, HLD and afib on xarelto who did not take medications for past 1 week prior to hospitalization here with R MCA infarct.   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

## 2023-11-07 NOTE — PROGRESS NOTE ADULT - SUBJECTIVE AND OBJECTIVE BOX
Neurology Progress Note    S: Patient seen and examined.         MEDICATIONS:    acetaminophen     Tablet .. 650 milliGRAM(s) Oral every 6 hours PRN  aspirin  chewable 81 milliGRAM(s) Oral daily  atorvastatin 80 milliGRAM(s) Oral at bedtime  bisacodyl Suppository 10 milliGRAM(s) Rectal daily PRN  chlorhexidine 0.12% Liquid 15 milliLiter(s) Oral Mucosa every 12 hours  chlorhexidine 2% Cloths 1 Application(s) Topical <User Schedule>  heparin   Injectable 5000 Unit(s) SubCutaneous every 12 hours  hydrALAZINE 100 milliGRAM(s) Oral every 8 hours  influenza  Vaccine (HIGH DOSE) 0.7 milliLiter(s) IntraMuscular once  pantoprazole   Suspension 40 milliGRAM(s) Enteral Tube daily  polyethylene glycol 3350 17 Gram(s) Oral daily  senna 2 Tablet(s) Oral at bedtime      LABS:                          12.1   11.16 )-----------( 403      ( 06 Nov 2023 02:50 )             39.1     11-06    143  |  110<H>  |  29<H>  ----------------------------<  157<H>  4.2   |  31  |  0.83    Ca    9.0      06 Nov 2023 02:50  Phos  2.4     11-06  Mg     2.5     11-06    TPro  6.7  /  Alb  2.4<L>  /  TBili  0.6  /  DBili  x   /  AST  72<H>  /  ALT  39  /  AlkPhos  52  11-06    CAPILLARY BLOOD GLUCOSE          Urinalysis Basic - ( 06 Nov 2023 02:50 )    Color: x / Appearance: x / SG: x / pH: x  Gluc: 157 mg/dL / Ketone: x  / Bili: x / Urobili: x   Blood: x / Protein: x / Nitrite: x   Leuk Esterase: x / RBC: x / WBC x   Sq Epi: x / Non Sq Epi: x / Bacteria: x      I&O's Summary    05 Nov 2023 07:01  -  06 Nov 2023 07:00  --------------------------------------------------------  IN: 1230 mL / OUT: 735 mL / NET: 495 mL    06 Nov 2023 07:01  -  06 Nov 2023 09:49  --------------------------------------------------------  IN: 110 mL / OUT: 125 mL / NET: -15 mL      Vital Signs Last 24 Hrs  T(C): 37.5 (06 Nov 2023 08:00), Max: 37.5 (06 Nov 2023 08:00)  T(F): 99.5 (06 Nov 2023 08:00), Max: 99.5 (06 Nov 2023 08:00)  HR: 58 (06 Nov 2023 09:00) (55 - 87)  BP: 164/72 (06 Nov 2023 09:00) (143/61 - 187/78)  BP(mean): 99 (06 Nov 2023 09:00) (82 - 114)  RR: 18 (06 Nov 2023 09:00) (14 - 28)  SpO2: 100% (06 Nov 2023 09:00) (97% - 100%)    Parameters below as of 05 Nov 2023 19:15  Patient On (Oxygen Delivery Method): ventilator, 400/12/5    O2 Concentration (%): 25    Neurological Exam:  Mental Status: Intubated, not sedated. No spont eye opening. No verbal output, does not follow commands.   Cranial Nerves: pupils very sluggish R, L near fixed, + corneals, no VOR, no facial asymmetry , weak cough/gag  Motor: dec tone throughout, no spont movemnt noted   Sensation: UE extensor posturing, LE triple flexion left toe up    I personally reviewed the below data/images/labs:      < from: CT Head No Cont (10.22.19 @ 15:57) >  IMPRESSION:    1)  chronic ischemic changes in both hemispheres with volume loss. There   are no new infarct as compared to prior CT. This may be further assessed   with MR imaging.  2)  no hemorrhage or mass identified.    < end of copied text >  < from: CT Angio Head w/ IV Cont (10.22.19 @ 15:57) >  IMPRESSION    1. There is intimal thickening and calcified plaque in both carotid   bifurcations in the neck, but without significant stenosis. No evidence   of carotid vertebral occlusion or dissection.  2. Intracranially, there are atherosclerotic changes in the anterior and   posterior circulation, but without significant stenosis or occlusion.    < end of copied text >  < from: CT Brain Stroke Protocol (10.26.23 @ 04:37) >  IMPRESSION:  No evidence of acute intracranial hemorrhage, mass effect or large acute   territorial infarct, within limits of noncontrast CT technique.    < end of copied text >  < from: CT Angio Neck Stroke Protocol w/ IV Cont (10.26.23 @ 04:53) >  CT ANGIOGRAPHY NECK:  1.  The right internal carotid artery is occluded approximately 1.5 cm   distal to its origin, possibly due to underlying dissection.  2.  Endotracheal tube is low in position, with its tip approximate 1 cm   above the slade.  3.  Diffuse ill-defined groundglass opacity in both lungs, which may   represent pulmonary edema, infection and/or atelectasis.  4.  There is a discrete right upper lobe groundglass opacity measuring   1.9 x 1.4 cm; this may represent a focus of infection, inflammation,   edema, hemorrhage, fibrosis or malignancy.  A follow-up chest CT is   warranted in three months to assess for resolution.    CT ANGIOGRAPHY BRAIN: The right internal carotid arteries occluded.    There is no significant flow related opacification within the proximal   right middle cerebral artery.  There is mild reconstitution of flow in   some distal branch vessels of the inferior division of the right middle   cerebral artery.  The right anterior cerebral artery fills across the   anterior communicating artery.    < end of copied text >  < from: CT Brain Perfusion Maps Stroke (10.26.23 @ 04:54) >  CT PERFUSION: CT perfusion is consistent with large acute infarct in the   right middle cerebral artery vascular territory and small to moderate   amount of surrounding ischemic penumbra.  Hypoperfusion index is 0.3.    Core infarct (CBF <  30%:): 136 mL  Penumbra (Tmax >6s): 194 mL  Mismatched volume: 58 mL  Mismatched ratio: 1.4    < end of copied text >      < from: CT Head No Cont (10.22.19 @ 15:57) >  IMPRESSION:    1)  chronic ischemic changes in both hemispheres with volume loss. There   are no new infarct as compared to prior CT. This may be further assessed   with MR imaging.  2)  no hemorrhage or mass identified.    < end of copied text >  < from: CT Angio Head w/ IV Cont (10.22.19 @ 15:57) >  IMPRESSION    1. There is intimal thickening and calcified plaque in both carotid   bifurcations in the neck, but without significant stenosis. No evidence   of carotid vertebral occlusion or dissection.  2. Intracranially, there are atherosclerotic changes in the anterior and   posterior circulation, but without significant stenosis or occlusion.    < end of copied text >  < from: CT Brain Stroke Protocol (10.26.23 @ 04:37) >  IMPRESSION:  No evidence of acute intracranial hemorrhage, mass effect or large acute   territorial infarct, within limits of noncontrast CT technique.    < end of copied text >  < from: CT Angio Neck Stroke Protocol w/ IV Cont (10.26.23 @ 04:53) >  CT ANGIOGRAPHY NECK:  1.  The right internal carotid artery is occluded approximately 1.5 cm   distal to its origin, possibly due to underlying dissection.  2.  Endotracheal tube is low in position, with its tip approximate 1 cm   above the slade.  3.  Diffuse ill-defined groundglass opacity in both lungs, which may   represent pulmonary edema, infection and/or atelectasis.  4.  There is a discrete right upper lobe groundglass opacity measuring   1.9 x 1.4 cm; this may represent a focus of infection, inflammation,   edema, hemorrhage, fibrosis or malignancy.  A follow-up chest CT is   warranted in three months to assess for resolution.    CT ANGIOGRAPHY BRAIN: The right internal carotid arteries occluded.    There is no significant flow related opacification within the proximal   right middle cerebral artery.  There is mild reconstitution of flow in   some distal branch vessels of the inferior division of the right middle   cerebral artery.  The right anterior cerebral artery fills across the   anterior communicating artery.    < end of copied text >  < from: CT Brain Perfusion Maps Stroke (10.26.23 @ 04:54) >  CT PERFUSION: CT perfusion is consistent with large acute infarct in the   right middle cerebral artery vascular territory and small to moderate   amount of surrounding ischemic penumbra.  Hypoperfusion index is 0.3.    Core infarct (CBF <  30%:): 136 mL  Penumbra (Tmax >6s): 194 mL  Mismatched volume: 58 mL  Mismatched ratio: 1.4    < end of copied text >      < from: CT Head No Cont (10.22.19 @ 15:57) >  IMPRESSION:    1)  chronic ischemic changes in both hemispheres with volume loss. There   are no new infarct as compared to prior CT. This may be further assessed   with MR imaging.  2)  no hemorrhage or mass identified.    < end of copied text >  < from: CT Angio Head w/ IV Cont (10.22.19 @ 15:57) >  IMPRESSION    1. There is intimal thickening and calcified plaque in both carotid   bifurcations in the neck, but without significant stenosis. No evidence   of carotid vertebral occlusion or dissection.  2. Intracranially, there are atherosclerotic changes in the anterior and   posterior circulation, but without significant stenosis or occlusion.    < end of copied text >  < from: CT Brain Stroke Protocol (10.26.23 @ 04:37) >  IMPRESSION:  No evidence of acute intracranial hemorrhage, mass effect or large acute   territorial infarct, within limits of noncontrast CT technique.    < end of copied text >  < from: CT Angio Neck Stroke Protocol w/ IV Cont (10.26.23 @ 04:53) >  CT ANGIOGRAPHY NECK:  1.  The right internal carotid artery is occluded approximately 1.5 cm   distal to its origin, possibly due to underlying dissection.  2.  Endotracheal tube is low in position, with its tip approximate 1 cm   above the slade.  3.  Diffuse ill-defined groundglass opacity in both lungs, which may   represent pulmonary edema, infection and/or atelectasis.  4.  There is a discrete right upper lobe groundglass opacity measuring   1.9 x 1.4 cm; this may represent a focus of infection, inflammation,   edema, hemorrhage, fibrosis or malignancy.  A follow-up chest CT is   warranted in three months to assess for resolution.    CT ANGIOGRAPHY BRAIN: The right internal carotid arteries occluded.    There is no significant flow related opacification within the proximal   right middle cerebral artery.  There is mild reconstitution of flow in   some distal branch vessels of the inferior division of the right middle   cerebral artery.  The right anterior cerebral artery fills across the   anterior communicating artery.    < end of copied text >  < from: CT Brain Perfusion Maps Stroke (10.26.23 @ 04:54) >  CT PERFUSION: CT perfusion is consistent with large acute infarct in the   right middle cerebral artery vascular territory and small to moderate   amount of surrounding ischemic penumbra.  Hypoperfusion index is 0.3.    Core infarct (CBF <  30%:): 136 mL  Penumbra (Tmax >6s): 194 mL  Mismatched volume: 58 mL  Mismatched ratio: 1.4    < end of copied text >    EEG Classification / Summary:  Abnormal EEG study  1. Intermittent periodic discharges seen intermittently over the left hemisphere, sometimes with triphasic morphology  2. Focal continuous right hemispheric slowing  3. Moderate generalized background slowing    -----------------------------------------------------------------------------------------------------    Clinical Impression:  1. While the periodic pattern noted above may represent lateralized periodic discharges indicating epileptic potential in the left hemisphere, more likely these represent generalized periodic discharges with triphasic morphology, consistent with metabolic etiology, that are poorly expressed on the right due to the effects of the stroke.   2. Structural abnormality in right hemisphere  3. Moderate diffuse/multi-focal cerebral dysfunction, not specific as to etiology.    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 Significant   associated mass effect and associated herniation.      < end of copied text >

## 2023-11-07 NOTE — PROGRESS NOTE ADULT - ASSESSMENT
89 yo F with A fib on Xarelto, HTN and HLD a/w altered mental status, nonverbal and with right gaze deviation when she woke up. At baseline pt is ambulatory and verbal. Pt required intubation in ER for airway protection given poor MS. Pt assessed by Stroke team: GIO ~4 hours ago, on Rivaroxaban, presents with AMS/weakness with NIHSS of 30 on ED arrival.  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  Given age, extended time window, oral anticoagulant use, large infarct, and high NIHSS, tPA relatively contraindicated/very high risk for heme and unlikely to be net beneficial.  D/w endovascular fellow Reanna, imaging reviewed, not a candidate for endovascular intervention. Pt admitted to the ICU. Repeat CT head: large acute infarcts right JIMMY and MCA territories. Significant associated mass effect R -> L and associated herniation on 11/2.     Neuro: Massive and worsening cerebral infracts with herniation.  No hope for recovery of an independent life.    - continue to monitor for signs of herniation; neuro recs appreciated  CV: Permissive BP goals  - Continue with aspirin  Pulm: Intubated with no mental status and mental status is preventing weaning at this time.    GI: c/w tube feeds  Renal/Metabolic: DAVIE improved; continue to monitor  ID: No acute issues  Heme: HSQ  Endo: FS within acceptable range  Dispo:  Currently with persistently poor exam, noted to have multiple strokes.  Updated daughter at bedside, though noted to have 5 children and eldest son appointed as decision maker.  Palliative care following.

## 2023-11-08 NOTE — PROGRESS NOTE ADULT - ASSESSMENT
89 yo F with A fib on Xarelto, HTN and HLD a/w altered mental status, nonverbal and with right gaze deviation when she woke up. At baseline pt is ambulatory and verbal. Pt required intubation in ER for airway protection given poor MS. Pt assessed by Stroke team: GIO ~4 hours ago, on Rivaroxaban, presents with AMS/weakness with NIHSS of 30 on ED arrival.  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  Given age, extended time window, oral anticoagulant use, large infarct, and high NIHSS, tPA relatively contraindicated/very high risk for heme and unlikely to be net beneficial.  D/w endovascular fellow Reanna, imaging reviewed, not a candidate for endovascular intervention. Pt admitted to the ICU. Repeat CT head: large acute infarcts right JIMMY and MCA territories. Significant associated mass effect R -> L and associated herniation on 11/2.     Neuro: Massive and worsening cerebral infracts with herniation.  No hope for recovery of an independent life.    - continue to monitor for signs of herniation; neuro recs appreciated  CV: Permissive BP goals, noting to have bradycardic episodes.   - Continue with aspirin  Pulm: Intubated with no mental status and mental status is preventing weaning at this time.    GI: c/w tube feeds  Renal/Metabolic: DAVIE improved; continue to monitor  ID: No acute issues  Heme: HSQ  Endo: FS within acceptable range  Dispo:  Currently with persistently poor exam, noted to have multiple strokes, remains intubated 2/2 poor mental status.  Updated daughter Nickie and Son Otoniel at bedside, though noted to have 5 children and eldest son appointed as decision maker.  Palliative care following.

## 2023-11-08 NOTE — PROGRESS NOTE ADULT - SUBJECTIVE AND OBJECTIVE BOX
follow up on:  complex medical decision making related to goals of care      OVERNIGHT EVENTS: no overnight events, neuro status unchanged    Review of systems:      Unable to obtain/Limited due to poor mental status    MEDICATIONS  (STANDING):  amLODIPine   Tablet 5 milliGRAM(s) Oral daily  aspirin  chewable 81 milliGRAM(s) Oral daily  atorvastatin 80 milliGRAM(s) Oral at bedtime  chlorhexidine 0.12% Liquid 15 milliLiter(s) Oral Mucosa every 12 hours  chlorhexidine 2% Cloths 1 Application(s) Topical <User Schedule>  heparin   Injectable 5000 Unit(s) SubCutaneous every 12 hours  hydrALAZINE 100 milliGRAM(s) Oral every 8 hours  influenza  Vaccine (HIGH DOSE) 0.7 milliLiter(s) IntraMuscular once  pantoprazole   Suspension 40 milliGRAM(s) Enteral Tube daily  polyethylene glycol 3350 17 Gram(s) Oral daily  senna 2 Tablet(s) Oral at bedtime    MEDICATIONS  (PRN):  acetaminophen     Tablet .. 650 milliGRAM(s) Oral every 6 hours PRN Temp greater or equal to 38C (100.4F)  bisacodyl Suppository 10 milliGRAM(s) Rectal daily PRN Constipation      PHYSICAL EXAM:  Vital Signs Last 24 Hrs  T(C): 36.1 (08 Nov 2023 15:00), Max: 36.2 (08 Nov 2023 04:00)  T(F): 97 (08 Nov 2023 15:00), Max: 97.1 (08 Nov 2023 04:00)  HR: 67 (08 Nov 2023 15:00) (50 - 77)  BP: 149/63 (08 Nov 2023 15:00) (147/66 - 187/78)  BP(mean): 88 (08 Nov 2023 15:00) (80 - 107)  RR: 29 (08 Nov 2023 15:00) (17 - 30)  SpO2: 100% (08 Nov 2023 15:00) (99% - 100%)    Parameters below as of 07 Nov 2023 19:05  Patient On (Oxygen Delivery Method): ventilator        Palliative Performance Scale/Karnofsky Score: 20     GENERAL: poorly responsive, intubated,  NAD  HEENT: Atraumatic, oropharynx clear, neck supple  CHEST/LUNG: unlabored  HEART: Regular rate and rhythm    ABDOMEN: Soft, Nontender, Nondistended   MUSCULOSKELETAL:  No  edema   NERVOUS SYSTEM:  poorly responsive, +spontaneous breathing on ventilator  SKIN: No rashes or lesions noted  Oral intake: npo/TF  LABS:                          12.8   11.14 )-----------( 422      ( 08 Nov 2023 02:50 )             39.8     11-08    140  |  108  |  22  ----------------------------<  154<H>  4.0   |  27  |  0.56    Ca    9.2      08 Nov 2023 02:50  Phos  2.9     11-08  Mg     2.4     11-08    TPro  6.8  /  Alb  2.4<L>  /  TBili  0.6  /  DBili  x   /  AST  105<H>  /  ALT  33  /  AlkPhos  50  11-08    Urinalysis Basic - ( 08 Nov 2023 02:50 )    Color: x / Appearance: x / SG: x / pH: x  Gluc: 154 mg/dL / Ketone: x  / Bili: x / Urobili: x   Blood: x / Protein: x / Nitrite: x   Leuk Esterase: x / RBC: x / WBC x   Sq Epi: x / Non Sq Epi: x / Bacteria: x        RADIOLOGY & ADDITIONAL STUDIES:

## 2023-11-08 NOTE — PROGRESS NOTE ADULT - SUBJECTIVE AND OBJECTIVE BOX
INTERVAL HPI/OVERNIGHT EVENTS:    Noted to have bradycardic episodes overnight.      Updated daughter Nickie and Son Otoniel at bedside today.  Ongoing goals of care discussion.  Discussed that there has been no change in Maia's neuro status.  Son reiterated that his mother wanted "a natural death" and that they will "allow her heart to stop naturally."  Otoniel said he and his family will continue to speak about the next steps.    CENTRAL LINE: [ ] YES [x ] NO  LOCATION:       PATEL: [x ] YES [ ] NO    Urinary retention    A-LINE:  [ ] YES [ x] NO  LOCATION:       GLOBAL ISSUE/BEST PRACTICE:  Analgesia:   Sedation:  HOB elevation: yes  Stress ulcer prophylaxis: pantoprazole   Suspension  VTE prophylaxis: aspirin  chewable 81 milliGRAM(s) Oral daily  heparin   Injectable 5000 Unit(s) SubCutaneous every 12 hours  Oral Care: Chlorhexidine  Glycemic control:   Nutrition:Diet, NPO with Tube Feed:   Tube Feeding Modality: Nasogastric  Vital HP  Total Volume for 24 Hours (mL): 990  Continuous  Starting Tube Feed Rate mL per Hour: 40  Increase Tube Feed Rate by (mL): 10     Every 6 hours  Until Goal Tube Feed Rate (mL per Hour): 55  Tube Feed Duration (in Hours): 18  Tube Feed Start Time: 17:00  Tube Feed Stop Time: 11:00 (11-01-23 @ 13:52) [Active]          REVIEW OF SYSTEMS:  [ x] Unable to obtain because: intubated on no sedation 2/2 multiple strokes    PHYSICAL EXAM:    Gen: intubated  HEENT: Intubated with ETT  CARD -s1s2, RRR, no M,G,R;   PULM - Coarse vented breath sounds, symmetric breath sounds;   ABD -  +BS, ND, NT, soft, no guarding, no rebound, no masses;   EXT - symmetric pulses, no edema;   NEURO - overbreathes the vent, gag and cough, posturing.        ICU Vital Signs Last 24 Hrs  T(C): 36 (08 Nov 2023 07:00), Max: 36.2 (08 Nov 2023 04:00)  T(F): 96.8 (08 Nov 2023 07:00), Max: 97.1 (08 Nov 2023 04:00)  HR: 67 (08 Nov 2023 12:05) (50 - 77)  BP: 152/70 (08 Nov 2023 12:00) (147/66 - 187/78)  BP(mean): 92 (08 Nov 2023 12:00) (80 - 107)  ABP: --  ABP(mean): --  RR: 27 (08 Nov 2023 12:00) (17 - 30)  SpO2: 100% (08 Nov 2023 12:05) (99% - 100%)    O2 Parameters below as of 07 Nov 2023 19:05  Patient On (Oxygen Delivery Method): ventilator          I&O's Detail    07 Nov 2023 07:01  -  08 Nov 2023 07:00  --------------------------------------------------------  IN:    Enteral Tube Flush: 80 mL    Vital High Protein: 990 mL  Total IN: 1070 mL    OUT:    Indwelling Catheter - Urethral (mL): 805 mL    Rectal Tube (mL): 450 mL  Total OUT: 1255 mL    Total NET: -185 mL      08 Nov 2023 07:01  -  08 Nov 2023 13:41  --------------------------------------------------------  IN:    Vital High Protein: 55 mL  Total IN: 55 mL    OUT:    Indwelling Catheter - Urethral (mL): 150 mL  Total OUT: 150 mL    Total NET: -95 mL        MEDICATIONS  NEURO  Meds: acetaminophen     Tablet .. 650 milliGRAM(s) Oral every 6 hours PRN Temp greater or equal to 38C (100.4F)    RESPIRATORY    Meds:   CARDIOVASCULAR  Meds: amLODIPine   Tablet 5 milliGRAM(s) Oral daily  hydrALAZINE 100 milliGRAM(s) Oral every 8 hours    GI/NUTRITION  Meds: bisacodyl Suppository 10 milliGRAM(s) Rectal daily PRN Constipation  pantoprazole   Suspension 40 milliGRAM(s) Enteral Tube daily  polyethylene glycol 3350 17 Gram(s) Oral daily  senna 2 Tablet(s) Oral at bedtime    GENITOURINARY  Meds:   HEMATOLOGIC  Meds: aspirin  chewable 81 milliGRAM(s) Oral daily  heparin   Injectable 5000 Unit(s) SubCutaneous every 12 hours    [x] VTE Prophylaxis  INFECTIOUS DISEASES  Meds: influenza  Vaccine (HIGH DOSE) 0.7 milliLiter(s) IntraMuscular once    ENDOCRINE  CAPILLARY BLOOD GLUCOSE        Meds:   OTHER MEDICATIONS:  chlorhexidine 0.12% Liquid 15 milliLiter(s) Oral Mucosa every 12 hours  chlorhexidine 2% Cloths 1 Application(s) Topical <User Schedule>  :    LABS:                        12.8   11.14 )-----------( 422      ( 08 Nov 2023 02:50 )             39.8      11-08    140  |  108  |  22  ----------------------------<  154<H>  4.0   |  27  |  0.56    Ca    9.2      08 Nov 2023 02:50  Phos  2.9     11-08  Mg     2.4     11-08    TPro  6.8  /  Alb  2.4<L>  /  TBili  0.6  /  DBili  x   /  AST  105<H>  /  ALT  33  /  AlkPhos  50  11-08      Urinalysis Basic - ( 08 Nov 2023 02:50 )    Color: x / Appearance: x / SG: x / pH: x  Gluc: 154 mg/dL / Ketone: x  / Bili: x / Urobili: x   Blood: x / Protein: x / Nitrite: x   Leuk Esterase: x / RBC: x / WBC x   Sq Epi: x / Non Sq Epi: x / Bacteria: x          Mode: PS (Pressure Support)/ Spontaneous, FiO2: 25, PEEP: 5, PS: 8, ITime: 1, MAP: 8, PIP: 13      RADIOLOGY & ADDITIONAL STUDIES:

## 2023-11-08 NOTE — PROGRESS NOTE ADULT - SUBJECTIVE AND OBJECTIVE BOX
Neurology Progress Note    S: Patient seen and examined.     MEDICATIONS:    acetaminophen     Tablet .. 650 milliGRAM(s) Oral every 6 hours PRN  amLODIPine   Tablet 5 milliGRAM(s) Oral daily  aspirin  chewable 81 milliGRAM(s) Oral daily  atorvastatin 80 milliGRAM(s) Oral at bedtime  bisacodyl Suppository 10 milliGRAM(s) Rectal daily PRN  chlorhexidine 0.12% Liquid 15 milliLiter(s) Oral Mucosa every 12 hours  chlorhexidine 2% Cloths 1 Application(s) Topical <User Schedule>  heparin   Injectable 5000 Unit(s) SubCutaneous every 12 hours  hydrALAZINE 100 milliGRAM(s) Oral every 8 hours  influenza  Vaccine (HIGH DOSE) 0.7 milliLiter(s) IntraMuscular once  pantoprazole   Suspension 40 milliGRAM(s) Enteral Tube daily  polyethylene glycol 3350 17 Gram(s) Oral daily  senna 2 Tablet(s) Oral at bedtime      LABS:                          12.8   11.14 )-----------( 422      ( 08 Nov 2023 02:50 )             39.8     11-08    140  |  108  |  22  ----------------------------<  154<H>  4.0   |  27  |  0.56    Ca    9.2      08 Nov 2023 02:50  Phos  2.9     11-08  Mg     2.4     11-08    TPro  6.8  /  Alb  2.4<L>  /  TBili  0.6  /  DBili  x   /  AST  105<H>  /  ALT  33  /  AlkPhos  50  11-08    CAPILLARY BLOOD GLUCOSE          Urinalysis Basic - ( 08 Nov 2023 02:50 )    Color: x / Appearance: x / SG: x / pH: x  Gluc: 154 mg/dL / Ketone: x  / Bili: x / Urobili: x   Blood: x / Protein: x / Nitrite: x   Leuk Esterase: x / RBC: x / WBC x   Sq Epi: x / Non Sq Epi: x / Bacteria: x      I&O's Summary    07 Nov 2023 07:01  -  08 Nov 2023 07:00  --------------------------------------------------------  IN: 1070 mL / OUT: 1255 mL / NET: -185 mL    08 Nov 2023 07:01  -  08 Nov 2023 09:51  --------------------------------------------------------  IN: 55 mL / OUT: 150 mL / NET: -95 mL      Vital Signs Last 24 Hrs  T(C): 36 (08 Nov 2023 07:00), Max: 36.2 (08 Nov 2023 04:00)  T(F): 96.8 (08 Nov 2023 07:00), Max: 97.1 (08 Nov 2023 04:00)  HR: 62 (08 Nov 2023 09:00) (50 - 77)  BP: 152/53 (08 Nov 2023 09:00) (152/53 - 187/78)  BP(mean): 80 (08 Nov 2023 09:00) (80 - 107)  RR: 27 (08 Nov 2023 09:00) (17 - 28)  SpO2: 100% (08 Nov 2023 09:00) (99% - 100%)    Parameters below as of 07 Nov 2023 19:05  Patient On (Oxygen Delivery Method): ventilator        Neurological Exam:  Mental Status: Intubated, not sedated. No spont eye opening. No verbal output, does not follow commands.   Cranial Nerves: pupils very sluggish R, L near fixed, + corneals, no VOR, no facial asymmetry , weak cough/gag  Motor: dec tone throughout, no spont movemnt noted   Sensation: UE extensor posturing, LE triple flexion left toe up    I personally reviewed the below data/images/labs:      < from: CT Head No Cont (10.22.19 @ 15:57) >  IMPRESSION:    1)  chronic ischemic changes in both hemispheres with volume loss. There   are no new infarct as compared to prior CT. This may be further assessed   with MR imaging.  2)  no hemorrhage or mass identified.    < end of copied text >  < from: CT Angio Head w/ IV Cont (10.22.19 @ 15:57) >  IMPRESSION    1. There is intimal thickening and calcified plaque in both carotid   bifurcations in the neck, but without significant stenosis. No evidence   of carotid vertebral occlusion or dissection.  2. Intracranially, there are atherosclerotic changes in the anterior and   posterior circulation, but without significant stenosis or occlusion.    < end of copied text >  < from: CT Brain Stroke Protocol (10.26.23 @ 04:37) >  IMPRESSION:  No evidence of acute intracranial hemorrhage, mass effect or large acute   territorial infarct, within limits of noncontrast CT technique.    < end of copied text >  < from: CT Angio Neck Stroke Protocol w/ IV Cont (10.26.23 @ 04:53) >  CT ANGIOGRAPHY NECK:  1.  The right internal carotid artery is occluded approximately 1.5 cm   distal to its origin, possibly due to underlying dissection.  2.  Endotracheal tube is low in position, with its tip approximate 1 cm   above the slade.  3.  Diffuse ill-defined groundglass opacity in both lungs, which may   represent pulmonary edema, infection and/or atelectasis.  4.  There is a discrete right upper lobe groundglass opacity measuring   1.9 x 1.4 cm; this may represent a focus of infection, inflammation,   edema, hemorrhage, fibrosis or malignancy.  A follow-up chest CT is   warranted in three months to assess for resolution.    CT ANGIOGRAPHY BRAIN: The right internal carotid arteries occluded.    There is no significant flow related opacification within the proximal   right middle cerebral artery.  There is mild reconstitution of flow in   some distal branch vessels of the inferior division of the right middle   cerebral artery.  The right anterior cerebral artery fills across the   anterior communicating artery.    < end of copied text >  < from: CT Brain Perfusion Maps Stroke (10.26.23 @ 04:54) >  CT PERFUSION: CT perfusion is consistent with large acute infarct in the   right middle cerebral artery vascular territory and small to moderate   amount of surrounding ischemic penumbra.  Hypoperfusion index is 0.3.    Core infarct (CBF <  30%:): 136 mL  Penumbra (Tmax >6s): 194 mL  Mismatched volume: 58 mL  Mismatched ratio: 1.4    < end of copied text >      < from: CT Head No Cont (10.22.19 @ 15:57) >  IMPRESSION:    1)  chronic ischemic changes in both hemispheres with volume loss. There   are no new infarct as compared to prior CT. This may be further assessed   with MR imaging.  2)  no hemorrhage or mass identified.    < end of copied text >  < from: CT Angio Head w/ IV Cont (10.22.19 @ 15:57) >  IMPRESSION    1. There is intimal thickening and calcified plaque in both carotid   bifurcations in the neck, but without significant stenosis. No evidence   of carotid vertebral occlusion or dissection.  2. Intracranially, there are atherosclerotic changes in the anterior and   posterior circulation, but without significant stenosis or occlusion.    < end of copied text >  < from: CT Brain Stroke Protocol (10.26.23 @ 04:37) >  IMPRESSION:  No evidence of acute intracranial hemorrhage, mass effect or large acute   territorial infarct, within limits of noncontrast CT technique.    < end of copied text >  < from: CT Angio Neck Stroke Protocol w/ IV Cont (10.26.23 @ 04:53) >  CT ANGIOGRAPHY NECK:  1.  The right internal carotid artery is occluded approximately 1.5 cm   distal to its origin, possibly due to underlying dissection.  2.  Endotracheal tube is low in position, with its tip approximate 1 cm   above the slade.  3.  Diffuse ill-defined groundglass opacity in both lungs, which may   represent pulmonary edema, infection and/or atelectasis.  4.  There is a discrete right upper lobe groundglass opacity measuring   1.9 x 1.4 cm; this may represent a focus of infection, inflammation,   edema, hemorrhage, fibrosis or malignancy.  A follow-up chest CT is   warranted in three months to assess for resolution.    CT ANGIOGRAPHY BRAIN: The right internal carotid arteries occluded.    There is no significant flow related opacification within the proximal   right middle cerebral artery.  There is mild reconstitution of flow in   some distal branch vessels of the inferior division of the right middle   cerebral artery.  The right anterior cerebral artery fills across the   anterior communicating artery.    < end of copied text >  < from: CT Brain Perfusion Maps Stroke (10.26.23 @ 04:54) >  CT PERFUSION: CT perfusion is consistent with large acute infarct in the   right middle cerebral artery vascular territory and small to moderate   amount of surrounding ischemic penumbra.  Hypoperfusion index is 0.3.    Core infarct (CBF <  30%:): 136 mL  Penumbra (Tmax >6s): 194 mL  Mismatched volume: 58 mL  Mismatched ratio: 1.4    < end of copied text >      < from: CT Head No Cont (10.22.19 @ 15:57) >  IMPRESSION:    1)  chronic ischemic changes in both hemispheres with volume loss. There   are no new infarct as compared to prior CT. This may be further assessed   with MR imaging.  2)  no hemorrhage or mass identified.    < end of copied text >  < from: CT Angio Head w/ IV Cont (10.22.19 @ 15:57) >  IMPRESSION    1. There is intimal thickening and calcified plaque in both carotid   bifurcations in the neck, but without significant stenosis. No evidence   of carotid vertebral occlusion or dissection.  2. Intracranially, there are atherosclerotic changes in the anterior and   posterior circulation, but without significant stenosis or occlusion.    < end of copied text >  < from: CT Brain Stroke Protocol (10.26.23 @ 04:37) >  IMPRESSION:  No evidence of acute intracranial hemorrhage, mass effect or large acute   territorial infarct, within limits of noncontrast CT technique.    < end of copied text >  < from: CT Angio Neck Stroke Protocol w/ IV Cont (10.26.23 @ 04:53) >  CT ANGIOGRAPHY NECK:  1.  The right internal carotid artery is occluded approximately 1.5 cm   distal to its origin, possibly due to underlying dissection.  2.  Endotracheal tube is low in position, with its tip approximate 1 cm   above the slade.  3.  Diffuse ill-defined groundglass opacity in both lungs, which may   represent pulmonary edema, infection and/or atelectasis.  4.  There is a discrete right upper lobe groundglass opacity measuring   1.9 x 1.4 cm; this may represent a focus of infection, inflammation,   edema, hemorrhage, fibrosis or malignancy.  A follow-up chest CT is   warranted in three months to assess for resolution.    CT ANGIOGRAPHY BRAIN: The right internal carotid arteries occluded.    There is no significant flow related opacification within the proximal   right middle cerebral artery.  There is mild reconstitution of flow in   some distal branch vessels of the inferior division of the right middle   cerebral artery.  The right anterior cerebral artery fills across the   anterior communicating artery.    < end of copied text >  < from: CT Brain Perfusion Maps Stroke (10.26.23 @ 04:54) >  CT PERFUSION: CT perfusion is consistent with large acute infarct in the   right middle cerebral artery vascular territory and small to moderate   amount of surrounding ischemic penumbra.  Hypoperfusion index is 0.3.    Core infarct (CBF <  30%:): 136 mL  Penumbra (Tmax >6s): 194 mL  Mismatched volume: 58 mL  Mismatched ratio: 1.4    < end of copied text >    EEG Classification / Summary:  Abnormal EEG study  1. Intermittent periodic discharges seen intermittently over the left hemisphere, sometimes with triphasic morphology  2. Focal continuous right hemispheric slowing  3. Moderate generalized background slowing    -----------------------------------------------------------------------------------------------------    Clinical Impression:  1. While the periodic pattern noted above may represent lateralized periodic discharges indicating epileptic potential in the left hemisphere, more likely these represent generalized periodic discharges with triphasic morphology, consistent with metabolic etiology, that are poorly expressed on the right due to the effects of the stroke.   2. Structural abnormality in right hemisphere  3. Moderate diffuse/multi-focal cerebral dysfunction, not specific as to etiology.    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 Significant   associated mass effect and associated herniation.      < end of copied text >

## 2023-11-09 NOTE — PROGRESS NOTE ADULT - ASSESSMENT
87 yo F with A fib on Xarelto, HTN and HLD a/w altered mental status, nonverbal and with right gaze deviation when she woke up. At baseline pt is ambulatory and verbal. Pt required intubation in ER for airway protection given poor MS. Pt assessed by Stroke team: GIO ~4 hours ago, on Rivaroxaban, presents with AMS/weakness with NIHSS of 30 on ED arrival.  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  Given age, extended time window, oral anticoagulant use, large infarct, and high NIHSS, tPA relatively contraindicated/very high risk for heme and unlikely to be net beneficial.  D/w endovascular fellow Reanna, imaging reviewed, not a candidate for endovascular intervention. Pt admitted to the ICU. Repeat CT head: large acute infarcts right JIMMY and MCA territories. Significant associated mass effect R -> L and associated herniation on 11/2.     Neuro: Massive and worsened cerebral infracts with herniation.  No hope for recovery of an independent life.    - has some brainstem fxn but no purposeful movement  - continue to monitor for signs of herniation  - neuro reccs appreciated  CV: Permissive BP goals, noting to have bradycardic episodes.   - Continue with aspirin  Pulm: Intubated with no mental status and mental status is preventing weaning at this time.    GI: c/w tube feeds  Renal/Metabolic: DAVIE improved; continue to monitor  ID: No acute issues  Heme: HSQ  Endo: FS within acceptable range    Dispo:  Currently with persistently poor exam, noted to have multiple strokes, remains intubated 2/2 poor mental status. cont GOC discussions.  Palliative care following.

## 2023-11-09 NOTE — PROGRESS NOTE ADULT - SUBJECTIVE AND OBJECTIVE BOX
HPI:  Ms. Longoria is an 88 year old female with a PMH of HTN, HLD and afib on xarelto who presented to [facility] earlier this morning after she was found altered, nonverbal and with right gaze deviation when she woke up. At baseline pt is ambulatory and verbal. Pt required intubation in ED for airway protection given poor MS. NIHSS calculated as 30 at that time. Pt not a candidate for TNK or transfer for intervention per stroke neurologist. She remains intubated on nicardipine gtt. ICU team consulted. (26 Oct 2023 05:50)       ## Labs:  CBC:                        12.4   11.15 )-----------( 454      ( 2023 03:15 )             39.0     Chem:      141  |  109<H>  |  22  ----------------------------<  147<H>  4.0   |  30  |  0.58    Ca    9.2      2023 03:15  Phos  2.8       Mg     2.4         TPro  6.4  /  Alb  2.2<L>  /  TBili  0.6  /  DBili  x   /  AST  106<H>  /  ALT  31  /  AlkPhos  48           ## Medications:    amLODIPine   Tablet 5 milliGRAM(s) Oral daily  hydrALAZINE 100 milliGRAM(s) Oral every 8 hours      atorvastatin 80 milliGRAM(s) Oral at bedtime    aspirin  chewable 81 milliGRAM(s) Oral daily  heparin   Injectable 5000 Unit(s) SubCutaneous every 12 hours    bisacodyl Suppository 10 milliGRAM(s) Rectal daily PRN  pantoprazole   Suspension 40 milliGRAM(s) Enteral Tube daily  senna 2 Tablet(s) Oral at bedtime    acetaminophen     Tablet .. 650 milliGRAM(s) Oral every 6 hours PRN      ## Vitals:  T(C): 35.9 (23 @ 05:52), Max: 35.9 (23 @ 05:52)  HR: 71 (23 @ 15:00) (60 - 82)  BP: 141/56 (23 @ 15:00) (137/61 - 164/70)  BP(mean): 79 (23 @ 15:00) (79 - 93)  RR: 17 (23 @ 15:00) (17 - 30)  SpO2: 98% (23 @ 15:00) (98% - 100%)  Wt(kg): --  Vent: Mode: AC/ CMV (Assist Control/ Continuous Mandatory Ventilation), RR (machine): 12, RR (patient): 19, TV (machine): 400, PEEP: 5, PIP: 21  AB-08 @ 07:01  -   @ 07:00  --------------------------------------------------------  IN: 550 mL / OUT: 950 mL / NET: -400 mL     @ 07:01  -   @ 16:07  --------------------------------------------------------  IN: 165 mL / OUT: 0 mL / NET: 165 mL          ## P/E:  Gen: intubated  Neck: no accessory muscle use  Lungs: b/l cta  Heart: s1s2 reg no murmur  Abd: +BS soft nontender  Ext: no edema  Neuro: withdraws ext, + gag, +spont breaths       < from: CT Head No Cont (23 @ 14:30) >    ACC: [id number] EXAM:  CT BRAIN   ORDERED BY: CINTHIA ULLOA     PROCEDURE DATE:  2023          INTERPRETATION:  Exam Date: 2023 2:30 PM    CT head without IV contrast    CLINICAL INFORMATION:  previous CVA reevaluate    TECHNIQUE: Contiguous axial sections were obtained through the head.     Coronal and sagittal reformats were obtained.    COMPARISON: CT head 10/30/2023    FINDINGS:    Reidentified are large subacute MCA and JIMMY infarcts throughout the right   frontal parietal and temporal lobes and right basal ganglia, with severe   right to left midline shift and severe mass effect upon the ventricular   system, not significantly changed. There is effacement of all of the   basilar cisterns. Effacement of the sulci in the bilateral cerebri,   compatible with increased brain edema. Right uncal herniation is   reidentified.    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    No hemorrhagic transformation is identified.    No downward herniation is present    IMPRESSION:    Reidentified are 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 There is effacement of all of the   basilar cisterns. Effacement of the sulci in the bilateral cerebri,   compatible with increased brain edema.    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    Unsuccessful attempts were made to reach the ordering clinician via teams.      --- End of Report ---            RADHA GONZALEZ MD; Attending Radiologist  This document has been electronically signed. 2023  2:45PM    < end of copied text >

## 2023-11-09 NOTE — PROGRESS NOTE ADULT - ASSESSMENT
88 year old female with a PMH of HTN, HLD and afib on xarelto who did not take medications for past 1 week prior to hospitalization here with R MCA infarct.   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

## 2023-11-09 NOTE — PROGRESS NOTE ADULT - SUBJECTIVE AND OBJECTIVE BOX
Neurology Progress Note    S: Patient seen and examined.     MEDICATIONS:    acetaminophen     Tablet .. 650 milliGRAM(s) Oral every 6 hours PRN  amLODIPine   Tablet 5 milliGRAM(s) Oral daily  aspirin  chewable 81 milliGRAM(s) Oral daily  atorvastatin 80 milliGRAM(s) Oral at bedtime  bisacodyl Suppository 10 milliGRAM(s) Rectal daily PRN  chlorhexidine 0.12% Liquid 15 milliLiter(s) Oral Mucosa every 12 hours  chlorhexidine 2% Cloths 1 Application(s) Topical <User Schedule>  heparin   Injectable 5000 Unit(s) SubCutaneous every 12 hours  hydrALAZINE 100 milliGRAM(s) Oral every 8 hours  influenza  Vaccine (HIGH DOSE) 0.7 milliLiter(s) IntraMuscular once  pantoprazole   Suspension 40 milliGRAM(s) Enteral Tube daily  senna 2 Tablet(s) Oral at bedtime      LABS:                          12.4   11.15 )-----------( 454      ( 09 Nov 2023 03:15 )             39.0     11-09    141  |  109<H>  |  22  ----------------------------<  147<H>  4.0   |  30  |  0.58    Ca    9.2      09 Nov 2023 03:15  Phos  2.8     11-09  Mg     2.4     11-09    TPro  6.4  /  Alb  2.2<L>  /  TBili  0.6  /  DBili  x   /  AST  106<H>  /  ALT  31  /  AlkPhos  48  11-09    CAPILLARY BLOOD GLUCOSE          Urinalysis Basic - ( 09 Nov 2023 03:15 )    Color: x / Appearance: x / SG: x / pH: x  Gluc: 147 mg/dL / Ketone: x  / Bili: x / Urobili: x   Blood: x / Protein: x / Nitrite: x   Leuk Esterase: x / RBC: x / WBC x   Sq Epi: x / Non Sq Epi: x / Bacteria: x      I&O's Summary    08 Nov 2023 07:01  -  09 Nov 2023 07:00  --------------------------------------------------------  IN: 550 mL / OUT: 950 mL / NET: -400 mL      Vital Signs Last 24 Hrs  T(C): 35.9 (09 Nov 2023 05:52), Max: 36.1 (08 Nov 2023 12:00)  T(F): 96.7 (09 Nov 2023 05:52), Max: 97 (08 Nov 2023 15:00)  HR: 79 (09 Nov 2023 08:30) (59 - 79)  BP: 164/70 (09 Nov 2023 08:00) (142/70 - 164/70)  BP(mean): 90 (09 Nov 2023 08:00) (81 - 93)  RR: 30 (09 Nov 2023 08:00) (20 - 30)  SpO2: 100% (09 Nov 2023 08:30) (99% - 100%)        Neurological Exam:  Mental Status: Intubated, not sedated. No spont eye opening. No verbal output, does not follow commands.   Cranial Nerves: pupils very sluggish R, L near fixed, + corneals, no VOR, no facial asymmetry , weak cough/gag  Motor: dec tone throughout, no spont movemnt noted   Sensation: UE extensor posturing, LE triple flexion left toe up    I personally reviewed the below data/images/labs:      < from: CT Head No Cont (10.22.19 @ 15:57) >  IMPRESSION:    1)  chronic ischemic changes in both hemispheres with volume loss. There   are no new infarct as compared to prior CT. This may be further assessed   with MR imaging.  2)  no hemorrhage or mass identified.    < end of copied text >  < from: CT Angio Head w/ IV Cont (10.22.19 @ 15:57) >  IMPRESSION    1. There is intimal thickening and calcified plaque in both carotid   bifurcations in the neck, but without significant stenosis. No evidence   of carotid vertebral occlusion or dissection.  2. Intracranially, there are atherosclerotic changes in the anterior and   posterior circulation, but without significant stenosis or occlusion.    < end of copied text >  < from: CT Brain Stroke Protocol (10.26.23 @ 04:37) >  IMPRESSION:  No evidence of acute intracranial hemorrhage, mass effect or large acute   territorial infarct, within limits of noncontrast CT technique.    < end of copied text >  < from: CT Angio Neck Stroke Protocol w/ IV Cont (10.26.23 @ 04:53) >  CT ANGIOGRAPHY NECK:  1.  The right internal carotid artery is occluded approximately 1.5 cm   distal to its origin, possibly due to underlying dissection.  2.  Endotracheal tube is low in position, with its tip approximate 1 cm   above the slade.  3.  Diffuse ill-defined groundglass opacity in both lungs, which may   represent pulmonary edema, infection and/or atelectasis.  4.  There is a discrete right upper lobe groundglass opacity measuring   1.9 x 1.4 cm; this may represent a focus of infection, inflammation,   edema, hemorrhage, fibrosis or malignancy.  A follow-up chest CT is   warranted in three months to assess for resolution.    CT ANGIOGRAPHY BRAIN: The right internal carotid arteries occluded.    There is no significant flow related opacification within the proximal   right middle cerebral artery.  There is mild reconstitution of flow in   some distal branch vessels of the inferior division of the right middle   cerebral artery.  The right anterior cerebral artery fills across the   anterior communicating artery.    < end of copied text >  < from: CT Brain Perfusion Maps Stroke (10.26.23 @ 04:54) >  CT PERFUSION: CT perfusion is consistent with large acute infarct in the   right middle cerebral artery vascular territory and small to moderate   amount of surrounding ischemic penumbra.  Hypoperfusion index is 0.3.    Core infarct (CBF <  30%:): 136 mL  Penumbra (Tmax >6s): 194 mL  Mismatched volume: 58 mL  Mismatched ratio: 1.4    < end of copied text >      < from: CT Head No Cont (10.22.19 @ 15:57) >  IMPRESSION:    1)  chronic ischemic changes in both hemispheres with volume loss. There   are no new infarct as compared to prior CT. This may be further assessed   with MR imaging.  2)  no hemorrhage or mass identified.    < end of copied text >  < from: CT Angio Head w/ IV Cont (10.22.19 @ 15:57) >  IMPRESSION    1. There is intimal thickening and calcified plaque in both carotid   bifurcations in the neck, but without significant stenosis. No evidence   of carotid vertebral occlusion or dissection.  2. Intracranially, there are atherosclerotic changes in the anterior and   posterior circulation, but without significant stenosis or occlusion.    < end of copied text >  < from: CT Brain Stroke Protocol (10.26.23 @ 04:37) >  IMPRESSION:  No evidence of acute intracranial hemorrhage, mass effect or large acute   territorial infarct, within limits of noncontrast CT technique.    < end of copied text >  < from: CT Angio Neck Stroke Protocol w/ IV Cont (10.26.23 @ 04:53) >  CT ANGIOGRAPHY NECK:  1.  The right internal carotid artery is occluded approximately 1.5 cm   distal to its origin, possibly due to underlying dissection.  2.  Endotracheal tube is low in position, with its tip approximate 1 cm   above the slade.  3.  Diffuse ill-defined groundglass opacity in both lungs, which may   represent pulmonary edema, infection and/or atelectasis.  4.  There is a discrete right upper lobe groundglass opacity measuring   1.9 x 1.4 cm; this may represent a focus of infection, inflammation,   edema, hemorrhage, fibrosis or malignancy.  A follow-up chest CT is   warranted in three months to assess for resolution.    CT ANGIOGRAPHY BRAIN: The right internal carotid arteries occluded.    There is no significant flow related opacification within the proximal   right middle cerebral artery.  There is mild reconstitution of flow in   some distal branch vessels of the inferior division of the right middle   cerebral artery.  The right anterior cerebral artery fills across the   anterior communicating artery.    < end of copied text >  < from: CT Brain Perfusion Maps Stroke (10.26.23 @ 04:54) >  CT PERFUSION: CT perfusion is consistent with large acute infarct in the   right middle cerebral artery vascular territory and small to moderate   amount of surrounding ischemic penumbra.  Hypoperfusion index is 0.3.    Core infarct (CBF <  30%:): 136 mL  Penumbra (Tmax >6s): 194 mL  Mismatched volume: 58 mL  Mismatched ratio: 1.4    < end of copied text >      < from: CT Head No Cont (10.22.19 @ 15:57) >  IMPRESSION:    1)  chronic ischemic changes in both hemispheres with volume loss. There   are no new infarct as compared to prior CT. This may be further assessed   with MR imaging.  2)  no hemorrhage or mass identified.    < end of copied text >  < from: CT Angio Head w/ IV Cont (10.22.19 @ 15:57) >  IMPRESSION    1. There is intimal thickening and calcified plaque in both carotid   bifurcations in the neck, but without significant stenosis. No evidence   of carotid vertebral occlusion or dissection.  2. Intracranially, there are atherosclerotic changes in the anterior and   posterior circulation, but without significant stenosis or occlusion.    < end of copied text >  < from: CT Brain Stroke Protocol (10.26.23 @ 04:37) >  IMPRESSION:  No evidence of acute intracranial hemorrhage, mass effect or large acute   territorial infarct, within limits of noncontrast CT technique.    < end of copied text >  < from: CT Angio Neck Stroke Protocol w/ IV Cont (10.26.23 @ 04:53) >  CT ANGIOGRAPHY NECK:  1.  The right internal carotid artery is occluded approximately 1.5 cm   distal to its origin, possibly due to underlying dissection.  2.  Endotracheal tube is low in position, with its tip approximate 1 cm   above the slade.  3.  Diffuse ill-defined groundglass opacity in both lungs, which may   represent pulmonary edema, infection and/or atelectasis.  4.  There is a discrete right upper lobe groundglass opacity measuring   1.9 x 1.4 cm; this may represent a focus of infection, inflammation,   edema, hemorrhage, fibrosis or malignancy.  A follow-up chest CT is   warranted in three months to assess for resolution.    CT ANGIOGRAPHY BRAIN: The right internal carotid arteries occluded.    There is no significant flow related opacification within the proximal   right middle cerebral artery.  There is mild reconstitution of flow in   some distal branch vessels of the inferior division of the right middle   cerebral artery.  The right anterior cerebral artery fills across the   anterior communicating artery.    < end of copied text >  < from: CT Brain Perfusion Maps Stroke (10.26.23 @ 04:54) >  CT PERFUSION: CT perfusion is consistent with large acute infarct in the   right middle cerebral artery vascular territory and small to moderate   amount of surrounding ischemic penumbra.  Hypoperfusion index is 0.3.    Core infarct (CBF <  30%:): 136 mL  Penumbra (Tmax >6s): 194 mL  Mismatched volume: 58 mL  Mismatched ratio: 1.4    < end of copied text >    EEG Classification / Summary:  Abnormal EEG study  1. Intermittent periodic discharges seen intermittently over the left hemisphere, sometimes with triphasic morphology  2. Focal continuous right hemispheric slowing  3. Moderate generalized background slowing    -----------------------------------------------------------------------------------------------------    Clinical Impression:  1. While the periodic pattern noted above may represent lateralized periodic discharges indicating epileptic potential in the left hemisphere, more likely these represent generalized periodic discharges with triphasic morphology, consistent with metabolic etiology, that are poorly expressed on the right due to the effects of the stroke.   2. Structural abnormality in right hemisphere  3. Moderate diffuse/multi-focal cerebral dysfunction, not specific as to etiology.    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 Significant   associated mass effect and associated herniation.      < end of copied text >   Neurology Progress Note    S: Patient seen and examined.     MEDICATIONS:    acetaminophen     Tablet .. 650 milliGRAM(s) Oral every 6 hours PRN  amLODIPine   Tablet 5 milliGRAM(s) Oral daily  aspirin  chewable 81 milliGRAM(s) Oral daily  atorvastatin 80 milliGRAM(s) Oral at bedtime  bisacodyl Suppository 10 milliGRAM(s) Rectal daily PRN  chlorhexidine 0.12% Liquid 15 milliLiter(s) Oral Mucosa every 12 hours  chlorhexidine 2% Cloths 1 Application(s) Topical <User Schedule>  heparin   Injectable 5000 Unit(s) SubCutaneous every 12 hours  hydrALAZINE 100 milliGRAM(s) Oral every 8 hours  influenza  Vaccine (HIGH DOSE) 0.7 milliLiter(s) IntraMuscular once  pantoprazole   Suspension 40 milliGRAM(s) Enteral Tube daily  senna 2 Tablet(s) Oral at bedtime      LABS:                          12.4   11.15 )-----------( 454      ( 09 Nov 2023 03:15 )             39.0     11-09    141  |  109<H>  |  22  ----------------------------<  147<H>  4.0   |  30  |  0.58    Ca    9.2      09 Nov 2023 03:15  Phos  2.8     11-09  Mg     2.4     11-09    TPro  6.4  /  Alb  2.2<L>  /  TBili  0.6  /  DBili  x   /  AST  106<H>  /  ALT  31  /  AlkPhos  48  11-09    CAPILLARY BLOOD GLUCOSE          Urinalysis Basic - ( 09 Nov 2023 03:15 )    Color: x / Appearance: x / SG: x / pH: x  Gluc: 147 mg/dL / Ketone: x  / Bili: x / Urobili: x   Blood: x / Protein: x / Nitrite: x   Leuk Esterase: x / RBC: x / WBC x   Sq Epi: x / Non Sq Epi: x / Bacteria: x      I&O's Summary    08 Nov 2023 07:01  -  09 Nov 2023 07:00  --------------------------------------------------------  IN: 550 mL / OUT: 950 mL / NET: -400 mL      Vital Signs Last 24 Hrs  T(C): 35.9 (09 Nov 2023 05:52), Max: 36.1 (08 Nov 2023 12:00)  T(F): 96.7 (09 Nov 2023 05:52), Max: 97 (08 Nov 2023 15:00)  HR: 79 (09 Nov 2023 08:30) (59 - 79)  BP: 164/70 (09 Nov 2023 08:00) (142/70 - 164/70)  BP(mean): 90 (09 Nov 2023 08:00) (81 - 93)  RR: 30 (09 Nov 2023 08:00) (20 - 30)  SpO2: 100% (09 Nov 2023 08:30) (99% - 100%)        Neurological Exam:  Mental Status: Intubated, not sedated. No spont eye opening. No verbal output, does not follow commands.   Cranial Nerves: pupils very sluggish R, L near fixed, + corneals, no VOR, no facial asymmetry , weak cough/gag  Motor: dec tone throughout, no spont movemnt noted   Sensation: UE extensor posturing, LE triple flexion left toe up    I personally reviewed the below data/images/labs:      < from: CT Head No Cont (10.22.19 @ 15:57) >  IMPRESSION:    1)  chronic ischemic changes in both hemispheres with volume loss. There   are no new infarct as compared to prior CT. This may be further assessed   with MR imaging.  2)  no hemorrhage or mass identified.    < end of copied text >  < from: CT Angio Head w/ IV Cont (10.22.19 @ 15:57) >  IMPRESSION    1. There is intimal thickening and calcified plaque in both carotid   bifurcations in the neck, but without significant stenosis. No evidence   of carotid vertebral occlusion or dissection.  2. Intracranially, there are atherosclerotic changes in the anterior and   posterior circulation, but without significant stenosis or occlusion.    < end of copied text >  < from: CT Brain Stroke Protocol (10.26.23 @ 04:37) >  IMPRESSION:  No evidence of acute intracranial hemorrhage, mass effect or large acute   territorial infarct, within limits of noncontrast CT technique.    < end of copied text >  < from: CT Angio Neck Stroke Protocol w/ IV Cont (10.26.23 @ 04:53) >  CT ANGIOGRAPHY NECK:  1.  The right internal carotid artery is occluded approximately 1.5 cm   distal to its origin, possibly due to underlying dissection.  2.  Endotracheal tube is low in position, with its tip approximate 1 cm   above the slade.  3.  Diffuse ill-defined groundglass opacity in both lungs, which may   represent pulmonary edema, infection and/or atelectasis.  4.  There is a discrete right upper lobe groundglass opacity measuring   1.9 x 1.4 cm; this may represent a focus of infection, inflammation,   edema, hemorrhage, fibrosis or malignancy.  A follow-up chest CT is   warranted in three months to assess for resolution.    CT ANGIOGRAPHY BRAIN: The right internal carotid arteries occluded.    There is no significant flow related opacification within the proximal   right middle cerebral artery.  There is mild reconstitution of flow in   some distal branch vessels of the inferior division of the right middle   cerebral artery.  The right anterior cerebral artery fills across the   anterior communicating artery.    < end of copied text >  < from: CT Brain Perfusion Maps Stroke (10.26.23 @ 04:54) >  CT PERFUSION: CT perfusion is consistent with large acute infarct in the   right middle cerebral artery vascular territory and small to moderate   amount of surrounding ischemic penumbra.  Hypoperfusion index is 0.3.    Core infarct (CBF <  30%:): 136 mL  Penumbra (Tmax >6s): 194 mL  Mismatched volume: 58 mL  Mismatched ratio: 1.4    < end of copied text >      < from: CT Head No Cont (10.22.19 @ 15:57) >  IMPRESSION:    1)  chronic ischemic changes in both hemispheres with volume loss. There   are no new infarct as compared to prior CT. This may be further assessed   with MR imaging.  2)  no hemorrhage or mass identified.    < end of copied text >  < from: CT Angio Head w/ IV Cont (10.22.19 @ 15:57) >  IMPRESSION    1. There is intimal thickening and calcified plaque in both carotid   bifurcations in the neck, but without significant stenosis. No evidence   of carotid vertebral occlusion or dissection.  2. Intracranially, there are atherosclerotic changes in the anterior and   posterior circulation, but without significant stenosis or occlusion.    < end of copied text >  < from: CT Brain Stroke Protocol (10.26.23 @ 04:37) >  IMPRESSION:  No evidence of acute intracranial hemorrhage, mass effect or large acute   territorial infarct, within limits of noncontrast CT technique.    < end of copied text >  < from: CT Angio Neck Stroke Protocol w/ IV Cont (10.26.23 @ 04:53) >  CT ANGIOGRAPHY NECK:  1.  The right internal carotid artery is occluded approximately 1.5 cm   distal to its origin, possibly due to underlying dissection.  2.  Endotracheal tube is low in position, with its tip approximate 1 cm   above the slade.  3.  Diffuse ill-defined groundglass opacity in both lungs, which may   represent pulmonary edema, infection and/or atelectasis.  4.  There is a discrete right upper lobe groundglass opacity measuring   1.9 x 1.4 cm; this may represent a focus of infection, inflammation,   edema, hemorrhage, fibrosis or malignancy.  A follow-up chest CT is   warranted in three months to assess for resolution.    CT ANGIOGRAPHY BRAIN: The right internal carotid arteries occluded.    There is no significant flow related opacification within the proximal   right middle cerebral artery.  There is mild reconstitution of flow in   some distal branch vessels of the inferior division of the right middle   cerebral artery.  The right anterior cerebral artery fills across the   anterior communicating artery.    < end of copied text >  < from: CT Brain Perfusion Maps Stroke (10.26.23 @ 04:54) >  CT PERFUSION: CT perfusion is consistent with large acute infarct in the   right middle cerebral artery vascular territory and small to moderate   amount of surrounding ischemic penumbra.  Hypoperfusion index is 0.3.    Core infarct (CBF <  30%:): 136 mL  Penumbra (Tmax >6s): 194 mL  Mismatched volume: 58 mL  Mismatched ratio: 1.4    < end of copied text >      < from: CT Head No Cont (10.22.19 @ 15:57) >  IMPRESSION:    1)  chronic ischemic changes in both hemispheres with volume loss. There   are no new infarct as compared to prior CT. This may be further assessed   with MR imaging.  2)  no hemorrhage or mass identified.    < end of copied text >  < from: CT Angio Head w/ IV Cont (10.22.19 @ 15:57) >  IMPRESSION    1. There is intimal thickening and calcified plaque in both carotid   bifurcations in the neck, but without significant stenosis. No evidence   of carotid vertebral occlusion or dissection.  2. Intracranially, there are atherosclerotic changes in the anterior and   posterior circulation, but without significant stenosis or occlusion.    < end of copied text >  < from: CT Brain Stroke Protocol (10.26.23 @ 04:37) >  IMPRESSION:  No evidence of acute intracranial hemorrhage, mass effect or large acute   territorial infarct, within limits of noncontrast CT technique.    < end of copied text >  < from: CT Angio Neck Stroke Protocol w/ IV Cont (10.26.23 @ 04:53) >  CT ANGIOGRAPHY NECK:  1.  The right internal carotid artery is occluded approximately 1.5 cm   distal to its origin, possibly due to underlying dissection.  2.  Endotracheal tube is low in position, with its tip approximate 1 cm   above the slade.  3.  Diffuse ill-defined groundglass opacity in both lungs, which may   represent pulmonary edema, infection and/or atelectasis.  4.  There is a discrete right upper lobe groundglass opacity measuring   1.9 x 1.4 cm; this may represent a focus of infection, inflammation,   edema, hemorrhage, fibrosis or malignancy.  A follow-up chest CT is   warranted in three months to assess for resolution.    CT ANGIOGRAPHY BRAIN: The right internal carotid arteries occluded.    There is no significant flow related opacification within the proximal   right middle cerebral artery.  There is mild reconstitution of flow in   some distal branch vessels of the inferior division of the right middle   cerebral artery.  The right anterior cerebral artery fills across the   anterior communicating artery.    < end of copied text >  < from: CT Brain Perfusion Maps Stroke (10.26.23 @ 04:54) >  CT PERFUSION: CT perfusion is consistent with large acute infarct in the   right middle cerebral artery vascular territory and small to moderate   amount of surrounding ischemic penumbra.  Hypoperfusion index is 0.3.    Core infarct (CBF <  30%:): 136 mL  Penumbra (Tmax >6s): 194 mL  Mismatched volume: 58 mL  Mismatched ratio: 1.4    < end of copied text >    EEG Classification / Summary:  Abnormal EEG study  1. Intermittent periodic discharges seen intermittently over the left hemisphere, sometimes with triphasic morphology  2. Focal continuous right hemispheric slowing  3. Moderate generalized background slowing    -----------------------------------------------------------------------------------------------------    Clinical Impression:  1. While the periodic pattern noted above may represent lateralized periodic discharges indicating epileptic potential in the left hemisphere, more likely these represent generalized periodic discharges with triphasic morphology, consistent with metabolic etiology, that are poorly expressed on the right due to the effects of the stroke.   2. Structural abnormality in right hemisphere  3. Moderate diffuse/multi-focal cerebral dysfunction, not specific as to etiology.    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 Significant   associated mass effect and associated herniation.      < end of copied text >  < from: CT Head No Cont (11.02.23 @ 14:30) >    Reidentified are 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 There is effacement of all of the   basilar cisterns. Effacement of the sulci in the bilateral cerebri,   compatible with increased brain edema.    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    Unsuccessful attempts were made to reach the ordering clinician via teams.      --- End of Report ---    < end of copied text >

## 2023-11-10 NOTE — PROGRESS NOTE ADULT - ASSESSMENT
88 year old female with a PMH of HTN, HLD and afib on xarelto who did not take medications for past 1 week prior to hospitalization here with R MCA infarct.   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

## 2023-11-10 NOTE — PROGRESS NOTE ADULT - SUBJECTIVE AND OBJECTIVE BOX
Neurology Progress Note    S: Patient seen and examined.     MEDICATIONS:    acetaminophen     Tablet .. 650 milliGRAM(s) Oral every 6 hours PRN  amLODIPine   Tablet 5 milliGRAM(s) Oral daily  aspirin  chewable 81 milliGRAM(s) Oral daily  atorvastatin 80 milliGRAM(s) Oral at bedtime  bisacodyl Suppository 10 milliGRAM(s) Rectal daily PRN  chlorhexidine 0.12% Liquid 15 milliLiter(s) Oral Mucosa every 12 hours  chlorhexidine 2% Cloths 1 Application(s) Topical <User Schedule>  heparin   Injectable 5000 Unit(s) SubCutaneous every 12 hours  hydrALAZINE 100 milliGRAM(s) Oral every 8 hours  influenza  Vaccine (HIGH DOSE) 0.7 milliLiter(s) IntraMuscular once  pantoprazole   Suspension 40 milliGRAM(s) Enteral Tube daily  senna 2 Tablet(s) Oral at bedtime      LABS:                          13.2   11.26 )-----------( 432      ( 10 Nov 2023 02:45 )             42.8     11-10    141  |  108  |  27<H>  ----------------------------<  123<H>  4.6   |  30  |  0.78    Ca    9.7      10 Nov 2023 02:45  Phos  2.9     11-10  Mg     2.5     11-10    TPro  6.9  /  Alb  2.4<L>  /  TBili  0.7  /  DBili  x   /  AST  121<H>  /  ALT  36  /  AlkPhos  55  11-10    CAPILLARY BLOOD GLUCOSE          Urinalysis Basic - ( 10 Nov 2023 02:45 )    Color: x / Appearance: x / SG: x / pH: x  Gluc: 123 mg/dL / Ketone: x  / Bili: x / Urobili: x   Blood: x / Protein: x / Nitrite: x   Leuk Esterase: x / RBC: x / WBC x   Sq Epi: x / Non Sq Epi: x / Bacteria: x      I&O's Summary    09 Nov 2023 07:01  -  10 Nov 2023 07:00  --------------------------------------------------------  IN: 990 mL / OUT: 1155 mL / NET: -165 mL    10 Nov 2023 07:01  -  10 Nov 2023 09:29  --------------------------------------------------------  IN: 55 mL / OUT: 0 mL / NET: 55 mL      Vital Signs Last 24 Hrs  T(C): 36.8 (10 Nov 2023 05:07), Max: 37.6 (09 Nov 2023 23:42)  T(F): 98.2 (10 Nov 2023 05:07), Max: 99.6 (09 Nov 2023 23:42)  HR: 61 (10 Nov 2023 08:20) (61 - 87)  BP: 131/63 (10 Nov 2023 07:00) (131/63 - 150/50)  BP(mean): 81 (10 Nov 2023 07:00) (74 - 91)  RR: 22 (10 Nov 2023 07:00) (15 - 29)  SpO2: 100% (10 Nov 2023 08:20) (97% - 100%)        Neurological Exam:  Mental Status: Intubated, not sedated. No spont eye opening. No verbal output, does not follow commands.   Cranial Nerves: pupils very sluggish R, L near fixed,no  corneals, no VOR, no facial asymmetry , weak cough/gag  Motor: dec tone throughout, no spont movemnt noted   Sensation: UE extensor posturing, LE triple flexion left toe up    I personally reviewed the below data/images/labs:      < from: CT Head No Cont (10.22.19 @ 15:57) >  IMPRESSION:    1)  chronic ischemic changes in both hemispheres with volume loss. There   are no new infarct as compared to prior CT. This may be further assessed   with MR imaging.  2)  no hemorrhage or mass identified.    < end of copied text >  < from: CT Angio Head w/ IV Cont (10.22.19 @ 15:57) >  IMPRESSION    1. There is intimal thickening and calcified plaque in both carotid   bifurcations in the neck, but without significant stenosis. No evidence   of carotid vertebral occlusion or dissection.  2. Intracranially, there are atherosclerotic changes in the anterior and   posterior circulation, but without significant stenosis or occlusion.    < end of copied text >  < from: CT Brain Stroke Protocol (10.26.23 @ 04:37) >  IMPRESSION:  No evidence of acute intracranial hemorrhage, mass effect or large acute   territorial infarct, within limits of noncontrast CT technique.    < end of copied text >  < from: CT Angio Neck Stroke Protocol w/ IV Cont (10.26.23 @ 04:53) >  CT ANGIOGRAPHY NECK:  1.  The right internal carotid artery is occluded approximately 1.5 cm   distal to its origin, possibly due to underlying dissection.  2.  Endotracheal tube is low in position, with its tip approximate 1 cm   above the slade.  3.  Diffuse ill-defined groundglass opacity in both lungs, which may   represent pulmonary edema, infection and/or atelectasis.  4.  There is a discrete right upper lobe groundglass opacity measuring   1.9 x 1.4 cm; this may represent a focus of infection, inflammation,   edema, hemorrhage, fibrosis or malignancy.  A follow-up chest CT is   warranted in three months to assess for resolution.    CT ANGIOGRAPHY BRAIN: The right internal carotid arteries occluded.    There is no significant flow related opacification within the proximal   right middle cerebral artery.  There is mild reconstitution of flow in   some distal branch vessels of the inferior division of the right middle   cerebral artery.  The right anterior cerebral artery fills across the   anterior communicating artery.    < end of copied text >  < from: CT Brain Perfusion Maps Stroke (10.26.23 @ 04:54) >  CT PERFUSION: CT perfusion is consistent with large acute infarct in the   right middle cerebral artery vascular territory and small to moderate   amount of surrounding ischemic penumbra.  Hypoperfusion index is 0.3.    Core infarct (CBF <  30%:): 136 mL  Penumbra (Tmax >6s): 194 mL  Mismatched volume: 58 mL  Mismatched ratio: 1.4    < end of copied text >      < from: CT Head No Cont (10.22.19 @ 15:57) >  IMPRESSION:    1)  chronic ischemic changes in both hemispheres with volume loss. There   are no new infarct as compared to prior CT. This may be further assessed   with MR imaging.  2)  no hemorrhage or mass identified.    < end of copied text >  < from: CT Angio Head w/ IV Cont (10.22.19 @ 15:57) >  IMPRESSION    1. There is intimal thickening and calcified plaque in both carotid   bifurcations in the neck, but without significant stenosis. No evidence   of carotid vertebral occlusion or dissection.  2. Intracranially, there are atherosclerotic changes in the anterior and   posterior circulation, but without significant stenosis or occlusion.    < end of copied text >  < from: CT Brain Stroke Protocol (10.26.23 @ 04:37) >  IMPRESSION:  No evidence of acute intracranial hemorrhage, mass effect or large acute   territorial infarct, within limits of noncontrast CT technique.    < end of copied text >  < from: CT Angio Neck Stroke Protocol w/ IV Cont (10.26.23 @ 04:53) >  CT ANGIOGRAPHY NECK:  1.  The right internal carotid artery is occluded approximately 1.5 cm   distal to its origin, possibly due to underlying dissection.  2.  Endotracheal tube is low in position, with its tip approximate 1 cm   above the slade.  3.  Diffuse ill-defined groundglass opacity in both lungs, which may   represent pulmonary edema, infection and/or atelectasis.  4.  There is a discrete right upper lobe groundglass opacity measuring   1.9 x 1.4 cm; this may represent a focus of infection, inflammation,   edema, hemorrhage, fibrosis or malignancy.  A follow-up chest CT is   warranted in three months to assess for resolution.    CT ANGIOGRAPHY BRAIN: The right internal carotid arteries occluded.    There is no significant flow related opacification within the proximal   right middle cerebral artery.  There is mild reconstitution of flow in   some distal branch vessels of the inferior division of the right middle   cerebral artery.  The right anterior cerebral artery fills across the   anterior communicating artery.    < end of copied text >  < from: CT Brain Perfusion Maps Stroke (10.26.23 @ 04:54) >  CT PERFUSION: CT perfusion is consistent with large acute infarct in the   right middle cerebral artery vascular territory and small to moderate   amount of surrounding ischemic penumbra.  Hypoperfusion index is 0.3.    Core infarct (CBF <  30%:): 136 mL  Penumbra (Tmax >6s): 194 mL  Mismatched volume: 58 mL  Mismatched ratio: 1.4    < end of copied text >      < from: CT Head No Cont (10.22.19 @ 15:57) >  IMPRESSION:    1)  chronic ischemic changes in both hemispheres with volume loss. There   are no new infarct as compared to prior CT. This may be further assessed   with MR imaging.  2)  no hemorrhage or mass identified.    < end of copied text >  < from: CT Angio Head w/ IV Cont (10.22.19 @ 15:57) >  IMPRESSION    1. There is intimal thickening and calcified plaque in both carotid   bifurcations in the neck, but without significant stenosis. No evidence   of carotid vertebral occlusion or dissection.  2. Intracranially, there are atherosclerotic changes in the anterior and   posterior circulation, but without significant stenosis or occlusion.    < end of copied text >  < from: CT Brain Stroke Protocol (10.26.23 @ 04:37) >  IMPRESSION:  No evidence of acute intracranial hemorrhage, mass effect or large acute   territorial infarct, within limits of noncontrast CT technique.    < end of copied text >  < from: CT Angio Neck Stroke Protocol w/ IV Cont (10.26.23 @ 04:53) >  CT ANGIOGRAPHY NECK:  1.  The right internal carotid artery is occluded approximately 1.5 cm   distal to its origin, possibly due to underlying dissection.  2.  Endotracheal tube is low in position, with its tip approximate 1 cm   above the slade.  3.  Diffuse ill-defined groundglass opacity in both lungs, which may   represent pulmonary edema, infection and/or atelectasis.  4.  There is a discrete right upper lobe groundglass opacity measuring   1.9 x 1.4 cm; this may represent a focus of infection, inflammation,   edema, hemorrhage, fibrosis or malignancy.  A follow-up chest CT is   warranted in three months to assess for resolution.    CT ANGIOGRAPHY BRAIN: The right internal carotid arteries occluded.    There is no significant flow related opacification within the proximal   right middle cerebral artery.  There is mild reconstitution of flow in   some distal branch vessels of the inferior division of the right middle   cerebral artery.  The right anterior cerebral artery fills across the   anterior communicating artery.    < end of copied text >  < from: CT Brain Perfusion Maps Stroke (10.26.23 @ 04:54) >  CT PERFUSION: CT perfusion is consistent with large acute infarct in the   right middle cerebral artery vascular territory and small to moderate   amount of surrounding ischemic penumbra.  Hypoperfusion index is 0.3.    Core infarct (CBF <  30%:): 136 mL  Penumbra (Tmax >6s): 194 mL  Mismatched volume: 58 mL  Mismatched ratio: 1.4    < end of copied text >    EEG Classification / Summary:  Abnormal EEG study  1. Intermittent periodic discharges seen intermittently over the left hemisphere, sometimes with triphasic morphology  2. Focal continuous right hemispheric slowing  3. Moderate generalized background slowing    -----------------------------------------------------------------------------------------------------    Clinical Impression:  1. While the periodic pattern noted above may represent lateralized periodic discharges indicating epileptic potential in the left hemisphere, more likely these represent generalized periodic discharges with triphasic morphology, consistent with metabolic etiology, that are poorly expressed on the right due to the effects of the stroke.   2. Structural abnormality in right hemisphere  3. Moderate diffuse/multi-focal cerebral dysfunction, not specific as to etiology.    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 Significant   associated mass effect and associated herniation.      < end of copied text >  < from: CT Head No Cont (11.02.23 @ 14:30) >    Reidentified are 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 There is effacement of all of the   basilar cisterns. Effacement of the sulci in the bilateral cerebri,   compatible with increased brain edema.    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    Unsuccessful attempts were made to reach the ordering clinician via teams.      --- End of Report ---    < end of copied text >

## 2023-11-10 NOTE — PROGRESS NOTE ADULT - SUBJECTIVE AND OBJECTIVE BOX
HPI:  Ms. Longoria is an 88 year old female with a PMH of HTN, HLD and afib on xarelto who presented to [facility] earlier this morning after she was found altered, nonverbal and with right gaze deviation when she woke up. At baseline pt is ambulatory and verbal. Pt required intubation in ED for airway protection given poor MS. NIHSS calculated as 30 at that time. Pt not a candidate for TNK or transfer for intervention per stroke neurologist. She remains intubated on nicardipine gtt. ICU team consulted. (26 Oct 2023 05:50)       ## Labs:  CBC:                          13.2   11. )-----------( 432      ( 10 Nov 2023 02:45 )             42.8   11-10    141  |  108  |  27<H>  ----------------------------<  123<H>  4.6   |  30  |  0.78    Ca    9.7      10 Nov 2023 02:45  Phos  2.9     11-10  Mg     2.5     11-10    TPro  6.9  /  Alb  2.4<L>  /  TBili  0.7  /  DBili  x   /  AST  121<H>  /  ALT  36  /  AlkPhos  55  11-10         ## Medications:    amLODIPine   Tablet 5 milliGRAM(s) Oral daily  hydrALAZINE 100 milliGRAM(s) Oral every 8 hours      atorvastatin 80 milliGRAM(s) Oral at bedtime    aspirin  chewable 81 milliGRAM(s) Oral daily  heparin   Injectable 5000 Unit(s) SubCutaneous every 12 hours    bisacodyl Suppository 10 milliGRAM(s) Rectal daily PRN  pantoprazole   Suspension 40 milliGRAM(s) Enteral Tube daily  senna 2 Tablet(s) Oral at bedtime    acetaminophen     Tablet .. 650 milliGRAM(s) Oral every 6 hours PRN      ## Vitals:  T(C): 35.9 (23 @ 05:52), Max: 35.9 (23 @ 05:52)  HR: 71 (23 @ 15:00) (60 - 82)  BP: 141/56 (23 @ 15:00) (137/61 - 164/70)  BP(mean): 79 (23 @ 15:00) (79 - 93)  RR: 17 (23 @ 15:00) (17 - 30)  SpO2: 98% (23 @ 15:00) (98% - 100%)  Wt(kg): --  Vent: Mode: AC/ CMV (Assist Control/ Continuous Mandatory Ventilation), RR (machine): 12, RR (patient): 19, TV (machine): 400, PEEP: 5, PIP: 21  AB-08 @ 07:01  -   @ 07:00  --------------------------------------------------------  IN: 550 mL / OUT: 950 mL / NET: -400 mL     @ 07:01  -   @ 16:07  --------------------------------------------------------  IN: 165 mL / OUT: 0 mL / NET: 165 mL          ## P/E:  Gen: intubated  Neck: no accessory muscle use  Lungs: b/l cta  Heart: s1s2 reg no murmur  Abd: +BS soft nontender  Ext: no edema  Neuro: withdraws ext, + gag, +spont breaths       < from: CT Head No Cont (23 @ 14:30) >    ACC: [id number] EXAM:  CT BRAIN   ORDERED BY: CINTHIA ULLOA     PROCEDURE DATE:  2023          INTERPRETATION:  Exam Date: 2023 2:30 PM    CT head without IV contrast    CLINICAL INFORMATION:  previous CVA reevaluate    TECHNIQUE: Contiguous axial sections were obtained through the head.     Coronal and sagittal reformats were obtained.    COMPARISON: CT head 10/30/2023    FINDINGS:    Reidentified are large subacute MCA and JIMMY infarcts throughout the right   frontal parietal and temporal lobes and right basal ganglia, with severe   right to left midline shift and severe mass effect upon the ventricular   system, not significantly changed. There is effacement of all of the   basilar cisterns. Effacement of the sulci in the bilateral cerebri,   compatible with increased brain edema. Right uncal herniation is   reidentified.    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    No hemorrhagic transformation is identified.    No downward herniation is present    IMPRESSION:    Reidentified are 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 There is effacement of all of the   basilar cisterns. Effacement of the sulci in the bilateral cerebri,   compatible with increased brain edema.    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    Unsuccessful attempts were made to reach the ordering clinician via teams.      --- End of Report ---            RADHA GONZALEZ MD; Attending Radiologist  This document has been electronically signed. 2023  2:45PM    < end of copied text >

## 2023-11-10 NOTE — PROGRESS NOTE ADULT - ASSESSMENT
87 yo F with A fib on Xarelto, HTN and HLD a/w altered mental status, nonverbal and with right gaze deviation when she woke up. At baseline pt is ambulatory and verbal. Pt required intubation in ER for airway protection given poor MS. Pt assessed by Stroke team: GIO ~4 hours ago, on Rivaroxaban, presents with AMS/weakness with NIHSS of 30 on ED arrival.  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  Given age, extended time window, oral anticoagulant use, large infarct, and high NIHSS, tPA relatively contraindicated/very high risk for heme and unlikely to be net beneficial.  D/w endovascular fellow Reanna, imaging reviewed, not a candidate for endovascular intervention. Pt admitted to the ICU. Repeat CT head: large acute infarcts right JIMMY and MCA territories. Significant associated mass effect R -> L and associated herniation on 11/2.     Neuro: Massive and worsened cerebral infracts with herniation.  No hope for recovery of an independent life.    - has some brainstem fxn but no purposeful movement  - continue to monitor for signs of herniation  - neuro reccs appreciated  CV: Permissive BP goals, noting to have bradycardic episodes.   - Continue with aspirin  Pulm: Intubated with no mental status and mental status is preventing weaning at this time.    GI: c/w tube feeds  Renal/Metabolic: DVAIE improved; continue to monitor  ID: No acute issues  Heme: HSQ  Endo: FS within acceptable range    Dispo:  Currently with persistently poor exam, noted to have multiple strokes, remains intubated 2/2 poor mental status. cont GOC discussions.  Palliative care following.  Family unable to make decision regarding future care- trach vs palliative extubation

## 2023-11-11 NOTE — PROGRESS NOTE ADULT - ASSESSMENT
87 yo F with A fib on Xarelto, HTN and HLD a/w altered mental status, nonverbal and with right gaze deviation when she woke up. At baseline pt is ambulatory and verbal. Pt required intubation in ER for airway protection given poor MS. Pt assessed by Stroke team: GIO ~4 hours ago, on Rivaroxaban, presents with AMS/weakness with NIHSS of 30 on ED arrival.  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  Given age, extended time window, oral anticoagulant use, large infarct, and high NIHSS, tPA relatively contraindicated/very high risk for heme and unlikely to be net beneficial.  D/w endovascular fellow Reanna, imaging reviewed, not a candidate for endovascular intervention. Pt admitted to the ICU. Repeat CT head: large acute infarcts right JIMMY and MCA territories. Significant associated mass effect R -> L and associated herniation on 11/2.     Neuro: Massive and worsened cerebral infracts with herniation.     - has some brainstem fxn but no purposeful movement  - continue to monitor for signs of herniation  - neuro reccs appreciated  CV: Permissive BP goals, noting to have bradycardic episodes but none today   - Continue with aspirin  Pulm: Intubated with no mental status and mental status is preventing extubation    GI: c/w tube feeds  Renal/Metabolic: DAVIE improved; continue to monitor  ID: No acute issues  Heme: HSQ  Endo: FS within acceptable range    Dispo:  Currently with persistently poor exam, noted to have multiple strokes, remains intubated 2/2 poor mental status. cont GOC discussions.  Palliative care following.  Family unable to make decision regarding future care- trach vs palliative extubation

## 2023-11-11 NOTE — PROGRESS NOTE ADULT - SUBJECTIVE AND OBJECTIVE BOX
HPI:  Ms. Longoria is an 88 year old female with a PMH of HTN, HLD and afib on xarelto who presented to [facility] earlier this morning after she was found altered, nonverbal and with right gaze deviation when she woke up. At baseline pt is ambulatory and verbal. Pt required intubation in ED for airway protection given poor MS. NIHSS calculated as 30 at that time. Pt not a candidate for TNK or transfer for intervention per stroke neurologist. She remains intubated on nicardipine gtt. ICU team consulted. (26 Oct 2023 05:50)       ## Labs:  CBC:                            13.2   11.26 )-----------( 432      ( 10 Nov 2023 02:45 )             42.8   11-10    141  |  108  |  27<H>  ----------------------------<  123<H>  4.6   |  30  |  0.78    Ca    9.7      10 Nov 2023 02:45  Phos  2.9     11-10  Mg     2.5     11-10    TPro  6.9  /  Alb  2.4<L>  /  TBili  0.7  /  DBili  x   /  AST  121<H>  /  ALT  36  /  AlkPhos  55  11-10      ## Medications:    amLODIPine   Tablet 5 milliGRAM(s) Oral daily  hydrALAZINE 100 milliGRAM(s) Oral every 8 hours      atorvastatin 80 milliGRAM(s) Oral at bedtime    aspirin  chewable 81 milliGRAM(s) Oral daily  heparin   Injectable 5000 Unit(s) SubCutaneous every 12 hours    bisacodyl Suppository 10 milliGRAM(s) Rectal daily PRN  pantoprazole   Suspension 40 milliGRAM(s) Enteral Tube daily  senna 2 Tablet(s) Oral at bedtime    acetaminophen     Tablet .. 650 milliGRAM(s) Oral every 6 hours PRN      ## Vitals:    ICU Vital Signs Last 24 Hrs  T(C): 36.7 (11 Nov 2023 05:00), Max: 36.7 (10 Nov 2023 20:00)  T(F): 98 (11 Nov 2023 05:00), Max: 98.1 (10 Nov 2023 20:00)  HR: 66 (11 Nov 2023 16:29) (54 - 84)  BP: 139/70 (11 Nov 2023 15:00) (123/60 - 150/69)  BP(mean): 92 (11 Nov 2023 15:00) (77 - 94)  ABP: --  ABP(mean): --  RR: 18 (11 Nov 2023 15:00) (17 - 33)  SpO2: 99% (11 Nov 2023 16:29) (96% - 100%)          ## P/E:  Gen: intubated  Neck: no accessory muscle use  Lungs: b/l cta  Heart: s1s2 reg no murmur  Abd: +BS soft nontender  Ext: no edema  Neuro: withdraws ext, + gag, +spont breaths       < from: CT Head No Cont (11.02.23 @ 14:30) >    ACC: [id number] EXAM:  CT BRAIN   ORDERED BY: CINTHIA ULLOA     PROCEDURE DATE:  11/02/2023          INTERPRETATION:  Exam Date: 11/2/2023 2:30 PM    CT head without IV contrast    CLINICAL INFORMATION:  previous CVA reevaluate    TECHNIQUE: Contiguous axial sections were obtained through the head.     Coronal and sagittal reformats were obtained.    COMPARISON: CT head 10/30/2023    FINDINGS:    Reidentified are large subacute MCA and JIMMY infarcts throughout the right   frontal parietal and temporal lobes and right basal ganglia, with severe   right to left midline shift and severe mass effect upon the ventricular   system, not significantly changed. There is effacement of all of the   basilar cisterns. Effacement of the sulci in the bilateral cerebri,   compatible with increased brain edema. Right uncal herniation is   reidentified.    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    No hemorrhagic transformation is identified.    No downward herniation is present    IMPRESSION:    Reidentified are 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 There is effacement of all of the   basilar cisterns. Effacement of the sulci in the bilateral cerebri,   compatible with increased brain edema.    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    Unsuccessful attempts were made to reach the ordering clinician via teams.      --- End of Report ---            RADAH GONZALEZ MD; Attending Radiologist  This document has been electronically signed. Nov 2 2023  2:45PM    < end of copied text >

## 2023-11-12 NOTE — PROGRESS NOTE ADULT - ASSESSMENT
89 yo F with A fib on Xarelto, HTN and HLD a/w altered mental status, nonverbal and with right gaze deviation when she woke up. At baseline pt is ambulatory and verbal. Pt required intubation in ER for airway protection given poor MS. Pt assessed by Stroke team: GIO ~4 hours ago, on Rivaroxaban, presents with AMS/weakness with NIHSS of 30 on ED arrival.  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  Given age, extended time window, oral anticoagulant use, large infarct, and high NIHSS, tPA relatively contraindicated/very high risk for heme and unlikely to be net beneficial.  D/w endovascular fellow Reanna, imaging reviewed, not a candidate for endovascular intervention. Pt admitted to the ICU. Repeat CT head: large acute infarcts right JIMMY and MCA territories. Significant associated mass effect R -> L and associated herniation on 11/2.     Neuro: Massive and worsened cerebral infracts with herniation.     - has some brainstem fxn but no purposeful movement  - continue to monitor for signs of herniation  - neuro reccs appreciated  CV: Permissive BP goals, noting to have bradycardic episodes but none today   - Continue with aspirin  Pulm: Intubated with no mental status and mental status is preventing extubation    cont PS trials, occ does well    GI: c/w tube feeds  Renal/Metabolic: DAVIE improved; continue to monitor  ID: No acute issues  Heme: HSQ  Endo: FS within acceptable range    Dispo:  Currently with persistently poor exam, noted to have multiple strokes, remains intubated 2/2 poor mental status. cont GOC discussions.  Palliative care following.  Family unable to make decision regarding future care- trach vs palliative extubation

## 2023-11-12 NOTE — PROGRESS NOTE ADULT - SUBJECTIVE AND OBJECTIVE BOX
HPI:  Ms. Longoria is an 88 year old female with a PMH of HTN, HLD and afib on xarelto who presented to [facility] earlier this morning after she was found altered, nonverbal and with right gaze deviation when she woke up. At baseline pt is ambulatory and verbal. Pt required intubation in ED for airway protection given poor MS. NIHSS calculated as 30 at that time. Pt not a candidate for TNK or transfer for intervention per stroke neurologist. She remains intubated on nicardipine gtt. ICU team consulted. (26 Oct 2023 05:50)      24 hr events:      ## Labs:  CBC:                        11.5   9.55  )-----------( 460      ( 2023 02:30 )             37.1     Chem:      142  |  108  |  29<H>  ----------------------------<  145<H>  3.8   |  31  |  0.71    Ca    9.1      2023 02:30  Phos  2.9       Mg     2.3         TPro  6.6  /  Alb  2.4<L>  /  TBili  0.6  /  DBili  x   /  AST  204<H>  /  ALT  137<H>  /  AlkPhos  63  12      ## Medications:    amLODIPine   Tablet 5 milliGRAM(s) Oral daily  hydrALAZINE 100 milliGRAM(s) Oral every 8 hours      atorvastatin 80 milliGRAM(s) Oral at bedtime    aspirin  chewable 81 milliGRAM(s) Oral daily  heparin   Injectable 5000 Unit(s) SubCutaneous every 12 hours    bisacodyl Suppository 10 milliGRAM(s) Rectal daily PRN  pantoprazole   Suspension 40 milliGRAM(s) Enteral Tube daily  senna 2 Tablet(s) Oral at bedtime    acetaminophen     Tablet .. 650 milliGRAM(s) Oral every 6 hours PRN      ## Vitals:  T(C): 36.1 (23 @ 11:57), Max: 36.6 (23 @ 23:33)  HR: 58 (23 @ 14:00) (55 - 73)  BP: 150/75 (23 @ 14:00) (137/67 - 155/64)  BP(mean): 98 (23 @ 14:00) (79 - 104)  RR: 15 (23 @ 14:00) (15 - 32)  SpO2: 100% (23 @ 14:00) (95% - 100%)  Wt(kg): --  Vent: Mode: AC/ CMV (Assist Control/ Continuous Mandatory Ventilation), RR (machine): 12, RR (patient): 23, TV (machine): 400, FiO2: 21, PEEP: 5, PIP: 18  AB-11 @ 07:01  -   @ 07:00  --------------------------------------------------------  IN: 1290 mL / OUT: 995 mL / NET: 295 mL     @ 07:01  -   @ 14:38  --------------------------------------------------------  IN: 340 mL / OUT: 170 mL / NET: 170 mL          ## P/E:  Gen: intubated  Neck: no accessory muscle use  Lungs: b/l cta  Heart: s1s2 reg no murmur  Abd: +BS soft nontender  Ext: no edema  Neuro: withdraws ext, + gag, +spont breaths

## 2023-11-13 NOTE — PROGRESS NOTE ADULT - SUBJECTIVE AND OBJECTIVE BOX
HPI:  Pt is an 89 yo F with h/o A fib on Xarelto, HTN and HLD who presented to [facility] after she was found altered, nonverbal and with right gaze deviation when she woke up. At baseline pt is ambulatory and verbal. Pt required intubation in ER for airway protection given poor MS. Pt assessed by Stroke team: GIO ~4 hours ago, on Rivaroxaban, presents with AMS/weakness with NIHSS of 30 on ED arrival.  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  Given age, extended time window, oral anticoagulant use, large infarct, and high NIHSS, tPA relatively contraindicated/very high risk for heme and unlikely to be net beneficial.  D/w endovascular fellow Reanna, imaging reviewed, not a candidate for endovascular intervention. Pt admitted to the ICU. 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 Significant associated mass effect R -> L and associated herniation.       ## Labs:  CBC:                        11.5   9.55  )-----------( 460      ( 2023 02:30 )             37.1     Chem:  11-12    142  |  108  |  29<H>  ----------------------------<  145<H>  3.8   |  31  |  0.71    Ca    9.1      2023 02:30  Phos  2.9     11-12  Mg     2.3     -12    TPro  6.6  /  Alb  2.4<L>  /  TBili  0.6  /  DBili  x   /  AST  204<H>  /  ALT  137<H>  /  AlkPhos  63  11-12    Coags:          ## Imaging:    ## Medications:    amLODIPine   Tablet 5 milliGRAM(s) Oral daily  hydrALAZINE 100 milliGRAM(s) Oral every 8 hours      atorvastatin 80 milliGRAM(s) Oral at bedtime    aspirin  chewable 81 milliGRAM(s) Oral daily  heparin   Injectable 5000 Unit(s) SubCutaneous every 12 hours    bisacodyl Suppository 10 milliGRAM(s) Rectal daily PRN  pantoprazole   Suspension 40 milliGRAM(s) Enteral Tube daily  senna 2 Tablet(s) Oral at bedtime    acetaminophen     Tablet .. 650 milliGRAM(s) Oral every 6 hours PRN      ## Vitals:  T(C): 36.1 (23 @ 16:27), Max: 36.3 (23 @ 05:00)  HR: 43 (23 @ 22:32) (43 - 70)  BP: 147/68 (23 @ 20:00) (133/59 - 156/72)  BP(mean): 91 (23 @ 20:00) (81 - 110)  RR: 24 (23 @ 20:00) (16 - 27)  SpO2: 100% (23 @ 22:32) (96% - 100%)  Wt(kg): --  Vent: Mode: AC/ CMV (Assist Control/ Continuous Mandatory Ventilation), RR (machine): 12, RR (patient): 25, TV (machine): 400, FiO2: 21, PEEP: 5, PIP: 22  AB-12 @ 07:  -   @ 07:00  --------------------------------------------------------  IN: 1280 mL / OUT: 870 mL / NET: 410 mL     @ 07:01  -   @ 22:53  --------------------------------------------------------  IN: 580 mL / OUT: 500 mL / NET: 80 mL          ## P/E:  Gen: lying comfortably in bed in no apparent distress  Mouth: (+) ETT/OGT  Lungs: CTA  Heart: Irregular and leslie  Abd: Soft/+BS/ Non-tender  Ext: Hand edema  Neuro: No gag reflex/ (+) cough reflex/ No response to pain/ (+) spont resp    CENTRAL LINE: [ ] YES [ ] NO  LOCATION:   DATE INSERTED:  REMOVE: [ ] YES [ ] NO      AMANDA: [ ] YES [ ] NO    DATE INSERTED:  REMOVE:  [ ] YES [ ] NO      A-LINE:  [ ] YES [ ] NO  LOCATION:   DATE INSERTED:  REMOVE:  [ ] YES [ ] NO  EXPLAIN:    CODE STATUS: [x ] full code  [ ] DNR  [ ] DNI  [ ] MOLST  Goals of care discussion: [ ] yes

## 2023-11-13 NOTE — CHART NOTE - NSCHARTNOTEFT_GEN_A_CORE
Assessment:  Pt seen in ICU, on vent, OGT feeding which is currently on hold as per 18 hour protocol at present.  Pt adm c diagnosis of  altered mental status, embolic stroke, cerebral edema, hypoxic respiratory failure, mental status preventing extubation, DAVIE improved, GOC discussions continued, family is unable to make decisions regarding future care; trach vs palliative extubation noted.  pt is DNR.  PMH include HLD, HTN, Afib.    Factors impacting intake: [ ] none [ ] nausea  [ ] vomiting [ ] diarrhea [ ] constipation  [ ]chewing problems [ ] swallowing issues  [x ] other: acute illness, rectal tube noted, without diarrhea @ present per RN.    Diet Prescription: Diet, NPO with Tube Feed:   Tube Feeding Modality: Nasogastric  Vital HP  Total Volume for 24 Hours (mL): 990  Continuous  Starting Tube Feed Rate {mL per Hour}: 40  Increase Tube Feed Rate by (mL): 10     Every 6 hours  Until Goal Tube Feed Rate (mL per Hour): 55  Tube Feed Duration (in Hours): 18  Tube Feed Start Time: 17:00  Tube Feed Stop Time: 11:00 (11-01-23 @ 13:52)    Intake: Vital HP @ 55 ml/hr x 18 hours=990 ml, 990 calories, 86 grams protein    Current Weight: 11/13, 59.2 kg, 11/06, 57.1 kg, 10/26, 63.1 kg, wt. fluctuation c wt. loss of 3.9 kg noted  % Weight Change: 6.2%  11/13, 2+(mild) edema of hands noted    limited view of pt on vent to conduct nutrition focus physical exam, c visible moderate orbital depletion, mild temple depletion       Pertinent Medications: MEDICATIONS  (STANDING):  amLODIPine   Tablet 5 milliGRAM(s) Oral daily  aspirin  chewable 81 milliGRAM(s) Oral daily  atorvastatin 80 milliGRAM(s) Oral at bedtime  chlorhexidine 0.12% Liquid 15 milliLiter(s) Oral Mucosa every 12 hours  chlorhexidine 2% Cloths 1 Application(s) Topical <User Schedule>  heparin   Injectable 5000 Unit(s) SubCutaneous every 12 hours  hydrALAZINE 100 milliGRAM(s) Oral every 8 hours  influenza  Vaccine (HIGH DOSE) 0.7 milliLiter(s) IntraMuscular once  pantoprazole   Suspension 40 milliGRAM(s) Enteral Tube daily  senna 2 Tablet(s) Oral at bedtime    MEDICATIONS  (PRN):  acetaminophen     Tablet .. 650 milliGRAM(s) Oral every 6 hours PRN Temp greater or equal to 38C (100.4F)  bisacodyl Suppository 10 milliGRAM(s) Rectal daily PRN Constipation    Pertinent Labs: 11-12 Na142 mmol/L Glu 145 mg/dL<H> K+ 3.8 mmol/L Cr  0.71 mg/dL BUN 29 mg/dL<H> 11-12 Phos 2.9 mg/dL 11-12 Alb 2.4 g/dL<L> 10-26 Chol 120 mg/dL LDL --    HDL 49 mg/dL<L> Trig 87 mg/dL11-12  U/L<H>  U/L<H> Alkaline Phosphatase 63 U/L  10-26-23 @ 10:20 a1c 5.5   CAPILLARY BLOOD GLUCOSE        Skin: 11/13, no pressure ulcers noted     Estimated Needs:   [ x] no change since previous assessment(10/28)  [ ] recalculated:     Previous Nutrition Diagnosis: (10/28)  Inadequate Energy Intake  Etiology: inadequate tube feed infusion  Signs/Symptoms: tube feed as ordered does not meet needs  Goal/Expected Outcome: Pt to meet >50% needs via tolerated route; met   Nutrition Diagnosis is [ ] ongoing  [x ] resolved [ ] not applicable       New Nutrition Diagnosis: [x ] not applicable     Interventions:   Recommend  [ ] Change Diet To:  [ ] Nutrition Supplement  [x ] Nutrition Support; change enteral feeding to Jevity 1.2 to better meet energy needs.  Recommend Jevity 1.2 @ 40->65 ml/hr x 18 hours = 1170 ml, 1404 calories, 65 grams protein, 944 ml free water   [ ] Other:     Monitoring and Evaluation:   [ ] PO intake [ x ] Tolerance to diet prescription [ x ] weights [ x ] labs[ x ] follow up per protocol  [ ] other:

## 2023-11-13 NOTE — PROGRESS NOTE ADULT - ASSESSMENT
Pt is an 87 yo F with h/o A fib on Xarelto, HTN and HLD who presented to [facility] after she was found altered, nonverbal and with right gaze deviation when she woke up. At baseline pt is ambulatory and verbal. Pt required intubation in ER for airway protection given poor MS. Pt assessed by Stroke team: GIO ~4 hours ago, on Rivaroxaban, presents with AMS/weakness with NIHSS of 30 on ED arrival.  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  Given age, extended time window, oral anticoagulant use, large infarct, and high NIHSS, tPA relatively contraindicated/very high risk for heme and unlikely to be net beneficial.  D/w endovascular fellow Reanna, imaging reviewed, not a candidate for endovascular intervention. Pt admitted to the ICU. 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 Significant associated mass effect R -> L and associated herniation.     Resp: Cont current vent settings  CVS: Cont antiHTN meds  HEME: DVT prophylaxis with sq Heparin  FEN: Cont enteral feeds/ Avoid hyponatremia  Endo: Follow FS  Renal: Follow BUN/Cr and UO  Neuro/Social: Supportive care; pt with extremely poor neurological prognosis; cont GOC pt is DNR but family has not made a decision regarding palliative extubation vs long term care with trach + PEG

## 2023-11-14 NOTE — PHARMACOTHERAPY INTERVENTION NOTE - INTERVENTION TYPE RECOOMEND
Therapy Recommended - Alternative treatment
IV to PO
Therapy Discontinuation Recommended - No indication

## 2023-11-14 NOTE — CONSULT NOTE ADULT - SUBJECTIVE AND OBJECTIVE BOX
88 year old female with altered mental status, non verbal, and right gaze deviation. Past medical history of hypertension, hyperlipidemia, atrial fibrillation on anticoagulation.      11/2/2023 CT scan of the head revealed-    "Reidentified are large subacute MCA and JIMMY infarcts throughout the right frontal parietal and temporal lobes and right basalganglia, with severe right to left midline shift and severe mass effect upon the ventricular system, not significantly changed. There is effacement of all of the basilar cisterns. Effacement of the sulci in the bilateral cerebri, compatible with increased brain edema. There is hypoattenuation within the right occipital lobe, markedly worsened as compared to the prior exam, compatible with evolution of an acute right PCA infarct."    Goals of care conversations have resulted in DNR, but family has indicated that they are uncomfortable with withdrawing life sustaining measures, and know that the patient would not want to exist with a tracheostomy and PEG.  Ethics assistance requested.       Prognosis Estimate (survival in hrs, days, wks, mos, yrs):  days to weeks  Patient Decision-Making Capacity NO -- devastating CVA  Patient Aware of: Diagnosis: NO  Name of medical decision-maker should patient lack capacity (relationship):   Role: Health Care Agent Legal Surrogate Contact #(s)   Other Decision-Maker (i.e., HCA or Surrogate) Aware of: Diagnosis:	 Prognosis:   Other Stake-Holders-  Son Leonides Doe  Evidence of Patient’s Preference of Life-Sustaining Treatment (Written or Oral):    Resuscitation status: DNR: YES  DNI: currently intubated    DISCUSSIONS   88 year old female with altered mental status, non verbal, and right gaze deviation. Past medical history of hypertension, hyperlipidemia, atrial fibrillation on anticoagulation.      11/2/2023 CT scan of the head revealed-    "Reidentified are large subacute MCA and JIMMY infarcts throughout the right frontal parietal and temporal lobes and right basalganglia, with severe right to left midline shift and severe mass effect upon the ventricular system, not significantly changed. There is effacement of all of the basilar cisterns. Effacement of the sulci in the bilateral cerebri, compatible with increased brain edema. There is hypoattenuation within the right occipital lobe, markedly worsened as compared to the prior exam, compatible with evolution of an acute right PCA infarct."    Goals of care conversations have resulted in DNR, but family has indicated that they are uncomfortable with withdrawing life sustaining measures, and know that the patient would not want to exist with a tracheostomy and PEG.  Some of the language the adult children are using is "we don't want to be considered murderers. We are leaving this in God's hands. Let he stay on the ventilator until God takes her."  Ethics assistance requested.       Prognosis Estimate (survival in hrs, days, wks, mos, yrs):  days to weeks  Patient Decision-Making Capacity NO -- devastating CVA  Patient Aware of: Diagnosis: NO  Name of medical decision-maker should patient lack capacity (relationship):  Nayla Thomas [phone]  Nickie [phone], Otoniel- son pending contact info  Role: Health Care Agent Legal Surrogate Contact #(s)   Other Decision-Maker (i.e., HCA or Surrogate) Aware of: Diagnosis:	 Prognosis:   Other Stake-Holders-    Evidence of Patient’s Preference of Life-Sustaining Treatment (Written or Oral):    Resuscitation status: DNR: YES  DNI: currently intubated    DISCUSSIONS:    11/15/2023- 1030- left message for Nayla at [phone], tried Nickie's obiwhg-[phone]- number kept ringing, no voicemail.

## 2023-11-14 NOTE — PROGRESS NOTE ADULT - ASSESSMENT
Pt is an 87 yo F with h/o A fib on Xarelto, HTN and HLD who presented to [facility] after she was found altered, nonverbal and with right gaze deviation when she woke up. At baseline pt is ambulatory and verbal. Pt required intubation in ER for airway protection given poor MS. Pt assessed by Stroke team: GIO ~4 hours ago, on Rivaroxaban, presents with AMS/weakness with NIHSS of 30 on ED arrival.  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  Given age, extended time window, oral anticoagulant use, large infarct, and high NIHSS, tPA relatively contraindicated/very high risk for heme and unlikely to be net beneficial.  D/w endovascular fellow Reanna, imaging reviewed, not a candidate for endovascular intervention. Pt admitted to the ICU. 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 Significant associated mass effect R -> L and associated herniation.     Resp: Cont current vent settings  CVS: Cont antiHTN meds  HEME: DVT prophylaxis with Lovenox  FEN: Cont enteral feeds  Endo: Follow FS  Renal: Follow BUN/Cr and UO  Neuro/Social: Supportive care; pt with extremely poor neurological prognosis; cont GOC pt is DNR but family has not made a decision regarding palliative extubation vs long term care with trach + PEG

## 2023-11-14 NOTE — PROGRESS NOTE ADULT - SUBJECTIVE AND OBJECTIVE BOX
HPI:  Pt is an 87 yo F with h/o A fib on Xarelto, HTN and HLD who presented to [facility] after she was found altered, nonverbal and with right gaze deviation when she woke up. At baseline pt is ambulatory and verbal. Pt required intubation in ER for airway protection given poor MS. Pt assessed by Stroke team: GIO ~4 hours ago, on Rivaroxaban, presents with AMS/weakness with NIHSS of 30 on ED arrival.  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  Given age, extended time window, oral anticoagulant use, large infarct, and high NIHSS, tPA relatively contraindicated/very high risk for heme and unlikely to be net beneficial.  D/w endovascular fellow Reanna, imaging reviewed, not a candidate for endovascular intervention. Pt admitted to the ICU. 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 Significant associated mass effect R -> L and associated herniation.         ## Labs:  CBC:    Chem:        Coags:          ## Imaging:    ## Medications:    amLODIPine   Tablet 5 milliGRAM(s) Oral daily  hydrALAZINE 100 milliGRAM(s) Oral every 8 hours      atorvastatin 80 milliGRAM(s) Oral at bedtime    aspirin  chewable 81 milliGRAM(s) Oral daily  enoxaparin Injectable 40 milliGRAM(s) SubCutaneous every 24 hours    bisacodyl Suppository 10 milliGRAM(s) Rectal daily PRN  senna 2 Tablet(s) Oral at bedtime    acetaminophen     Tablet .. 650 milliGRAM(s) Oral every 6 hours PRN      ## Vitals:  T(C): 36.4 (23 @ 15:00), Max: 36.5 (23 @ 12:05)  HR: 70 (23 @ 15:00) (43 - 70)  BP: 178/68 (23 @ 15:00) (135/64 - 178/68)  BP(mean): 93 (23 @ 15:00) (79 - 110)  RR: 20 (23 @ 15:00) (17 - 27)  SpO2: 98% (23 @ 15:00) (96% - 100%)  Wt(kg): --  Vent: Mode: AC/ CMV (Assist Control/ Continuous Mandatory Ventilation), RR (machine): 12, RR (patient): 19, TV (machine): 400, FiO2: 21, PEEP: 5, PIP: 19  AB-13 @ 07:  -   @ 07:00  --------------------------------------------------------  IN: 1145 mL / OUT: 760 mL / NET: 385 mL     @ 07: @ 15:45  --------------------------------------------------------  IN: 135 mL / OUT: 285 mL / NET: -150 mL          ## P/E:  Gen: lying comfortably in bed in no apparent distress  Mouth: (+) ETT/OGT  Lungs: B/l rhonchi  Heart: Irregular   Abd: Soft/+BS/ Non-tender  Ext: L hand edema  Neuro: Pupils fixed and dilated/ No gag reflex/ (+) cough reflex/ ? decerebrate posturing to pain/ (+) spont resp    CENTRAL LINE: [ ] YES [ ] NO  LOCATION:   DATE INSERTED:  REMOVE: [ ] YES [ ] NO      PATEL: [ ] YES [ ] NO    DATE INSERTED:  REMOVE:  [ ] YES [ ] NO      A-LINE:  [ ] YES [ ] NO  LOCATION:   DATE INSERTED:  REMOVE:  [ ] YES [ ] NO  EXPLAIN:    CODE STATUS: [x ] full code  [ ] DNR  [ ] DNI  [ ] [facility]  Goals of care discussion: [ ] yes

## 2023-11-14 NOTE — CONSULT NOTE ADULT - ASSESSMENT
BIOETHICAL ANALYSIS    CONSULT IN PROGRESS      Breana Ernst D.Min., RN-BSN, [facility] Ethics  [phone] BIOETHICAL ANALYSIS    The central ethical issue that exists in this case is (A) the patient’s autonomy versus (B) the providers’ desire for beneficence and non-maleficence. Autonomy centers on letting patients with capacity decide what is best for their health after being fully informed of the options available, as well as the risks and benefits of those options. Non-maleficence focuses on the clinician’s desire to do no harm. Similarly, beneficence focuses on the clinicians’ desire to do what is best for their patient, requiring that health care providers consider what is best for the patient given the individual patient’s unique circumstances.  In this particular case, the patient is without capacity due to a devastating stroke and her five children are now her surrogate decision makers.      What can be considered is the concept of quality of life. Respect for patient autonomy and beneficence go hand in hand when quality of life is a concern. Beneficence is a moral principle that directs persons to help others in need and medical indications are comprised in order to meet those needs. It is also an act of duty to bring satisfaction or wholeness to other persons and this can be relevant when offering medical interventions. This act of restoring wholeness can also enhance a patient’s quality of life if the patient desires, which leads us to respect for patient preferences, respect for autonomy.     As is in this case, involving surrogates in decision making raises distinct psychological, ethical, and communication challenges compared to involving patients in decision-making when they have the capacity to do so. For example, and is prevalent in this case  family members sometimes struggle emotionally to authorize decisions that will lead to the death of a patient or to changes in functional status and quality of life, even when those decisions are clearly consistent with the patient’s preferences. In addition, some surrogates make errors due to projection bias, consciously or unconsciously making decisions based on their own values rather than the patient’s values.    This family is relying heavily on their Taoist belief system and feel that if they consent to withdrawal it is considered "murder." Although Taoist teachings are that individuals are not the owners of their lives, and that life support should be continued except in extraordinary circumstances where it is burdensome to the patient and their condition is irreversible, it is difficult for a loving family to accept otherwise.       RECOMMENDATION    Ethics will update consult once conversations with the family have occurred.     Breana Ernst D.Min., RN-BSN, [facility]  Medical Ethics  [phone]    REFERENCES    Lennox MM, Rasheeda EL, Leventhal H, Darryl BROWN. Pathways from Restorationism to advance care planning: beliefs about control over length of life and end-of-life values. Gerontologist. 2013 Oct;53(5):801-16. doi: [email or web address]. Epub 2012 Nov 15. PMID: 29869451; PMCID: [id number].    ASYA Burt., VIJAY Brown., KARLA Jovel, CHELY Dangelo, & Kierra, ANKITA FRANCOIS. (2016). Shared decision making in intensive care units: an American College of Critical Care Medicine and American Thoracic Society policy statement. Critical care medicine, 44(1), 188.

## 2023-11-14 NOTE — PROGRESS NOTE ADULT - SUBJECTIVE AND OBJECTIVE BOX
Neurology Progress Note    S: Patient seen and examined.     MEDICATIONS:    acetaminophen     Tablet .. 650 milliGRAM(s) Oral every 6 hours PRN  amLODIPine   Tablet 5 milliGRAM(s) Oral daily  aspirin  chewable 81 milliGRAM(s) Oral daily  atorvastatin 80 milliGRAM(s) Oral at bedtime  bisacodyl Suppository 10 milliGRAM(s) Rectal daily PRN  chlorhexidine 0.12% Liquid 15 milliLiter(s) Oral Mucosa every 12 hours  chlorhexidine 2% Cloths 1 Application(s) Topical <User Schedule>  enoxaparin Injectable 40 milliGRAM(s) SubCutaneous every 24 hours  hydrALAZINE 100 milliGRAM(s) Oral every 8 hours  influenza  Vaccine (HIGH DOSE) 0.7 milliLiter(s) IntraMuscular once  senna 2 Tablet(s) Oral at bedtime      LABS:            CAPILLARY BLOOD GLUCOSE            I&O's Summary    13 Nov 2023 07:01  -  14 Nov 2023 07:00  --------------------------------------------------------  IN: 1145 mL / OUT: 760 mL / NET: 385 mL    14 Nov 2023 07:01  -  14 Nov 2023 16:45  --------------------------------------------------------  IN: 135 mL / OUT: 320 mL / NET: -185 mL      Vital Signs Last 24 Hrs  T(C): 36.4 (14 Nov 2023 15:00), Max: 36.5 (14 Nov 2023 12:05)  T(F): 97.5 (14 Nov 2023 15:00), Max: 97.7 (14 Nov 2023 12:05)  HR: 70 (14 Nov 2023 15:00) (43 - 70)  BP: 178/68 (14 Nov 2023 15:00) (136/60 - 178/68)  BP(mean): 93 (14 Nov 2023 15:00) (79 - 110)  RR: 20 (14 Nov 2023 15:00) (17 - 27)  SpO2: 98% (14 Nov 2023 15:00) (98% - 100%)    Parameters below as of 14 Nov 2023 15:00  Patient On (Oxygen Delivery Method): ventilator          Neurological Exam:  Mental Status: Intubated, not sedated. No spont eye opening. No verbal output, does not follow commands.   Cranial Nerves: pupils very sluggish R, L near fixed,no  corneals, no VOR, no facial asymmetry , weak cough/gag  Motor: dec tone throughout, no spont movemnt noted   Sensation: UE extensor posturing, LE triple flexion left toe up    I personally reviewed the below data/images/labs:      < from: CT Head No Cont (10.22.19 @ 15:57) >  IMPRESSION:    1)  chronic ischemic changes in both hemispheres with volume loss. There   are no new infarct as compared to prior CT. This may be further assessed   with MR imaging.  2)  no hemorrhage or mass identified.    < end of copied text >  < from: CT Angio Head w/ IV Cont (10.22.19 @ 15:57) >  IMPRESSION    1. There is intimal thickening and calcified plaque in both carotid   bifurcations in the neck, but without significant stenosis. No evidence   of carotid vertebral occlusion or dissection.  2. Intracranially, there are atherosclerotic changes in the anterior and   posterior circulation, but without significant stenosis or occlusion.    < end of copied text >  < from: CT Brain Stroke Protocol (10.26.23 @ 04:37) >  IMPRESSION:  No evidence of acute intracranial hemorrhage, mass effect or large acute   territorial infarct, within limits of noncontrast CT technique.    < end of copied text >  < from: CT Angio Neck Stroke Protocol w/ IV Cont (10.26.23 @ 04:53) >  CT ANGIOGRAPHY NECK:  1.  The right internal carotid artery is occluded approximately 1.5 cm   distal to its origin, possibly due to underlying dissection.  2.  Endotracheal tube is low in position, with its tip approximate 1 cm   above the slade.  3.  Diffuse ill-defined groundglass opacity in both lungs, which may   represent pulmonary edema, infection and/or atelectasis.  4.  There is a discrete right upper lobe groundglass opacity measuring   1.9 x 1.4 cm; this may represent a focus of infection, inflammation,   edema, hemorrhage, fibrosis or malignancy.  A follow-up chest CT is   warranted in three months to assess for resolution.    CT ANGIOGRAPHY BRAIN: The right internal carotid arteries occluded.    There is no significant flow related opacification within the proximal   right middle cerebral artery.  There is mild reconstitution of flow in   some distal branch vessels of the inferior division of the right middle   cerebral artery.  The right anterior cerebral artery fills across the   anterior communicating artery.    < end of copied text >  < from: CT Brain Perfusion Maps Stroke (10.26.23 @ 04:54) >  CT PERFUSION: CT perfusion is consistent with large acute infarct in the   right middle cerebral artery vascular territory and small to moderate   amount of surrounding ischemic penumbra.  Hypoperfusion index is 0.3.    Core infarct (CBF <  30%:): 136 mL  Penumbra (Tmax >6s): 194 mL  Mismatched volume: 58 mL  Mismatched ratio: 1.4    < end of copied text >      < from: CT Head No Cont (10.22.19 @ 15:57) >  IMPRESSION:    1)  chronic ischemic changes in both hemispheres with volume loss. There   are no new infarct as compared to prior CT. This may be further assessed   with MR imaging.  2)  no hemorrhage or mass identified.    < end of copied text >  < from: CT Angio Head w/ IV Cont (10.22.19 @ 15:57) >  IMPRESSION    1. There is intimal thickening and calcified plaque in both carotid   bifurcations in the neck, but without significant stenosis. No evidence   of carotid vertebral occlusion or dissection.  2. Intracranially, there are atherosclerotic changes in the anterior and   posterior circulation, but without significant stenosis or occlusion.    < end of copied text >  < from: CT Brain Stroke Protocol (10.26.23 @ 04:37) >  IMPRESSION:  No evidence of acute intracranial hemorrhage, mass effect or large acute   territorial infarct, within limits of noncontrast CT technique.    < end of copied text >  < from: CT Angio Neck Stroke Protocol w/ IV Cont (10.26.23 @ 04:53) >  CT ANGIOGRAPHY NECK:  1.  The right internal carotid artery is occluded approximately 1.5 cm   distal to its origin, possibly due to underlying dissection.  2.  Endotracheal tube is low in position, with its tip approximate 1 cm   above the slade.  3.  Diffuse ill-defined groundglass opacity in both lungs, which may   represent pulmonary edema, infection and/or atelectasis.  4.  There is a discrete right upper lobe groundglass opacity measuring   1.9 x 1.4 cm; this may represent a focus of infection, inflammation,   edema, hemorrhage, fibrosis or malignancy.  A follow-up chest CT is   warranted in three months to assess for resolution.    CT ANGIOGRAPHY BRAIN: The right internal carotid arteries occluded.    There is no significant flow related opacification within the proximal   right middle cerebral artery.  There is mild reconstitution of flow in   some distal branch vessels of the inferior division of the right middle   cerebral artery.  The right anterior cerebral artery fills across the   anterior communicating artery.    < end of copied text >  < from: CT Brain Perfusion Maps Stroke (10.26.23 @ 04:54) >  CT PERFUSION: CT perfusion is consistent with large acute infarct in the   right middle cerebral artery vascular territory and small to moderate   amount of surrounding ischemic penumbra.  Hypoperfusion index is 0.3.    Core infarct (CBF <  30%:): 136 mL  Penumbra (Tmax >6s): 194 mL  Mismatched volume: 58 mL  Mismatched ratio: 1.4    < end of copied text >      < from: CT Head No Cont (10.22.19 @ 15:57) >  IMPRESSION:    1)  chronic ischemic changes in both hemispheres with volume loss. There   are no new infarct as compared to prior CT. This may be further assessed   with MR imaging.  2)  no hemorrhage or mass identified.    < end of copied text >  < from: CT Angio Head w/ IV Cont (10.22.19 @ 15:57) >  IMPRESSION    1. There is intimal thickening and calcified plaque in both carotid   bifurcations in the neck, but without significant stenosis. No evidence   of carotid vertebral occlusion or dissection.  2. Intracranially, there are atherosclerotic changes in the anterior and   posterior circulation, but without significant stenosis or occlusion.    < end of copied text >  < from: CT Brain Stroke Protocol (10.26.23 @ 04:37) >  IMPRESSION:  No evidence of acute intracranial hemorrhage, mass effect or large acute   territorial infarct, within limits of noncontrast CT technique.    < end of copied text >  < from: CT Angio Neck Stroke Protocol w/ IV Cont (10.26.23 @ 04:53) >  CT ANGIOGRAPHY NECK:  1.  The right internal carotid artery is occluded approximately 1.5 cm   distal to its origin, possibly due to underlying dissection.  2.  Endotracheal tube is low in position, with its tip approximate 1 cm   above the slade.  3.  Diffuse ill-defined groundglass opacity in both lungs, which may   represent pulmonary edema, infection and/or atelectasis.  4.  There is a discrete right upper lobe groundglass opacity measuring   1.9 x 1.4 cm; this may represent a focus of infection, inflammation,   edema, hemorrhage, fibrosis or malignancy.  A follow-up chest CT is   warranted in three months to assess for resolution.    CT ANGIOGRAPHY BRAIN: The right internal carotid arteries occluded.    There is no significant flow related opacification within the proximal   right middle cerebral artery.  There is mild reconstitution of flow in   some distal branch vessels of the inferior division of the right middle   cerebral artery.  The right anterior cerebral artery fills across the   anterior communicating artery.    < end of copied text >  < from: CT Brain Perfusion Maps Stroke (10.26.23 @ 04:54) >  CT PERFUSION: CT perfusion is consistent with large acute infarct in the   right middle cerebral artery vascular territory and small to moderate   amount of surrounding ischemic penumbra.  Hypoperfusion index is 0.3.    Core infarct (CBF <  30%:): 136 mL  Penumbra (Tmax >6s): 194 mL  Mismatched volume: 58 mL  Mismatched ratio: 1.4    < end of copied text >    EEG Classification / Summary:  Abnormal EEG study  1. Intermittent periodic discharges seen intermittently over the left hemisphere, sometimes with triphasic morphology  2. Focal continuous right hemispheric slowing  3. Moderate generalized background slowing    -----------------------------------------------------------------------------------------------------    Clinical Impression:  1. While the periodic pattern noted above may represent lateralized periodic discharges indicating epileptic potential in the left hemisphere, more likely these represent generalized periodic discharges with triphasic morphology, consistent with metabolic etiology, that are poorly expressed on the right due to the effects of the stroke.   2. Structural abnormality in right hemisphere  3. Moderate diffuse/multi-focal cerebral dysfunction, not specific as to etiology.    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 Significant   associated mass effect and associated herniation.      < end of copied text >  < from: CT Head No Cont (11.02.23 @ 14:30) >    Reidentified are 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 There is effacement of all of the   basilar cisterns. Effacement of the sulci in the bilateral cerebri,   compatible with increased brain edema.    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    Unsuccessful attempts were made to reach the ordering clinician via teams.      --- End of Report ---    < end of copied text >

## 2023-11-15 NOTE — PROGRESS NOTE ADULT - SUBJECTIVE AND OBJECTIVE BOX
HPI:  Pt is an 89 yo F with h/o A fib on Xarelto, HTN and HLD who presented to [facility] after she was found altered, nonverbal and with right gaze deviation when she woke up. At baseline pt is ambulatory and verbal. Pt required intubation in ER for airway protection given poor MS. Pt assessed by Stroke team: DARINELAYNE ~4 hours ago, on Rivaroxaban, presents with AMS/weakness with NIHSS of 30 on ED arrival.  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  Given age, extended time window, oral anticoagulant use, large infarct, and high NIHSS, tPA relatively contraindicated/very high risk for heme and unlikely to be net beneficial.  D/w endovascular fellow Reanna, imaging reviewed, not a candidate for endovascular intervention. Pt admitted to the ICU. 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 Significant associated mass effect R -> L and associated herniation.       ## Labs:  CBC:                        11.4   6.94  )-----------( 478      ( 15 Nov 2023 02:15 )             37.7     Chem:  11-15    145  |  111<H>  |  20  ----------------------------<  153<H>  4.0   |  31  |  0.94    Ca    9.2      15 Nov 2023 02:15  Phos  3.9     11-15  Mg     2.3     -15    TPro  8.0  /  Alb  2.4<L>  /  TBili  0.8  /  DBili  x   /  AST  132<H>  /  ALT  70  /  AlkPhos  81  11-15    Coags:          ## Imaging:    ## Medications:    amLODIPine   Tablet 5 milliGRAM(s) Oral daily  hydrALAZINE 100 milliGRAM(s) Oral every 8 hours      atorvastatin 80 milliGRAM(s) Oral at bedtime    aspirin  chewable 81 milliGRAM(s) Oral daily  enoxaparin Injectable 40 milliGRAM(s) SubCutaneous every 24 hours    bisacodyl Suppository 10 milliGRAM(s) Rectal daily PRN  senna 2 Tablet(s) Oral at bedtime    acetaminophen     Tablet .. 650 milliGRAM(s) Oral every 6 hours PRN      ## Vitals:  T(C): 36.1 (11-15-23 @ 07:17), Max: 36.6 (23 @ 19:20)  HR: 50 (11-15-23 @ 12:00) (46 - 70)  BP: 150/64 (11-15-23 @ 12:00) (135/58 - 178/68)  BP(mean): 90 (11-15-23 @ 12:00) (77 - 110)  RR: 17 (11-15-23 @ 12:00) (12 - 32)  SpO2: 100% (11-15-23 @ 11:55) (98% - 100%)  Wt(kg): --  Vent: Mode: AC/ CMV (Assist Control/ Continuous Mandatory Ventilation), RR (machine): 12, RR (patient): 28, TV (machine): 400, FiO2: 21, PEEP: 5, PIP: 32  AB-14 @ 07:01  -  11-15 @ 07:00  --------------------------------------------------------  IN: 870 mL / OUT: 750 mL / NET: 120 mL    11-15 @ 07:01  -  11-15 @ 14:01  --------------------------------------------------------  IN: 175 mL / OUT: 45 mL / NET: 130 mL          ## P/E:  Gen: lying comfortably in bed in no apparent distress  Mouth: (+) ETT/OGT  Lungs: CTA  Heart: Bryce  Abd: Soft/+BS/ Non-tender  Ext: L hand edema  Neuro: Pupils fixed and dilated/ No gag reflex/ weak cough reflex/ ? decerebrate posturing to pain/ (+) spont resp    CENTRAL LINE: [ ] YES [ ] NO  LOCATION:   DATE INSERTED:  REMOVE: [ ] YES [ ] NO      AMANDA: [ ] YES [ ] NO    DATE INSERTED:  REMOVE:  [ ] YES [ ] NO      A-LINE:  [ ] YES [ ] NO  LOCATION:   DATE INSERTED:  REMOVE:  [ ] YES [ ] NO  EXPLAIN:    CODE STATUS: [x ] full code  [ ] DNR  [ ] DNI  [ ] MOLST  Goals of care discussion: [ ] yes

## 2023-11-15 NOTE — PROGRESS NOTE ADULT - ASSESSMENT
Pt is an 87 yo F with h/o A fib on Xarelto, HTN and HLD who presented to [facility] after she was found altered, nonverbal and with right gaze deviation when she woke up. At baseline pt is ambulatory and verbal. Pt required intubation in ER for airway protection given poor MS. Pt assessed by Stroke team: GIO ~4 hours ago, on Rivaroxaban, presents with AMS/weakness with NIHSS of 30 on ED arrival.  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  Given age, extended time window, oral anticoagulant use, large infarct, and high NIHSS, tPA relatively contraindicated/very high risk for heme and unlikely to be net beneficial.  D/w endovascular fellow Reanna, imaging reviewed, not a candidate for endovascular intervention. Pt admitted to the ICU. 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 Significant associated mass effect R -> L and associated herniation.     Resp: Cont current vent settings  CVS: Cont antiHTN meds  HEME: DVT prophylaxis with Lovenox  FEN: Cont enteral feeds  Endo: Follow FS  Renal: Follow BUN/Cr and UO  Neuro/Social: Supportive care; pt with extremely poor neurological prognosis; cont GOC pt is DNR but family has not made a decision regarding palliative extubation vs long term care with trach + PEG/ Today 11/15 spoke to pt's daughter who still says the family is unable to make any decision in regards to GOC

## 2023-11-16 NOTE — PROGRESS NOTE ADULT - SUBJECTIVE AND OBJECTIVE BOX
HPI:  Pt is an 89 yo F with h/o A fib on Xarelto, HTN and HLD who presented to [facility] after she was found altered, nonverbal and with right gaze deviation when she woke up. At baseline pt is ambulatory and verbal. Pt required intubation in ER for airway protection given poor MS. Pt assessed by Stroke team: GIO ~4 hours ago, on Rivaroxaban, presents with AMS/weakness with NIHSS of 30 on ED arrival.  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  Given age, extended time window, oral anticoagulant use, large infarct, and high NIHSS, tPA relatively contraindicated/very high risk for heme and unlikely to be net beneficial.  D/w endovascular fellow Reanna, imaging reviewed, not a candidate for endovascular intervention. Pt admitted to the ICU. 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 Significant associated mass effect R -> L and associated herniation.       ## Labs:  CBC:                        11.4   6.94  )-----------( 478      ( 15 Nov 2023 02:15 )             37.7     Chem:  11-15    145  |  111<H>  |  20  ----------------------------<  153<H>  4.0   |  31  |  0.94    Ca    9.2      15 Nov 2023 02:15  Phos  3.9     11-15  Mg     2.3     11-15    TPro  8.0  /  Alb  2.4<L>  /  TBili  0.8  /  DBili  x   /  AST  132<H>  /  ALT  70  /  AlkPhos  81  11-15    ## Medications:    amLODIPine   Tablet 5 milliGRAM(s) Oral daily  hydrALAZINE 100 milliGRAM(s) Oral every 8 hours      atorvastatin 80 milliGRAM(s) Oral at bedtime    aspirin  chewable 81 milliGRAM(s) Oral daily  enoxaparin Injectable 40 milliGRAM(s) SubCutaneous every 24 hours    bisacodyl Suppository 10 milliGRAM(s) Rectal daily PRN  senna 2 Tablet(s) Oral at bedtime    acetaminophen     Tablet .. 650 milliGRAM(s) Oral every 6 hours PRN      ## O/E:  ICU Vital Signs Last 24 Hrs  T(C): 37.4 (17 Nov 2023 00:00), Max: 37.4 (17 Nov 2023 00:00)  T(F): 99.4 (17 Nov 2023 00:00), Max: 99.4 (17 Nov 2023 00:00)  HR: 58 (17 Nov 2023 01:04) (45 - 70)  BP: 153/69 (17 Nov 2023 00:00) (143/76 - 173/64)  BP(mean): 93 (17 Nov 2023 00:00) (80 - 104)  RR: 17 (17 Nov 2023 00:00) (14 - 22)  SpO2: 99% (17 Nov 2023 01:04) (99% - 100%)  O2 Parameters below as of 17 Nov 2023 00:00 / Patient On (Oxygen Delivery Method): ventilator / O2 Concentration (%): 21  IN: 890 mL / OUT: 710 mL / NET: 180 mL    Mode: AC/ CMV (Assist Control/ Continuous Mandatory Ventilation), RR (machine): 12, TV (machine): 400, FiO2: 21, PEEP: 5, ITime: 0.9, MAP: 8, PIP: 17  Gen: orotracheally intubated on full vent  HEENT: PERRL  Resp: mechanical breath sounds B/L  CVS: S1S2 no m/r/g  Abd: soft NT/ND +BS  Ext: no c/c/e  Neuro: breathes over vent, withdraws to pain (?posturing)    CENTRAL LINE: [ ] YES [ ] NO  LOCATION:   DATE INSERTED:  REMOVE: [ ] YES [ ] NO      PATEL: [ ] YES [ ] NO    DATE INSERTED:  REMOVE:  [ ] YES [ ] NO      A-LINE:  [ ] YES [ ] NO  LOCATION:   DATE INSERTED:  REMOVE:  [ ] YES [ ] NO  EXPLAIN:    CODE STATUS: [x ] full code  [ ] DNR  [ ] DNI  [ ] [facility]OLST  Goals of care discussion: [ ] yes

## 2023-11-16 NOTE — PROGRESS NOTE ADULT - ASSESSMENT
Pt is an 89 yo F with h/o A fib on Xarelto, HTN and HLD who presented to [facility] after she was found altered, nonverbal and with right gaze deviation when she woke up. At baseline pt is ambulatory and verbal. Pt required intubation in ER for airway protection given poor MS. Pt assessed by Stroke team: GIO ~4 hours ago, on Rivaroxaban, presents with AMS/weakness with NIHSS of 30 on ED arrival.  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  Given age, extended time window, oral anticoagulant use, large infarct, and high NIHSS, tPA relatively contraindicated/very high risk for heme and unlikely to be net beneficial.  D/w endovascular fellow Reanna, imaging reviewed, not a candidate for endovascular intervention. Pt admitted to the ICU. 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 Significant associated mass effect R -> L and associated herniation.   - Remains on vent: 12/400/21%/+5, breathing 20  - No possibility of extubation because of mental status, which is very poor  - c/w tube feeds  - DVT ppx: Lovenox  - Code status: DNR; GOC ongoing; family does not wish trache, but is refusing to consent to palliative wean. Prolonged orotracheal intubation does not meet standards of care, and the chance of complications such as laryngeal injury, ventilator-associated pneumonia, among other reasons. Ethics following.    I have personally provided 45 minutes of critical care time.

## 2023-11-17 NOTE — CONSULT NOTE ADULT - SUBJECTIVE AND OBJECTIVE BOX
88 year old female with altered mental status, non verbal, and right gaze deviation. Past medical history of hypertension, hyperlipidemia, atrial fibrillation on anticoagulation.      11/2/2023 CT scan of the head revealed-    "Reidentified are large subacute MCA and JIMMY infarcts throughout the right frontal parietal and temporal lobes and right basalganglia, with severe right to left midline shift and severe mass effect upon the ventricular system, not significantly changed. There is effacement of all of the basilar cisterns. Effacement of the sulci in the bilateral cerebri, compatible with increased brain edema. There is hypoattenuation within the right occipital lobe, markedly worsened as compared to the prior exam, compatible with evolution of an acute right PCA infarct."    Goals of care conversations have resulted in DNR, but family has indicated that they are uncomfortable with withdrawing life sustaining measures, and know that the patient would not want to exist with a tracheostomy and PEG.  Some of the language the adult children are using is "we don't want to be considered murderers. We are leaving this in God's hands. Let he stay on the ventilator until God takes her."  Ethics assistance requested.     Follow up to initial consult still pending family conversations      Prognosis Estimate (survival in hrs, days, wks, mos, yrs):  days to weeks  Patient Decision-Making Capacity NO -- devastating CVA  Patient Aware of: Diagnosis: NO  Name of medical decision-maker should patient lack capacity (relationship):  Nayla Thomas [phone]  Nickie [phone], Otoniel- son pending contact info  Role: Health Care Agent Legal Surrogate Contact #(s)   Other Decision-Maker (i.e., HCA or Surrogate) Aware of: Diagnosis:	 Prognosis:   Other Stake-Holders-    Evidence of Patient’s Preference of Life-Sustaining Treatment (Written or Oral):    Resuscitation status: DNR: YES  DNI: currently intubated    DISCUSSIONS:  88 year old female with altered mental status, non verbal, and right gaze deviation. Past medical history of hypertension, hyperlipidemia, atrial fibrillation on anticoagulation.      2023 CT scan of the head revealed-    "Reidentified are large subacute MCA and JIMMY infarcts throughout the right frontal parietal and temporal lobes and right basalganglia, with severe right to left midline shift and severe mass effect upon the ventricular system, not significantly changed. There is effacement of all of the basilar cisterns. Effacement of the sulci in the bilateral cerebri, compatible with increased brain edema. There is hypoattenuation within the right occipital lobe, markedly worsened as compared to the prior exam, compatible with evolution of an acute right PCA infarct."    Goals of care conversations have resulted in DNR, but family has indicated that they are uncomfortable with withdrawing life sustaining measures, and know that the patient would not want to exist with a tracheostomy and PEG.  Some of the language the adult children are using is "we don't want to be considered murderers. We are leaving this in God's hands. Let he stay on the ventilator until God takes her."  Ethics assistance requested.     Follow up to initial consult still pending family conversations      Prognosis Estimate (survival in hrs, days, wks, mos, yrs):  days to weeks  Patient Decision-Making Capacity NO -- devastating CVA  Patient Aware of: Diagnosis: NO  Name of medical decision-maker should patient lack capacity (relationship):  Nayla Thomas [phone]  Nickie [phone], Otoniel- son pending contact info  Role: Health Care Agent Legal Surrogate Contact #(s)   Other Decision-Maker (i.e., HCA or Surrogate) Aware of: Diagnosis:	 Prognosis:   Other Stake-Holders-    Evidence of Patient’s Preference of Life-Sustaining Treatment (Written or Oral):    Resuscitation status: DNR: YES  DNI: currently intubated    DISCUSSIONS: [id number] Met with Nurse Manager, Kaykay Ortega while waiting for the patient's daughter to come in. KRYSTAL Ortega imparted that she had an extensive conversation on  with Nickie (daughter) who explained that the patient's sister in [place] passed away this week and the family is going to [place] for the  for the weekend.  Daughter indicated she won't be in today. Tried to leave a message on her voicemail, but it is full. Left contact information at bedside and will revisit on Monday.

## 2023-11-17 NOTE — CONSULT NOTE ADULT - ASSESSMENT
BIOETHICS ANALYSIS     BIOETHICS ANALYSIS    The central ethical issue that exists in this case is (A) the patient’s autonomy versus (B) the providers’ desire for beneficence and non-maleficence. Autonomy centers on letting patients with capacity decide what is best for their health after being fully informed of the options available, as well as the risks and benefits of those options. Non-maleficence focuses on the clinician’s desire to do no harm. Similarly, beneficence focuses on the clinicians’ desire to do what is best for their patient, requiring that health care providers consider what is best for the patient given the individual patient’s unique circumstances.  In this particular case, the patient is without capacity due to a devastating stroke and her five children are now her surrogate decision makers.  As this family is now grappling with the death of the patient's sister and have traveled to [place] for the , ethics will revisit the conversations needed after the weekend.      Breana Ernst D.Min., RN-BSN, [facility]-  Medical Ethics   [phone]

## 2023-11-17 NOTE — PROGRESS NOTE ADULT - SUBJECTIVE AND OBJECTIVE BOX
HPI:  Pt is an 87 yo F with h/o A fib on Xarelto, HTN and HLD who presented to [facility] after she was found altered, nonverbal and with right gaze deviation when she woke up. At baseline pt is ambulatory and verbal. Pt required intubation in ER for airway protection given poor MS. Pt assessed by Stroke team: GIO ~4 hours ago, on Rivaroxaban, presents with AMS/weakness with NIHSS of 30 on ED arrival.  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  Given age, extended time window, oral anticoagulant use, large infarct, and high NIHSS, tPA relatively contraindicated/very high risk for heme and unlikely to be net beneficial.  D/w endovascular fellow Reanna, imaging reviewed, not a candidate for endovascular intervention. Pt admitted to the ICU. 10/30/2023 pt noted with change in neuro exam and sent urgent for CT head: large acute infarcts right IJMMY and MCA territories. Significant associated mass effect R -> L and associated herniation.       ## Labs:  ** CBC: **    ** Chem:  **        ** Coags: **      CAPILLARY BLOOD GLUCOSE            ## Medications:  amLODIPine   Tablet 5 milliGRAM(s) Oral daily  hydrALAZINE 100 milliGRAM(s) Oral every 8 hours        acetaminophen     Tablet .. 650 milliGRAM(s) Oral every 6 hours PRN      aspirin  chewable 81 milliGRAM(s) Oral daily  enoxaparin Injectable 40 milliGRAM(s) SubCutaneous every 24 hours    bisacodyl Suppository 10 milliGRAM(s) Rectal daily PRN  senna 2 Tablet(s) Oral at bedtime      atorvastatin 80 milliGRAM(s) Oral at bedtime    influenza  Vaccine (HIGH DOSE) 0.7 milliLiter(s) IntraMuscular once        # Imaging:  No new pertinent imaging    ## O/E:  T(F): 100 (11-17-23 @ 12:00), Max: 100 (11-17-23 @ 12:00)  HR: 70 (11-17-23 @ 13:00) (53 - 70)  BP: 151/58 (11-17-23 @ 13:00) (141/67 - 167/79)  ABP: --  ABP(mean): --  RR: 20 (11-17-23 @ 13:00) (15 - 28)  SpO2: 99% (11-17-23 @ 13:00) (98% - 100%)    11-16 @ 07:01  -  11-17 @ 07:00  --------------------------------------------------------  IN: 1285 mL / OUT: 830 mL / NET: 455 mL      Mode: AC/ CMV (Assist Control/ Continuous Mandatory Ventilation), RR (machine): 12, TV (machine): 400, FiO2: 21, PEEP: 5, ITime: 1, MAP: 8, PIP: 17  Gen: orotracheally intubated on full vent  HEENT: PERRL  Resp: mechanical breath sounds B/L  CVS: S1S2 no m/r/g  Abd: soft NT/ND +BS  Ext: no c/c/e  Neuro: breathes over vent, withdraws to pain (?posturing)    CENTRAL LINE: [ ] YES [ ] NO  LOCATION:   DATE INSERTED:  REMOVE: [ ] YES [ ] NO      AMANDA: [ ] YES [ ] NO    DATE INSERTED:  REMOVE:  [ ] YES [ ] NO      A-LINE:  [ ] YES [ ] NO  LOCATION:   DATE INSERTED:  REMOVE:  [ ] YES [ ] NO  EXPLAIN:    CODE STATUS: [x ] full code  [ ] DNR  [ ] DNI  [ ] [facility]  Goals of care discussion: [ ] yes

## 2023-11-17 NOTE — PROGRESS NOTE ADULT - ASSESSMENT
88F PMH AF on Xarelto, HTN and HLD who presented to [facility] after she was found altered, nonverbal and with right gaze deviation when she woke up. At baseline pt is ambulatory and verbal. Pt required intubation in ER for airway protection given poor MS. Pt assessed by Stroke team: GIO ~4 hours ago, on Rivaroxaban, presents with AMS/weakness with NIHSS of 30 on ED arrival.  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  Given age, extended time window, oral anticoagulant use, large infarct, and high NIHSS, tPA relatively contraindicated/very high risk for heme and unlikely to be net beneficial.  D/w endovascular fellow Reanna, imaging reviewed, not a candidate for endovascular intervention. Pt admitted to the ICU. 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 Significant associated mass effect R -> L and associated herniation.   - Remains on vent: 12/400/21%/+5, breathing 20  - No possibility of extubation because of mental status, which is very poor  - c/w tube feeds  - DVT ppx: Lovenox  - Code status: DNR; GOC ongoing; family does not wish trache, but is refusing to consent to palliative wean. Prolonged orotracheal intubation does not meet standards of care, and the chance of complications such as laryngeal injury, ventilator-associated pneumonia, among other reasons. Ethics following: patient's family is all currently in [place] due to death of relative.    I have personally provided 45 minutes of critical care time.

## 2023-11-18 NOTE — PROGRESS NOTE ADULT - ASSESSMENT
88 year old female with a PMH of HTN, HLD and afib on xarelto who did not take medications for past 1 week prior to hospitalization here with R MCA infarct.   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

## 2023-11-18 NOTE — PROGRESS NOTE ADULT - SUBJECTIVE AND OBJECTIVE BOX
HPI:  Pt is an 89 yo F with h/o A fib on Xarelto, HTN and HLD who presented to [facility] after she was found altered, nonverbal and with right gaze deviation when she woke up. At baseline pt is ambulatory and verbal. Pt required intubation in ER for airway protection given poor MS. Pt assessed by Stroke team: GIO ~4 hours ago, on Rivaroxaban, presents with AMS/weakness with NIHSS of 30 on ED arrival.  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  Given age, extended time window, oral anticoagulant use, large infarct, and high NIHSS, tPA relatively contraindicated/very high risk for heme and unlikely to be net beneficial.  D/w endovascular fellow Reanna, imaging reviewed, not a candidate for endovascular intervention. Pt admitted to the ICU. 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 Significant associated mass effect R -> L and associated herniation.     O/N: Remains intubated on full vent support. Failed SBT this AM    ROS:  Unobtainable due to mental status    ## Labs:  No new labs    ## Medications:  amLODIPine   Tablet 5 milliGRAM(s) Oral daily  hydrALAZINE 100 milliGRAM(s) Oral every 8 hours        acetaminophen     Tablet .. 650 milliGRAM(s) Oral every 6 hours PRN      aspirin  chewable 81 milliGRAM(s) Oral daily  enoxaparin Injectable 40 milliGRAM(s) SubCutaneous every 24 hours    bisacodyl Suppository 10 milliGRAM(s) Rectal daily PRN  senna 2 Tablet(s) Oral at bedtime      atorvastatin 80 milliGRAM(s) Oral at bedtime    influenza  Vaccine (HIGH DOSE) 0.7 milliLiter(s) IntraMuscular once        # Imaging:  No new pertinent imaging    ## O/E:  T(F): 97.6 (11-18-23 @ 13:00), Max: 98.6 (11-17-23 @ 20:00)  HR: 53 (11-18-23 @ 16:12) (49 - 69)  BP: 135/64 (11-18-23 @ 16:00) (135/64 - 158/72)  ABP: --  ABP(mean): --  RR: 13 (11-18-23 @ 16:00) (13 - 23)  SpO2: 99% (11-18-23 @ 16:12) (94% - 100%)    11-17 @ 07:01  -  11-18 @ 07:00  --------------------------------------------------------  IN: 1325 mL / OUT: 760 mL / NET: 565 mL    11-18 @ 07:01  -  11-18 @ 16:52  --------------------------------------------------------  IN: 260 mL / OUT: 250 mL / NET: 10 mL      Mode: AC/ CMV (Assist Control/ Continuous Mandatory Ventilation), RR (machine): 12, TV (machine): 400, FiO2: 21, PEEP: 5, ITime: 1, MAP: 9, PIP: 21  Gen: orotracheally intubated on full vent  HEENT: PERRL  Resp: mechanical breath sounds B/L  CVS: S1S2 no m/r/g  Abd: soft NT/ND +BS  Ext: no c/c/e  Neuro: breathes over vent, withdraws to pain (?posturing)    CENTRAL LINE: [ ] YES [ ] NO  LOCATION:   DATE INSERTED:  REMOVE: [ ] YES [ ] NO      PATEL: [ ] YES [ ] NO    DATE INSERTED:  REMOVE:  [ ] YES [ ] NO      A-LINE:  [ ] YES [ ] NO  LOCATION:   DATE INSERTED:  REMOVE:  [ ] YES [ ] NO  EXPLAIN:    CODE STATUS: [x ] full code  [ ] DNR  [ ] DNI  [ ] [facility]  Goals of care discussion: [ ] yes

## 2023-11-18 NOTE — PROGRESS NOTE ADULT - ASSESSMENT
88F PMH AF on Xarelto, HTN and HLD who presented to [facility] after she was found altered, nonverbal and with right gaze deviation when she woke up. At baseline pt is ambulatory and verbal. Pt required intubation in ER for airway protection given poor MS. Pt assessed by Stroke team: GIO ~4 hours ago, on Rivaroxaban, presents with AMS/weakness with NIHSS of 30 on ED arrival.  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  Given age, extended time window, oral anticoagulant use, large infarct, and high NIHSS, tPA relatively contraindicated/very high risk for heme and unlikely to be net beneficial.  D/w endovascular fellow Raenna, imaging reviewed, not a candidate for endovascular intervention. Pt admitted to the ICU. 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 Significant associated mass effect R -> L and associated herniation.   - Remains on vent: 12/400/21%/+5, breathing 16. Failed SBT today after 30 minutes.  - No possibility of extubation because of mental status, which is very poor  - c/w tube feeds  - DVT ppx: Lovenox  - Code status: DNR; GOC ongoing; family does not wish trache, but is refusing to consent to palliative wean. Prolonged orotracheal intubation does not meet standards of care, and the chance of complications such as laryngeal injury, ventilator-associated pneumonia, among other reasons. Ethics following: patient's family is all currently in [place] due to death of relative, plan for teleconference on Monday or Tuesday.    I have personally provided 45 minutes of critical care time.

## 2023-11-18 NOTE — PROGRESS NOTE ADULT - SUBJECTIVE AND OBJECTIVE BOX
Neurology Progress Note    S: Patient seen and examined.     MEDICATIONS:    acetaminophen     Tablet .. 650 milliGRAM(s) Oral every 6 hours PRN  amLODIPine   Tablet 5 milliGRAM(s) Oral daily  aspirin  chewable 81 milliGRAM(s) Oral daily  atorvastatin 80 milliGRAM(s) Oral at bedtime  bacitracin   Ointment 1 Application(s) Topical every 12 hours  bisacodyl Suppository 10 milliGRAM(s) Rectal daily PRN  chlorhexidine 0.12% Liquid 15 milliLiter(s) Oral Mucosa every 12 hours  chlorhexidine 2% Cloths 1 Application(s) Topical <User Schedule>  enoxaparin Injectable 40 milliGRAM(s) SubCutaneous every 24 hours  hydrALAZINE 100 milliGRAM(s) Oral every 8 hours  influenza  Vaccine (HIGH DOSE) 0.7 milliLiter(s) IntraMuscular once  senna 2 Tablet(s) Oral at bedtime      LABS:            CAPILLARY BLOOD GLUCOSE            I&O's Summary    17 Nov 2023 07:01  -  18 Nov 2023 07:00  --------------------------------------------------------  IN: 1325 mL / OUT: 760 mL / NET: 565 mL    18 Nov 2023 07:01  -  18 Nov 2023 22:49  --------------------------------------------------------  IN: 455 mL / OUT: 315 mL / NET: 140 mL      Vital Signs Last 24 Hrs  T(C): 36.7 (18 Nov 2023 19:33), Max: 36.7 (18 Nov 2023 19:33)  T(F): 98 (18 Nov 2023 19:33), Max: 98 (18 Nov 2023 19:33)  HR: 56 (18 Nov 2023 21:10) (46 - 69)  BP: 150/68 (18 Nov 2023 20:00) (131/61 - 158/72)  BP(mean): 91 (18 Nov 2023 20:00) (81 - 96)  RR: 18 (18 Nov 2023 20:00) (13 - 23)  SpO2: 98% (18 Nov 2023 21:10) (94% - 100%)    Parameters below as of 18 Nov 2023 19:33  Patient On (Oxygen Delivery Method): ventilator, 400/5/12    O2 Concentration (%): 21      Neurological Exam:  Mental Status: Intubated, not sedated. No spont eye opening. No verbal output, does not follow commands.   Cranial Nerves: pupils very sluggish R, L near fixed,no  corneals, no VOR, no facial asymmetry , weak cough/gag  Motor: dec tone throughout, no spont movemnt noted   Sensation: UE extensor posturing, LE triple flexion left toe up    I personally reviewed the below data/images/labs:      < from: CT Head No Cont (10.22.19 @ 15:57) >  IMPRESSION:    1)  chronic ischemic changes in both hemispheres with volume loss. There   are no new infarct as compared to prior CT. This may be further assessed   with MR imaging.  2)  no hemorrhage or mass identified.    < end of copied text >  < from: CT Angio Head w/ IV Cont (10.22.19 @ 15:57) >  IMPRESSION    1. There is intimal thickening and calcified plaque in both carotid   bifurcations in the neck, but without significant stenosis. No evidence   of carotid vertebral occlusion or dissection.  2. Intracranially, there are atherosclerotic changes in the anterior and   posterior circulation, but without significant stenosis or occlusion.    < end of copied text >  < from: CT Brain Stroke Protocol (10.26.23 @ 04:37) >  IMPRESSION:  No evidence of acute intracranial hemorrhage, mass effect or large acute   territorial infarct, within limits of noncontrast CT technique.    < end of copied text >  < from: CT Angio Neck Stroke Protocol w/ IV Cont (10.26.23 @ 04:53) >  CT ANGIOGRAPHY NECK:  1.  The right internal carotid artery is occluded approximately 1.5 cm   distal to its origin, possibly due to underlying dissection.  2.  Endotracheal tube is low in position, with its tip approximate 1 cm   above the slade.  3.  Diffuse ill-defined groundglass opacity in both lungs, which may   represent pulmonary edema, infection and/or atelectasis.  4.  There is a discrete right upper lobe groundglass opacity measuring   1.9 x 1.4 cm; this may represent a focus of infection, inflammation,   edema, hemorrhage, fibrosis or malignancy.  A follow-up chest CT is   warranted in three months to assess for resolution.    CT ANGIOGRAPHY BRAIN: The right internal carotid arteries occluded.    There is no significant flow related opacification within the proximal   right middle cerebral artery.  There is mild reconstitution of flow in   some distal branch vessels of the inferior division of the right middle   cerebral artery.  The right anterior cerebral artery fills across the   anterior communicating artery.    < end of copied text >  < from: CT Brain Perfusion Maps Stroke (10.26.23 @ 04:54) >  CT PERFUSION: CT perfusion is consistent with large acute infarct in the   right middle cerebral artery vascular territory and small to moderate   amount of surrounding ischemic penumbra.  Hypoperfusion index is 0.3.    Core infarct (CBF <  30%:): 136 mL  Penumbra (Tmax >6s): 194 mL  Mismatched volume: 58 mL  Mismatched ratio: 1.4    < end of copied text >      < from: CT Head No Cont (10.22.19 @ 15:57) >  IMPRESSION:    1)  chronic ischemic changes in both hemispheres with volume loss. There   are no new infarct as compared to prior CT. This may be further assessed   with MR imaging.  2)  no hemorrhage or mass identified.    < end of copied text >  < from: CT Angio Head w/ IV Cont (10.22.19 @ 15:57) >  IMPRESSION    1. There is intimal thickening and calcified plaque in both carotid   bifurcations in the neck, but without significant stenosis. No evidence   of carotid vertebral occlusion or dissection.  2. Intracranially, there are atherosclerotic changes in the anterior and   posterior circulation, but without significant stenosis or occlusion.    < end of copied text >  < from: CT Brain Stroke Protocol (10.26.23 @ 04:37) >  IMPRESSION:  No evidence of acute intracranial hemorrhage, mass effect or large acute   territorial infarct, within limits of noncontrast CT technique.    < end of copied text >  < from: CT Angio Neck Stroke Protocol w/ IV Cont (10.26.23 @ 04:53) >  CT ANGIOGRAPHY NECK:  1.  The right internal carotid artery is occluded approximately 1.5 cm   distal to its origin, possibly due to underlying dissection.  2.  Endotracheal tube is low in position, with its tip approximate 1 cm   above the slade.  3.  Diffuse ill-defined groundglass opacity in both lungs, which may   represent pulmonary edema, infection and/or atelectasis.  4.  There is a discrete right upper lobe groundglass opacity measuring   1.9 x 1.4 cm; this may represent a focus of infection, inflammation,   edema, hemorrhage, fibrosis or malignancy.  A follow-up chest CT is   warranted in three months to assess for resolution.    CT ANGIOGRAPHY BRAIN: The right internal carotid arteries occluded.    There is no significant flow related opacification within the proximal   right middle cerebral artery.  There is mild reconstitution of flow in   some distal branch vessels of the inferior division of the right middle   cerebral artery.  The right anterior cerebral artery fills across the   anterior communicating artery.    < end of copied text >  < from: CT Brain Perfusion Maps Stroke (10.26.23 @ 04:54) >  CT PERFUSION: CT perfusion is consistent with large acute infarct in the   right middle cerebral artery vascular territory and small to moderate   amount of surrounding ischemic penumbra.  Hypoperfusion index is 0.3.    Core infarct (CBF <  30%:): 136 mL  Penumbra (Tmax >6s): 194 mL  Mismatched volume: 58 mL  Mismatched ratio: 1.4    < end of copied text >      < from: CT Head No Cont (10.22.19 @ 15:57) >  IMPRESSION:    1)  chronic ischemic changes in both hemispheres with volume loss. There   are no new infarct as compared to prior CT. This may be further assessed   with MR imaging.  2)  no hemorrhage or mass identified.    < end of copied text >  < from: CT Angio Head w/ IV Cont (10.22.19 @ 15:57) >  IMPRESSION    1. There is intimal thickening and calcified plaque in both carotid   bifurcations in the neck, but without significant stenosis. No evidence   of carotid vertebral occlusion or dissection.  2. Intracranially, there are atherosclerotic changes in the anterior and   posterior circulation, but without significant stenosis or occlusion.    < end of copied text >  < from: CT Brain Stroke Protocol (10.26.23 @ 04:37) >  IMPRESSION:  No evidence of acute intracranial hemorrhage, mass effect or large acute   territorial infarct, within limits of noncontrast CT technique.    < end of copied text >  < from: CT Angio Neck Stroke Protocol w/ IV Cont (10.26.23 @ 04:53) >  CT ANGIOGRAPHY NECK:  1.  The right internal carotid artery is occluded approximately 1.5 cm   distal to its origin, possibly due to underlying dissection.  2.  Endotracheal tube is low in position, with its tip approximate 1 cm   above the slade.  3.  Diffuse ill-defined groundglass opacity in both lungs, which may   represent pulmonary edema, infection and/or atelectasis.  4.  There is a discrete right upper lobe groundglass opacity measuring   1.9 x 1.4 cm; this may represent a focus of infection, inflammation,   edema, hemorrhage, fibrosis or malignancy.  A follow-up chest CT is   warranted in three months to assess for resolution.    CT ANGIOGRAPHY BRAIN: The right internal carotid arteries occluded.    There is no significant flow related opacification within the proximal   right middle cerebral artery.  There is mild reconstitution of flow in   some distal branch vessels of the inferior division of the right middle   cerebral artery.  The right anterior cerebral artery fills across the   anterior communicating artery.    < end of copied text >  < from: CT Brain Perfusion Maps Stroke (10.26.23 @ 04:54) >  CT PERFUSION: CT perfusion is consistent with large acute infarct in the   right middle cerebral artery vascular territory and small to moderate   amount of surrounding ischemic penumbra.  Hypoperfusion index is 0.3.    Core infarct (CBF <  30%:): 136 mL  Penumbra (Tmax >6s): 194 mL  Mismatched volume: 58 mL  Mismatched ratio: 1.4    < end of copied text >    EEG Classification / Summary:  Abnormal EEG study  1. Intermittent periodic discharges seen intermittently over the left hemisphere, sometimes with triphasic morphology  2. Focal continuous right hemispheric slowing  3. Moderate generalized background slowing    -----------------------------------------------------------------------------------------------------    Clinical Impression:  1. While the periodic pattern noted above may represent lateralized periodic discharges indicating epileptic potential in the left hemisphere, more likely these represent generalized periodic discharges with triphasic morphology, consistent with metabolic etiology, that are poorly expressed on the right due to the effects of the stroke.   2. Structural abnormality in right hemisphere  3. Moderate diffuse/multi-focal cerebral dysfunction, not specific as to etiology.    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 Significant   associated mass effect and associated herniation.      < end of copied text >  < from: CT Head No Cont (11.02.23 @ 14:30) >    Reidentified are 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 There is effacement of all of the   basilar cisterns. Effacement of the sulci in the bilateral cerebri,   compatible with increased brain edema.    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    Unsuccessful attempts were made to reach the ordering clinician via teams.      --- End of Report ---    < end of copied text >

## 2023-11-19 NOTE — PROGRESS NOTE ADULT - ASSESSMENT
88F PMH AF on Xarelto, HTN and HLD who presented to [facility] after she was found altered, nonverbal and with right gaze deviation when she woke up. At baseline pt is ambulatory and verbal. Pt required intubation in ER for airway protection given poor MS. Pt assessed by Stroke team: GIO ~4 hours ago, on Rivaroxaban, presents with AMS/weakness with NIHSS of 30 on ED arrival.  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  Given age, extended time window, oral anticoagulant use, large infarct, and high NIHSS, tPA relatively contraindicated/very high risk for heme and unlikely to be net beneficial.  D/w endovascular fellow Reanna, imaging reviewed, not a candidate for endovascular intervention. Pt admitted to the ICU. 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 Significant associated mass effect R -> L and associated herniation.   - Remains on vent: 12/400/21%/+5, breathing 16. Failed SBT today after 30 minutes.  - No possibility of extubation because of mental status, which is very poor  - c/w tube feeds  - DVT ppx: Lovenox  - Code status: DNR; GOC ongoing; family does not wish trache, but is refusing to consent to palliative wean. Prolonged orotracheal intubation does not meet standards of care, and the chance of complications such as laryngeal injury, ventilator-associated pneumonia, among other reasons. Ethics following: patient's family is all currently in [place] due to death of relative, plan for teleconference on Monday or Tuesday.    I have personally provided 45 minutes of critical care time.

## 2023-11-19 NOTE — PROGRESS NOTE ADULT - SUBJECTIVE AND OBJECTIVE BOX
Neurology Progress Note    S: Patient seen and examined.     MEDICATIONS:    acetaminophen     Tablet .. 650 milliGRAM(s) Oral every 6 hours PRN  amLODIPine   Tablet 5 milliGRAM(s) Oral daily  aspirin  chewable 81 milliGRAM(s) Oral daily  atorvastatin 80 milliGRAM(s) Oral at bedtime  bacitracin   Ointment 1 Application(s) Topical every 12 hours  bisacodyl Suppository 10 milliGRAM(s) Rectal daily PRN  chlorhexidine 0.12% Liquid 15 milliLiter(s) Oral Mucosa every 12 hours  chlorhexidine 2% Cloths 1 Application(s) Topical <User Schedule>  enoxaparin Injectable 40 milliGRAM(s) SubCutaneous every 24 hours  hydrALAZINE 100 milliGRAM(s) Oral every 8 hours  influenza  Vaccine (HIGH DOSE) 0.7 milliLiter(s) IntraMuscular once  senna 2 Tablet(s) Oral at bedtime      LABS:            CAPILLARY BLOOD GLUCOSE            I&O's Summary    18 Nov 2023 07:01  -  19 Nov 2023 07:00  --------------------------------------------------------  IN: 975 mL / OUT: 555 mL / NET: 420 mL    19 Nov 2023 07:01  -  19 Nov 2023 11:57  --------------------------------------------------------  IN: 195 mL / OUT: 60 mL / NET: 135 mL      Vital Signs Last 24 Hrs  T(C): 37.7 (19 Nov 2023 08:00), Max: 37.7 (19 Nov 2023 08:00)  T(F): 99.9 (19 Nov 2023 08:00), Max: 99.9 (19 Nov 2023 08:00)  HR: 70 (19 Nov 2023 10:00) (46 - 81)  BP: 139/59 (19 Nov 2023 10:00) (131/61 - 170/71)  BP(mean): 82 (19 Nov 2023 10:00) (81 - 102)  RR: 20 (19 Nov 2023 10:00) (13 - 25)  SpO2: 98% (19 Nov 2023 08:17) (94% - 100%)    Parameters below as of 18 Nov 2023 19:33  Patient On (Oxygen Delivery Method): ventilator, 400/5/12    O2 Concentration (%): 21      Neurological Exam:  Mental Status: Intubated, not sedated. No spont eye opening. No verbal output, does not follow commands.   Cranial Nerves: pupils very sluggish R, L near fixed,no  corneals, no VOR, no facial asymmetry , weak cough/gag  Motor: dec tone throughout, no spont movemnt noted   Sensation: UE extensor posturing, LE triple flexion left toe up    I personally reviewed the below data/images/labs:      < from: CT Head No Cont (10.22.19 @ 15:57) >  IMPRESSION:    1)  chronic ischemic changes in both hemispheres with volume loss. There   are no new infarct as compared to prior CT. This may be further assessed   with MR imaging.  2)  no hemorrhage or mass identified.    < end of copied text >  < from: CT Angio Head w/ IV Cont (10.22.19 @ 15:57) >  IMPRESSION    1. There is intimal thickening and calcified plaque in both carotid   bifurcations in the neck, but without significant stenosis. No evidence   of carotid vertebral occlusion or dissection.  2. Intracranially, there are atherosclerotic changes in the anterior and   posterior circulation, but without significant stenosis or occlusion.    < end of copied text >  < from: CT Brain Stroke Protocol (10.26.23 @ 04:37) >  IMPRESSION:  No evidence of acute intracranial hemorrhage, mass effect or large acute   territorial infarct, within limits of noncontrast CT technique.    < end of copied text >  < from: CT Angio Neck Stroke Protocol w/ IV Cont (10.26.23 @ 04:53) >  CT ANGIOGRAPHY NECK:  1.  The right internal carotid artery is occluded approximately 1.5 cm   distal to its origin, possibly due to underlying dissection.  2.  Endotracheal tube is low in position, with its tip approximate 1 cm   above the slade.  3.  Diffuse ill-defined groundglass opacity in both lungs, which may   represent pulmonary edema, infection and/or atelectasis.  4.  There is a discrete right upper lobe groundglass opacity measuring   1.9 x 1.4 cm; this may represent a focus of infection, inflammation,   edema, hemorrhage, fibrosis or malignancy.  A follow-up chest CT is   warranted in three months to assess for resolution.    CT ANGIOGRAPHY BRAIN: The right internal carotid arteries occluded.    There is no significant flow related opacification within the proximal   right middle cerebral artery.  There is mild reconstitution of flow in   some distal branch vessels of the inferior division of the right middle   cerebral artery.  The right anterior cerebral artery fills across the   anterior communicating artery.    < end of copied text >  < from: CT Brain Perfusion Maps Stroke (10.26.23 @ 04:54) >  CT PERFUSION: CT perfusion is consistent with large acute infarct in the   right middle cerebral artery vascular territory and small to moderate   amount of surrounding ischemic penumbra.  Hypoperfusion index is 0.3.    Core infarct (CBF <  30%:): 136 mL  Penumbra (Tmax >6s): 194 mL  Mismatched volume: 58 mL  Mismatched ratio: 1.4    < end of copied text >      < from: CT Head No Cont (10.22.19 @ 15:57) >  IMPRESSION:    1)  chronic ischemic changes in both hemispheres with volume loss. There   are no new infarct as compared to prior CT. This may be further assessed   with MR imaging.  2)  no hemorrhage or mass identified.    < end of copied text >  < from: CT Angio Head w/ IV Cont (10.22.19 @ 15:57) >  IMPRESSION    1. There is intimal thickening and calcified plaque in both carotid   bifurcations in the neck, but without significant stenosis. No evidence   of carotid vertebral occlusion or dissection.  2. Intracranially, there are atherosclerotic changes in the anterior and   posterior circulation, but without significant stenosis or occlusion.    < end of copied text >  < from: CT Brain Stroke Protocol (10.26.23 @ 04:37) >  IMPRESSION:  No evidence of acute intracranial hemorrhage, mass effect or large acute   territorial infarct, within limits of noncontrast CT technique.    < end of copied text >  < from: CT Angio Neck Stroke Protocol w/ IV Cont (10.26.23 @ 04:53) >  CT ANGIOGRAPHY NECK:  1.  The right internal carotid artery is occluded approximately 1.5 cm   distal to its origin, possibly due to underlying dissection.  2.  Endotracheal tube is low in position, with its tip approximate 1 cm   above the slade.  3.  Diffuse ill-defined groundglass opacity in both lungs, which may   represent pulmonary edema, infection and/or atelectasis.  4.  There is a discrete right upper lobe groundglass opacity measuring   1.9 x 1.4 cm; this may represent a focus of infection, inflammation,   edema, hemorrhage, fibrosis or malignancy.  A follow-up chest CT is   warranted in three months to assess for resolution.    CT ANGIOGRAPHY BRAIN: The right internal carotid arteries occluded.    There is no significant flow related opacification within the proximal   right middle cerebral artery.  There is mild reconstitution of flow in   some distal branch vessels of the inferior division of the right middle   cerebral artery.  The right anterior cerebral artery fills across the   anterior communicating artery.    < end of copied text >  < from: CT Brain Perfusion Maps Stroke (10.26.23 @ 04:54) >  CT PERFUSION: CT perfusion is consistent with large acute infarct in the   right middle cerebral artery vascular territory and small to moderate   amount of surrounding ischemic penumbra.  Hypoperfusion index is 0.3.    Core infarct (CBF <  30%:): 136 mL  Penumbra (Tmax >6s): 194 mL  Mismatched volume: 58 mL  Mismatched ratio: 1.4    < end of copied text >      < from: CT Head No Cont (10.22.19 @ 15:57) >  IMPRESSION:    1)  chronic ischemic changes in both hemispheres with volume loss. There   are no new infarct as compared to prior CT. This may be further assessed   with MR imaging.  2)  no hemorrhage or mass identified.    < end of copied text >  < from: CT Angio Head w/ IV Cont (10.22.19 @ 15:57) >  IMPRESSION    1. There is intimal thickening and calcified plaque in both carotid   bifurcations in the neck, but without significant stenosis. No evidence   of carotid vertebral occlusion or dissection.  2. Intracranially, there are atherosclerotic changes in the anterior and   posterior circulation, but without significant stenosis or occlusion.    < end of copied text >  < from: CT Brain Stroke Protocol (10.26.23 @ 04:37) >  IMPRESSION:  No evidence of acute intracranial hemorrhage, mass effect or large acute   territorial infarct, within limits of noncontrast CT technique.    < end of copied text >  < from: CT Angio Neck Stroke Protocol w/ IV Cont (10.26.23 @ 04:53) >  CT ANGIOGRAPHY NECK:  1.  The right internal carotid artery is occluded approximately 1.5 cm   distal to its origin, possibly due to underlying dissection.  2.  Endotracheal tube is low in position, with its tip approximate 1 cm   above the slade.  3.  Diffuse ill-defined groundglass opacity in both lungs, which may   represent pulmonary edema, infection and/or atelectasis.  4.  There is a discrete right upper lobe groundglass opacity measuring   1.9 x 1.4 cm; this may represent a focus of infection, inflammation,   edema, hemorrhage, fibrosis or malignancy.  A follow-up chest CT is   warranted in three months to assess for resolution.    CT ANGIOGRAPHY BRAIN: The right internal carotid arteries occluded.    There is no significant flow related opacification within the proximal   right middle cerebral artery.  There is mild reconstitution of flow in   some distal branch vessels of the inferior division of the right middle   cerebral artery.  The right anterior cerebral artery fills across the   anterior communicating artery.    < end of copied text >  < from: CT Brain Perfusion Maps Stroke (10.26.23 @ 04:54) >  CT PERFUSION: CT perfusion is consistent with large acute infarct in the   right middle cerebral artery vascular territory and small to moderate   amount of surrounding ischemic penumbra.  Hypoperfusion index is 0.3.    Core infarct (CBF <  30%:): 136 mL  Penumbra (Tmax >6s): 194 mL  Mismatched volume: 58 mL  Mismatched ratio: 1.4    < end of copied text >    EEG Classification / Summary:  Abnormal EEG study  1. Intermittent periodic discharges seen intermittently over the left hemisphere, sometimes with triphasic morphology  2. Focal continuous right hemispheric slowing  3. Moderate generalized background slowing    -----------------------------------------------------------------------------------------------------    Clinical Impression:  1. While the periodic pattern noted above may represent lateralized periodic discharges indicating epileptic potential in the left hemisphere, more likely these represent generalized periodic discharges with triphasic morphology, consistent with metabolic etiology, that are poorly expressed on the right due to the effects of the stroke.   2. Structural abnormality in right hemisphere  3. Moderate diffuse/multi-focal cerebral dysfunction, not specific as to etiology.    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 Significant   associated mass effect and associated herniation.      < end of copied text >  < from: CT Head No Cont (11.02.23 @ 14:30) >    Reidentified are 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 There is effacement of all of the   basilar cisterns. Effacement of the sulci in the bilateral cerebri,   compatible with increased brain edema.    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    Unsuccessful attempts were made to reach the ordering clinician via teams.      --- End of Report ---    < end of copied text >

## 2023-11-19 NOTE — PROGRESS NOTE ADULT - SUBJECTIVE AND OBJECTIVE BOX
HPI:  Pt is an 87 yo F with h/o A fib on Xarelto, HTN and HLD who presented to [facility] after she was found altered, nonverbal and with right gaze deviation when she woke up. At baseline pt is ambulatory and verbal. Pt required intubation in ER for airway protection given poor MS. Pt assessed by Stroke team: GIO ~4 hours ago, on Rivaroxaban, presents with AMS/weakness with NIHSS of 30 on ED arrival.  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  Given age, extended time window, oral anticoagulant use, large infarct, and high NIHSS, tPA relatively contraindicated/very high risk for heme and unlikely to be net beneficial.  D/w endovascular fellow Reanna, imaging reviewed, not a candidate for endovascular intervention. Pt admitted to the ICU. 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 Significant associated mass effect R -> L and associated herniation.     O/N: Remains intubated on full vent support. Failed SBT this AM    ROS:  Unobtainable due to mental status    ## Labs:  ** CBC: **    ** Chem:  **        ** Coags: **      CAPILLARY BLOOD GLUCOSE            ## Medications:  amLODIPine   Tablet 5 milliGRAM(s) Oral daily  hydrALAZINE 100 milliGRAM(s) Oral every 8 hours        acetaminophen     Tablet .. 650 milliGRAM(s) Oral every 6 hours PRN      aspirin  chewable 81 milliGRAM(s) Oral daily  enoxaparin Injectable 40 milliGRAM(s) SubCutaneous every 24 hours    bisacodyl Suppository 10 milliGRAM(s) Rectal daily PRN  senna 2 Tablet(s) Oral at bedtime      atorvastatin 80 milliGRAM(s) Oral at bedtime    influenza  Vaccine (HIGH DOSE) 0.7 milliLiter(s) IntraMuscular once        # Imaging:  No new pertinent imaging    ## O/E:  T(F): 98.5 (11-19-23 @ 15:52), Max: 100 (11-19-23 @ 12:00)  HR: 56 (11-19-23 @ 15:00) (46 - 81)  BP: 141/74 (11-19-23 @ 15:00) (131/61 - 170/71)  ABP: --  ABP(mean): --  RR: 19 (11-19-23 @ 15:00) (16 - 25)  SpO2: 100% (11-19-23 @ 14:00) (94% - 100%)    11-18 @ 07:01  -  11-19 @ 07:00  --------------------------------------------------------  IN: 975 mL / OUT: 555 mL / NET: 420 mL    11-19 @ 07:01  -  11-19 @ 16:14  --------------------------------------------------------  IN: 245 mL / OUT: 160 mL / NET: 85 mL      Mode: AC/ CMV (Assist Control/ Continuous Mandatory Ventilation), RR (machine): 12, TV (machine): 400, FiO2: 21, PEEP: 5, ITime: 0.9, MAP: 9, PIP: 19  Gen: orotracheally intubated on full vent  HEENT: PERRL  Resp: mechanical breath sounds B/L  CVS: S1S2 no m/r/g  Abd: soft NT/ND +BS  Ext: no c/c/e  Neuro: breathes over vent, withdraws to pain (?posturing)    CENTRAL LINE: [ ] YES [ ] NO  LOCATION:   DATE INSERTED:  REMOVE: [ ] YES [ ] NO      PATEL: [ ] YES [ ] NO    DATE INSERTED:  REMOVE:  [ ] YES [ ] NO      A-LINE:  [ ] YES [ ] NO  LOCATION:   DATE INSERTED:  REMOVE:  [ ] YES [ ] NO  EXPLAIN:    CODE STATUS: [x ] full code  [ ] DNR  [ ] DNI  [ ] [facility]  Goals of care discussion: [ ] yes

## 2023-11-19 NOTE — PROGRESS NOTE ADULT - ASSESSMENT
88 year old female with a PMH of HTN, HLD and afib on xarelto who did not take medications for past 1 week prior to hospitalization here with R MCA infarct.   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

## 2023-11-20 NOTE — PROGRESS NOTE ADULT - SUBJECTIVE AND OBJECTIVE BOX
88 year old female with altered mental status, non verbal, and right gaze deviation. Past medical history of hypertension, hyperlipidemia, atrial fibrillation on anticoagulation.      11/2/2023 CT scan of the head revealed-    "Reidentified are large subacute MCA and JIMMY infarcts throughout the right frontal parietal and temporal lobes and right basalganglia, with severe right to left midline shift and severe mass effect upon the ventricular system, not significantly changed. There is effacement of all of the basilar cisterns. Effacement of the sulci in the bilateral cerebri, compatible with increased brain edema. There is hypoattenuation within the right occipital lobe, markedly worsened as compared to the prior exam, compatible with evolution of an acute right PCA infarct."    Goals of care conversations have resulted in DNR, but family has indicated that they are uncomfortable with withdrawing life sustaining measures, and know that the patient would not want to exist with a tracheostomy and PEG.  Some of the language the adult children are using is "we don't want to be considered murderers. We are leaving this in God's hands. Let he stay on the ventilator until God takes her."  Ethics assistance requested.     Follow up to initial consult still pending family conversations      Prognosis Estimate (survival in hrs, days, wks, mos, yrs):  days to weeks  Patient Decision-Making Capacity NO -- devastating CVA  Patient Aware of: Diagnosis: NO  Name of medical decision-maker should patient lack capacity (relationship):  Nayla Swensonixte [phone]  Nickie [phone], Otoniel- son pending contact info  Role: Health Care Agent Legal Surrogate Contact #(s)   Other Decision-Maker (i.e., HCA or Surrogate) Aware of: Diagnosis:	 Prognosis:   Other Stake-Holders-    Evidence of Patient’s Preference of Life-Sustaining Treatment (Written or Oral):    Resuscitation status: DNR: YES  DNI: currently intubated    [id number] left message for Nayla at [phone] (on voicemail), tried Nickie's number again, and unable to leave a voicemail.     If family should call the unit please give them Ethics' number.     Breana Ernst D.Min., RN-BSN, [facility]  Medical Ethics   [phone]

## 2023-11-20 NOTE — PROGRESS NOTE ADULT - ASSESSMENT
87 y/o F w/hypertension, HLD and Afib on AC admitted for acute CVA, not a candidate for TNK or endovascular intervention. Intubated for respiratory failure in setting of stroke. CT chest with pulmonary nodule.    - ASA/Statin  - Continue mechanical ventilation, wean as tolerated (mental status is barrier to extubation)  - Monitor off abx  - DVT prophylaxis  - Repeat CT scan as outpatient for follow up of nodule if patient improves  - GOC discussions - family does not want trach, but not consenting to palliative wean. Ethics involved.     I have personally provided 35 minutes of attending critical care time excluding procedures.

## 2023-11-20 NOTE — PROGRESS NOTE ADULT - SUBJECTIVE AND OBJECTIVE BOX
HPI:  Ms. Longoria is an 88 year old female with a PMH of HTN, HLD and afib on xarelto who presented to [facility] earlier this morning after she was found altered, nonverbal and with right gaze deviation when she woke up. At baseline pt is ambulatory and verbal. Pt required intubation in ED for airway protection given poor MS. NIHSS calculated as 30 at that time. Pt not a candidate for TNK or transfer for intervention per stroke neurologist. She remains intubated on nicardipine gtt. ICU team consulted. (26 Oct 2023 05:50)      24 hr events: No acute events.    ## ROS:  [x ] unable to obtain    ## Vitals  ICU Vital Signs Last 24 Hrs  T(C): 36.1 (20 Nov 2023 04:00), Max: 37.8 (19 Nov 2023 12:00)  T(F): 97 (20 Nov 2023 04:00), Max: 100 (19 Nov 2023 12:00)  HR: 69 (20 Nov 2023 08:30) (51 - 70)  BP: 147/66 (20 Nov 2023 08:00) (129/67 - 154/70)  BP(mean): 87 (20 Nov 2023 08:00) (80 - 98)  ABP: --  ABP(mean): --  RR: 20 (20 Nov 2023 08:00) (14 - 25)  SpO2: 99% (20 Nov 2023 08:30) (94% - 100%)    O2 Parameters below as of 19 Nov 2023 19:02  Patient On (Oxygen Delivery Method): ventilator, 400/5/12    O2 Concentration (%): 21        ## Physical Exam:  Gen: Elderly female lying in bed, NAD, intubated  HEENT: NC/AT sclerae anicteric. ET tube in place  Resp: Mechanical breath sounds b/l  CV: S1, S2  Abd: Soft, + BS  Ext: WWP  Neuro: Unarousable      ## Vent Data  Mode: AC/ CMV (Assist Control/ Continuous Mandatory Ventilation)  RR (machine): 12  TV (machine): 400  FiO2: 21  PEEP: 5  ITime: 1  MAP: 13  PIP: 35      ## Labs:  Chem:          CBC:    Coags:          ## Cardiac        ## Blood Gas      #I/Os  I&O's Detail    19 Nov 2023 07:01  -  20 Nov 2023 07:00  --------------------------------------------------------  IN:    Enteral Tube Flush: 50 mL    Jevity 1.2: 1105 mL  Total IN: 1155 mL    OUT:    Indwelling Catheter - Urethral (mL): 750 mL  Total OUT: 750 mL    Total NET: 405 mL      20 Nov 2023 07:01  -  20 Nov 2023 09:12  --------------------------------------------------------  IN:    Jevity 1.2: 65 mL  Total IN: 65 mL    OUT:    Indwelling Catheter - Urethral (mL): 50 mL  Total OUT: 50 mL    Total NET: 15 mL          ## Imaging:    ## Medications:  MEDICATIONS  (STANDING):  amLODIPine   Tablet 5 milliGRAM(s) Oral daily  aspirin  chewable 81 milliGRAM(s) Oral daily  atorvastatin 80 milliGRAM(s) Oral at bedtime  bacitracin   Ointment 1 Application(s) Topical every 12 hours  chlorhexidine 0.12% Liquid 15 milliLiter(s) Oral Mucosa every 12 hours  chlorhexidine 2% Cloths 1 Application(s) Topical <User Schedule>  enoxaparin Injectable 40 milliGRAM(s) SubCutaneous every 24 hours  hydrALAZINE 100 milliGRAM(s) Oral every 8 hours  influenza  Vaccine (HIGH DOSE) 0.7 milliLiter(s) IntraMuscular once  senna 2 Tablet(s) Oral at bedtime    MEDICATIONS  (PRN):  acetaminophen     Tablet .. 650 milliGRAM(s) Oral every 6 hours PRN Temp greater or equal to 38C (100.4F)  bisacodyl Suppository 10 milliGRAM(s) Rectal daily PRN Constipation

## 2023-11-20 NOTE — PROGRESS NOTE ADULT - SUBJECTIVE AND OBJECTIVE BOX
[phone] phone call from patient's daughter Nickie- Explained the role of Ethics and she agreed that the family needs to be able to discussed together. She explained that her siblings will be able to talk midweek as they will be available after their aunts . Nickie has shared with them the updates that the medical team has shared with her, and that there is no change in their mother.  Will coordinate with her tomorrow to set up the phone conference with everyone.     Breana Enrst D.Min., RN-BSN, [facility]-  Medical Ethics  [phone]

## 2023-11-21 NOTE — PROGRESS NOTE ADULT - ASSESSMENT
88 year old female with a PMH of HTN, HLD and afib on xarelto who did not take medications for past 1 week prior to hospitalization here with R MCA infarct.   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

## 2023-11-21 NOTE — PROGRESS NOTE ADULT - ASSESSMENT
89 y/o F w/hypertension, HLD and Afib on AC admitted for acute CVA, not a candidate for TNK or endovascular intervention. Intubated for respiratory failure in setting of stroke. CT chest with pulmonary nodule.    - ASA/Statin  - Continue mechanical ventilation, wean as tolerated (mental status is barrier to extubation)  - Monitor off abx  - DVT prophylaxis  - Repeat CT scan as outpatient for follow up of nodule if patient improves  - GOC discussions - family does not want trach, but not consenting to palliative wean. Ethics involved.     I have personally provided 35 minutes of attending critical care time excluding procedures.

## 2023-11-21 NOTE — PROGRESS NOTE ADULT - SUBJECTIVE AND OBJECTIVE BOX
HPI:  Ms. Longoria is an 88 year old female with a PMH of HTN, HLD and afib on xarelto who presented to [facility] earlier this morning after she was found altered, nonverbal and with right gaze deviation when she woke up. At baseline pt is ambulatory and verbal. Pt required intubation in ED for airway protection given poor MS. NIHSS calculated as 30 at that time. Pt not a candidate for TNK or transfer for intervention per stroke neurologist. She remains intubated on nicardipine gtt. ICU team consulted. (26 Oct 2023 05:50)      24 hr events: No acute events.     ## ROS:  [ x] unable to obtain      ## Vitals  ICU Vital Signs Last 24 Hrs  T(C): 37.7 (21 Nov 2023 07:00), Max: 37.7 (20 Nov 2023 15:51)  T(F): 99.9 (21 Nov 2023 07:00), Max: 99.9 (21 Nov 2023 07:00)  HR: 60 (21 Nov 2023 07:00) (59 - 78)  BP: 134/60 (21 Nov 2023 07:00) (132/65 - 160/67)  BP(mean): 82 (21 Nov 2023 07:00) (81 - 97)  ABP: --  ABP(mean): --  RR: 23 (21 Nov 2023 07:00) (14 - 35)  SpO2: 98% (21 Nov 2023 07:00) (95% - 100%)    O2 Parameters below as of 21 Nov 2023 04:00  Patient On (Oxygen Delivery Method): ventilator    O2 Concentration (%): 21        ## Physical Exam:  Gen: Elderly female lying in bed, NAD, intubated  HEENT: NC/AT sclerae anicteric. ET tube in place  Resp: Mechanical breath sounds b/l  CV: S1, S2  Abd: Soft, + BS  Ext: WWP  Neuro: Unarousable    ## Vent Data  Mode: AC/ CMV (Assist Control/ Continuous Mandatory Ventilation)  RR (machine): 12  TV (machine): 400  FiO2: 21  PEEP: 5  ITime: 0.9  MAP: 10  PIP: 23      ## Labs:  Chem:          CBC:    Coags:          ## Cardiac        ## Blood Gas      #I/Os  I&O's Detail    20 Nov 2023 07:01  -  21 Nov 2023 07:00  --------------------------------------------------------  IN:    Enteral Tube Flush: 255 mL    Jevity 1.2: 1170 mL  Total IN: 1425 mL    OUT:    Indwelling Catheter - Urethral (mL): 995 mL  Total OUT: 995 mL    Total NET: 430 mL          ## Imaging:    ## Medications:  MEDICATIONS  (STANDING):  amLODIPine   Tablet 5 milliGRAM(s) Oral daily  aspirin  chewable 81 milliGRAM(s) Oral daily  atorvastatin 80 milliGRAM(s) Oral at bedtime  bacitracin   Ointment 1 Application(s) Topical every 12 hours  chlorhexidine 0.12% Liquid 15 milliLiter(s) Oral Mucosa every 12 hours  chlorhexidine 2% Cloths 1 Application(s) Topical <User Schedule>  enoxaparin Injectable 40 milliGRAM(s) SubCutaneous every 24 hours  hydrALAZINE 100 milliGRAM(s) Oral every 8 hours  influenza  Vaccine (HIGH DOSE) 0.7 milliLiter(s) IntraMuscular once  senna 2 Tablet(s) Oral at bedtime    MEDICATIONS  (PRN):  acetaminophen     Tablet .. 650 milliGRAM(s) Oral every 6 hours PRN Temp greater or equal to 38C (100.4F)  bisacodyl Suppository 10 milliGRAM(s) Rectal daily PRN Constipation

## 2023-11-21 NOTE — PROGRESS NOTE ADULT - SUBJECTIVE AND OBJECTIVE BOX
Neurology Progress Note    S: Patient seen and examined.     MEDICATIONS:    acetaminophen     Tablet .. 650 milliGRAM(s) Oral every 6 hours PRN  amLODIPine   Tablet 5 milliGRAM(s) Oral daily  aspirin  chewable 81 milliGRAM(s) Oral daily  atorvastatin 80 milliGRAM(s) Oral at bedtime  bacitracin   Ointment 1 Application(s) Topical every 12 hours  bisacodyl Suppository 10 milliGRAM(s) Rectal daily PRN  chlorhexidine 0.12% Liquid 15 milliLiter(s) Oral Mucosa every 12 hours  chlorhexidine 2% Cloths 1 Application(s) Topical <User Schedule>  enoxaparin Injectable 40 milliGRAM(s) SubCutaneous every 24 hours  hydrALAZINE 100 milliGRAM(s) Oral every 8 hours  influenza  Vaccine (HIGH DOSE) 0.7 milliLiter(s) IntraMuscular once  senna 2 Tablet(s) Oral at bedtime      LABS:            CAPILLARY BLOOD GLUCOSE            I&O's Summary    20 Nov 2023 07:01  -  21 Nov 2023 07:00  --------------------------------------------------------  IN: 1425 mL / OUT: 995 mL / NET: 430 mL    21 Nov 2023 07:01  -  21 Nov 2023 09:28  --------------------------------------------------------  IN: 130 mL / OUT: 20 mL / NET: 110 mL      Vital Signs Last 24 Hrs  T(C): 37.9 (21 Nov 2023 08:00), Max: 37.9 (21 Nov 2023 08:00)  T(F): 100.2 (21 Nov 2023 08:00), Max: 100.2 (21 Nov 2023 08:00)  HR: 64 (21 Nov 2023 08:13) (59 - 78)  BP: 142/65 (21 Nov 2023 08:00) (132/65 - 160/67)  BP(mean): 89 (21 Nov 2023 08:00) (82 - 97)  RR: 23 (21 Nov 2023 08:00) (14 - 35)  SpO2: 97% (21 Nov 2023 08:13) (95% - 100%)    Parameters below as of 21 Nov 2023 04:00  Patient On (Oxygen Delivery Method): ventilator    O2 Concentration (%): 21      Neurological Exam:  Mental Status: Intubated, not sedated. No spont eye opening. No verbal output, does not follow commands.   Cranial Nerves: pupils very sluggish R, L near fixed,no  corneals, no VOR, no facial asymmetry , weak cough/gag  Motor: dec tone throughout, no spont movemnt noted   Sensation: UE extensor posturing, LE triple flexion left toe up    I personally reviewed the below data/images/labs:      < from: CT Head No Cont (10.22.19 @ 15:57) >  IMPRESSION:    1)  chronic ischemic changes in both hemispheres with volume loss. There   are no new infarct as compared to prior CT. This may be further assessed   with MR imaging.  2)  no hemorrhage or mass identified.    < end of copied text >  < from: CT Angio Head w/ IV Cont (10.22.19 @ 15:57) >  IMPRESSION    1. There is intimal thickening and calcified plaque in both carotid   bifurcations in the neck, but without significant stenosis. No evidence   of carotid vertebral occlusion or dissection.  2. Intracranially, there are atherosclerotic changes in the anterior and   posterior circulation, but without significant stenosis or occlusion.    < end of copied text >  < from: CT Brain Stroke Protocol (10.26.23 @ 04:37) >  IMPRESSION:  No evidence of acute intracranial hemorrhage, mass effect or large acute   territorial infarct, within limits of noncontrast CT technique.    < end of copied text >  < from: CT Angio Neck Stroke Protocol w/ IV Cont (10.26.23 @ 04:53) >  CT ANGIOGRAPHY NECK:  1.  The right internal carotid artery is occluded approximately 1.5 cm   distal to its origin, possibly due to underlying dissection.  2.  Endotracheal tube is low in position, with its tip approximate 1 cm   above the slade.  3.  Diffuse ill-defined groundglass opacity in both lungs, which may   represent pulmonary edema, infection and/or atelectasis.  4.  There is a discrete right upper lobe groundglass opacity measuring   1.9 x 1.4 cm; this may represent a focus of infection, inflammation,   edema, hemorrhage, fibrosis or malignancy.  A follow-up chest CT is   warranted in three months to assess for resolution.    CT ANGIOGRAPHY BRAIN: The right internal carotid arteries occluded.    There is no significant flow related opacification within the proximal   right middle cerebral artery.  There is mild reconstitution of flow in   some distal branch vessels of the inferior division of the right middle   cerebral artery.  The right anterior cerebral artery fills across the   anterior communicating artery.    < end of copied text >  < from: CT Brain Perfusion Maps Stroke (10.26.23 @ 04:54) >  CT PERFUSION: CT perfusion is consistent with large acute infarct in the   right middle cerebral artery vascular territory and small to moderate   amount of surrounding ischemic penumbra.  Hypoperfusion index is 0.3.    Core infarct (CBF <  30%:): 136 mL  Penumbra (Tmax >6s): 194 mL  Mismatched volume: 58 mL  Mismatched ratio: 1.4    < end of copied text >      < from: CT Head No Cont (10.22.19 @ 15:57) >  IMPRESSION:    1)  chronic ischemic changes in both hemispheres with volume loss. There   are no new infarct as compared to prior CT. This may be further assessed   with MR imaging.  2)  no hemorrhage or mass identified.    < end of copied text >  < from: CT Angio Head w/ IV Cont (10.22.19 @ 15:57) >  IMPRESSION    1. There is intimal thickening and calcified plaque in both carotid   bifurcations in the neck, but without significant stenosis. No evidence   of carotid vertebral occlusion or dissection.  2. Intracranially, there are atherosclerotic changes in the anterior and   posterior circulation, but without significant stenosis or occlusion.    < end of copied text >  < from: CT Brain Stroke Protocol (10.26.23 @ 04:37) >  IMPRESSION:  No evidence of acute intracranial hemorrhage, mass effect or large acute   territorial infarct, within limits of noncontrast CT technique.    < end of copied text >  < from: CT Angio Neck Stroke Protocol w/ IV Cont (10.26.23 @ 04:53) >  CT ANGIOGRAPHY NECK:  1.  The right internal carotid artery is occluded approximately 1.5 cm   distal to its origin, possibly due to underlying dissection.  2.  Endotracheal tube is low in position, with its tip approximate 1 cm   above the slade.  3.  Diffuse ill-defined groundglass opacity in both lungs, which may   represent pulmonary edema, infection and/or atelectasis.  4.  There is a discrete right upper lobe groundglass opacity measuring   1.9 x 1.4 cm; this may represent a focus of infection, inflammation,   edema, hemorrhage, fibrosis or malignancy.  A follow-up chest CT is   warranted in three months to assess for resolution.    CT ANGIOGRAPHY BRAIN: The right internal carotid arteries occluded.    There is no significant flow related opacification within the proximal   right middle cerebral artery.  There is mild reconstitution of flow in   some distal branch vessels of the inferior division of the right middle   cerebral artery.  The right anterior cerebral artery fills across the   anterior communicating artery.    < end of copied text >  < from: CT Brain Perfusion Maps Stroke (10.26.23 @ 04:54) >  CT PERFUSION: CT perfusion is consistent with large acute infarct in the   right middle cerebral artery vascular territory and small to moderate   amount of surrounding ischemic penumbra.  Hypoperfusion index is 0.3.    Core infarct (CBF <  30%:): 136 mL  Penumbra (Tmax >6s): 194 mL  Mismatched volume: 58 mL  Mismatched ratio: 1.4    < end of copied text >      < from: CT Head No Cont (10.22.19 @ 15:57) >  IMPRESSION:    1)  chronic ischemic changes in both hemispheres with volume loss. There   are no new infarct as compared to prior CT. This may be further assessed   with MR imaging.  2)  no hemorrhage or mass identified.    < end of copied text >  < from: CT Angio Head w/ IV Cont (10.22.19 @ 15:57) >  IMPRESSION    1. There is intimal thickening and calcified plaque in both carotid   bifurcations in the neck, but without significant stenosis. No evidence   of carotid vertebral occlusion or dissection.  2. Intracranially, there are atherosclerotic changes in the anterior and   posterior circulation, but without significant stenosis or occlusion.    < end of copied text >  < from: CT Brain Stroke Protocol (10.26.23 @ 04:37) >  IMPRESSION:  No evidence of acute intracranial hemorrhage, mass effect or large acute   territorial infarct, within limits of noncontrast CT technique.    < end of copied text >  < from: CT Angio Neck Stroke Protocol w/ IV Cont (10.26.23 @ 04:53) >  CT ANGIOGRAPHY NECK:  1.  The right internal carotid artery is occluded approximately 1.5 cm   distal to its origin, possibly due to underlying dissection.  2.  Endotracheal tube is low in position, with its tip approximate 1 cm   above the slade.  3.  Diffuse ill-defined groundglass opacity in both lungs, which may   represent pulmonary edema, infection and/or atelectasis.  4.  There is a discrete right upper lobe groundglass opacity measuring   1.9 x 1.4 cm; this may represent a focus of infection, inflammation,   edema, hemorrhage, fibrosis or malignancy.  A follow-up chest CT is   warranted in three months to assess for resolution.    CT ANGIOGRAPHY BRAIN: The right internal carotid arteries occluded.    There is no significant flow related opacification within the proximal   right middle cerebral artery.  There is mild reconstitution of flow in   some distal branch vessels of the inferior division of the right middle   cerebral artery.  The right anterior cerebral artery fills across the   anterior communicating artery.    < end of copied text >  < from: CT Brain Perfusion Maps Stroke (10.26.23 @ 04:54) >  CT PERFUSION: CT perfusion is consistent with large acute infarct in the   right middle cerebral artery vascular territory and small to moderate   amount of surrounding ischemic penumbra.  Hypoperfusion index is 0.3.    Core infarct (CBF <  30%:): 136 mL  Penumbra (Tmax >6s): 194 mL  Mismatched volume: 58 mL  Mismatched ratio: 1.4    < end of copied text >    EEG Classification / Summary:  Abnormal EEG study  1. Intermittent periodic discharges seen intermittently over the left hemisphere, sometimes with triphasic morphology  2. Focal continuous right hemispheric slowing  3. Moderate generalized background slowing    -----------------------------------------------------------------------------------------------------    Clinical Impression:  1. While the periodic pattern noted above may represent lateralized periodic discharges indicating epileptic potential in the left hemisphere, more likely these represent generalized periodic discharges with triphasic morphology, consistent with metabolic etiology, that are poorly expressed on the right due to the effects of the stroke.   2. Structural abnormality in right hemisphere  3. Moderate diffuse/multi-focal cerebral dysfunction, not specific as to etiology.    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 Significant   associated mass effect and associated herniation.      < end of copied text >  < from: CT Head No Cont (11.02.23 @ 14:30) >    Reidentified are 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 There is effacement of all of the   basilar cisterns. Effacement of the sulci in the bilateral cerebri,   compatible with increased brain edema.    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    Unsuccessful attempts were made to reach the ordering clinician via teams.      --- End of Report ---    < end of copied text >

## 2023-11-22 NOTE — PROGRESS NOTE ADULT - SUBJECTIVE AND OBJECTIVE BOX
HPI:  Ms. Longoria is an 88 year old female with a PMH of HTN, HLD and afib on xarelto who presented to [facility] earlier this morning after she was found altered, nonverbal and with right gaze deviation when she woke up. At baseline pt is ambulatory and verbal. Pt required intubation in ED for airway protection given poor MS. NIHSS calculated as 30 at that time. Pt not a candidate for TNK or transfer for intervention per stroke neurologist. She remains intubated on nicardipine gtt. ICU team consulted. (26 Oct 2023 05:50)      24 hr events: No acute events.     ## ROS:  [ x] unable to obtain      ## Vitals  ICU Vital Signs Last 24 Hrs  T(C): 37.7 (22 Nov 2023 08:00), Max: 38.6 (21 Nov 2023 13:00)  T(F): 99.9 (22 Nov 2023 08:00), Max: 101.5 (21 Nov 2023 13:00)  HR: 64 (22 Nov 2023 08:19) (47 - 97)  BP: 140/64 (22 Nov 2023 08:00) (130/58 - 187/89)  BP(mean): 88 (22 Nov 2023 08:00) (79 - 117)  ABP: --  ABP(mean): --  RR: 15 (22 Nov 2023 08:00) (14 - 28)  SpO2: 99% (22 Nov 2023 08:19) (84% - 100%)        ## Physical Exam:  Gen: Elderly female lying in bed, NAD, intubated  HEENT: NC/AT sclerae anicteric. ET tube in place  Resp: Mechanical breath sounds b/l  CV: S1, S2  Abd: Soft, + BS  Ext: WWP  Neuro: Unarousable    ## Vent Data  Mode: AC/ CMV (Assist Control/ Continuous Mandatory Ventilation)  RR (machine): 12  TV (machine): 400  FiO2: 21  PEEP: 5  ITime: 1  MAP: 7  PIP: 19      ## Labs:  Chem:          CBC:    Coags:          ## Cardiac        ## Blood Gas      #I/Os  I&O's Detail    21 Nov 2023 07:01  -  22 Nov 2023 07:00  --------------------------------------------------------  IN:    Enteral Tube Flush: 450 mL    Jevity 1.2: 1170 mL  Total IN: 1620 mL    OUT:    Indwelling Catheter - Urethral (mL): 715 mL  Total OUT: 715 mL    Total NET: 905 mL          ## Imaging:    ## Medications:  MEDICATIONS  (STANDING):  amLODIPine   Tablet 5 milliGRAM(s) Oral daily  aspirin  chewable 81 milliGRAM(s) Oral daily  atorvastatin 80 milliGRAM(s) Oral at bedtime  chlorhexidine 0.12% Liquid 15 milliLiter(s) Oral Mucosa every 12 hours  chlorhexidine 2% Cloths 1 Application(s) Topical <User Schedule>  enoxaparin Injectable 40 milliGRAM(s) SubCutaneous every 24 hours  hydrALAZINE 100 milliGRAM(s) Oral every 8 hours  influenza  Vaccine (HIGH DOSE) 0.7 milliLiter(s) IntraMuscular once  senna 2 Tablet(s) Oral at bedtime    MEDICATIONS  (PRN):  acetaminophen     Tablet .. 650 milliGRAM(s) Oral every 6 hours PRN Temp greater or equal to 38C (100.4F)  bisacodyl Suppository 10 milliGRAM(s) Rectal daily PRN Constipation  morphine  - Injectable 2 milliGRAM(s) IV Push every 4 hours PRN Dyspnea

## 2023-11-22 NOTE — CHART NOTE - NSCHARTNOTEFT_GEN_A_CORE
Per chart pt with PMH: HTN, HLD, Afib, presents with AMS, intubated 10/26 and admitted to ICU for acute respiratory failure secondary to CVA.  Pt remains intubated on full vent support. Family continues to discuss GOC; pt DNR as per ethics.     Factors impacting intake: [x] none [ ] nausea  [ ] vomiting [ ] diarrhea [ ] constipation  [ ]chewing problems [ ] swallowing issues  [ ] other:     Diet Prescription: Diet, NPO with Tube Feed:   Tube Feeding Modality: Orogastric  Jevity 1.2 Messi  Total Volume for 24 Hours (mL): 1170  Continuous  Starting Tube Feed Rate {mL per Hour}: 40  Increase Tube Feed Rate by (mL): 5     Every 8 hours  Until Goal Tube Feed Rate (mL per Hour): 65  Tube Feed Duration (in Hours): 18  Tube Feed Start Time: 17:00  Tube Feed Stop Time: 11:00 (11-13-23 @ 15:20)    Intake: Tube feed infusing @ goal rate at time of visit. Tube feed as ordered Jevity 1.2 @ goal rate 65 ml/hr x 18 hours provides 1170 ml total volume, 1404 calories, 65 grams protein, 944 ml free water     Current Weight: (11/22) 59.6kg (10/26) 63.1kg indicating weight loss of 3.5kg  % Weight Change: 5.5% weight loss x 1 month    Edema: 1+ generalized as per flow sheets      Pertinent Medications: MEDICATIONS  (STANDING):  amLODIPine   Tablet 5 milliGRAM(s) Oral daily  aspirin  chewable 81 milliGRAM(s) Oral daily  atorvastatin 80 milliGRAM(s) Oral at bedtime  chlorhexidine 0.12% Liquid 15 milliLiter(s) Oral Mucosa every 12 hours  chlorhexidine 2% Cloths 1 Application(s) Topical <User Schedule>  enoxaparin Injectable 40 milliGRAM(s) SubCutaneous every 24 hours  hydrALAZINE 100 milliGRAM(s) Oral every 8 hours  influenza  Vaccine (HIGH DOSE) 0.7 milliLiter(s) IntraMuscular once  senna 2 Tablet(s) Oral at bedtime    MEDICATIONS  (PRN):  acetaminophen     Tablet .. 650 milliGRAM(s) Oral every 6 hours PRN Temp greater or equal to 38C (100.4F)  bisacodyl Suppository 10 milliGRAM(s) Rectal daily PRN Constipation  morphine  - Injectable 2 milliGRAM(s) IV Push every 4 hours PRN Dyspnea    Pertinent Labs:  No recent labs to trend      Skin: no pressure injuries as per flow sheets     Estimated Needs:   [x] no change since previous assessment: 10/28/23  [ ] recalculated:     Previous Nutrition Diagnosis:   [x] No nutrition diagnosis (11/13/23)     Nutrition Diagnosis is [x] ongoing  [ ] resolved [ ] not applicable     New Nutrition Diagnosis: [x] not applicable      Interventions:   Recommend  [x] Continue tube feed as ordered above   [ ] Change Diet To:  [ ] Nutrition Supplement  [ ] Nutrition Support  [ ] Other:     Monitoring and Evaluation:   [ ] PO intake [ x ] Tolerance to diet prescription [ x ] weights [ x ] labs[ x ] follow up per protocol  [ ] other:

## 2023-11-22 NOTE — CONSULT NOTE ADULT - SUBJECTIVE AND OBJECTIVE BOX
CLINICAL SUMMARY:  88 year old female with altered mental status, non verbal, and right gaze deviation. Past medical history of hypertension, hyperlipidemia, atrial fibrillation on anticoagulation.      11/2/2023 CT scan of the head revealed-    "Reidentified are large subacute MCA and JIMMY infarcts throughout the right frontal parietal and temporal lobes and right basalganglia, with severe right to left midline shift and severe mass effect upon the ventricular system, not significantly changed. There is effacement of all of the basilar cisterns. Effacement of the sulci in the bilateral cerebri, compatible with increased brain edema. 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     Family has been out of town due to the patient’s sister passing away and Nickie (patient’s daughter) has been the go between as she stayed behind. Ethics has been attempting to sit with her as she visits her mother, but her visits have been very short as she leaves to go to work. Messages have also been left for Nayla Thomas as that phone number is the only one other than Nickie’s on the chart.     Prognosis Estimate (survival in hrs, days, wks, mos, yrs):  days to weeks  Patient Decision-Making Capacity NO -- devastating CVA  Patient Aware of: Diagnosis: NO  Name of medical decision-maker should patient lack capacity (relationship):  Nayla Thomas [phone]  Nickie [phone], Otoniel- son pending contact info  Role: Health Care Agent Legal Surrogate Contact #(s)   Other Decision-Maker (i.e., HCA or Surrogate) Aware of: Diagnosis:	 Prognosis:   Other Stake-Holders-    Evidence of Patient’s Preference of Life-Sustaining Treatment (Written or Oral):    Resuscitation status: DNR: YES  DNI: currently intubated      Discussion- 11/22/2023- 1476- alerted by Kaykay Ortega, Nurse Manager that patient has been here and already left today. Nickie indicated that her brothers are still away, but everyone will be back and coming to the hospital on Saturday to discuss.       BIOETHICS ANALYSIS: The central ethical issue that exists in this case is (A) respecting the patient’s autonomy versus (B) the providers’ desire for beneficence and non-maleficence. The conflict that exists in this situation is one hospital clinicians commonly encounter for patients who have an undetermined prognosis for recovery. The bioethical principle of autonomy – here represented by a thoughtful consideration of the appropriate surrogate to make decisions for a patient without capacity.      Physicians and health care providers have a role in protecting the patient from harm by safeguarding the patient’s best interests          As there is no health care proxy appointed, (and there isn’t in this case) then the [facility] Health Care Decision Act for a patient without capacity comes into authority where informed consent for medical procedures, including discharge planning, would need to be obtained from a surrogate decision-maker.       The 2010 [facility] Health Care Decision Act (CDA) addresses the situation of a sick individual who lacks capacity but has an available surrogate: the surrogate has the exclusive right to make medical and discharge decisions.    The ethical standard the surrogate is supposed to apply to such decision-making is, first "substituted judgment" – based on the patient’s prior articulation of preferences for such a situation or second "best interests". The CDA establishes a hierarchy of surrogacy, in order of priority decision maker for the incapable patient that must be followed (see below.)      – an [facility] Article 81 court-appointed guardian (in this case, there is not one);    – the spouse or domestic partner as defined in the CDA ([facility] § 2994-a);    – adult children;    – a parent(s);    – a brother and/or sister;    – a close friend/all other family members (nieces, nephews, etc.)      In this case as the patient has not appointed an [facility] HCP, all her adult children have a say in goals of care. It has already been established by the family’s comments that the patient would not want to be supported indefinitely so a tracheostomy and PEG are not warranted. It stands to reason that the patient’s family has been dealing with an extraordinary amount of grief and pain due to their beloved mother’s sudden and devastating illness, and their aunt (the patient’s sister) passing away suddenly last week.      RECOMMENDATION:      The Nurse Manager has indicated that per the daughter, her brother’s are traveling back to [place] and will gather at their mother’s bedside on Saturday to discuss goals.     Thank you for this challenging consult.   Ethics signing off, reconsult if needed.      Breana Ernst D.Min., RN-BSN, [facility]  Medical Ethics  Dept of Medicine  [phone]       CASE REPORT WRITTEN BY ANKITA WHITING, BSN-RN, [facility]     MORE THAN 50% OF THE TIME OF THIS CONSULTATION WAS SPENT IN COORDINATION OF CARE OF PATIENT       CLINICAL SUMMARY:  88 year old female with altered mental status, non verbal, and right gaze deviation. Past medical history of hypertension, hyperlipidemia, atrial fibrillation on anticoagulation.      11/2/2023 CT scan of the head revealed-    "Reidentified are large subacute MCA and JIMMY infarcts throughout the right frontal parietal and temporal lobes and right basalganglia, with severe right to left midline shift and severe mass effect upon the ventricular system, not significantly changed. There is effacement of all of the basilar cisterns. Effacement of the sulci in the bilateral cerebri, compatible with increased brain edema. 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     Family has been out of town due to the patient’s sister passing away and Nickie (patient’s daughter) has been the go between as she stayed behind. Ethics has been attempting to sit with her as she visits her mother, but her visits have been very short as she leaves to go to work. Messages have also been left for Nayla Thomas as that phone number is the only one other than Nickie’s on the chart.     Prognosis Estimate (survival in hrs, days, wks, mos, yrs):  days to weeks  Patient Decision-Making Capacity NO -- devastating CVA  Patient Aware of: Diagnosis: NO  Name of medical decision-maker should patient lack capacity (relationship):  Nayla Thomas [phone]  Nickie [phone], Otoniel- son pending contact info  Role: Health Care Agent Legal Surrogate Contact #(s)   Other Decision-Maker (i.e., HCA or Surrogate) Aware of: Diagnosis:	 Prognosis:   Other Stake-Holders-    Evidence of Patient’s Preference of Life-Sustaining Treatment (Written or Oral):    Resuscitation status: DNR: YES  DNI: currently intubated      Discussion- 11/22/2023- 7380- alerted by Kaykay Ortega, Nurse Manager that patient's daughter has been here and already left today. Nickie indicated that her brothers are still away, but everyone will be back and coming to the hospital on Saturday to discuss.     BIOETHICS ANALYSIS: The central ethical issue that exists in this case is (A) respecting the patient’s autonomy versus (B) the providers’ desire for beneficence and non-maleficence. The conflict that exists in this situation is one hospital clinicians commonly encounter for patients who have an undetermined prognosis for recovery. The bioethical principle of autonomy – here represented by a thoughtful consideration of the appropriate surrogate to make decisions for a patient without capacity.      Physicians and health care providers have a role in protecting the patient from harm by safeguarding the patient’s best interests     As there is no health care proxy appointed, (and there isn’t in this case) then the [facility] Care Decision Act for a patient without capacity comes into authority where informed consent for medical procedures, including discharge planning, would need to be obtained from a surrogate decision-maker.       The 2010 [facility] Health Care Decision Act (CDA) addresses the situation of a sick individual who lacks capacity but has an available surrogate: the surrogate has the exclusive right to make medical and discharge decisions.    The ethical standard the surrogate is supposed to apply to such decision-making is, first "substituted judgment" – based on the patient’s prior articulation of preferences for such a situation or second "best interests". The CDA establishes a hierarchy of surrogacy, in order of priority decision maker for the incapable patient that must be followed (see below.)      – an [facility] Article 81 court-appointed guardian (in this case, there is not one);    – the spouse or domestic partner as defined in the CDA ([facility] § 2994-a);    – adult children;    – a parent(s);    – a brother and/or sister;    – a close friend/all other family members (nieces, nephews, etc.)      In this case as the patient has not appointed an [facility] HCP, all her adult children have a say in goals of care. It has already been established by the family’s comments that the patient would not want to be supported indefinitely so a tracheostomy and PEG are not warranted. It stands to reason that the patient’s family has been dealing with an extraordinary amount of grief and pain due to their beloved mother’s sudden and devastating illness, and their aunt (the patient’s sister) passing away suddenly last week.      RECOMMENDATION:      The Nurse Manager has indicated that per the daughter, her brothers are traveling back to [place] and will gather at their mother’s bedside on Saturday to discuss goals.     Thank you for this challenging consult.   Ethics signing off, reconsult if needed.      Breana Ernst D.Min., RN-BSN, [facility]  Medical Ethics  Dept of Medicine  [phone]       CASE REPORT WRITTEN BY ANKITA WHITING, BSN-RN, [facility]     MORE THAN 50% OF THE TIME OF THIS CONSULTATION WAS SPENT IN COORDINATION OF CARE OF PATIENT

## 2023-11-23 NOTE — PROGRESS NOTE ADULT - SUBJECTIVE AND OBJECTIVE BOX
CHIEF COMPLAINT:    Interval Events:    REVIEW OF SYSTEMS:  Constitutional: [ ] fevers [ ] chills [ ] weight loss [ ] weight gain  HEENT: [ ] dry eyes [ ] eye irritation [ ] postnasal drip [ ] nasal congestion  CV: [ ] chest pain [ ] orthopnea [ ] palpitations [ ] murmur  Resp: [ ] cough [ ] shortness of breath [ ] dyspnea [ ] wheezing [ ] sputum [ ] hemoptysis  GI: [ ] nausea [ ] vomiting [ ] diarrhea [ ] constipation [ ] abd pain [ ] dysphagia   : [ ] dysuria [ ] nocturia [ ] hematuria [ ] increased urinary frequency  Musculoskeletal: [ ] back pain [ ] myalgias [ ] arthralgias [ ] fracture  Skin: [ ] rash [ ] itch  Neurological: [ ] headache [ ] dizziness [ ] syncope [ ] weakness [ ] numbness  Hematologic/Lymphatic: [ ] anemia [ ] bleeding problem  Allergic/Immunologic: [ ] itchy eyes [ ] nasal discharge [ ] hives [ ] angioedema  [ ] All other systems negative  [ ] Unable to assess ROS because ________    OBJECTIVE:  ICU Vital Signs Last 24 Hrs  T(C): 36.8 (23 Nov 2023 07:08), Max: 37.7 (22 Nov 2023 08:00)  T(F): 98.2 (23 Nov 2023 07:08), Max: 99.9 (22 Nov 2023 08:00)  HR: 58 (23 Nov 2023 07:08) (58 - 79)  BP: 145/70 (23 Nov 2023 07:00) (133/54 - 171/79)  BP(mean): 88 (23 Nov 2023 07:00) (75 - 104)  ABP: --  ABP(mean): --  RR: 19 (23 Nov 2023 07:08) (14 - 30)  SpO2: 97% (23 Nov 2023 07:00) (93% - 99%)    O2 Parameters below as of 22 Nov 2023 19:02  Patient On (Oxygen Delivery Method): ventilator, tv 400, peep5, 12,    O2 Concentration (%): 21      Mode: AC/ CMV (Assist Control/ Continuous Mandatory Ventilation), RR (machine): 12, TV (machine): 400, FiO2: 21, PEEP: 5, ITime: 0.9, MAP: 8, PIP: 31    11-22 @ 07:01 - 11-23 @ 07:00  --------------------------------------------------------  IN: 1305 mL / OUT: 785 mL / NET: 520 mL      CAPILLARY BLOOD GLUCOSE          PHYSICAL EXAM:  General:   HEENT:   Neck:   Respiratory:   Cardiovascular:   Abdomen:   Extremities:   Skin:   Neurological:  Psychiatry:    LINES:    HOSPITAL MEDICATIONS:  MEDICATIONS  (STANDING):  amLODIPine   Tablet 5 milliGRAM(s) Oral daily  aspirin  chewable 81 milliGRAM(s) Oral daily  atorvastatin 80 milliGRAM(s) Oral at bedtime  chlorhexidine 0.12% Liquid 15 milliLiter(s) Oral Mucosa every 12 hours  chlorhexidine 2% Cloths 1 Application(s) Topical <User Schedule>  enoxaparin Injectable 40 milliGRAM(s) SubCutaneous every 24 hours  hydrALAZINE 100 milliGRAM(s) Oral every 8 hours  influenza  Vaccine (HIGH DOSE) 0.7 milliLiter(s) IntraMuscular once  senna 2 Tablet(s) Oral at bedtime    MEDICATIONS  (PRN):  acetaminophen     Tablet .. 650 milliGRAM(s) Oral every 6 hours PRN Temp greater or equal to 38C (100.4F)  bisacodyl Suppository 10 milliGRAM(s) Rectal daily PRN Constipation  morphine  - Injectable 2 milliGRAM(s) IV Push every 4 hours PRN Dyspnea      LABS:                        12.4   7.41  )-----------( 251      ( 23 Nov 2023 01:56 )             41.4     Hgb Trend: 12.4<--  11-23    142  |  109<H>  |  24<H>  ----------------------------<  135<H>  4.3   |  32<H>  |  0.75    Ca    9.1      23 Nov 2023 01:56  Phos  3.2     11-23  Mg     2.4     11-23    TPro  7.1  /  Alb  2.4<L>  /  TBili  0.8  /  DBili  x   /  AST  128<H>  /  ALT  144<H>  /  AlkPhos  91  11-23      Urinalysis Basic - ( 23 Nov 2023 01:56 )    Color: x / Appearance: x / SG: x / pH: x  Gluc: 135 mg/dL / Ketone: x  / Bili: x / Urobili: x   Blood: x / Protein: x / Nitrite: x   Leuk Esterase: x / RBC: x / WBC x   Sq Epi: x / Non Sq Epi: x / Bacteria: x            MICROBIOLOGY:     RADIOLOGY:  [ ] Reviewed and interpreted by me CHIEF COMPLAINT:    Interval Events:  morphine given for discomfort  does not tolerate PSV, becomes tachypneic    REVIEW OF SYSTEMS:  [x ] Unable to assess ROS because encephalopathy    OBJECTIVE:  ICU Vital Signs Last 24 Hrs  T(C): 36.8 (23 Nov 2023 07:08), Max: 37.7 (22 Nov 2023 08:00)  T(F): 98.2 (23 Nov 2023 07:08), Max: 99.9 (22 Nov 2023 08:00)  HR: 58 (23 Nov 2023 07:08) (58 - 79)  BP: 145/70 (23 Nov 2023 07:00) (133/54 - 171/79)  BP(mean): 88 (23 Nov 2023 07:00) (75 - 104)  ABP: --  ABP(mean): --  RR: 19 (23 Nov 2023 07:08) (14 - 30)  SpO2: 97% (23 Nov 2023 07:00) (93% - 99%)    O2 Parameters below as of 22 Nov 2023 19:02  Patient On (Oxygen Delivery Method): ventilator, tv 400, peep5, 12,    O2 Concentration (%): 21      Mode: AC/ CMV (Assist Control/ Continuous Mandatory Ventilation), RR (machine): 12, TV (machine): 400, FiO2: 21, PEEP: 5, ITime: 0.9, MAP: 8, PIP: 31    11-22 @ 07:01  -  11-23 @ 07:00  --------------------------------------------------------  IN: 1305 mL / OUT: 785 mL / NET: 520 mL      CAPILLARY BLOOD GLUCOSE          PHYSICAL EXAM:  General: NAD, chronically ill appearing  HEENT: ETT and NGT in place  Neck: supple  Respiratory: mechanical BS  Cardiovascular: s1s2 RRR  Abdomen: soft, non tender, non distended  Extremities: warm, no edema or clubbing  Skin: intact  Neurological: unable to assess  Psychiatry: unable to assess    LINES:  [facility]    HOSPITAL MEDICATIONS:  MEDICATIONS  (STANDING):  amLODIPine   Tablet 5 milliGRAM(s) Oral daily  aspirin  chewable 81 milliGRAM(s) Oral daily  atorvastatin 80 milliGRAM(s) Oral at bedtime  chlorhexidine 0.12% Liquid 15 milliLiter(s) Oral Mucosa every 12 hours  chlorhexidine 2% Cloths 1 Application(s) Topical <User Schedule>  enoxaparin Injectable 40 milliGRAM(s) SubCutaneous every 24 hours  hydrALAZINE 100 milliGRAM(s) Oral every 8 hours  influenza  Vaccine (HIGH DOSE) 0.7 milliLiter(s) IntraMuscular once  senna 2 Tablet(s) Oral at bedtime    MEDICATIONS  (PRN):  acetaminophen     Tablet .. 650 milliGRAM(s) Oral every 6 hours PRN Temp greater or equal to 38C (100.4F)  bisacodyl Suppository 10 milliGRAM(s) Rectal daily PRN Constipation  morphine  - Injectable 2 milliGRAM(s) IV Push every 4 hours PRN Dyspnea      LABS:                        12.4   7.41  )-----------( 251      ( 23 Nov 2023 01:56 )             41.4     Hgb Trend: 12.4<--  11-23    142  |  109<H>  |  24<H>  ----------------------------<  135<H>  4.3   |  32<H>  |  0.75    Ca    9.1      23 Nov 2023 01:56  Phos  3.2     11-23  Mg     2.4     11-23    TPro  7.1  /  Alb  2.4<L>  /  TBili  0.8  /  DBili  x   /  AST  128<H>  /  ALT  144<H>  /  AlkPhos  91  11-23      Urinalysis Basic - ( 23 Nov 2023 01:56 )    Color: x / Appearance: x / SG: x / pH: x  Gluc: 135 mg/dL / Ketone: x  / Bili: x / Urobili: x   Blood: x / Protein: x / Nitrite: x   Leuk Esterase: x / RBC: x / WBC x   Sq Epi: x / Non Sq Epi: x / Bacteria: x            MICROBIOLOGY:     RADIOLOGY:  [ ] Reviewed and interpreted by me

## 2023-11-23 NOTE — PROGRESS NOTE ADULT - ASSESSMENT
88F w/ HTN, HLD, Afib. Presents w/ AMS and R gaze deviation. Pt was intubated in the ED due to poor mental status. Pt did not receive TNK. Admitted to ICU. Imaging revealed R JIMMY and MCA infarct w/ midline shift, as well as PCA infarct. Pt's mental status is poor.     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; morphine PRN for discomfort/tachypnea  #CV - continue BP meds w/ amlodipine, hydralazine    #Pulm - remains intubated on minimal vent settings; does not tolerate PSV weaning and not a candidate for extubation; family would not opt for tracheostomy, although they do not want to withdraw either  #ID- off abx, afebrile, no leukocytosis; monitor off abx  #Renal/metabolic - renal function stable; monitor I/Os, electrolytes  #GI- tolerating TF, having BM  #Heme - no active issues  #Endo - BG stable  #PPx - lovenox  #Dispo- remains in ICU vent dependent, unable to extubate; prognosis for neuro-cognitive recovery is poor; pt is DNR - family does not want to withdraw care but they would not want trach/PEG either, ethics consulted and will f/u with family     88F w/ HTN, HLD, Afib. Presents w/ AMS and R gaze deviation. Pt was intubated in the ED due to poor mental status. Pt did not receive TNK. Admitted to ICU. Imaging revealed R JIMMY and MCA infarct w/ midline shift, as well as PCA infarct. Pt's mental status is poor.     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; morphine PRN for discomfort/tachypnea  #CV - continue BP meds w/ amlodipine, hydralazine; known afib, too high risk given extent of CVA to give pt therapeutic AC  #Pulm - remains intubated on minimal vent settings; does not tolerate PSV weaning and not a candidate for extubation; family would not opt for tracheostomy, although they do not want to withdraw either  #ID- off abx, afebrile, no leukocytosis; monitor off abx  #Renal/metabolic - renal function stable; monitor I/Os, electrolytes  #GI- tolerating TF, having BM  #Heme - no active issues  #Endo - BG stable  #PPx - lovenox qd  #Dispo- remains in ICU vent dependent, unable to extubate; prognosis for neuro-cognitive recovery is poor; pt is DNR - family does not want to withdraw care but they would not want trach/PEG either, ethics consulted and will f/u with family

## 2023-11-24 NOTE — PROGRESS NOTE ADULT - ASSESSMENT
88F w/ HTN, HLD, Afib. Presents w/ AMS and R gaze deviation. Pt was intubated in the ED due to poor mental status. Pt did not receive TNK. Admitted to ICU. Imaging revealed R JIMMY and MCA infarct w/ midline shift, as well as PCA infarct. Pt's mental status is poor.     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; morphine PRN for discomfort/tachypnea  #CV - increase amlodipine for elevated BP, continue hydralazine; known afib, too high risk given extent of CVA to give pt therapeutic AC  #Pulm - remains intubated on minimal vent settings; does not tolerate PSV weaning and not a candidate for extubation; family would not opt for tracheostomy, although they do not want to withdraw either  #ID- off abx, afebrile, no leukocytosis; monitor off abx  #Renal/metabolic - renal function stable; monitor I/Os, electrolytes  #GI- tolerating TF, having BM  #Heme - no active issues  #Endo - BG stable  #PPx - lovenox qd  #Dispo- remains in ICU vent dependent, unable to extubate; prognosis for neuro-cognitive recovery is poor; pt is DNR - family does not want to withdraw care but they would not want trach/PEG either, ethics consulted and will f/u with family

## 2023-11-24 NOTE — PROGRESS NOTE ADULT - SUBJECTIVE AND OBJECTIVE BOX
CHIEF COMPLAINT:    Interval Events:    REVIEW OF SYSTEMS:  Constitutional: [ ] fevers [ ] chills [ ] weight loss [ ] weight gain  HEENT: [ ] dry eyes [ ] eye irritation [ ] postnasal drip [ ] nasal congestion  CV: [ ] chest pain [ ] orthopnea [ ] palpitations [ ] murmur  Resp: [ ] cough [ ] shortness of breath [ ] dyspnea [ ] wheezing [ ] sputum [ ] hemoptysis  GI: [ ] nausea [ ] vomiting [ ] diarrhea [ ] constipation [ ] abd pain [ ] dysphagia   : [ ] dysuria [ ] nocturia [ ] hematuria [ ] increased urinary frequency  Musculoskeletal: [ ] back pain [ ] myalgias [ ] arthralgias [ ] fracture  Skin: [ ] rash [ ] itch  Neurological: [ ] headache [ ] dizziness [ ] syncope [ ] weakness [ ] numbness  Hematologic/Lymphatic: [ ] anemia [ ] bleeding problem  Allergic/Immunologic: [ ] itchy eyes [ ] nasal discharge [ ] hives [ ] angioedema  [ ] All other systems negative  [ ] Unable to assess ROS because ________    OBJECTIVE:  ICU Vital Signs Last 24 Hrs  T(C): 37.6 (24 Nov 2023 07:00), Max: 37.9 (24 Nov 2023 00:00)  T(F): 99.7 (24 Nov 2023 07:00), Max: 100.2 (24 Nov 2023 00:00)  HR: 69 (24 Nov 2023 07:00) (51 - 87)  BP: 160/64 (24 Nov 2023 07:00) (139/73 - 162/75)  BP(mean): 92 (24 Nov 2023 07:00) (86 - 108)  ABP: --  ABP(mean): --  RR: 22 (24 Nov 2023 07:00) (15 - 27)  SpO2: 96% (24 Nov 2023 07:00) (92% - 100%)    O2 Parameters below as of 24 Nov 2023 04:00  Patient On (Oxygen Delivery Method): ventilator    O2 Concentration (%): 21      Mode: AC/ CMV (Assist Control/ Continuous Mandatory Ventilation), RR (machine): 12, TV (machine): 400, FiO2: 25, PEEP: 5, ITime: 1, MAP: 10, PIP: 24    11-23 @ 07:01  -  11-24 @ 07:00  --------------------------------------------------------  IN: 1270 mL / OUT: 790 mL / NET: 480 mL      CAPILLARY BLOOD GLUCOSE          PHYSICAL EXAM:  General:   HEENT:   Neck:   Respiratory:   Cardiovascular:   Abdomen:   Extremities:   Skin:   Neurological:  Psychiatry:    LINES:    HOSPITAL MEDICATIONS:  MEDICATIONS  (STANDING):  amLODIPine   Tablet 5 milliGRAM(s) Oral daily  aspirin  chewable 81 milliGRAM(s) Oral daily  atorvastatin 80 milliGRAM(s) Oral at bedtime  chlorhexidine 0.12% Liquid 15 milliLiter(s) Oral Mucosa every 12 hours  chlorhexidine 2% Cloths 1 Application(s) Topical <User Schedule>  enoxaparin Injectable 40 milliGRAM(s) SubCutaneous every 24 hours  hydrALAZINE 100 milliGRAM(s) Oral every 8 hours  influenza  Vaccine (HIGH DOSE) 0.7 milliLiter(s) IntraMuscular once  senna 2 Tablet(s) Oral at bedtime    MEDICATIONS  (PRN):  acetaminophen     Tablet .. 650 milliGRAM(s) Oral every 6 hours PRN Temp greater or equal to 38C (100.4F)  bisacodyl Suppository 10 milliGRAM(s) Rectal daily PRN Constipation  morphine  - Injectable 2 milliGRAM(s) IV Push every 4 hours PRN Dyspnea      LABS:                        12.4   7.41  )-----------( 251      ( 23 Nov 2023 01:56 )             41.4     Hgb Trend: 12.4<--  11-23    142  |  109<H>  |  24<H>  ----------------------------<  135<H>  4.3   |  32<H>  |  0.75    Ca    9.1      23 Nov 2023 01:56  Phos  3.2     11-23  Mg     2.4     11-23    TPro  7.1  /  Alb  2.4<L>  /  TBili  0.8  /  DBili  x   /  AST  128<H>  /  ALT  144<H>  /  AlkPhos  91  11-23      Urinalysis Basic - ( 23 Nov 2023 01:56 )    Color: x / Appearance: x / SG: x / pH: x  Gluc: 135 mg/dL / Ketone: x  / Bili: x / Urobili: x   Blood: x / Protein: x / Nitrite: x   Leuk Esterase: x / RBC: x / WBC x   Sq Epi: x / Non Sq Epi: x / Bacteria: x            MICROBIOLOGY:     RADIOLOGY:  [ ] Reviewed and interpreted by me CHIEF COMPLAINT:    Interval Events:  no o/n events    REVIEW OF SYSTEMS:  [ x] Unable to assess ROS because encephalopathy    OBJECTIVE:  ICU Vital Signs Last 24 Hrs  T(C): 37.6 (24 Nov 2023 07:00), Max: 37.9 (24 Nov 2023 00:00)  T(F): 99.7 (24 Nov 2023 07:00), Max: 100.2 (24 Nov 2023 00:00)  HR: 69 (24 Nov 2023 07:00) (51 - 87)  BP: 160/64 (24 Nov 2023 07:00) (139/73 - 162/75)  BP(mean): 92 (24 Nov 2023 07:00) (86 - 108)  ABP: --  ABP(mean): --  RR: 22 (24 Nov 2023 07:00) (15 - 27)  SpO2: 96% (24 Nov 2023 07:00) (92% - 100%)    O2 Parameters below as of 24 Nov 2023 04:00  Patient On (Oxygen Delivery Method): ventilator    O2 Concentration (%): 21      Mode: AC/ CMV (Assist Control/ Continuous Mandatory Ventilation), RR (machine): 12, TV (machine): 400, FiO2: 25, PEEP: 5, ITime: 1, MAP: 10, PIP: 24    11-23 @ 07:01  -  11-24 @ 07:00  --------------------------------------------------------  IN: 1270 mL / OUT: 790 mL / NET: 480 mL      CAPILLARY BLOOD GLUCOSE          PHYSICAL EXAM:  General: NAD, chronically ill appearing  HEENT: ETT and NGT in place  Neck: supple  Respiratory: mechanical BS  Cardiovascular: s1s2 RRR  Abdomen: soft, non tender, non distended  Extremities: warm, no edema or clubbing  Skin: intact  Neurological: unable to assess  Psychiatry: unable to assess    LINES:  [facility]    HOSPITAL MEDICATIONS:  MEDICATIONS  (STANDING):  amLODIPine   Tablet 5 milliGRAM(s) Oral daily  aspirin  chewable 81 milliGRAM(s) Oral daily  atorvastatin 80 milliGRAM(s) Oral at bedtime  chlorhexidine 0.12% Liquid 15 milliLiter(s) Oral Mucosa every 12 hours  chlorhexidine 2% Cloths 1 Application(s) Topical <User Schedule>  enoxaparin Injectable 40 milliGRAM(s) SubCutaneous every 24 hours  hydrALAZINE 100 milliGRAM(s) Oral every 8 hours  influenza  Vaccine (HIGH DOSE) 0.7 milliLiter(s) IntraMuscular once  senna 2 Tablet(s) Oral at bedtime    MEDICATIONS  (PRN):  acetaminophen     Tablet .. 650 milliGRAM(s) Oral every 6 hours PRN Temp greater or equal to 38C (100.4F)  bisacodyl Suppository 10 milliGRAM(s) Rectal daily PRN Constipation  morphine  - Injectable 2 milliGRAM(s) IV Push every 4 hours PRN Dyspnea      LABS:                        12.4   7.41  )-----------( 251      ( 23 Nov 2023 01:56 )             41.4     Hgb Trend: 12.4<--  11-23    142  |  109<H>  |  24<H>  ----------------------------<  135<H>  4.3   |  32<H>  |  0.75    Ca    9.1      23 Nov 2023 01:56  Phos  3.2     11-23  Mg     2.4     11-23    TPro  7.1  /  Alb  2.4<L>  /  TBili  0.8  /  DBili  x   /  AST  128<H>  /  ALT  144<H>  /  AlkPhos  91  11-23      Urinalysis Basic - ( 23 Nov 2023 01:56 )    Color: x / Appearance: x / SG: x / pH: x  Gluc: 135 mg/dL / Ketone: x  / Bili: x / Urobili: x   Blood: x / Protein: x / Nitrite: x   Leuk Esterase: x / RBC: x / WBC x   Sq Epi: x / Non Sq Epi: x / Bacteria: x            MICROBIOLOGY:     RADIOLOGY:  [ ] Reviewed and interpreted by me

## 2023-11-25 NOTE — PROGRESS NOTE ADULT - ASSESSMENT
88F w/ HTN, HLD, Afib. Presents w/ AMS and R gaze deviation. Pt was intubated in the ED due to poor mental status. Pt did not receive TNK. Admitted to ICU. Imaging revealed R JIMMY and MCA infarct w/ midline shift, as well as PCA infarct. Pt's mental status is poor.     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; morphine PRN for discomfort/tachypnea  #CV - continue hydralazine and amlodipine; known afib, too high risk given extent of CVA to give pt therapeutic AC  #Pulm - remains intubated on minimal vent settings; does not tolerate PSV weaning and not a candidate for extubation; family would not opt for tracheostomy, although they do not want to withdraw either  #ID- off abx, afebrile, no leukocytosis; monitor off abx  #Renal/metabolic - renal function stable; monitor I/Os, electrolytes  #GI- tolerating TF, having BM  #Heme - no active issues  #Endo - BG stable  #PPx - lovenox qd  #Dispo- remains in ICU vent dependent, unable to extubate; prognosis for neuro-cognitive recovery is poor; pt is DNR - family does not want to withdraw care but they would not want trach/PEG either, ethics consulted and will f/u with family; labs 2-3x/week

## 2023-11-25 NOTE — PROGRESS NOTE ADULT - SUBJECTIVE AND OBJECTIVE BOX
Neurology Progress Note    S: Patient seen and examined.     Medications: MEDICATIONS  (STANDING):  amLODIPine   Tablet 10 milliGRAM(s) Oral daily  aspirin  chewable 81 milliGRAM(s) Oral daily  atorvastatin 80 milliGRAM(s) Oral at bedtime  chlorhexidine 0.12% Liquid 15 milliLiter(s) Oral Mucosa every 12 hours  chlorhexidine 2% Cloths 1 Application(s) Topical <User Schedule>  enoxaparin Injectable 40 milliGRAM(s) SubCutaneous every 24 hours  hydrALAZINE 100 milliGRAM(s) Oral every 8 hours  influenza  Vaccine (HIGH DOSE) 0.7 milliLiter(s) IntraMuscular once  senna 2 Tablet(s) Oral at bedtime    MEDICATIONS  (PRN):  acetaminophen     Tablet .. 650 milliGRAM(s) Oral every 6 hours PRN Temp greater or equal to 38C (100.4F)  bisacodyl Suppository 10 milliGRAM(s) Rectal daily PRN Constipation  morphine  - Injectable 2 milliGRAM(s) IV Push every 4 hours PRN Dyspnea       Vitals:  Vital Signs Last 24 Hrs  T(C): 37.4 (25 Nov 2023 19:00), Max: 37.7 (25 Nov 2023 14:00)  T(F): 99.3 (25 Nov 2023 19:00), Max: 99.9 (25 Nov 2023 14:00)  HR: 66 (25 Nov 2023 19:00) (54 - 74)  BP: 143/68 (25 Nov 2023 19:00) (123/62 - 165/77)  BP(mean): 90 (25 Nov 2023 19:00) (76 - 103)  RR: 25 (25 Nov 2023 19:00) (15 - 35)  SpO2: 98% (25 Nov 2023 19:00) (95% - 100%)    Parameters below as of 25 Nov 2023 07:21  Patient On (Oxygen Delivery Method): ventilator, ETT: , RR12,PEEP5, FIO2 21%    O2 Concentration (%): 21          LABS:        Neurological Exam:  Mental Status: Intubated, not sedated. No spont eye opening. No verbal output, does not follow commands.   Cranial Nerves: pupils very sluggish R, L near fixed, no  corneals, no VOR, intermittent eyelid fluttering no facial asymmetry , weak cough/gag  Motor: dec tone throughout, no spont movemnt noted   Sensation: UE extensor posturing, LE triple flexion left toe up    I personally reviewed the below data/images/labs:      < from: CT Head No Cont (10.22.19 @ 15:57) >  IMPRESSION:    1)  chronic ischemic changes in both hemispheres with volume loss. There   are no new infarct as compared to prior CT. This may be further assessed   with MR imaging.  2)  no hemorrhage or mass identified.    < end of copied text >  < from: CT Angio Head w/ IV Cont (10.22.19 @ 15:57) >  IMPRESSION    1. There is intimal thickening and calcified plaque in both carotid   bifurcations in the neck, but without significant stenosis. No evidence   of carotid vertebral occlusion or dissection.  2. Intracranially, there are atherosclerotic changes in the anterior and   posterior circulation, but without significant stenosis or occlusion.    < end of copied text >  < from: CT Brain Stroke Protocol (10.26.23 @ 04:37) >  IMPRESSION:  No evidence of acute intracranial hemorrhage, mass effect or large acute   territorial infarct, within limits of noncontrast CT technique.    < end of copied text >  < from: CT Angio Neck Stroke Protocol w/ IV Cont (10.26.23 @ 04:53) >  CT ANGIOGRAPHY NECK:  1.  The right internal carotid artery is occluded approximately 1.5 cm   distal to its origin, possibly due to underlying dissection.  2.  Endotracheal tube is low in position, with its tip approximate 1 cm   above the slade.  3.  Diffuse ill-defined groundglass opacity in both lungs, which may   represent pulmonary edema, infection and/or atelectasis.  4.  There is a discrete right upper lobe groundglass opacity measuring   1.9 x 1.4 cm; this may represent a focus of infection, inflammation,   edema, hemorrhage, fibrosis or malignancy.  A follow-up chest CT is   warranted in three months to assess for resolution.    CT ANGIOGRAPHY BRAIN: The right internal carotid arteries occluded.    There is no significant flow related opacification within the proximal   right middle cerebral artery.  There is mild reconstitution of flow in   some distal branch vessels of the inferior division of the right middle   cerebral artery.  The right anterior cerebral artery fills across the   anterior communicating artery.    < end of copied text >  < from: CT Brain Perfusion Maps Stroke (10.26.23 @ 04:54) >  CT PERFUSION: CT perfusion is consistent with large acute infarct in the   right middle cerebral artery vascular territory and small to moderate   amount of surrounding ischemic penumbra.  Hypoperfusion index is 0.3.    Core infarct (CBF <  30%:): 136 mL  Penumbra (Tmax >6s): 194 mL  Mismatched volume: 58 mL  Mismatched ratio: 1.4    < end of copied text >      < from: CT Head No Cont (10.22.19 @ 15:57) >  IMPRESSION:    1)  chronic ischemic changes in both hemispheres with volume loss. There   are no new infarct as compared to prior CT. This may be further assessed   with MR imaging.  2)  no hemorrhage or mass identified.    < end of copied text >  < from: CT Angio Head w/ IV Cont (10.22.19 @ 15:57) >  IMPRESSION    1. There is intimal thickening and calcified plaque in both carotid   bifurcations in the neck, but without significant stenosis. No evidence   of carotid vertebral occlusion or dissection.  2. Intracranially, there are atherosclerotic changes in the anterior and   posterior circulation, but without significant stenosis or occlusion.    < end of copied text >  < from: CT Brain Stroke Protocol (10.26.23 @ 04:37) >  IMPRESSION:  No evidence of acute intracranial hemorrhage, mass effect or large acute   territorial infarct, within limits of noncontrast CT technique.    < end of copied text >  < from: CT Angio Neck Stroke Protocol w/ IV Cont (10.26.23 @ 04:53) >  CT ANGIOGRAPHY NECK:  1.  The right internal carotid artery is occluded approximately 1.5 cm   distal to its origin, possibly due to underlying dissection.  2.  Endotracheal tube is low in position, with its tip approximate 1 cm   above the slade.  3.  Diffuse ill-defined groundglass opacity in both lungs, which may   represent pulmonary edema, infection and/or atelectasis.  4.  There is a discrete right upper lobe groundglass opacity measuring   1.9 x 1.4 cm; this may represent a focus of infection, inflammation,   edema, hemorrhage, fibrosis or malignancy.  A follow-up chest CT is   warranted in three months to assess for resolution.    CT ANGIOGRAPHY BRAIN: The right internal carotid arteries occluded.    There is no significant flow related opacification within the proximal   right middle cerebral artery.  There is mild reconstitution of flow in   some distal branch vessels of the inferior division of the right middle   cerebral artery.  The right anterior cerebral artery fills across the   anterior communicating artery.    < end of copied text >  < from: CT Brain Perfusion Maps Stroke (10.26.23 @ 04:54) >  CT PERFUSION: CT perfusion is consistent with large acute infarct in the   right middle cerebral artery vascular territory and small to moderate   amount of surrounding ischemic penumbra.  Hypoperfusion index is 0.3.    Core infarct (CBF <  30%:): 136 mL  Penumbra (Tmax >6s): 194 mL  Mismatched volume: 58 mL  Mismatched ratio: 1.4    < end of copied text >      < from: CT Head No Cont (10.22.19 @ 15:57) >  IMPRESSION:    1)  chronic ischemic changes in both hemispheres with volume loss. There   are no new infarct as compared to prior CT. This may be further assessed   with MR imaging.  2)  no hemorrhage or mass identified.    < end of copied text >  < from: CT Angio Head w/ IV Cont (10.22.19 @ 15:57) >  IMPRESSION    1. There is intimal thickening and calcified plaque in both carotid   bifurcations in the neck, but without significant stenosis. No evidence   of carotid vertebral occlusion or dissection.  2. Intracranially, there are atherosclerotic changes in the anterior and   posterior circulation, but without significant stenosis or occlusion.    < end of copied text >  < from: CT Brain Stroke Protocol (10.26.23 @ 04:37) >  IMPRESSION:  No evidence of acute intracranial hemorrhage, mass effect or large acute   territorial infarct, within limits of noncontrast CT technique.    < end of copied text >  < from: CT Angio Neck Stroke Protocol w/ IV Cont (10.26.23 @ 04:53) >  CT ANGIOGRAPHY NECK:  1.  The right internal carotid artery is occluded approximately 1.5 cm   distal to its origin, possibly due to underlying dissection.  2.  Endotracheal tube is low in position, with its tip approximate 1 cm   above the slade.  3.  Diffuse ill-defined groundglass opacity in both lungs, which may   represent pulmonary edema, infection and/or atelectasis.  4.  There is a discrete right upper lobe groundglass opacity measuring   1.9 x 1.4 cm; this may represent a focus of infection, inflammation,   edema, hemorrhage, fibrosis or malignancy.  A follow-up chest CT is   warranted in three months to assess for resolution.    CT ANGIOGRAPHY BRAIN: The right internal carotid arteries occluded.    There is no significant flow related opacification within the proximal   right middle cerebral artery.  There is mild reconstitution of flow in   some distal branch vessels of the inferior division of the right middle   cerebral artery.  The right anterior cerebral artery fills across the   anterior communicating artery.    < end of copied text >  < from: CT Brain Perfusion Maps Stroke (10.26.23 @ 04:54) >  CT PERFUSION: CT perfusion is consistent with large acute infarct in the   right middle cerebral artery vascular territory and small to moderate   amount of surrounding ischemic penumbra.  Hypoperfusion index is 0.3.    Core infarct (CBF <  30%:): 136 mL  Penumbra (Tmax >6s): 194 mL  Mismatched volume: 58 mL  Mismatched ratio: 1.4    < end of copied text >    EEG Classification / Summary:  Abnormal EEG study  1. Intermittent periodic discharges seen intermittently over the left hemisphere, sometimes with triphasic morphology  2. Focal continuous right hemispheric slowing  3. Moderate generalized background slowing    -----------------------------------------------------------------------------------------------------    Clinical Impression:  1. While the periodic pattern noted above may represent lateralized periodic discharges indicating epileptic potential in the left hemisphere, more likely these represent generalized periodic discharges with triphasic morphology, consistent with metabolic etiology, that are poorly expressed on the right due to the effects of the stroke.   2. Structural abnormality in right hemisphere  3. Moderate diffuse/multi-focal cerebral dysfunction, not specific as to etiology.    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 Significant   associated mass effect and associated herniation.      < end of copied text >  < from: CT Head No Cont (11.02.23 @ 14:30) >    Reidentified are 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 There is effacement of all of the   basilar cisterns. Effacement of the sulci in the bilateral cerebri,   compatible with increased brain edema.    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    Unsuccessful attempts were made to reach the ordering clinician via teams.      --- End of Report ---    < end of copied text >

## 2023-11-25 NOTE — PROGRESS NOTE ADULT - SUBJECTIVE AND OBJECTIVE BOX
CHIEF COMPLAINT:    Interval Events:    REVIEW OF SYSTEMS:  Constitutional: [ ] fevers [ ] chills [ ] weight loss [ ] weight gain  HEENT: [ ] dry eyes [ ] eye irritation [ ] postnasal drip [ ] nasal congestion  CV: [ ] chest pain [ ] orthopnea [ ] palpitations [ ] murmur  Resp: [ ] cough [ ] shortness of breath [ ] dyspnea [ ] wheezing [ ] sputum [ ] hemoptysis  GI: [ ] nausea [ ] vomiting [ ] diarrhea [ ] constipation [ ] abd pain [ ] dysphagia   : [ ] dysuria [ ] nocturia [ ] hematuria [ ] increased urinary frequency  Musculoskeletal: [ ] back pain [ ] myalgias [ ] arthralgias [ ] fracture  Skin: [ ] rash [ ] itch  Neurological: [ ] headache [ ] dizziness [ ] syncope [ ] weakness [ ] numbness  Hematologic/Lymphatic: [ ] anemia [ ] bleeding problem  Allergic/Immunologic: [ ] itchy eyes [ ] nasal discharge [ ] hives [ ] angioedema  [ ] All other systems negative  [ ] Unable to assess ROS because ________    OBJECTIVE:  ICU Vital Signs Last 24 Hrs  T(C): 37.4 (25 Nov 2023 07:20), Max: 37.8 (24 Nov 2023 12:00)  T(F): 99.3 (25 Nov 2023 07:20), Max: 100 (24 Nov 2023 12:00)  HR: 61 (25 Nov 2023 07:20) (54 - 76)  BP: 146/62 (25 Nov 2023 07:00) (126/65 - 165/77)  BP(mean): 85 (25 Nov 2023 07:00) (81 - 103)  ABP: --  ABP(mean): --  RR: 22 (25 Nov 2023 07:20) (15 - 25)  SpO2: 100% (25 Nov 2023 07:00) (94% - 100%)      Mode: AC/ CMV (Assist Control/ Continuous Mandatory Ventilation), RR (machine): 12, TV (machine): 400, FiO2: 25, PEEP: 5, ITime: 1, MAP: 11, PIP: 29    11-24 @ 07:01  -  11-25 @ 07:00  --------------------------------------------------------  IN: 1300 mL / OUT: 690 mL / NET: 610 mL      CAPILLARY BLOOD GLUCOSE          PHYSICAL EXAM:  General:   HEENT:   Neck:   Respiratory:   Cardiovascular:   Abdomen:   Extremities:   Skin:   Neurological:  Psychiatry:    LINES:    HOSPITAL MEDICATIONS:  MEDICATIONS  (STANDING):  amLODIPine   Tablet 10 milliGRAM(s) Oral daily  aspirin  chewable 81 milliGRAM(s) Oral daily  atorvastatin 80 milliGRAM(s) Oral at bedtime  chlorhexidine 0.12% Liquid 15 milliLiter(s) Oral Mucosa every 12 hours  chlorhexidine 2% Cloths 1 Application(s) Topical <User Schedule>  enoxaparin Injectable 40 milliGRAM(s) SubCutaneous every 24 hours  hydrALAZINE 100 milliGRAM(s) Oral every 8 hours  influenza  Vaccine (HIGH DOSE) 0.7 milliLiter(s) IntraMuscular once  senna 2 Tablet(s) Oral at bedtime    MEDICATIONS  (PRN):  acetaminophen     Tablet .. 650 milliGRAM(s) Oral every 6 hours PRN Temp greater or equal to 38C (100.4F)  bisacodyl Suppository 10 milliGRAM(s) Rectal daily PRN Constipation  morphine  - Injectable 2 milliGRAM(s) IV Push every 4 hours PRN Dyspnea      LABS:    Hgb Trend: 12.4<--                  MICROBIOLOGY:     RADIOLOGY:  [ ] Reviewed and interpreted by me CHIEF COMPLAINT:    Interval Events:  no o/n events    REVIEW OF SYSTEMS:  [x ] Unable to assess ROS because chronic encephalopathy    OBJECTIVE:  ICU Vital Signs Last 24 Hrs  T(C): 37.4 (25 Nov 2023 07:20), Max: 37.8 (24 Nov 2023 12:00)  T(F): 99.3 (25 Nov 2023 07:20), Max: 100 (24 Nov 2023 12:00)  HR: 61 (25 Nov 2023 07:20) (54 - 76)  BP: 146/62 (25 Nov 2023 07:00) (126/65 - 165/77)  BP(mean): 85 (25 Nov 2023 07:00) (81 - 103)  ABP: --  ABP(mean): --  RR: 22 (25 Nov 2023 07:20) (15 - 25)  SpO2: 100% (25 Nov 2023 07:00) (94% - 100%)      Mode: AC/ CMV (Assist Control/ Continuous Mandatory Ventilation), RR (machine): 12, TV (machine): 400, FiO2: 25, PEEP: 5, ITime: 1, MAP: 11, PIP: 29    11-24 @ 07:01  -  11-25 @ 07:00  --------------------------------------------------------  IN: 1300 mL / OUT: 690 mL / NET: 610 mL      CAPILLARY BLOOD GLUCOSE          PHYSICAL EXAM:  General: NAD, chronically ill appearing  HEENT: ETT and NGT in place  Neck: supple  Respiratory: mechanical BS  Cardiovascular: s1s2 RRR  Abdomen: soft, non tender, non distended  Extremities: warm, no edema or clubbing  Skin: intact  Neurological: unable to assess  Psychiatry: unable to assess    LINES:  \[facility] [place] [facility]\""    HOSPITAL MEDICATIONS:  MEDICATIONS  (STANDING):  amLODIPine   Tablet 10 milliGRAM(s) Oral daily  aspirin  chewable 81 milliGRAM(s) Oral daily  atorvastatin 80 milliGRAM(s) Oral at bedtime  chlorhexidine 0.12% Liquid 15 milliLiter(s) Oral Mucosa every 12 hours  chlorhexidine 2% Cloths 1 Application(s) Topical <User Schedule>  enoxaparin Injectable 40 milliGRAM(s) SubCutaneous every 24 hours  hydrALAZINE 100 milliGRAM(s) Oral every 8 hours  influenza  Vaccine (HIGH DOSE) 0.7 milliLiter(s) IntraMuscular once  senna 2 Tablet(s) Oral at bedtime    MEDICATIONS  (PRN):  acetaminophen     Tablet .. 650 milliGRAM(s) Oral every 6 hours PRN Temp greater or equal to 38C (100.4F)  bisacodyl Suppository 10 milliGRAM(s) Rectal daily PRN Constipation  morphine  - Injectable 2 milliGRAM(s) IV Push every 4 hours PRN Dyspnea      LABS:    Hgb Trend: 12.4<--                  MICROBIOLOGY:     RADIOLOGY:  [ ] Reviewed and interpreted by me

## 2023-11-25 NOTE — PROGRESS NOTE ADULT - ASSESSMENT
88 year old female with a PMH of HTN, HLD and afib on xarelto who did not take medications for past 1 week prior to hospitalization here with R MCA infarct.   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

## 2023-11-26 NOTE — PROGRESS NOTE ADULT - NS ATTEST RISK PROBLEM GEN_ALL_CORE FT
catastrophic CVA - ICA, MCA, JIMMY infarct, persistent encephalopathy, HTN

## 2023-11-26 NOTE — PROGRESS NOTE ADULT - SUBJECTIVE AND OBJECTIVE BOX
CHIEF COMPLAINT:    Interval Events:    REVIEW OF SYSTEMS:  Constitutional: [ ] fevers [ ] chills [ ] weight loss [ ] weight gain  HEENT: [ ] dry eyes [ ] eye irritation [ ] postnasal drip [ ] nasal congestion  CV: [ ] chest pain [ ] orthopnea [ ] palpitations [ ] murmur  Resp: [ ] cough [ ] shortness of breath [ ] dyspnea [ ] wheezing [ ] sputum [ ] hemoptysis  GI: [ ] nausea [ ] vomiting [ ] diarrhea [ ] constipation [ ] abd pain [ ] dysphagia   : [ ] dysuria [ ] nocturia [ ] hematuria [ ] increased urinary frequency  Musculoskeletal: [ ] back pain [ ] myalgias [ ] arthralgias [ ] fracture  Skin: [ ] rash [ ] itch  Neurological: [ ] headache [ ] dizziness [ ] syncope [ ] weakness [ ] numbness  Hematologic/Lymphatic: [ ] anemia [ ] bleeding problem  Allergic/Immunologic: [ ] itchy eyes [ ] nasal discharge [ ] hives [ ] angioedema  [ ] All other systems negative  [ ] Unable to assess ROS because ________    OBJECTIVE:  ICU Vital Signs Last 24 Hrs  T(C): 36.8 (26 Nov 2023 07:00), Max: 38 (25 Nov 2023 22:00)  T(F): 98.2 (26 Nov 2023 07:00), Max: 100.4 (25 Nov 2023 22:00)  HR: 65 (26 Nov 2023 07:00) (61 - 84)  BP: 130/65 (26 Nov 2023 07:00) (123/62 - 171/72)  BP(mean): 84 (26 Nov 2023 07:00) (76 - 107)  ABP: --  ABP(mean): --  RR: 20 (26 Nov 2023 07:00) (19 - 35)  SpO2: 99% (26 Nov 2023 07:00) (95% - 100%)      Mode: AC/ CMV (Assist Control/ Continuous Mandatory Ventilation), RR (machine): 12, TV (machine): 400, FiO2: 25, PEEP: 5, ITime: 0.9, MAP: 10, PIP: 28    11-25 @ 07:01  -  11-26 @ 07:00  --------------------------------------------------------  IN: 1270 mL / OUT: 620 mL / NET: 650 mL      CAPILLARY BLOOD GLUCOSE          PHYSICAL EXAM:  General:   HEENT:   Neck:   Respiratory:   Cardiovascular:   Abdomen:   Extremities:   Skin:   Neurological:  Psychiatry:    LINES:    HOSPITAL MEDICATIONS:  MEDICATIONS  (STANDING):  amLODIPine   Tablet 10 milliGRAM(s) Oral daily  aspirin  chewable 81 milliGRAM(s) Oral daily  atorvastatin 80 milliGRAM(s) Oral at bedtime  chlorhexidine 0.12% Liquid 15 milliLiter(s) Oral Mucosa every 12 hours  chlorhexidine 2% Cloths 1 Application(s) Topical <User Schedule>  enoxaparin Injectable 40 milliGRAM(s) SubCutaneous every 24 hours  hydrALAZINE 100 milliGRAM(s) Oral every 8 hours  influenza  Vaccine (HIGH DOSE) 0.7 milliLiter(s) IntraMuscular once  senna 2 Tablet(s) Oral at bedtime    MEDICATIONS  (PRN):  acetaminophen     Tablet .. 650 milliGRAM(s) Oral every 6 hours PRN Temp greater or equal to 38C (100.4F)  bisacodyl Suppository 10 milliGRAM(s) Rectal daily PRN Constipation  morphine  - Injectable 2 milliGRAM(s) IV Push every 4 hours PRN Dyspnea      LABS:    Hgb Trend: 12.4<--                  MICROBIOLOGY:     RADIOLOGY:  [ ] Reviewed and interpreted by me CHIEF COMPLAINT:    Interval Events:  Tmax 100.2    REVIEW OF SYSTEMS:  [x ] Unable to assess ROS because unresponsive    OBJECTIVE:  ICU Vital Signs Last 24 Hrs  T(C): 36.8 (26 Nov 2023 07:00), Max: 38 (25 Nov 2023 22:00)  T(F): 98.2 (26 Nov 2023 07:00), Max: 100.4 (25 Nov 2023 22:00)  HR: 65 (26 Nov 2023 07:00) (61 - 84)  BP: 130/65 (26 Nov 2023 07:00) (123/62 - 171/72)  BP(mean): 84 (26 Nov 2023 07:00) (76 - 107)  ABP: --  ABP(mean): --  RR: 20 (26 Nov 2023 07:00) (19 - 35)  SpO2: 99% (26 Nov 2023 07:00) (95% - 100%)      Mode: AC/ CMV (Assist Control/ Continuous Mandatory Ventilation), RR (machine): 12, TV (machine): 400, FiO2: 25, PEEP: 5, ITime: 0.9, MAP: 10, PIP: 28    11-25 @ 07:01  -  11-26 @ 07:00  --------------------------------------------------------  IN: 1270 mL / OUT: 620 mL / NET: 650 mL      CAPILLARY BLOOD GLUCOSE          PHYSICAL EXAM:  General: NAD, chronically ill appearing  HEENT: ETT and NGT in place  Neck: supple  Respiratory: mechanical BS  Cardiovascular: s1s2 RRR  Abdomen: soft, non tender, non distended  Extremities: warm, no edema or clubbing  Skin: intact  Neurological: unable to assess  Psychiatry: unable to assess    LINES:  [facility]    HOSPITAL MEDICATIONS:  MEDICATIONS  (STANDING):  amLODIPine   Tablet 10 milliGRAM(s) Oral daily  aspirin  chewable 81 milliGRAM(s) Oral daily  atorvastatin 80 milliGRAM(s) Oral at bedtime  chlorhexidine 0.12% Liquid 15 milliLiter(s) Oral Mucosa every 12 hours  chlorhexidine 2% Cloths 1 Application(s) Topical <User Schedule>  enoxaparin Injectable 40 milliGRAM(s) SubCutaneous every 24 hours  hydrALAZINE 100 milliGRAM(s) Oral every 8 hours  influenza  Vaccine (HIGH DOSE) 0.7 milliLiter(s) IntraMuscular once  senna 2 Tablet(s) Oral at bedtime    MEDICATIONS  (PRN):  acetaminophen     Tablet .. 650 milliGRAM(s) Oral every 6 hours PRN Temp greater or equal to 38C (100.4F)  bisacodyl Suppository 10 milliGRAM(s) Rectal daily PRN Constipation  morphine  - Injectable 2 milliGRAM(s) IV Push every 4 hours PRN Dyspnea      LABS:    Hgb Trend: 12.4<--                  MICROBIOLOGY:     RADIOLOGY:  [ ] Reviewed and interpreted by me

## 2023-11-27 NOTE — PROGRESS NOTE ADULT - ASSESSMENT
88 year old female with a PMH of HTN, HLD and afib on xarelto who did not take medications for past 1 week prior to hospitalization here with R MCA infarct.   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

## 2023-11-27 NOTE — PROGRESS NOTE ADULT - SUBJECTIVE AND OBJECTIVE BOX
INTERVAL HPI/OVERNIGHT EVENTS:   HPI:  Ms. Longoria is an 88 year old female with a PMH of HTN, HLD and afib on xarelto who presented to [facility] earlier this morning after she was found altered, nonverbal and with right gaze deviation when she woke up. At baseline pt is ambulatory and verbal. Pt required intubation in ED for airway protection given poor MS. NIHSS calculated as 30 at that time. Pt not a candidate for TNK or transfer for intervention per stroke neurologist. She remains intubated on nicardipine gtt. ICU team consulted. (26 Oct 2023 05:50)      CENTRAL LINE: [ ] YES [ ] NO  LOCATION:   DATE INSERTED:  REMOVE: [ ] YES [ ] NO  EXPLAIN:    PATEL: [ ] YES [ ] NO    DATE INSERTED:  REMOVE:  [ ] YES [ ] NO  EXPLAIN:    A-LINE:  [ ] YES [ ] NO  LOCATION:   DATE INSERTED:  REMOVE:  [ ] YES [ ] NO  EXPLAIN:    PAST MEDICAL & SURGICAL HISTORY:  HTN (hypertension)      Elevated cholesterol      Atrial fibrillation      Hyperlipidemia      REVIEW OF SYSTEMS:    Negative ROS aside from HPI/Interval events above.    ICU Vital Signs Last 24 Hrs  T(C): 36.3 (27 Nov 2023 10:00), Max: 36.3 (27 Nov 2023 03:00)  T(F): 97.3 (27 Nov 2023 10:00), Max: 97.3 (27 Nov 2023 03:00)  HR: 59 (27 Nov 2023 10:00) (52 - 74)  BP: 138/64 (27 Nov 2023 10:00) (126/67 - 152/98)  BP(mean): 87 (27 Nov 2023 10:00) (77 - 109)  ABP: --  ABP(mean): --  RR: 30 (27 Nov 2023 10:00) (12 - 31)  SpO2: 100% (27 Nov 2023 10:00) (100% - 100%)    O2 Parameters below as of 27 Nov 2023 07:00  Patient On (Oxygen Delivery Method): ventilator, ETT: , PEEP5, RR12, FIO2 21%    O2 Concentration (%): 21      I&O's Detail    26 Nov 2023 07:01  -  27 Nov 2023 07:00  --------------------------------------------------------  IN:    Enteral Tube Flush: 200 mL    Jevity 1.2: 1040 mL  Total IN: 1240 mL    OUT:    Indwelling Catheter - Urethral (mL): 540 mL  Total OUT: 540 mL    Total NET: 700 mL      Mode: AC/ CMV (Assist Control/ Continuous Mandatory Ventilation)  RR (machine): 12  TV (machine): 400  FiO2: 25  PEEP: 5  ITime: 1  MAP: 10  PIP: 25    CAPILLARY BLOOD GLUCOSE        PHYSICAL EXAM:    General: NAD, chronically ill appearing  HEENT: ETT and NGT in place  Neck: supple  Respiratory: mechanical BS  Cardiovascular: s1s2 RRR  Abdomen: soft, non tender, non distended  Extremities: warm, no edema or clubbing  Skin: intact  Neurological: unable to assess  Psychiatry: unable to assess      LABS:                        10.2   7.17  )-----------( 216      ( 27 Nov 2023 02:15 )             32.8      11-27    144  |  110<H>  |  20  ----------------------------<  195<H>  4.0   |  32<H>  |  0.53    Ca    8.9      27 Nov 2023 02:15  Phos  2.6     11-27  Mg     2.4     11-27    TPro  6.2  /  Alb  2.1<L>  /  TBili  0.7  /  DBili  x   /  AST  129<H>  /  ALT  158<H>  /  AlkPhos  92  11-27      Urinalysis Basic - ( 27 Nov 2023 02:15 )    Color: x / Appearance: x / SG: x / pH: x  Gluc: 195 mg/dL / Ketone: x  / Bili: x / Urobili: x   Blood: x / Protein: x / Nitrite: x   Leuk Esterase: x / RBC: x / WBC x   Sq Epi: x / Non Sq Epi: x / Bacteria: x

## 2023-11-27 NOTE — PROGRESS NOTE ADULT - SUBJECTIVE AND OBJECTIVE BOX
Neurology Progress Note    S: Patient seen and examined.     Medications: MEDICATIONS  (STANDING):  amLODIPine   Tablet 10 milliGRAM(s) Oral daily  aspirin  chewable 81 milliGRAM(s) Oral daily  atorvastatin 80 milliGRAM(s) Oral at bedtime  chlorhexidine 0.12% Liquid 15 milliLiter(s) Oral Mucosa every 12 hours  chlorhexidine 2% Cloths 1 Application(s) Topical <User Schedule>  enoxaparin Injectable 40 milliGRAM(s) SubCutaneous every 24 hours  hydrALAZINE 100 milliGRAM(s) Oral every 8 hours  influenza  Vaccine (HIGH DOSE) 0.7 milliLiter(s) IntraMuscular once  senna 2 Tablet(s) Oral at bedtime    MEDICATIONS  (PRN):  acetaminophen     Tablet .. 650 milliGRAM(s) Oral every 6 hours PRN Temp greater or equal to 38C (100.4F)  bisacodyl Suppository 10 milliGRAM(s) Rectal daily PRN Constipation  morphine  - Injectable 2 milliGRAM(s) IV Push every 4 hours PRN Dyspnea       Vitals:  Vital Signs Last 24 Hrs  T(C): 36.3 (27 Nov 2023 10:00), Max: 36.3 (27 Nov 2023 03:00)  T(F): 97.3 (27 Nov 2023 10:00), Max: 97.3 (27 Nov 2023 03:00)  HR: 59 (27 Nov 2023 10:00) (52 - 74)  BP: 138/64 (27 Nov 2023 10:00) (126/67 - 152/98)  BP(mean): 87 (27 Nov 2023 10:00) (77 - 109)  RR: 30 (27 Nov 2023 10:00) (12 - 31)  SpO2: 100% (27 Nov 2023 10:00) (100% - 100%)    Parameters below as of 27 Nov 2023 07:00  Patient On (Oxygen Delivery Method): ventilator, ETT: , PEEP5, RR12, FIO2 21%    O2 Concentration (%): 21        LABS:                          10.2   7.17  )-----------( 216      ( 27 Nov 2023 02:15 )             32.8     11-27    144  |  110<H>  |  20  ----------------------------<  195<H>  4.0   |  32<H>  |  0.53    Ca    8.9      27 Nov 2023 02:15  Phos  2.6     11-27  Mg     2.4     11-27    TPro  6.2  /  Alb  2.1<L>  /  TBili  0.7  /  DBili  x   /  AST  129<H>  /  ALT  158<H>  /  AlkPhos  92  11-27    LIVER FUNCTIONS - ( 27 Nov 2023 02:15 )  Alb: 2.1 g/dL / Pro: 6.2 gm/dL / ALK PHOS: 92 U/L / ALT: 158 U/L / AST: 129 U/L / GGT: x             Urinalysis Basic - ( 27 Nov 2023 02:15 )    Color: x / Appearance: x / SG: x / pH: x  Gluc: 195 mg/dL / Ketone: x  / Bili: x / Urobili: x   Blood: x / Protein: x / Nitrite: x   Leuk Esterase: x / RBC: x / WBC x   Sq Epi: x / Non Sq Epi: x / Bacteria: x          Neurological Exam:  Mental Status: Intubated, not sedated. No spont eye opening. No verbal output, does not follow commands.   Cranial Nerves: pupils very sluggish R, L near fixed, no  corneals, no VOR, intermittent eyelid fluttering no facial asymmetry , weak cough/gag  Motor: dec tone throughout, no spont movemnt noted   Sensation: UE extensor posturing, LE triple flexion left toe up    I personally reviewed the below data/images/labs:      < from: CT Head No Cont (10.22.19 @ 15:57) >  IMPRESSION:    1)  chronic ischemic changes in both hemispheres with volume loss. There   are no new infarct as compared to prior CT. This may be further assessed   with MR imaging.  2)  no hemorrhage or mass identified.    < end of copied text >  < from: CT Angio Head w/ IV Cont (10.22.19 @ 15:57) >  IMPRESSION    1. There is intimal thickening and calcified plaque in both carotid   bifurcations in the neck, but without significant stenosis. No evidence   of carotid vertebral occlusion or dissection.  2. Intracranially, there are atherosclerotic changes in the anterior and   posterior circulation, but without significant stenosis or occlusion.    < end of copied text >  < from: CT Brain Stroke Protocol (10.26.23 @ 04:37) >  IMPRESSION:  No evidence of acute intracranial hemorrhage, mass effect or large acute   territorial infarct, within limits of noncontrast CT technique.    < end of copied text >  < from: CT Angio Neck Stroke Protocol w/ IV Cont (10.26.23 @ 04:53) >  CT ANGIOGRAPHY NECK:  1.  The right internal carotid artery is occluded approximately 1.5 cm   distal to its origin, possibly due to underlying dissection.  2.  Endotracheal tube is low in position, with its tip approximate 1 cm   above the slade.  3.  Diffuse ill-defined groundglass opacity in both lungs, which may   represent pulmonary edema, infection and/or atelectasis.  4.  There is a discrete right upper lobe groundglass opacity measuring   1.9 x 1.4 cm; this may represent a focus of infection, inflammation,   edema, hemorrhage, fibrosis or malignancy.  A follow-up chest CT is   warranted in three months to assess for resolution.    CT ANGIOGRAPHY BRAIN: The right internal carotid arteries occluded.    There is no significant flow related opacification within the proximal   right middle cerebral artery.  There is mild reconstitution of flow in   some distal branch vessels of the inferior division of the right middle   cerebral artery.  The right anterior cerebral artery fills across the   anterior communicating artery.    < end of copied text >  < from: CT Brain Perfusion Maps Stroke (10.26.23 @ 04:54) >  CT PERFUSION: CT perfusion is consistent with large acute infarct in the   right middle cerebral artery vascular territory and small to moderate   amount of surrounding ischemic penumbra.  Hypoperfusion index is 0.3.    Core infarct (CBF <  30%:): 136 mL  Penumbra (Tmax >6s): 194 mL  Mismatched volume: 58 mL  Mismatched ratio: 1.4    < end of copied text >      < from: CT Head No Cont (10.22.19 @ 15:57) >  IMPRESSION:    1)  chronic ischemic changes in both hemispheres with volume loss. There   are no new infarct as compared to prior CT. This may be further assessed   with MR imaging.  2)  no hemorrhage or mass identified.    < end of copied text >  < from: CT Angio Head w/ IV Cont (10.22.19 @ 15:57) >  IMPRESSION    1. There is intimal thickening and calcified plaque in both carotid   bifurcations in the neck, but without significant stenosis. No evidence   of carotid vertebral occlusion or dissection.  2. Intracranially, there are atherosclerotic changes in the anterior and   posterior circulation, but without significant stenosis or occlusion.    < end of copied text >  < from: CT Brain Stroke Protocol (10.26.23 @ 04:37) >  IMPRESSION:  No evidence of acute intracranial hemorrhage, mass effect or large acute   territorial infarct, within limits of noncontrast CT technique.    < end of copied text >  < from: CT Angio Neck Stroke Protocol w/ IV Cont (10.26.23 @ 04:53) >  CT ANGIOGRAPHY NECK:  1.  The right internal carotid artery is occluded approximately 1.5 cm   distal to its origin, possibly due to underlying dissection.  2.  Endotracheal tube is low in position, with its tip approximate 1 cm   above the slade.  3.  Diffuse ill-defined groundglass opacity in both lungs, which may   represent pulmonary edema, infection and/or atelectasis.  4.  There is a discrete right upper lobe groundglass opacity measuring   1.9 x 1.4 cm; this may represent a focus of infection, inflammation,   edema, hemorrhage, fibrosis or malignancy.  A follow-up chest CT is   warranted in three months to assess for resolution.    CT ANGIOGRAPHY BRAIN: The right internal carotid arteries occluded.    There is no significant flow related opacification within the proximal   right middle cerebral artery.  There is mild reconstitution of flow in   some distal branch vessels of the inferior division of the right middle   cerebral artery.  The right anterior cerebral artery fills across the   anterior communicating artery.    < end of copied text >  < from: CT Brain Perfusion Maps Stroke (10.26.23 @ 04:54) >  CT PERFUSION: CT perfusion is consistent with large acute infarct in the   right middle cerebral artery vascular territory and small to moderate   amount of surrounding ischemic penumbra.  Hypoperfusion index is 0.3.    Core infarct (CBF <  30%:): 136 mL  Penumbra (Tmax >6s): 194 mL  Mismatched volume: 58 mL  Mismatched ratio: 1.4    < end of copied text >      < from: CT Head No Cont (10.22.19 @ 15:57) >  IMPRESSION:    1)  chronic ischemic changes in both hemispheres with volume loss. There   are no new infarct as compared to prior CT. This may be further assessed   with MR imaging.  2)  no hemorrhage or mass identified.    < end of copied text >  < from: CT Angio Head w/ IV Cont (10.22.19 @ 15:57) >  IMPRESSION    1. There is intimal thickening and calcified plaque in both carotid   bifurcations in the neck, but without significant stenosis. No evidence   of carotid vertebral occlusion or dissection.  2. Intracranially, there are atherosclerotic changes in the anterior and   posterior circulation, but without significant stenosis or occlusion.    < end of copied text >  < from: CT Brain Stroke Protocol (10.26.23 @ 04:37) >  IMPRESSION:  No evidence of acute intracranial hemorrhage, mass effect or large acute   territorial infarct, within limits of noncontrast CT technique.    < end of copied text >  < from: CT Angio Neck Stroke Protocol w/ IV Cont (10.26.23 @ 04:53) >  CT ANGIOGRAPHY NECK:  1.  The right internal carotid artery is occluded approximately 1.5 cm   distal to its origin, possibly due to underlying dissection.  2.  Endotracheal tube is low in position, with its tip approximate 1 cm   above the slade.  3.  Diffuse ill-defined groundglass opacity in both lungs, which may   represent pulmonary edema, infection and/or atelectasis.  4.  There is a discrete right upper lobe groundglass opacity measuring   1.9 x 1.4 cm; this may represent a focus of infection, inflammation,   edema, hemorrhage, fibrosis or malignancy.  A follow-up chest CT is   warranted in three months to assess for resolution.    CT ANGIOGRAPHY BRAIN: The right internal carotid arteries occluded.    There is no significant flow related opacification within the proximal   right middle cerebral artery.  There is mild reconstitution of flow in   some distal branch vessels of the inferior division of the right middle   cerebral artery.  The right anterior cerebral artery fills across the   anterior communicating artery.    < end of copied text >  < from: CT Brain Perfusion Maps Stroke (10.26.23 @ 04:54) >  CT PERFUSION: CT perfusion is consistent with large acute infarct in the   right middle cerebral artery vascular territory and small to moderate   amount of surrounding ischemic penumbra.  Hypoperfusion index is 0.3.    Core infarct (CBF <  30%:): 136 mL  Penumbra (Tmax >6s): 194 mL  Mismatched volume: 58 mL  Mismatched ratio: 1.4    < end of copied text >    EEG Classification / Summary:  Abnormal EEG study  1. Intermittent periodic discharges seen intermittently over the left hemisphere, sometimes with triphasic morphology  2. Focal continuous right hemispheric slowing  3. Moderate generalized background slowing    -----------------------------------------------------------------------------------------------------    Clinical Impression:  1. While the periodic pattern noted above may represent lateralized periodic discharges indicating epileptic potential in the left hemisphere, more likely these represent generalized periodic discharges with triphasic morphology, consistent with metabolic etiology, that are poorly expressed on the right due to the effects of the stroke.   2. Structural abnormality in right hemisphere  3. Moderate diffuse/multi-focal cerebral dysfunction, not specific as to etiology.    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 Significant   associated mass effect and associated herniation.      < end of copied text >  < from: CT Head No Cont (11.02.23 @ 14:30) >    Reidentified are 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 There is effacement of all of the   basilar cisterns. Effacement of the sulci in the bilateral cerebri,   compatible with increased brain edema.    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    Unsuccessful attempts were made to reach the ordering clinician via teams.      --- End of Report ---    < end of copied text >

## 2023-11-27 NOTE — PROGRESS NOTE ADULT - ASSESSMENT
88F w/ HTN, HLD, Afib. Presents w/ AMS and R gaze deviation. Pt was intubated in the ED due to poor mental status. Pt did not receive TNK. Admitted to ICU. Imaging revealed R JIMMY and MCA infarct w/ midline shift, as well as PCA infarct. Pt's mental status is poor.     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; morphine PRN for discomfort/tachypnea. will not recover  #CV - maintain BP control. no vasopressor needed.  #Pulm - remains intubated on minimal vent settings; does not tolerate PSV weaning and not a candidate for extubation; family would not opt for tracheostomy, although they do not want to withdraw either  #ID- off abx, afebrile, no leukocytosis; monitor off abx  #Renal/metabolic - renal function stable; monitor I/Os, electrolytes  #GI- tolerating TF, having BM  #Heme - no active issues  #Endo - BG stable  #PPx - lovenox qd  #Dispo- remains in ICU vent dependent, unable to extubate; prognosis for neuro-cognitive recovery is poor; pt is DNR - family does not want to withdraw care but they would not want trach/PEG either, ethics consulted and will f/u with family; labs 2-3x/week. We are not getting anywhere with the family.

## 2023-11-28 NOTE — PROGRESS NOTE ADULT - SUBJECTIVE AND OBJECTIVE BOX
INTERVAL HPI/OVERNIGHT EVENTS:   HPI:  Ms. Longoria is an 88 year old female with a PMH of HTN, HLD and afib on xarelto who presented to [facility] earlier this morning after she was found altered, nonverbal and with right gaze deviation when she woke up. At baseline pt is ambulatory and verbal. Pt required intubation in ED for airway protection given poor MS. NIHSS calculated as 30 at that time. Pt not a candidate for TNK or transfer for intervention per stroke neurologist. She remains intubated on nicardipine gtt. ICU team consulted. (26 Oct 2023 05:50)      CENTRAL LINE: [ ] YES [ ] NO  LOCATION:   DATE INSERTED:  REMOVE: [ ] YES [ ] NO  EXPLAIN:    PATEL: [ ] YES [ ] NO    DATE INSERTED:  REMOVE:  [ ] YES [ ] NO  EXPLAIN:    A-LINE:  [ ] YES [ ] NO  LOCATION:   DATE INSERTED:  REMOVE:  [ ] YES [ ] NO  EXPLAIN:    PAST MEDICAL & SURGICAL HISTORY:  HTN (hypertension)      Elevated cholesterol      Atrial fibrillation      Hyperlipidemia          REVIEW OF SYSTEMS:    Negative ROS aside from HPI/Interval events above.    ICU Vital Signs Last 24 Hrs  T(C): 36.5 (28 Nov 2023 09:00), Max: 36.7 (27 Nov 2023 14:00)  T(F): 97.7 (28 Nov 2023 09:00), Max: 98.1 (27 Nov 2023 14:00)  HR: 63 (28 Nov 2023 09:00) (57 - 74)  BP: 136/63 (28 Nov 2023 09:00) (118/61 - 153/73)  BP(mean): 84 (28 Nov 2023 09:00) (77 - 98)  ABP: --  ABP(mean): --  RR: 23 (28 Nov 2023 09:00) (16 - 40)  SpO2: 100% (28 Nov 2023 08:36) (95% - 100%)    O2 Parameters below as of 28 Nov 2023 07:05  Patient On (Oxygen Delivery Method): ventilator, ETT: , PEEP5, RR12,FIO2 21%    O2 Concentration (%): 21            I&O's Detail    27 Nov 2023 07:01  -  28 Nov 2023 07:00  --------------------------------------------------------  IN:    Enteral Tube Flush: 360 mL    Jevity 1.2: 1105 mL  Total IN: 1465 mL    OUT:    Indwelling Catheter - Urethral (mL): 790 mL  Total OUT: 790 mL    Total NET: 675 mL      28 Nov 2023 07:01  -  28 Nov 2023 11:08  --------------------------------------------------------  IN:    Jevity 1.2: 195 mL  Total IN: 195 mL    OUT:    Indwelling Catheter - Urethral (mL): 100 mL  Total OUT: 100 mL    Total NET: 95 mL          Mode: AC/ CMV (Assist Control/ Continuous Mandatory Ventilation)  RR (machine): 12  TV (machine): 400  FiO2: 25  PEEP: 5  ITime: 0.9  MAP: 12  PIP: 29    CAPILLARY BLOOD GLUCOSE      PHYSICAL EXAM:    General: NAD, chronically ill appearing  HEENT: ETT and NGT in place  Neck: supple  Respiratory: mechanical BS  Cardiovascular: s1s2 RRR  Abdomen: soft, non tender, non distended  Extremities: warm, no edema or clubbing  Skin: intact  Neurological: unable to assess  Psychiatry: unable to assess      LABS:                        10.2   7.17  )-----------( 216      ( 27 Nov 2023 02:15 )             32.8      11-27    144  |  110<H>  |  20  ----------------------------<  195<H>  4.0   |  32<H>  |  0.53    Ca    8.9      27 Nov 2023 02:15  Phos  2.6     11-27  Mg     2.4     11-27    TPro  6.2  /  Alb  2.1<L>  /  TBili  0.7  /  DBili  x   /  AST  129<H>  /  ALT  158<H>  /  AlkPhos  92  11-27      Urinalysis Basic - ( 27 Nov 2023 02:15 )    Color: x / Appearance: x / SG: x / pH: x  Gluc: 195 mg/dL / Ketone: x  / Bili: x / Urobili: x   Blood: x / Protein: x / Nitrite: x   Leuk Esterase: x / RBC: x / WBC x   Sq Epi: x / Non Sq Epi: x / Bacteria: x

## 2023-11-28 NOTE — PROGRESS NOTE ADULT - SUBJECTIVE AND OBJECTIVE BOX
Neurology Progress Note    S: Patient seen and examined.     Medications: MEDICATIONS  (STANDING):  amLODIPine   Tablet 10 milliGRAM(s) Oral daily  aspirin  chewable 81 milliGRAM(s) Oral daily  atorvastatin 80 milliGRAM(s) Oral at bedtime  chlorhexidine 0.12% Liquid 15 milliLiter(s) Oral Mucosa every 12 hours  chlorhexidine 2% Cloths 1 Application(s) Topical <User Schedule>  enoxaparin Injectable 40 milliGRAM(s) SubCutaneous every 24 hours  hydrALAZINE 100 milliGRAM(s) Oral every 8 hours  influenza  Vaccine (HIGH DOSE) 0.7 milliLiter(s) IntraMuscular once  senna 2 Tablet(s) Oral at bedtime    MEDICATIONS  (PRN):  acetaminophen     Tablet .. 650 milliGRAM(s) Oral every 6 hours PRN Temp greater or equal to 38C (100.4F)  bisacodyl Suppository 10 milliGRAM(s) Rectal daily PRN Constipation  morphine  - Injectable 2 milliGRAM(s) IV Push every 4 hours PRN Dyspnea       Vitals:  Vital Signs Last 24 Hrs  T(C): 36.5 (28 Nov 2023 09:00), Max: 36.7 (27 Nov 2023 14:00)  T(F): 97.7 (28 Nov 2023 09:00), Max: 98.1 (27 Nov 2023 14:00)  HR: 63 (28 Nov 2023 09:00) (57 - 74)  BP: 136/63 (28 Nov 2023 09:00) (118/61 - 153/73)  BP(mean): 84 (28 Nov 2023 09:00) (77 - 98)  RR: 23 (28 Nov 2023 09:00) (16 - 40)  SpO2: 100% (28 Nov 2023 08:36) (95% - 100%)    Parameters below as of 28 Nov 2023 07:05  Patient On (Oxygen Delivery Method): ventilator, ETT: , PEEP5, RR12,FIO2 21%    O2 Concentration (%): 21      LABS:                          10.2   7.17  )-----------( 216      ( 27 Nov 2023 02:15 )             32.8     11-27    144  |  110<H>  |  20  ----------------------------<  195<H>  4.0   |  32<H>  |  0.53    Ca    8.9      27 Nov 2023 02:15  Phos  2.6     11-27  Mg     2.4     11-27    TPro  6.2  /  Alb  2.1<L>  /  TBili  0.7  /  DBili  x   /  AST  129<H>  /  ALT  158<H>  /  AlkPhos  92  11-27    LIVER FUNCTIONS - ( 27 Nov 2023 02:15 )  Alb: 2.1 g/dL / Pro: 6.2 gm/dL / ALK PHOS: 92 U/L / ALT: 158 U/L / AST: 129 U/L / GGT: x             Urinalysis Basic - ( 27 Nov 2023 02:15 )    Color: x / Appearance: x / SG: x / pH: x  Gluc: 195 mg/dL / Ketone: x  / Bili: x / Urobili: x   Blood: x / Protein: x / Nitrite: x   Leuk Esterase: x / RBC: x / WBC x   Sq Epi: x / Non Sq Epi: x / Bacteria: x        Neurological Exam:  Mental Status: Intubated, not sedated. No spont eye opening. No verbal output, does not follow commands.   Cranial Nerves: pupils very sluggish R, L near fixed, no  corneals, no VOR, intermittent eyelid fluttering no facial asymmetry , weak cough/gag  Motor: dec tone throughout, no spont movemnt noted   Sensation: UE extensor posturing, LE triple flexion left toe up    I personally reviewed the below data/images/labs:      < from: CT Head No Cont (10.22.19 @ 15:57) >  IMPRESSION:    1)  chronic ischemic changes in both hemispheres with volume loss. There   are no new infarct as compared to prior CT. This may be further assessed   with MR imaging.  2)  no hemorrhage or mass identified.    < end of copied text >  < from: CT Angio Head w/ IV Cont (10.22.19 @ 15:57) >  IMPRESSION    1. There is intimal thickening and calcified plaque in both carotid   bifurcations in the neck, but without significant stenosis. No evidence   of carotid vertebral occlusion or dissection.  2. Intracranially, there are atherosclerotic changes in the anterior and   posterior circulation, but without significant stenosis or occlusion.    < end of copied text >  < from: CT Brain Stroke Protocol (10.26.23 @ 04:37) >  IMPRESSION:  No evidence of acute intracranial hemorrhage, mass effect or large acute   territorial infarct, within limits of noncontrast CT technique.    < end of copied text >  < from: CT Angio Neck Stroke Protocol w/ IV Cont (10.26.23 @ 04:53) >  CT ANGIOGRAPHY NECK:  1.  The right internal carotid artery is occluded approximately 1.5 cm   distal to its origin, possibly due to underlying dissection.  2.  Endotracheal tube is low in position, with its tip approximate 1 cm   above the slade.  3.  Diffuse ill-defined groundglass opacity in both lungs, which may   represent pulmonary edema, infection and/or atelectasis.  4.  There is a discrete right upper lobe groundglass opacity measuring   1.9 x 1.4 cm; this may represent a focus of infection, inflammation,   edema, hemorrhage, fibrosis or malignancy.  A follow-up chest CT is   warranted in three months to assess for resolution.    CT ANGIOGRAPHY BRAIN: The right internal carotid arteries occluded.    There is no significant flow related opacification within the proximal   right middle cerebral artery.  There is mild reconstitution of flow in   some distal branch vessels of the inferior division of the right middle   cerebral artery.  The right anterior cerebral artery fills across the   anterior communicating artery.    < end of copied text >  < from: CT Brain Perfusion Maps Stroke (10.26.23 @ 04:54) >  CT PERFUSION: CT perfusion is consistent with large acute infarct in the   right middle cerebral artery vascular territory and small to moderate   amount of surrounding ischemic penumbra.  Hypoperfusion index is 0.3.    Core infarct (CBF <  30%:): 136 mL  Penumbra (Tmax >6s): 194 mL  Mismatched volume: 58 mL  Mismatched ratio: 1.4    < end of copied text >      < from: CT Head No Cont (10.22.19 @ 15:57) >  IMPRESSION:    1)  chronic ischemic changes in both hemispheres with volume loss. There   are no new infarct as compared to prior CT. This may be further assessed   with MR imaging.  2)  no hemorrhage or mass identified.    < end of copied text >  < from: CT Angio Head w/ IV Cont (10.22.19 @ 15:57) >  IMPRESSION    1. There is intimal thickening and calcified plaque in both carotid   bifurcations in the neck, but without significant stenosis. No evidence   of carotid vertebral occlusion or dissection.  2. Intracranially, there are atherosclerotic changes in the anterior and   posterior circulation, but without significant stenosis or occlusion.    < end of copied text >  < from: CT Brain Stroke Protocol (10.26.23 @ 04:37) >  IMPRESSION:  No evidence of acute intracranial hemorrhage, mass effect or large acute   territorial infarct, within limits of noncontrast CT technique.    < end of copied text >  < from: CT Angio Neck Stroke Protocol w/ IV Cont (10.26.23 @ 04:53) >  CT ANGIOGRAPHY NECK:  1.  The right internal carotid artery is occluded approximately 1.5 cm   distal to its origin, possibly due to underlying dissection.  2.  Endotracheal tube is low in position, with its tip approximate 1 cm   above the slade.  3.  Diffuse ill-defined groundglass opacity in both lungs, which may   represent pulmonary edema, infection and/or atelectasis.  4.  There is a discrete right upper lobe groundglass opacity measuring   1.9 x 1.4 cm; this may represent a focus of infection, inflammation,   edema, hemorrhage, fibrosis or malignancy.  A follow-up chest CT is   warranted in three months to assess for resolution.    CT ANGIOGRAPHY BRAIN: The right internal carotid arteries occluded.    There is no significant flow related opacification within the proximal   right middle cerebral artery.  There is mild reconstitution of flow in   some distal branch vessels of the inferior division of the right middle   cerebral artery.  The right anterior cerebral artery fills across the   anterior communicating artery.    < end of copied text >  < from: CT Brain Perfusion Maps Stroke (10.26.23 @ 04:54) >  CT PERFUSION: CT perfusion is consistent with large acute infarct in the   right middle cerebral artery vascular territory and small to moderate   amount of surrounding ischemic penumbra.  Hypoperfusion index is 0.3.    Core infarct (CBF <  30%:): 136 mL  Penumbra (Tmax >6s): 194 mL  Mismatched volume: 58 mL  Mismatched ratio: 1.4    < end of copied text >      < from: CT Head No Cont (10.22.19 @ 15:57) >  IMPRESSION:    1)  chronic ischemic changes in both hemispheres with volume loss. There   are no new infarct as compared to prior CT. This may be further assessed   with MR imaging.  2)  no hemorrhage or mass identified.    < end of copied text >  < from: CT Angio Head w/ IV Cont (10.22.19 @ 15:57) >  IMPRESSION    1. There is intimal thickening and calcified plaque in both carotid   bifurcations in the neck, but without significant stenosis. No evidence   of carotid vertebral occlusion or dissection.  2. Intracranially, there are atherosclerotic changes in the anterior and   posterior circulation, but without significant stenosis or occlusion.    < end of copied text >  < from: CT Brain Stroke Protocol (10.26.23 @ 04:37) >  IMPRESSION:  No evidence of acute intracranial hemorrhage, mass effect or large acute   territorial infarct, within limits of noncontrast CT technique.    < end of copied text >  < from: CT Angio Neck Stroke Protocol w/ IV Cont (10.26.23 @ 04:53) >  CT ANGIOGRAPHY NECK:  1.  The right internal carotid artery is occluded approximately 1.5 cm   distal to its origin, possibly due to underlying dissection.  2.  Endotracheal tube is low in position, with its tip approximate 1 cm   above the slade.  3.  Diffuse ill-defined groundglass opacity in both lungs, which may   represent pulmonary edema, infection and/or atelectasis.  4.  There is a discrete right upper lobe groundglass opacity measuring   1.9 x 1.4 cm; this may represent a focus of infection, inflammation,   edema, hemorrhage, fibrosis or malignancy.  A follow-up chest CT is   warranted in three months to assess for resolution.    CT ANGIOGRAPHY BRAIN: The right internal carotid arteries occluded.    There is no significant flow related opacification within the proximal   right middle cerebral artery.  There is mild reconstitution of flow in   some distal branch vessels of the inferior division of the right middle   cerebral artery.  The right anterior cerebral artery fills across the   anterior communicating artery.    < end of copied text >  < from: CT Brain Perfusion Maps Stroke (10.26.23 @ 04:54) >  CT PERFUSION: CT perfusion is consistent with large acute infarct in the   right middle cerebral artery vascular territory and small to moderate   amount of surrounding ischemic penumbra.  Hypoperfusion index is 0.3.    Core infarct (CBF <  30%:): 136 mL  Penumbra (Tmax >6s): 194 mL  Mismatched volume: 58 mL  Mismatched ratio: 1.4    < end of copied text >    EEG Classification / Summary:  Abnormal EEG study  1. Intermittent periodic discharges seen intermittently over the left hemisphere, sometimes with triphasic morphology  2. Focal continuous right hemispheric slowing  3. Moderate generalized background slowing    -----------------------------------------------------------------------------------------------------    Clinical Impression:  1. While the periodic pattern noted above may represent lateralized periodic discharges indicating epileptic potential in the left hemisphere, more likely these represent generalized periodic discharges with triphasic morphology, consistent with metabolic etiology, that are poorly expressed on the right due to the effects of the stroke.   2. Structural abnormality in right hemisphere  3. Moderate diffuse/multi-focal cerebral dysfunction, not specific as to etiology.    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 Significant   associated mass effect and associated herniation.      < end of copied text >  < from: CT Head No Cont (11.02.23 @ 14:30) >    Reidentified are 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 There is effacement of all of the   basilar cisterns. Effacement of the sulci in the bilateral cerebri,   compatible with increased brain edema.    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    Unsuccessful attempts were made to reach the ordering clinician via teams.      --- End of Report ---    < end of copied text >

## 2023-11-28 NOTE — PROGRESS NOTE ADULT - ASSESSMENT
88 year old female with a PMH of HTN, HLD and afib on xarelto who did not take medications for past 1 week prior to hospitalization here with R MCA infarct.   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

## 2023-11-29 NOTE — PROGRESS NOTE ADULT - SUBJECTIVE AND OBJECTIVE BOX
Neurology Progress Note    S: Patient seen and examined.     Medications: MEDICATIONS  (STANDING):  amLODIPine   Tablet 10 milliGRAM(s) Oral daily  aspirin  chewable 81 milliGRAM(s) Oral daily  atorvastatin 80 milliGRAM(s) Oral at bedtime  chlorhexidine 0.12% Liquid 15 milliLiter(s) Oral Mucosa every 12 hours  chlorhexidine 2% Cloths 1 Application(s) Topical <User Schedule>  enoxaparin Injectable 40 milliGRAM(s) SubCutaneous every 24 hours  hydrALAZINE 100 milliGRAM(s) Oral every 8 hours  influenza  Vaccine (HIGH DOSE) 0.7 milliLiter(s) IntraMuscular once  senna 2 Tablet(s) Oral at bedtime    MEDICATIONS  (PRN):  acetaminophen     Tablet .. 650 milliGRAM(s) Oral every 6 hours PRN Temp greater or equal to 38C (100.4F)  bisacodyl Suppository 10 milliGRAM(s) Rectal daily PRN Constipation       Vitals:  Vital Signs Last 24 Hrs  T(C): 37.6 (29 Nov 2023 10:00), Max: 37.9 (29 Nov 2023 08:00)  T(F): 99.7 (29 Nov 2023 10:00), Max: 100.2 (29 Nov 2023 08:00)  HR: 73 (29 Nov 2023 11:34) (56 - 79)  BP: 126/61 (29 Nov 2023 10:00) (123/66 - 151/72)  BP(mean): 80 (29 Nov 2023 10:00) (75 - 96)  RR: 32 (29 Nov 2023 10:00) (16 - 33)  SpO2: 100% (29 Nov 2023 11:34) (97% - 100%)    Parameters below as of 29 Nov 2023 04:00  Patient On (Oxygen Delivery Method): ventilator    O2 Concentration (%): 25        Neurological Exam:  Mental Status: Intubated, not sedated. No spont eye opening. No verbal output, does not follow commands.   Cranial Nerves: pupils very sluggish R, L near fixed, no  corneals, no VOR, intermittent eyelid fluttering no facial asymmetry , weak cough/gag  Motor: dec tone throughout, no spont movemnt noted   Sensation: UE extensor posturing, LE triple flexion left toe up    I personally reviewed the below data/images/labs:      < from: CT Head No Cont (10.22.19 @ 15:57) >  IMPRESSION:    1)  chronic ischemic changes in both hemispheres with volume loss. There   are no new infarct as compared to prior CT. This may be further assessed   with MR imaging.  2)  no hemorrhage or mass identified.      LABS:                  < end of copied text >  < from: CT Angio Head w/ IV Cont (10.22.19 @ 15:57) >  IMPRESSION    1. There is intimal thickening and calcified plaque in both carotid   bifurcations in the neck, but without significant stenosis. No evidence   of carotid vertebral occlusion or dissection.  2. Intracranially, there are atherosclerotic changes in the anterior and   posterior circulation, but without significant stenosis or occlusion.    < end of copied text >  < from: CT Brain Stroke Protocol (10.26.23 @ 04:37) >  IMPRESSION:  No evidence of acute intracranial hemorrhage, mass effect or large acute   territorial infarct, within limits of noncontrast CT technique.    < end of copied text >  < from: CT Angio Neck Stroke Protocol w/ IV Cont (10.26.23 @ 04:53) >  CT ANGIOGRAPHY NECK:  1.  The right internal carotid artery is occluded approximately 1.5 cm   distal to its origin, possibly due to underlying dissection.  2.  Endotracheal tube is low in position, with its tip approximate 1 cm   above the slade.  3.  Diffuse ill-defined groundglass opacity in both lungs, which may   represent pulmonary edema, infection and/or atelectasis.  4.  There is a discrete right upper lobe groundglass opacity measuring   1.9 x 1.4 cm; this may represent a focus of infection, inflammation,   edema, hemorrhage, fibrosis or malignancy.  A follow-up chest CT is   warranted in three months to assess for resolution.    CT ANGIOGRAPHY BRAIN: The right internal carotid arteries occluded.    There is no significant flow related opacification within the proximal   right middle cerebral artery.  There is mild reconstitution of flow in   some distal branch vessels of the inferior division of the right middle   cerebral artery.  The right anterior cerebral artery fills across the   anterior communicating artery.    < end of copied text >  < from: CT Brain Perfusion Maps Stroke (10.26.23 @ 04:54) >  CT PERFUSION: CT perfusion is consistent with large acute infarct in the   right middle cerebral artery vascular territory and small to moderate   amount of surrounding ischemic penumbra.  Hypoperfusion index is 0.3.    Core infarct (CBF <  30%:): 136 mL  Penumbra (Tmax >6s): 194 mL  Mismatched volume: 58 mL  Mismatched ratio: 1.4    < end of copied text >      < from: CT Head No Cont (10.22.19 @ 15:57) >  IMPRESSION:    1)  chronic ischemic changes in both hemispheres with volume loss. There   are no new infarct as compared to prior CT. This may be further assessed   with MR imaging.  2)  no hemorrhage or mass identified.    < end of copied text >  < from: CT Angio Head w/ IV Cont (10.22.19 @ 15:57) >  IMPRESSION    1. There is intimal thickening and calcified plaque in both carotid   bifurcations in the neck, but without significant stenosis. No evidence   of carotid vertebral occlusion or dissection.  2. Intracranially, there are atherosclerotic changes in the anterior and   posterior circulation, but without significant stenosis or occlusion.    < end of copied text >  < from: CT Brain Stroke Protocol (10.26.23 @ 04:37) >  IMPRESSION:  No evidence of acute intracranial hemorrhage, mass effect or large acute   territorial infarct, within limits of noncontrast CT technique.    < end of copied text >  < from: CT Angio Neck Stroke Protocol w/ IV Cont (10.26.23 @ 04:53) >  CT ANGIOGRAPHY NECK:  1.  The right internal carotid artery is occluded approximately 1.5 cm   distal to its origin, possibly due to underlying dissection.  2.  Endotracheal tube is low in position, with its tip approximate 1 cm   above the slade.  3.  Diffuse ill-defined groundglass opacity in both lungs, which may   represent pulmonary edema, infection and/or atelectasis.  4.  There is a discrete right upper lobe groundglass opacity measuring   1.9 x 1.4 cm; this may represent a focus of infection, inflammation,   edema, hemorrhage, fibrosis or malignancy.  A follow-up chest CT is   warranted in three months to assess for resolution.    CT ANGIOGRAPHY BRAIN: The right internal carotid arteries occluded.    There is no significant flow related opacification within the proximal   right middle cerebral artery.  There is mild reconstitution of flow in   some distal branch vessels of the inferior division of the right middle   cerebral artery.  The right anterior cerebral artery fills across the   anterior communicating artery.    < end of copied text >  < from: CT Brain Perfusion Maps Stroke (10.26.23 @ 04:54) >  CT PERFUSION: CT perfusion is consistent with large acute infarct in the   right middle cerebral artery vascular territory and small to moderate   amount of surrounding ischemic penumbra.  Hypoperfusion index is 0.3.    Core infarct (CBF <  30%:): 136 mL  Penumbra (Tmax >6s): 194 mL  Mismatched volume: 58 mL  Mismatched ratio: 1.4    < end of copied text >      < from: CT Head No Cont (10.22.19 @ 15:57) >  IMPRESSION:    1)  chronic ischemic changes in both hemispheres with volume loss. There   are no new infarct as compared to prior CT. This may be further assessed   with MR imaging.  2)  no hemorrhage or mass identified.    < end of copied text >  < from: CT Angio Head w/ IV Cont (10.22.19 @ 15:57) >  IMPRESSION    1. There is intimal thickening and calcified plaque in both carotid   bifurcations in the neck, but without significant stenosis. No evidence   of carotid vertebral occlusion or dissection.  2. Intracranially, there are atherosclerotic changes in the anterior and   posterior circulation, but without significant stenosis or occlusion.    < end of copied text >  < from: CT Brain Stroke Protocol (10.26.23 @ 04:37) >  IMPRESSION:  No evidence of acute intracranial hemorrhage, mass effect or large acute   territorial infarct, within limits of noncontrast CT technique.    < end of copied text >  < from: CT Angio Neck Stroke Protocol w/ IV Cont (10.26.23 @ 04:53) >  CT ANGIOGRAPHY NECK:  1.  The right internal carotid artery is occluded approximately 1.5 cm   distal to its origin, possibly due to underlying dissection.  2.  Endotracheal tube is low in position, with its tip approximate 1 cm   above the slade.  3.  Diffuse ill-defined groundglass opacity in both lungs, which may   represent pulmonary edema, infection and/or atelectasis.  4.  There is a discrete right upper lobe groundglass opacity measuring   1.9 x 1.4 cm; this may represent a focus of infection, inflammation,   edema, hemorrhage, fibrosis or malignancy.  A follow-up chest CT is   warranted in three months to assess for resolution.    CT ANGIOGRAPHY BRAIN: The right internal carotid arteries occluded.    There is no significant flow related opacification within the proximal   right middle cerebral artery.  There is mild reconstitution of flow in   some distal branch vessels of the inferior division of the right middle   cerebral artery.  The right anterior cerebral artery fills across the   anterior communicating artery.    < end of copied text >  < from: CT Brain Perfusion Maps Stroke (10.26.23 @ 04:54) >  CT PERFUSION: CT perfusion is consistent with large acute infarct in the   right middle cerebral artery vascular territory and small to moderate   amount of surrounding ischemic penumbra.  Hypoperfusion index is 0.3.    Core infarct (CBF <  30%:): 136 mL  Penumbra (Tmax >6s): 194 mL  Mismatched volume: 58 mL  Mismatched ratio: 1.4    < end of copied text >    EEG Classification / Summary:  Abnormal EEG study  1. Intermittent periodic discharges seen intermittently over the left hemisphere, sometimes with triphasic morphology  2. Focal continuous right hemispheric slowing  3. Moderate generalized background slowing    -----------------------------------------------------------------------------------------------------    Clinical Impression:  1. While the periodic pattern noted above may represent lateralized periodic discharges indicating epileptic potential in the left hemisphere, more likely these represent generalized periodic discharges with triphasic morphology, consistent with metabolic etiology, that are poorly expressed on the right due to the effects of the stroke.   2. Structural abnormality in right hemisphere  3. Moderate diffuse/multi-focal cerebral dysfunction, not specific as to etiology.    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 Significant   associated mass effect and associated herniation.      < end of copied text >  < from: CT Head No Cont (11.02.23 @ 14:30) >    Reidentified are 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 There is effacement of all of the   basilar cisterns. Effacement of the sulci in the bilateral cerebri,   compatible with increased brain edema.    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    Unsuccessful attempts were made to reach the ordering clinician via teams.      --- End of Report ---    < end of copied text >

## 2023-11-29 NOTE — PROGRESS NOTE ADULT - ASSESSMENT
88 year old female with a PMH of HTN, HLD and afib on xarelto who did not take medications for past 1 week prior to hospitalization here with R MCA infarct.   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

## 2023-11-30 NOTE — CHART NOTE - NSCHARTNOTEFT_GEN_A_CORE
Assessment:  Pt seen in ICU, on vent, tolerating 18 hour feeding regime of Jevity 1.2 @ 65 ml/hr, however Jevity 1.2 is currently not available.  Pt adm c diagnosis of  altered mental status, embolic stroke, cerebral edema, hypoxic respiratory failure, poor metal status, encephalopathic, not a candidate for extubation, family is not opting for trach, PEG, does not want to withdraw care.  pt is DNR.  PMH include HLD, HTN, Afib.      Factors impacting intake: [ ] none [ ] nausea  [ ] vomiting [ ] diarrhea [ ] constipation  [ ]chewing problems [ ] swallowing issues  [x ] other: tolerating OGT feeding     Diet Prescription: Diet, NPO with Tube Feed:   Tube Feeding Modality: Orogastric  Jevity 1.2 Messi  Total Volume for 24 Hours (mL): 1170  Continuous  Starting Tube Feed Rate {mL per Hour}: 40  Increase Tube Feed Rate by (mL): 5     Every 8 hours  Until Goal Tube Feed Rate (mL per Hour): 65  Tube Feed Duration (in Hours): 18  Tube Feed Start Time: 17:00  Tube Feed Stop Time: 11:00 (11-13-23 @ 15:20)    Intake: Jevity 1.2 @ 65 x 18 hours= 1170 ml, 1404 calories, 65 grams protein, 944 ml free water       Current Weight: 11/29, 61 kg,11/22, 59.6 kg, 11/13, 59.2 kg, 11/06, 57.1 kg, 10/26, 63.1 kg, wt. fluctuation c wt. loss of 2.1 kg  % Weight Change: 3.3 %  11/30, 1+(trace) generalized edema noted    Pertinent Medications: MEDICATIONS  (STANDING):  amLODIPine   Tablet 10 milliGRAM(s) Oral daily  aspirin  chewable 81 milliGRAM(s) Oral daily  atorvastatin 80 milliGRAM(s) Oral at bedtime  chlorhexidine 0.12% Liquid 15 milliLiter(s) Oral Mucosa every 12 hours  chlorhexidine 2% Cloths 1 Application(s) Topical <User Schedule>  enoxaparin Injectable 40 milliGRAM(s) SubCutaneous every 24 hours  hydrALAZINE 100 milliGRAM(s) Oral every 8 hours  influenza  Vaccine (HIGH DOSE) 0.7 milliLiter(s) IntraMuscular once  senna 2 Tablet(s) Oral at bedtime  sodium chloride 3%  Inhalation 4 milliLiter(s) Inhalation every 6 hours    MEDICATIONS  (PRN):  acetaminophen     Tablet .. 650 milliGRAM(s) Oral every 6 hours PRN Temp greater or equal to 38C (100.4F)  bisacodyl Suppository 10 milliGRAM(s) Rectal daily PRN Constipation    Pertinent Labs:  11-27 Phos 2.6 mg/dL 11-27 Alb 2.1 g/dL<L>11-27  U/L<H>  U/L<H> Alkaline Phosphatase 92 U/L   CAPILLARY BLOOD GLUCOSE        Skin:   11/29, no pressure ulcers noted    Estimated Needs:   [ x] no change since previous assessment(10/28)  [ ] recalculated:       Previous Nutrition Diagnosis: (10/28)  Inadequate Energy Intake was resolved      New Nutrition Diagnosis: [x ] not applicable     Interventions:   Recommend  [ ] Change Diet To:  [ ] Nutrition Supplement  [x ] Nutrition Support; Recommend Jevity 1.5 @ goal of 55 ml/hr x 18 hours=990 ml, 1485 calories, 63 grams protein, 752 ml free water  [ ] Other:     Monitoring and Evaluation:   [ ] PO intake [ x ] Tolerance to diet prescription [ x ] weights [ x ] labs[ x ] follow up per protocol  [ ] other:

## 2023-11-30 NOTE — PROGRESS NOTE ADULT - SUBJECTIVE AND OBJECTIVE BOX
Neurology Progress Note    S: Patient seen and examined.     Medications: MEDICATIONS  (STANDING):  amLODIPine   Tablet 10 milliGRAM(s) Oral daily  aspirin  chewable 81 milliGRAM(s) Oral daily  atorvastatin 80 milliGRAM(s) Oral at bedtime  chlorhexidine 0.12% Liquid 15 milliLiter(s) Oral Mucosa every 12 hours  chlorhexidine 2% Cloths 1 Application(s) Topical <User Schedule>  enoxaparin Injectable 40 milliGRAM(s) SubCutaneous every 24 hours  hydrALAZINE 100 milliGRAM(s) Oral every 8 hours  influenza  Vaccine (HIGH DOSE) 0.7 milliLiter(s) IntraMuscular once  senna 2 Tablet(s) Oral at bedtime  sodium chloride 3%  Inhalation 4 milliLiter(s) Inhalation every 6 hours    MEDICATIONS  (PRN):  acetaminophen     Tablet .. 650 milliGRAM(s) Oral every 6 hours PRN Temp greater or equal to 38C (100.4F)  bisacodyl Suppository 10 milliGRAM(s) Rectal daily PRN Constipation       Vitals:  Vital Signs Last 24 Hrs  T(C): 36.5 (30 Nov 2023 11:00), Max: 37.4 (29 Nov 2023 13:00)  T(F): 97.7 (30 Nov 2023 11:00), Max: 99.3 (29 Nov 2023 13:00)  HR: 61 (30 Nov 2023 11:00) (55 - 78)  BP: 134/69 (30 Nov 2023 11:00) (116/64 - 149/69)  BP(mean): 89 (30 Nov 2023 11:00) (74 - 98)  RR: 16 (30 Nov 2023 11:00) (15 - 27)  SpO2: 100% (30 Nov 2023 11:00) (95% - 100%)    Parameters below as of 30 Nov 2023 08:00  Patient On (Oxygen Delivery Method): ventilator            Neurological Exam:  Mental Status: Intubated, not sedated. No spont eye opening. No verbal output, does not follow commands.   Cranial Nerves: pupils very sluggish R, L near fixed, no  corneals, no VOR, intermittent eyelid fluttering no facial asymmetry , weak cough/gag  Motor: dec tone throughout, no spont movemnt noted   Sensation: UE extensor posturing, LE triple flexion left toe up    I personally reviewed the below data/images/labs:      < from: CT Head No Cont (10.22.19 @ 15:57) >  IMPRESSION:    1)  chronic ischemic changes in both hemispheres with volume loss. There   are no new infarct as compared to prior CT. This may be further assessed   with MR imaging.  2)  no hemorrhage or mass identified.        LABS:              < end of copied text >  < from: CT Angio Head w/ IV Cont (10.22.19 @ 15:57) >  IMPRESSION    1. There is intimal thickening and calcified plaque in both carotid   bifurcations in the neck, but without significant stenosis. No evidence   of carotid vertebral occlusion or dissection.  2. Intracranially, there are atherosclerotic changes in the anterior and   posterior circulation, but without significant stenosis or occlusion.    < end of copied text >  < from: CT Brain Stroke Protocol (10.26.23 @ 04:37) >  IMPRESSION:  No evidence of acute intracranial hemorrhage, mass effect or large acute   territorial infarct, within limits of noncontrast CT technique.    < end of copied text >  < from: CT Angio Neck Stroke Protocol w/ IV Cont (10.26.23 @ 04:53) >  CT ANGIOGRAPHY NECK:  1.  The right internal carotid artery is occluded approximately 1.5 cm   distal to its origin, possibly due to underlying dissection.  2.  Endotracheal tube is low in position, with its tip approximate 1 cm   above the slade.  3.  Diffuse ill-defined groundglass opacity in both lungs, which may   represent pulmonary edema, infection and/or atelectasis.  4.  There is a discrete right upper lobe groundglass opacity measuring   1.9 x 1.4 cm; this may represent a focus of infection, inflammation,   edema, hemorrhage, fibrosis or malignancy.  A follow-up chest CT is   warranted in three months to assess for resolution.    CT ANGIOGRAPHY BRAIN: The right internal carotid arteries occluded.    There is no significant flow related opacification within the proximal   right middle cerebral artery.  There is mild reconstitution of flow in   some distal branch vessels of the inferior division of the right middle   cerebral artery.  The right anterior cerebral artery fills across the   anterior communicating artery.    < end of copied text >  < from: CT Brain Perfusion Maps Stroke (10.26.23 @ 04:54) >  CT PERFUSION: CT perfusion is consistent with large acute infarct in the   right middle cerebral artery vascular territory and small to moderate   amount of surrounding ischemic penumbra.  Hypoperfusion index is 0.3.    Core infarct (CBF <  30%:): 136 mL  Penumbra (Tmax >6s): 194 mL  Mismatched volume: 58 mL  Mismatched ratio: 1.4    < end of copied text >      < from: CT Head No Cont (10.22.19 @ 15:57) >  IMPRESSION:    1)  chronic ischemic changes in both hemispheres with volume loss. There   are no new infarct as compared to prior CT. This may be further assessed   with MR imaging.  2)  no hemorrhage or mass identified.    < end of copied text >  < from: CT Angio Head w/ IV Cont (10.22.19 @ 15:57) >  IMPRESSION    1. There is intimal thickening and calcified plaque in both carotid   bifurcations in the neck, but without significant stenosis. No evidence   of carotid vertebral occlusion or dissection.  2. Intracranially, there are atherosclerotic changes in the anterior and   posterior circulation, but without significant stenosis or occlusion.    < end of copied text >  < from: CT Brain Stroke Protocol (10.26.23 @ 04:37) >  IMPRESSION:  No evidence of acute intracranial hemorrhage, mass effect or large acute   territorial infarct, within limits of noncontrast CT technique.    < end of copied text >  < from: CT Angio Neck Stroke Protocol w/ IV Cont (10.26.23 @ 04:53) >  CT ANGIOGRAPHY NECK:  1.  The right internal carotid artery is occluded approximately 1.5 cm   distal to its origin, possibly due to underlying dissection.  2.  Endotracheal tube is low in position, with its tip approximate 1 cm   above the slade.  3.  Diffuse ill-defined groundglass opacity in both lungs, which may   represent pulmonary edema, infection and/or atelectasis.  4.  There is a discrete right upper lobe groundglass opacity measuring   1.9 x 1.4 cm; this may represent a focus of infection, inflammation,   edema, hemorrhage, fibrosis or malignancy.  A follow-up chest CT is   warranted in three months to assess for resolution.    CT ANGIOGRAPHY BRAIN: The right internal carotid arteries occluded.    There is no significant flow related opacification within the proximal   right middle cerebral artery.  There is mild reconstitution of flow in   some distal branch vessels of the inferior division of the right middle   cerebral artery.  The right anterior cerebral artery fills across the   anterior communicating artery.    < end of copied text >  < from: CT Brain Perfusion Maps Stroke (10.26.23 @ 04:54) >  CT PERFUSION: CT perfusion is consistent with large acute infarct in the   right middle cerebral artery vascular territory and small to moderate   amount of surrounding ischemic penumbra.  Hypoperfusion index is 0.3.    Core infarct (CBF <  30%:): 136 mL  Penumbra (Tmax >6s): 194 mL  Mismatched volume: 58 mL  Mismatched ratio: 1.4    < end of copied text >      < from: CT Head No Cont (10.22.19 @ 15:57) >  IMPRESSION:    1)  chronic ischemic changes in both hemispheres with volume loss. There   are no new infarct as compared to prior CT. This may be further assessed   with MR imaging.  2)  no hemorrhage or mass identified.    < end of copied text >  < from: CT Angio Head w/ IV Cont (10.22.19 @ 15:57) >  IMPRESSION    1. There is intimal thickening and calcified plaque in both carotid   bifurcations in the neck, but without significant stenosis. No evidence   of carotid vertebral occlusion or dissection.  2. Intracranially, there are atherosclerotic changes in the anterior and   posterior circulation, but without significant stenosis or occlusion.    < end of copied text >  < from: CT Brain Stroke Protocol (10.26.23 @ 04:37) >  IMPRESSION:  No evidence of acute intracranial hemorrhage, mass effect or large acute   territorial infarct, within limits of noncontrast CT technique.    < end of copied text >  < from: CT Angio Neck Stroke Protocol w/ IV Cont (10.26.23 @ 04:53) >  CT ANGIOGRAPHY NECK:  1.  The right internal carotid artery is occluded approximately 1.5 cm   distal to its origin, possibly due to underlying dissection.  2.  Endotracheal tube is low in position, with its tip approximate 1 cm   above the slade.  3.  Diffuse ill-defined groundglass opacity in both lungs, which may   represent pulmonary edema, infection and/or atelectasis.  4.  There is a discrete right upper lobe groundglass opacity measuring   1.9 x 1.4 cm; this may represent a focus of infection, inflammation,   edema, hemorrhage, fibrosis or malignancy.  A follow-up chest CT is   warranted in three months to assess for resolution.    CT ANGIOGRAPHY BRAIN: The right internal carotid arteries occluded.    There is no significant flow related opacification within the proximal   right middle cerebral artery.  There is mild reconstitution of flow in   some distal branch vessels of the inferior division of the right middle   cerebral artery.  The right anterior cerebral artery fills across the   anterior communicating artery.    < end of copied text >  < from: CT Brain Perfusion Maps Stroke (10.26.23 @ 04:54) >  CT PERFUSION: CT perfusion is consistent with large acute infarct in the   right middle cerebral artery vascular territory and small to moderate   amount of surrounding ischemic penumbra.  Hypoperfusion index is 0.3.    Core infarct (CBF <  30%:): 136 mL  Penumbra (Tmax >6s): 194 mL  Mismatched volume: 58 mL  Mismatched ratio: 1.4    < end of copied text >    EEG Classification / Summary:  Abnormal EEG study  1. Intermittent periodic discharges seen intermittently over the left hemisphere, sometimes with triphasic morphology  2. Focal continuous right hemispheric slowing  3. Moderate generalized background slowing    -----------------------------------------------------------------------------------------------------    Clinical Impression:  1. While the periodic pattern noted above may represent lateralized periodic discharges indicating epileptic potential in the left hemisphere, more likely these represent generalized periodic discharges with triphasic morphology, consistent with metabolic etiology, that are poorly expressed on the right due to the effects of the stroke.   2. Structural abnormality in right hemisphere  3. Moderate diffuse/multi-focal cerebral dysfunction, not specific as to etiology.    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 Significant   associated mass effect and associated herniation.      < end of copied text >  < from: CT Head No Cont (11.02.23 @ 14:30) >    Reidentified are 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 There is effacement of all of the   basilar cisterns. Effacement of the sulci in the bilateral cerebri,   compatible with increased brain edema.    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    Unsuccessful attempts were made to reach the ordering clinician via teams.      --- End of Report ---    < end of copied text >

## 2023-11-30 NOTE — PROGRESS NOTE ADULT - SUBJECTIVE AND OBJECTIVE BOX
INTERVAL HPI/OVERNIGHT EVENTS:   HPI:  Ms. Longoria is an 88 year old female with a PMH of HTN, HLD and afib on xarelto who presented to [facility] earlier this morning after she was found altered, nonverbal and with right gaze deviation when she woke up. At baseline pt is ambulatory and verbal. Pt required intubation in ED for airway protection given poor MS. NIHSS calculated as 30 at that time. Pt not a candidate for TNK or transfer for intervention per stroke neurologist. She remains intubated on nicardipine gtt. ICU team consulted. (26 Oct 2023 05:50)      CENTRAL LINE: [ ] YES [ ] NO  LOCATION:   DATE INSERTED:  REMOVE: [ ] YES [ ] NO  EXPLAIN:    PATEL: [ ] YES [ ] NO    DATE INSERTED:  REMOVE:  [ ] YES [ ] NO  EXPLAIN:    A-LINE:  [ ] YES [ ] NO  LOCATION:   DATE INSERTED:  REMOVE:  [ ] YES [ ] NO  EXPLAIN:    PAST MEDICAL & SURGICAL HISTORY:  HTN (hypertension)      Elevated cholesterol      Atrial fibrillation      Hyperlipidemia          REVIEW OF SYSTEMS:    Negative ROS aside from HPI/Interval events above.    ICU Vital Signs Last 24 Hrs  T(C): 36.5 (30 Nov 2023 10:00), Max: 37.4 (29 Nov 2023 12:00)  T(F): 97.7 (30 Nov 2023 10:00), Max: 99.3 (29 Nov 2023 12:00)  HR: 63 (30 Nov 2023 10:00) (55 - 78)  BP: 132/63 (30 Nov 2023 10:00) (116/64 - 149/69)  BP(mean): 83 (30 Nov 2023 10:00) (74 - 98)  ABP: --  ABP(mean): --  RR: 15 (30 Nov 2023 10:00) (15 - 27)  SpO2: 100% (30 Nov 2023 09:23) (95% - 100%)    O2 Parameters below as of 30 Nov 2023 08:00  Patient On (Oxygen Delivery Method): ventilator                I&O's Detail    29 Nov 2023 07:01  -  30 Nov 2023 07:00  --------------------------------------------------------  IN:    Enteral Tube Flush: 230 mL    Jevity 1.2: 1170 mL  Total IN: 1400 mL    OUT:    Indwelling Catheter - Urethral (mL): 950 mL  Total OUT: 950 mL    Total NET: 450 mL      30 Nov 2023 07:01  -  30 Nov 2023 11:05  --------------------------------------------------------  IN:    Jevity 1.2: 195 mL  Total IN: 195 mL    OUT:    Indwelling Catheter - Urethral (mL): 70 mL  Total OUT: 70 mL    Total NET: 125 mL          Mode: AC/ CMV (Assist Control/ Continuous Mandatory Ventilation)  RR (machine): 12  TV (machine): 400  FiO2: 25  PEEP: 5  ITime: 0.9  MAP: 10  PIP: 20    CAPILLARY BLOOD GLUCOSE      PHYSICAL EXAM:    General: NAD, chronically ill appearing  HEENT: ETT and NGT in place  Neck: supple  Respiratory: mechanical BS  Cardiovascular: s1s2 RRR  Abdomen: soft, non tender, non distended  Extremities: warm, no edema or clubbing  Skin: intact  Neurological: unable to assess  Psychiatry: unable to assess

## 2023-11-30 NOTE — PROGRESS NOTE ADULT - ASSESSMENT
88F w/ HTN, HLD, Afib. Presents w/ AMS and R gaze deviation. Pt was intubated in the ED due to poor mental status. Pt did not receive TNK. Admitted to ICU. Imaging revealed R JIMMY and MCA infarct w/ midline shift, as well as PCA infarct. Pt's mental status is poor.     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; morphine PRN for discomfort/tachypnea. will not recover  #CV - maintain BP control. no vasopressor needed.  #Pulm - remains intubated on minimal vent settings; does not tolerate PSV weaning and not a candidate for extubation; family would not opt for tracheostomy, although they do not want to withdraw either  #ID- off abx, afebrile, no leukocytosis; monitor off abx  #Renal/metabolic - renal function stable; monitor I/Os, electrolytes. would obtain labs if clinically indicated.  #GI- tolerating TF, having BM  #Heme - no active issues  #Endo - BG stable  #PPx - lovenox qd  #Dispo- remains in ICU vent dependent, unable to extubate; prognosis for neuro-cognitive recovery is poor; pt is DNR - family does not want to withdraw care but they would not want trach/PEG either, ethics consulted and will f/u with family; labs 2-3x/week. We are not getting anywhere with the family.

## 2023-11-30 NOTE — PROGRESS NOTE ADULT - ASSESSMENT
88 year old female with a PMH of HTN, HLD and afib on xarelto who did not take medications for past 1 week prior to hospitalization here with R MCA infarct.   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

## 2023-12-01 NOTE — PROGRESS NOTE ADULT - SUBJECTIVE AND OBJECTIVE BOX
HPI:  Pt is an 89 yo F with h/o A fib on Xarelto, HTN and HLD who presented to [facility] after she was found altered, nonverbal and with right gaze deviation when she woke up. At baseline pt is ambulatory and verbal. Pt required intubation in ER for airway protection given poor MS. Pt assessed by Stroke team: GIO ~4 hours ago, on Rivaroxaban, presents with AMS/weakness with NIHSS of 30 on ED arrival.  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  Given age, extended time window, oral anticoagulant use, large infarct, and high NIHSS, tPA relatively contraindicated/very high risk for heme and unlikely to be net beneficial.  D/w endovascular fellow Reanna, imaging reviewed, not a candidate for endovascular intervention. Pt admitted to the ICU. 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 Significant associated mass effect R -> L and associated herniation.         ## Labs:  CBC:    Chem:        Coags:          ## Imaging:    ## Medications:    amLODIPine   Tablet 10 milliGRAM(s) Oral daily  hydrALAZINE 100 milliGRAM(s) Oral every 8 hours    sodium chloride 3%  Inhalation 4 milliLiter(s) Inhalation every 6 hours    atorvastatin 80 milliGRAM(s) Oral at bedtime    aspirin  chewable 81 milliGRAM(s) Oral daily  enoxaparin Injectable 40 milliGRAM(s) SubCutaneous every 24 hours    bisacodyl Suppository 10 milliGRAM(s) Rectal daily PRN  senna 2 Tablet(s) Oral at bedtime    acetaminophen     Tablet .. 650 milliGRAM(s) Oral every 6 hours PRN      ## Vitals:  T(C): 36.4 (23 @ 20:00), Max: 36.9 (23 @ 13:00)  HR: 62 (23 @ 20:00) (54 - 73)  BP: 139/62 (23 @ 20:00) (125/61 - 150/89)  BP(mean): 81 (23 @ 20:00) (79 - 108)  RR: 14 (23 @ 20:00) (13 - 24)  SpO2: 100% (23 @ 19:15) (100% - 100%)  Wt(kg): --  Vent: Mode: AC/ CMV (Assist Control/ Continuous Mandatory Ventilation), RR (machine): 12, RR (patient): 16, TV (machine): 400, FiO2: 25, PEEP: 5, PIP: 22  AB-30 @ :  -   @ 07:00  --------------------------------------------------------  IN: 1035 mL / OUT: 690 mL / NET: 345 mL     @ :  -   @ 20:39  --------------------------------------------------------  IN: 330 mL / OUT: 300 mL / NET: 30 mL          ## P/E:  Gen: lying comfortably in bed in no apparent distress  Mouth: (+) ETT/OGT  Lungs: CTA  Heart: Irregular  Abd: Soft/+BS/ Non-tender  Ext: Hand edema  Neuro: (+) cough reflex/ Minimal response to painful stimuli/  (+) spont resp    CENTRAL LINE: [ ] YES [ ] NO  LOCATION:   DATE INSERTED:  REMOVE: [ ] YES [ ] NO      AMANDA: [ ] YES [ ] NO    DATE INSERTED:  REMOVE:  [ ] YES [ ] NO      A-LINE:  [ ] YES [ ] NO  LOCATION:   DATE INSERTED:  REMOVE:  [ ] YES [ ] NO  EXPLAIN:    CODE STATUS: [x ] full code  [ ] DNR  [ ] DNI  [ ] [facility]  Goals of care discussion: [ ] yes

## 2023-12-01 NOTE — PROGRESS NOTE ADULT - SUBJECTIVE AND OBJECTIVE BOX
Neurology Progress Note    S: Patient seen and examined.       Medications: MEDICATIONS  (STANDING):  amLODIPine   Tablet 10 milliGRAM(s) Oral daily  aspirin  chewable 81 milliGRAM(s) Oral daily  atorvastatin 80 milliGRAM(s) Oral at bedtime  chlorhexidine 0.12% Liquid 15 milliLiter(s) Oral Mucosa every 12 hours  chlorhexidine 2% Cloths 1 Application(s) Topical <User Schedule>  enoxaparin Injectable 40 milliGRAM(s) SubCutaneous every 24 hours  hydrALAZINE 100 milliGRAM(s) Oral every 8 hours  influenza  Vaccine (HIGH DOSE) 0.7 milliLiter(s) IntraMuscular once  senna 2 Tablet(s) Oral at bedtime  sodium chloride 3%  Inhalation 4 milliLiter(s) Inhalation every 6 hours    MEDICATIONS  (PRN):  acetaminophen     Tablet .. 650 milliGRAM(s) Oral every 6 hours PRN Temp greater or equal to 38C (100.4F)  bisacodyl Suppository 10 milliGRAM(s) Rectal daily PRN Constipation       Vitals:  Vital Signs Last 24 Hrs  T(C): 36.5 (01 Dec 2023 16:00), Max: 36.9 (01 Dec 2023 13:00)  T(F): 97.7 (01 Dec 2023 16:00), Max: 98.4 (01 Dec 2023 13:00)  HR: 73 (01 Dec 2023 16:00) (54 - 73)  BP: 145/73 (01 Dec 2023 16:00) (125/61 - 150/89)  BP(mean): 93 (01 Dec 2023 16:00) (79 - 108)  RR: 22 (01 Dec 2023 16:00) (13 - 23)  SpO2: 100% (01 Dec 2023 14:00) (100% - 100%)    Parameters below as of 01 Dec 2023 06:10  Patient On (Oxygen Delivery Method): ventilator      Neurological Exam:  Mental Status: Intubated, not sedated. No spont eye opening. No verbal output, does not follow commands.   Cranial Nerves: pupils very sluggish R, L near fixed, no  corneals, no VOR, intermittent eyelid fluttering no facial asymmetry , weak cough/gag  Motor: dec tone throughout, no spont movemnt noted   Sensation: UE extensor posturing, LE triple flexion left toe up    I personally reviewed the below data/images/labs:      < from: CT Head No Cont (10.22.19 @ 15:57) >  IMPRESSION:    1)  chronic ischemic changes in both hemispheres with volume loss. There   are no new infarct as compared to prior CT. This may be further assessed   with MR imaging.  2)  no hemorrhage or mass identified.        LABS:              < end of copied text >  < from: CT Angio Head w/ IV Cont (10.22.19 @ 15:57) >  IMPRESSION    1. There is intimal thickening and calcified plaque in both carotid   bifurcations in the neck, but without significant stenosis. No evidence   of carotid vertebral occlusion or dissection.  2. Intracranially, there are atherosclerotic changes in the anterior and   posterior circulation, but without significant stenosis or occlusion.    < end of copied text >  < from: CT Brain Stroke Protocol (10.26.23 @ 04:37) >  IMPRESSION:  No evidence of acute intracranial hemorrhage, mass effect or large acute   territorial infarct, within limits of noncontrast CT technique.    < end of copied text >  < from: CT Angio Neck Stroke Protocol w/ IV Cont (10.26.23 @ 04:53) >  CT ANGIOGRAPHY NECK:  1.  The right internal carotid artery is occluded approximately 1.5 cm   distal to its origin, possibly due to underlying dissection.  2.  Endotracheal tube is low in position, with its tip approximate 1 cm   above the slade.  3.  Diffuse ill-defined groundglass opacity in both lungs, which may   represent pulmonary edema, infection and/or atelectasis.  4.  There is a discrete right upper lobe groundglass opacity measuring   1.9 x 1.4 cm; this may represent a focus of infection, inflammation,   edema, hemorrhage, fibrosis or malignancy.  A follow-up chest CT is   warranted in three months to assess for resolution.    CT ANGIOGRAPHY BRAIN: The right internal carotid arteries occluded.    There is no significant flow related opacification within the proximal   right middle cerebral artery.  There is mild reconstitution of flow in   some distal branch vessels of the inferior division of the right middle   cerebral artery.  The right anterior cerebral artery fills across the   anterior communicating artery.    < end of copied text >  < from: CT Brain Perfusion Maps Stroke (10.26.23 @ 04:54) >  CT PERFUSION: CT perfusion is consistent with large acute infarct in the   right middle cerebral artery vascular territory and small to moderate   amount of surrounding ischemic penumbra.  Hypoperfusion index is 0.3.    Core infarct (CBF <  30%:): 136 mL  Penumbra (Tmax >6s): 194 mL  Mismatched volume: 58 mL  Mismatched ratio: 1.4    < end of copied text >      < from: CT Head No Cont (10.22.19 @ 15:57) >  IMPRESSION:    1)  chronic ischemic changes in both hemispheres with volume loss. There   are no new infarct as compared to prior CT. This may be further assessed   with MR imaging.  2)  no hemorrhage or mass identified.    < end of copied text >  < from: CT Angio Head w/ IV Cont (10.22.19 @ 15:57) >  IMPRESSION    1. There is intimal thickening and calcified plaque in both carotid   bifurcations in the neck, but without significant stenosis. No evidence   of carotid vertebral occlusion or dissection.  2. Intracranially, there are atherosclerotic changes in the anterior and   posterior circulation, but without significant stenosis or occlusion.    < end of copied text >  < from: CT Brain Stroke Protocol (10.26.23 @ 04:37) >  IMPRESSION:  No evidence of acute intracranial hemorrhage, mass effect or large acute   territorial infarct, within limits of noncontrast CT technique.    < end of copied text >  < from: CT Angio Neck Stroke Protocol w/ IV Cont (10.26.23 @ 04:53) >  CT ANGIOGRAPHY NECK:  1.  The right internal carotid artery is occluded approximately 1.5 cm   distal to its origin, possibly due to underlying dissection.  2.  Endotracheal tube is low in position, with its tip approximate 1 cm   above the slade.  3.  Diffuse ill-defined groundglass opacity in both lungs, which may   represent pulmonary edema, infection and/or atelectasis.  4.  There is a discrete right upper lobe groundglass opacity measuring   1.9 x 1.4 cm; this may represent a focus of infection, inflammation,   edema, hemorrhage, fibrosis or malignancy.  A follow-up chest CT is   warranted in three months to assess for resolution.    CT ANGIOGRAPHY BRAIN: The right internal carotid arteries occluded.    There is no significant flow related opacification within the proximal   right middle cerebral artery.  There is mild reconstitution of flow in   some distal branch vessels of the inferior division of the right middle   cerebral artery.  The right anterior cerebral artery fills across the   anterior communicating artery.    < end of copied text >  < from: CT Brain Perfusion Maps Stroke (10.26.23 @ 04:54) >  CT PERFUSION: CT perfusion is consistent with large acute infarct in the   right middle cerebral artery vascular territory and small to moderate   amount of surrounding ischemic penumbra.  Hypoperfusion index is 0.3.    Core infarct (CBF <  30%:): 136 mL  Penumbra (Tmax >6s): 194 mL  Mismatched volume: 58 mL  Mismatched ratio: 1.4    < end of copied text >      < from: CT Head No Cont (10.22.19 @ 15:57) >  IMPRESSION:    1)  chronic ischemic changes in both hemispheres with volume loss. There   are no new infarct as compared to prior CT. This may be further assessed   with MR imaging.  2)  no hemorrhage or mass identified.    < end of copied text >  < from: CT Angio Head w/ IV Cont (10.22.19 @ 15:57) >  IMPRESSION    1. There is intimal thickening and calcified plaque in both carotid   bifurcations in the neck, but without significant stenosis. No evidence   of carotid vertebral occlusion or dissection.  2. Intracranially, there are atherosclerotic changes in the anterior and   posterior circulation, but without significant stenosis or occlusion.    < end of copied text >  < from: CT Brain Stroke Protocol (10.26.23 @ 04:37) >  IMPRESSION:  No evidence of acute intracranial hemorrhage, mass effect or large acute   territorial infarct, within limits of noncontrast CT technique.    < end of copied text >  < from: CT Angio Neck Stroke Protocol w/ IV Cont (10.26.23 @ 04:53) >  CT ANGIOGRAPHY NECK:  1.  The right internal carotid artery is occluded approximately 1.5 cm   distal to its origin, possibly due to underlying dissection.  2.  Endotracheal tube is low in position, with its tip approximate 1 cm   above the slade.  3.  Diffuse ill-defined groundglass opacity in both lungs, which may   represent pulmonary edema, infection and/or atelectasis.  4.  There is a discrete right upper lobe groundglass opacity measuring   1.9 x 1.4 cm; this may represent a focus of infection, inflammation,   edema, hemorrhage, fibrosis or malignancy.  A follow-up chest CT is   warranted in three months to assess for resolution.    CT ANGIOGRAPHY BRAIN: The right internal carotid arteries occluded.    There is no significant flow related opacification within the proximal   right middle cerebral artery.  There is mild reconstitution of flow in   some distal branch vessels of the inferior division of the right middle   cerebral artery.  The right anterior cerebral artery fills across the   anterior communicating artery.    < end of copied text >  < from: CT Brain Perfusion Maps Stroke (10.26.23 @ 04:54) >  CT PERFUSION: CT perfusion is consistent with large acute infarct in the   right middle cerebral artery vascular territory and small to moderate   amount of surrounding ischemic penumbra.  Hypoperfusion index is 0.3.    Core infarct (CBF <  30%:): 136 mL  Penumbra (Tmax >6s): 194 mL  Mismatched volume: 58 mL  Mismatched ratio: 1.4    < end of copied text >    EEG Classification / Summary:  Abnormal EEG study  1. Intermittent periodic discharges seen intermittently over the left hemisphere, sometimes with triphasic morphology  2. Focal continuous right hemispheric slowing  3. Moderate generalized background slowing    -----------------------------------------------------------------------------------------------------    Clinical Impression:  1. While the periodic pattern noted above may represent lateralized periodic discharges indicating epileptic potential in the left hemisphere, more likely these represent generalized periodic discharges with triphasic morphology, consistent with metabolic etiology, that are poorly expressed on the right due to the effects of the stroke.   2. Structural abnormality in right hemisphere  3. Moderate diffuse/multi-focal cerebral dysfunction, not specific as to etiology.    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 Significant   associated mass effect and associated herniation.      < end of copied text >  < from: CT Head No Cont (11.02.23 @ 14:30) >    Reidentified are 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 There is effacement of all of the   basilar cisterns. Effacement of the sulci in the bilateral cerebri,   compatible with increased brain edema.    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    Unsuccessful attempts were made to reach the ordering clinician via teams.      --- End of Report ---    < end of copied text >

## 2023-12-01 NOTE — PROGRESS NOTE ADULT - ASSESSMENT
Pt is an 87 yo F with h/o A fib on Xarelto, HTN and HLD who presented to [facility] after she was found altered, nonverbal and with right gaze deviation when she woke up. At baseline pt is ambulatory and verbal. Pt required intubation in ER for airway protection given poor MS. Pt assessed by Stroke team: GIO ~4 hours ago, on Rivaroxaban, presents with AMS/weakness with NIHSS of 30 on ED arrival.  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  Given age, extended time window, oral anticoagulant use, large infarct, and high NIHSS, tPA relatively contraindicated/very high risk for heme and unlikely to be net beneficial.  D/w endovascular fellow Reanna, imaging reviewed, not a candidate for endovascular intervention. Pt admitted to the ICU. 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 Significant associated mass effect R -> L and associated herniation.     Resp: Cont current vent settings  CVS: Cont antiHTN meds  HEME: DVT prophylaxis with Lovenox  FEN: Cont enteral feeds  Endo: Follow FS  Renal: Follow BUN/Cr and UO  Neuro/Social: Supportive care; pt with extremely poor neurological prognosis; cont GOC pt is DNR but family has not made a decision regarding palliative extubation vs long term care with trach + PEG/ Family is unable to make any definitive decisions regarding EOL

## 2023-12-01 NOTE — PROGRESS NOTE ADULT - ASSESSMENT
88 year old female with a PMH of HTN, HLD and afib on xarelto who did not take medications for past 1 week prior to hospitalization here with R MCA infarct.   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

## 2023-12-02 NOTE — PROGRESS NOTE ADULT - ASSESSMENT
Pt is an 87 yo F with h/o A fib on Xarelto, HTN and HLD who presented to [facility] after she was found altered, nonverbal and with right gaze deviation when she woke up. At baseline pt is ambulatory and verbal. Pt required intubation in ER for airway protection given poor MS. Pt assessed by Stroke team: GIO ~4 hours ago, on Rivaroxaban, presents with AMS/weakness with NIHSS of 30 on ED arrival.  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  Given age, extended time window, oral anticoagulant use, large infarct, and high NIHSS, tPA relatively contraindicated/very high risk for heme and unlikely to be net beneficial.  It was discussed with endovascular fellow Reanna, imaging reviewed, not a candidate for endovascular intervention. Pt admitted to the ICU. 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 Significant associated mass effect R -> L and associated herniation.     Resp: Cont current vent settings  CVS: Cont antiHTN meds  HEME: DVT prophylaxis with Lovenox  FEN: Cont enteral feeds  Endo: Follow FS  Renal: Follow BUN/Cr and UO  Neuro/Social: Supportive care; pt with extremely poor neurological prognosis;  pt is DNR but family has not made a decision regarding palliative extubation vs long term care with trach + PEG/ Family is unable to make any definitive decisions regarding EOL care

## 2023-12-02 NOTE — PROGRESS NOTE ADULT - SUBJECTIVE AND OBJECTIVE BOX
HPI:  Pt is an 87 yo F with h/o A fib on Xarelto, HTN and HLD who presented to [facility] after she was found altered, nonverbal and with right gaze deviation when she woke up. At baseline pt is ambulatory and verbal. Pt required intubation in ER for airway protection given poor MS. Pt assessed by Stroke team: GIO ~4 hours ago, on Rivaroxaban, presents with AMS/weakness with NIHSS of 30 on ED arrival.  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  Given age, extended time window, oral anticoagulant use, large infarct, and high NIHSS, tPA relatively contraindicated/very high risk for heme and unlikely to be net beneficial.  It was discussed with endovascular fellow Reanna, imaging reviewed, not a candidate for endovascular intervention. Pt admitted to the ICU. 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 Significant associated mass effect R -> L and associated herniation.       ## Labs:  CBC:    Chem:        Coags:          ## Imaging:    ## Medications:    amLODIPine   Tablet 10 milliGRAM(s) Oral daily  hydrALAZINE 100 milliGRAM(s) Oral every 8 hours      atorvastatin 80 milliGRAM(s) Oral at bedtime    aspirin  chewable 81 milliGRAM(s) Oral daily  enoxaparin Injectable 40 milliGRAM(s) SubCutaneous every 24 hours    bisacodyl Suppository 10 milliGRAM(s) Rectal daily PRN  polyethylene glycol 3350 17 Gram(s) Oral daily  senna 2 Tablet(s) Oral at bedtime    acetaminophen     Tablet .. 650 milliGRAM(s) Oral every 6 hours PRN      ## Vitals:  T(C): 37.1 (23 @ 14:00), Max: 37.1 (23 @ 14:00)  HR: 65 (23 @ 14:00) (55 - 74)  BP: 131/65 (23 @ 14:00) (125/61 - 152/80)  BP(mean): 86 (23 @ 14:00) (79 - 99)  RR: 19 (23 @ 14:00) (12 - 29)  SpO2: 100% (23 @ 14:00) (100% - 100%)  Wt(kg): --  Vent: Mode: AC/ CMV (Assist Control/ Continuous Mandatory Ventilation), RR (machine): 12, RR (patient): 17, TV (machine): 400, FiO2: 25, PEEP: 5, PIP: 23  AB-01 @ 07:  -   @ 07:00  --------------------------------------------------------  IN: 935 mL / OUT: 735 mL / NET: 200 mL     @ :  -   @ 15:06  --------------------------------------------------------  IN: 165 mL / OUT: 150 mL / NET: 15 mL          ## P/E:  Gen: lying comfortably in bed in no apparent distress  Mouth: (+) ETT/OGT  Lungs: CTA  Heart: Irregular  Abd: Soft/+BS/ Non-tender  Ext: Hand edema  Neuro: Pupil fixed and dilated/ No gag reflex/ (+) cough reflex/ Minimal response to painful stimuli/  (+) spont resp    CENTRAL LINE: [ ] YES [ ] NO  LOCATION:   DATE INSERTED:  REMOVE: [ ] YES [ ] NO      PATEL: [ ] YES [ ] NO    DATE INSERTED:  REMOVE:  [ ] YES [ ] NO      A-LINE:  [ ] YES [ ] NO  LOCATION:   DATE INSERTED:  REMOVE:  [ ] YES [ ] NO  EXPLAIN:    CODE STATUS: [x ] full code  [ ] DNR  [ ] DNI  [ ] [facility]  Goals of care discussion: [ ] yes

## 2023-12-03 NOTE — PROGRESS NOTE ADULT - SUBJECTIVE AND OBJECTIVE BOX
HPI:  Pt is an 89 yo F with h/o A fib on Xarelto, HTN and HLD who presented to [facility] after she was found altered, nonverbal and with right gaze deviation when she woke up. At baseline pt is ambulatory and verbal. Pt required intubation in ER for airway protection given poor MS. Pt assessed by Stroke team: GIO ~4 hours ago, on Rivaroxaban, presents with AMS/weakness with NIHSS of 30 on ED arrival.  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  Given age, extended time window, oral anticoagulant use, large infarct, and high NIHSS, tPA relatively contraindicated/very high risk for heme and unlikely to be net beneficial.  It was discussed with endovascular fellow Reanna, imaging reviewed, not a candidate for endovascular intervention. Pt admitted to the ICU. 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 Significant associated mass effect R -> L and associated herniation.       ## Labs:  CBC:    Chem:        Coags:          ## Imaging:    ## Medications:    amLODIPine   Tablet 10 milliGRAM(s) Oral daily  hydrALAZINE 100 milliGRAM(s) Oral every 8 hours      atorvastatin 80 milliGRAM(s) Oral at bedtime    aspirin  chewable 81 milliGRAM(s) Oral daily  enoxaparin Injectable 40 milliGRAM(s) SubCutaneous every 24 hours    bisacodyl Suppository 10 milliGRAM(s) Rectal daily PRN  polyethylene glycol 3350 17 Gram(s) Oral daily  senna 2 Tablet(s) Oral at bedtime    acetaminophen     Tablet .. 650 milliGRAM(s) Oral every 6 hours PRN      ## Vitals:  T(C): 37.2 (23 @ 14:00), Max: 37.3 (23 @ 13:00)  HR: 64 (23 @ 14:00) (58 - 77)  BP: 133/59 (23 @ 14:00) (120/61 - 171/69)  BP(mean): 81 (23 @ 14:00) (76 - 96)  RR: 19 (23 @ 14:00) (12 - 31)  SpO2: 100% (23 @ 14:00) (100% - 100%)  Wt(kg): --  Vent: Mode: AC/ CMV (Assist Control/ Continuous Mandatory Ventilation), RR (machine): 12, RR (patient): 23, TV (machine): 400, FiO2: 25, PEEP: 5, PIP: 24  AB-02 @ 07:  -   @ 07:00  --------------------------------------------------------  IN: 1040 mL / OUT: 720 mL / NET: 320 mL     @ 07:01  -   @ 15:43  --------------------------------------------------------  IN: 165 mL / OUT: 140 mL / NET: 25 mL          ## P/E:  Gen: lying comfortably in bed in no apparent distress  Mouth: (+) ETT/OGT  Lungs: CTA  Heart: Irregular  Abd: Soft/+BS/ Non-tender  Ext: Hand edema  Neuro: Pupil fixed and dilated/ No gag reflex/ (+) cough reflex/ Minimal response to painful stimuli/  (+) spont resp    CENTRAL LINE: [ ] YES [ ] NO  LOCATION:   DATE INSERTED:  REMOVE: [ ] YES [ ] NO      PATEL: [ ] YES [ ] NO    DATE INSERTED:  REMOVE:  [ ] YES [ ] NO      A-LINE:  [ ] YES [ ] NO  LOCATION:   DATE INSERTED:  REMOVE:  [ ] YES [ ] NO  EXPLAIN:    CODE STATUS: [x ] full code  [ ] DNR  [ ] DNI  [ ] [facility]  Goals of care discussion: [ ] yes          HPI:  Pt is an 89 yo F with h/o A fib on Xarelto, HTN and HLD who presented to [facility] after she was found altered, nonverbal and with right gaze deviation when she woke up. At baseline pt is ambulatory and verbal. Pt required intubation in ER for airway protection given poor MS. Pt assessed by Stroke team: GIO ~4 hours ago, on Rivaroxaban, presents with AMS/weakness with NIHSS of 30 on ED arrival.  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  Given age, extended time window, oral anticoagulant use, large infarct, and high NIHSS, tPA relatively contraindicated/very high risk for heme and unlikely to be net beneficial.  It was discussed with endovascular fellow Reanna, imaging reviewed, not a candidate for endovascular intervention. Pt admitted to the ICU. 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 Significant associated mass effect R -> L and associated herniation.       ## Labs:  CBC:    Chem:        Coags:          ## Imaging:    ## Medications:    amLODIPine   Tablet 10 milliGRAM(s) Oral daily  hydrALAZINE 100 milliGRAM(s) Oral every 8 hours      atorvastatin 80 milliGRAM(s) Oral at bedtime    aspirin  chewable 81 milliGRAM(s) Oral daily  enoxaparin Injectable 40 milliGRAM(s) SubCutaneous every 24 hours    bisacodyl Suppository 10 milliGRAM(s) Rectal daily PRN  polyethylene glycol 3350 17 Gram(s) Oral daily  senna 2 Tablet(s) Oral at bedtime    acetaminophen     Tablet .. 650 milliGRAM(s) Oral every 6 hours PRN      ## Vitals:  T(C): 37.2 (23 @ 14:00), Max: 37.3 (23 @ 13:00)  HR: 64 (23 @ 14:00) (58 - 77)  BP: 133/59 (23 @ 14:00) (120/61 - 171/69)  BP(mean): 81 (23 @ 14:00) (76 - 96)  RR: 19 (23 @ 14:00) (12 - 31)  SpO2: 100% (23 @ 14:00) (100% - 100%)  Wt(kg): --  Vent: Mode: AC/ CMV (Assist Control/ Continuous Mandatory Ventilation), RR (machine): 12, RR (patient): 23, TV (machine): 400, FiO2: 25, PEEP: 5, PIP: 24  AB-02 @ 07:  -   @ 07:00  --------------------------------------------------------  IN: 1040 mL / OUT: 720 mL / NET: 320 mL     @ 07:01  -   @ 15:43  --------------------------------------------------------  IN: 165 mL / OUT: 140 mL / NET: 25 mL          ## P/E:  Gen: lying comfortably in bed in no apparent distress  Mouth: (+) ETT/OGT  Lungs: CTA  Heart: Irregular  Abd: Soft/+BS/ Non-tender  Ext: Hand edema  Neuro: Pupil fixed and dilated/ No gag reflex/ (+) cough reflex/ Minimal response to painful stimuli/  (+) spont resp    CENTRAL LINE: [ ] YES [ ] NO  LOCATION:   DATE INSERTED:  REMOVE: [ ] YES [ ] NO      PATEL: [ ] YES [ ] NO    DATE INSERTED:  REMOVE:  [ ] YES [ ] NO      A-LINE:  [ ] YES [ ] NO  LOCATION:   DATE INSERTED:  REMOVE:  [ ] YES [ ] NO  EXPLAIN:    CODE STATUS: [x ] full code  [ ] DNR  [ ] DNI  [ ] [facility]  Goals of care discussion: [ ] yes

## 2023-12-03 NOTE — PROGRESS NOTE ADULT - ASSESSMENT
Pt is an 87 yo F with h/o A fib on Xarelto, HTN and HLD who presented to [facility] after she was found altered, nonverbal and with right gaze deviation when she woke up. At baseline pt is ambulatory and verbal. Pt required intubation in ER for airway protection given poor MS. Pt assessed by Stroke team: GIO ~4 hours ago, on Rivaroxaban, presents with AMS/weakness with NIHSS of 30 on ED arrival.  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  Given age, extended time window, oral anticoagulant use, large infarct, and high NIHSS, tPA relatively contraindicated/very high risk for heme and unlikely to be net beneficial.  It was discussed with endovascular fellow Reanna, imaging reviewed, not a candidate for endovascular intervention. Pt admitted to the ICU. 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 Significant associated mass effect R -> L and associated herniation.     Resp: Cont current vent settings  CVS: Cont antiHTN meds  HEME: DVT prophylaxis with Lovenox  FEN: Cont enteral feeds  Endo: Follow FS  Renal: Follow BUN/Cr and UO  Neuro/Social: Supportive care; pt with extremely poor neurological prognosis;  pt is DNR but family has not made a decision regarding palliative extubation vs long term care with trach + PEG/ Family is unable to make any definitive decisions regarding EOL care. Have SW see pt and look into LTAC since family has not made any definitive decisions   Pt is an 89 yo F with h/o A fib on Xarelto, HTN and HLD who presented to [facility] after she was found altered, nonverbal and with right gaze deviation when she woke up. At baseline pt is ambulatory and verbal. Pt required intubation in ER for airway protection given poor MS. Pt assessed by Stroke team: GIO ~4 hours ago, on Rivaroxaban, presents with AMS/weakness with NIHSS of 30 on ED arrival.  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  Given age, extended time window, oral anticoagulant use, large infarct, and high NIHSS, tPA relatively contraindicated/very high risk for heme and unlikely to be net beneficial.  It was discussed with endovascular fellow Reanna, imaging reviewed, not a candidate for endovascular intervention. Pt admitted to the ICU. 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 Significant associated mass effect R -> L and associated herniation.     Resp: Cont current vent settings  CVS: Cont antiHTN meds  HEME: DVT prophylaxis with Lovenox  FEN: Cont enteral feeds  Endo: Follow FS  Renal: Follow BUN/Cr and UO  Neuro/Social: Supportive care; pt with extremely poor neurological prognosis;  pt is DNR but family has not made a decision regarding palliative extubation vs long term care with trach + PEG/ Family is unable to make any definitive decisions regarding EOL care. Have SW see pt and look into LTAC since family has not made any definitive decisions

## 2023-12-03 NOTE — PROGRESS NOTE ADULT - SUBJECTIVE AND OBJECTIVE BOX
Neurology Progress Note    S: Patient seen and examined.     Medications: MEDICATIONS  (STANDING):  amLODIPine   Tablet 10 milliGRAM(s) Oral daily  aspirin  chewable 81 milliGRAM(s) Oral daily  atorvastatin 80 milliGRAM(s) Oral at bedtime  chlorhexidine 0.12% Liquid 15 milliLiter(s) Oral Mucosa every 12 hours  chlorhexidine 2% Cloths 1 Application(s) Topical <User Schedule>  enoxaparin Injectable 40 milliGRAM(s) SubCutaneous every 24 hours  hydrALAZINE 100 milliGRAM(s) Oral every 8 hours  influenza  Vaccine (HIGH DOSE) 0.7 milliLiter(s) IntraMuscular once  polyethylene glycol 3350 17 Gram(s) Oral daily  senna 2 Tablet(s) Oral at bedtime    MEDICATIONS  (PRN):  acetaminophen     Tablet .. 650 milliGRAM(s) Oral every 6 hours PRN Temp greater or equal to 38C (100.4F)  bisacodyl Suppository 10 milliGRAM(s) Rectal daily PRN Constipation       Vitals:  Vital Signs Last 24 Hrs  T(C): 37.2 (03 Dec 2023 14:00), Max: 37.3 (03 Dec 2023 13:00)  T(F): 99 (03 Dec 2023 14:00), Max: 99.1 (03 Dec 2023 13:00)  HR: 64 (03 Dec 2023 14:00) (58 - 77)  BP: 133/59 (03 Dec 2023 14:00) (123/59 - 171/69)  BP(mean): 81 (03 Dec 2023 14:00) (76 - 96)  RR: 19 (03 Dec 2023 14:00) (12 - 31)  SpO2: 100% (03 Dec 2023 14:00) (100% - 100%)    Parameters below as of 03 Dec 2023 07:01  Patient On (Oxygen Delivery Method): ventilator    O2 Concentration (%): 25      Neurological Exam:  Mental Status: Intubated, not sedated. No spont eye opening. No verbal output, does not follow commands.   Cranial Nerves: pupils very sluggish R, L near fixed, no  corneals, no VOR, intermittent eyelid fluttering no facial asymmetry , weak cough/gag  Motor: dec tone throughout, no spont movemnt noted   Sensation: UE extensor posturing, LE triple flexion left toe up    I personally reviewed the below data/images/labs:      < from: CT Head No Cont (10.22.19 @ 15:57) >  IMPRESSION:    1)  chronic ischemic changes in both hemispheres with volume loss. There   are no new infarct as compared to prior CT. This may be further assessed   with MR imaging.  2)  no hemorrhage or mass identified.      LABS:    No new labs    < end of copied text >  < from: CT Angio Head w/ IV Cont (10.22.19 @ 15:57) >  IMPRESSION    1. There is intimal thickening and calcified plaque in both carotid   bifurcations in the neck, but without significant stenosis. No evidence   of carotid vertebral occlusion or dissection.  2. Intracranially, there are atherosclerotic changes in the anterior and   posterior circulation, but without significant stenosis or occlusion.    < end of copied text >  < from: CT Brain Stroke Protocol (10.26.23 @ 04:37) >  IMPRESSION:  No evidence of acute intracranial hemorrhage, mass effect or large acute   territorial infarct, within limits of noncontrast CT technique.    < end of copied text >  < from: CT Angio Neck Stroke Protocol w/ IV Cont (10.26.23 @ 04:53) >  CT ANGIOGRAPHY NECK:  1.  The right internal carotid artery is occluded approximately 1.5 cm   distal to its origin, possibly due to underlying dissection.  2.  Endotracheal tube is low in position, with its tip approximate 1 cm   above the slade.  3.  Diffuse ill-defined groundglass opacity in both lungs, which may   represent pulmonary edema, infection and/or atelectasis.  4.  There is a discrete right upper lobe groundglass opacity measuring   1.9 x 1.4 cm; this may represent a focus of infection, inflammation,   edema, hemorrhage, fibrosis or malignancy.  A follow-up chest CT is   warranted in three months to assess for resolution.    CT ANGIOGRAPHY BRAIN: The right internal carotid arteries occluded.    There is no significant flow related opacification within the proximal   right middle cerebral artery.  There is mild reconstitution of flow in   some distal branch vessels of the inferior division of the right middle   cerebral artery.  The right anterior cerebral artery fills across the   anterior communicating artery.    < end of copied text >  < from: CT Brain Perfusion Maps Stroke (10.26.23 @ 04:54) >  CT PERFUSION: CT perfusion is consistent with large acute infarct in the   right middle cerebral artery vascular territory and small to moderate   amount of surrounding ischemic penumbra.  Hypoperfusion index is 0.3.    Core infarct (CBF <  30%:): 136 mL  Penumbra (Tmax >6s): 194 mL  Mismatched volume: 58 mL  Mismatched ratio: 1.4    < end of copied text >      < from: CT Head No Cont (10.22.19 @ 15:57) >  IMPRESSION:    1)  chronic ischemic changes in both hemispheres with volume loss. There   are no new infarct as compared to prior CT. This may be further assessed   with MR imaging.  2)  no hemorrhage or mass identified.    < end of copied text >  < from: CT Angio Head w/ IV Cont (10.22.19 @ 15:57) >  IMPRESSION    1. There is intimal thickening and calcified plaque in both carotid   bifurcations in the neck, but without significant stenosis. No evidence   of carotid vertebral occlusion or dissection.  2. Intracranially, there are atherosclerotic changes in the anterior and   posterior circulation, but without significant stenosis or occlusion.    < end of copied text >  < from: CT Brain Stroke Protocol (10.26.23 @ 04:37) >  IMPRESSION:  No evidence of acute intracranial hemorrhage, mass effect or large acute   territorial infarct, within limits of noncontrast CT technique.    < end of copied text >  < from: CT Angio Neck Stroke Protocol w/ IV Cont (10.26.23 @ 04:53) >  CT ANGIOGRAPHY NECK:  1.  The right internal carotid artery is occluded approximately 1.5 cm   distal to its origin, possibly due to underlying dissection.  2.  Endotracheal tube is low in position, with its tip approximate 1 cm   above the slade.  3.  Diffuse ill-defined groundglass opacity in both lungs, which may   represent pulmonary edema, infection and/or atelectasis.  4.  There is a discrete right upper lobe groundglass opacity measuring   1.9 x 1.4 cm; this may represent a focus of infection, inflammation,   edema, hemorrhage, fibrosis or malignancy.  A follow-up chest CT is   warranted in three months to assess for resolution.    CT ANGIOGRAPHY BRAIN: The right internal carotid arteries occluded.    There is no significant flow related opacification within the proximal   right middle cerebral artery.  There is mild reconstitution of flow in   some distal branch vessels of the inferior division of the right middle   cerebral artery.  The right anterior cerebral artery fills across the   anterior communicating artery.    < end of copied text >  < from: CT Brain Perfusion Maps Stroke (10.26.23 @ 04:54) >  CT PERFUSION: CT perfusion is consistent with large acute infarct in the   right middle cerebral artery vascular territory and small to moderate   amount of surrounding ischemic penumbra.  Hypoperfusion index is 0.3.    Core infarct (CBF <  30%:): 136 mL  Penumbra (Tmax >6s): 194 mL  Mismatched volume: 58 mL  Mismatched ratio: 1.4    < end of copied text >      < from: CT Head No Cont (10.22.19 @ 15:57) >  IMPRESSION:    1)  chronic ischemic changes in both hemispheres with volume loss. There   are no new infarct as compared to prior CT. This may be further assessed   with MR imaging.  2)  no hemorrhage or mass identified.    < end of copied text >  < from: CT Angio Head w/ IV Cont (10.22.19 @ 15:57) >  IMPRESSION    1. There is intimal thickening and calcified plaque in both carotid   bifurcations in the neck, but without significant stenosis. No evidence   of carotid vertebral occlusion or dissection.  2. Intracranially, there are atherosclerotic changes in the anterior and   posterior circulation, but without significant stenosis or occlusion.    < end of copied text >  < from: CT Brain Stroke Protocol (10.26.23 @ 04:37) >  IMPRESSION:  No evidence of acute intracranial hemorrhage, mass effect or large acute   territorial infarct, within limits of noncontrast CT technique.    < end of copied text >  < from: CT Angio Neck Stroke Protocol w/ IV Cont (10.26.23 @ 04:53) >  CT ANGIOGRAPHY NECK:  1.  The right internal carotid artery is occluded approximately 1.5 cm   distal to its origin, possibly due to underlying dissection.  2.  Endotracheal tube is low in position, with its tip approximate 1 cm   above the slade.  3.  Diffuse ill-defined groundglass opacity in both lungs, which may   represent pulmonary edema, infection and/or atelectasis.  4.  There is a discrete right upper lobe groundglass opacity measuring   1.9 x 1.4 cm; this may represent a focus of infection, inflammation,   edema, hemorrhage, fibrosis or malignancy.  A follow-up chest CT is   warranted in three months to assess for resolution.    CT ANGIOGRAPHY BRAIN: The right internal carotid arteries occluded.    There is no significant flow related opacification within the proximal   right middle cerebral artery.  There is mild reconstitution of flow in   some distal branch vessels of the inferior division of the right middle   cerebral artery.  The right anterior cerebral artery fills across the   anterior communicating artery.    < end of copied text >  < from: CT Brain Perfusion Maps Stroke (10.26.23 @ 04:54) >  CT PERFUSION: CT perfusion is consistent with large acute infarct in the   right middle cerebral artery vascular territory and small to moderate   amount of surrounding ischemic penumbra.  Hypoperfusion index is 0.3.    Core infarct (CBF <  30%:): 136 mL  Penumbra (Tmax >6s): 194 mL  Mismatched volume: 58 mL  Mismatched ratio: 1.4    < end of copied text >    EEG Classification / Summary:  Abnormal EEG study  1. Intermittent periodic discharges seen intermittently over the left hemisphere, sometimes with triphasic morphology  2. Focal continuous right hemispheric slowing  3. Moderate generalized background slowing    -----------------------------------------------------------------------------------------------------    Clinical Impression:  1. While the periodic pattern noted above may represent lateralized periodic discharges indicating epileptic potential in the left hemisphere, more likely these represent generalized periodic discharges with triphasic morphology, consistent with metabolic etiology, that are poorly expressed on the right due to the effects of the stroke.   2. Structural abnormality in right hemisphere  3. Moderate diffuse/multi-focal cerebral dysfunction, not specific as to etiology.    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 Significant   associated mass effect and associated herniation.      < end of copied text >  < from: CT Head No Cont (11.02.23 @ 14:30) >    Reidentified are 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 There is effacement of all of the   basilar cisterns. Effacement of the sulci in the bilateral cerebri,   compatible with increased brain edema.    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    Unsuccessful attempts were made to reach the ordering clinician via teams.      --- End of Report ---    < end of copied text >

## 2023-12-03 NOTE — PROGRESS NOTE ADULT - ASSESSMENT
88 year old female with a PMH of HTN, HLD and afib on xarelto who did not take medications for past 1 week prior to hospitalization here with R MCA infarct.   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

## 2023-12-04 NOTE — PROGRESS NOTE ADULT - SUBJECTIVE AND OBJECTIVE BOX
Neurology Progress Note    S: Patient seen and examined.     Medications: MEDICATIONS  (STANDING):  amLODIPine   Tablet 10 milliGRAM(s) Oral daily  aspirin  chewable 81 milliGRAM(s) Oral daily  atorvastatin 80 milliGRAM(s) Oral at bedtime  chlorhexidine 0.12% Liquid 15 milliLiter(s) Oral Mucosa every 12 hours  chlorhexidine 2% Cloths 1 Application(s) Topical <User Schedule>  enoxaparin Injectable 40 milliGRAM(s) SubCutaneous every 24 hours  hydrALAZINE 100 milliGRAM(s) Oral every 8 hours  influenza  Vaccine (HIGH DOSE) 0.7 milliLiter(s) IntraMuscular once  polyethylene glycol 3350 17 Gram(s) Oral daily  senna 2 Tablet(s) Oral at bedtime    MEDICATIONS  (PRN):  acetaminophen     Tablet .. 650 milliGRAM(s) Oral every 6 hours PRN Temp greater or equal to 38C (100.4F)  bisacodyl Suppository 10 milliGRAM(s) Rectal daily PRN Constipation       Vitals:  Vital Signs Last 24 Hrs  T(C): 36.5 (04 Dec 2023 19:10), Max: 37 (03 Dec 2023 20:54)  T(F): 97.7 (04 Dec 2023 19:10), Max: 98.6 (03 Dec 2023 20:54)  HR: 67 (04 Dec 2023 19:10) (63 - 81)  BP: 127/63 (04 Dec 2023 19:00) (121/52 - 161/80)  BP(mean): 81 (04 Dec 2023 19:00) (73 - 102)  RR: 19 (04 Dec 2023 19:10) (15 - 28)  SpO2: 100% (04 Dec 2023 19:10) (98% - 100%)    Parameters below as of 04 Dec 2023 19:10  Patient On (Oxygen Delivery Method): ventilator    O2 Concentration (%): 21      Neurological Exam:  Mental Status: Intubated, not sedated. No spont eye opening. No verbal output, does not follow commands.   Cranial Nerves: pupils very sluggish R, L near fixed, no  corneals, no VOR, intermittent eyelid fluttering no facial asymmetry , weak cough/gag  Motor: dec tone throughout, no spont movemnt noted   Sensation: UE extensor posturing, LE triple flexion left toe up    I personally reviewed the below data/images/labs:      < from: CT Head No Cont (10.22.19 @ 15:57) >  IMPRESSION:    1)  chronic ischemic changes in both hemispheres with volume loss. There   are no new infarct as compared to prior CT. This may be further assessed   with MR imaging.  2)  no hemorrhage or mass identified.      LABS:    No new labs    < end of copied text >  < from: CT Angio Head w/ IV Cont (10.22.19 @ 15:57) >  IMPRESSION    1. There is intimal thickening and calcified plaque in both carotid   bifurcations in the neck, but without significant stenosis. No evidence   of carotid vertebral occlusion or dissection.  2. Intracranially, there are atherosclerotic changes in the anterior and   posterior circulation, but without significant stenosis or occlusion.    < end of copied text >  < from: CT Brain Stroke Protocol (10.26.23 @ 04:37) >  IMPRESSION:  No evidence of acute intracranial hemorrhage, mass effect or large acute   territorial infarct, within limits of noncontrast CT technique.    < end of copied text >  < from: CT Angio Neck Stroke Protocol w/ IV Cont (10.26.23 @ 04:53) >  CT ANGIOGRAPHY NECK:  1.  The right internal carotid artery is occluded approximately 1.5 cm   distal to its origin, possibly due to underlying dissection.  2.  Endotracheal tube is low in position, with its tip approximate 1 cm   above the slade.  3.  Diffuse ill-defined groundglass opacity in both lungs, which may   represent pulmonary edema, infection and/or atelectasis.  4.  There is a discrete right upper lobe groundglass opacity measuring   1.9 x 1.4 cm; this may represent a focus of infection, inflammation,   edema, hemorrhage, fibrosis or malignancy.  A follow-up chest CT is   warranted in three months to assess for resolution.    CT ANGIOGRAPHY BRAIN: The right internal carotid arteries occluded.    There is no significant flow related opacification within the proximal   right middle cerebral artery.  There is mild reconstitution of flow in   some distal branch vessels of the inferior division of the right middle   cerebral artery.  The right anterior cerebral artery fills across the   anterior communicating artery.    < end of copied text >  < from: CT Brain Perfusion Maps Stroke (10.26.23 @ 04:54) >  CT PERFUSION: CT perfusion is consistent with large acute infarct in the   right middle cerebral artery vascular territory and small to moderate   amount of surrounding ischemic penumbra.  Hypoperfusion index is 0.3.    Core infarct (CBF <  30%:): 136 mL  Penumbra (Tmax >6s): 194 mL  Mismatched volume: 58 mL  Mismatched ratio: 1.4    < end of copied text >      < from: CT Head No Cont (10.22.19 @ 15:57) >  IMPRESSION:    1)  chronic ischemic changes in both hemispheres with volume loss. There   are no new infarct as compared to prior CT. This may be further assessed   with MR imaging.  2)  no hemorrhage or mass identified.    < end of copied text >  < from: CT Angio Head w/ IV Cont (10.22.19 @ 15:57) >  IMPRESSION    1. There is intimal thickening and calcified plaque in both carotid   bifurcations in the neck, but without significant stenosis. No evidence   of carotid vertebral occlusion or dissection.  2. Intracranially, there are atherosclerotic changes in the anterior and   posterior circulation, but without significant stenosis or occlusion.    < end of copied text >  < from: CT Brain Stroke Protocol (10.26.23 @ 04:37) >  IMPRESSION:  No evidence of acute intracranial hemorrhage, mass effect or large acute   territorial infarct, within limits of noncontrast CT technique.    < end of copied text >  < from: CT Angio Neck Stroke Protocol w/ IV Cont (10.26.23 @ 04:53) >  CT ANGIOGRAPHY NECK:  1.  The right internal carotid artery is occluded approximately 1.5 cm   distal to its origin, possibly due to underlying dissection.  2.  Endotracheal tube is low in position, with its tip approximate 1 cm   above the slade.  3.  Diffuse ill-defined groundglass opacity in both lungs, which may   represent pulmonary edema, infection and/or atelectasis.  4.  There is a discrete right upper lobe groundglass opacity measuring   1.9 x 1.4 cm; this may represent a focus of infection, inflammation,   edema, hemorrhage, fibrosis or malignancy.  A follow-up chest CT is   warranted in three months to assess for resolution.    CT ANGIOGRAPHY BRAIN: The right internal carotid arteries occluded.    There is no significant flow related opacification within the proximal   right middle cerebral artery.  There is mild reconstitution of flow in   some distal branch vessels of the inferior division of the right middle   cerebral artery.  The right anterior cerebral artery fills across the   anterior communicating artery.    < end of copied text >  < from: CT Brain Perfusion Maps Stroke (10.26.23 @ 04:54) >  CT PERFUSION: CT perfusion is consistent with large acute infarct in the   right middle cerebral artery vascular territory and small to moderate   amount of surrounding ischemic penumbra.  Hypoperfusion index is 0.3.    Core infarct (CBF <  30%:): 136 mL  Penumbra (Tmax >6s): 194 mL  Mismatched volume: 58 mL  Mismatched ratio: 1.4    < end of copied text >      < from: CT Head No Cont (10.22.19 @ 15:57) >  IMPRESSION:    1)  chronic ischemic changes in both hemispheres with volume loss. There   are no new infarct as compared to prior CT. This may be further assessed   with MR imaging.  2)  no hemorrhage or mass identified.    < end of copied text >  < from: CT Angio Head w/ IV Cont (10.22.19 @ 15:57) >  IMPRESSION    1. There is intimal thickening and calcified plaque in both carotid   bifurcations in the neck, but without significant stenosis. No evidence   of carotid vertebral occlusion or dissection.  2. Intracranially, there are atherosclerotic changes in the anterior and   posterior circulation, but without significant stenosis or occlusion.    < end of copied text >  < from: CT Brain Stroke Protocol (10.26.23 @ 04:37) >  IMPRESSION:  No evidence of acute intracranial hemorrhage, mass effect or large acute   territorial infarct, within limits of noncontrast CT technique.    < end of copied text >  < from: CT Angio Neck Stroke Protocol w/ IV Cont (10.26.23 @ 04:53) >  CT ANGIOGRAPHY NECK:  1.  The right internal carotid artery is occluded approximately 1.5 cm   distal to its origin, possibly due to underlying dissection.  2.  Endotracheal tube is low in position, with its tip approximate 1 cm   above the slade.  3.  Diffuse ill-defined groundglass opacity in both lungs, which may   represent pulmonary edema, infection and/or atelectasis.  4.  There is a discrete right upper lobe groundglass opacity measuring   1.9 x 1.4 cm; this may represent a focus of infection, inflammation,   edema, hemorrhage, fibrosis or malignancy.  A follow-up chest CT is   warranted in three months to assess for resolution.    CT ANGIOGRAPHY BRAIN: The right internal carotid arteries occluded.    There is no significant flow related opacification within the proximal   right middle cerebral artery.  There is mild reconstitution of flow in   some distal branch vessels of the inferior division of the right middle   cerebral artery.  The right anterior cerebral artery fills across the   anterior communicating artery.    < end of copied text >  < from: CT Brain Perfusion Maps Stroke (10.26.23 @ 04:54) >  CT PERFUSION: CT perfusion is consistent with large acute infarct in the   right middle cerebral artery vascular territory and small to moderate   amount of surrounding ischemic penumbra.  Hypoperfusion index is 0.3.    Core infarct (CBF <  30%:): 136 mL  Penumbra (Tmax >6s): 194 mL  Mismatched volume: 58 mL  Mismatched ratio: 1.4    < end of copied text >    EEG Classification / Summary:  Abnormal EEG study  1. Intermittent periodic discharges seen intermittently over the left hemisphere, sometimes with triphasic morphology  2. Focal continuous right hemispheric slowing  3. Moderate generalized background slowing    -----------------------------------------------------------------------------------------------------    Clinical Impression:  1. While the periodic pattern noted above may represent lateralized periodic discharges indicating epileptic potential in the left hemisphere, more likely these represent generalized periodic discharges with triphasic morphology, consistent with metabolic etiology, that are poorly expressed on the right due to the effects of the stroke.   2. Structural abnormality in right hemisphere  3. Moderate diffuse/multi-focal cerebral dysfunction, not specific as to etiology.    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 Significant   associated mass effect and associated herniation.      < end of copied text >  < from: CT Head No Cont (11.02.23 @ 14:30) >    Reidentified are 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 There is effacement of all of the   basilar cisterns. Effacement of the sulci in the bilateral cerebri,   compatible with increased brain edema.    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    Unsuccessful attempts were made to reach the ordering clinician via teams.      --- End of Report ---    < end of copied text >   Neurology Progress Note    S: Patient seen and examined.     Medications: MEDICATIONS  (STANDING):  amLODIPine   Tablet 10 milliGRAM(s) Oral daily  aspirin  chewable 81 milliGRAM(s) Oral daily  atorvastatin 80 milliGRAM(s) Oral at bedtime  chlorhexidine 0.12% Liquid 15 milliLiter(s) Oral Mucosa every 12 hours  chlorhexidine 2% Cloths 1 Application(s) Topical <User Schedule>  enoxaparin Injectable 40 milliGRAM(s) SubCutaneous every 24 hours  hydrALAZINE 100 milliGRAM(s) Oral every 8 hours  influenza  Vaccine (HIGH DOSE) 0.7 milliLiter(s) IntraMuscular once  polyethylene glycol 3350 17 Gram(s) Oral daily  senna 2 Tablet(s) Oral at bedtime    MEDICATIONS  (PRN):  acetaminophen     Tablet .. 650 milliGRAM(s) Oral every 6 hours PRN Temp greater or equal to 38C (100.4F)  bisacodyl Suppository 10 milliGRAM(s) Rectal daily PRN Constipation       Vitals:  Vital Signs Last 24 Hrs  T(C): 36.5 (04 Dec 2023 19:10), Max: 37 (03 Dec 2023 20:54)  T(F): 97.7 (04 Dec 2023 19:10), Max: 98.6 (03 Dec 2023 20:54)  HR: 67 (04 Dec 2023 19:10) (63 - 81)  BP: 127/63 (04 Dec 2023 19:00) (121/52 - 161/80)  BP(mean): 81 (04 Dec 2023 19:00) (73 - 102)  RR: 19 (04 Dec 2023 19:10) (15 - 28)  SpO2: 100% (04 Dec 2023 19:10) (98% - 100%)    Parameters below as of 04 Dec 2023 19:10  Patient On (Oxygen Delivery Method): ventilator    O2 Concentration (%): 21      Neurological Exam:  Mental Status: Intubated, not sedated. No spont eye opening. No verbal output, does not follow commands.   Cranial Nerves: pupils very sluggish R, L near fixed, no  corneals, no VOR, intermittent eyelid fluttering no facial asymmetry , weak cough/gag  Motor: dec tone throughout, no spont movemnt noted   Sensation: UE extensor posturing, LE triple flexion left toe up    I personally reviewed the below data/images/labs:      < from: CT Head No Cont (10.22.19 @ 15:57) >  IMPRESSION:    1)  chronic ischemic changes in both hemispheres with volume loss. There   are no new infarct as compared to prior CT. This may be further assessed   with MR imaging.  2)  no hemorrhage or mass identified.      LABS:    No new labs    < end of copied text >  < from: CT Angio Head w/ IV Cont (10.22.19 @ 15:57) >  IMPRESSION    1. There is intimal thickening and calcified plaque in both carotid   bifurcations in the neck, but without significant stenosis. No evidence   of carotid vertebral occlusion or dissection.  2. Intracranially, there are atherosclerotic changes in the anterior and   posterior circulation, but without significant stenosis or occlusion.    < end of copied text >  < from: CT Brain Stroke Protocol (10.26.23 @ 04:37) >  IMPRESSION:  No evidence of acute intracranial hemorrhage, mass effect or large acute   territorial infarct, within limits of noncontrast CT technique.    < end of copied text >  < from: CT Angio Neck Stroke Protocol w/ IV Cont (10.26.23 @ 04:53) >  CT ANGIOGRAPHY NECK:  1.  The right internal carotid artery is occluded approximately 1.5 cm   distal to its origin, possibly due to underlying dissection.  2.  Endotracheal tube is low in position, with its tip approximate 1 cm   above the slade.  3.  Diffuse ill-defined groundglass opacity in both lungs, which may   represent pulmonary edema, infection and/or atelectasis.  4.  There is a discrete right upper lobe groundglass opacity measuring   1.9 x 1.4 cm; this may represent a focus of infection, inflammation,   edema, hemorrhage, fibrosis or malignancy.  A follow-up chest CT is   warranted in three months to assess for resolution.    CT ANGIOGRAPHY BRAIN: The right internal carotid arteries occluded.    There is no significant flow related opacification within the proximal   right middle cerebral artery.  There is mild reconstitution of flow in   some distal branch vessels of the inferior division of the right middle   cerebral artery.  The right anterior cerebral artery fills across the   anterior communicating artery.    < end of copied text >  < from: CT Brain Perfusion Maps Stroke (10.26.23 @ 04:54) >  CT PERFUSION: CT perfusion is consistent with large acute infarct in the   right middle cerebral artery vascular territory and small to moderate   amount of surrounding ischemic penumbra.  Hypoperfusion index is 0.3.    Core infarct (CBF <  30%:): 136 mL  Penumbra (Tmax >6s): 194 mL  Mismatched volume: 58 mL  Mismatched ratio: 1.4    < end of copied text >      < from: CT Head No Cont (10.22.19 @ 15:57) >  IMPRESSION:    1)  chronic ischemic changes in both hemispheres with volume loss. There   are no new infarct as compared to prior CT. This may be further assessed   with MR imaging.  2)  no hemorrhage or mass identified.    < end of copied text >  < from: CT Angio Head w/ IV Cont (10.22.19 @ 15:57) >  IMPRESSION    1. There is intimal thickening and calcified plaque in both carotid   bifurcations in the neck, but without significant stenosis. No evidence   of carotid vertebral occlusion or dissection.  2. Intracranially, there are atherosclerotic changes in the anterior and   posterior circulation, but without significant stenosis or occlusion.    < end of copied text >  < from: CT Brain Stroke Protocol (10.26.23 @ 04:37) >  IMPRESSION:  No evidence of acute intracranial hemorrhage, mass effect or large acute   territorial infarct, within limits of noncontrast CT technique.    < end of copied text >  < from: CT Angio Neck Stroke Protocol w/ IV Cont (10.26.23 @ 04:53) >  CT ANGIOGRAPHY NECK:  1.  The right internal carotid artery is occluded approximately 1.5 cm   distal to its origin, possibly due to underlying dissection.  2.  Endotracheal tube is low in position, with its tip approximate 1 cm   above the slade.  3.  Diffuse ill-defined groundglass opacity in both lungs, which may   represent pulmonary edema, infection and/or atelectasis.  4.  There is a discrete right upper lobe groundglass opacity measuring   1.9 x 1.4 cm; this may represent a focus of infection, inflammation,   edema, hemorrhage, fibrosis or malignancy.  A follow-up chest CT is   warranted in three months to assess for resolution.    CT ANGIOGRAPHY BRAIN: The right internal carotid arteries occluded.    There is no significant flow related opacification within the proximal   right middle cerebral artery.  There is mild reconstitution of flow in   some distal branch vessels of the inferior division of the right middle   cerebral artery.  The right anterior cerebral artery fills across the   anterior communicating artery.    < end of copied text >  < from: CT Brain Perfusion Maps Stroke (10.26.23 @ 04:54) >  CT PERFUSION: CT perfusion is consistent with large acute infarct in the   right middle cerebral artery vascular territory and small to moderate   amount of surrounding ischemic penumbra.  Hypoperfusion index is 0.3.    Core infarct (CBF <  30%:): 136 mL  Penumbra (Tmax >6s): 194 mL  Mismatched volume: 58 mL  Mismatched ratio: 1.4    < end of copied text >      < from: CT Head No Cont (10.22.19 @ 15:57) >  IMPRESSION:    1)  chronic ischemic changes in both hemispheres with volume loss. There   are no new infarct as compared to prior CT. This may be further assessed   with MR imaging.  2)  no hemorrhage or mass identified.    < end of copied text >  < from: CT Angio Head w/ IV Cont (10.22.19 @ 15:57) >  IMPRESSION    1. There is intimal thickening and calcified plaque in both carotid   bifurcations in the neck, but without significant stenosis. No evidence   of carotid vertebral occlusion or dissection.  2. Intracranially, there are atherosclerotic changes in the anterior and   posterior circulation, but without significant stenosis or occlusion.    < end of copied text >  < from: CT Brain Stroke Protocol (10.26.23 @ 04:37) >  IMPRESSION:  No evidence of acute intracranial hemorrhage, mass effect or large acute   territorial infarct, within limits of noncontrast CT technique.    < end of copied text >  < from: CT Angio Neck Stroke Protocol w/ IV Cont (10.26.23 @ 04:53) >  CT ANGIOGRAPHY NECK:  1.  The right internal carotid artery is occluded approximately 1.5 cm   distal to its origin, possibly due to underlying dissection.  2.  Endotracheal tube is low in position, with its tip approximate 1 cm   above the slade.  3.  Diffuse ill-defined groundglass opacity in both lungs, which may   represent pulmonary edema, infection and/or atelectasis.  4.  There is a discrete right upper lobe groundglass opacity measuring   1.9 x 1.4 cm; this may represent a focus of infection, inflammation,   edema, hemorrhage, fibrosis or malignancy.  A follow-up chest CT is   warranted in three months to assess for resolution.    CT ANGIOGRAPHY BRAIN: The right internal carotid arteries occluded.    There is no significant flow related opacification within the proximal   right middle cerebral artery.  There is mild reconstitution of flow in   some distal branch vessels of the inferior division of the right middle   cerebral artery.  The right anterior cerebral artery fills across the   anterior communicating artery.    < end of copied text >  < from: CT Brain Perfusion Maps Stroke (10.26.23 @ 04:54) >  CT PERFUSION: CT perfusion is consistent with large acute infarct in the   right middle cerebral artery vascular territory and small to moderate   amount of surrounding ischemic penumbra.  Hypoperfusion index is 0.3.    Core infarct (CBF <  30%:): 136 mL  Penumbra (Tmax >6s): 194 mL  Mismatched volume: 58 mL  Mismatched ratio: 1.4    < end of copied text >    EEG Classification / Summary:  Abnormal EEG study  1. Intermittent periodic discharges seen intermittently over the left hemisphere, sometimes with triphasic morphology  2. Focal continuous right hemispheric slowing  3. Moderate generalized background slowing    -----------------------------------------------------------------------------------------------------    Clinical Impression:  1. While the periodic pattern noted above may represent lateralized periodic discharges indicating epileptic potential in the left hemisphere, more likely these represent generalized periodic discharges with triphasic morphology, consistent with metabolic etiology, that are poorly expressed on the right due to the effects of the stroke.   2. Structural abnormality in right hemisphere  3. Moderate diffuse/multi-focal cerebral dysfunction, not specific as to etiology.    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 Significant   associated mass effect and associated herniation.      < end of copied text >  < from: CT Head No Cont (11.02.23 @ 14:30) >    Reidentified are large subacute MCA and JMIMY infarcts throughout the right   frontal parietal and temporal lobes and right basalganglia, with severe   right to left midline shift and severe mass effect upon the ventricular   system, not significantly changed. There is effacement of all of the   basilar cisterns. Effacement of the sulci in the bilateral cerebri,   compatible with increased brain edema.    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    Unsuccessful attempts were made to reach the ordering clinician via teams.      --- End of Report ---    < end of copied text >

## 2023-12-04 NOTE — PROGRESS NOTE ADULT - ASSESSMENT
88F w/ HTN, HLD, Afib. Presents w/ AMS and R gaze deviation. Pt was intubated in the ED due to poor mental status. Pt did not receive TNK. Admitted to ICU. Imaging revealed R JIMMY and MCA infarct w/ midline shift, as well as PCA infarct. Pt's mental status is poor.     #Neuro - mental status is poor, unresponsive in setting of catastrophic CVA; morphine PRN for discomfort/tachypnea  #CV - continue hydralazine and amlodipine; known afib, too high risk given extent of CVA to give pt therapeutic AC  #Pulm - remains intubated on minimal vent settings; does not tolerate PSV weaning and not a candidate for extubation; family would not opt for tracheostomy, although they do not want to withdraw either  #ID- off abx, afebrile, no leukocytosis; monitor off abx  #Renal/metabolic - renal function stable; monitor I/Os, electrolytes  #GI- tolerating TF, having BM  #Heme - no active issues  #Endo - BG stable  #PPx - lovenox qd  #Dispo- remains in ICU vent dependent, unable to extubate; prognosis for neuro-cognitive recovery is poor; pt is DNR - family does not want to withdraw care but they would not want trach/PEG either, social work consult to discuss LTC placement; check labs tomorrow

## 2023-12-04 NOTE — PROGRESS NOTE ADULT - ASSESSMENT
88 year old female with a PMH of HTN, HLD and afib on xarelto who did not take medications for past 1 week prior to hospitalization here with R MCA infarct.   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

## 2023-12-04 NOTE — PROGRESS NOTE ADULT - SUBJECTIVE AND OBJECTIVE BOX
CHIEF COMPLAINT:    Interval Events:    REVIEW OF SYSTEMS:  Constitutional: [ ] fevers [ ] chills [ ] weight loss [ ] weight gain  HEENT: [ ] dry eyes [ ] eye irritation [ ] postnasal drip [ ] nasal congestion  CV: [ ] chest pain [ ] orthopnea [ ] palpitations [ ] murmur  Resp: [ ] cough [ ] shortness of breath [ ] dyspnea [ ] wheezing [ ] sputum [ ] hemoptysis  GI: [ ] nausea [ ] vomiting [ ] diarrhea [ ] constipation [ ] abd pain [ ] dysphagia   : [ ] dysuria [ ] nocturia [ ] hematuria [ ] increased urinary frequency  Musculoskeletal: [ ] back pain [ ] myalgias [ ] arthralgias [ ] fracture  Skin: [ ] rash [ ] itch  Neurological: [ ] headache [ ] dizziness [ ] syncope [ ] weakness [ ] numbness  Hematologic/Lymphatic: [ ] anemia [ ] bleeding problem  Allergic/Immunologic: [ ] itchy eyes [ ] nasal discharge [ ] hives [ ] angioedema  [ ] All other systems negative  [ ] Unable to assess ROS because ________    OBJECTIVE:  ICU Vital Signs Last 24 Hrs  T(C): 36.8 (04 Dec 2023 07:00), Max: 37.3 (03 Dec 2023 13:00)  T(F): 98.2 (04 Dec 2023 07:00), Max: 99.1 (03 Dec 2023 13:00)  HR: 78 (04 Dec 2023 07:00) (62 - 78)  BP: 142/74 (04 Dec 2023 07:00) (121/65 - 171/69)  BP(mean): 94 (04 Dec 2023 07:00) (78 - 102)  ABP: --  ABP(mean): --  RR: 18 (04 Dec 2023 07:00) (13 - 31)  SpO2: 98% (04 Dec 2023 07:00) (97% - 100%)      Mode: CPAP with PS, FiO2: 21, PEEP: 5, PS: 10, ITime: 0.7, MAP: 9, PIP: 15    12-03 @ 07:01  -  12-04 @ 07:00  --------------------------------------------------------  IN: 1040 mL / OUT: 590 mL / NET: 450 mL      CAPILLARY BLOOD GLUCOSE      POCT Blood Glucose.: 95 mg/dL (03 Dec 2023 17:58)      PHYSICAL EXAM:  General:   HEENT:   Neck:   Respiratory:   Cardiovascular:   Abdomen:   Extremities:   Skin:   Neurological:  Psychiatry:    LINES:    HOSPITAL MEDICATIONS:  MEDICATIONS  (STANDING):  amLODIPine   Tablet 10 milliGRAM(s) Oral daily  aspirin  chewable 81 milliGRAM(s) Oral daily  atorvastatin 80 milliGRAM(s) Oral at bedtime  chlorhexidine 0.12% Liquid 15 milliLiter(s) Oral Mucosa every 12 hours  chlorhexidine 2% Cloths 1 Application(s) Topical <User Schedule>  enoxaparin Injectable 40 milliGRAM(s) SubCutaneous every 24 hours  hydrALAZINE 100 milliGRAM(s) Oral every 8 hours  influenza  Vaccine (HIGH DOSE) 0.7 milliLiter(s) IntraMuscular once  polyethylene glycol 3350 17 Gram(s) Oral daily  senna 2 Tablet(s) Oral at bedtime    MEDICATIONS  (PRN):  acetaminophen     Tablet .. 650 milliGRAM(s) Oral every 6 hours PRN Temp greater or equal to 38C (100.4F)  bisacodyl Suppository 10 milliGRAM(s) Rectal daily PRN Constipation      LABS:    Hgb Trend:                   MICROBIOLOGY:     RADIOLOGY:  [ ] Reviewed and interpreted by me CHIEF COMPLAINT:    Interval Events:  no o/n events  does not tolerate PSV    REVIEW OF SYSTEMS:  [ x] Unable to assess ROS because encephalopathy    OBJECTIVE:  ICU Vital Signs Last 24 Hrs  T(C): 36.8 (04 Dec 2023 07:00), Max: 37.3 (03 Dec 2023 13:00)  T(F): 98.2 (04 Dec 2023 07:00), Max: 99.1 (03 Dec 2023 13:00)  HR: 78 (04 Dec 2023 07:00) (62 - 78)  BP: 142/74 (04 Dec 2023 07:00) (121/65 - 171/69)  BP(mean): 94 (04 Dec 2023 07:00) (78 - 102)  ABP: --  ABP(mean): --  RR: 18 (04 Dec 2023 07:00) (13 - 31)  SpO2: 98% (04 Dec 2023 07:00) (97% - 100%)      Mode: CPAP with PS, FiO2: 21, PEEP: 5, PS: 10, ITime: 0.7, MAP: 9, PIP: 15    12-03 @ 07:01  -  12-04 @ 07:00  --------------------------------------------------------  IN: 1040 mL / OUT: 590 mL / NET: 450 mL      CAPILLARY BLOOD GLUCOSE      POCT Blood Glucose.: 95 mg/dL (03 Dec 2023 17:58)      PHYSICAL EXAM:  General: NAD, chronically ill appearing  HEENT: ETT and NGT in place  Neck: supple  Respiratory: mechanical BS  Cardiovascular: s1s2 RRR  Abdomen: soft, non tender, non distended  Extremities: warm, no edema or clubbing  Skin: intact  Neurological: unable to assess  Psychiatry: unable to assess    LINES:  [facility]    HOSPITAL MEDICATIONS:  MEDICATIONS  (STANDING):  amLODIPine   Tablet 10 milliGRAM(s) Oral daily  aspirin  chewable 81 milliGRAM(s) Oral daily  atorvastatin 80 milliGRAM(s) Oral at bedtime  chlorhexidine 0.12% Liquid 15 milliLiter(s) Oral Mucosa every 12 hours  chlorhexidine 2% Cloths 1 Application(s) Topical <User Schedule>  enoxaparin Injectable 40 milliGRAM(s) SubCutaneous every 24 hours  hydrALAZINE 100 milliGRAM(s) Oral every 8 hours  influenza  Vaccine (HIGH DOSE) 0.7 milliLiter(s) IntraMuscular once  polyethylene glycol 3350 17 Gram(s) Oral daily  senna 2 Tablet(s) Oral at bedtime    MEDICATIONS  (PRN):  acetaminophen     Tablet .. 650 milliGRAM(s) Oral every 6 hours PRN Temp greater or equal to 38C (100.4F)  bisacodyl Suppository 10 milliGRAM(s) Rectal daily PRN Constipation      LABS:    Hgb Trend:                   MICROBIOLOGY:     RADIOLOGY:  [ ] Reviewed and interpreted by me CHIEF COMPLAINT:    Interval Events:  no o/n events  does not tolerate PSV    REVIEW OF SYSTEMS:  [ x] Unable to assess ROS because encephalopathy    OBJECTIVE:  ICU Vital Signs Last 24 Hrs  T(C): 36.8 (04 Dec 2023 07:00), Max: 37.3 (03 Dec 2023 13:00)  T(F): 98.2 (04 Dec 2023 07:00), Max: 99.1 (03 Dec 2023 13:00)  HR: 78 (04 Dec 2023 07:00) (62 - 78)  BP: 142/74 (04 Dec 2023 07:00) (121/65 - 171/69)  BP(mean): 94 (04 Dec 2023 07:00) (78 - 102)  ABP: --  ABP(mean): --  RR: 18 (04 Dec 2023 07:00) (13 - 31)  SpO2: 98% (04 Dec 2023 07:00) (97% - 100%)      Mode: CPAP with PS, FiO2: 21, PEEP: 5, PS: 10, ITime: 0.7, MAP: 9, PIP: 15    12-03 @ 07:01  -  12-04 @ 07:00  --------------------------------------------------------  IN: 1040 mL / OUT: 590 mL / NET: 450 mL      CAPILLARY BLOOD GLUCOSE      POCT Blood Glucose.: 95 mg/dL (03 Dec 2023 17:58)      PHYSICAL EXAM:  General: NAD, chronically ill appearing  HEENT: ETT and NGT in place  Neck: supple  Respiratory: mechanical BS  Cardiovascular: s1s2 RRR  Abdomen: soft, non tender, non distended  Extremities: warm, no edema or clubbing  Skin: intact  Neurological: unable to assess  Psychiatry: unable to assess    LINES:  [facility]    HOSPITAL MEDICATIONS:  MEDICATIONS  (STANDING):  amLODIPine   Tablet 10 milliGRAM(s) Oral daily  aspirin  chewable 81 milliGRAM(s) Oral daily  atorvastatin 80 milliGRAM(s) Oral at bedtime  chlorhexidine 0.12% Liquid 15 milliLiter(s) Oral Mucosa every 12 hours  chlorhexidine 2% Cloths 1 Application(s) Topical <User Schedule>  enoxaparin Injectable 40 milliGRAM(s) SubCutaneous every 24 hours  hydrALAZINE 100 milliGRAM(s) Oral every 8 hours  influenza  Vaccine (HIGH DOSE) 0.7 milliLiter(s) IntraMuscular once  polyethylene glycol 3350 17 Gram(s) Oral daily  senna 2 Tablet(s) Oral at bedtime    MEDICATIONS  (PRN):  acetaminophen     Tablet .. 650 milliGRAM(s) Oral every 6 hours PRN Temp greater or equal to 38C (100.4F)  bisacodyl Suppository 10 milliGRAM(s) Rectal daily PRN Constipation      LABS:    Hgb Trend:                   MICROBIOLOGY:     RADIOLOGY:  [ ] Reviewed and interpreted by me

## 2023-12-05 NOTE — PROGRESS NOTE ADULT - ASSESSMENT
88F w/ HTN, HLD, Afib. Presents w/ AMS and R gaze deviation. Pt was intubated in the ED due to poor mental status. Pt did not receive TNK. Admitted to ICU. Imaging revealed R JIMMY and MCA infarct w/ midline shift, as well as PCA infarct. Pt's mental status is poor.     #Neuro - mental status is poor, unresponsive in setting of catastrophic CVA; morphine PRN for discomfort/tachypnea  #CV - continue hydralazine and amlodipine; known afib, too high risk given extent of CVA to give pt therapeutic AC  #Pulm - remains intubated on minimal vent settings; does not tolerate PSV weaning and not a candidate for extubation; family would not opt for tracheostomy, although they do not want to withdraw either  #ID- off abx, afebrile, no leukocytosis; monitor off abx  #Renal/metabolic - renal function stable; monitor I/Os, electrolytes; start free water for hyperNa, hypophos repleted  #GI- tolerating TF, having BM  #Heme - no active issues  #Endo - BG stable  #PPx - lovenox qd  #Dispo- remains in ICU vent dependent, unable to extubate; prognosis for neuro-cognitive recovery is poor; pt is DNR - family does not want to withdraw care but they would not want trach/PEG either, social work consult to discuss LTC placement; check labs 2x/week

## 2023-12-05 NOTE — PROGRESS NOTE ADULT - SUBJECTIVE AND OBJECTIVE BOX
follow up on:  complex medical decision making related to goals of care      OVERNIGHT EVENTS: no overnight events    Review of systems:        Unable to obtain/Limited due to poor mental status    MEDICATIONS  (STANDING):  amLODIPine   Tablet 10 milliGRAM(s) Oral daily  aspirin  chewable 81 milliGRAM(s) Oral daily  atorvastatin 80 milliGRAM(s) Oral at bedtime  chlorhexidine 0.12% Liquid 15 milliLiter(s) Oral Mucosa every 12 hours  chlorhexidine 2% Cloths 1 Application(s) Topical <User Schedule>  enoxaparin Injectable 40 milliGRAM(s) SubCutaneous every 24 hours  hydrALAZINE 100 milliGRAM(s) Oral every 8 hours  influenza  Vaccine (HIGH DOSE) 0.7 milliLiter(s) IntraMuscular once  polyethylene glycol 3350 17 Gram(s) Oral daily  senna 2 Tablet(s) Oral at bedtime    MEDICATIONS  (PRN):  acetaminophen     Tablet .. 650 milliGRAM(s) Oral every 6 hours PRN Temp greater or equal to 38C (100.4F)  bisacodyl Suppository 10 milliGRAM(s) Rectal daily PRN Constipation      PHYSICAL EXAM:  Vital Signs Last 24 Hrs  T(C): 37.3 (05 Dec 2023 13:00), Max: 37.3 (05 Dec 2023 04:00)  T(F): 99.1 (05 Dec 2023 13:00), Max: 99.1 (05 Dec 2023 04:00)  HR: 67 (05 Dec 2023 13:00) (62 - 86)  BP: 123/60 (05 Dec 2023 13:00) (110/56 - 145/60)  BP(mean): 78 (05 Dec 2023 13:00) (73 - 101)  RR: 25 (05 Dec 2023 13:00) (17 - 31)  SpO2: 96% (05 Dec 2023 13:00) (95% - 100%)    Parameters below as of 04 Dec 2023 20:00  Patient On (Oxygen Delivery Method): ventilator    O2 Concentration (%): 21      Palliative Performance Scale/Karnofsky Score: 20      GENERAL: poorly responsive, intubated,  NAD  HEENT: Atraumatic, oropharynx clear, neck supple  CHEST/LUNG: unlabored  HEART: Regular rate and rhythm    ABDOMEN: Soft, Nontender, Nondistended   MUSCULOSKELETAL:  No  edema,  bedbound  NERVOUS SYSTEM:  poorly responsive, spont breathing on ventilator  SKIN: No rashes or lesions noted  Oral intake: npo/TF    LABS:                          9.5    10.97 )-----------( 259      ( 05 Dec 2023 02:00 )             31.3     12-05    146<H>  |  110<H>  |  21  ----------------------------<  159<H>  3.7   |  32<H>  |  0.57    Ca    8.5      05 Dec 2023 02:00  Phos  2.4     12-05  Mg     2.2     12-05    TPro  6.3  /  Alb  2.0<L>  /  TBili  0.8  /  DBili  x   /  AST  87<H>  /  ALT  129<H>  /  AlkPhos  120  12-05    Urinalysis Basic - ( 05 Dec 2023 02:00 )    Color: x / Appearance: x / SG: x / pH: x  Gluc: 159 mg/dL / Ketone: x  / Bili: x / Urobili: x   Blood: x / Protein: x / Nitrite: x   Leuk Esterase: x / RBC: x / WBC x   Sq Epi: x / Non Sq Epi: x / Bacteria: x        RADIOLOGY & ADDITIONAL STUDIES:

## 2023-12-05 NOTE — PROGRESS NOTE ADULT - SUBJECTIVE AND OBJECTIVE BOX
CHIEF COMPLAINT:    Interval Events:    REVIEW OF SYSTEMS:  Constitutional: [ ] fevers [ ] chills [ ] weight loss [ ] weight gain  HEENT: [ ] dry eyes [ ] eye irritation [ ] postnasal drip [ ] nasal congestion  CV: [ ] chest pain [ ] orthopnea [ ] palpitations [ ] murmur  Resp: [ ] cough [ ] shortness of breath [ ] dyspnea [ ] wheezing [ ] sputum [ ] hemoptysis  GI: [ ] nausea [ ] vomiting [ ] diarrhea [ ] constipation [ ] abd pain [ ] dysphagia   : [ ] dysuria [ ] nocturia [ ] hematuria [ ] increased urinary frequency  Musculoskeletal: [ ] back pain [ ] myalgias [ ] arthralgias [ ] fracture  Skin: [ ] rash [ ] itch  Neurological: [ ] headache [ ] dizziness [ ] syncope [ ] weakness [ ] numbness  Hematologic/Lymphatic: [ ] anemia [ ] bleeding problem  Allergic/Immunologic: [ ] itchy eyes [ ] nasal discharge [ ] hives [ ] angioedema  [ ] All other systems negative  [ ] Unable to assess ROS because ________    OBJECTIVE:  ICU Vital Signs Last 24 Hrs  T(C): 36.9 (05 Dec 2023 07:00), Max: 37.3 (05 Dec 2023 04:00)  T(F): 98.4 (05 Dec 2023 07:00), Max: 99.1 (05 Dec 2023 04:00)  HR: 74 (05 Dec 2023 07:00) (62 - 81)  BP: 135/81 (05 Dec 2023 07:00) (120/60 - 145/60)  BP(mean): 97 (05 Dec 2023 07:00) (73 - 97)  ABP: --  ABP(mean): --  RR: 26 (05 Dec 2023 07:00) (17 - 26)  SpO2: 99% (05 Dec 2023 07:00) (95% - 100%)    O2 Parameters below as of 04 Dec 2023 20:00  Patient On (Oxygen Delivery Method): ventilator    O2 Concentration (%): 21      Mode: AC/ CMV (Assist Control/ Continuous Mandatory Ventilation), RR (machine): 12, TV (machine): 400, FiO2: 21, PEEP: 5, ITime: 0.9, MAP: 9, PIP: 20    12-04 @ 07:01  -  12-05 @ 07:00  --------------------------------------------------------  IN: 1580 mL / OUT: 750 mL / NET: 830 mL      CAPILLARY BLOOD GLUCOSE      POCT Blood Glucose.: 95 mg/dL (03 Dec 2023 17:58)      PHYSICAL EXAM:  General:   HEENT:   Neck:   Respiratory:   Cardiovascular:   Abdomen:   Extremities:   Skin:   Neurological:  Psychiatry:    LINES:    HOSPITAL MEDICATIONS:  MEDICATIONS  (STANDING):  amLODIPine   Tablet 10 milliGRAM(s) Oral daily  aspirin  chewable 81 milliGRAM(s) Oral daily  atorvastatin 80 milliGRAM(s) Oral at bedtime  chlorhexidine 0.12% Liquid 15 milliLiter(s) Oral Mucosa every 12 hours  chlorhexidine 2% Cloths 1 Application(s) Topical <User Schedule>  enoxaparin Injectable 40 milliGRAM(s) SubCutaneous every 24 hours  hydrALAZINE 100 milliGRAM(s) Oral every 8 hours  influenza  Vaccine (HIGH DOSE) 0.7 milliLiter(s) IntraMuscular once  polyethylene glycol 3350 17 Gram(s) Oral daily  senna 2 Tablet(s) Oral at bedtime    MEDICATIONS  (PRN):  acetaminophen     Tablet .. 650 milliGRAM(s) Oral every 6 hours PRN Temp greater or equal to 38C (100.4F)  bisacodyl Suppository 10 milliGRAM(s) Rectal daily PRN Constipation      LABS:                        9.5    10.97 )-----------( 259      ( 05 Dec 2023 02:00 )             31.3     Hgb Trend: 9.5<--  12-05    146<H>  |  110<H>  |  21  ----------------------------<  159<H>  3.7   |  32<H>  |  0.57    Ca    8.5      05 Dec 2023 02:00  Phos  2.4     12-05  Mg     2.2     12-05    TPro  6.3  /  Alb  2.0<L>  /  TBili  0.8  /  DBili  x   /  AST  87<H>  /  ALT  129<H>  /  AlkPhos  120  12-05      Urinalysis Basic - ( 05 Dec 2023 02:00 )    Color: x / Appearance: x / SG: x / pH: x  Gluc: 159 mg/dL / Ketone: x  / Bili: x / Urobili: x   Blood: x / Protein: x / Nitrite: x   Leuk Esterase: x / RBC: x / WBC x   Sq Epi: x / Non Sq Epi: x / Bacteria: x            MICROBIOLOGY:     RADIOLOGY:  [ ] Reviewed and interpreted by me CHIEF COMPLAINT:    Interval Events:  no o/n events    REVIEW OF SYSTEMS:  [x ] Unable to assess ROS because unresponsive/encephalopathy    OBJECTIVE:  ICU Vital Signs Last 24 Hrs  T(C): 36.9 (05 Dec 2023 07:00), Max: 37.3 (05 Dec 2023 04:00)  T(F): 98.4 (05 Dec 2023 07:00), Max: 99.1 (05 Dec 2023 04:00)  HR: 74 (05 Dec 2023 07:00) (62 - 81)  BP: 135/81 (05 Dec 2023 07:00) (120/60 - 145/60)  BP(mean): 97 (05 Dec 2023 07:00) (73 - 97)  ABP: --  ABP(mean): --  RR: 26 (05 Dec 2023 07:00) (17 - 26)  SpO2: 99% (05 Dec 2023 07:00) (95% - 100%)    O2 Parameters below as of 04 Dec 2023 20:00  Patient On (Oxygen Delivery Method): ventilator    O2 Concentration (%): 21      Mode: AC/ CMV (Assist Control/ Continuous Mandatory Ventilation), RR (machine): 12, TV (machine): 400, FiO2: 21, PEEP: 5, ITime: 0.9, MAP: 9, PIP: 20    12-04 @ 07:01  -  12-05 @ 07:00  --------------------------------------------------------  IN: 1580 mL / OUT: 750 mL / NET: 830 mL      CAPILLARY BLOOD GLUCOSE      POCT Blood Glucose.: 95 mg/dL (03 Dec 2023 17:58)      PHYSICAL EXAM:  General: NAD, chronically ill appearing  HEENT: ETT and NGT in place  Neck: supple  Respiratory: mechanical BS  Cardiovascular: s1s2 RRR  Abdomen: soft, non tender, non distended  Extremities: warm, no edema or clubbing  Skin: intact  Neurological: unable to assess  Psychiatry: unable to assess    LINES:  [facility]    HOSPITAL MEDICATIONS:  MEDICATIONS  (STANDING):  amLODIPine   Tablet 10 milliGRAM(s) Oral daily  aspirin  chewable 81 milliGRAM(s) Oral daily  atorvastatin 80 milliGRAM(s) Oral at bedtime  chlorhexidine 0.12% Liquid 15 milliLiter(s) Oral Mucosa every 12 hours  chlorhexidine 2% Cloths 1 Application(s) Topical <User Schedule>  enoxaparin Injectable 40 milliGRAM(s) SubCutaneous every 24 hours  hydrALAZINE 100 milliGRAM(s) Oral every 8 hours  influenza  Vaccine (HIGH DOSE) 0.7 milliLiter(s) IntraMuscular once  polyethylene glycol 3350 17 Gram(s) Oral daily  senna 2 Tablet(s) Oral at bedtime    MEDICATIONS  (PRN):  acetaminophen     Tablet .. 650 milliGRAM(s) Oral every 6 hours PRN Temp greater or equal to 38C (100.4F)  bisacodyl Suppository 10 milliGRAM(s) Rectal daily PRN Constipation      LABS:                        9.5    10.97 )-----------( 259      ( 05 Dec 2023 02:00 )             31.3     Hgb Trend: 9.5<--  12-05    146<H>  |  110<H>  |  21  ----------------------------<  159<H>  3.7   |  32<H>  |  0.57    Ca    8.5      05 Dec 2023 02:00  Phos  2.4     12-05  Mg     2.2     12-05    TPro  6.3  /  Alb  2.0<L>  /  TBili  0.8  /  DBili  x   /  AST  87<H>  /  ALT  129<H>  /  AlkPhos  120  12-05      Urinalysis Basic - ( 05 Dec 2023 02:00 )    Color: x / Appearance: x / SG: x / pH: x  Gluc: 159 mg/dL / Ketone: x  / Bili: x / Urobili: x   Blood: x / Protein: x / Nitrite: x   Leuk Esterase: x / RBC: x / WBC x   Sq Epi: x / Non Sq Epi: x / Bacteria: x            MICROBIOLOGY:     RADIOLOGY:  [ ] Reviewed and interpreted by me CHIEF COMPLAINT:    Interval Events:  no o/n events    REVIEW OF SYSTEMS:  [x ] Unable to assess ROS because unresponsive/encephalopathy    OBJECTIVE:  ICU Vital Signs Last 24 Hrs  T(C): 36.9 (05 Dec 2023 07:00), Max: 37.3 (05 Dec 2023 04:00)  T(F): 98.4 (05 Dec 2023 07:00), Max: 99.1 (05 Dec 2023 04:00)  HR: 74 (05 Dec 2023 07:00) (62 - 81)  BP: 135/81 (05 Dec 2023 07:00) (120/60 - 145/60)  BP(mean): 97 (05 Dec 2023 07:00) (73 - 97)  ABP: --  ABP(mean): --  RR: 26 (05 Dec 2023 07:00) (17 - 26)  SpO2: 99% (05 Dec 2023 07:00) (95% - 100%)    O2 Parameters below as of 04 Dec 2023 20:00  Patient On (Oxygen Delivery Method): ventilator    O2 Concentration (%): 21      Mode: AC/ CMV (Assist Control/ Continuous Mandatory Ventilation), RR (machine): 12, TV (machine): 400, FiO2: 21, PEEP: 5, ITime: 0.9, MAP: 9, PIP: 20    12-04 @ 07:01  -  12-05 @ 07:00  --------------------------------------------------------  IN: 1580 mL / OUT: 750 mL / NET: 830 mL      CAPILLARY BLOOD GLUCOSE      POCT Blood Glucose.: 95 mg/dL (03 Dec 2023 17:58)      PHYSICAL EXAM:  General: NAD, chronically ill appearing  HEENT: ETT and NGT in place  Neck: supple  Respiratory: mechanical BS  Cardiovascular: s1s2 RRR  Abdomen: soft, non tender, non distended  Extremities: warm, no edema or clubbing  Skin: intact  Neurological: unable to assess  Psychiatry: unable to assess    LINES:  [facility]    HOSPITAL MEDICATIONS:  MEDICATIONS  (STANDING):  amLODIPine   Tablet 10 milliGRAM(s) Oral daily  aspirin  chewable 81 milliGRAM(s) Oral daily  atorvastatin 80 milliGRAM(s) Oral at bedtime  chlorhexidine 0.12% Liquid 15 milliLiter(s) Oral Mucosa every 12 hours  chlorhexidine 2% Cloths 1 Application(s) Topical <User Schedule>  enoxaparin Injectable 40 milliGRAM(s) SubCutaneous every 24 hours  hydrALAZINE 100 milliGRAM(s) Oral every 8 hours  influenza  Vaccine (HIGH DOSE) 0.7 milliLiter(s) IntraMuscular once  polyethylene glycol 3350 17 Gram(s) Oral daily  senna 2 Tablet(s) Oral at bedtime    MEDICATIONS  (PRN):  acetaminophen     Tablet .. 650 milliGRAM(s) Oral every 6 hours PRN Temp greater or equal to 38C (100.4F)  bisacodyl Suppository 10 milliGRAM(s) Rectal daily PRN Constipation      LABS:                        9.5    10.97 )-----------( 259      ( 05 Dec 2023 02:00 )             31.3     Hgb Trend: 9.5<--  12-05    146<H>  |  110<H>  |  21  ----------------------------<  159<H>  3.7   |  32<H>  |  0.57    Ca    8.5      05 Dec 2023 02:00  Phos  2.4     12-05  Mg     2.2     12-05    TPro  6.3  /  Alb  2.0<L>  /  TBili  0.8  /  DBili  x   /  AST  87<H>  /  ALT  129<H>  /  AlkPhos  120  12-05      Urinalysis Basic - ( 05 Dec 2023 02:00 )    Color: x / Appearance: x / SG: x / pH: x  Gluc: 159 mg/dL / Ketone: x  / Bili: x / Urobili: x   Blood: x / Protein: x / Nitrite: x   Leuk Esterase: x / RBC: x / WBC x   Sq Epi: x / Non Sq Epi: x / Bacteria: x            MICROBIOLOGY:     RADIOLOGY:  [ ] Reviewed and interpreted by me

## 2023-12-06 NOTE — PROGRESS NOTE ADULT - ASSESSMENT
88 year old female with a PMH of HTN, HLD and afib on xarelto who did not take medications for past 1 week prior to hospitalization here with R MCA infarct.   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

## 2023-12-06 NOTE — PROGRESS NOTE ADULT - ASSESSMENT
88F w/ HTN, HLD, Afib. Presents w/ AMS and R gaze deviation. Pt was intubated in the ED due to poor mental status. Pt did not receive TNK. Admitted to ICU. Imaging revealed R JIMMY and MCA infarct w/ midline shift, as well as PCA infarct. Pt's mental status is poor.     #Neuro - mental status is poor, unresponsive in setting of catastrophic CVA; morphine PRN for discomfort/tachypnea  #CV - continue hydralazine and amlodipine; known afib, too high risk given extent of CVA to give pt therapeutic AC  #Pulm - remains intubated on minimal vent settings; does not tolerate PSV weaning and not a candidate for extubation; family would not opt for tracheostomy, although they do not want to withdraw either  #ID- off abx, afebrile, no leukocytosis; monitor off abx  #Renal/metabolic - renal function stable; monitor I/Os, electrolytes; on free water for hyperNa  #GI- tolerating TF, having BM  #Heme - no active issues  #Endo - BG stable  #PPx - lovenox qd  #Dispo- remains in ICU vent dependent, unable to extubate; prognosis for neuro-cognitive recovery is poor; pt is DNR - multiple LTACHs can accept patient, she is medically stable for d/c at this time

## 2023-12-06 NOTE — PROGRESS NOTE ADULT - SUBJECTIVE AND OBJECTIVE BOX
Neurology Progress Note    S: Patient seen and examined.       Medications: MEDICATIONS  (STANDING):  amLODIPine   Tablet 10 milliGRAM(s) Oral daily  aspirin  chewable 81 milliGRAM(s) Oral daily  atorvastatin 80 milliGRAM(s) Oral at bedtime  chlorhexidine 0.12% Liquid 15 milliLiter(s) Oral Mucosa every 12 hours  chlorhexidine 2% Cloths 1 Application(s) Topical <User Schedule>  enoxaparin Injectable 40 milliGRAM(s) SubCutaneous every 24 hours  hydrALAZINE 100 milliGRAM(s) Oral every 8 hours  influenza  Vaccine (HIGH DOSE) 0.7 milliLiter(s) IntraMuscular once  polyethylene glycol 3350 17 Gram(s) Oral daily  senna 2 Tablet(s) Oral at bedtime    MEDICATIONS  (PRN):  acetaminophen     Tablet .. 650 milliGRAM(s) Oral every 6 hours PRN Temp greater or equal to 38C (100.4F)  bisacodyl Suppository 10 milliGRAM(s) Rectal daily PRN Constipation       Vitals:  Vital Signs Last 24 Hrs  T(C): 36.8 (06 Dec 2023 12:00), Max: 37.7 (06 Dec 2023 04:00)  T(F): 98.2 (06 Dec 2023 12:00), Max: 99.9 (06 Dec 2023 04:00)  HR: 60 (06 Dec 2023 12:28) (60 - 88)  BP: 124/62 (06 Dec 2023 12:00) (121/59 - 165/66)  BP(mean): 80 (06 Dec 2023 12:00) (76 - 95)  RR: 23 (06 Dec 2023 12:00) (16 - 36)  SpO2: 98% (06 Dec 2023 12:28) (92% - 100%)            Neurological Exam:  Mental Status: Intubated, not sedated. No spont eye opening. No verbal output, does not follow commands.   Cranial Nerves: pupils very sluggish R, L near fixed, no  corneals, no VOR, intermittent eyelid fluttering no facial asymmetry , weak cough/gag  Motor: dec tone throughout, no spont movemnt noted   Sensation: UE extensor posturing, LE triple flexion left toe up    I personally reviewed the below data/images/labs:      < from: CT Head No Cont (10.22.19 @ 15:57) >  IMPRESSION:    1)  chronic ischemic changes in both hemispheres with volume loss. There   are no new infarct as compared to prior CT. This may be further assessed   with MR imaging.  2)  no hemorrhage or mass identified.      LABS:    No new labs    < end of copied text >  < from: CT Angio Head w/ IV Cont (10.22.19 @ 15:57) >  IMPRESSION    1. There is intimal thickening and calcified plaque in both carotid   bifurcations in the neck, but without significant stenosis. No evidence   of carotid vertebral occlusion or dissection.  2. Intracranially, there are atherosclerotic changes in the anterior and   posterior circulation, but without significant stenosis or occlusion.    < end of copied text >  < from: CT Brain Stroke Protocol (10.26.23 @ 04:37) >  IMPRESSION:  No evidence of acute intracranial hemorrhage, mass effect or large acute   territorial infarct, within limits of noncontrast CT technique.    < end of copied text >  < from: CT Angio Neck Stroke Protocol w/ IV Cont (10.26.23 @ 04:53) >  CT ANGIOGRAPHY NECK:  1.  The right internal carotid artery is occluded approximately 1.5 cm   distal to its origin, possibly due to underlying dissection.  2.  Endotracheal tube is low in position, with its tip approximate 1 cm   above the slade.  3.  Diffuse ill-defined groundglass opacity in both lungs, which may   represent pulmonary edema, infection and/or atelectasis.  4.  There is a discrete right upper lobe groundglass opacity measuring   1.9 x 1.4 cm; this may represent a focus of infection, inflammation,   edema, hemorrhage, fibrosis or malignancy.  A follow-up chest CT is   warranted in three months to assess for resolution.    CT ANGIOGRAPHY BRAIN: The right internal carotid arteries occluded.    There is no significant flow related opacification within the proximal   right middle cerebral artery.  There is mild reconstitution of flow in   some distal branch vessels of the inferior division of the right middle   cerebral artery.  The right anterior cerebral artery fills across the   anterior communicating artery.    < end of copied text >  < from: CT Brain Perfusion Maps Stroke (10.26.23 @ 04:54) >  CT PERFUSION: CT perfusion is consistent with large acute infarct in the   right middle cerebral artery vascular territory and small to moderate   amount of surrounding ischemic penumbra.  Hypoperfusion index is 0.3.    Core infarct (CBF <  30%:): 136 mL  Penumbra (Tmax >6s): 194 mL  Mismatched volume: 58 mL  Mismatched ratio: 1.4    < end of copied text >      < from: CT Head No Cont (10.22.19 @ 15:57) >  IMPRESSION:    1)  chronic ischemic changes in both hemispheres with volume loss. There   are no new infarct as compared to prior CT. This may be further assessed   with MR imaging.  2)  no hemorrhage or mass identified.    < end of copied text >  < from: CT Angio Head w/ IV Cont (10.22.19 @ 15:57) >  IMPRESSION    1. There is intimal thickening and calcified plaque in both carotid   bifurcations in the neck, but without significant stenosis. No evidence   of carotid vertebral occlusion or dissection.  2. Intracranially, there are atherosclerotic changes in the anterior and   posterior circulation, but without significant stenosis or occlusion.    < end of copied text >  < from: CT Brain Stroke Protocol (10.26.23 @ 04:37) >  IMPRESSION:  No evidence of acute intracranial hemorrhage, mass effect or large acute   territorial infarct, within limits of noncontrast CT technique.    < end of copied text >  < from: CT Angio Neck Stroke Protocol w/ IV Cont (10.26.23 @ 04:53) >  CT ANGIOGRAPHY NECK:  1.  The right internal carotid artery is occluded approximately 1.5 cm   distal to its origin, possibly due to underlying dissection.  2.  Endotracheal tube is low in position, with its tip approximate 1 cm   above the slade.  3.  Diffuse ill-defined groundglass opacity in both lungs, which may   represent pulmonary edema, infection and/or atelectasis.  4.  There is a discrete right upper lobe groundglass opacity measuring   1.9 x 1.4 cm; this may represent a focus of infection, inflammation,   edema, hemorrhage, fibrosis or malignancy.  A follow-up chest CT is   warranted in three months to assess for resolution.    CT ANGIOGRAPHY BRAIN: The right internal carotid arteries occluded.    There is no significant flow related opacification within the proximal   right middle cerebral artery.  There is mild reconstitution of flow in   some distal branch vessels of the inferior division of the right middle   cerebral artery.  The right anterior cerebral artery fills across the   anterior communicating artery.    < end of copied text >  < from: CT Brain Perfusion Maps Stroke (10.26.23 @ 04:54) >  CT PERFUSION: CT perfusion is consistent with large acute infarct in the   right middle cerebral artery vascular territory and small to moderate   amount of surrounding ischemic penumbra.  Hypoperfusion index is 0.3.    Core infarct (CBF <  30%:): 136 mL  Penumbra (Tmax >6s): 194 mL  Mismatched volume: 58 mL  Mismatched ratio: 1.4    < end of copied text >      < from: CT Head No Cont (10.22.19 @ 15:57) >  IMPRESSION:    1)  chronic ischemic changes in both hemispheres with volume loss. There   are no new infarct as compared to prior CT. This may be further assessed   with MR imaging.  2)  no hemorrhage or mass identified.    < end of copied text >  < from: CT Angio Head w/ IV Cont (10.22.19 @ 15:57) >  IMPRESSION    1. There is intimal thickening and calcified plaque in both carotid   bifurcations in the neck, but without significant stenosis. No evidence   of carotid vertebral occlusion or dissection.  2. Intracranially, there are atherosclerotic changes in the anterior and   posterior circulation, but without significant stenosis or occlusion.    < end of copied text >  < from: CT Brain Stroke Protocol (10.26.23 @ 04:37) >  IMPRESSION:  No evidence of acute intracranial hemorrhage, mass effect or large acute   territorial infarct, within limits of noncontrast CT technique.    < end of copied text >  < from: CT Angio Neck Stroke Protocol w/ IV Cont (10.26.23 @ 04:53) >  CT ANGIOGRAPHY NECK:  1.  The right internal carotid artery is occluded approximately 1.5 cm   distal to its origin, possibly due to underlying dissection.  2.  Endotracheal tube is low in position, with its tip approximate 1 cm   above the slade.  3.  Diffuse ill-defined groundglass opacity in both lungs, which may   represent pulmonary edema, infection and/or atelectasis.  4.  There is a discrete right upper lobe groundglass opacity measuring   1.9 x 1.4 cm; this may represent a focus of infection, inflammation,   edema, hemorrhage, fibrosis or malignancy.  A follow-up chest CT is   warranted in three months to assess for resolution.    CT ANGIOGRAPHY BRAIN: The right internal carotid arteries occluded.    There is no significant flow related opacification within the proximal   right middle cerebral artery.  There is mild reconstitution of flow in   some distal branch vessels of the inferior division of the right middle   cerebral artery.  The right anterior cerebral artery fills across the   anterior communicating artery.    < end of copied text >  < from: CT Brain Perfusion Maps Stroke (10.26.23 @ 04:54) >  CT PERFUSION: CT perfusion is consistent with large acute infarct in the   right middle cerebral artery vascular territory and small to moderate   amount of surrounding ischemic penumbra.  Hypoperfusion index is 0.3.    Core infarct (CBF <  30%:): 136 mL  Penumbra (Tmax >6s): 194 mL  Mismatched volume: 58 mL  Mismatched ratio: 1.4    < end of copied text >    EEG Classification / Summary:  Abnormal EEG study  1. Intermittent periodic discharges seen intermittently over the left hemisphere, sometimes with triphasic morphology  2. Focal continuous right hemispheric slowing  3. Moderate generalized background slowing    -----------------------------------------------------------------------------------------------------    Clinical Impression:  1. While the periodic pattern noted above may represent lateralized periodic discharges indicating epileptic potential in the left hemisphere, more likely these represent generalized periodic discharges with triphasic morphology, consistent with metabolic etiology, that are poorly expressed on the right due to the effects of the stroke.   2. Structural abnormality in right hemisphere  3. Moderate diffuse/multi-focal cerebral dysfunction, not specific as to etiology.    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 Significant   associated mass effect and associated herniation.      < end of copied text >  < from: CT Head No Cont (11.02.23 @ 14:30) >    Reidentified are 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 There is effacement of all of the   basilar cisterns. Effacement of the sulci in the bilateral cerebri,   compatible with increased brain edema.    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    Unsuccessful attempts were made to reach the ordering clinician via teams.      --- End of Report ---    < end of copied text >

## 2023-12-06 NOTE — PROGRESS NOTE ADULT - SUBJECTIVE AND OBJECTIVE BOX
CHIEF COMPLAINT:    Interval Events:    REVIEW OF SYSTEMS:  Constitutional: [ ] fevers [ ] chills [ ] weight loss [ ] weight gain  HEENT: [ ] dry eyes [ ] eye irritation [ ] postnasal drip [ ] nasal congestion  CV: [ ] chest pain [ ] orthopnea [ ] palpitations [ ] murmur  Resp: [ ] cough [ ] shortness of breath [ ] dyspnea [ ] wheezing [ ] sputum [ ] hemoptysis  GI: [ ] nausea [ ] vomiting [ ] diarrhea [ ] constipation [ ] abd pain [ ] dysphagia   : [ ] dysuria [ ] nocturia [ ] hematuria [ ] increased urinary frequency  Musculoskeletal: [ ] back pain [ ] myalgias [ ] arthralgias [ ] fracture  Skin: [ ] rash [ ] itch  Neurological: [ ] headache [ ] dizziness [ ] syncope [ ] weakness [ ] numbness  Hematologic/Lymphatic: [ ] anemia [ ] bleeding problem  Allergic/Immunologic: [ ] itchy eyes [ ] nasal discharge [ ] hives [ ] angioedema  [ ] All other systems negative  [ ] Unable to assess ROS because ________    OBJECTIVE:  ICU Vital Signs Last 24 Hrs  T(C): 36.9 (06 Dec 2023 07:15), Max: 37.7 (06 Dec 2023 04:00)  T(F): 98.4 (06 Dec 2023 07:15), Max: 99.9 (06 Dec 2023 04:00)  HR: 70 (06 Dec 2023 07:15) (62 - 88)  BP: 121/64 (06 Dec 2023 07:00) (110/56 - 165/66)  BP(mean): 82 (06 Dec 2023 07:00) (72 - 95)  ABP: --  ABP(mean): --  RR: 21 (06 Dec 2023 07:15) (16 - 36)  SpO2: 98% (06 Dec 2023 07:15) (92% - 100%)      Mode: AC/ CMV (Assist Control/ Continuous Mandatory Ventilation), RR (machine): 12, TV (machine): 400, FiO2: 21, PEEP: 5, ITime: 0.9, MAP: 9, PIP: 21    12-05 @ 07:01  -  12-06 @ 07:00  --------------------------------------------------------  IN: 1910 mL / OUT: 795 mL / NET: 1115 mL      CAPILLARY BLOOD GLUCOSE          PHYSICAL EXAM:  General:   HEENT:   Neck:   Respiratory:   Cardiovascular:   Abdomen:   Extremities:   Skin:   Neurological:  Psychiatry:    LINES:    HOSPITAL MEDICATIONS:  MEDICATIONS  (STANDING):  amLODIPine   Tablet 10 milliGRAM(s) Oral daily  aspirin  chewable 81 milliGRAM(s) Oral daily  atorvastatin 80 milliGRAM(s) Oral at bedtime  chlorhexidine 0.12% Liquid 15 milliLiter(s) Oral Mucosa every 12 hours  chlorhexidine 2% Cloths 1 Application(s) Topical <User Schedule>  enoxaparin Injectable 40 milliGRAM(s) SubCutaneous every 24 hours  hydrALAZINE 100 milliGRAM(s) Oral every 8 hours  influenza  Vaccine (HIGH DOSE) 0.7 milliLiter(s) IntraMuscular once  polyethylene glycol 3350 17 Gram(s) Oral daily  senna 2 Tablet(s) Oral at bedtime    MEDICATIONS  (PRN):  acetaminophen     Tablet .. 650 milliGRAM(s) Oral every 6 hours PRN Temp greater or equal to 38C (100.4F)  bisacodyl Suppository 10 milliGRAM(s) Rectal daily PRN Constipation      LABS:                        9.5    10.97 )-----------( 259      ( 05 Dec 2023 02:00 )             31.3     Hgb Trend: 9.5<--  12-05    146<H>  |  110<H>  |  21  ----------------------------<  159<H>  3.7   |  32<H>  |  0.57    Ca    8.5      05 Dec 2023 02:00  Phos  2.4     12-05  Mg     2.2     12-05    TPro  6.3  /  Alb  2.0<L>  /  TBili  0.8  /  DBili  x   /  AST  87<H>  /  ALT  129<H>  /  AlkPhos  120  12-05      Urinalysis Basic - ( 05 Dec 2023 02:00 )    Color: x / Appearance: x / SG: x / pH: x  Gluc: 159 mg/dL / Ketone: x  / Bili: x / Urobili: x   Blood: x / Protein: x / Nitrite: x   Leuk Esterase: x / RBC: x / WBC x   Sq Epi: x / Non Sq Epi: x / Bacteria: x            MICROBIOLOGY:     RADIOLOGY:  [ ] Reviewed and interpreted by me CHIEF COMPLAINT:    Interval Events:  no o/n events    REVIEW OF SYSTEMS:  [x ] Unable to assess ROS because non responsive    OBJECTIVE:  ICU Vital Signs Last 24 Hrs  T(C): 36.9 (06 Dec 2023 07:15), Max: 37.7 (06 Dec 2023 04:00)  T(F): 98.4 (06 Dec 2023 07:15), Max: 99.9 (06 Dec 2023 04:00)  HR: 70 (06 Dec 2023 07:15) (62 - 88)  BP: 121/64 (06 Dec 2023 07:00) (110/56 - 165/66)  BP(mean): 82 (06 Dec 2023 07:00) (72 - 95)  ABP: --  ABP(mean): --  RR: 21 (06 Dec 2023 07:15) (16 - 36)  SpO2: 98% (06 Dec 2023 07:15) (92% - 100%)      Mode: AC/ CMV (Assist Control/ Continuous Mandatory Ventilation), RR (machine): 12, TV (machine): 400, FiO2: 21, PEEP: 5, ITime: 0.9, MAP: 9, PIP: 21    12-05 @ 07:01  -  12-06 @ 07:00  --------------------------------------------------------  IN: 1910 mL / OUT: 795 mL / NET: 1115 mL      CAPILLARY BLOOD GLUCOSE          PHYSICAL EXAM:  General: NAD, chronically ill appearing  HEENT: ETT and NGT in place  Neck: supple  Respiratory: mechanical BS  Cardiovascular: s1s2 RRR  Abdomen: soft, non tender, non distended  Extremities: warm, no edema or clubbing  Skin: intact  Neurological: unable to assess  Psychiatry: unable to assess    LINES:  [facility]    HOSPITAL MEDICATIONS:  MEDICATIONS  (STANDING):  amLODIPine   Tablet 10 milliGRAM(s) Oral daily  aspirin  chewable 81 milliGRAM(s) Oral daily  atorvastatin 80 milliGRAM(s) Oral at bedtime  chlorhexidine 0.12% Liquid 15 milliLiter(s) Oral Mucosa every 12 hours  chlorhexidine 2% Cloths 1 Application(s) Topical <User Schedule>  enoxaparin Injectable 40 milliGRAM(s) SubCutaneous every 24 hours  hydrALAZINE 100 milliGRAM(s) Oral every 8 hours  influenza  Vaccine (HIGH DOSE) 0.7 milliLiter(s) IntraMuscular once  polyethylene glycol 3350 17 Gram(s) Oral daily  senna 2 Tablet(s) Oral at bedtime    MEDICATIONS  (PRN):  acetaminophen     Tablet .. 650 milliGRAM(s) Oral every 6 hours PRN Temp greater or equal to 38C (100.4F)  bisacodyl Suppository 10 milliGRAM(s) Rectal daily PRN Constipation      LABS:                        9.5    10.97 )-----------( 259      ( 05 Dec 2023 02:00 )             31.3     Hgb Trend: 9.5<--  12-05    146<H>  |  110<H>  |  21  ----------------------------<  159<H>  3.7   |  32<H>  |  0.57    Ca    8.5      05 Dec 2023 02:00  Phos  2.4     12-05  Mg     2.2     12-05    TPro  6.3  /  Alb  2.0<L>  /  TBili  0.8  /  DBili  x   /  AST  87<H>  /  ALT  129<H>  /  AlkPhos  120  12-05      Urinalysis Basic - ( 05 Dec 2023 02:00 )    Color: x / Appearance: x / SG: x / pH: x  Gluc: 159 mg/dL / Ketone: x  / Bili: x / Urobili: x   Blood: x / Protein: x / Nitrite: x   Leuk Esterase: x / RBC: x / WBC x   Sq Epi: x / Non Sq Epi: x / Bacteria: x            MICROBIOLOGY:     RADIOLOGY:  [ ] Reviewed and interpreted by me CHIEF COMPLAINT:    Interval Events:  no o/n events    REVIEW OF SYSTEMS:  [x ] Unable to assess ROS because non responsive    OBJECTIVE:  ICU Vital Signs Last 24 Hrs  T(C): 36.9 (06 Dec 2023 07:15), Max: 37.7 (06 Dec 2023 04:00)  T(F): 98.4 (06 Dec 2023 07:15), Max: 99.9 (06 Dec 2023 04:00)  HR: 70 (06 Dec 2023 07:15) (62 - 88)  BP: 121/64 (06 Dec 2023 07:00) (110/56 - 165/66)  BP(mean): 82 (06 Dec 2023 07:00) (72 - 95)  ABP: --  ABP(mean): --  RR: 21 (06 Dec 2023 07:15) (16 - 36)  SpO2: 98% (06 Dec 2023 07:15) (92% - 100%)      Mode: AC/ CMV (Assist Control/ Continuous Mandatory Ventilation), RR (machine): 12, TV (machine): 400, FiO2: 21, PEEP: 5, ITime: 0.9, MAP: 9, PIP: 21    12-05 @ 07:01  -  12-06 @ 07:00  --------------------------------------------------------  IN: 1910 mL / OUT: 795 mL / NET: 1115 mL      CAPILLARY BLOOD GLUCOSE          PHYSICAL EXAM:  General: NAD, chronically ill appearing  HEENT: ETT and NGT in place  Neck: supple  Respiratory: mechanical BS  Cardiovascular: s1s2 RRR  Abdomen: soft, non tender, non distended  Extremities: warm, no edema or clubbing  Skin: intact  Neurological: unable to assess  Psychiatry: unable to assess    LINES:  [facility]    HOSPITAL MEDICATIONS:  MEDICATIONS  (STANDING):  amLODIPine   Tablet 10 milliGRAM(s) Oral daily  aspirin  chewable 81 milliGRAM(s) Oral daily  atorvastatin 80 milliGRAM(s) Oral at bedtime  chlorhexidine 0.12% Liquid 15 milliLiter(s) Oral Mucosa every 12 hours  chlorhexidine 2% Cloths 1 Application(s) Topical <User Schedule>  enoxaparin Injectable 40 milliGRAM(s) SubCutaneous every 24 hours  hydrALAZINE 100 milliGRAM(s) Oral every 8 hours  influenza  Vaccine (HIGH DOSE) 0.7 milliLiter(s) IntraMuscular once  polyethylene glycol 3350 17 Gram(s) Oral daily  senna 2 Tablet(s) Oral at bedtime    MEDICATIONS  (PRN):  acetaminophen     Tablet .. 650 milliGRAM(s) Oral every 6 hours PRN Temp greater or equal to 38C (100.4F)  bisacodyl Suppository 10 milliGRAM(s) Rectal daily PRN Constipation      LABS:                        9.5    10.97 )-----------( 259      ( 05 Dec 2023 02:00 )             31.3     Hgb Trend: 9.5<--  12-05    146<H>  |  110<H>  |  21  ----------------------------<  159<H>  3.7   |  32<H>  |  0.57    Ca    8.5      05 Dec 2023 02:00  Phos  2.4     12-05  Mg     2.2     12-05    TPro  6.3  /  Alb  2.0<L>  /  TBili  0.8  /  DBili  x   /  AST  87<H>  /  ALT  129<H>  /  AlkPhos  120  12-05      Urinalysis Basic - ( 05 Dec 2023 02:00 )    Color: x / Appearance: x / SG: x / pH: x  Gluc: 159 mg/dL / Ketone: x  / Bili: x / Urobili: x   Blood: x / Protein: x / Nitrite: x   Leuk Esterase: x / RBC: x / WBC x   Sq Epi: x / Non Sq Epi: x / Bacteria: x            MICROBIOLOGY:     RADIOLOGY:  [ ] Reviewed and interpreted by me

## 2023-12-07 NOTE — PROGRESS NOTE ADULT - ASSESSMENT
88 year old female with a PMH of HTN, HLD and afib on xarelto who did not take medications for past 1 week prior to hospitalization here with R MCA infarct.   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 discussion  Very poor prognosis for any functional neurologic prognosis  Call with questions   88 year old female with a PMH of HTN, HLD and afib on xarelto who did not take medications for past 1 week prior to hospitalization here with R MCA infarct.   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

## 2023-12-07 NOTE — PROGRESS NOTE ADULT - SUBJECTIVE AND OBJECTIVE BOX
Neurology Progress Note    S: Patient seen and examined.       Medications: MEDICATIONS  (STANDING):  amLODIPine   Tablet 10 milliGRAM(s) Oral daily  aspirin  chewable 81 milliGRAM(s) Oral daily  atorvastatin 80 milliGRAM(s) Oral at bedtime  chlorhexidine 0.12% Liquid 15 milliLiter(s) Oral Mucosa every 12 hours  chlorhexidine 2% Cloths 1 Application(s) Topical <User Schedule>  enoxaparin Injectable 40 milliGRAM(s) SubCutaneous every 24 hours  hydrALAZINE 100 milliGRAM(s) Oral every 8 hours  influenza  Vaccine (HIGH DOSE) 0.7 milliLiter(s) IntraMuscular once  polyethylene glycol 3350 17 Gram(s) Oral daily  senna 2 Tablet(s) Oral at bedtime    MEDICATIONS  (PRN):  acetaminophen     Tablet .. 650 milliGRAM(s) Oral every 6 hours PRN Temp greater or equal to 38C (100.4F)  bisacodyl Suppository 10 milliGRAM(s) Rectal daily PRN Constipation       Vitals:  Vital Signs Last 24 Hrs  T(C): 36.2 (07 Dec 2023 20:00), Max: 36.6 (07 Dec 2023 03:00)  T(F): 97.2 (07 Dec 2023 20:00), Max: 97.9 (07 Dec 2023 03:00)  HR: 63 (07 Dec 2023 20:00) (59 - 81)  BP: 137/67 (07 Dec 2023 20:00) (121/60 - 147/55)  BP(mean): 85 (07 Dec 2023 20:00) (72 - 91)  RR: 19 (07 Dec 2023 20:00) (16 - 29)  SpO2: 99% (07 Dec 2023 18:00) (97% - 100%)          Neurological Exam:  Mental Status: Intubated, not sedated. No spont eye opening. No verbal output, does not follow commands.   Cranial Nerves: pupils very sluggish R, L near fixed, no  corneals, no VOR, intermittent eyelid fluttering no facial asymmetry , weak cough/gag  Motor: dec tone throughout, no spont movemnt noted   Sensation: UE extensor posturing, LE triple flexion left toe up    I personally reviewed the below data/images/labs:      < from: CT Head No Cont (10.22.19 @ 15:57) >  IMPRESSION:    1)  chronic ischemic changes in both hemispheres with volume loss. There   are no new infarct as compared to prior CT. This may be further assessed   with MR imaging.  2)  no hemorrhage or mass identified.      LABS:    No new labs    < end of copied text >  < from: CT Angio Head w/ IV Cont (10.22.19 @ 15:57) >  IMPRESSION    1. There is intimal thickening and calcified plaque in both carotid   bifurcations in the neck, but without significant stenosis. No evidence   of carotid vertebral occlusion or dissection.  2. Intracranially, there are atherosclerotic changes in the anterior and   posterior circulation, but without significant stenosis or occlusion.    < end of copied text >  < from: CT Brain Stroke Protocol (10.26.23 @ 04:37) >  IMPRESSION:  No evidence of acute intracranial hemorrhage, mass effect or large acute   territorial infarct, within limits of noncontrast CT technique.    < end of copied text >  < from: CT Angio Neck Stroke Protocol w/ IV Cont (10.26.23 @ 04:53) >  CT ANGIOGRAPHY NECK:  1.  The right internal carotid artery is occluded approximately 1.5 cm   distal to its origin, possibly due to underlying dissection.  2.  Endotracheal tube is low in position, with its tip approximate 1 cm   above the slade.  3.  Diffuse ill-defined groundglass opacity in both lungs, which may   represent pulmonary edema, infection and/or atelectasis.  4.  There is a discrete right upper lobe groundglass opacity measuring   1.9 x 1.4 cm; this may represent a focus of infection, inflammation,   edema, hemorrhage, fibrosis or malignancy.  A follow-up chest CT is   warranted in three months to assess for resolution.    CT ANGIOGRAPHY BRAIN: The right internal carotid arteries occluded.    There is no significant flow related opacification within the proximal   right middle cerebral artery.  There is mild reconstitution of flow in   some distal branch vessels of the inferior division of the right middle   cerebral artery.  The right anterior cerebral artery fills across the   anterior communicating artery.    < end of copied text >  < from: CT Brain Perfusion Maps Stroke (10.26.23 @ 04:54) >  CT PERFUSION: CT perfusion is consistent with large acute infarct in the   right middle cerebral artery vascular territory and small to moderate   amount of surrounding ischemic penumbra.  Hypoperfusion index is 0.3.    Core infarct (CBF <  30%:): 136 mL  Penumbra (Tmax >6s): 194 mL  Mismatched volume: 58 mL  Mismatched ratio: 1.4    < end of copied text >      < from: CT Head No Cont (10.22.19 @ 15:57) >  IMPRESSION:    1)  chronic ischemic changes in both hemispheres with volume loss. There   are no new infarct as compared to prior CT. This may be further assessed   with MR imaging.  2)  no hemorrhage or mass identified.    < end of copied text >  < from: CT Angio Head w/ IV Cont (10.22.19 @ 15:57) >  IMPRESSION    1. There is intimal thickening and calcified plaque in both carotid   bifurcations in the neck, but without significant stenosis. No evidence   of carotid vertebral occlusion or dissection.  2. Intracranially, there are atherosclerotic changes in the anterior and   posterior circulation, but without significant stenosis or occlusion.    < end of copied text >  < from: CT Brain Stroke Protocol (10.26.23 @ 04:37) >  IMPRESSION:  No evidence of acute intracranial hemorrhage, mass effect or large acute   territorial infarct, within limits of noncontrast CT technique.    < end of copied text >  < from: CT Angio Neck Stroke Protocol w/ IV Cont (10.26.23 @ 04:53) >  CT ANGIOGRAPHY NECK:  1.  The right internal carotid artery is occluded approximately 1.5 cm   distal to its origin, possibly due to underlying dissection.  2.  Endotracheal tube is low in position, with its tip approximate 1 cm   above the slade.  3.  Diffuse ill-defined groundglass opacity in both lungs, which may   represent pulmonary edema, infection and/or atelectasis.  4.  There is a discrete right upper lobe groundglass opacity measuring   1.9 x 1.4 cm; this may represent a focus of infection, inflammation,   edema, hemorrhage, fibrosis or malignancy.  A follow-up chest CT is   warranted in three months to assess for resolution.    CT ANGIOGRAPHY BRAIN: The right internal carotid arteries occluded.    There is no significant flow related opacification within the proximal   right middle cerebral artery.  There is mild reconstitution of flow in   some distal branch vessels of the inferior division of the right middle   cerebral artery.  The right anterior cerebral artery fills across the   anterior communicating artery.    < end of copied text >  < from: CT Brain Perfusion Maps Stroke (10.26.23 @ 04:54) >  CT PERFUSION: CT perfusion is consistent with large acute infarct in the   right middle cerebral artery vascular territory and small to moderate   amount of surrounding ischemic penumbra.  Hypoperfusion index is 0.3.    Core infarct (CBF <  30%:): 136 mL  Penumbra (Tmax >6s): 194 mL  Mismatched volume: 58 mL  Mismatched ratio: 1.4    < end of copied text >      < from: CT Head No Cont (10.22.19 @ 15:57) >  IMPRESSION:    1)  chronic ischemic changes in both hemispheres with volume loss. There   are no new infarct as compared to prior CT. This may be further assessed   with MR imaging.  2)  no hemorrhage or mass identified.    < end of copied text >  < from: CT Angio Head w/ IV Cont (10.22.19 @ 15:57) >  IMPRESSION    1. There is intimal thickening and calcified plaque in both carotid   bifurcations in the neck, but without significant stenosis. No evidence   of carotid vertebral occlusion or dissection.  2. Intracranially, there are atherosclerotic changes in the anterior and   posterior circulation, but without significant stenosis or occlusion.    < end of copied text >  < from: CT Brain Stroke Protocol (10.26.23 @ 04:37) >  IMPRESSION:  No evidence of acute intracranial hemorrhage, mass effect or large acute   territorial infarct, within limits of noncontrast CT technique.    < end of copied text >  < from: CT Angio Neck Stroke Protocol w/ IV Cont (10.26.23 @ 04:53) >  CT ANGIOGRAPHY NECK:  1.  The right internal carotid artery is occluded approximately 1.5 cm   distal to its origin, possibly due to underlying dissection.  2.  Endotracheal tube is low in position, with its tip approximate 1 cm   above the slade.  3.  Diffuse ill-defined groundglass opacity in both lungs, which may   represent pulmonary edema, infection and/or atelectasis.  4.  There is a discrete right upper lobe groundglass opacity measuring   1.9 x 1.4 cm; this may represent a focus of infection, inflammation,   edema, hemorrhage, fibrosis or malignancy.  A follow-up chest CT is   warranted in three months to assess for resolution.    CT ANGIOGRAPHY BRAIN: The right internal carotid arteries occluded.    There is no significant flow related opacification within the proximal   right middle cerebral artery.  There is mild reconstitution of flow in   some distal branch vessels of the inferior division of the right middle   cerebral artery.  The right anterior cerebral artery fills across the   anterior communicating artery.    < end of copied text >  < from: CT Brain Perfusion Maps Stroke (10.26.23 @ 04:54) >  CT PERFUSION: CT perfusion is consistent with large acute infarct in the   right middle cerebral artery vascular territory and small to moderate   amount of surrounding ischemic penumbra.  Hypoperfusion index is 0.3.    Core infarct (CBF <  30%:): 136 mL  Penumbra (Tmax >6s): 194 mL  Mismatched volume: 58 mL  Mismatched ratio: 1.4    < end of copied text >    EEG Classification / Summary:  Abnormal EEG study  1. Intermittent periodic discharges seen intermittently over the left hemisphere, sometimes with triphasic morphology  2. Focal continuous right hemispheric slowing  3. Moderate generalized background slowing    -----------------------------------------------------------------------------------------------------    Clinical Impression:  1. While the periodic pattern noted above may represent lateralized periodic discharges indicating epileptic potential in the left hemisphere, more likely these represent generalized periodic discharges with triphasic morphology, consistent with metabolic etiology, that are poorly expressed on the right due to the effects of the stroke.   2. Structural abnormality in right hemisphere  3. Moderate diffuse/multi-focal cerebral dysfunction, not specific as to etiology.    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 Significant   associated mass effect and associated herniation.      < end of copied text >  < from: CT Head No Cont (11.02.23 @ 14:30) >    Reidentified are 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 There is effacement of all of the   basilar cisterns. Effacement of the sulci in the bilateral cerebri,   compatible with increased brain edema.    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    Unsuccessful attempts were made to reach the ordering clinician via teams.      --- End of Report ---    < end of copied text >   Neurology Progress Note    S: Patient seen and examined.       Medications: MEDICATIONS  (STANDING):  amLODIPine   Tablet 10 milliGRAM(s) Oral daily  aspirin  chewable 81 milliGRAM(s) Oral daily  atorvastatin 80 milliGRAM(s) Oral at bedtime  chlorhexidine 0.12% Liquid 15 milliLiter(s) Oral Mucosa every 12 hours  chlorhexidine 2% Cloths 1 Application(s) Topical <User Schedule>  enoxaparin Injectable 40 milliGRAM(s) SubCutaneous every 24 hours  hydrALAZINE 100 milliGRAM(s) Oral every 8 hours  influenza  Vaccine (HIGH DOSE) 0.7 milliLiter(s) IntraMuscular once  polyethylene glycol 3350 17 Gram(s) Oral daily  senna 2 Tablet(s) Oral at bedtime    MEDICATIONS  (PRN):  acetaminophen     Tablet .. 650 milliGRAM(s) Oral every 6 hours PRN Temp greater or equal to 38C (100.4F)  bisacodyl Suppository 10 milliGRAM(s) Rectal daily PRN Constipation       Vitals:  Vital Signs Last 24 Hrs  T(C): 36.2 (07 Dec 2023 20:00), Max: 36.6 (07 Dec 2023 03:00)  T(F): 97.2 (07 Dec 2023 20:00), Max: 97.9 (07 Dec 2023 03:00)  HR: 63 (07 Dec 2023 20:00) (59 - 81)  BP: 137/67 (07 Dec 2023 20:00) (121/60 - 147/55)  BP(mean): 85 (07 Dec 2023 20:00) (72 - 91)  RR: 19 (07 Dec 2023 20:00) (16 - 29)  SpO2: 99% (07 Dec 2023 18:00) (97% - 100%)          Neurological Exam:  Mental Status: Intubated, not sedated. No spont eye opening. No verbal output, does not follow commands.   Cranial Nerves: pupils very sluggish R, L near fixed, no  corneals, no VOR, intermittent eyelid fluttering no facial asymmetry , weak cough/gag  Motor: dec tone throughout, no spont movemnt noted   Sensation: UE extensor posturing, LE triple flexion left toe up    I personally reviewed the below data/images/labs:      < from: CT Head No Cont (10.22.19 @ 15:57) >  IMPRESSION:    1)  chronic ischemic changes in both hemispheres with volume loss. There   are no new infarct as compared to prior CT. This may be further assessed   with MR imaging.  2)  no hemorrhage or mass identified.      LABS:    No new labs    < end of copied text >  < from: CT Angio Head w/ IV Cont (10.22.19 @ 15:57) >  IMPRESSION    1. There is intimal thickening and calcified plaque in both carotid   bifurcations in the neck, but without significant stenosis. No evidence   of carotid vertebral occlusion or dissection.  2. Intracranially, there are atherosclerotic changes in the anterior and   posterior circulation, but without significant stenosis or occlusion.    < end of copied text >  < from: CT Brain Stroke Protocol (10.26.23 @ 04:37) >  IMPRESSION:  No evidence of acute intracranial hemorrhage, mass effect or large acute   territorial infarct, within limits of noncontrast CT technique.    < end of copied text >  < from: CT Angio Neck Stroke Protocol w/ IV Cont (10.26.23 @ 04:53) >  CT ANGIOGRAPHY NECK:  1.  The right internal carotid artery is occluded approximately 1.5 cm   distal to its origin, possibly due to underlying dissection.  2.  Endotracheal tube is low in position, with its tip approximate 1 cm   above the lsade.  3.  Diffuse ill-defined groundglass opacity in both lungs, which may   represent pulmonary edema, infection and/or atelectasis.  4.  There is a discrete right upper lobe groundglass opacity measuring   1.9 x 1.4 cm; this may represent a focus of infection, inflammation,   edema, hemorrhage, fibrosis or malignancy.  A follow-up chest CT is   warranted in three months to assess for resolution.    CT ANGIOGRAPHY BRAIN: The right internal carotid arteries occluded.    There is no significant flow related opacification within the proximal   right middle cerebral artery.  There is mild reconstitution of flow in   some distal branch vessels of the inferior division of the right middle   cerebral artery.  The right anterior cerebral artery fills across the   anterior communicating artery.    < end of copied text >  < from: CT Brain Perfusion Maps Stroke (10.26.23 @ 04:54) >  CT PERFUSION: CT perfusion is consistent with large acute infarct in the   right middle cerebral artery vascular territory and small to moderate   amount of surrounding ischemic penumbra.  Hypoperfusion index is 0.3.    Core infarct (CBF <  30%:): 136 mL  Penumbra (Tmax >6s): 194 mL  Mismatched volume: 58 mL  Mismatched ratio: 1.4    < end of copied text >      < from: CT Head No Cont (10.22.19 @ 15:57) >  IMPRESSION:    1)  chronic ischemic changes in both hemispheres with volume loss. There   are no new infarct as compared to prior CT. This may be further assessed   with MR imaging.  2)  no hemorrhage or mass identified.    < end of copied text >  < from: CT Angio Head w/ IV Cont (10.22.19 @ 15:57) >  IMPRESSION    1. There is intimal thickening and calcified plaque in both carotid   bifurcations in the neck, but without significant stenosis. No evidence   of carotid vertebral occlusion or dissection.  2. Intracranially, there are atherosclerotic changes in the anterior and   posterior circulation, but without significant stenosis or occlusion.    < end of copied text >  < from: CT Brain Stroke Protocol (10.26.23 @ 04:37) >  IMPRESSION:  No evidence of acute intracranial hemorrhage, mass effect or large acute   territorial infarct, within limits of noncontrast CT technique.    < end of copied text >  < from: CT Angio Neck Stroke Protocol w/ IV Cont (10.26.23 @ 04:53) >  CT ANGIOGRAPHY NECK:  1.  The right internal carotid artery is occluded approximately 1.5 cm   distal to its origin, possibly due to underlying dissection.  2.  Endotracheal tube is low in position, with its tip approximate 1 cm   above the slade.  3.  Diffuse ill-defined groundglass opacity in both lungs, which may   represent pulmonary edema, infection and/or atelectasis.  4.  There is a discrete right upper lobe groundglass opacity measuring   1.9 x 1.4 cm; this may represent a focus of infection, inflammation,   edema, hemorrhage, fibrosis or malignancy.  A follow-up chest CT is   warranted in three months to assess for resolution.    CT ANGIOGRAPHY BRAIN: The right internal carotid arteries occluded.    There is no significant flow related opacification within the proximal   right middle cerebral artery.  There is mild reconstitution of flow in   some distal branch vessels of the inferior division of the right middle   cerebral artery.  The right anterior cerebral artery fills across the   anterior communicating artery.    < end of copied text >  < from: CT Brain Perfusion Maps Stroke (10.26.23 @ 04:54) >  CT PERFUSION: CT perfusion is consistent with large acute infarct in the   right middle cerebral artery vascular territory and small to moderate   amount of surrounding ischemic penumbra.  Hypoperfusion index is 0.3.    Core infarct (CBF <  30%:): 136 mL  Penumbra (Tmax >6s): 194 mL  Mismatched volume: 58 mL  Mismatched ratio: 1.4    < end of copied text >      < from: CT Head No Cont (10.22.19 @ 15:57) >  IMPRESSION:    1)  chronic ischemic changes in both hemispheres with volume loss. There   are no new infarct as compared to prior CT. This may be further assessed   with MR imaging.  2)  no hemorrhage or mass identified.    < end of copied text >  < from: CT Angio Head w/ IV Cont (10.22.19 @ 15:57) >  IMPRESSION    1. There is intimal thickening and calcified plaque in both carotid   bifurcations in the neck, but without significant stenosis. No evidence   of carotid vertebral occlusion or dissection.  2. Intracranially, there are atherosclerotic changes in the anterior and   posterior circulation, but without significant stenosis or occlusion.    < end of copied text >  < from: CT Brain Stroke Protocol (10.26.23 @ 04:37) >  IMPRESSION:  No evidence of acute intracranial hemorrhage, mass effect or large acute   territorial infarct, within limits of noncontrast CT technique.    < end of copied text >  < from: CT Angio Neck Stroke Protocol w/ IV Cont (10.26.23 @ 04:53) >  CT ANGIOGRAPHY NECK:  1.  The right internal carotid artery is occluded approximately 1.5 cm   distal to its origin, possibly due to underlying dissection.  2.  Endotracheal tube is low in position, with its tip approximate 1 cm   above the slade.  3.  Diffuse ill-defined groundglass opacity in both lungs, which may   represent pulmonary edema, infection and/or atelectasis.  4.  There is a discrete right upper lobe groundglass opacity measuring   1.9 x 1.4 cm; this may represent a focus of infection, inflammation,   edema, hemorrhage, fibrosis or malignancy.  A follow-up chest CT is   warranted in three months to assess for resolution.    CT ANGIOGRAPHY BRAIN: The right internal carotid arteries occluded.    There is no significant flow related opacification within the proximal   right middle cerebral artery.  There is mild reconstitution of flow in   some distal branch vessels of the inferior division of the right middle   cerebral artery.  The right anterior cerebral artery fills across the   anterior communicating artery.    < end of copied text >  < from: CT Brain Perfusion Maps Stroke (10.26.23 @ 04:54) >  CT PERFUSION: CT perfusion is consistent with large acute infarct in the   right middle cerebral artery vascular territory and small to moderate   amount of surrounding ischemic penumbra.  Hypoperfusion index is 0.3.    Core infarct (CBF <  30%:): 136 mL  Penumbra (Tmax >6s): 194 mL  Mismatched volume: 58 mL  Mismatched ratio: 1.4    < end of copied text >    EEG Classification / Summary:  Abnormal EEG study  1. Intermittent periodic discharges seen intermittently over the left hemisphere, sometimes with triphasic morphology  2. Focal continuous right hemispheric slowing  3. Moderate generalized background slowing    -----------------------------------------------------------------------------------------------------    Clinical Impression:  1. While the periodic pattern noted above may represent lateralized periodic discharges indicating epileptic potential in the left hemisphere, more likely these represent generalized periodic discharges with triphasic morphology, consistent with metabolic etiology, that are poorly expressed on the right due to the effects of the stroke.   2. Structural abnormality in right hemisphere  3. Moderate diffuse/multi-focal cerebral dysfunction, not specific as to etiology.    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 Significant   associated mass effect and associated herniation.      < end of copied text >  < from: CT Head No Cont (11.02.23 @ 14:30) >    Reidentified are 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 There is effacement of all of the   basilar cisterns. Effacement of the sulci in the bilateral cerebri,   compatible with increased brain edema.    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    Unsuccessful attempts were made to reach the ordering clinician via teams.      --- End of Report ---    < end of copied text >

## 2023-12-07 NOTE — PROGRESS NOTE ADULT - SUBJECTIVE AND OBJECTIVE BOX
CHIEF COMPLAINT:    Interval Events:    REVIEW OF SYSTEMS:  Constitutional: [ ] fevers [ ] chills [ ] weight loss [ ] weight gain  HEENT: [ ] dry eyes [ ] eye irritation [ ] postnasal drip [ ] nasal congestion  CV: [ ] chest pain [ ] orthopnea [ ] palpitations [ ] murmur  Resp: [ ] cough [ ] shortness of breath [ ] dyspnea [ ] wheezing [ ] sputum [ ] hemoptysis  GI: [ ] nausea [ ] vomiting [ ] diarrhea [ ] constipation [ ] abd pain [ ] dysphagia   : [ ] dysuria [ ] nocturia [ ] hematuria [ ] increased urinary frequency  Musculoskeletal: [ ] back pain [ ] myalgias [ ] arthralgias [ ] fracture  Skin: [ ] rash [ ] itch  Neurological: [ ] headache [ ] dizziness [ ] syncope [ ] weakness [ ] numbness  Hematologic/Lymphatic: [ ] anemia [ ] bleeding problem  Allergic/Immunologic: [ ] itchy eyes [ ] nasal discharge [ ] hives [ ] angioedema  [ ] All other systems negative  [ ] Unable to assess ROS because ________    OBJECTIVE:  ICU Vital Signs Last 24 Hrs  T(C): 36.4 (07 Dec 2023 07:10), Max: 37 (06 Dec 2023 08:00)  T(F): 97.5 (07 Dec 2023 07:10), Max: 98.6 (06 Dec 2023 08:00)  HR: 73 (07 Dec 2023 07:10) (59 - 76)  BP: 126/58 (07 Dec 2023 07:00) (116/62 - 146/54)  BP(mean): 78 (07 Dec 2023 07:00) (72 - 97)  ABP: --  ABP(mean): --  RR: 16 (07 Dec 2023 07:10) (14 - 32)  SpO2: 98% (07 Dec 2023 07:00) (96% - 100%)      Mode: AC/ CMV (Assist Control/ Continuous Mandatory Ventilation), RR (machine): 12, TV (machine): 400, FiO2: 21, PEEP: 5, ITime: 0.8, MAP: 10, PIP: 24    12-06 @ 07:01  -  12-07 @ 07:00  --------------------------------------------------------  IN: 1840 mL / OUT: 695 mL / NET: 1145 mL      CAPILLARY BLOOD GLUCOSE          PHYSICAL EXAM:  General:   HEENT:   Neck:   Respiratory:   Cardiovascular:   Abdomen:   Extremities:   Skin:   Neurological:  Psychiatry:    LINES:    HOSPITAL MEDICATIONS:  MEDICATIONS  (STANDING):  amLODIPine   Tablet 10 milliGRAM(s) Oral daily  aspirin  chewable 81 milliGRAM(s) Oral daily  atorvastatin 80 milliGRAM(s) Oral at bedtime  chlorhexidine 0.12% Liquid 15 milliLiter(s) Oral Mucosa every 12 hours  chlorhexidine 2% Cloths 1 Application(s) Topical <User Schedule>  enoxaparin Injectable 40 milliGRAM(s) SubCutaneous every 24 hours  hydrALAZINE 100 milliGRAM(s) Oral every 8 hours  influenza  Vaccine (HIGH DOSE) 0.7 milliLiter(s) IntraMuscular once  polyethylene glycol 3350 17 Gram(s) Oral daily  senna 2 Tablet(s) Oral at bedtime    MEDICATIONS  (PRN):  acetaminophen     Tablet .. 650 milliGRAM(s) Oral every 6 hours PRN Temp greater or equal to 38C (100.4F)  bisacodyl Suppository 10 milliGRAM(s) Rectal daily PRN Constipation      LABS:    Hgb Trend: 9.5<--                  MICROBIOLOGY:     RADIOLOGY:  [ ] Reviewed and interpreted by me CHIEF COMPLAINT:    Interval Events:  no o/n events    REVIEW OF SYSTEMS:  [ x] Unable to assess ROS because unresponsive    OBJECTIVE:  ICU Vital Signs Last 24 Hrs  T(C): 36.4 (07 Dec 2023 07:10), Max: 37 (06 Dec 2023 08:00)  T(F): 97.5 (07 Dec 2023 07:10), Max: 98.6 (06 Dec 2023 08:00)  HR: 73 (07 Dec 2023 07:10) (59 - 76)  BP: 126/58 (07 Dec 2023 07:00) (116/62 - 146/54)  BP(mean): 78 (07 Dec 2023 07:00) (72 - 97)  ABP: --  ABP(mean): --  RR: 16 (07 Dec 2023 07:10) (14 - 32)  SpO2: 98% (07 Dec 2023 07:00) (96% - 100%)      Mode: AC/ CMV (Assist Control/ Continuous Mandatory Ventilation), RR (machine): 12, TV (machine): 400, FiO2: 21, PEEP: 5, ITime: 0.8, MAP: 10, PIP: 24    12-06 @ 07:01  -  12-07 @ 07:00  --------------------------------------------------------  IN: 1840 mL / OUT: 695 mL / NET: 1145 mL      CAPILLARY BLOOD GLUCOSE          PHYSICAL EXAM:  General: NAD, chronically ill appearing  HEENT: ETT and NGT in place  Neck: supple  Respiratory: mechanical BS  Cardiovascular: s1s2 RRR  Abdomen: soft, non tender, non distended  Extremities: warm, no edema or clubbing  Skin: intact  Neurological: unable to assess  Psychiatry: unable to assess    LINES:  [facility] [place]    HOSPITAL MEDICATIONS:  MEDICATIONS  (STANDING):  amLODIPine   Tablet 10 milliGRAM(s) Oral daily  aspirin  chewable 81 milliGRAM(s) Oral daily  atorvastatin 80 milliGRAM(s) Oral at bedtime  chlorhexidine 0.12% Liquid 15 milliLiter(s) Oral Mucosa every 12 hours  chlorhexidine 2% Cloths 1 Application(s) Topical <User Schedule>  enoxaparin Injectable 40 milliGRAM(s) SubCutaneous every 24 hours  hydrALAZINE 100 milliGRAM(s) Oral every 8 hours  influenza  Vaccine (HIGH DOSE) 0.7 milliLiter(s) IntraMuscular once  polyethylene glycol 3350 17 Gram(s) Oral daily  senna 2 Tablet(s) Oral at bedtime    MEDICATIONS  (PRN):  acetaminophen     Tablet .. 650 milliGRAM(s) Oral every 6 hours PRN Temp greater or equal to 38C (100.4F)  bisacodyl Suppository 10 milliGRAM(s) Rectal daily PRN Constipation      LABS:    Hgb Trend: 9.5<--                  MICROBIOLOGY:     RADIOLOGY:  [ ] Reviewed and interpreted by me CHIEF COMPLAINT:    Interval Events:  no o/n events    REVIEW OF SYSTEMS:  [ x] Unable to assess ROS because unresponsive    OBJECTIVE:  ICU Vital Signs Last 24 Hrs  T(C): 36.4 (07 Dec 2023 07:10), Max: 37 (06 Dec 2023 08:00)  T(F): 97.5 (07 Dec 2023 07:10), Max: 98.6 (06 Dec 2023 08:00)  HR: 73 (07 Dec 2023 07:10) (59 - 76)  BP: 126/58 (07 Dec 2023 07:00) (116/62 - 146/54)  BP(mean): 78 (07 Dec 2023 07:00) (72 - 97)  ABP: --  ABP(mean): --  RR: 16 (07 Dec 2023 07:10) (14 - 32)  SpO2: 98% (07 Dec 2023 07:00) (96% - 100%)      Mode: AC/ CMV (Assist Control/ Continuous Mandatory Ventilation), RR (machine): 12, TV (machine): 400, FiO2: 21, PEEP: 5, ITime: 0.8, MAP: 10, PIP: 24    12-06 @ 07:01  -  12-07 @ 07:00  --------------------------------------------------------  IN: 1840 mL / OUT: 695 mL / NET: 1145 mL      CAPILLARY BLOOD GLUCOSE          PHYSICAL EXAM:  General: NAD, chronically ill appearing  HEENT: ETT and NGT in place  Neck: supple  Respiratory: mechanical BS  Cardiovascular: s1s2 RRR  Abdomen: soft, non tender, non distended  Extremities: warm, no edema or clubbing  Skin: intact  Neurological: unable to assess  Psychiatry: unable to assess    LINES:  [facility]    HOSPITAL MEDICATIONS:  MEDICATIONS  (STANDING):  amLODIPine   Tablet 10 milliGRAM(s) Oral daily  aspirin  chewable 81 milliGRAM(s) Oral daily  atorvastatin 80 milliGRAM(s) Oral at bedtime  chlorhexidine 0.12% Liquid 15 milliLiter(s) Oral Mucosa every 12 hours  chlorhexidine 2% Cloths 1 Application(s) Topical <User Schedule>  enoxaparin Injectable 40 milliGRAM(s) SubCutaneous every 24 hours  hydrALAZINE 100 milliGRAM(s) Oral every 8 hours  influenza  Vaccine (HIGH DOSE) 0.7 milliLiter(s) IntraMuscular once  polyethylene glycol 3350 17 Gram(s) Oral daily  senna 2 Tablet(s) Oral at bedtime    MEDICATIONS  (PRN):  acetaminophen     Tablet .. 650 milliGRAM(s) Oral every 6 hours PRN Temp greater or equal to 38C (100.4F)  bisacodyl Suppository 10 milliGRAM(s) Rectal daily PRN Constipation      LABS:    Hgb Trend: 9.5<--                  MICROBIOLOGY:     RADIOLOGY:  [ ] Reviewed and interpreted by me

## 2023-12-08 NOTE — PROGRESS NOTE ADULT - SUBJECTIVE AND OBJECTIVE BOX
CHIEF COMPLAINT: CVA    Interval Events: No events overnight      REVIEW OF SYSTEMS:  Unable to perform ROS due to lack of mental status       OBJECTIVE:  ICU Vital Signs Last 24 Hrs  T(C): 36.8 (08 Dec 2023 13:00), Max: 36.8 (08 Dec 2023 13:00)  T(F): 98.2 (08 Dec 2023 13:00), Max: 98.2 (08 Dec 2023 13:00)  HR: 70 (08 Dec 2023 13:00) (58 - 76)  BP: 129/57 (08 Dec 2023 13:00) (123/62 - 142/68)  BP(mean): 79 (08 Dec 2023 13:00) (79 - 91)  ABP: --  ABP(mean): --  RR: 27 (08 Dec 2023 13:00) (13 - 28)  SpO2: 95% (08 Dec 2023 12:31) (95% - 100%)      Mode: AC/ CMV (Assist Control/ Continuous Mandatory Ventilation), RR (machine): 12, TV (machine): 400, FiO2: 21, PEEP: 5, ITime: 0.8, MAP: 9, PIP: 19    12-07 @ 07:01  -  12-08 @ 07:00  --------------------------------------------------------  IN: 1395 mL / OUT: 760 mL / NET: 635 mL    12-08 @ 07:01  -  12-08 @ 15:30  --------------------------------------------------------  IN: 165 mL / OUT: 50 mL / NET: 115 mL      CAPILLARY BLOOD GLUCOSE          PHYSICAL EXAM:  GENERAL: NAD  EYES: EOMI, PERRLA, conjunctiva and sclera clear  ENMT: ET tube in place   NECK: Supple, No JVD, Normal thyroid  CHEST/LUNG: Clear to auscultation bilaterally; No rales, rhonchi, wheezing, or rubs  HEART: Regular rate and rhythm; No murmurs, rubs, or gallops  ABDOMEN: Soft, Nontender, Nondistended; Bowel sounds present  VASCULAR:  2+ Peripheral Pulses, No clubbing, cyanosis, or edema  LYMPH: No lymphadenopathy noted  NERVOUS SYSTEM:  Alert & Oriented X0    LINES:    HOSPITAL MEDICATIONS:  aspirin  chewable 81 milliGRAM(s) Oral daily  enoxaparin Injectable 40 milliGRAM(s) SubCutaneous every 24 hours      amLODIPine   Tablet 10 milliGRAM(s) Oral daily  hydrALAZINE 100 milliGRAM(s) Oral every 8 hours    atorvastatin 80 milliGRAM(s) Oral at bedtime      acetaminophen     Tablet .. 650 milliGRAM(s) Oral every 6 hours PRN    bisacodyl Suppository 10 milliGRAM(s) Rectal daily PRN  polyethylene glycol 3350 17 Gram(s) Oral daily  senna 2 Tablet(s) Oral at bedtime          influenza  Vaccine (HIGH DOSE) 0.7 milliLiter(s) IntraMuscular once    chlorhexidine 0.12% Liquid 15 milliLiter(s) Oral Mucosa every 12 hours  chlorhexidine 2% Cloths 1 Application(s) Topical <User Schedule>        LABS:    Hgb Trend: 9.5<--        Creatinine Trend: 0.57<--, 0.53<--, 0.75<--, 0.94<--, 0.71<--, 0.78<--            MICROBIOLOGY:     RADIOLOGY:  [ ] Reviewed and interpreted by me    EKG:

## 2023-12-09 NOTE — PROGRESS NOTE ADULT - SUBJECTIVE AND OBJECTIVE BOX
CHIEF COMPLAINT: CVA    Interval Events: No events     REVIEW OF SYSTEMS:    Unable to perform ROS due to lack of mental status       OBJECTIVE:  ICU Vital Signs Last 24 Hrs  T(C): 37.3 (09 Dec 2023 13:00), Max: 37.9 (09 Dec 2023 05:00)  T(F): 99.1 (09 Dec 2023 13:00), Max: 100.2 (09 Dec 2023 05:00)  HR: 67 (09 Dec 2023 13:00) (65 - 98)  BP: 127/58 (09 Dec 2023 13:00) (120/57 - 151/51)  BP(mean): 79 (09 Dec 2023 13:00) (75 - 99)  ABP: --  ABP(mean): --  RR: 30 (09 Dec 2023 13:00) (16 - 30)  SpO2: 97% (09 Dec 2023 13:00) (95% - 99%)      Mode: AC/ CMV (Assist Control/ Continuous Mandatory Ventilation), RR (machine): 12, TV (machine): 400, FiO2: 21, PEEP: 5, ITime: 0.8, MAP: 10, PIP: 20    12-08 @ 07:01  -  12-09 @ 07:00  --------------------------------------------------------  IN: 1340 mL / OUT: 655 mL / NET: 685 mL    12-09 @ 07:01  -  12-09 @ 14:11  --------------------------------------------------------  IN: 280 mL / OUT: 250 mL / NET: 30 mL      CAPILLARY BLOOD GLUCOSE          PHYSICAL EXAM:  GENERAL: NAD, well-groomed, well-developed  EYES: EOMI, PERRLA, conjunctiva and sclera clear  ENMT: No tonsillar erythema, exudates, or enlargement; Moist mucous membranes, Good dentition, No lesions  CHEST/LUNG: Clear to auscultation bilaterally; No rales, rhonchi, wheezing, or rubs  HEART: Regular rate and rhythm; No murmurs, rubs, or gallops  ABDOMEN: Soft, Nontender, Nondistended; Bowel sounds present  VASCULAR:  2+ Peripheral Pulses, No clubbing, cyanosis, or edema  LYMPH: No lymphadenopathy noted  SKIN: No rashes or lesions  NERVOUS SYSTEM:  Alert & Oriented X0    LINES:    HOSPITAL MEDICATIONS:  aspirin  chewable 81 milliGRAM(s) Oral daily  enoxaparin Injectable 40 milliGRAM(s) SubCutaneous every 24 hours      amLODIPine   Tablet 10 milliGRAM(s) Oral daily  hydrALAZINE 100 milliGRAM(s) Oral every 8 hours    atorvastatin 80 milliGRAM(s) Oral at bedtime      acetaminophen     Tablet .. 650 milliGRAM(s) Oral every 6 hours PRN    bisacodyl Suppository 10 milliGRAM(s) Rectal daily PRN  polyethylene glycol 3350 17 Gram(s) Oral daily  senna 2 Tablet(s) Oral at bedtime          influenza  Vaccine (HIGH DOSE) 0.7 milliLiter(s) IntraMuscular once    chlorhexidine 0.12% Liquid 15 milliLiter(s) Oral Mucosa every 12 hours  chlorhexidine 2% Cloths 1 Application(s) Topical <User Schedule>        LABS:    Hgb Trend: 9.5<--        Creatinine Trend: 0.57<--, 0.53<--, 0.75<--, 0.94<--, 0.71<--, 0.78<--            MICROBIOLOGY:     RADIOLOGY:  [ ] Reviewed and interpreted by me    EKG:

## 2023-12-10 NOTE — PROGRESS NOTE ADULT - SUBJECTIVE AND OBJECTIVE BOX
CHIEF COMPLAINT: CVA    Interval Events: NAEON     REVIEW OF SYSTEMS:   Unable to perform ROS due to lack of mental status       OBJECTIVE:  ICU Vital Signs Last 24 Hrs  T(C): 36.3 (10 Dec 2023 11:00), Max: 37.1 (09 Dec 2023 14:00)  T(F): 97.3 (10 Dec 2023 11:00), Max: 98.8 (09 Dec 2023 14:00)  HR: 65 (10 Dec 2023 12:04) (58 - 71)  BP: 140/75 (10 Dec 2023 11:00) (111/62 - 144/71)  BP(mean): 94 (10 Dec 2023 11:00) (73 - 94)  ABP: --  ABP(mean): --  RR: 27 (10 Dec 2023 11:00) (12 - 31)  SpO2: 98% (10 Dec 2023 12:04) (92% - 100%)      Mode: AC/ CMV (Assist Control/ Continuous Mandatory Ventilation), RR (machine): 12, TV (machine): 400, FiO2: 21, PEEP: 5, ITime: 1, MAP: 10, PIP: 20    12-09 @ 07:01  -  12-10 @ 07:00  --------------------------------------------------------  IN: 1920 mL / OUT: 630 mL / NET: 1290 mL    12-10 @ 07:01  -  12-10 @ 13:41  --------------------------------------------------------  IN: 300 mL / OUT: 200 mL / NET: 100 mL      CAPILLARY BLOOD GLUCOSE          PHYSICAL EXAM:  GENERAL: NAD, well-groomed, well-developed  EYES: EOMI, PERRLA, conjunctiva and sclera clear  ENMT: No tonsillar erythema, exudates, or enlargement; Moist mucous membranes, Good dentition, No lesions  CHEST/LUNG: Clear to auscultation bilaterally; No rales, rhonchi, wheezing, or rubs  HEART: Regular rate and rhythm; No murmurs, rubs, or gallops  ABDOMEN: Soft, Nontender, Nondistended; Bowel sounds present  VASCULAR:  2+ Peripheral Pulses, No clubbing, cyanosis, or edema  LYMPH: No lymphadenopathy noted  SKIN: No rashes or lesions  NERVOUS SYSTEM:  Alert & Oriented x0    LINES:    HOSPITAL MEDICATIONS:  aspirin  chewable 81 milliGRAM(s) Oral daily  enoxaparin Injectable 40 milliGRAM(s) SubCutaneous every 24 hours      amLODIPine   Tablet 10 milliGRAM(s) Oral daily  hydrALAZINE 100 milliGRAM(s) Oral every 8 hours    atorvastatin 80 milliGRAM(s) Oral at bedtime      acetaminophen     Tablet .. 650 milliGRAM(s) Oral every 6 hours PRN    bisacodyl Suppository 10 milliGRAM(s) Rectal daily PRN  polyethylene glycol 3350 17 Gram(s) Oral daily  senna 2 Tablet(s) Oral at bedtime          influenza  Vaccine (HIGH DOSE) 0.7 milliLiter(s) IntraMuscular once    chlorhexidine 0.12% Liquid 15 milliLiter(s) Oral Mucosa every 12 hours  chlorhexidine 2% Cloths 1 Application(s) Topical <User Schedule>        LABS:    Hgb Trend: 9.5<--        Creatinine Trend: 0.57<--, 0.53<--, 0.75<--, 0.94<--, 0.71<--            MICROBIOLOGY:     RADIOLOGY:  [ ] Reviewed and interpreted by me    EKG:

## 2023-12-12 NOTE — PROGRESS NOTE ADULT - ASSESSMENT
88 year old female with a PMH of HTN, HLD and afib on xarelto who did not take medications for past 1 week prior to hospitalization here with R MCA infarct.   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

## 2023-12-12 NOTE — PROGRESS NOTE ADULT - SUBJECTIVE AND OBJECTIVE BOX
HPI:  Pt is an 87 yo F with h/o A fib on Xarelto, HTN and HLD who presented to [facility] after she was found altered, nonverbal and with right gaze deviation when she woke up. At baseline pt is ambulatory and verbal. Pt required intubation in ER for airway protection given poor MS. Pt assessed by Stroke team: GIO ~4 hours ago, on Rivaroxaban, presents with AMS/weakness with NIHSS of 30 on ED arrival.  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  Given age, extended time window, oral anticoagulant use, large infarct, and high NIHSS, tPA relatively contraindicated/very high risk for heme and unlikely to be net beneficial.  It was discussed with endovascular fellow Reanna, imaging reviewed, not a candidate for endovascular intervention. Pt admitted to the ICU. 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 Significant associated mass effect R -> L and associated herniation.       ## Labs:  CBC:    Chem:        Coags:          ## Imaging:    ## Medications:    amLODIPine   Tablet 10 milliGRAM(s) Oral daily  hydrALAZINE 100 milliGRAM(s) Oral every 8 hours      atorvastatin 80 milliGRAM(s) Oral at bedtime    aspirin  chewable 81 milliGRAM(s) Oral daily  enoxaparin Injectable 40 milliGRAM(s) SubCutaneous every 24 hours    bisacodyl Suppository 10 milliGRAM(s) Rectal daily PRN  polyethylene glycol 3350 17 Gram(s) Oral daily  senna 2 Tablet(s) Oral at bedtime    acetaminophen     Tablet .. 650 milliGRAM(s) Oral every 6 hours PRN      ## Vitals:  T(C): 36.2 (23 @ 15:00), Max: 36.3 (23 @ 00:28)  HR: 62 (23 @ 15:00) (52 - 70)  BP: 146/78 (23 @ 15:00) (114/53 - 146/78)  BP(mean): 99 (23 @ 15:00) (72 - 99)  RR: 24 (23 @ 15:00) (16 - 34)  SpO2: 98% (23 @ 15:00) (94% - 100%)  Wt(kg): --  Vent: Mode: AC/ CMV (Assist Control/ Continuous Mandatory Ventilation), RR (machine): 12, RR (patient): 23, TV (machine): 400, FiO2: 21, PEEP: 5, PIP: 23  AB-11 @ 07:01  -   @ 07:00  --------------------------------------------------------  IN: 1570 mL / OUT: 600 mL / NET: 970 mL     @ 07:01  -   @ 16:26  --------------------------------------------------------  IN: 410 mL / OUT: 0 mL / NET: 410 mL          ## P/E:  Gen: lying comfortably in bed in no apparent distress  Mouth: (+) ETT/OGT  Lungs: Rhonchi  Heart: Irregular  Abd: Soft/+BS/ Non-tender  Ext: Hand and dependent edema  Neuro: Unchanged    CENTRAL LINE: [ ] YES [ ] NO  LOCATION:   DATE INSERTED:  REMOVE: [ ] YES [ ] NO      AMANDA: [ ] YES [ ] NO    DATE INSERTED:  REMOVE:  [ ] YES [ ] NO      A-LINE:  [ ] YES [ ] NO  LOCATION:   DATE INSERTED:  REMOVE:  [ ] YES [ ] NO  EXPLAIN:    CODE STATUS: [x ] full code  [ ] DNR  [ ] DNI  [ ] [facility]  Goals of care discussion: [ ] yes             HPI:  Pt is an 89 yo F with h/o A fib on Xarelto, HTN and HLD who presented to [facility] after she was found altered, nonverbal and with right gaze deviation when she woke up. At baseline pt is ambulatory and verbal. Pt required intubation in ER for airway protection given poor MS. Pt assessed by Stroke team: GIO ~4 hours ago, on Rivaroxaban, presents with AMS/weakness with NIHSS of 30 on ED arrival.  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  Given age, extended time window, oral anticoagulant use, large infarct, and high NIHSS, tPA relatively contraindicated/very high risk for heme and unlikely to be net beneficial.  It was discussed with endovascular fellow Reanna, imaging reviewed, not a candidate for endovascular intervention. Pt admitted to the ICU. 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 Significant associated mass effect R -> L and associated herniation.       ## Labs:  CBC:    Chem:        Coags:          ## Imaging:    ## Medications:    amLODIPine   Tablet 10 milliGRAM(s) Oral daily  hydrALAZINE 100 milliGRAM(s) Oral every 8 hours      atorvastatin 80 milliGRAM(s) Oral at bedtime    aspirin  chewable 81 milliGRAM(s) Oral daily  enoxaparin Injectable 40 milliGRAM(s) SubCutaneous every 24 hours    bisacodyl Suppository 10 milliGRAM(s) Rectal daily PRN  polyethylene glycol 3350 17 Gram(s) Oral daily  senna 2 Tablet(s) Oral at bedtime    acetaminophen     Tablet .. 650 milliGRAM(s) Oral every 6 hours PRN      ## Vitals:  T(C): 36.2 (23 @ 15:00), Max: 36.3 (23 @ 00:28)  HR: 62 (23 @ 15:00) (52 - 70)  BP: 146/78 (23 @ 15:00) (114/53 - 146/78)  BP(mean): 99 (23 @ 15:00) (72 - 99)  RR: 24 (23 @ 15:00) (16 - 34)  SpO2: 98% (23 @ 15:00) (94% - 100%)  Wt(kg): --  Vent: Mode: AC/ CMV (Assist Control/ Continuous Mandatory Ventilation), RR (machine): 12, RR (patient): 23, TV (machine): 400, FiO2: 21, PEEP: 5, PIP: 23  AB-11 @ 07:01  -   @ 07:00  --------------------------------------------------------  IN: 1570 mL / OUT: 600 mL / NET: 970 mL     @ 07:01  -   @ 16:26  --------------------------------------------------------  IN: 410 mL / OUT: 0 mL / NET: 410 mL          ## P/E:  Gen: lying comfortably in bed in no apparent distress  Mouth: (+) ETT/OGT  Lungs: Rhonchi  Heart: Irregular  Abd: Soft/+BS/ Non-tender  Ext: Hand and dependent edema  Neuro: Unchanged    CENTRAL LINE: [ ] YES [ ] NO  LOCATION:   DATE INSERTED:  REMOVE: [ ] YES [ ] NO      AMANDA: [ ] YES [ ] NO    DATE INSERTED:  REMOVE:  [ ] YES [ ] NO      A-LINE:  [ ] YES [ ] NO  LOCATION:   DATE INSERTED:  REMOVE:  [ ] YES [ ] NO  EXPLAIN:    CODE STATUS: [x ] full code  [ ] DNR  [ ] DNI  [ ] [facility]  Goals of care discussion: [ ] yes

## 2023-12-12 NOTE — PROGRESS NOTE ADULT - ASSESSMENT
Pt is an 89 yo F with h/o A fib on Xarelto, HTN and HLD who presented to [facility] after she was found altered, nonverbal and with right gaze deviation when she woke up. At baseline pt is ambulatory and verbal. Pt required intubation in ER for airway protection given poor MS. Pt assessed by Stroke team: GIO ~4 hours ago, on Rivaroxaban, presents with AMS/weakness with NIHSS of 30 on ED arrival.  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  Given age, extended time window, oral anticoagulant use, large infarct, and high NIHSS, tPA relatively contraindicated/very high risk for heme and unlikely to be net beneficial.  It was discussed with endovascular fellow Reanna, imaging reviewed, not a candidate for endovascular intervention. Pt admitted to the ICU. 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 Significant associated mass effect R -> L and associated herniation.     Resp: Cont current vent settings  CVS: Cont antiHTN meds/ Change Vs to q 6hrs  HEME: DVT prophylaxis with Lovenox  FEN: Cont enteral feeds  Endo: Follow FS  Renal: Follow BUN/Cr and UO  Neuro/Social: Supportive care; pt with extremely poor neurological prognosis;  pt is DNR but family has not made a decision regarding palliative extubation vs long term care with trach + PEG/ Pt has been accepted to 4 LT[facility] in [place]; will discuss with SW how we need to proceed Pt is an 89 yo F with h/o A fib on Xarelto, HTN and HLD who presented to [facility] after she was found altered, nonverbal and with right gaze deviation when she woke up. At baseline pt is ambulatory and verbal. Pt required intubation in ER for airway protection given poor MS. Pt assessed by Stroke team: GIO ~4 hours ago, on Rivaroxaban, presents with AMS/weakness with NIHSS of 30 on ED arrival.  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  Given age, extended time window, oral anticoagulant use, large infarct, and high NIHSS, tPA relatively contraindicated/very high risk for heme and unlikely to be net beneficial.  It was discussed with endovascular fellow Reanna, imaging reviewed, not a candidate for endovascular intervention. Pt admitted to the ICU. 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 Significant associated mass effect R -> L and associated herniation.     Resp: Cont current vent settings  CVS: Cont antiHTN meds/ Change Vs to q 6hrs  HEME: DVT prophylaxis with Lovenox  FEN: Cont enteral feeds  Endo: Follow FS  Renal: Follow BUN/Cr and UO  Neuro/Social: Supportive care; pt with extremely poor neurological prognosis;  pt is DNR but family has not made a decision regarding palliative extubation vs long term care with trach + PEG/ Pt has been accepted to 4 LT[facility] in [place]; will discuss with SW how we need to proceed

## 2023-12-12 NOTE — PROGRESS NOTE ADULT - ASSESSMENT
Pt is an 89 yo F with h/o A fib on Xarelto, HTN and HLD who presented to [facility] after she was found altered, nonverbal and with right gaze deviation when she woke up. At baseline pt is ambulatory and verbal. Pt required intubation in ER for airway protection given poor MS. Pt assessed by Stroke team: GIO ~4 hours ago, on Rivaroxaban, presents with AMS/weakness with NIHSS of 30 on ED arrival.  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  Given age, extended time window, oral anticoagulant use, large infarct, and high NIHSS, tPA relatively contraindicated/very high risk for heme and unlikely to be net beneficial.  It was discussed with endovascular fellow Reanna, imaging reviewed, not a candidate for endovascular intervention. Pt admitted to the ICU. 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 Significant associated mass effect R -> L and associated herniation.     Resp: Cont current vent settings  CVS: Cont antiHTN meds  HEME: DVT prophylaxis with Lovenox  FEN: Cont enteral feeds  Endo: Follow FS  Renal: Follow BUN/Cr and UO  Neuro/Social: Supportive care; pt with extremely poor neurological prognosis;  pt is DNR but family has not made a decision regarding palliative extubation vs long term care with trach + PEG/ Pt has been accepted to 4 LT[facility] in [place]; will discuss with MARIEL  Pt is an 87 yo F with h/o A fib on Xarelto, HTN and HLD who presented to [facility] after she was found altered, nonverbal and with right gaze deviation when she woke up. At baseline pt is ambulatory and verbal. Pt required intubation in ER for airway protection given poor MS. Pt assessed by Stroke team: GIO ~4 hours ago, on Rivaroxaban, presents with AMS/weakness with NIHSS of 30 on ED arrival.  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  Given age, extended time window, oral anticoagulant use, large infarct, and high NIHSS, tPA relatively contraindicated/very high risk for heme and unlikely to be net beneficial.  It was discussed with endovascular fellow Reanna, imaging reviewed, not a candidate for endovascular intervention. Pt admitted to the ICU. 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 Significant associated mass effect R -> L and associated herniation.     Resp: Cont current vent settings  CVS: Cont antiHTN meds  HEME: DVT prophylaxis with Lovenox  FEN: Cont enteral feeds  Endo: Follow FS  Renal: Follow BUN/Cr and UO  Neuro/Social: Supportive care; pt with extremely poor neurological prognosis;  pt is DNR but family has not made a decision regarding palliative extubation vs long term care with trach + PEG/ Pt has been accepted to 4 [facility] in [place]; will discuss with MARIEL

## 2023-12-12 NOTE — PROGRESS NOTE ADULT - SUBJECTIVE AND OBJECTIVE BOX
Neurology Progress Note    S: Patient seen and examined.     Medications: MEDICATIONS  (STANDING):  amLODIPine   Tablet 10 milliGRAM(s) Oral daily  aspirin  chewable 81 milliGRAM(s) Oral daily  atorvastatin 80 milliGRAM(s) Oral at bedtime  chlorhexidine 0.12% Liquid 15 milliLiter(s) Oral Mucosa every 12 hours  chlorhexidine 2% Cloths 1 Application(s) Topical <User Schedule>  enoxaparin Injectable 40 milliGRAM(s) SubCutaneous every 24 hours  hydrALAZINE 100 milliGRAM(s) Oral every 8 hours  influenza  Vaccine (HIGH DOSE) 0.7 milliLiter(s) IntraMuscular once  polyethylene glycol 3350 17 Gram(s) Oral daily  senna 2 Tablet(s) Oral at bedtime    MEDICATIONS  (PRN):  acetaminophen     Tablet .. 650 milliGRAM(s) Oral every 6 hours PRN Temp greater or equal to 38C (100.4F)  bisacodyl Suppository 10 milliGRAM(s) Rectal daily PRN Constipation       Vitals:  Vital Signs Last 24 Hrs  T(C): 35.9 (12 Dec 2023 09:00), Max: 36.3 (12 Dec 2023 00:28)  T(F): 96.6 (12 Dec 2023 09:00), Max: 97.3 (12 Dec 2023 00:28)  HR: 61 (12 Dec 2023 09:00) (52 - 70)  BP: 117/59 (12 Dec 2023 09:00) (114/53 - 145/74)  BP(mean): 77 (12 Dec 2023 09:00) (72 - 94)  RR: 19 (12 Dec 2023 09:00) (16 - 34)  SpO2: 96% (12 Dec 2023 09:00) (94% - 100%)          Neurological Exam:  Mental Status: Intubated, not sedated. No spont eye opening. No verbal output, does not follow commands.   Cranial Nerves: pupils very sluggish R, L near fixed, no  corneals, no VOR, intermittent eyelid fluttering no facial asymmetry , weak cough/gag  Motor: dec tone throughout, no spont movemnt noted   Sensation: UE extensor posturing, LE triple flexion left toe up    I personally reviewed the below data/images/labs:      < from: CT Head No Cont (10.22.19 @ 15:57) >  IMPRESSION:    1)  chronic ischemic changes in both hemispheres with volume loss. There   are no new infarct as compared to prior CT. This may be further assessed   with MR imaging.  2)  no hemorrhage or mass identified.      LABS:    No new labs    < end of copied text >  < from: CT Angio Head w/ IV Cont (10.22.19 @ 15:57) >  IMPRESSION    1. There is intimal thickening and calcified plaque in both carotid   bifurcations in the neck, but without significant stenosis. No evidence   of carotid vertebral occlusion or dissection.  2. Intracranially, there are atherosclerotic changes in the anterior and   posterior circulation, but without significant stenosis or occlusion.    < end of copied text >  < from: CT Brain Stroke Protocol (10.26.23 @ 04:37) >  IMPRESSION:  No evidence of acute intracranial hemorrhage, mass effect or large acute   territorial infarct, within limits of noncontrast CT technique.    < end of copied text >  < from: CT Angio Neck Stroke Protocol w/ IV Cont (10.26.23 @ 04:53) >  CT ANGIOGRAPHY NECK:  1.  The right internal carotid artery is occluded approximately 1.5 cm   distal to its origin, possibly due to underlying dissection.  2.  Endotracheal tube is low in position, with its tip approximate 1 cm   above the slade.  3.  Diffuse ill-defined groundglass opacity in both lungs, which may   represent pulmonary edema, infection and/or atelectasis.  4.  There is a discrete right upper lobe groundglass opacity measuring   1.9 x 1.4 cm; this may represent a focus of infection, inflammation,   edema, hemorrhage, fibrosis or malignancy.  A follow-up chest CT is   warranted in three months to assess for resolution.    CT ANGIOGRAPHY BRAIN: The right internal carotid arteries occluded.    There is no significant flow related opacification within the proximal   right middle cerebral artery.  There is mild reconstitution of flow in   some distal branch vessels of the inferior division of the right middle   cerebral artery.  The right anterior cerebral artery fills across the   anterior communicating artery.    < end of copied text >  < from: CT Brain Perfusion Maps Stroke (10.26.23 @ 04:54) >  CT PERFUSION: CT perfusion is consistent with large acute infarct in the   right middle cerebral artery vascular territory and small to moderate   amount of surrounding ischemic penumbra.  Hypoperfusion index is 0.3.    Core infarct (CBF <  30%:): 136 mL  Penumbra (Tmax >6s): 194 mL  Mismatched volume: 58 mL  Mismatched ratio: 1.4    < end of copied text >      < from: CT Head No Cont (10.22.19 @ 15:57) >  IMPRESSION:    1)  chronic ischemic changes in both hemispheres with volume loss. There   are no new infarct as compared to prior CT. This may be further assessed   with MR imaging.  2)  no hemorrhage or mass identified.    < end of copied text >  < from: CT Angio Head w/ IV Cont (10.22.19 @ 15:57) >  IMPRESSION    1. There is intimal thickening and calcified plaque in both carotid   bifurcations in the neck, but without significant stenosis. No evidence   of carotid vertebral occlusion or dissection.  2. Intracranially, there are atherosclerotic changes in the anterior and   posterior circulation, but without significant stenosis or occlusion.    < end of copied text >  < from: CT Brain Stroke Protocol (10.26.23 @ 04:37) >  IMPRESSION:  No evidence of acute intracranial hemorrhage, mass effect or large acute   territorial infarct, within limits of noncontrast CT technique.    < end of copied text >  < from: CT Angio Neck Stroke Protocol w/ IV Cont (10.26.23 @ 04:53) >  CT ANGIOGRAPHY NECK:  1.  The right internal carotid artery is occluded approximately 1.5 cm   distal to its origin, possibly due to underlying dissection.  2.  Endotracheal tube is low in position, with its tip approximate 1 cm   above the slade.  3.  Diffuse ill-defined groundglass opacity in both lungs, which may   represent pulmonary edema, infection and/or atelectasis.  4.  There is a discrete right upper lobe groundglass opacity measuring   1.9 x 1.4 cm; this may represent a focus of infection, inflammation,   edema, hemorrhage, fibrosis or malignancy.  A follow-up chest CT is   warranted in three months to assess for resolution.    CT ANGIOGRAPHY BRAIN: The right internal carotid arteries occluded.    There is no significant flow related opacification within the proximal   right middle cerebral artery.  There is mild reconstitution of flow in   some distal branch vessels of the inferior division of the right middle   cerebral artery.  The right anterior cerebral artery fills across the   anterior communicating artery.    < end of copied text >  < from: CT Brain Perfusion Maps Stroke (10.26.23 @ 04:54) >  CT PERFUSION: CT perfusion is consistent with large acute infarct in the   right middle cerebral artery vascular territory and small to moderate   amount of surrounding ischemic penumbra.  Hypoperfusion index is 0.3.    Core infarct (CBF <  30%:): 136 mL  Penumbra (Tmax >6s): 194 mL  Mismatched volume: 58 mL  Mismatched ratio: 1.4    < end of copied text >      < from: CT Head No Cont (10.22.19 @ 15:57) >  IMPRESSION:    1)  chronic ischemic changes in both hemispheres with volume loss. There   are no new infarct as compared to prior CT. This may be further assessed   with MR imaging.  2)  no hemorrhage or mass identified.    < end of copied text >  < from: CT Angio Head w/ IV Cont (10.22.19 @ 15:57) >  IMPRESSION    1. There is intimal thickening and calcified plaque in both carotid   bifurcations in the neck, but without significant stenosis. No evidence   of carotid vertebral occlusion or dissection.  2. Intracranially, there are atherosclerotic changes in the anterior and   posterior circulation, but without significant stenosis or occlusion.    < end of copied text >  < from: CT Brain Stroke Protocol (10.26.23 @ 04:37) >  IMPRESSION:  No evidence of acute intracranial hemorrhage, mass effect or large acute   territorial infarct, within limits of noncontrast CT technique.    < end of copied text >  < from: CT Angio Neck Stroke Protocol w/ IV Cont (10.26.23 @ 04:53) >  CT ANGIOGRAPHY NECK:  1.  The right internal carotid artery is occluded approximately 1.5 cm   distal to its origin, possibly due to underlying dissection.  2.  Endotracheal tube is low in position, with its tip approximate 1 cm   above the slade.  3.  Diffuse ill-defined groundglass opacity in both lungs, which may   represent pulmonary edema, infection and/or atelectasis.  4.  There is a discrete right upper lobe groundglass opacity measuring   1.9 x 1.4 cm; this may represent a focus of infection, inflammation,   edema, hemorrhage, fibrosis or malignancy.  A follow-up chest CT is   warranted in three months to assess for resolution.    CT ANGIOGRAPHY BRAIN: The right internal carotid arteries occluded.    There is no significant flow related opacification within the proximal   right middle cerebral artery.  There is mild reconstitution of flow in   some distal branch vessels of the inferior division of the right middle   cerebral artery.  The right anterior cerebral artery fills across the   anterior communicating artery.    < end of copied text >  < from: CT Brain Perfusion Maps Stroke (10.26.23 @ 04:54) >  CT PERFUSION: CT perfusion is consistent with large acute infarct in the   right middle cerebral artery vascular territory and small to moderate   amount of surrounding ischemic penumbra.  Hypoperfusion index is 0.3.    Core infarct (CBF <  30%:): 136 mL  Penumbra (Tmax >6s): 194 mL  Mismatched volume: 58 mL  Mismatched ratio: 1.4    < end of copied text >    EEG Classification / Summary:  Abnormal EEG study  1. Intermittent periodic discharges seen intermittently over the left hemisphere, sometimes with triphasic morphology  2. Focal continuous right hemispheric slowing  3. Moderate generalized background slowing    -----------------------------------------------------------------------------------------------------    Clinical Impression:  1. While the periodic pattern noted above may represent lateralized periodic discharges indicating epileptic potential in the left hemisphere, more likely these represent generalized periodic discharges with triphasic morphology, consistent with metabolic etiology, that are poorly expressed on the right due to the effects of the stroke.   2. Structural abnormality in right hemisphere  3. Moderate diffuse/multi-focal cerebral dysfunction, not specific as to etiology.    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 Significant   associated mass effect and associated herniation.      < end of copied text >  < from: CT Head No Cont (11.02.23 @ 14:30) >    Reidentified are 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 There is effacement of all of the   basilar cisterns. Effacement of the sulci in the bilateral cerebri,   compatible with increased brain edema.    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    Unsuccessful attempts were made to reach the ordering clinician via teams.      --- End of Report ---    < end of copied text >

## 2023-12-12 NOTE — PROGRESS NOTE ADULT - SUBJECTIVE AND OBJECTIVE BOX
HPI:  Pt is an 87 yo F with h/o A fib on Xarelto, HTN and HLD who presented to [facility] after she was found altered, nonverbal and with right gaze deviation when she woke up. At baseline pt is ambulatory and verbal. Pt required intubation in ER for airway protection given poor MS. Pt assessed by Stroke team: GIO ~4 hours ago, on Rivaroxaban, presents with AMS/weakness with NIHSS of 30 on ED arrival.  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  Given age, extended time window, oral anticoagulant use, large infarct, and high NIHSS, tPA relatively contraindicated/very high risk for heme and unlikely to be net beneficial.  It was discussed with endovascular fellow Reanna, imaging reviewed, not a candidate for endovascular intervention. Pt admitted to the ICU. 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 Significant associated mass effect R -> L and associated herniation      ## Labs:  CBC:    Chem:        Coags:          ## Imaging:    ## Medications:    amLODIPine   Tablet 10 milliGRAM(s) Oral daily  hydrALAZINE 100 milliGRAM(s) Oral every 8 hours      atorvastatin 80 milliGRAM(s) Oral at bedtime    aspirin  chewable 81 milliGRAM(s) Oral daily  enoxaparin Injectable 40 milliGRAM(s) SubCutaneous every 24 hours    bisacodyl Suppository 10 milliGRAM(s) Rectal daily PRN  polyethylene glycol 3350 17 Gram(s) Oral daily  senna 2 Tablet(s) Oral at bedtime    acetaminophen     Tablet .. 650 milliGRAM(s) Oral every 6 hours PRN      ## Vitals:  T(C): 36.2 (23 @ 23:39), Max: 36.8 (23 @ 04:00)  HR: 60 (23 @ 23:00) (54 - 70)  BP: 118/57 (23 @ 23:00) (114/53 - 145/74)  BP(mean): 75 (23 @ 23:00) (72 - 94)  RR: 27 (23 @ 23:00) (15 - 32)  SpO2: 94% (23 @ 23:00) (94% - 100%)  Wt(kg): --  Vent: Mode: AC/ CMV (Assist Control/ Continuous Mandatory Ventilation), RR (machine): 12, RR (patient): 17, TV (machine): 400, FiO2: 21, PEEP: 5, PIP: 21  AB-10 @ 07:01  -   @ 07:00  --------------------------------------------------------  IN: 1850 mL / OUT: 650 mL / NET: 1200 mL     @ 07:01  -   @ 00:05  --------------------------------------------------------  IN: 710 mL / OUT: 300 mL / NET: 410 mL          ## P/E:  Gen: lying comfortably in bed in no apparent distress  Mouth: (+) ETT/OGT  Lungs: CTA  Heart: Irregular  Abd: Soft/+BS/ Non-tender  Ext: Hand edema  Neuro: Obtunded    CENTRAL LINE: [ ] YES [ ] NO  LOCATION:   DATE INSERTED:  REMOVE: [ ] YES [ ] NO      AMANDA: [ ] YES [ ] NO    DATE INSERTED:  REMOVE:  [ ] YES [ ] NO      A-LINE:  [ ] YES [ ] NO  LOCATION:   DATE INSERTED:  REMOVE:  [ ] YES [ ] NO  EXPLAIN:    CODE STATUS: [x ] full code  [ ] DNR  [ ] DNI  [ ] [facility]  Goals of care discussion: [ ] yes         HPI:  Pt is an 87 yo F with h/o A fib on Xarelto, HTN and HLD who presented to [facility] after she was found altered, nonverbal and with right gaze deviation when she woke up. At baseline pt is ambulatory and verbal. Pt required intubation in ER for airway protection given poor MS. Pt assessed by Stroke team: GIO ~4 hours ago, on Rivaroxaban, presents with AMS/weakness with NIHSS of 30 on ED arrival.  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  Given age, extended time window, oral anticoagulant use, large infarct, and high NIHSS, tPA relatively contraindicated/very high risk for heme and unlikely to be net beneficial.  It was discussed with endovascular fellow Reanna, imaging reviewed, not a candidate for endovascular intervention. Pt admitted to the ICU. 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 Significant associated mass effect R -> L and associated herniation      ## Labs:  CBC:    Chem:        Coags:          ## Imaging:    ## Medications:    amLODIPine   Tablet 10 milliGRAM(s) Oral daily  hydrALAZINE 100 milliGRAM(s) Oral every 8 hours      atorvastatin 80 milliGRAM(s) Oral at bedtime    aspirin  chewable 81 milliGRAM(s) Oral daily  enoxaparin Injectable 40 milliGRAM(s) SubCutaneous every 24 hours    bisacodyl Suppository 10 milliGRAM(s) Rectal daily PRN  polyethylene glycol 3350 17 Gram(s) Oral daily  senna 2 Tablet(s) Oral at bedtime    acetaminophen     Tablet .. 650 milliGRAM(s) Oral every 6 hours PRN      ## Vitals:  T(C): 36.2 (23 @ 23:39), Max: 36.8 (23 @ 04:00)  HR: 60 (23 @ 23:00) (54 - 70)  BP: 118/57 (23 @ 23:00) (114/53 - 145/74)  BP(mean): 75 (23 @ 23:00) (72 - 94)  RR: 27 (23 @ 23:00) (15 - 32)  SpO2: 94% (23 @ 23:00) (94% - 100%)  Wt(kg): --  Vent: Mode: AC/ CMV (Assist Control/ Continuous Mandatory Ventilation), RR (machine): 12, RR (patient): 17, TV (machine): 400, FiO2: 21, PEEP: 5, PIP: 21  AB-10 @ 07:01  -   @ 07:00  --------------------------------------------------------  IN: 1850 mL / OUT: 650 mL / NET: 1200 mL     @ 07:01  -   @ 00:05  --------------------------------------------------------  IN: 710 mL / OUT: 300 mL / NET: 410 mL          ## P/E:  Gen: lying comfortably in bed in no apparent distress  Mouth: (+) ETT/OGT  Lungs: CTA  Heart: Irregular  Abd: Soft/+BS/ Non-tender  Ext: Hand edema  Neuro: Obtunded    CENTRAL LINE: [ ] YES [ ] NO  LOCATION:   DATE INSERTED:  REMOVE: [ ] YES [ ] NO      AMANDA: [ ] YES [ ] NO    DATE INSERTED:  REMOVE:  [ ] YES [ ] NO      A-LINE:  [ ] YES [ ] NO  LOCATION:   DATE INSERTED:  REMOVE:  [ ] YES [ ] NO  EXPLAIN:    CODE STATUS: [x ] full code  [ ] DNR  [ ] DNI  [ ] [facility]  Goals of care discussion: [ ] yes

## 2023-12-13 NOTE — PROGRESS NOTE ADULT - CRITICAL CARE ATTENDING COMMENT
Pt is an 87 yo F with h/o A fib on Xarelto, HTN and HLD who presented to [facility] after she was found altered, nonverbal and with right gaze deviation when she woke up. At baseline pt is ambulatory and verbal. Pt required intubation in ER for airway protection given poor MS. Pt assessed by Stroke team: GIO ~4 hours ago, on Rivaroxaban, presents with AMS/weakness with NIHSS of 30 on ED arrival.  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  Given age, extended time window, oral anticoagulant use, large infarct, and high NIHSS, tPA relatively contraindicated/very high risk for heme and unlikely to be net beneficial.  It was discussed with endovascular fellow Reanna, imaging reviewed, not a candidate for endovascular intervention. Pt admitted to the ICU. 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 Significant associated mass effect R -> L and associated herniation.     Resp: Cont current vent settings  CVS: Cont antiHTN meds/ Change Vs to q 6hrs  HEME: DVT prophylaxis with Lovenox  FEN: Cont enteral feeds  Endo: Follow FS  Renal: Follow BUN/Cr and UO  Neuro/Social: Supportive care; pt with extremely poor neurological prognosis;  pt is DNR but family has not made a decision regarding palliative extubation vs long term care with trach + PEG/ Pt has been accepted to 4 LTAC in [place]/ Discussed with MARIEL MR and is the process of contacting pt's family members to discuss dc planning    CCT: 35 min not in conjunction with the NP Pt is an 87 yo F with h/o A fib on Xarelto, HTN and HLD who presented to [facility] after she was found altered, nonverbal and with right gaze deviation when she woke up. At baseline pt is ambulatory and verbal. Pt required intubation in ER for airway protection given poor MS. Pt assessed by Stroke team: GIO ~4 hours ago, on Rivaroxaban, presents with AMS/weakness with NIHSS of 30 on ED arrival.  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  Given age, extended time window, oral anticoagulant use, large infarct, and high NIHSS, tPA relatively contraindicated/very high risk for heme and unlikely to be net beneficial.  It was discussed with endovascular fellow Reanna, imaging reviewed, not a candidate for endovascular intervention. Pt admitted to the ICU. 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 Significant associated mass effect R -> L and associated herniation.     Resp: Cont current vent settings  CVS: Cont antiHTN meds/ Change Vs to q 6hrs  HEME: DVT prophylaxis with Lovenox  FEN: Cont enteral feeds  Endo: Follow FS  Renal: Follow BUN/Cr and UO  Neuro/Social: Supportive care; pt with extremely poor neurological prognosis;  pt is DNR but family has not made a decision regarding palliative extubation vs long term care with trach + PEG/ Pt has been accepted to 4 LTAC in [place]/ Discussed with MARIEL MR and is the process of contacting pt's family members to discuss dc planning    CCT: 35 min not in conjunction with the NP

## 2023-12-13 NOTE — PROGRESS NOTE ADULT - SUBJECTIVE AND OBJECTIVE BOX
HPI:  Ms. Longoria is an 88 year old female with a PMH of HTN, HLD and afib on xarelto who presented to [facility] earlier this morning after she was found altered, nonverbal and with right gaze deviation when she woke up. At baseline pt is ambulatory and verbal. Pt required intubation in ED for airway protection given poor MS. NIHSS calculated as 30 at that time. Pt not a candidate for TNK or transfer for intervention per stroke neurologist. She remains intubated on nicardipine gtt. ICU team consulted. (26 Oct 2023 05:50)      24 hr events:  - remains orally intubated  - no change in hemodynamics     ## Labs:  Comprehensive Metabolic Panel (23 @ 02:00)   Sodium: 146 mmol/L  Potassium: 3.7 mmol/L  Chloride: 110 mmol/L  Carbon Dioxide: 32 mmol/L  Anion Gap: 4 mmol/L  Blood Urea Nitrogen: 21 mg/dL  Creatinine: 0.57 mg/dL  Glucose: 159 mg/dL  Calcium: 8.5 mg/dL  Protein Total: 6.3 gm/dL  Albumin: 2.0 g/dL  Bilirubin Total: 0.8 mg/dL  Alkaline Phosphatase: 120 U/L  Aspartate Aminotransferase (AST/SGOT): 87 U/L  Alanine Aminotransferase (ALT/SGPT): 129 U/L  eGFR: 88: The estimated glomerular filtration rate (eGFR) is calculated using the       Complete Blood Count (23 @ 02:00)   Nucleated RBC: 0 /100 WBCs  WBC Count: 10.97 K/uL  RBC Count: 3.47 M/uL  Hemoglobin: 9.5 g/dL  Hematocrit: 31.3 %  Mean Cell Volume: 90.2 fl  Mean Cell Hemoglobin: 27.4 pg  Mean Cell Hemoglobin Conc: 30.4 g/dL  Red Cell Distrib Width: 17.5 %  Platelet Count - Automated: 259 K/uL      ## Imaging:   CT Head No Cont (23 @ 14:30) >  IMPRESSION:    Reidentified are 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 There is effacement of all of the   basilar cisterns. Effacement of the sulci in the bilateral cerebri,   compatible with increased brain edema.    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    Unsuccessful attempts were made to reach the ordering clinician via teams.    ## Medications:  amLODIPine   Tablet 10 milliGRAM(s) Oral daily  hydrALAZINE 100 milliGRAM(s) Oral every 8 hours  atorvastatin 80 milliGRAM(s) Oral at bedtime  aspirin  chewable 81 milliGRAM(s) Oral daily  enoxaparin Injectable 40 milliGRAM(s) SubCutaneous every 24 hours  bisacodyl Suppository 10 milliGRAM(s) Rectal daily PRN  polyethylene glycol 3350 17 Gram(s) Oral daily  senna 2 Tablet(s) Oral at bedtime  acetaminophen     Tablet .. 650 milliGRAM(s) Oral every 6 hours PRN  scopolamine 1 mG/72 Hr(s) Patch 1 Patch Transdermal every 72 hours      ## Vitals:  T(C): 37 (23 @ 08:00), Max: 37.3 (23 @ 01:48)  HR: 75 (23 @ 12:26) (58 - 80)  BP: 145/58 (23 @ 08:00) (114/57 - 152/78)  BP(mean): 83 (23 @ 08:00) (70 - 99)  RR: 18 (23 @ 08:00) (14 - 28)  SpO2: 99% (23 @ 12:26) (97% - 99%)  Wt(kg): --  Vent: Mode: AC/ CMV (Assist Control/ Continuous Mandatory Ventilation), RR (machine): 12, RR (patient): 24, TV (machine): 400, FiO2: 21, PEEP: 5, PIP: 17  AB-12 @ 07:01  -   @ 07:00  --------------------------------------------------------  IN: 1735 mL / OUT: 750 mL / NET: 985 mL      ## P/E:  Gen: lying comfortably in bed in no apparent distress  Neck: supple no JVD   Lungs: scattered rhonchi,   Heart: S1S2 RRR   Abd: soft + BS   Ext: no edema   Neuro:  unchanged     CENTRAL LINE: [ ] YES [ X] NO  LOCATION:   DATE INSERTED:    PATEL: [ X] YES [ ] NO    DATE INSERTED:    A-LINE:  [ ] YES [ X] NO  LOCATION:   DATE INSERTED:  REMOVE:  [ ] YES [ ] NO  EXPLAIN:    GLOBAL ISSUE/BEST PRACTICE:  Analgesia: None   Sedation: none   HOB elevation: yes  Stress ulcer prophylaxis: being fed   VTE prophylaxis:enoxaparin Injectable 40 milliGRAM(s) SubCutaneous every 24 hours    Oral Care: yes   Glycemic control:  Nutrition: tolerating Feeds     CODE STATUS: [ ] full code  [ X] DNR  [ ] DNI  [ ] MOLST  Goals of care discussion: [X ] yes - ongoing   son - Pepito

## 2023-12-13 NOTE — PROGRESS NOTE ADULT - ASSESSMENT
88F w/ HTN, HLD, Afib. Presents w/ AMS and R gaze deviation. Pt was intubated in the ED due to poor mental status. Pt did not receive TNK. Admitted to ICU. Imaging revealed R JIMMY and MCA infarct w/ midline shift, as well as PCA infarct. Pt's mental status is poor.     #Neuro - mental status is poor, unresponsive in setting of catastrophic CVA; morphine PRN for discomfort/tachypnea  #CV - continue hydralazine and amlodipine; known afib, too high risk given extent of CVA to give pt therapeutic AC  #Pulm - remains intubated on minimal vent settings; does not tolerate PSV weaning and not a candidate for extubation; family would not opt for tracheostomy, although they do not want to withdraw either  #ID-  afebrile, no leukocytosis; monitor off abx  #Renal/metabolic - renal function stable; monitor I/Os, electrolytes; on free water for hyperNa  #GI- tolerating TF, having BM  #Heme - no active issues  #Endo - BG stable  #PPx - lovenox qd  #Dispo- remains in ICU vent dependent, unable to extubate; prognosis for neuro-cognitive recovery is poor; pt is DNR - multiple LTACHs can accept patient, she is medically stable for d/c at this time

## 2023-12-14 NOTE — PROGRESS NOTE ADULT - SUBJECTIVE AND OBJECTIVE BOX
HPI:  Pt is an 89 yo F with h/o A fib on Xarelto, HTN and HLD who presented to [facility] after she was found altered, nonverbal and with right gaze deviation when she woke up. At baseline pt is ambulatory and verbal. Pt required intubation in ER for airway protection given poor MS. Pt assessed by Stroke team: GIO ~4 hours ago, on Rivaroxaban, presents with AMS/weakness with NIHSS of 30 on ED arrival.  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  Given age, extended time window, oral anticoagulant use, large infarct, and high NIHSS, tPA relatively contraindicated/very high risk for heme and unlikely to be net beneficial.  It was discussed with endovascular fellow Reanna, imaging reviewed, not a candidate for endovascular intervention. Pt admitted to the ICU. 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 Significant associated mass effect R -> L and associated herniation.       ## Labs:  CBC:    Chem:        Coags:          ## Imaging:    ## Medications:    amLODIPine   Tablet 10 milliGRAM(s) Oral daily  hydrALAZINE 100 milliGRAM(s) Oral every 8 hours      atorvastatin 80 milliGRAM(s) Oral at bedtime    aspirin  chewable 81 milliGRAM(s) Oral daily  enoxaparin Injectable 40 milliGRAM(s) SubCutaneous every 24 hours    bisacodyl Suppository 10 milliGRAM(s) Rectal daily PRN  polyethylene glycol 3350 17 Gram(s) Oral daily  senna 2 Tablet(s) Oral at bedtime    acetaminophen     Tablet .. 650 milliGRAM(s) Oral every 6 hours PRN  scopolamine 1 mG/72 Hr(s) Patch 1 Patch Transdermal every 72 hours      ## Vitals:  T(C): 36 (23 @ 20:00), Max: 37.4 (23 @ 00:00)  HR: 60 (23 @ 20:56) (57 - 80)  BP: 141/52 (23 @ 20:00) (122/64 - 141/52)  BP(mean): 75 (23 @ 20:00) (75 - 90)  RR: 21 (23 @ 20:00) (13 - 30)  SpO2: 97% (23 @ 20:56) (95% - 99%)  Wt(kg): --  Vent: Mode: AC/ CMV (Assist Control/ Continuous Mandatory Ventilation), RR (machine): 12, RR (patient): 23, TV (machine): 400, FiO2: 21, PEEP: 5, PIP: 20  AB-13 @ 07:01  -   @ 07:00  --------------------------------------------------------  IN: 2000 mL / OUT: 1000 mL / NET: 1000 mL     @ 07:01  -   @ 23:40  --------------------------------------------------------  IN: 580 mL / OUT: 300 mL / NET: 280 mL          ## P/E:  Gen: lying comfortably in bed in no apparent distress  Mouth: (+) ETT/OGT  Lungs: CTA  Heart: Irregular  Abd: Soft/+BS/ Non-tender  Ext: Hand and dependent edema  Neuro: Obtunded    CENTRAL LINE: [ ] YES [ ] NO  LOCATION:   DATE INSERTED:  REMOVE: [ ] YES [ ] NO      AMANDA: [ ] YES [ ] NO    DATE INSERTED:  REMOVE:  [ ] YES [ ] NO      A-LINE:  [ ] YES [ ] NO  LOCATION:   DATE INSERTED:  REMOVE:  [ ] YES [ ] NO  EXPLAIN:    CODE STATUS: [x ] full code  [ ] DNR  [ ] DNI  [ ] [facility]  Goals of care discussion: [ ] yes    HPI:  Pt is an 87 yo F with h/o A fib on Xarelto, HTN and HLD who presented to [facility] after she was found altered, nonverbal and with right gaze deviation when she woke up. At baseline pt is ambulatory and verbal. Pt required intubation in ER for airway protection given poor MS. Pt assessed by Stroke team: GIO ~4 hours ago, on Rivaroxaban, presents with AMS/weakness with NIHSS of 30 on ED arrival.  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  Given age, extended time window, oral anticoagulant use, large infarct, and high NIHSS, tPA relatively contraindicated/very high risk for heme and unlikely to be net beneficial.  It was discussed with endovascular fellow Reanna, imaging reviewed, not a candidate for endovascular intervention. Pt admitted to the ICU. 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 Significant associated mass effect R -> L and associated herniation.       ## Labs:  CBC:    Chem:        Coags:          ## Imaging:    ## Medications:    amLODIPine   Tablet 10 milliGRAM(s) Oral daily  hydrALAZINE 100 milliGRAM(s) Oral every 8 hours      atorvastatin 80 milliGRAM(s) Oral at bedtime    aspirin  chewable 81 milliGRAM(s) Oral daily  enoxaparin Injectable 40 milliGRAM(s) SubCutaneous every 24 hours    bisacodyl Suppository 10 milliGRAM(s) Rectal daily PRN  polyethylene glycol 3350 17 Gram(s) Oral daily  senna 2 Tablet(s) Oral at bedtime    acetaminophen     Tablet .. 650 milliGRAM(s) Oral every 6 hours PRN  scopolamine 1 mG/72 Hr(s) Patch 1 Patch Transdermal every 72 hours      ## Vitals:  T(C): 36 (23 @ 20:00), Max: 37.4 (23 @ 00:00)  HR: 60 (23 @ 20:56) (57 - 80)  BP: 141/52 (23 @ 20:00) (122/64 - 141/52)  BP(mean): 75 (23 @ 20:00) (75 - 90)  RR: 21 (23 @ 20:00) (13 - 30)  SpO2: 97% (23 @ 20:56) (95% - 99%)  Wt(kg): --  Vent: Mode: AC/ CMV (Assist Control/ Continuous Mandatory Ventilation), RR (machine): 12, RR (patient): 23, TV (machine): 400, FiO2: 21, PEEP: 5, PIP: 20  AB-13 @ 07:01  -   @ 07:00  --------------------------------------------------------  IN: 2000 mL / OUT: 1000 mL / NET: 1000 mL     @ 07:01  -   @ 23:40  --------------------------------------------------------  IN: 580 mL / OUT: 300 mL / NET: 280 mL          ## P/E:  Gen: lying comfortably in bed in no apparent distress  Mouth: (+) ETT/OGT  Lungs: CTA  Heart: Irregular  Abd: Soft/+BS/ Non-tender  Ext: Hand and dependent edema  Neuro: Obtunded    CENTRAL LINE: [ ] YES [ ] NO  LOCATION:   DATE INSERTED:  REMOVE: [ ] YES [ ] NO      AMANDA: [ ] YES [ ] NO    DATE INSERTED:  REMOVE:  [ ] YES [ ] NO      A-LINE:  [ ] YES [ ] NO  LOCATION:   DATE INSERTED:  REMOVE:  [ ] YES [ ] NO  EXPLAIN:    CODE STATUS: [x ] full code  [ ] DNR  [ ] DNI  [ ] [facility]  Goals of care discussion: [ ] yes

## 2023-12-14 NOTE — PROGRESS NOTE ADULT - ASSESSMENT
Pt is an 87 yo F with h/o A fib on Xarelto, HTN and HLD who presented to [facility] after she was found altered, nonverbal and with right gaze deviation when she woke up. At baseline pt is ambulatory and verbal. Pt required intubation in ER for airway protection given poor MS. Pt assessed by Stroke team: GIO ~4 hours ago, on Rivaroxaban, presents with AMS/weakness with NIHSS of 30 on ED arrival.  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  Given age, extended time window, oral anticoagulant use, large infarct, and high NIHSS, tPA relatively contraindicated/very high risk for heme and unlikely to be net beneficial.  It was discussed with endovascular fellow Reanna, imaging reviewed, not a candidate for endovascular intervention. Pt admitted to the ICU. 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 Significant associated mass effect R -> L and associated herniation.     Resp: Cont current vent settings  CVS: Cont antiHTN meds/ Change Vs to q 6hrs  HEME: DVT prophylaxis with Lovenox  FEN: Cont enteral feeds  Endo: Follow FS  Renal: Follow BUN/Cr and UO  Neuro/Social: Supportive care; pt with extremely poor neurological prognosis;  pt is DNR but family has not made a decision regarding palliative extubation vs long term care with trach + PEG/ Pt has been accepted to LTACs in [place]/ Letter sent to pt's family members to discuss dc planning     Pt is an 87 yo F with h/o A fib on Xarelto, HTN and HLD who presented to [facility] after she was found altered, nonverbal and with right gaze deviation when she woke up. At baseline pt is ambulatory and verbal. Pt required intubation in ER for airway protection given poor MS. Pt assessed by Stroke team: GIO ~4 hours ago, on Rivaroxaban, presents with AMS/weakness with NIHSS of 30 on ED arrival.  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  Given age, extended time window, oral anticoagulant use, large infarct, and high NIHSS, tPA relatively contraindicated/very high risk for heme and unlikely to be net beneficial.  It was discussed with endovascular fellow Reanna, imaging reviewed, not a candidate for endovascular intervention. Pt admitted to the ICU. 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 Significant associated mass effect R -> L and associated herniation.     Resp: Cont current vent settings  CVS: Cont antiHTN meds/ Change Vs to q 6hrs  HEME: DVT prophylaxis with Lovenox  FEN: Cont enteral feeds  Endo: Follow FS  Renal: Follow BUN/Cr and UO  Neuro/Social: Supportive care; pt with extremely poor neurological prognosis;  pt is DNR but family has not made a decision regarding palliative extubation vs long term care with trach + PEG/ Pt has been accepted to LTACs in [place]/ Letter sent to pt's family members to discuss dc planning

## 2023-12-14 NOTE — CHART NOTE - NSCHARTNOTEFT_GEN_A_CORE
Pt seen in ICU, remains on vent, tolerating Jevity 1.5 @ 55 ml/hr via OGT x 18 hours=990 ml, 1485 calories, 63 grams protein, 752 ml free water.  Pt is also receiving 250 ml water flushes q 8 hours=additional 750 ml free water for a total of 1502 ml free water from enteral feeding and water flushes.  Pt appears to be tolerating feeding.  Pt adm c diagnosis of  altered mental status, embolic stroke, cerebral edema, hypoxic respiratory failure, poor metal status, encephalopathic, not a candidate for extubation, family is not opting for trach, does not want to withdraw care.  pt is DNR.  Wt: 12/14 - 137.7 (62.5 kg) 12/07, 64.3 kg, 11/29, 61 kg,11/22, 59.6 kg, 11/13, 59.2 kg, 11/06, 57.1 kg, 10/26, 63.1 kg, wt. fluctuation c wt. gain of 1.2 kg is noted.  12/07, 2+(mild) edema noted.  Pt is not appropriate for nutrition focused physical due to limited view of pt on vent, c visible moderate temple depletion.    Labs: 12-05 Na146 mmol/L<H> Glu 159 mg/dL<H> K+ 3.7 mmol/L Cr  0.57 mg/dL BUN 21 mg/dL 12-05 Phos 2.4 mg/dL<L> 12-05 Alb 2.0 g/dL<L>12-05  U/L<H> AST 87 U/L<H> Alkaline Phosphatase 120 U/L    Skin: without pressure ulcers  wounf - L ear - no dressing    Previous Nutrition Diagnosis: (10/28)  Inadequate Energy Intake was resolved    New Nutrition Diagnosis: [x ] not applicable     Recommend continue c current enteral feeding regimen.

## 2023-12-15 NOTE — PROGRESS NOTE ADULT - SUBJECTIVE AND OBJECTIVE BOX
INTERVAL HPI/OVERNIGHT EVENTS:    No acute overnight events.    CENTRAL LINE: [ ] YES [x ] NO  LOCATION:       PATEL: [ ] YES [x ] NO        A-LINE:  [ ] YES [ ] NO  LOCATION:       GLOBAL ISSUE/BEST PRACTICE:  Analgesia:   Sedation:  HOB elevation: yes  Stress ulcer prophylaxis:   VTE prophylaxis: aspirin  chewable 81 milliGRAM(s) Oral daily  enoxaparin Injectable 40 milliGRAM(s) SubCutaneous every 24 hours    Oral Care: Chlorhexidine  Glycemic control:   Nutrition:Diet, NPO with Tube Feed:   Tube Feeding Modality: Orogastric  Jevity 1.5 Messi  Total Volume for 24 Hours (mL): 990  Continuous  Until Goal Tube Feed Rate (mL per Hour): 55  Tube Feed Duration (in Hours): 18  Tube Feed Start Time: 17:00  Tube Feed Stop Time: 11:00 (11-30-23 @ 16:47) [Active]    REVIEW OF SYSTEMS:  [x ] Unable to obtain because: acute cva    PHYSICAL EXAM:  Gen: intubated and sedated  HEENT: Intubated with ETT  CARD -s1s2, RRR, no M,G,R;   PULM - Coarse vented breath sounds, symmetric breath sounds;   ABD -  +BS, firm periumbilical ND, NT,  no guarding, no rebound.  EXT - symmetric pulses, dependent edema;   NEURO - Neuro: Obtunded    ICU Vital Signs Last 24 Hrs  T(C): 36 (15 Dec 2023 13:00), Max: 36.4 (15 Dec 2023 04:00)  T(F): 96.8 (15 Dec 2023 13:00), Max: 97.5 (15 Dec 2023 04:00)  HR: 64 (15 Dec 2023 13:00) (57 - 72)  BP: 129/68 (15 Dec 2023 13:00) (126/56 - 154/77)  BP(mean): 87 (15 Dec 2023 13:00) (75 - 98)  ABP: --  ABP(mean): --  RR: 29 (15 Dec 2023 13:00) (13 - 30)  SpO2: 98% (15 Dec 2023 13:00) (97% - 100%)    O2 Parameters below as of 15 Dec 2023 08:00  Patient On (Oxygen Delivery Method): ventilator, V A/C /Rate12/PEEP5    O2 Concentration (%): 21      I&O's Detail    14 Dec 2023 07:01  -  15 Dec 2023 07:00  --------------------------------------------------------  IN:    Free Water: 500 mL    Jevity 1.5: 990 mL  Total IN: 1490 mL    OUT:    Voided (mL): 500 mL  Total OUT: 500 mL    Total NET: 990 mL      15 Dec 2023 07:01  -  15 Dec 2023 16:32  --------------------------------------------------------  IN:    Enteral Tube Flush: 200 mL    Free Water: 250 mL    Jevity 1.5: 220 mL  Total IN: 670 mL    OUT:  Total OUT: 0 mL    Total NET: 670 mL        MEDICATIONS  NEURO  Meds: acetaminophen     Tablet .. 650 milliGRAM(s) Oral every 6 hours PRN Temp greater or equal to 38C (100.4F)  scopolamine 1 mG/72 Hr(s) Patch 1 Patch Transdermal every 72 hours    RESPIRATORY    Meds:   CARDIOVASCULAR  Meds: amLODIPine   Tablet 10 milliGRAM(s) Oral daily  hydrALAZINE 100 milliGRAM(s) Oral every 8 hours    GI/NUTRITION  Meds: bisacodyl Suppository 10 milliGRAM(s) Rectal daily PRN Constipation  polyethylene glycol 3350 17 Gram(s) Oral daily  senna 2 Tablet(s) Oral at bedtime    GENITOURINARY  Meds:   HEMATOLOGIC  Meds: aspirin  chewable 81 milliGRAM(s) Oral daily  enoxaparin Injectable 40 milliGRAM(s) SubCutaneous every 24 hours    [x] VTE Prophylaxis  INFECTIOUS DISEASES  Meds: influenza  Vaccine (HIGH DOSE) 0.7 milliLiter(s) IntraMuscular once    ENDOCRINE  CAPILLARY BLOOD GLUCOSE        Meds:   OTHER MEDICATIONS:  chlorhexidine 0.12% Liquid 15 milliLiter(s) Oral Mucosa every 12 hours  chlorhexidine 2% Cloths 1 Application(s) Topical <User Schedule>  erythromycin   Ointment 1 Application(s) Both EYES three times a day  :    LABS:                   Mode: AC/ CMV (Assist Control/ Continuous Mandatory Ventilation), RR (machine): 12, TV (machine): 400, FiO2: 21, PEEP: 5, ITime: 1, MAP: 9, PIP: 18      RADIOLOGY & ADDITIONAL STUDIES:

## 2023-12-16 NOTE — PROGRESS NOTE ADULT - SUBJECTIVE AND OBJECTIVE BOX
Neurology Progress Note    S: Patient seen and examined.       Medications: MEDICATIONS  (STANDING):  amLODIPine   Tablet 10 milliGRAM(s) Oral daily  aspirin  chewable 81 milliGRAM(s) Oral daily  atorvastatin 80 milliGRAM(s) Oral at bedtime  chlorhexidine 0.12% Liquid 15 milliLiter(s) Oral Mucosa every 12 hours  chlorhexidine 2% Cloths 1 Application(s) Topical <User Schedule>  enoxaparin Injectable 40 milliGRAM(s) SubCutaneous every 24 hours  erythromycin   Ointment 1 Application(s) Both EYES three times a day  hydrALAZINE 100 milliGRAM(s) Oral every 8 hours  influenza  Vaccine (HIGH DOSE) 0.7 milliLiter(s) IntraMuscular once  polyethylene glycol 3350 17 Gram(s) Oral daily  scopolamine 1 mG/72 Hr(s) Patch 1 Patch Transdermal every 72 hours  senna 2 Tablet(s) Oral at bedtime    MEDICATIONS  (PRN):  acetaminophen     Tablet .. 650 milliGRAM(s) Oral every 6 hours PRN Temp greater or equal to 38C (100.4F)  bisacodyl Suppository 10 milliGRAM(s) Rectal daily PRN Constipation       Vitals:  Vital Signs Last 24 Hrs  T(C): 36.9 (16 Dec 2023 20:00), Max: 37 (16 Dec 2023 19:00)  T(F): 98.4 (16 Dec 2023 20:00), Max: 98.6 (16 Dec 2023 19:00)  HR: 67 (16 Dec 2023 20:56) (56 - 74)  BP: 128/93 (16 Dec 2023 20:00) (123/58 - 151/50)  BP(mean): 106 (16 Dec 2023 20:00) (71 - 106)  RR: 20 (16 Dec 2023 20:00) (15 - 34)  SpO2: 98% (16 Dec 2023 20:56) (96% - 100%)    Parameters below as of 16 Dec 2023 18:00  Patient On (Oxygen Delivery Method): ventilator    O2 Concentration (%): 21          Neurological Exam:  Mental Status: Intubated, not sedated. No spont eye opening. No verbal output, does not follow commands.   Cranial Nerves: pupils very sluggish R, L near fixed, no  corneals, no VOR, intermittent eyelid fluttering no facial asymmetry , weak cough/gag  Motor: dec tone throughout, no spont movemnt noted   Sensation: UE extensor posturing, LE triple flexion left toe up    I personally reviewed the below data/images/labs:      < from: CT Head No Cont (10.22.19 @ 15:57) >  IMPRESSION:    1)  chronic ischemic changes in both hemispheres with volume loss. There   are no new infarct as compared to prior CT. This may be further assessed   with MR imaging.  2)  no hemorrhage or mass identified.      LABS:    No new labs    < end of copied text >  < from: CT Angio Head w/ IV Cont (10.22.19 @ 15:57) >  IMPRESSION    1. There is intimal thickening and calcified plaque in both carotid   bifurcations in the neck, but without significant stenosis. No evidence   of carotid vertebral occlusion or dissection.  2. Intracranially, there are atherosclerotic changes in the anterior and   posterior circulation, but without significant stenosis or occlusion.    < end of copied text >  < from: CT Brain Stroke Protocol (10.26.23 @ 04:37) >  IMPRESSION:  No evidence of acute intracranial hemorrhage, mass effect or large acute   territorial infarct, within limits of noncontrast CT technique.    < end of copied text >  < from: CT Angio Neck Stroke Protocol w/ IV Cont (10.26.23 @ 04:53) >  CT ANGIOGRAPHY NECK:  1.  The right internal carotid artery is occluded approximately 1.5 cm   distal to its origin, possibly due to underlying dissection.  2.  Endotracheal tube is low in position, with its tip approximate 1 cm   above the slade.  3.  Diffuse ill-defined groundglass opacity in both lungs, which may   represent pulmonary edema, infection and/or atelectasis.  4.  There is a discrete right upper lobe groundglass opacity measuring   1.9 x 1.4 cm; this may represent a focus of infection, inflammation,   edema, hemorrhage, fibrosis or malignancy.  A follow-up chest CT is   warranted in three months to assess for resolution.    CT ANGIOGRAPHY BRAIN: The right internal carotid arteries occluded.    There is no significant flow related opacification within the proximal   right middle cerebral artery.  There is mild reconstitution of flow in   some distal branch vessels of the inferior division of the right middle   cerebral artery.  The right anterior cerebral artery fills across the   anterior communicating artery.    < end of copied text >  < from: CT Brain Perfusion Maps Stroke (10.26.23 @ 04:54) >  CT PERFUSION: CT perfusion is consistent with large acute infarct in the   right middle cerebral artery vascular territory and small to moderate   amount of surrounding ischemic penumbra.  Hypoperfusion index is 0.3.    Core infarct (CBF <  30%:): 136 mL  Penumbra (Tmax >6s): 194 mL  Mismatched volume: 58 mL  Mismatched ratio: 1.4    < end of copied text >      < from: CT Head No Cont (10.22.19 @ 15:57) >  IMPRESSION:    1)  chronic ischemic changes in both hemispheres with volume loss. There   are no new infarct as compared to prior CT. This may be further assessed   with MR imaging.  2)  no hemorrhage or mass identified.    < end of copied text >  < from: CT Angio Head w/ IV Cont (10.22.19 @ 15:57) >  IMPRESSION    1. There is intimal thickening and calcified plaque in both carotid   bifurcations in the neck, but without significant stenosis. No evidence   of carotid vertebral occlusion or dissection.  2. Intracranially, there are atherosclerotic changes in the anterior and   posterior circulation, but without significant stenosis or occlusion.    < end of copied text >  < from: CT Brain Stroke Protocol (10.26.23 @ 04:37) >  IMPRESSION:  No evidence of acute intracranial hemorrhage, mass effect or large acute   territorial infarct, within limits of noncontrast CT technique.    < end of copied text >  < from: CT Angio Neck Stroke Protocol w/ IV Cont (10.26.23 @ 04:53) >  CT ANGIOGRAPHY NECK:  1.  The right internal carotid artery is occluded approximately 1.5 cm   distal to its origin, possibly due to underlying dissection.  2.  Endotracheal tube is low in position, with its tip approximate 1 cm   above the slade.  3.  Diffuse ill-defined groundglass opacity in both lungs, which may   represent pulmonary edema, infection and/or atelectasis.  4.  There is a discrete right upper lobe groundglass opacity measuring   1.9 x 1.4 cm; this may represent a focus of infection, inflammation,   edema, hemorrhage, fibrosis or malignancy.  A follow-up chest CT is   warranted in three months to assess for resolution.    CT ANGIOGRAPHY BRAIN: The right internal carotid arteries occluded.    There is no significant flow related opacification within the proximal   right middle cerebral artery.  There is mild reconstitution of flow in   some distal branch vessels of the inferior division of the right middle   cerebral artery.  The right anterior cerebral artery fills across the   anterior communicating artery.    < end of copied text >  < from: CT Brain Perfusion Maps Stroke (10.26.23 @ 04:54) >  CT PERFUSION: CT perfusion is consistent with large acute infarct in the   right middle cerebral artery vascular territory and small to moderate   amount of surrounding ischemic penumbra.  Hypoperfusion index is 0.3.    Core infarct (CBF <  30%:): 136 mL  Penumbra (Tmax >6s): 194 mL  Mismatched volume: 58 mL  Mismatched ratio: 1.4    < end of copied text >      < from: CT Head No Cont (10.22.19 @ 15:57) >  IMPRESSION:    1)  chronic ischemic changes in both hemispheres with volume loss. There   are no new infarct as compared to prior CT. This may be further assessed   with MR imaging.  2)  no hemorrhage or mass identified.    < end of copied text >  < from: CT Angio Head w/ IV Cont (10.22.19 @ 15:57) >  IMPRESSION    1. There is intimal thickening and calcified plaque in both carotid   bifurcations in the neck, but without significant stenosis. No evidence   of carotid vertebral occlusion or dissection.  2. Intracranially, there are atherosclerotic changes in the anterior and   posterior circulation, but without significant stenosis or occlusion.    < end of copied text >  < from: CT Brain Stroke Protocol (10.26.23 @ 04:37) >  IMPRESSION:  No evidence of acute intracranial hemorrhage, mass effect or large acute   territorial infarct, within limits of noncontrast CT technique.    < end of copied text >  < from: CT Angio Neck Stroke Protocol w/ IV Cont (10.26.23 @ 04:53) >  CT ANGIOGRAPHY NECK:  1.  The right internal carotid artery is occluded approximately 1.5 cm   distal to its origin, possibly due to underlying dissection.  2.  Endotracheal tube is low in position, with its tip approximate 1 cm   above the slade.  3.  Diffuse ill-defined groundglass opacity in both lungs, which may   represent pulmonary edema, infection and/or atelectasis.  4.  There is a discrete right upper lobe groundglass opacity measuring   1.9 x 1.4 cm; this may represent a focus of infection, inflammation,   edema, hemorrhage, fibrosis or malignancy.  A follow-up chest CT is   warranted in three months to assess for resolution.    CT ANGIOGRAPHY BRAIN: The right internal carotid arteries occluded.    There is no significant flow related opacification within the proximal   right middle cerebral artery.  There is mild reconstitution of flow in   some distal branch vessels of the inferior division of the right middle   cerebral artery.  The right anterior cerebral artery fills across the   anterior communicating artery.    < end of copied text >  < from: CT Brain Perfusion Maps Stroke (10.26.23 @ 04:54) >  CT PERFUSION: CT perfusion is consistent with large acute infarct in the   right middle cerebral artery vascular territory and small to moderate   amount of surrounding ischemic penumbra.  Hypoperfusion index is 0.3.    Core infarct (CBF <  30%:): 136 mL  Penumbra (Tmax >6s): 194 mL  Mismatched volume: 58 mL  Mismatched ratio: 1.4    < end of copied text >    EEG Classification / Summary:  Abnormal EEG study  1. Intermittent periodic discharges seen intermittently over the left hemisphere, sometimes with triphasic morphology  2. Focal continuous right hemispheric slowing  3. Moderate generalized background slowing    -----------------------------------------------------------------------------------------------------    Clinical Impression:  1. While the periodic pattern noted above may represent lateralized periodic discharges indicating epileptic potential in the left hemisphere, more likely these represent generalized periodic discharges with triphasic morphology, consistent with metabolic etiology, that are poorly expressed on the right due to the effects of the stroke.   2. Structural abnormality in right hemisphere  3. Moderate diffuse/multi-focal cerebral dysfunction, not specific as to etiology.    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 Significant   associated mass effect and associated herniation.      < end of copied text >  < from: CT Head No Cont (11.02.23 @ 14:30) >    Reidentified are 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 There is effacement of all of the   basilar cisterns. Effacement of the sulci in the bilateral cerebri,   compatible with increased brain edema.    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    Unsuccessful attempts were made to reach the ordering clinician via teams.      --- End of Report ---    < end of copied text >

## 2023-12-16 NOTE — PROGRESS NOTE ADULT - ASSESSMENT
88 year old female with a PMH of HTN, HLD and afib on xarelto who did not take medications for past 1 week prior to hospitalization here with R MCA infarct.   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

## 2023-12-16 NOTE — PROGRESS NOTE ADULT - ASSESSMENT
87 yo F with A fib on Xarelto, HTN and HLD a/w ams, nonverbal and with right gaze deviation when she woke up, required intubation in ER for airway protection given poor MS. Assessed by Stroke team: GIO ~4 hours ago, on Rivaroxaban, with AMS/weakness and initial NIHSS of 30 on ED arrival.  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  Given age, extended time window, oral anticoagulant use, large infarct, and high NIHSS, tPA relatively contraindicated/very high risk for heme and unlikely to be net beneficial.  Case was discussed with endovascular fellow Reanna, imaging reviewed, not a candidate for endovascular intervention.     Pt admitted to the ICU. 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 Significant associated mass effect R -> L and associated herniation. Patient remains intubated since admission despite multiple goals of care conversations regarding palliative extubation vs long term care with trach and PEG.      Neuro:  Extremely poor neurologic prognosis.  Pending  CV: Cont antiHTN meds  Pulm: Continue with full vent support  GI: Tube feeds  Renal/Metabolic: Trend BUN/Cr and UO  Heme: DVT prophylaxis with Lovenox  Endo: Trend FS  Dispo: Pt is DNR but family has not made a decision regarding palliative extubation vs long term care with trach + PEG/ Pt has been accepted to LTACs in [place]/ Letter sent to pt's family members to discuss dc planning   89 yo F with A fib on Xarelto, HTN and HLD a/w ams, nonverbal and with right gaze deviation when she woke up, required intubation in ER for airway protection given poor MS. Assessed by Stroke team: GIO ~4 hours ago, on Rivaroxaban, with AMS/weakness and initial NIHSS of 30 on ED arrival.  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  Given age, extended time window, oral anticoagulant use, large infarct, and high NIHSS, tPA relatively contraindicated/very high risk for heme and unlikely to be net beneficial.  Case was discussed with endovascular fellow Reanna, imaging reviewed, not a candidate for endovascular intervention.     Pt admitted to the ICU. 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 Significant associated mass effect R -> L and associated herniation. Patient remains intubated since admission despite multiple goals of care conversations regarding palliative extubation vs long term care with trach and PEG.      Neuro:  Extremely poor neurologic prognosis.  Pending  CV: Cont antiHTN meds  Pulm: Continue with full vent support  GI: Tube feeds  Renal/Metabolic: Trend BUN/Cr and UO  Heme: DVT prophylaxis with Lovenox  Endo: Trend FS  Dispo: Pt is DNR but family has not made a decision regarding palliative extubation vs long term care with trach + PEG/ Pt has been accepted to LTACs in [place]/ Letter sent to pt's family members to discuss dc planning

## 2023-12-16 NOTE — PROGRESS NOTE ADULT - SUBJECTIVE AND OBJECTIVE BOX
INTERVAL HPI/OVERNIGHT EVENTS:    No acute overnight events.    CENTRAL LINE: [ ] YES [x ] NO  LOCATION:       PATEL: [ ] YES [x ] NO        A-LINE:  [ ] YES [ x] NO  LOCATION:       GLOBAL ISSUE/BEST PRACTICE:  Analgesia:   Sedation:  HOB elevation: yes  Stress ulcer prophylaxis:   VTE prophylaxis: aspirin  chewable 81 milliGRAM(s) Oral daily  enoxaparin Injectable 40 milliGRAM(s) SubCutaneous every 24 hours    Oral Care: Chlorhexidine  Glycemic control:   Nutrition:Diet, NPO with Tube Feed:   Tube Feeding Modality: Orogastric  Jevity 1.5 Messi  Total Volume for 24 Hours (mL): 990  Continuous  Until Goal Tube Feed Rate (mL per Hour): 55  Tube Feed Duration (in Hours): 18  Tube Feed Start Time: 17:00  Tube Feed Stop Time: 11:00 (11-30-23 @ 16:47) [Active]          REVIEW OF SYSTEMS:  [ x] Unable to obtain because: multiple cvas    PHYSICAL EXAM:  Gen: intubated and sedated  HEENT: Intubated with ETT  CARD -s1s2, RRR, no M,G,R;   PULM - Coarse vented breath sounds, symmetric breath sounds;   ABD -  +BS, soft, ND, NT; tolerating tube feeds  EXT - symmetric pulses, dependent edema;   NEURO - Neuro: Obtunded    ICU Vital Signs Last 24 Hrs  T(C): 35.7 (16 Dec 2023 08:00), Max: 36.2 (15 Dec 2023 10:47)  T(F): 96.3 (16 Dec 2023 08:00), Max: 97.2 (15 Dec 2023 10:47)  HR: 70 (16 Dec 2023 08:00) (57 - 74)  BP: 139/60 (16 Dec 2023 08:00) (123/58 - 151/50)  BP(mean): 79 (16 Dec 2023 07:00) (71 - 87)  ABP: --  ABP(mean): --  RR: 22 (16 Dec 2023 08:00) (14 - 34)  SpO2: 96% (16 Dec 2023 08:00) (96% - 100%)    O2 Parameters below as of 16 Dec 2023 08:00  Patient On (Oxygen Delivery Method): ventilator    O2 Concentration (%): 21      I&O's Detail    15 Dec 2023 07:01  -  16 Dec 2023 07:00  --------------------------------------------------------  IN:    Enteral Tube Flush: 260 mL    Free Water: 500 mL    Jevity 1.5: 990 mL  Total IN: 1750 mL    OUT:    Voided (mL): 550 mL  Total OUT: 550 mL    Total NET: 1200 mL        MEDICATIONS  NEURO  Meds: acetaminophen     Tablet .. 650 milliGRAM(s) Oral every 6 hours PRN Temp greater or equal to 38C (100.4F)  scopolamine 1 mG/72 Hr(s) Patch 1 Patch Transdermal every 72 hours    RESPIRATORY    Meds:   CARDIOVASCULAR  Meds: amLODIPine   Tablet 10 milliGRAM(s) Oral daily  hydrALAZINE 100 milliGRAM(s) Oral every 8 hours    GI/NUTRITION  Meds: bisacodyl Suppository 10 milliGRAM(s) Rectal daily PRN Constipation  polyethylene glycol 3350 17 Gram(s) Oral daily  senna 2 Tablet(s) Oral at bedtime    GENITOURINARY  Meds:   HEMATOLOGIC  Meds: aspirin  chewable 81 milliGRAM(s) Oral daily  enoxaparin Injectable 40 milliGRAM(s) SubCutaneous every 24 hours    [x] VTE Prophylaxis  INFECTIOUS DISEASES  Meds: influenza  Vaccine (HIGH DOSE) 0.7 milliLiter(s) IntraMuscular once    ENDOCRINE  CAPILLARY BLOOD GLUCOSE    Meds:   OTHER MEDICATIONS:  chlorhexidine 0.12% Liquid 15 milliLiter(s) Oral Mucosa every 12 hours  chlorhexidine 2% Cloths 1 Application(s) Topical <User Schedule>  erythromycin   Ointment 1 Application(s) Both EYES three times a day  :    LABS:                   Mode: AC/ CMV (Assist Control/ Continuous Mandatory Ventilation), RR (machine): 12, TV (machine): 400, FiO2: 21, PEEP: 5, ITime: 0.8, MAP: 9, PIP: 20      RADIOLOGY & ADDITIONAL STUDIES:

## 2023-12-17 NOTE — PROGRESS NOTE ADULT - ASSESSMENT
87 yo F with A fib on Xarelto, HTN and HLD a/w ams, nonverbal and with right gaze deviation when she woke up, required intubation in ER for airway protection given poor MS. Assessed by Stroke team: GIO ~4 hours ago, on Rivaroxaban, with AMS/weakness and initial NIHSS of 30 on ED arrival.  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  Given age, extended time window, oral anticoagulant use, large infarct, and high NIHSS, tPA relatively contraindicated/very high risk for heme and unlikely to be net beneficial.  Case was discussed with endovascular fellow Reanna, imaging reviewed, not a candidate for endovascular intervention.     Pt admitted to the ICU. 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 Significant associated mass effect R -> L and associated herniation. Patient remains intubated since admission despite multiple goals of care conversations regarding palliative extubation vs long term care with trach and PEG.      Neuro:  Extremely poor neurologic prognosis.  Pending decision for trach and PEG vs palliative extubation, however no decision has been made by family.  No changes to neurologic exams, no signs of any improvement.  CV: Cont antiHTN meds  Pulm: Continue with full vent support  GI: Tube feeds  Renal/Metabolic: Trend BUN/Cr and UO  Heme: DVT prophylaxis with Lovenox  Endo: Trend FS  Dispo: Pt is DNR but family has not made a decision regarding palliative extubation vs long term care with trach + PEG/ Pt has been accepted to LTACs in [place]/ Letter sent to pt's family members to discuss dc planning   89 yo F with A fib on Xarelto, HTN and HLD a/w ams, nonverbal and with right gaze deviation when she woke up, required intubation in ER for airway protection given poor MS. Assessed by Stroke team: GIO ~4 hours ago, on Rivaroxaban, with AMS/weakness and initial NIHSS of 30 on ED arrival.  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  Given age, extended time window, oral anticoagulant use, large infarct, and high NIHSS, tPA relatively contraindicated/very high risk for heme and unlikely to be net beneficial.  Case was discussed with endovascular fellow Reanna, imaging reviewed, not a candidate for endovascular intervention.     Pt admitted to the ICU. 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 Significant associated mass effect R -> L and associated herniation. Patient remains intubated since admission despite multiple goals of care conversations regarding palliative extubation vs long term care with trach and PEG.      Neuro:  Extremely poor neurologic prognosis.  Pending decision for trach and PEG vs palliative extubation, however no decision has been made by family.  No changes to neurologic exams, no signs of any improvement.  CV: Cont antiHTN meds  Pulm: Continue with full vent support  GI: Tube feeds  Renal/Metabolic: Trend BUN/Cr and UO  Heme: DVT prophylaxis with Lovenox  Endo: Trend FS  Dispo: Pt is DNR but family has not made a decision regarding palliative extubation vs long term care with trach + PEG/ Pt has been accepted to LTACs in [place]/ Letter sent to pt's family members to discuss dc planning

## 2023-12-17 NOTE — PROGRESS NOTE ADULT - SUBJECTIVE AND OBJECTIVE BOX
INTERVAL HPI/OVERNIGHT EVENTS:    No acute overnight events.      CENTRAL LINE: [ ] YES [x ] NO  LOCATION:       PATEL: [ ] YES [x ] NO        A-LINE:  [ ] YES [ x] NO  LOCATION:       GLOBAL ISSUE/BEST PRACTICE:  Analgesia:   Sedation:  HOB elevation: yes  Stress ulcer prophylaxis:   VTE prophylaxis: aspirin  chewable 81 milliGRAM(s) Oral daily  enoxaparin Injectable 40 milliGRAM(s) SubCutaneous every 24 hours    Oral Care: Chlorhexidine  Glycemic control:   Nutrition:Diet, NPO with Tube Feed:   Tube Feeding Modality: Orogastric  Jevity 1.5 Messi  Total Volume for 24 Hours (mL): 990  Continuous  Until Goal Tube Feed Rate (mL per Hour): 55  Tube Feed Duration (in Hours): 18  Tube Feed Start Time: 17:00  Tube Feed Stop Time: 11:00 (11-30-23 @ 16:47) [Active]          REVIEW OF SYSTEMS:  [ x] Unable to obtain because: multiple cvas    PHYSICAL EXAM:  Gen: intubated and sedated  HEENT: Intubated with ETT  CARD -s1s2, RRR, no M,G,R;   PULM - Coarse vented breath sounds, symmetric breath sounds;   ABD -  +BS, soft, ND, NT; tolerating tube feeds  EXT - symmetric pulses, dependent edema;   NEURO - Neuro: Obtunded    ICU Vital Signs Last 24 Hrs  T(C): 36.1 (17 Dec 2023 15:20), Max: 37.1 (16 Dec 2023 23:27)  T(F): 97 (17 Dec 2023 15:20), Max: 98.8 (16 Dec 2023 23:27)  HR: 57 (17 Dec 2023 15:20) (56 - 71)  BP: 137/71 (17 Dec 2023 15:20) (123/62 - 153/74)  BP(mean): 90 (17 Dec 2023 15:20) (80 - 106)  ABP: --  ABP(mean): --  RR: 17 (17 Dec 2023 15:20) (17 - 30)  SpO2: 98% (17 Dec 2023 15:20) (97% - 100%)    O2 Parameters below as of 16 Dec 2023 18:00  Patient On (Oxygen Delivery Method): ventilator    O2 Concentration (%): 21      I&O's Detail    16 Dec 2023 07:01  -  17 Dec 2023 07:00  --------------------------------------------------------  IN:    Enteral Tube Flush: 100 mL    Free Water: 750 mL    Jevity 1.5: 990 mL  Total IN: 1840 mL    OUT:    Voided (mL): 1100 mL  Total OUT: 1100 mL    Total NET: 740 mL      17 Dec 2023 07:01  -  17 Dec 2023 16:28  --------------------------------------------------------  IN:    Enteral Tube Flush: 220 mL    Free Water: 250 mL    Jevity 1.5: 275 mL  Total IN: 745 mL    OUT:  Total OUT: 0 mL    Total NET: 745 mL        MEDICATIONS  NEURO  Meds: acetaminophen     Tablet .. 650 milliGRAM(s) Oral every 6 hours PRN Temp greater or equal to 38C (100.4F)  scopolamine 1 mG/72 Hr(s) Patch 1 Patch Transdermal every 72 hours    RESPIRATORY    Meds:   CARDIOVASCULAR  Meds: amLODIPine   Tablet 10 milliGRAM(s) Oral daily  hydrALAZINE 100 milliGRAM(s) Oral every 8 hours    GI/NUTRITION  Meds: bisacodyl Suppository 10 milliGRAM(s) Rectal daily PRN Constipation  polyethylene glycol 3350 17 Gram(s) Oral daily  senna 2 Tablet(s) Oral at bedtime    GENITOURINARY  Meds:   HEMATOLOGIC  Meds: aspirin  chewable 81 milliGRAM(s) Oral daily  enoxaparin Injectable 40 milliGRAM(s) SubCutaneous every 24 hours    [x] VTE Prophylaxis  INFECTIOUS DISEASES  Meds: influenza  Vaccine (HIGH DOSE) 0.7 milliLiter(s) IntraMuscular once    ENDOCRINE  CAPILLARY BLOOD GLUCOSE        Meds:   OTHER MEDICATIONS:  chlorhexidine 0.12% Liquid 15 milliLiter(s) Oral Mucosa every 12 hours  chlorhexidine 2% Cloths 1 Application(s) Topical <User Schedule>  erythromycin   Ointment 1 Application(s) Both EYES three times a day  :    LABS:                   Mode: AC/ CMV (Assist Control/ Continuous Mandatory Ventilation), RR (machine): 12, TV (machine): 400, FiO2: 21, PEEP: 5, ITime: 1, MAP: 9, PIP: 18      RADIOLOGY & ADDITIONAL STUDIES:

## 2023-12-18 NOTE — PROGRESS NOTE ADULT - SUBJECTIVE AND OBJECTIVE BOX
Neurology Progress Note    S: Patient seen and examined.       Medications: MEDICATIONS  (STANDING):  amLODIPine   Tablet 10 milliGRAM(s) Oral daily  aspirin  chewable 81 milliGRAM(s) Oral daily  atorvastatin 80 milliGRAM(s) Oral at bedtime  chlorhexidine 0.12% Liquid 15 milliLiter(s) Oral Mucosa every 12 hours  chlorhexidine 2% Cloths 1 Application(s) Topical <User Schedule>  enoxaparin Injectable 40 milliGRAM(s) SubCutaneous every 24 hours  hydrALAZINE 100 milliGRAM(s) Oral every 8 hours  influenza  Vaccine (HIGH DOSE) 0.7 milliLiter(s) IntraMuscular once  polyethylene glycol 3350 17 Gram(s) Oral daily  scopolamine 1 mG/72 Hr(s) Patch 1 Patch Transdermal every 72 hours  senna 2 Tablet(s) Oral at bedtime    MEDICATIONS  (PRN):  acetaminophen     Tablet .. 650 milliGRAM(s) Oral every 6 hours PRN Temp greater or equal to 38C (100.4F)  bisacodyl Suppository 10 milliGRAM(s) Rectal daily PRN Constipation       Vitals:  Vital Signs Last 24 Hrs  T(C): 36.6 (18 Dec 2023 08:00), Max: 36.6 (18 Dec 2023 07:17)  T(F): 97.9 (18 Dec 2023 08:00), Max: 97.9 (18 Dec 2023 07:17)  HR: 66 (18 Dec 2023 08:14) (57 - 74)  BP: 137/60 (18 Dec 2023 08:00) (124/58 - 138/73)  BP(mean): 84 (18 Dec 2023 08:00) (78 - 94)  RR: 22 (18 Dec 2023 08:00) (17 - 26)  SpO2: 96% (18 Dec 2023 08:14) (96% - 100%)              Neurological Exam:  Mental Status: Intubated, not sedated. some spont eye opening. No verbal output, does not follow commands.   Cranial Nerves: pupils very sluggish R, L near fixed, no  corneals, no VOR, intermittent eyelid fluttering no facial asymmetry , weak cough/gag  Motor: dec tone throughout, no spont movemnt noted   Sensation: UE extensor posturing, LE triple flexion left toe up    I personally reviewed the below data/images/labs:      < from: CT Head No Cont (10.22.19 @ 15:57) >  IMPRESSION:    1)  chronic ischemic changes in both hemispheres with volume loss. There   are no new infarct as compared to prior CT. This may be further assessed   with MR imaging.  2)  no hemorrhage or mass identified.      LABS:    No new labs    < end of copied text >  < from: CT Angio Head w/ IV Cont (10.22.19 @ 15:57) >  IMPRESSION    1. There is intimal thickening and calcified plaque in both carotid   bifurcations in the neck, but without significant stenosis. No evidence   of carotid vertebral occlusion or dissection.  2. Intracranially, there are atherosclerotic changes in the anterior and   posterior circulation, but without significant stenosis or occlusion.    < end of copied text >  < from: CT Brain Stroke Protocol (10.26.23 @ 04:37) >  IMPRESSION:  No evidence of acute intracranial hemorrhage, mass effect or large acute   territorial infarct, within limits of noncontrast CT technique.    < end of copied text >  < from: CT Angio Neck Stroke Protocol w/ IV Cont (10.26.23 @ 04:53) >  CT ANGIOGRAPHY NECK:  1.  The right internal carotid artery is occluded approximately 1.5 cm   distal to its origin, possibly due to underlying dissection.  2.  Endotracheal tube is low in position, with its tip approximate 1 cm   above the slade.  3.  Diffuse ill-defined groundglass opacity in both lungs, which may   represent pulmonary edema, infection and/or atelectasis.  4.  There is a discrete right upper lobe groundglass opacity measuring   1.9 x 1.4 cm; this may represent a focus of infection, inflammation,   edema, hemorrhage, fibrosis or malignancy.  A follow-up chest CT is   warranted in three months to assess for resolution.    CT ANGIOGRAPHY BRAIN: The right internal carotid arteries occluded.    There is no significant flow related opacification within the proximal   right middle cerebral artery.  There is mild reconstitution of flow in   some distal branch vessels of the inferior division of the right middle   cerebral artery.  The right anterior cerebral artery fills across the   anterior communicating artery.    < end of copied text >  < from: CT Brain Perfusion Maps Stroke (10.26.23 @ 04:54) >  CT PERFUSION: CT perfusion is consistent with large acute infarct in the   right middle cerebral artery vascular territory and small to moderate   amount of surrounding ischemic penumbra.  Hypoperfusion index is 0.3.    Core infarct (CBF <  30%:): 136 mL  Penumbra (Tmax >6s): 194 mL  Mismatched volume: 58 mL  Mismatched ratio: 1.4    < end of copied text >      < from: CT Head No Cont (10.22.19 @ 15:57) >  IMPRESSION:    1)  chronic ischemic changes in both hemispheres with volume loss. There   are no new infarct as compared to prior CT. This may be further assessed   with MR imaging.  2)  no hemorrhage or mass identified.    < end of copied text >  < from: CT Angio Head w/ IV Cont (10.22.19 @ 15:57) >  IMPRESSION    1. There is intimal thickening and calcified plaque in both carotid   bifurcations in the neck, but without significant stenosis. No evidence   of carotid vertebral occlusion or dissection.  2. Intracranially, there are atherosclerotic changes in the anterior and   posterior circulation, but without significant stenosis or occlusion.    < end of copied text >  < from: CT Brain Stroke Protocol (10.26.23 @ 04:37) >  IMPRESSION:  No evidence of acute intracranial hemorrhage, mass effect or large acute   territorial infarct, within limits of noncontrast CT technique.    < end of copied text >  < from: CT Angio Neck Stroke Protocol w/ IV Cont (10.26.23 @ 04:53) >  CT ANGIOGRAPHY NECK:  1.  The right internal carotid artery is occluded approximately 1.5 cm   distal to its origin, possibly due to underlying dissection.  2.  Endotracheal tube is low in position, with its tip approximate 1 cm   above the slade.  3.  Diffuse ill-defined groundglass opacity in both lungs, which may   represent pulmonary edema, infection and/or atelectasis.  4.  There is a discrete right upper lobe groundglass opacity measuring   1.9 x 1.4 cm; this may represent a focus of infection, inflammation,   edema, hemorrhage, fibrosis or malignancy.  A follow-up chest CT is   warranted in three months to assess for resolution.    CT ANGIOGRAPHY BRAIN: The right internal carotid arteries occluded.    There is no significant flow related opacification within the proximal   right middle cerebral artery.  There is mild reconstitution of flow in   some distal branch vessels of the inferior division of the right middle   cerebral artery.  The right anterior cerebral artery fills across the   anterior communicating artery.    < end of copied text >  < from: CT Brain Perfusion Maps Stroke (10.26.23 @ 04:54) >  CT PERFUSION: CT perfusion is consistent with large acute infarct in the   right middle cerebral artery vascular territory and small to moderate   amount of surrounding ischemic penumbra.  Hypoperfusion index is 0.3.    Core infarct (CBF <  30%:): 136 mL  Penumbra (Tmax >6s): 194 mL  Mismatched volume: 58 mL  Mismatched ratio: 1.4    < end of copied text >      < from: CT Head No Cont (10.22.19 @ 15:57) >  IMPRESSION:    1)  chronic ischemic changes in both hemispheres with volume loss. There   are no new infarct as compared to prior CT. This may be further assessed   with MR imaging.  2)  no hemorrhage or mass identified.    < end of copied text >  < from: CT Angio Head w/ IV Cont (10.22.19 @ 15:57) >  IMPRESSION    1. There is intimal thickening and calcified plaque in both carotid   bifurcations in the neck, but without significant stenosis. No evidence   of carotid vertebral occlusion or dissection.  2. Intracranially, there are atherosclerotic changes in the anterior and   posterior circulation, but without significant stenosis or occlusion.    < end of copied text >  < from: CT Brain Stroke Protocol (10.26.23 @ 04:37) >  IMPRESSION:  No evidence of acute intracranial hemorrhage, mass effect or large acute   territorial infarct, within limits of noncontrast CT technique.    < end of copied text >  < from: CT Angio Neck Stroke Protocol w/ IV Cont (10.26.23 @ 04:53) >  CT ANGIOGRAPHY NECK:  1.  The right internal carotid artery is occluded approximately 1.5 cm   distal to its origin, possibly due to underlying dissection.  2.  Endotracheal tube is low in position, with its tip approximate 1 cm   above the slade.  3.  Diffuse ill-defined groundglass opacity in both lungs, which may   represent pulmonary edema, infection and/or atelectasis.  4.  There is a discrete right upper lobe groundglass opacity measuring   1.9 x 1.4 cm; this may represent a focus of infection, inflammation,   edema, hemorrhage, fibrosis or malignancy.  A follow-up chest CT is   warranted in three months to assess for resolution.    CT ANGIOGRAPHY BRAIN: The right internal carotid arteries occluded.    There is no significant flow related opacification within the proximal   right middle cerebral artery.  There is mild reconstitution of flow in   some distal branch vessels of the inferior division of the right middle   cerebral artery.  The right anterior cerebral artery fills across the   anterior communicating artery.    < end of copied text >  < from: CT Brain Perfusion Maps Stroke (10.26.23 @ 04:54) >  CT PERFUSION: CT perfusion is consistent with large acute infarct in the   right middle cerebral artery vascular territory and small to moderate   amount of surrounding ischemic penumbra.  Hypoperfusion index is 0.3.    Core infarct (CBF <  30%:): 136 mL  Penumbra (Tmax >6s): 194 mL  Mismatched volume: 58 mL  Mismatched ratio: 1.4    < end of copied text >    EEG Classification / Summary:  Abnormal EEG study  1. Intermittent periodic discharges seen intermittently over the left hemisphere, sometimes with triphasic morphology  2. Focal continuous right hemispheric slowing  3. Moderate generalized background slowing    -----------------------------------------------------------------------------------------------------    Clinical Impression:  1. While the periodic pattern noted above may represent lateralized periodic discharges indicating epileptic potential in the left hemisphere, more likely these represent generalized periodic discharges with triphasic morphology, consistent with metabolic etiology, that are poorly expressed on the right due to the effects of the stroke.   2. Structural abnormality in right hemisphere  3. Moderate diffuse/multi-focal cerebral dysfunction, not specific as to etiology.    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 Significant   associated mass effect and associated herniation.      < end of copied text >  < from: CT Head No Cont (11.02.23 @ 14:30) >    Reidentified are 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 There is effacement of all of the   basilar cisterns. Effacement of the sulci in the bilateral cerebri,   compatible with increased brain edema.    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    Unsuccessful attempts were made to reach the ordering clinician via teams.      --- End of Report ---    < end of copied text >

## 2023-12-18 NOTE — PROGRESS NOTE ADULT - ASSESSMENT
88 year old female with a PMH of HTN, HLD and afib on xarelto who did not take medications for past 1 week prior to hospitalization here with R MCA infarct.   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

## 2023-12-18 NOTE — PROGRESS NOTE ADULT - SUBJECTIVE AND OBJECTIVE BOX
HPI:  Ms. Longoria is an 88 year old female with a PMH of HTN, HLD and afib on xarelto who presented to [facility] earlier this morning after she was found altered, nonverbal and with right gaze deviation when she woke up. At baseline pt is ambulatory and verbal. Pt required intubation in ED for airway protection given poor MS. NIHSS calculated as 30 at that time. Pt not a candidate for TNK or transfer for intervention per stroke neurologist. She remains intubated on nicardipine gtt. ICU team consulted. (26 Oct 2023 05:50)      24 hr events: No acute events.     ## ROS:  [ x] unable to obtain    ## Vitals  ICU Vital Signs Last 24 Hrs  T(C): 36.8 (18 Dec 2023 12:00), Max: 36.8 (18 Dec 2023 12:00)  T(F): 98.2 (18 Dec 2023 12:00), Max: 98.2 (18 Dec 2023 12:00)  HR: 65 (18 Dec 2023 12:17) (57 - 74)  BP: 130/66 (18 Dec 2023 12:00) (124/58 - 138/73)  BP(mean): 87 (18 Dec 2023 12:00) (78 - 91)  ABP: --  ABP(mean): --  RR: 23 (18 Dec 2023 12:00) (17 - 25)  SpO2: 97% (18 Dec 2023 12:17) (96% - 100%)        ## Physical Exam:  Gen: Elderly female lying in bed, NAD, intubated  HEENT: NC/AT sclerae anicteric. ET tube in place  Resp: Mechanical breath sounds b/l  CV: S1, S2  Abd: Soft, + BS  Ext: WWP  Neuro: Unarousable      ## Vent Data  Mode: AC/ CMV (Assist Control/ Continuous Mandatory Ventilation)  RR (machine): 12  TV (machine): 400  FiO2: 21  PEEP: 5  ITime: 0.8  MAP: 7  PIP: 18      ## Labs:  Chem:          CBC:    Coags:          ## Cardiac        ## Blood Gas      #I/Os  I&O's Detail    17 Dec 2023 07:01  -  18 Dec 2023 07:00  --------------------------------------------------------  IN:    Enteral Tube Flush: 320 mL    Free Water: 500 mL    Jevity 1.5: 935 mL  Total IN: 1755 mL    OUT:    Voided (mL): 1000 mL  Total OUT: 1000 mL    Total NET: 755 mL          ## Imaging:    ## Medications:  MEDICATIONS  (STANDING):  amLODIPine   Tablet 10 milliGRAM(s) Oral daily  aspirin  chewable 81 milliGRAM(s) Oral daily  atorvastatin 80 milliGRAM(s) Oral at bedtime  chlorhexidine 0.12% Liquid 15 milliLiter(s) Oral Mucosa every 12 hours  chlorhexidine 2% Cloths 1 Application(s) Topical <User Schedule>  enoxaparin Injectable 40 milliGRAM(s) SubCutaneous every 24 hours  hydrALAZINE 100 milliGRAM(s) Oral every 8 hours  influenza  Vaccine (HIGH DOSE) 0.7 milliLiter(s) IntraMuscular once  polyethylene glycol 3350 17 Gram(s) Oral daily  scopolamine 1 mG/72 Hr(s) Patch 1 Patch Transdermal every 72 hours  senna 2 Tablet(s) Oral at bedtime    MEDICATIONS  (PRN):  acetaminophen     Tablet .. 650 milliGRAM(s) Oral every 6 hours PRN Temp greater or equal to 38C (100.4F)  bisacodyl Suppository 10 milliGRAM(s) Rectal daily PRN Constipation       HPI:  Ms. Longoria is an 88 year old female with a PMH of HTN, HLD and afib on xarelto who presented to [facility] earlier this morning after she was found altered, nonverbal and with right gaze deviation when she woke up. At baseline pt is ambulatory and verbal. Pt required intubation in ED for airway protection given poor MS. NIHSS calculated as 30 at that time. Pt not a candidate for TNK or transfer for intervention per stroke neurologist. She remains intubated on nicardipine gtt. ICU team consulted. (26 Oct 2023 05:50)      24 hr events: No acute events.     ## ROS:  [ x] unable to obtain    ## Vitals  ICU Vital Signs Last 24 Hrs  T(C): 36.8 (18 Dec 2023 12:00), Max: 36.8 (18 Dec 2023 12:00)  T(F): 98.2 (18 Dec 2023 12:00), Max: 98.2 (18 Dec 2023 12:00)  HR: 65 (18 Dec 2023 12:17) (57 - 74)  BP: 130/66 (18 Dec 2023 12:00) (124/58 - 138/73)  BP(mean): 87 (18 Dec 2023 12:00) (78 - 91)  ABP: --  ABP(mean): --  RR: 23 (18 Dec 2023 12:00) (17 - 25)  SpO2: 97% (18 Dec 2023 12:17) (96% - 100%)        ## Physical Exam:  Gen: Elderly female lying in bed, NAD, intubated  HEENT: NC/AT sclerae anicteric. ET tube in place  Resp: Mechanical breath sounds b/l  CV: S1, S2  Abd: Soft, + BS  Ext: WWP  Neuro: Unarousable      ## Vent Data  Mode: AC/ CMV (Assist Control/ Continuous Mandatory Ventilation)  RR (machine): 12  TV (machine): 400  FiO2: 21  PEEP: 5  ITime: 0.8  MAP: 7  PIP: 18      ## Labs:  Chem:          CBC:    Coags:          ## Cardiac        ## Blood Gas      #I/Os  I&O's Detail    17 Dec 2023 07:01  -  18 Dec 2023 07:00  --------------------------------------------------------  IN:    Enteral Tube Flush: 320 mL    Free Water: 500 mL    Jevity 1.5: 935 mL  Total IN: 1755 mL    OUT:    Voided (mL): 1000 mL  Total OUT: 1000 mL    Total NET: 755 mL          ## Imaging:    ## Medications:  MEDICATIONS  (STANDING):  amLODIPine   Tablet 10 milliGRAM(s) Oral daily  aspirin  chewable 81 milliGRAM(s) Oral daily  atorvastatin 80 milliGRAM(s) Oral at bedtime  chlorhexidine 0.12% Liquid 15 milliLiter(s) Oral Mucosa every 12 hours  chlorhexidine 2% Cloths 1 Application(s) Topical <User Schedule>  enoxaparin Injectable 40 milliGRAM(s) SubCutaneous every 24 hours  hydrALAZINE 100 milliGRAM(s) Oral every 8 hours  influenza  Vaccine (HIGH DOSE) 0.7 milliLiter(s) IntraMuscular once  polyethylene glycol 3350 17 Gram(s) Oral daily  scopolamine 1 mG/72 Hr(s) Patch 1 Patch Transdermal every 72 hours  senna 2 Tablet(s) Oral at bedtime    MEDICATIONS  (PRN):  acetaminophen     Tablet .. 650 milliGRAM(s) Oral every 6 hours PRN Temp greater or equal to 38C (100.4F)  bisacodyl Suppository 10 milliGRAM(s) Rectal daily PRN Constipation

## 2023-12-19 NOTE — PROGRESS NOTE ADULT - SUBJECTIVE AND OBJECTIVE BOX
Neurology Progress Note    S: Patient seen and examined.       Medications: MEDICATIONS  (STANDING):  amLODIPine   Tablet 10 milliGRAM(s) Oral daily  aspirin  chewable 81 milliGRAM(s) Oral daily  atorvastatin 80 milliGRAM(s) Oral at bedtime  chlorhexidine 0.12% Liquid 15 milliLiter(s) Oral Mucosa every 12 hours  chlorhexidine 2% Cloths 1 Application(s) Topical <User Schedule>  enoxaparin Injectable 40 milliGRAM(s) SubCutaneous every 24 hours  hydrALAZINE 100 milliGRAM(s) Oral every 8 hours  influenza  Vaccine (HIGH DOSE) 0.7 milliLiter(s) IntraMuscular once  polyethylene glycol 3350 17 Gram(s) Oral daily  scopolamine 1 mG/72 Hr(s) Patch 1 Patch Transdermal every 72 hours  senna 2 Tablet(s) Oral at bedtime    MEDICATIONS  (PRN):  acetaminophen     Tablet .. 650 milliGRAM(s) Oral every 6 hours PRN Temp greater or equal to 38C (100.4F)  bisacodyl Suppository 10 milliGRAM(s) Rectal daily PRN Constipation       Vitals:  Vital Signs Last 24 Hrs  T(C): 35.1 (19 Dec 2023 08:00), Max: 36.5 (18 Dec 2023 16:00)  T(F): 95.2 (19 Dec 2023 08:00), Max: 97.7 (18 Dec 2023 16:00)  HR: 58 (19 Dec 2023 12:59) (54 - 63)  BP: 115/64 (19 Dec 2023 08:00) (115/64 - 135/69)  BP(mean): 78 (19 Dec 2023 08:00) (76 - 89)  RR: 25 (19 Dec 2023 08:00) (17 - 25)  SpO2: 99% (19 Dec 2023 12:59) (95% - 99%)            Neurological Exam:  Mental Status: Intubated, not sedated. some spont eye opening. No verbal output, does not follow commands.   Cranial Nerves: pupils very sluggish R, L near fixed, no  corneals, no VOR, intermittent eyelid fluttering no facial asymmetry , weak cough/gag  Motor: dec tone throughout, no spont movemnt noted   Sensation: UE extensor posturing, LE triple flexion left toe up    I personally reviewed the below data/images/labs:      < from: CT Head No Cont (10.22.19 @ 15:57) >  IMPRESSION:    1)  chronic ischemic changes in both hemispheres with volume loss. There   are no new infarct as compared to prior CT. This may be further assessed   with MR imaging.  2)  no hemorrhage or mass identified.      LABS:    No new labs    < end of copied text >  < from: CT Angio Head w/ IV Cont (10.22.19 @ 15:57) >  IMPRESSION    1. There is intimal thickening and calcified plaque in both carotid   bifurcations in the neck, but without significant stenosis. No evidence   of carotid vertebral occlusion or dissection.  2. Intracranially, there are atherosclerotic changes in the anterior and   posterior circulation, but without significant stenosis or occlusion.    < end of copied text >  < from: CT Brain Stroke Protocol (10.26.23 @ 04:37) >  IMPRESSION:  No evidence of acute intracranial hemorrhage, mass effect or large acute   territorial infarct, within limits of noncontrast CT technique.    < end of copied text >  < from: CT Angio Neck Stroke Protocol w/ IV Cont (10.26.23 @ 04:53) >  CT ANGIOGRAPHY NECK:  1.  The right internal carotid artery is occluded approximately 1.5 cm   distal to its origin, possibly due to underlying dissection.  2.  Endotracheal tube is low in position, with its tip approximate 1 cm   above the slade.  3.  Diffuse ill-defined groundglass opacity in both lungs, which may   represent pulmonary edema, infection and/or atelectasis.  4.  There is a discrete right upper lobe groundglass opacity measuring   1.9 x 1.4 cm; this may represent a focus of infection, inflammation,   edema, hemorrhage, fibrosis or malignancy.  A follow-up chest CT is   warranted in three months to assess for resolution.    CT ANGIOGRAPHY BRAIN: The right internal carotid arteries occluded.    There is no significant flow related opacification within the proximal   right middle cerebral artery.  There is mild reconstitution of flow in   some distal branch vessels of the inferior division of the right middle   cerebral artery.  The right anterior cerebral artery fills across the   anterior communicating artery.    < end of copied text >  < from: CT Brain Perfusion Maps Stroke (10.26.23 @ 04:54) >  CT PERFUSION: CT perfusion is consistent with large acute infarct in the   right middle cerebral artery vascular territory and small to moderate   amount of surrounding ischemic penumbra.  Hypoperfusion index is 0.3.    Core infarct (CBF <  30%:): 136 mL  Penumbra (Tmax >6s): 194 mL  Mismatched volume: 58 mL  Mismatched ratio: 1.4    < end of copied text >      < from: CT Head No Cont (10.22.19 @ 15:57) >  IMPRESSION:    1)  chronic ischemic changes in both hemispheres with volume loss. There   are no new infarct as compared to prior CT. This may be further assessed   with MR imaging.  2)  no hemorrhage or mass identified.    < end of copied text >  < from: CT Angio Head w/ IV Cont (10.22.19 @ 15:57) >  IMPRESSION    1. There is intimal thickening and calcified plaque in both carotid   bifurcations in the neck, but without significant stenosis. No evidence   of carotid vertebral occlusion or dissection.  2. Intracranially, there are atherosclerotic changes in the anterior and   posterior circulation, but without significant stenosis or occlusion.    < end of copied text >  < from: CT Brain Stroke Protocol (10.26.23 @ 04:37) >  IMPRESSION:  No evidence of acute intracranial hemorrhage, mass effect or large acute   territorial infarct, within limits of noncontrast CT technique.    < end of copied text >  < from: CT Angio Neck Stroke Protocol w/ IV Cont (10.26.23 @ 04:53) >  CT ANGIOGRAPHY NECK:  1.  The right internal carotid artery is occluded approximately 1.5 cm   distal to its origin, possibly due to underlying dissection.  2.  Endotracheal tube is low in position, with its tip approximate 1 cm   above the slade.  3.  Diffuse ill-defined groundglass opacity in both lungs, which may   represent pulmonary edema, infection and/or atelectasis.  4.  There is a discrete right upper lobe groundglass opacity measuring   1.9 x 1.4 cm; this may represent a focus of infection, inflammation,   edema, hemorrhage, fibrosis or malignancy.  A follow-up chest CT is   warranted in three months to assess for resolution.    CT ANGIOGRAPHY BRAIN: The right internal carotid arteries occluded.    There is no significant flow related opacification within the proximal   right middle cerebral artery.  There is mild reconstitution of flow in   some distal branch vessels of the inferior division of the right middle   cerebral artery.  The right anterior cerebral artery fills across the   anterior communicating artery.    < end of copied text >  < from: CT Brain Perfusion Maps Stroke (10.26.23 @ 04:54) >  CT PERFUSION: CT perfusion is consistent with large acute infarct in the   right middle cerebral artery vascular territory and small to moderate   amount of surrounding ischemic penumbra.  Hypoperfusion index is 0.3.    Core infarct (CBF <  30%:): 136 mL  Penumbra (Tmax >6s): 194 mL  Mismatched volume: 58 mL  Mismatched ratio: 1.4    < end of copied text >      < from: CT Head No Cont (10.22.19 @ 15:57) >  IMPRESSION:    1)  chronic ischemic changes in both hemispheres with volume loss. There   are no new infarct as compared to prior CT. This may be further assessed   with MR imaging.  2)  no hemorrhage or mass identified.    < end of copied text >  < from: CT Angio Head w/ IV Cont (10.22.19 @ 15:57) >  IMPRESSION    1. There is intimal thickening and calcified plaque in both carotid   bifurcations in the neck, but without significant stenosis. No evidence   of carotid vertebral occlusion or dissection.  2. Intracranially, there are atherosclerotic changes in the anterior and   posterior circulation, but without significant stenosis or occlusion.    < end of copied text >  < from: CT Brain Stroke Protocol (10.26.23 @ 04:37) >  IMPRESSION:  No evidence of acute intracranial hemorrhage, mass effect or large acute   territorial infarct, within limits of noncontrast CT technique.    < end of copied text >  < from: CT Angio Neck Stroke Protocol w/ IV Cont (10.26.23 @ 04:53) >  CT ANGIOGRAPHY NECK:  1.  The right internal carotid artery is occluded approximately 1.5 cm   distal to its origin, possibly due to underlying dissection.  2.  Endotracheal tube is low in position, with its tip approximate 1 cm   above the slade.  3.  Diffuse ill-defined groundglass opacity in both lungs, which may   represent pulmonary edema, infection and/or atelectasis.  4.  There is a discrete right upper lobe groundglass opacity measuring   1.9 x 1.4 cm; this may represent a focus of infection, inflammation,   edema, hemorrhage, fibrosis or malignancy.  A follow-up chest CT is   warranted in three months to assess for resolution.    CT ANGIOGRAPHY BRAIN: The right internal carotid arteries occluded.    There is no significant flow related opacification within the proximal   right middle cerebral artery.  There is mild reconstitution of flow in   some distal branch vessels of the inferior division of the right middle   cerebral artery.  The right anterior cerebral artery fills across the   anterior communicating artery.    < end of copied text >  < from: CT Brain Perfusion Maps Stroke (10.26.23 @ 04:54) >  CT PERFUSION: CT perfusion is consistent with large acute infarct in the   right middle cerebral artery vascular territory and small to moderate   amount of surrounding ischemic penumbra.  Hypoperfusion index is 0.3.    Core infarct (CBF <  30%:): 136 mL  Penumbra (Tmax >6s): 194 mL  Mismatched volume: 58 mL  Mismatched ratio: 1.4    < end of copied text >    EEG Classification / Summary:  Abnormal EEG study  1. Intermittent periodic discharges seen intermittently over the left hemisphere, sometimes with triphasic morphology  2. Focal continuous right hemispheric slowing  3. Moderate generalized background slowing    -----------------------------------------------------------------------------------------------------    Clinical Impression:  1. While the periodic pattern noted above may represent lateralized periodic discharges indicating epileptic potential in the left hemisphere, more likely these represent generalized periodic discharges with triphasic morphology, consistent with metabolic etiology, that are poorly expressed on the right due to the effects of the stroke.   2. Structural abnormality in right hemisphere  3. Moderate diffuse/multi-focal cerebral dysfunction, not specific as to etiology.    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 Significant   associated mass effect and associated herniation.      < end of copied text >  < from: CT Head No Cont (11.02.23 @ 14:30) >    Reidentified are 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 There is effacement of all of the   basilar cisterns. Effacement of the sulci in the bilateral cerebri,   compatible with increased brain edema.    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    Unsuccessful attempts were made to reach the ordering clinician via teams.      --- End of Report ---    < end of copied text >

## 2023-12-19 NOTE — PROGRESS NOTE ADULT - SUBJECTIVE AND OBJECTIVE BOX
HPI:  Ms. Longoria is an 88 year old female with a PMH of HTN, HLD and afib on xarelto who presented to [facility] earlier this morning after she was found altered, nonverbal and with right gaze deviation when she woke up. At baseline pt is ambulatory and verbal. Pt required intubation in ED for airway protection given poor MS. NIHSS calculated as 30 at that time. Pt not a candidate for TNK or transfer for intervention per stroke neurologist. She remains intubated on nicardipine gtt. ICU team consulted. (26 Oct 2023 05:50)      24 hr events: No acute events    ## ROS:  [x ] unable to obtain    ## Vitals  ICU Vital Signs Last 24 Hrs  T(C): 35.1 (19 Dec 2023 08:00), Max: 36.8 (18 Dec 2023 12:00)  T(F): 95.2 (19 Dec 2023 08:00), Max: 98.2 (18 Dec 2023 12:00)  HR: 58 (19 Dec 2023 08:16) (54 - 66)  BP: 115/64 (19 Dec 2023 08:00) (115/64 - 135/69)  BP(mean): 78 (19 Dec 2023 08:00) (76 - 89)  ABP: --  ABP(mean): --  RR: 25 (19 Dec 2023 08:00) (17 - 25)  SpO2: 98% (19 Dec 2023 08:16) (95% - 98%)        ## Physical Exam:  Gen: Elderly female lying in bed, NAD, intubated  HEENT: NC/AT sclerae anicteric. ET tube in place  Resp: Mechanical breath sounds b/l  CV: S1, S2  Abd: Soft, + BS  Ext: WWP  Neuro: Unarousable    ## Vent Data  Mode: AC/ CMV (Assist Control/ Continuous Mandatory Ventilation)  RR (machine): 12  TV (machine): 400  FiO2: 21  PEEP: 5  ITime: 1  MAP: 8  PIP: 20      ## Labs:  Chem:          CBC:    Coags:          ## Cardiac        ## Blood Gas      #I/Os  I&O's Detail    18 Dec 2023 07:01  -  19 Dec 2023 07:00  --------------------------------------------------------  IN:    Enteral Tube Flush: 90 mL    Free Water: 750 mL    Jevity 1.5: 660 mL  Total IN: 1500 mL    OUT:    Voided (mL): 850 mL  Total OUT: 850 mL    Total NET: 650 mL      19 Dec 2023 07:01  -  19 Dec 2023 10:41  --------------------------------------------------------  IN:    Jevity 1.5: 165 mL  Total IN: 165 mL    OUT:  Total OUT: 0 mL    Total NET: 165 mL          ## Imaging:    ## Medications:  MEDICATIONS  (STANDING):  amLODIPine   Tablet 10 milliGRAM(s) Oral daily  aspirin  chewable 81 milliGRAM(s) Oral daily  atorvastatin 80 milliGRAM(s) Oral at bedtime  chlorhexidine 0.12% Liquid 15 milliLiter(s) Oral Mucosa every 12 hours  chlorhexidine 2% Cloths 1 Application(s) Topical <User Schedule>  enoxaparin Injectable 40 milliGRAM(s) SubCutaneous every 24 hours  hydrALAZINE 100 milliGRAM(s) Oral every 8 hours  influenza  Vaccine (HIGH DOSE) 0.7 milliLiter(s) IntraMuscular once  polyethylene glycol 3350 17 Gram(s) Oral daily  scopolamine 1 mG/72 Hr(s) Patch 1 Patch Transdermal every 72 hours  senna 2 Tablet(s) Oral at bedtime    MEDICATIONS  (PRN):  acetaminophen     Tablet .. 650 milliGRAM(s) Oral every 6 hours PRN Temp greater or equal to 38C (100.4F)  bisacodyl Suppository 10 milliGRAM(s) Rectal daily PRN Constipation       HPI:  Ms. Longoria is an 88 year old female with a PMH of HTN, HLD and afib on xarelto who presented to [facility] earlier this morning after she was found altered, nonverbal and with right gaze deviation when she woke up. At baseline pt is ambulatory and verbal. Pt required intubation in ED for airway protection given poor MS. NIHSS calculated as 30 at that time. Pt not a candidate for TNK or transfer for intervention per stroke neurologist. She remains intubated on nicardipine gtt. ICU team consulted. (26 Oct 2023 05:50)      24 hr events: No acute events    ## ROS:  [x ] unable to obtain    ## Vitals  ICU Vital Signs Last 24 Hrs  T(C): 35.1 (19 Dec 2023 08:00), Max: 36.8 (18 Dec 2023 12:00)  T(F): 95.2 (19 Dec 2023 08:00), Max: 98.2 (18 Dec 2023 12:00)  HR: 58 (19 Dec 2023 08:16) (54 - 66)  BP: 115/64 (19 Dec 2023 08:00) (115/64 - 135/69)  BP(mean): 78 (19 Dec 2023 08:00) (76 - 89)  ABP: --  ABP(mean): --  RR: 25 (19 Dec 2023 08:00) (17 - 25)  SpO2: 98% (19 Dec 2023 08:16) (95% - 98%)        ## Physical Exam:  Gen: Elderly female lying in bed, NAD, intubated  HEENT: NC/AT sclerae anicteric. ET tube in place  Resp: Mechanical breath sounds b/l  CV: S1, S2  Abd: Soft, + BS  Ext: WWP  Neuro: Unarousable    ## Vent Data  Mode: AC/ CMV (Assist Control/ Continuous Mandatory Ventilation)  RR (machine): 12  TV (machine): 400  FiO2: 21  PEEP: 5  ITime: 1  MAP: 8  PIP: 20      ## Labs:  Chem:          CBC:    Coags:          ## Cardiac        ## Blood Gas      #I/Os  I&O's Detail    18 Dec 2023 07:01  -  19 Dec 2023 07:00  --------------------------------------------------------  IN:    Enteral Tube Flush: 90 mL    Free Water: 750 mL    Jevity 1.5: 660 mL  Total IN: 1500 mL    OUT:    Voided (mL): 850 mL  Total OUT: 850 mL    Total NET: 650 mL      19 Dec 2023 07:01  -  19 Dec 2023 10:41  --------------------------------------------------------  IN:    Jevity 1.5: 165 mL  Total IN: 165 mL    OUT:  Total OUT: 0 mL    Total NET: 165 mL          ## Imaging:    ## Medications:  MEDICATIONS  (STANDING):  amLODIPine   Tablet 10 milliGRAM(s) Oral daily  aspirin  chewable 81 milliGRAM(s) Oral daily  atorvastatin 80 milliGRAM(s) Oral at bedtime  chlorhexidine 0.12% Liquid 15 milliLiter(s) Oral Mucosa every 12 hours  chlorhexidine 2% Cloths 1 Application(s) Topical <User Schedule>  enoxaparin Injectable 40 milliGRAM(s) SubCutaneous every 24 hours  hydrALAZINE 100 milliGRAM(s) Oral every 8 hours  influenza  Vaccine (HIGH DOSE) 0.7 milliLiter(s) IntraMuscular once  polyethylene glycol 3350 17 Gram(s) Oral daily  scopolamine 1 mG/72 Hr(s) Patch 1 Patch Transdermal every 72 hours  senna 2 Tablet(s) Oral at bedtime    MEDICATIONS  (PRN):  acetaminophen     Tablet .. 650 milliGRAM(s) Oral every 6 hours PRN Temp greater or equal to 38C (100.4F)  bisacodyl Suppository 10 milliGRAM(s) Rectal daily PRN Constipation

## 2023-12-19 NOTE — PROGRESS NOTE ADULT - ASSESSMENT
89 y/o F w/hypertension, HLD and Afib on AC admitted for acute CVA, not a candidate for TNK or endovascular intervention. Intubated for respiratory failure in setting of stroke. CT chest with pulmonary nodule.    - ASA/Statin  - Continue mechanical ventilation, wean as tolerated (mental status is barrier to extubation)  - Monitor off abx  - DVT prophylaxis  - Repeat CT scan as outpatient for follow up of nodule if patient improves  - GOC discussions - family does not want trach, but not consenting to palliative wean. Ethics involved.     I have personally provided 35 minutes of attending critical care time excluding procedures.   87 y/o F w/hypertension, HLD and Afib on AC admitted for acute CVA, not a candidate for TNK or endovascular intervention. Intubated for respiratory failure in setting of stroke. CT chest with pulmonary nodule.    - ASA/Statin  - Continue mechanical ventilation, wean as tolerated (mental status is barrier to extubation)  - Monitor off abx  - DVT prophylaxis  - Repeat CT scan as outpatient for follow up of nodule if patient improves  - GOC discussions - family does not want trach, but not consenting to palliative wean. Ethics involved.     I have personally provided 35 minutes of attending critical care time excluding procedures.

## 2023-12-19 NOTE — PROGRESS NOTE ADULT - ASSESSMENT
88 year old female with a PMH of HTN, HLD and afib on xarelto who did not take medications for past 1 week prior to hospitalization here with R MCA infarct.   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  Very poor prognosis for any functional neurologic prognosis  Pending transfer to LTAC facility in [place] vs palliative wean  Call with questions   88 year old female with a PMH of HTN, HLD and afib on xarelto who did not take medications for past 1 week prior to hospitalization here with R MCA infarct.   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  Very poor prognosis for any functional neurologic prognosis  Pending transfer to LTAC facility in [place] vs palliative wean  Call with questions

## 2023-12-20 NOTE — PROGRESS NOTE ADULT - SUBJECTIVE AND OBJECTIVE BOX
HPI:  Ms. Longoria is an 88 year old female with a PMH of HTN, HLD and afib on xarelto who presented to [facility] earlier this morning after she was found altered, nonverbal and with right gaze deviation when she woke up. At baseline pt is ambulatory and verbal. Pt required intubation in ED for airway protection given poor MS. NIHSS calculated as 30 at that time. Pt not a candidate for TNK or transfer for intervention per stroke neurologist. She remains intubated on nicardipine gtt. ICU team consulted. (26 Oct 2023 05:50)      24 hr events: No acute events.     ## ROS:  [x ] unable to obtain    ## Vitals  ICU Vital Signs Last 24 Hrs  T(C): 36.2 (20 Dec 2023 08:00), Max: 37.4 (20 Dec 2023 05:30)  T(F): 97.2 (20 Dec 2023 08:00), Max: 99.3 (20 Dec 2023 05:30)  HR: 53 (20 Dec 2023 08:15) (45 - 76)  BP: 137/55 (20 Dec 2023 08:00) (129/77 - 139/65)  BP(mean): 79 (20 Dec 2023 08:00) (79 - 95)  ABP: --  ABP(mean): --  RR: 19 (20 Dec 2023 08:00) (15 - 29)  SpO2: 98% (20 Dec 2023 08:15) (95% - 100%)    O2 Parameters below as of 20 Dec 2023 07:07  Patient On (Oxygen Delivery Method): ventilator    O2 Concentration (%): 21        ## Physical Exam:  Gen: Elderly female lying in bed, NAD, intubated  HEENT: NC/AT sclerae anicteric. ET tube in place  Resp: Mechanical breath sounds b/l  CV: S1, S2  Abd: Soft, + BS  Ext: WWP  Neuro: Unarousable    ## Vent Data  Mode: AC/ CMV (Assist Control/ Continuous Mandatory Ventilation)  RR (machine): 12  TV (machine): 400  FiO2: 21  PEEP: 5  ITime: 0.8  MAP: 7  PIP: 16      ## Labs:  Chem:          CBC:    Coags:          ## Cardiac        ## Blood Gas      #I/Os  I&O's Detail    19 Dec 2023 07:01  -  20 Dec 2023 07:00  --------------------------------------------------------  IN:    Enteral Tube Flush: 30 mL    Free Water: 500 mL    Jevity 1.5: 605 mL  Total IN: 1135 mL    OUT:    Voided (mL): 700 mL  Total OUT: 700 mL    Total NET: 435 mL          ## Imaging:    ## Medications:  MEDICATIONS  (STANDING):  amLODIPine   Tablet 10 milliGRAM(s) Oral daily  aspirin  chewable 81 milliGRAM(s) Oral daily  atorvastatin 80 milliGRAM(s) Oral at bedtime  chlorhexidine 0.12% Liquid 15 milliLiter(s) Oral Mucosa every 12 hours  chlorhexidine 2% Cloths 1 Application(s) Topical <User Schedule>  enoxaparin Injectable 40 milliGRAM(s) SubCutaneous every 24 hours  hydrALAZINE 100 milliGRAM(s) Oral every 8 hours  influenza  Vaccine (HIGH DOSE) 0.7 milliLiter(s) IntraMuscular once  polyethylene glycol 3350 17 Gram(s) Oral daily  scopolamine 1 mG/72 Hr(s) Patch 1 Patch Transdermal every 72 hours  senna 2 Tablet(s) Oral at bedtime    MEDICATIONS  (PRN):  acetaminophen     Tablet .. 650 milliGRAM(s) Oral every 6 hours PRN Temp greater or equal to 38C (100.4F)  bisacodyl Suppository 10 milliGRAM(s) Rectal daily PRN Constipation       HPI:  Ms. Longoria is an 88 year old female with a PMH of HTN, HLD and afib on xarelto who presented to [facility] earlier this morning after she was found altered, nonverbal and with right gaze deviation when she woke up. At baseline pt is ambulatory and verbal. Pt required intubation in ED for airway protection given poor MS. NIHSS calculated as 30 at that time. Pt not a candidate for TNK or transfer for intervention per stroke neurologist. She remains intubated on nicardipine gtt. ICU team consulted. (26 Oct 2023 05:50)      24 hr events: No acute events.     ## ROS:  [x ] unable to obtain    ## Vitals  ICU Vital Signs Last 24 Hrs  T(C): 36.2 (20 Dec 2023 08:00), Max: 37.4 (20 Dec 2023 05:30)  T(F): 97.2 (20 Dec 2023 08:00), Max: 99.3 (20 Dec 2023 05:30)  HR: 53 (20 Dec 2023 08:15) (45 - 76)  BP: 137/55 (20 Dec 2023 08:00) (129/77 - 139/65)  BP(mean): 79 (20 Dec 2023 08:00) (79 - 95)  ABP: --  ABP(mean): --  RR: 19 (20 Dec 2023 08:00) (15 - 29)  SpO2: 98% (20 Dec 2023 08:15) (95% - 100%)    O2 Parameters below as of 20 Dec 2023 07:07  Patient On (Oxygen Delivery Method): ventilator    O2 Concentration (%): 21        ## Physical Exam:  Gen: Elderly female lying in bed, NAD, intubated  HEENT: NC/AT sclerae anicteric. ET tube in place  Resp: Mechanical breath sounds b/l  CV: S1, S2  Abd: Soft, + BS  Ext: WWP  Neuro: Unarousable    ## Vent Data  Mode: AC/ CMV (Assist Control/ Continuous Mandatory Ventilation)  RR (machine): 12  TV (machine): 400  FiO2: 21  PEEP: 5  ITime: 0.8  MAP: 7  PIP: 16      ## Labs:  Chem:          CBC:    Coags:          ## Cardiac        ## Blood Gas      #I/Os  I&O's Detail    19 Dec 2023 07:01  -  20 Dec 2023 07:00  --------------------------------------------------------  IN:    Enteral Tube Flush: 30 mL    Free Water: 500 mL    Jevity 1.5: 605 mL  Total IN: 1135 mL    OUT:    Voided (mL): 700 mL  Total OUT: 700 mL    Total NET: 435 mL          ## Imaging:    ## Medications:  MEDICATIONS  (STANDING):  amLODIPine   Tablet 10 milliGRAM(s) Oral daily  aspirin  chewable 81 milliGRAM(s) Oral daily  atorvastatin 80 milliGRAM(s) Oral at bedtime  chlorhexidine 0.12% Liquid 15 milliLiter(s) Oral Mucosa every 12 hours  chlorhexidine 2% Cloths 1 Application(s) Topical <User Schedule>  enoxaparin Injectable 40 milliGRAM(s) SubCutaneous every 24 hours  hydrALAZINE 100 milliGRAM(s) Oral every 8 hours  influenza  Vaccine (HIGH DOSE) 0.7 milliLiter(s) IntraMuscular once  polyethylene glycol 3350 17 Gram(s) Oral daily  scopolamine 1 mG/72 Hr(s) Patch 1 Patch Transdermal every 72 hours  senna 2 Tablet(s) Oral at bedtime    MEDICATIONS  (PRN):  acetaminophen     Tablet .. 650 milliGRAM(s) Oral every 6 hours PRN Temp greater or equal to 38C (100.4F)  bisacodyl Suppository 10 milliGRAM(s) Rectal daily PRN Constipation

## 2023-12-20 NOTE — PROGRESS NOTE ADULT - ASSESSMENT
89 y/o F w/hypertension, HLD and Afib on AC admitted for acute CVA, not a candidate for TNK or endovascular intervention. Intubated for respiratory failure in setting of stroke. CT chest with pulmonary nodule.    - ASA/Statin  - Continue mechanical ventilation, wean as tolerated (mental status is barrier to extubation)  - Monitor off abx  - DVT prophylaxis  - Repeat CT scan as outpatient for follow up of nodule if patient improves  - GOC discussions - family does not want trach, but not consenting to palliative wean. Ethics involved.  87 y/o F w/hypertension, HLD and Afib on AC admitted for acute CVA, not a candidate for TNK or endovascular intervention. Intubated for respiratory failure in setting of stroke. CT chest with pulmonary nodule.    - ASA/Statin  - Continue mechanical ventilation, wean as tolerated (mental status is barrier to extubation)  - Monitor off abx  - DVT prophylaxis  - Repeat CT scan as outpatient for follow up of nodule if patient improves  - GOC discussions - family does not want trach, but not consenting to palliative wean. Ethics involved.     I have personally provided 35 minutes of attending critical care time excluding procedures. 89 y/o F w/hypertension, HLD and Afib on AC admitted for acute CVA, not a candidate for TNK or endovascular intervention. Intubated for respiratory failure in setting of stroke. CT chest with pulmonary nodule.    - ASA/Statin  - Continue mechanical ventilation, wean as tolerated (mental status is barrier to extubation)  - Monitor off abx  - DVT prophylaxis  - Repeat CT scan as outpatient for follow up of nodule if patient improves  - GOC discussions - family does not want trach, but not consenting to palliative wean. Ethics involved.     I have personally provided 35 minutes of attending critical care time excluding procedures.

## 2023-12-20 NOTE — PROGRESS NOTE ADULT - SUBJECTIVE AND OBJECTIVE BOX
Neurology Progress Note    S: Patient seen and examined.         Medications: MEDICATIONS  (STANDING):  amLODIPine   Tablet 10 milliGRAM(s) Oral daily  aspirin  chewable 81 milliGRAM(s) Oral daily  atorvastatin 80 milliGRAM(s) Oral at bedtime  chlorhexidine 0.12% Liquid 15 milliLiter(s) Oral Mucosa every 12 hours  chlorhexidine 2% Cloths 1 Application(s) Topical <User Schedule>  enoxaparin Injectable 40 milliGRAM(s) SubCutaneous every 24 hours  hydrALAZINE 100 milliGRAM(s) Oral every 8 hours  influenza  Vaccine (HIGH DOSE) 0.7 milliLiter(s) IntraMuscular once  polyethylene glycol 3350 17 Gram(s) Oral daily  scopolamine 1 mG/72 Hr(s) Patch 1 Patch Transdermal every 72 hours  senna 2 Tablet(s) Oral at bedtime    MEDICATIONS  (PRN):  acetaminophen     Tablet .. 650 milliGRAM(s) Oral every 6 hours PRN Temp greater or equal to 38C (100.4F)  bisacodyl Suppository 10 milliGRAM(s) Rectal daily PRN Constipation       Vitals:  Vital Signs Last 24 Hrs  T(C): 35.8 (20 Dec 2023 12:00), Max: 37.4 (20 Dec 2023 05:30)  T(F): 96.4 (20 Dec 2023 12:00), Max: 99.3 (20 Dec 2023 05:30)  HR: 57 (20 Dec 2023 12:00) (45 - 76)  BP: 140/69 (20 Dec 2023 12:00) (129/77 - 140/69)  BP(mean): 85 (20 Dec 2023 12:00) (79 - 95)  RR: 15 (20 Dec 2023 12:00) (15 - 29)  SpO2: 98% (20 Dec 2023 12:00) (95% - 100%)    Parameters below as of 20 Dec 2023 07:07  Patient On (Oxygen Delivery Method): ventilator    O2 Concentration (%): 21          Neurological Exam:  Mental Status: Intubated, not sedated. some spont eye opening. No verbal output, does not follow commands.   Cranial Nerves: pupils very sluggish R, L near fixed, no  corneals, no VOR, intermittent eyelid fluttering no facial asymmetry , weak cough/gag  Motor: dec tone throughout, no spont movemnt noted   Sensation: UE extensor posturing, LE triple flexion left toe up    I personally reviewed the below data/images/labs:      < from: CT Head No Cont (10.22.19 @ 15:57) >  IMPRESSION:    1)  chronic ischemic changes in both hemispheres with volume loss. There   are no new infarct as compared to prior CT. This may be further assessed   with MR imaging.  2)  no hemorrhage or mass identified.      LABS:    No new labs    < end of copied text >  < from: CT Angio Head w/ IV Cont (10.22.19 @ 15:57) >  IMPRESSION    1. There is intimal thickening and calcified plaque in both carotid   bifurcations in the neck, but without significant stenosis. No evidence   of carotid vertebral occlusion or dissection.  2. Intracranially, there are atherosclerotic changes in the anterior and   posterior circulation, but without significant stenosis or occlusion.    < end of copied text >  < from: CT Brain Stroke Protocol (10.26.23 @ 04:37) >  IMPRESSION:  No evidence of acute intracranial hemorrhage, mass effect or large acute   territorial infarct, within limits of noncontrast CT technique.    < end of copied text >  < from: CT Angio Neck Stroke Protocol w/ IV Cont (10.26.23 @ 04:53) >  CT ANGIOGRAPHY NECK:  1.  The right internal carotid artery is occluded approximately 1.5 cm   distal to its origin, possibly due to underlying dissection.  2.  Endotracheal tube is low in position, with its tip approximate 1 cm   above the slade.  3.  Diffuse ill-defined groundglass opacity in both lungs, which may   represent pulmonary edema, infection and/or atelectasis.  4.  There is a discrete right upper lobe groundglass opacity measuring   1.9 x 1.4 cm; this may represent a focus of infection, inflammation,   edema, hemorrhage, fibrosis or malignancy.  A follow-up chest CT is   warranted in three months to assess for resolution.    CT ANGIOGRAPHY BRAIN: The right internal carotid arteries occluded.    There is no significant flow related opacification within the proximal   right middle cerebral artery.  There is mild reconstitution of flow in   some distal branch vessels of the inferior division of the right middle   cerebral artery.  The right anterior cerebral artery fills across the   anterior communicating artery.    < end of copied text >  < from: CT Brain Perfusion Maps Stroke (10.26.23 @ 04:54) >  CT PERFUSION: CT perfusion is consistent with large acute infarct in the   right middle cerebral artery vascular territory and small to moderate   amount of surrounding ischemic penumbra.  Hypoperfusion index is 0.3.    Core infarct (CBF <  30%:): 136 mL  Penumbra (Tmax >6s): 194 mL  Mismatched volume: 58 mL  Mismatched ratio: 1.4    < end of copied text >      < from: CT Head No Cont (10.22.19 @ 15:57) >  IMPRESSION:    1)  chronic ischemic changes in both hemispheres with volume loss. There   are no new infarct as compared to prior CT. This may be further assessed   with MR imaging.  2)  no hemorrhage or mass identified.    < end of copied text >  < from: CT Angio Head w/ IV Cont (10.22.19 @ 15:57) >  IMPRESSION    1. There is intimal thickening and calcified plaque in both carotid   bifurcations in the neck, but without significant stenosis. No evidence   of carotid vertebral occlusion or dissection.  2. Intracranially, there are atherosclerotic changes in the anterior and   posterior circulation, but without significant stenosis or occlusion.    < end of copied text >  < from: CT Brain Stroke Protocol (10.26.23 @ 04:37) >  IMPRESSION:  No evidence of acute intracranial hemorrhage, mass effect or large acute   territorial infarct, within limits of noncontrast CT technique.    < end of copied text >  < from: CT Angio Neck Stroke Protocol w/ IV Cont (10.26.23 @ 04:53) >  CT ANGIOGRAPHY NECK:  1.  The right internal carotid artery is occluded approximately 1.5 cm   distal to its origin, possibly due to underlying dissection.  2.  Endotracheal tube is low in position, with its tip approximate 1 cm   above the slade.  3.  Diffuse ill-defined groundglass opacity in both lungs, which may   represent pulmonary edema, infection and/or atelectasis.  4.  There is a discrete right upper lobe groundglass opacity measuring   1.9 x 1.4 cm; this may represent a focus of infection, inflammation,   edema, hemorrhage, fibrosis or malignancy.  A follow-up chest CT is   warranted in three months to assess for resolution.    CT ANGIOGRAPHY BRAIN: The right internal carotid arteries occluded.    There is no significant flow related opacification within the proximal   right middle cerebral artery.  There is mild reconstitution of flow in   some distal branch vessels of the inferior division of the right middle   cerebral artery.  The right anterior cerebral artery fills across the   anterior communicating artery.    < end of copied text >  < from: CT Brain Perfusion Maps Stroke (10.26.23 @ 04:54) >  CT PERFUSION: CT perfusion is consistent with large acute infarct in the   right middle cerebral artery vascular territory and small to moderate   amount of surrounding ischemic penumbra.  Hypoperfusion index is 0.3.    Core infarct (CBF <  30%:): 136 mL  Penumbra (Tmax >6s): 194 mL  Mismatched volume: 58 mL  Mismatched ratio: 1.4    < end of copied text >      < from: CT Head No Cont (10.22.19 @ 15:57) >  IMPRESSION:    1)  chronic ischemic changes in both hemispheres with volume loss. There   are no new infarct as compared to prior CT. This may be further assessed   with MR imaging.  2)  no hemorrhage or mass identified.    < end of copied text >  < from: CT Angio Head w/ IV Cont (10.22.19 @ 15:57) >  IMPRESSION    1. There is intimal thickening and calcified plaque in both carotid   bifurcations in the neck, but without significant stenosis. No evidence   of carotid vertebral occlusion or dissection.  2. Intracranially, there are atherosclerotic changes in the anterior and   posterior circulation, but without significant stenosis or occlusion.    < end of copied text >  < from: CT Brain Stroke Protocol (10.26.23 @ 04:37) >  IMPRESSION:  No evidence of acute intracranial hemorrhage, mass effect or large acute   territorial infarct, within limits of noncontrast CT technique.    < end of copied text >  < from: CT Angio Neck Stroke Protocol w/ IV Cont (10.26.23 @ 04:53) >  CT ANGIOGRAPHY NECK:  1.  The right internal carotid artery is occluded approximately 1.5 cm   distal to its origin, possibly due to underlying dissection.  2.  Endotracheal tube is low in position, with its tip approximate 1 cm   above the slade.  3.  Diffuse ill-defined groundglass opacity in both lungs, which may   represent pulmonary edema, infection and/or atelectasis.  4.  There is a discrete right upper lobe groundglass opacity measuring   1.9 x 1.4 cm; this may represent a focus of infection, inflammation,   edema, hemorrhage, fibrosis or malignancy.  A follow-up chest CT is   warranted in three months to assess for resolution.    CT ANGIOGRAPHY BRAIN: The right internal carotid arteries occluded.    There is no significant flow related opacification within the proximal   right middle cerebral artery.  There is mild reconstitution of flow in   some distal branch vessels of the inferior division of the right middle   cerebral artery.  The right anterior cerebral artery fills across the   anterior communicating artery.    < end of copied text >  < from: CT Brain Perfusion Maps Stroke (10.26.23 @ 04:54) >  CT PERFUSION: CT perfusion is consistent with large acute infarct in the   right middle cerebral artery vascular territory and small to moderate   amount of surrounding ischemic penumbra.  Hypoperfusion index is 0.3.    Core infarct (CBF <  30%:): 136 mL  Penumbra (Tmax >6s): 194 mL  Mismatched volume: 58 mL  Mismatched ratio: 1.4    < end of copied text >    EEG Classification / Summary:  Abnormal EEG study  1. Intermittent periodic discharges seen intermittently over the left hemisphere, sometimes with triphasic morphology  2. Focal continuous right hemispheric slowing  3. Moderate generalized background slowing    -----------------------------------------------------------------------------------------------------    Clinical Impression:  1. While the periodic pattern noted above may represent lateralized periodic discharges indicating epileptic potential in the left hemisphere, more likely these represent generalized periodic discharges with triphasic morphology, consistent with metabolic etiology, that are poorly expressed on the right due to the effects of the stroke.   2. Structural abnormality in right hemisphere  3. Moderate diffuse/multi-focal cerebral dysfunction, not specific as to etiology.    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 Significant   associated mass effect and associated herniation.      < end of copied text >  < from: CT Head No Cont (11.02.23 @ 14:30) >    Reidentified are 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 There is effacement of all of the   basilar cisterns. Effacement of the sulci in the bilateral cerebri,   compatible with increased brain edema.    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    Unsuccessful attempts were made to reach the ordering clinician via teams.      --- End of Report ---    < end of copied text >   Neurology Progress Note    S: Patient seen and examined.         Medications: MEDICATIONS  (STANDING):  amLODIPine   Tablet 10 milliGRAM(s) Oral daily  aspirin  chewable 81 milliGRAM(s) Oral daily  atorvastatin 80 milliGRAM(s) Oral at bedtime  chlorhexidine 0.12% Liquid 15 milliLiter(s) Oral Mucosa every 12 hours  chlorhexidine 2% Cloths 1 Application(s) Topical <User Schedule>  enoxaparin Injectable 40 milliGRAM(s) SubCutaneous every 24 hours  hydrALAZINE 100 milliGRAM(s) Oral every 8 hours  influenza  Vaccine (HIGH DOSE) 0.7 milliLiter(s) IntraMuscular once  polyethylene glycol 3350 17 Gram(s) Oral daily  scopolamine 1 mG/72 Hr(s) Patch 1 Patch Transdermal every 72 hours  senna 2 Tablet(s) Oral at bedtime    MEDICATIONS  (PRN):  acetaminophen     Tablet .. 650 milliGRAM(s) Oral every 6 hours PRN Temp greater or equal to 38C (100.4F)  bisacodyl Suppository 10 milliGRAM(s) Rectal daily PRN Constipation       Vitals:  Vital Signs Last 24 Hrs  T(C): 35.8 (20 Dec 2023 12:00), Max: 37.4 (20 Dec 2023 05:30)  T(F): 96.4 (20 Dec 2023 12:00), Max: 99.3 (20 Dec 2023 05:30)  HR: 57 (20 Dec 2023 12:00) (45 - 76)  BP: 140/69 (20 Dec 2023 12:00) (129/77 - 140/69)  BP(mean): 85 (20 Dec 2023 12:00) (79 - 95)  RR: 15 (20 Dec 2023 12:00) (15 - 29)  SpO2: 98% (20 Dec 2023 12:00) (95% - 100%)    Parameters below as of 20 Dec 2023 07:07  Patient On (Oxygen Delivery Method): ventilator    O2 Concentration (%): 21          Neurological Exam:  Mental Status: Intubated, not sedated. some spont eye opening. No verbal output, does not follow commands.   Cranial Nerves: pupils very sluggish R, L near fixed, no  corneals, no VOR, intermittent eyelid fluttering no facial asymmetry , weak cough/gag  Motor: dec tone throughout, no spont movemnt noted   Sensation: UE extensor posturing, LE triple flexion left toe up    I personally reviewed the below data/images/labs:      < from: CT Head No Cont (10.22.19 @ 15:57) >  IMPRESSION:    1)  chronic ischemic changes in both hemispheres with volume loss. There   are no new infarct as compared to prior CT. This may be further assessed   with MR imaging.  2)  no hemorrhage or mass identified.      LABS:    No new labs    < end of copied text >  < from: CT Angio Head w/ IV Cont (10.22.19 @ 15:57) >  IMPRESSION    1. There is intimal thickening and calcified plaque in both carotid   bifurcations in the neck, but without significant stenosis. No evidence   of carotid vertebral occlusion or dissection.  2. Intracranially, there are atherosclerotic changes in the anterior and   posterior circulation, but without significant stenosis or occlusion.    < end of copied text >  < from: CT Brain Stroke Protocol (10.26.23 @ 04:37) >  IMPRESSION:  No evidence of acute intracranial hemorrhage, mass effect or large acute   territorial infarct, within limits of noncontrast CT technique.    < end of copied text >  < from: CT Angio Neck Stroke Protocol w/ IV Cont (10.26.23 @ 04:53) >  CT ANGIOGRAPHY NECK:  1.  The right internal carotid artery is occluded approximately 1.5 cm   distal to its origin, possibly due to underlying dissection.  2.  Endotracheal tube is low in position, with its tip approximate 1 cm   above the slade.  3.  Diffuse ill-defined groundglass opacity in both lungs, which may   represent pulmonary edema, infection and/or atelectasis.  4.  There is a discrete right upper lobe groundglass opacity measuring   1.9 x 1.4 cm; this may represent a focus of infection, inflammation,   edema, hemorrhage, fibrosis or malignancy.  A follow-up chest CT is   warranted in three months to assess for resolution.    CT ANGIOGRAPHY BRAIN: The right internal carotid arteries occluded.    There is no significant flow related opacification within the proximal   right middle cerebral artery.  There is mild reconstitution of flow in   some distal branch vessels of the inferior division of the right middle   cerebral artery.  The right anterior cerebral artery fills across the   anterior communicating artery.    < end of copied text >  < from: CT Brain Perfusion Maps Stroke (10.26.23 @ 04:54) >  CT PERFUSION: CT perfusion is consistent with large acute infarct in the   right middle cerebral artery vascular territory and small to moderate   amount of surrounding ischemic penumbra.  Hypoperfusion index is 0.3.    Core infarct (CBF <  30%:): 136 mL  Penumbra (Tmax >6s): 194 mL  Mismatched volume: 58 mL  Mismatched ratio: 1.4    < end of copied text >      < from: CT Head No Cont (10.22.19 @ 15:57) >  IMPRESSION:    1)  chronic ischemic changes in both hemispheres with volume loss. There   are no new infarct as compared to prior CT. This may be further assessed   with MR imaging.  2)  no hemorrhage or mass identified.    < end of copied text >  < from: CT Angio Head w/ IV Cont (10.22.19 @ 15:57) >  IMPRESSION    1. There is intimal thickening and calcified plaque in both carotid   bifurcations in the neck, but without significant stenosis. No evidence   of carotid vertebral occlusion or dissection.  2. Intracranially, there are atherosclerotic changes in the anterior and   posterior circulation, but without significant stenosis or occlusion.    < end of copied text >  < from: CT Brain Stroke Protocol (10.26.23 @ 04:37) >  IMPRESSION:  No evidence of acute intracranial hemorrhage, mass effect or large acute   territorial infarct, within limits of noncontrast CT technique.    < end of copied text >  < from: CT Angio Neck Stroke Protocol w/ IV Cont (10.26.23 @ 04:53) >  CT ANGIOGRAPHY NECK:  1.  The right internal carotid artery is occluded approximately 1.5 cm   distal to its origin, possibly due to underlying dissection.  2.  Endotracheal tube is low in position, with its tip approximate 1 cm   above the slade.  3.  Diffuse ill-defined groundglass opacity in both lungs, which may   represent pulmonary edema, infection and/or atelectasis.  4.  There is a discrete right upper lobe groundglass opacity measuring   1.9 x 1.4 cm; this may represent a focus of infection, inflammation,   edema, hemorrhage, fibrosis or malignancy.  A follow-up chest CT is   warranted in three months to assess for resolution.    CT ANGIOGRAPHY BRAIN: The right internal carotid arteries occluded.    There is no significant flow related opacification within the proximal   right middle cerebral artery.  There is mild reconstitution of flow in   some distal branch vessels of the inferior division of the right middle   cerebral artery.  The right anterior cerebral artery fills across the   anterior communicating artery.    < end of copied text >  < from: CT Brain Perfusion Maps Stroke (10.26.23 @ 04:54) >  CT PERFUSION: CT perfusion is consistent with large acute infarct in the   right middle cerebral artery vascular territory and small to moderate   amount of surrounding ischemic penumbra.  Hypoperfusion index is 0.3.    Core infarct (CBF <  30%:): 136 mL  Penumbra (Tmax >6s): 194 mL  Mismatched volume: 58 mL  Mismatched ratio: 1.4    < end of copied text >      < from: CT Head No Cont (10.22.19 @ 15:57) >  IMPRESSION:    1)  chronic ischemic changes in both hemispheres with volume loss. There   are no new infarct as compared to prior CT. This may be further assessed   with MR imaging.  2)  no hemorrhage or mass identified.    < end of copied text >  < from: CT Angio Head w/ IV Cont (10.22.19 @ 15:57) >  IMPRESSION    1. There is intimal thickening and calcified plaque in both carotid   bifurcations in the neck, but without significant stenosis. No evidence   of carotid vertebral occlusion or dissection.  2. Intracranially, there are atherosclerotic changes in the anterior and   posterior circulation, but without significant stenosis or occlusion.    < end of copied text >  < from: CT Brain Stroke Protocol (10.26.23 @ 04:37) >  IMPRESSION:  No evidence of acute intracranial hemorrhage, mass effect or large acute   territorial infarct, within limits of noncontrast CT technique.    < end of copied text >  < from: CT Angio Neck Stroke Protocol w/ IV Cont (10.26.23 @ 04:53) >  CT ANGIOGRAPHY NECK:  1.  The right internal carotid artery is occluded approximately 1.5 cm   distal to its origin, possibly due to underlying dissection.  2.  Endotracheal tube is low in position, with its tip approximate 1 cm   above the slade.  3.  Diffuse ill-defined groundglass opacity in both lungs, which may   represent pulmonary edema, infection and/or atelectasis.  4.  There is a discrete right upper lobe groundglass opacity measuring   1.9 x 1.4 cm; this may represent a focus of infection, inflammation,   edema, hemorrhage, fibrosis or malignancy.  A follow-up chest CT is   warranted in three months to assess for resolution.    CT ANGIOGRAPHY BRAIN: The right internal carotid arteries occluded.    There is no significant flow related opacification within the proximal   right middle cerebral artery.  There is mild reconstitution of flow in   some distal branch vessels of the inferior division of the right middle   cerebral artery.  The right anterior cerebral artery fills across the   anterior communicating artery.    < end of copied text >  < from: CT Brain Perfusion Maps Stroke (10.26.23 @ 04:54) >  CT PERFUSION: CT perfusion is consistent with large acute infarct in the   right middle cerebral artery vascular territory and small to moderate   amount of surrounding ischemic penumbra.  Hypoperfusion index is 0.3.    Core infarct (CBF <  30%:): 136 mL  Penumbra (Tmax >6s): 194 mL  Mismatched volume: 58 mL  Mismatched ratio: 1.4    < end of copied text >    EEG Classification / Summary:  Abnormal EEG study  1. Intermittent periodic discharges seen intermittently over the left hemisphere, sometimes with triphasic morphology  2. Focal continuous right hemispheric slowing  3. Moderate generalized background slowing    -----------------------------------------------------------------------------------------------------    Clinical Impression:  1. While the periodic pattern noted above may represent lateralized periodic discharges indicating epileptic potential in the left hemisphere, more likely these represent generalized periodic discharges with triphasic morphology, consistent with metabolic etiology, that are poorly expressed on the right due to the effects of the stroke.   2. Structural abnormality in right hemisphere  3. Moderate diffuse/multi-focal cerebral dysfunction, not specific as to etiology.    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 Significant   associated mass effect and associated herniation.      < end of copied text >  < from: CT Head No Cont (11.02.23 @ 14:30) >    Reidentified are large subacute MCA and IJMMY infarcts throughout the right   frontal parietal and temporal lobes and right basalganglia, with severe   right to left midline shift and severe mass effect upon the ventricular   system, not significantly changed. There is effacement of all of the   basilar cisterns. Effacement of the sulci in the bilateral cerebri,   compatible with increased brain edema.    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    Unsuccessful attempts were made to reach the ordering clinician via teams.      --- End of Report ---    < end of copied text >

## 2023-12-20 NOTE — PROGRESS NOTE ADULT - ASSESSMENT
88 year old female with a PMH of HTN, HLD and afib on xarelto who did not take medications for past 1 week prior to hospitalization here with R MCA infarct.   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  Very poor prognosis for any functional neurologic prognosis  Pending transfer to LTAC facility in [place] vs palliative wean, family now not consenting to palliative wean  Call with questions   88 year old female with a PMH of HTN, HLD and afib on xarelto who did not take medications for past 1 week prior to hospitalization here with R MCA infarct.   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  Very poor prognosis for any functional neurologic prognosis  Pending transfer to LTAC facility in [place] vs palliative wean, family now not consenting to palliative wean  Call with questions

## 2023-12-21 NOTE — PROGRESS NOTE ADULT - ASSESSMENT
88 year old female with a PMH of HTN, HLD and afib on xarelto who did not take medications for past 1 week prior to hospitalization here with R MCA infarct.   CTH neg  CTA with R ICA occlusion  CTP with large R MCA core infarct, not ammenable to thrombectomy   EEG with LPDs over the L hemisphere, R hemispheric slowing, no seizures seen  Repeat CTH 10/30 with large ICA territory infarct with sig shift, edema and herniation  repeat CTH 11/2 evolution of Right ICA/MCA, right CA, increased edema    R ICA infarct 2/2 R ICA occlusion, likely cardioembolic from AF    Plan:    Continue [facility] discussion - palliative extubation?  Very poor prognosis for any functional neurologic prognosis  Pending transfer to LTAC facility in [place] vs palliative wean, family now not consenting to palliative wean  Call with questions   88 year old female with a PMH of HTN, HLD and afib on xarelto who did not take medications for past 1 week prior to hospitalization here with R MCA infarct.   CTH neg  CTA with R ICA occlusion  CTP with large R MCA core infarct, not ammenable to thrombectomy   EEG with LPDs over the L hemisphere, R hemispheric slowing, no seizures seen  Repeat CTH 10/30 with large ICA territory infarct with sig shift, edema and herniation  repeat CTH 11/2 evolution of Right ICA/MCA, right CA, increased edema    R ICA infarct 2/2 R ICA occlusion, likely cardioembolic from AF    Plan:    Continue [facility] discussion - palliative extubation?  Very poor prognosis for any functional neurologic prognosis  Pending transfer to LTAC facility in [place] vs palliative wean, family now not consenting to palliative wean  Call with questions

## 2023-12-21 NOTE — PROGRESS NOTE ADULT - ASSESSMENT
87 y/o F w/hypertension, HLD and Afib on AC admitted for acute CVA, not a candidate for TNK or endovascular intervention. Intubated for respiratory failure in setting of stroke. CT chest with pulmonary nodule. Never regained adequate mental status.  Neuro: C/w ASA/Statin  Respiratory: Continue mechanical ventilation, wean as tolerated (mental status is barrier to extubation)  CV: bradycardic  ID: Monitor off abx  Heme: DVT prophylaxis  GOC discussions - family does not want trach, but not consenting to palliative wean. Ethics involved.

## 2023-12-21 NOTE — PROGRESS NOTE ADULT - SUBJECTIVE AND OBJECTIVE BOX
Progress Note    ALBA PETERSON 88y (1935) Female [id number]  10-26-23 (56d)      Chief Complaint: Ischemic Stroke, Poor MS requiring Intubation    Subjective:  No acute events overnight. Patient seen and examined at bedside.    Review of Systems: Unable to obtain d/t mental status.    PAST MEDICAL & SURGICAL HISTORY:  HTN (hypertension) [I10]  Elevated cholesterol [E78.00]  Atrial fibrillation [I48.91]  Hyperlipidemia [E78.5]  No significant past surgical history [235232515]      acetaminophen     Tablet .. 650 milliGRAM(s) Oral every 6 hours PRN  amLODIPine   Tablet 10 milliGRAM(s) Oral daily  aspirin  chewable 81 milliGRAM(s) Oral daily  atorvastatin 80 milliGRAM(s) Oral at bedtime  bisacodyl Suppository 10 milliGRAM(s) Rectal daily PRN  chlorhexidine 0.12% Liquid 15 milliLiter(s) Oral Mucosa every 12 hours  chlorhexidine 2% Cloths 1 Application(s) Topical <User Schedule>  enoxaparin Injectable 40 milliGRAM(s) SubCutaneous every 24 hours  hydrALAZINE 100 milliGRAM(s) Oral every 8 hours  influenza  Vaccine (HIGH DOSE) 0.7 milliLiter(s) IntraMuscular once  polyethylene glycol 3350 17 Gram(s) Oral daily  scopolamine 1 mG/72 Hr(s) Patch 1 Patch Transdermal every 72 hours  senna 2 Tablet(s) Oral at bedtime    Objective:  T(C): 35.9 (12-21-23 @ 11:36), Max: 36.1 (12-21-23 @ 04:00)  HR: 63 (12-21-23 @ 15:20) (55 - 72)  BP: 119/52 (12-21-23 @ 15:20) (119/52 - 139/69)  RR: 20 (12-21-23 @ 15:20) (15 - 26)  SpO2: 98% (12-21-23 @ 15:20) (96% - 100%)    Physical exam:  GENERAL: NAD, cachectic  HEAD:  Atraumatic, Normocephalic  EYES: PERRLA  NECK: Supple, No JVD  CHEST/LUNG: Clear to auscultation bilaterally.  HEART: Bradycardic. Regular rhythm; No murmurs, rubs, or gallops  ABDOMEN: Soft, Nondistended; Bowel sounds present  EXTREMITIES:  2+ Peripheral Pulses, No clubbing, cyanosis, or edema      12-20-23 @ 07:01  -  12-21-23 @ 07:00  --------------------------------------------------------  IN: 250 mL / OUT: 700 mL / NET: -450 mL    12-21-23 @ 07:01  -  12-21-23 @ 15:35  --------------------------------------------------------  IN: 370 mL / OUT: 0 mL / NET: 370 mL     Progress Note    ALBA PETERSON 88y (1935) Female [id number]  10-26-23 (56d)      Chief Complaint: Ischemic Stroke, Poor MS requiring Intubation    Subjective:  No acute events overnight. Patient seen and examined at bedside.    Review of Systems: Unable to obtain d/t mental status.    PAST MEDICAL & SURGICAL HISTORY:  HTN (hypertension) [I10]  Elevated cholesterol [E78.00]  Atrial fibrillation [I48.91]  Hyperlipidemia [E78.5]  No significant past surgical history [[id number]]      acetaminophen     Tablet .. 650 milliGRAM(s) Oral every 6 hours PRN  amLODIPine   Tablet 10 milliGRAM(s) Oral daily  aspirin  chewable 81 milliGRAM(s) Oral daily  atorvastatin 80 milliGRAM(s) Oral at bedtime  bisacodyl Suppository 10 milliGRAM(s) Rectal daily PRN  chlorhexidine 0.12% Liquid 15 milliLiter(s) Oral Mucosa every 12 hours  chlorhexidine 2% Cloths 1 Application(s) Topical <User Schedule>  enoxaparin Injectable 40 milliGRAM(s) SubCutaneous every 24 hours  hydrALAZINE 100 milliGRAM(s) Oral every 8 hours  influenza  Vaccine (HIGH DOSE) 0.7 milliLiter(s) IntraMuscular once  polyethylene glycol 3350 17 Gram(s) Oral daily  scopolamine 1 mG/72 Hr(s) Patch 1 Patch Transdermal every 72 hours  senna 2 Tablet(s) Oral at bedtime    Objective:  T(C): 35.9 (12-21-23 @ 11:36), Max: 36.1 (12-21-23 @ 04:00)  HR: 63 (12-21-23 @ 15:20) (55 - 72)  BP: 119/52 (12-21-23 @ 15:20) (119/52 - 139/69)  RR: 20 (12-21-23 @ 15:20) (15 - 26)  SpO2: 98% (12-21-23 @ 15:20) (96% - 100%)    Physical exam:  GENERAL: NAD, cachectic  HEAD:  Atraumatic, Normocephalic  EYES: PERRLA  NECK: Supple, No JVD  CHEST/LUNG: Clear to auscultation bilaterally.  HEART: Bradycardic. Regular rhythm; No murmurs, rubs, or gallops  ABDOMEN: Soft, Nondistended; Bowel sounds present  EXTREMITIES:  2+ Peripheral Pulses, No clubbing, cyanosis, or edema      12-20-23 @ 07:01  -  12-21-23 @ 07:00  --------------------------------------------------------  IN: 250 mL / OUT: 700 mL / NET: -450 mL    12-21-23 @ 07:01  -  12-21-23 @ 15:35  --------------------------------------------------------  IN: 370 mL / OUT: 0 mL / NET: 370 mL

## 2023-12-21 NOTE — CHART NOTE - NSCHARTNOTEFT_GEN_A_CORE
Per chart pt with PMH:  HTN, HLD and afib, presents with AMS, found with respiratory failure in setting of acute stroke. Pt remains intubated. Ethics following; pt's family does not want palliative extubation or trach placement at this time.     Factors impacting intake: [ ] none [ ] nausea  [x] vomiting [ ] diarrhea [ ] constipation  [ ]chewing problems [ ] swallowing issues  [ ] other:     Diet Prescription: Diet, NPO with Tube Feed:   Tube Feeding Modality: Orogastric  Jevity 1.5 Messi  Total Volume for 24 Hours (mL): 990  Continuous  Until Goal Tube Feed Rate (mL per Hour): 55  Tube Feed Duration (in Hours): 18  Tube Feed Start Time: 17:00  Tube Feed Stop Time: 11:00 (11-30-23 @ 16:47)    Intake: Tube feed currently held secondary to multiple episodes of emesis on 12/20. Tube feed as ordered Jevity 1.5 @ goal of 55 ml/hr x 18 hours provides 990 ml total volume, 1485 calories, 63 grams protein, 752 ml free water meeting <75% needs as estimated 10/28    Current Weight: (12/21) 64.4kg (10/28) 63.4kg  % Weight Change: weight stable     Edema: 1+ generalized and 2+ left and right hands as per flow sheets    Pertinent Medications: MEDICATIONS  (STANDING):  amLODIPine   Tablet 10 milliGRAM(s) Oral daily  aspirin  chewable 81 milliGRAM(s) Oral daily  atorvastatin 80 milliGRAM(s) Oral at bedtime  chlorhexidine 0.12% Liquid 15 milliLiter(s) Oral Mucosa every 12 hours  chlorhexidine 2% Cloths 1 Application(s) Topical <User Schedule>  enoxaparin Injectable 40 milliGRAM(s) SubCutaneous every 24 hours  hydrALAZINE 100 milliGRAM(s) Oral every 8 hours  influenza  Vaccine (HIGH DOSE) 0.7 milliLiter(s) IntraMuscular once  polyethylene glycol 3350 17 Gram(s) Oral daily  scopolamine 1 mG/72 Hr(s) Patch 1 Patch Transdermal every 72 hours  senna 2 Tablet(s) Oral at bedtime    MEDICATIONS  (PRN):  acetaminophen     Tablet .. 650 milliGRAM(s) Oral every 6 hours PRN Temp greater or equal to 38C (100.4F)  bisacodyl Suppository 10 milliGRAM(s) Rectal daily PRN Constipation    Pertinent Labs: no recent labs to trend      Skin: no pressure injuries as per flow sheets     Estimated Needs:   [x] no change since previous assessment: 10/28  [ ] recalculated:     Previous Nutrition Diagnosis:   [ ] No previous nutrition diagnosis    Nutrition Diagnosis is [ ] ongoing  [ ] resolved [x] not applicable     New Nutrition Diagnosis: [x] Inadequate energy intake    Inability to consume sufficient energy secondary to emesis    Tube feed held    Pt to meet >50% needs via tolerated route      Interventions:   Recommend  [ ] Change Diet To:  [ ] Nutrition Supplement  [x] Nutrition Support: When/if medically feasible and if within pt/family wishes for treatment and care reinitiate tube feed as ordered above  [ ] Other:     Monitoring and Evaluation:   [ ] PO intake [ x ] Tolerance to diet prescription [ x ] weights [ x ] labs[ x ] follow up per protocol  [ ] other:

## 2023-12-22 NOTE — PROVIDER CONTACT NOTE (CRITICAL VALUE NOTIFICATION) - TEST AND RESULT REPORTED:
lactate 2.3  Troponin 380.8
Calcium less than 3 and Phos 1.0
Urine culture from !2/19/23  Positive for above 100,000 CFU per ml , E-Coli, ESBL

## 2023-12-22 NOTE — PROGRESS NOTE ADULT - SUBJECTIVE AND OBJECTIVE BOX
Neurology Progress Note    S: Patient seen and examined.       Medications: MEDICATIONS  (STANDING):  amLODIPine   Tablet 10 milliGRAM(s) Oral daily  aspirin  chewable 81 milliGRAM(s) Oral daily  atorvastatin 80 milliGRAM(s) Oral at bedtime  chlorhexidine 0.12% Liquid 15 milliLiter(s) Oral Mucosa every 12 hours  chlorhexidine 2% Cloths 1 Application(s) Topical <User Schedule>  enoxaparin Injectable 40 milliGRAM(s) SubCutaneous every 24 hours  hydrALAZINE 100 milliGRAM(s) Oral every 8 hours  influenza  Vaccine (HIGH DOSE) 0.7 milliLiter(s) IntraMuscular once  polyethylene glycol 3350 17 Gram(s) Oral daily  scopolamine 1 mG/72 Hr(s) Patch 1 Patch Transdermal every 72 hours  senna 2 Tablet(s) Oral at bedtime    MEDICATIONS  (PRN):  acetaminophen     Tablet .. 650 milliGRAM(s) Oral every 6 hours PRN Temp greater or equal to 38C (100.4F)  bisacodyl Suppository 10 milliGRAM(s) Rectal daily PRN Constipation       Vitals:  Vital Signs Last 24 Hrs  T(C): 35.3 (22 Dec 2023 12:00), Max: 36.6 (22 Dec 2023 07:10)  T(F): 95.5 (22 Dec 2023 12:00), Max: 97.8 (22 Dec 2023 07:10)  HR: 61 (22 Dec 2023 12:00) (56 - 69)  BP: 157/61 (22 Dec 2023 12:00) (127/61 - 157/61)  BP(mean): 88 (22 Dec 2023 12:00) (81 - 88)  RR: 15 (22 Dec 2023 12:00) (15 - 25)  SpO2: 95% (22 Dec 2023 12:00) (95% - 99%)              Neurological Exam:  Mental Status: Intubated, not sedated. some spont eye opening. No verbal output, does not follow commands.   Cranial Nerves: pupils very sluggish R, L near fixed, no  corneals, no VOR, intermittent eyelid fluttering no facial asymmetry , weak cough/gag  Motor: dec tone throughout, no spont movemnt noted   Sensation: UE extensor posturing, LE triple flexion left toe up    I personally reviewed the below data/images/labs:      < from: CT Head No Cont (10.22.19 @ 15:57) >  IMPRESSION:    1)  chronic ischemic changes in both hemispheres with volume loss. There   are no new infarct as compared to prior CT. This may be further assessed   with MR imaging.  2)  no hemorrhage or mass identified.      LABS:                      < end of copied text >  < from: CT Angio Head w/ IV Cont (10.22.19 @ 15:57) >  IMPRESSION    1. There is intimal thickening and calcified plaque in both carotid   bifurcations in the neck, but without significant stenosis. No evidence   of carotid vertebral occlusion or dissection.  2. Intracranially, there are atherosclerotic changes in the anterior and   posterior circulation, but without significant stenosis or occlusion.    < end of copied text >  < from: CT Brain Stroke Protocol (10.26.23 @ 04:37) >  IMPRESSION:  No evidence of acute intracranial hemorrhage, mass effect or large acute   territorial infarct, within limits of noncontrast CT technique.    < end of copied text >  < from: CT Angio Neck Stroke Protocol w/ IV Cont (10.26.23 @ 04:53) >  CT ANGIOGRAPHY NECK:  1.  The right internal carotid artery is occluded approximately 1.5 cm   distal to its origin, possibly due to underlying dissection.  2.  Endotracheal tube is low in position, with its tip approximate 1 cm   above the slade.  3.  Diffuse ill-defined groundglass opacity in both lungs, which may   represent pulmonary edema, infection and/or atelectasis.  4.  There is a discrete right upper lobe groundglass opacity measuring   1.9 x 1.4 cm; this may represent a focus of infection, inflammation,   edema, hemorrhage, fibrosis or malignancy.  A follow-up chest CT is   warranted in three months to assess for resolution.    CT ANGIOGRAPHY BRAIN: The right internal carotid arteries occluded.    There is no significant flow related opacification within the proximal   right middle cerebral artery.  There is mild reconstitution of flow in   some distal branch vessels of the inferior division of the right middle   cerebral artery.  The right anterior cerebral artery fills across the   anterior communicating artery.    < end of copied text >  < from: CT Brain Perfusion Maps Stroke (10.26.23 @ 04:54) >  CT PERFUSION: CT perfusion is consistent with large acute infarct in the   right middle cerebral artery vascular territory and small to moderate   amount of surrounding ischemic penumbra.  Hypoperfusion index is 0.3.    Core infarct (CBF <  30%:): 136 mL  Penumbra (Tmax >6s): 194 mL  Mismatched volume: 58 mL  Mismatched ratio: 1.4    < end of copied text >      < from: CT Head No Cont (10.22.19 @ 15:57) >  IMPRESSION:    1)  chronic ischemic changes in both hemispheres with volume loss. There   are no new infarct as compared to prior CT. This may be further assessed   with MR imaging.  2)  no hemorrhage or mass identified.    < end of copied text >  < from: CT Angio Head w/ IV Cont (10.22.19 @ 15:57) >  IMPRESSION    1. There is intimal thickening and calcified plaque in both carotid   bifurcations in the neck, but without significant stenosis. No evidence   of carotid vertebral occlusion or dissection.  2. Intracranially, there are atherosclerotic changes in the anterior and   posterior circulation, but without significant stenosis or occlusion.    < end of copied text >  < from: CT Brain Stroke Protocol (10.26.23 @ 04:37) >  IMPRESSION:  No evidence of acute intracranial hemorrhage, mass effect or large acute   territorial infarct, within limits of noncontrast CT technique.    < end of copied text >  < from: CT Angio Neck Stroke Protocol w/ IV Cont (10.26.23 @ 04:53) >  CT ANGIOGRAPHY NECK:  1.  The right internal carotid artery is occluded approximately 1.5 cm   distal to its origin, possibly due to underlying dissection.  2.  Endotracheal tube is low in position, with its tip approximate 1 cm   above the slade.  3.  Diffuse ill-defined groundglass opacity in both lungs, which may   represent pulmonary edema, infection and/or atelectasis.  4.  There is a discrete right upper lobe groundglass opacity measuring   1.9 x 1.4 cm; this may represent a focus of infection, inflammation,   edema, hemorrhage, fibrosis or malignancy.  A follow-up chest CT is   warranted in three months to assess for resolution.    CT ANGIOGRAPHY BRAIN: The right internal carotid arteries occluded.    There is no significant flow related opacification within the proximal   right middle cerebral artery.  There is mild reconstitution of flow in   some distal branch vessels of the inferior division of the right middle   cerebral artery.  The right anterior cerebral artery fills across the   anterior communicating artery.    < end of copied text >  < from: CT Brain Perfusion Maps Stroke (10.26.23 @ 04:54) >  CT PERFUSION: CT perfusion is consistent with large acute infarct in the   right middle cerebral artery vascular territory and small to moderate   amount of surrounding ischemic penumbra.  Hypoperfusion index is 0.3.    Core infarct (CBF <  30%:): 136 mL  Penumbra (Tmax >6s): 194 mL  Mismatched volume: 58 mL  Mismatched ratio: 1.4    < end of copied text >      < from: CT Head No Cont (10.22.19 @ 15:57) >  IMPRESSION:    1)  chronic ischemic changes in both hemispheres with volume loss. There   are no new infarct as compared to prior CT. This may be further assessed   with MR imaging.  2)  no hemorrhage or mass identified.    < end of copied text >  < from: CT Angio Head w/ IV Cont (10.22.19 @ 15:57) >  IMPRESSION    1. There is intimal thickening and calcified plaque in both carotid   bifurcations in the neck, but without significant stenosis. No evidence   of carotid vertebral occlusion or dissection.  2. Intracranially, there are atherosclerotic changes in the anterior and   posterior circulation, but without significant stenosis or occlusion.    < end of copied text >  < from: CT Brain Stroke Protocol (10.26.23 @ 04:37) >  IMPRESSION:  No evidence of acute intracranial hemorrhage, mass effect or large acute   territorial infarct, within limits of noncontrast CT technique.    < end of copied text >  < from: CT Angio Neck Stroke Protocol w/ IV Cont (10.26.23 @ 04:53) >  CT ANGIOGRAPHY NECK:  1.  The right internal carotid artery is occluded approximately 1.5 cm   distal to its origin, possibly due to underlying dissection.  2.  Endotracheal tube is low in position, with its tip approximate 1 cm   above the slade.  3.  Diffuse ill-defined groundglass opacity in both lungs, which may   represent pulmonary edema, infection and/or atelectasis.  4.  There is a discrete right upper lobe groundglass opacity measuring   1.9 x 1.4 cm; this may represent a focus of infection, inflammation,   edema, hemorrhage, fibrosis or malignancy.  A follow-up chest CT is   warranted in three months to assess for resolution.    CT ANGIOGRAPHY BRAIN: The right internal carotid arteries occluded.    There is no significant flow related opacification within the proximal   right middle cerebral artery.  There is mild reconstitution of flow in   some distal branch vessels of the inferior division of the right middle   cerebral artery.  The right anterior cerebral artery fills across the   anterior communicating artery.    < end of copied text >  < from: CT Brain Perfusion Maps Stroke (10.26.23 @ 04:54) >  CT PERFUSION: CT perfusion is consistent with large acute infarct in the   right middle cerebral artery vascular territory and small to moderate   amount of surrounding ischemic penumbra.  Hypoperfusion index is 0.3.    Core infarct (CBF <  30%:): 136 mL  Penumbra (Tmax >6s): 194 mL  Mismatched volume: 58 mL  Mismatched ratio: 1.4    < end of copied text >    EEG Classification / Summary:  Abnormal EEG study  1. Intermittent periodic discharges seen intermittently over the left hemisphere, sometimes with triphasic morphology  2. Focal continuous right hemispheric slowing  3. Moderate generalized background slowing    -----------------------------------------------------------------------------------------------------    Clinical Impression:  1. While the periodic pattern noted above may represent lateralized periodic discharges indicating epileptic potential in the left hemisphere, more likely these represent generalized periodic discharges with triphasic morphology, consistent with metabolic etiology, that are poorly expressed on the right due to the effects of the stroke.   2. Structural abnormality in right hemisphere  3. Moderate diffuse/multi-focal cerebral dysfunction, not specific as to etiology.    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 Significant   associated mass effect and associated herniation.      < end of copied text >  < from: CT Head No Cont (11.02.23 @ 14:30) >    Reidentified are 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 There is effacement of all of the   basilar cisterns. Effacement of the sulci in the bilateral cerebri,   compatible with increased brain edema.    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    Unsuccessful attempts were made to reach the ordering clinician via teams.      --- End of Report ---    < end of copied text >

## 2023-12-22 NOTE — PROGRESS NOTE ADULT - ASSESSMENT
88 year old female with a PMH of HTN, HLD and afib on xarelto who did not take medications for past 1 week prior to hospitalization here with R MCA infarct.   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  Very poor prognosis for any functional neurologic prognosis  Pending transfer to LTAC facility in [place] vs palliative wean, family now not consenting to palliative wean  Call with questions   88 year old female with a PMH of HTN, HLD and afib on xarelto who did not take medications for past 1 week prior to hospitalization here with R MCA infarct.   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  Very poor prognosis for any functional neurologic prognosis  Pending transfer to LTAC facility in [place] vs palliative wean, family now not consenting to palliative wean  Call with questions

## 2023-12-22 NOTE — PROGRESS NOTE ADULT - SUBJECTIVE AND OBJECTIVE BOX
Progress Note    ALBA PETERSON 88y (1935) Female [id number]  10-26-23 (57d)      Chief Complaint: Ischemic Stroke, Poor MS requiring Intubation    Subjective:  Mr. Hernandez vomited overnight and then again this morning. Tube feeds held.     Review of Systems: Unable to obtain due to mental status that is now permanent.      PAST MEDICAL & SURGICAL HISTORY:  HTN (hypertension) [I10]    Elevated cholesterol [E78.00]    Atrial fibrillation [I48.91]    Hyperlipidemia [E78.5]    No significant past surgical history [569443510]    No significant past surgical history [900274109]    No significant past surgical history [496010044]    No significant past surgical history [297112374]      acetaminophen     Tablet .. 650 milliGRAM(s) Oral every 6 hours PRN  amLODIPine   Tablet 10 milliGRAM(s) Oral daily  aspirin  chewable 81 milliGRAM(s) Oral daily  atorvastatin 80 milliGRAM(s) Oral at bedtime  bisacodyl Suppository 10 milliGRAM(s) Rectal daily PRN  chlorhexidine 0.12% Liquid 15 milliLiter(s) Oral Mucosa every 12 hours  chlorhexidine 2% Cloths 1 Application(s) Topical <User Schedule>  enoxaparin Injectable 40 milliGRAM(s) SubCutaneous every 24 hours  hydrALAZINE 100 milliGRAM(s) Oral every 8 hours  influenza  Vaccine (HIGH DOSE) 0.7 milliLiter(s) IntraMuscular once  polyethylene glycol 3350 17 Gram(s) Oral daily  scopolamine 1 mG/72 Hr(s) Patch 1 Patch Transdermal every 72 hours  senna 2 Tablet(s) Oral at bedtime    Objective:  T(C): 36.6 (12-22-23 @ 07:10), Max: 36.6 (12-22-23 @ 07:10)  HR: 69 (12-22-23 @ 08:30) (56 - 69)  BP: 151/58 (12-22-23 @ 04:00) (119/52 - 151/58)  RR: 22 (12-22-23 @ 04:00) (17 - 25)  SpO2: 97% (12-22-23 @ 08:30) (97% - 100%)    Physical exam:  GENERAL: NAD  HEAD:  Atraumatic, Normocephalic  NECK: No JVD. Intubated.   CHEST/LUNG: Vented breath sounds.   HEART: Bradycardic, regular rhythm  ABDOMEN: Soft. Non-distended.   EXTREMITIES:  No clubbing, cyanosis, or edema      12-21-23 @ 07:01  -  12-22-23 @ 07:00  --------------------------------------------------------  IN: 1690 mL / OUT: 650 mL / NET: 1040 mL        CAPILLARY BLOOD GLUCOSE                    RVP:          Tox:             WBC Trend:     Hb Trend:         New imaging in last 24 hours:  Consult notes reviewed: Progress Note    ALBA PETERSON 88y (1935) Female [id number]  10-26-23 (57d)      Chief Complaint: Ischemic Stroke, Poor MS requiring Intubation    Subjective:  Mr. Hernandez vomited overnight and then again this morning. Tube feeds held.     Review of Systems: Unable to obtain due to mental status that is now permanent.      PAST MEDICAL & SURGICAL HISTORY:  HTN (hypertension) [I10]    Elevated cholesterol [E78.00]    Atrial fibrillation [I48.91]    Hyperlipidemia [E78.5]    No significant past surgical history [201134227]    No significant past surgical history [129909471]    No significant past surgical history [[id number]]    No significant past surgical history [[id number]]      acetaminophen     Tablet .. 650 milliGRAM(s) Oral every 6 hours PRN  amLODIPine   Tablet 10 milliGRAM(s) Oral daily  aspirin  chewable 81 milliGRAM(s) Oral daily  atorvastatin 80 milliGRAM(s) Oral at bedtime  bisacodyl Suppository 10 milliGRAM(s) Rectal daily PRN  chlorhexidine 0.12% Liquid 15 milliLiter(s) Oral Mucosa every 12 hours  chlorhexidine 2% Cloths 1 Application(s) Topical <User Schedule>  enoxaparin Injectable 40 milliGRAM(s) SubCutaneous every 24 hours  hydrALAZINE 100 milliGRAM(s) Oral every 8 hours  influenza  Vaccine (HIGH DOSE) 0.7 milliLiter(s) IntraMuscular once  polyethylene glycol 3350 17 Gram(s) Oral daily  scopolamine 1 mG/72 Hr(s) Patch 1 Patch Transdermal every 72 hours  senna 2 Tablet(s) Oral at bedtime    Objective:  T(C): 36.6 (12-22-23 @ 07:10), Max: 36.6 (12-22-23 @ 07:10)  HR: 69 (12-22-23 @ 08:30) (56 - 69)  BP: 151/58 (12-22-23 @ 04:00) (119/52 - 151/58)  RR: 22 (12-22-23 @ 04:00) (17 - 25)  SpO2: 97% (12-22-23 @ 08:30) (97% - 100%)    Physical exam:  GENERAL: NAD  HEAD:  Atraumatic, Normocephalic  NECK: No JVD. Intubated.   CHEST/LUNG: Vented breath sounds.   HEART: Bradycardic, regular rhythm  ABDOMEN: Soft. Non-distended.   EXTREMITIES:  No clubbing, cyanosis, or edema      12-21-23 @ 07:01  -  12-22-23 @ 07:00  --------------------------------------------------------  IN: 1690 mL / OUT: 650 mL / NET: 1040 mL        CAPILLARY BLOOD GLUCOSE                    RVP:          Tox:             WBC Trend:     Hb Trend:         New imaging in last 24 hours:  Consult notes reviewed:

## 2023-12-22 NOTE — PROGRESS NOTE ADULT - ASSESSMENT
87 y/o F w/hypertension, HLD and Afib on AC admitted for acute CVA, not a candidate for TNK or endovascular intervention. Intubated for respiratory failure in setting of stroke. CT chest with pulmonary nodule. Never regained adequate mental status.  Neuro: C/w ASA/Statin  Respiratory: Continue mechanical ventilation, wean as tolerated (mental status is barrier to extubation)  CV: bradycardic continuously.  ID: Monitor off abx  GI: 2 episodes of vomiting. Holding tube feeds starting 12/22.  Heme: DVT prophylaxis  GOC discussions - family does not want trach, but not consenting to palliative wean. Ethics involved.  87 y/o F w/hypertension, HLD and Afib on AC admitted for acute CVA, not a candidate for TNK or endovascular intervention. Intubated for respiratory failure in setting of stroke. CT chest with pulmonary nodule. Never regained adequate mental status.  Neuro: C/w ASA/Statin  Respiratory: Continue mechanical ventilation, wean as tolerated (mental status is barrier to extubation)  CV: bradycardic continuously.  ID: Monitor off abx  GI: 2 episodes of vomiting. Holding tube feeds starting 12/22. NGT to low intermittent suctioning.   Heme: DVT prophylaxis  GOC discussions - family does not want trach, but not consenting to palliative wean. Ethics involved.

## 2023-12-22 NOTE — PROVIDER CONTACT NOTE (CRITICAL VALUE NOTIFICATION) - SITUATION
Lab called with Positive critical urine C/S from 12/19/23 final results . MD prather
Ca less than 3, phos 1.0

## 2023-12-23 NOTE — PROGRESS NOTE ADULT - SUBJECTIVE AND OBJECTIVE BOX
CHIEF COMPLAINT: CVA    Interval Events: No acute events overnight     REVIEW OF SYSTEMS:  Unable to perform ROS due to lack of mental status       OBJECTIVE:  ICU Vital Signs Last 24 Hrs  T(C): 36.2 (23 Dec 2023 08:00), Max: 37.5 (23 Dec 2023 04:00)  T(F): 97.1 (23 Dec 2023 08:00), Max: 99.5 (23 Dec 2023 04:00)  HR: 76 (23 Dec 2023 12:14) (62 - 85)  BP: 149/71 (23 Dec 2023 08:00) (147/45 - 164/63)  BP(mean): 92 (23 Dec 2023 08:00) (76 - 100)  ABP: --  ABP(mean): --  RR: 21 (23 Dec 2023 08:00) (13 - 25)  SpO2: 97% (23 Dec 2023 12:14) (94% - 99%)    O2 Parameters below as of 23 Dec 2023 04:00  Patient On (Oxygen Delivery Method): ventilator    O2 Concentration (%): 21      Mode: AC/ CMV (Assist Control/ Continuous Mandatory Ventilation), RR (machine): 12, TV (machine): 400, FiO2: 21, PEEP: 5, ITime: 0.8, MAP: 9, PIP: 16    12-22 @ 07:01  -  12-23 @ 07:00  --------------------------------------------------------  IN: 110 mL / OUT: 1300 mL / NET: -1190 mL    12-23 @ 07:01  -  12-23 @ 13:57  --------------------------------------------------------  IN: 0 mL / OUT: 50 mL / NET: -50 mL      CAPILLARY BLOOD GLUCOSE          PHYSICAL EXAM:  GENERAL: NAD, well-groomed, well-developed  EYES: EOMI, PERRLA, conjunctiva and sclera clear  ENMT: ET tube in place   CHEST/LUNG: Clear to auscultation bilaterally; No rales, rhonchi, wheezing, or rubs  HEART: Regular rate and rhythm; No murmurs, rubs, or gallops  ABDOMEN: Soft, Nontender, Nondistended; Bowel sounds present  VASCULAR:  2+ Peripheral Pulses, No clubbing, cyanosis, or edema  LYMPH: No lymphadenopathy noted  SKIN: No rashes or lesions  NERVOUS SYSTEM:  Alert & Oriented X0    LINES:    HOSPITAL MEDICATIONS:  aspirin  chewable 81 milliGRAM(s) Oral daily  enoxaparin Injectable 40 milliGRAM(s) SubCutaneous every 24 hours      amLODIPine   Tablet 10 milliGRAM(s) Oral daily  hydrALAZINE 100 milliGRAM(s) Oral every 8 hours    atorvastatin 80 milliGRAM(s) Oral at bedtime      acetaminophen     Tablet .. 650 milliGRAM(s) Oral every 6 hours PRN  scopolamine 1 mG/72 Hr(s) Patch 1 Patch Transdermal every 72 hours    bisacodyl Suppository 10 milliGRAM(s) Rectal daily PRN  polyethylene glycol 3350 17 Gram(s) Oral daily  senna 2 Tablet(s) Oral at bedtime          influenza  Vaccine (HIGH DOSE) 0.7 milliLiter(s) IntraMuscular once    chlorhexidine 0.12% Liquid 15 milliLiter(s) Oral Mucosa every 12 hours  chlorhexidine 2% Cloths 1 Application(s) Topical <User Schedule>        LABS:    Hgb Trend:         Creatinine Trend: 0.57<--, 0.53<--            MICROBIOLOGY:     RADIOLOGY:  [ ] Reviewed and interpreted by me    EKG:

## 2023-12-23 NOTE — PROGRESS NOTE ADULT - SUBJECTIVE AND OBJECTIVE BOX
Neurology Progress Note    S: Patient seen and examined.       Medications: MEDICATIONS  (STANDING):  amLODIPine   Tablet 10 milliGRAM(s) Oral daily  aspirin  chewable 81 milliGRAM(s) Oral daily  atorvastatin 80 milliGRAM(s) Oral at bedtime  chlorhexidine 0.12% Liquid 15 milliLiter(s) Oral Mucosa every 12 hours  chlorhexidine 2% Cloths 1 Application(s) Topical <User Schedule>  enoxaparin Injectable 40 milliGRAM(s) SubCutaneous every 24 hours  hydrALAZINE 100 milliGRAM(s) Oral every 8 hours  influenza  Vaccine (HIGH DOSE) 0.7 milliLiter(s) IntraMuscular once  polyethylene glycol 3350 17 Gram(s) Oral daily  scopolamine 1 mG/72 Hr(s) Patch 1 Patch Transdermal every 72 hours  senna 2 Tablet(s) Oral at bedtime    MEDICATIONS  (PRN):  acetaminophen     Tablet .. 650 milliGRAM(s) Oral every 6 hours PRN Temp greater or equal to 38C (100.4F)  bisacodyl Suppository 10 milliGRAM(s) Rectal daily PRN Constipation       Vitals:  Vital Signs Last 24 Hrs  T(C): 36.2 (23 Dec 2023 08:00), Max: 37.5 (23 Dec 2023 04:00)  T(F): 97.1 (23 Dec 2023 08:00), Max: 99.5 (23 Dec 2023 04:00)  HR: 87 (23 Dec 2023 18:00) (71 - 87)  BP: 141/67 (23 Dec 2023 16:00) (138/68 - 164/63)  BP(mean): 91 (23 Dec 2023 16:00) (87 - 100)  RR: 20 (23 Dec 2023 18:00) (14 - 25)  SpO2: 98% (23 Dec 2023 18:00) (94% - 100%)    Parameters below as of 23 Dec 2023 04:00  Patient On (Oxygen Delivery Method): ventilator    O2 Concentration (%): 21          Neurological Exam:  Mental Status: Intubated, not sedated. some spont eye opening. No verbal output, does not follow commands.   Cranial Nerves: pupils very sluggish R, L near fixed, no  corneals, no VOR, intermittent eyelid fluttering no facial asymmetry , weak cough/gag  Motor: dec tone throughout, no spont movemnt noted   Sensation: UE extensor posturing, LE triple flexion left toe up    I personally reviewed the below data/images/labs:      < from: CT Head No Cont (10.22.19 @ 15:57) >  IMPRESSION:    1)  chronic ischemic changes in both hemispheres with volume loss. There   are no new infarct as compared to prior CT. This may be further assessed   with MR imaging.  2)  no hemorrhage or mass identified.      LABS:                      < end of copied text >  < from: CT Angio Head w/ IV Cont (10.22.19 @ 15:57) >  IMPRESSION    1. There is intimal thickening and calcified plaque in both carotid   bifurcations in the neck, but without significant stenosis. No evidence   of carotid vertebral occlusion or dissection.  2. Intracranially, there are atherosclerotic changes in the anterior and   posterior circulation, but without significant stenosis or occlusion.    < end of copied text >  < from: CT Brain Stroke Protocol (10.26.23 @ 04:37) >  IMPRESSION:  No evidence of acute intracranial hemorrhage, mass effect or large acute   territorial infarct, within limits of noncontrast CT technique.    < end of copied text >  < from: CT Angio Neck Stroke Protocol w/ IV Cont (10.26.23 @ 04:53) >  CT ANGIOGRAPHY NECK:  1.  The right internal carotid artery is occluded approximately 1.5 cm   distal to its origin, possibly due to underlying dissection.  2.  Endotracheal tube is low in position, with its tip approximate 1 cm   above the slade.  3.  Diffuse ill-defined groundglass opacity in both lungs, which may   represent pulmonary edema, infection and/or atelectasis.  4.  There is a discrete right upper lobe groundglass opacity measuring   1.9 x 1.4 cm; this may represent a focus of infection, inflammation,   edema, hemorrhage, fibrosis or malignancy.  A follow-up chest CT is   warranted in three months to assess for resolution.    CT ANGIOGRAPHY BRAIN: The right internal carotid arteries occluded.    There is no significant flow related opacification within the proximal   right middle cerebral artery.  There is mild reconstitution of flow in   some distal branch vessels of the inferior division of the right middle   cerebral artery.  The right anterior cerebral artery fills across the   anterior communicating artery.    < end of copied text >  < from: CT Brain Perfusion Maps Stroke (10.26.23 @ 04:54) >  CT PERFUSION: CT perfusion is consistent with large acute infarct in the   right middle cerebral artery vascular territory and small to moderate   amount of surrounding ischemic penumbra.  Hypoperfusion index is 0.3.    Core infarct (CBF <  30%:): 136 mL  Penumbra (Tmax >6s): 194 mL  Mismatched volume: 58 mL  Mismatched ratio: 1.4    < end of copied text >      < from: CT Head No Cont (10.22.19 @ 15:57) >  IMPRESSION:    1)  chronic ischemic changes in both hemispheres with volume loss. There   are no new infarct as compared to prior CT. This may be further assessed   with MR imaging.  2)  no hemorrhage or mass identified.    < end of copied text >  < from: CT Angio Head w/ IV Cont (10.22.19 @ 15:57) >  IMPRESSION    1. There is intimal thickening and calcified plaque in both carotid   bifurcations in the neck, but without significant stenosis. No evidence   of carotid vertebral occlusion or dissection.  2. Intracranially, there are atherosclerotic changes in the anterior and   posterior circulation, but without significant stenosis or occlusion.    < end of copied text >  < from: CT Brain Stroke Protocol (10.26.23 @ 04:37) >  IMPRESSION:  No evidence of acute intracranial hemorrhage, mass effect or large acute   territorial infarct, within limits of noncontrast CT technique.    < end of copied text >  < from: CT Angio Neck Stroke Protocol w/ IV Cont (10.26.23 @ 04:53) >  CT ANGIOGRAPHY NECK:  1.  The right internal carotid artery is occluded approximately 1.5 cm   distal to its origin, possibly due to underlying dissection.  2.  Endotracheal tube is low in position, with its tip approximate 1 cm   above the slade.  3.  Diffuse ill-defined groundglass opacity in both lungs, which may   represent pulmonary edema, infection and/or atelectasis.  4.  There is a discrete right upper lobe groundglass opacity measuring   1.9 x 1.4 cm; this may represent a focus of infection, inflammation,   edema, hemorrhage, fibrosis or malignancy.  A follow-up chest CT is   warranted in three months to assess for resolution.    CT ANGIOGRAPHY BRAIN: The right internal carotid arteries occluded.    There is no significant flow related opacification within the proximal   right middle cerebral artery.  There is mild reconstitution of flow in   some distal branch vessels of the inferior division of the right middle   cerebral artery.  The right anterior cerebral artery fills across the   anterior communicating artery.    < end of copied text >  < from: CT Brain Perfusion Maps Stroke (10.26.23 @ 04:54) >  CT PERFUSION: CT perfusion is consistent with large acute infarct in the   right middle cerebral artery vascular territory and small to moderate   amount of surrounding ischemic penumbra.  Hypoperfusion index is 0.3.    Core infarct (CBF <  30%:): 136 mL  Penumbra (Tmax >6s): 194 mL  Mismatched volume: 58 mL  Mismatched ratio: 1.4    < end of copied text >      < from: CT Head No Cont (10.22.19 @ 15:57) >  IMPRESSION:    1)  chronic ischemic changes in both hemispheres with volume loss. There   are no new infarct as compared to prior CT. This may be further assessed   with MR imaging.  2)  no hemorrhage or mass identified.    < end of copied text >  < from: CT Angio Head w/ IV Cont (10.22.19 @ 15:57) >  IMPRESSION    1. There is intimal thickening and calcified plaque in both carotid   bifurcations in the neck, but without significant stenosis. No evidence   of carotid vertebral occlusion or dissection.  2. Intracranially, there are atherosclerotic changes in the anterior and   posterior circulation, but without significant stenosis or occlusion.    < end of copied text >  < from: CT Brain Stroke Protocol (10.26.23 @ 04:37) >  IMPRESSION:  No evidence of acute intracranial hemorrhage, mass effect or large acute   territorial infarct, within limits of noncontrast CT technique.    < end of copied text >  < from: CT Angio Neck Stroke Protocol w/ IV Cont (10.26.23 @ 04:53) >  CT ANGIOGRAPHY NECK:  1.  The right internal carotid artery is occluded approximately 1.5 cm   distal to its origin, possibly due to underlying dissection.  2.  Endotracheal tube is low in position, with its tip approximate 1 cm   above the slade.  3.  Diffuse ill-defined groundglass opacity in both lungs, which may   represent pulmonary edema, infection and/or atelectasis.  4.  There is a discrete right upper lobe groundglass opacity measuring   1.9 x 1.4 cm; this may represent a focus of infection, inflammation,   edema, hemorrhage, fibrosis or malignancy.  A follow-up chest CT is   warranted in three months to assess for resolution.    CT ANGIOGRAPHY BRAIN: The right internal carotid arteries occluded.    There is no significant flow related opacification within the proximal   right middle cerebral artery.  There is mild reconstitution of flow in   some distal branch vessels of the inferior division of the right middle   cerebral artery.  The right anterior cerebral artery fills across the   anterior communicating artery.    < end of copied text >  < from: CT Brain Perfusion Maps Stroke (10.26.23 @ 04:54) >  CT PERFUSION: CT perfusion is consistent with large acute infarct in the   right middle cerebral artery vascular territory and small to moderate   amount of surrounding ischemic penumbra.  Hypoperfusion index is 0.3.    Core infarct (CBF <  30%:): 136 mL  Penumbra (Tmax >6s): 194 mL  Mismatched volume: 58 mL  Mismatched ratio: 1.4    < end of copied text >    EEG Classification / Summary:  Abnormal EEG study  1. Intermittent periodic discharges seen intermittently over the left hemisphere, sometimes with triphasic morphology  2. Focal continuous right hemispheric slowing  3. Moderate generalized background slowing    -----------------------------------------------------------------------------------------------------    Clinical Impression:  1. While the periodic pattern noted above may represent lateralized periodic discharges indicating epileptic potential in the left hemisphere, more likely these represent generalized periodic discharges with triphasic morphology, consistent with metabolic etiology, that are poorly expressed on the right due to the effects of the stroke.   2. Structural abnormality in right hemisphere  3. Moderate diffuse/multi-focal cerebral dysfunction, not specific as to etiology.    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 Significant   associated mass effect and associated herniation.      < end of copied text >  < from: CT Head No Cont (11.02.23 @ 14:30) >    Reidentified are 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 There is effacement of all of the   basilar cisterns. Effacement of the sulci in the bilateral cerebri,   compatible with increased brain edema.    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    Unsuccessful attempts were made to reach the ordering clinician via teams.      --- End of Report ---    < end of copied text >

## 2023-12-23 NOTE — PROGRESS NOTE ADULT - ASSESSMENT
88 year old female with a PMH of HTN, HLD and afib on xarelto who did not take medications for past 1 week prior to hospitalization here with R MCA infarct.   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  Very poor prognosis for any functional neurologic prognosis  Pending transfer to LTAC facility in [place] vs palliative wean, family now not consenting to palliative wean  Call with questions   88 year old female with a PMH of HTN, HLD and afib on xarelto who did not take medications for past 1 week prior to hospitalization here with R MCA infarct.   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  Very poor prognosis for any functional neurologic prognosis  Pending transfer to LTAC facility in [place] vs palliative wean, family now not consenting to palliative wean  Call with questions

## 2023-12-23 NOTE — PROGRESS NOTE ADULT - ASSESSMENT
89 y/o F w/hypertension, HLD and Afib on AC admitted for acute CVA, not a candidate for TNK or endovascular intervention. Intubated for respiratory failure in setting of stroke. CT chest with pulmonary nodule. Never regained adequate mental status.  Neuro: C/w ASA/Statin  Respiratory: Continue mechanical ventilation, wean as tolerated (mental status is barrier to extubation)  CV: bradycardic continuously.  ID: Monitor off abx  GI: Replaced OGT. Started trickle feeds   Heme: DVT prophylaxis  GOC discussions - family does not want trach, but not consenting to palliative wean. Ethics involved.

## 2023-12-24 NOTE — PROGRESS NOTE ADULT - ASSESSMENT
89 y/o F w/hypertension, HLD and Afib on AC admitted for acute CVA, not a candidate for TNK or endovascular intervention. Intubated for respiratory failure in setting of stroke. CT chest with pulmonary nodule. Never regained adequate mental status.  Neuro: C/w ASA/Statin  Respiratory: Continue mechanical ventilation, wean as tolerated (mental status is barrier to extubation)  CV: bradycardic continuously.  ID: Monitor off abx  GI: c/w trickle feeds   Heme: DVT prophylaxis  GOC discussions - family does not want trach, but not consenting to palliative wean. Ethics involved.

## 2023-12-24 NOTE — PROGRESS NOTE ADULT - SUBJECTIVE AND OBJECTIVE BOX
CHIEF COMPLAINT: CVA    Interval Events: No acute events overnight     REVIEW OF SYSTEMS:   Unable to perform ROS due to lack of mental status       OBJECTIVE:  ICU Vital Signs Last 24 Hrs  T(C): 36.7 (24 Dec 2023 12:00), Max: 37.2 (23 Dec 2023 19:05)  T(F): 98.1 (24 Dec 2023 12:00), Max: 99 (23 Dec 2023 19:05)  HR: 80 (24 Dec 2023 13:00) (74 - 87)  BP: 128/57 (24 Dec 2023 12:00) (117/49 - 141/67)  BP(mean): 74 (24 Dec 2023 12:00) (70 - 91)  ABP: --  ABP(mean): --  RR: 19 (24 Dec 2023 13:00) (17 - 28)  SpO2: 97% (24 Dec 2023 13:00) (94% - 100%)    O2 Parameters below as of 24 Dec 2023 07:00  Patient On (Oxygen Delivery Method): ventilator          Mode: AC/ CMV (Assist Control/ Continuous Mandatory Ventilation), RR (machine): 12, TV (machine): 400, FiO2: 21, PEEP: 5, ITime: 1, MAP: 8, PIP: 18    12-23 @ 07:01  -  12-24 @ 07:00  --------------------------------------------------------  IN: 400 mL / OUT: 900 mL / NET: -500 mL    12-24 @ 07:01  -  12-24 @ 13:06  --------------------------------------------------------  IN: 180 mL / OUT: 230 mL / NET: -50 mL      CAPILLARY BLOOD GLUCOSE          PHYSICAL EXAM:  GENERAL: NAD, well-groomed, well-developed  EYES: EOMI, PERRLA, conjunctiva and sclera clear  ENMT: No tonsillar erythema, exudates, or enlargement; Moist mucous membranes, Good dentition, No lesions  NECK: Supple, No JVD, Normal thyroid  CHEST/LUNG: ET tube in place   HEART: Regular rate and rhythm; No murmurs, rubs, or gallops  ABDOMEN: Soft, Nontender, Nondistended; Bowel sounds present  VASCULAR:  2+ Peripheral Pulses, No clubbing, cyanosis, or edema  LYMPH: No lymphadenopathy noted  SKIN: No rashes or lesions  NERVOUS SYSTEM:  Alert & Oriented X0    LINES:    HOSPITAL MEDICATIONS:  aspirin  chewable 81 milliGRAM(s) Oral daily  enoxaparin Injectable 40 milliGRAM(s) SubCutaneous every 24 hours      amLODIPine   Tablet 10 milliGRAM(s) Oral daily  hydrALAZINE 100 milliGRAM(s) Oral every 8 hours    atorvastatin 80 milliGRAM(s) Oral at bedtime      acetaminophen     Tablet .. 650 milliGRAM(s) Oral every 6 hours PRN  scopolamine 1 mG/72 Hr(s) Patch 1 Patch Transdermal every 72 hours    bisacodyl Suppository 10 milliGRAM(s) Rectal daily PRN  polyethylene glycol 3350 17 Gram(s) Oral daily  senna 2 Tablet(s) Oral at bedtime          influenza  Vaccine (HIGH DOSE) 0.7 milliLiter(s) IntraMuscular once    chlorhexidine 0.12% Liquid 15 milliLiter(s) Oral Mucosa every 12 hours  chlorhexidine 2% Cloths 1 Application(s) Topical <User Schedule>        LABS:                        10.5   25.03 )-----------( 324      ( 24 Dec 2023 02:38 )             33.7     Hgb Trend: 10.5<--  12-24    149<H>  |  110<H>  |  25<H>  ----------------------------<  142<H>  3.0<L>   |  36<H>  |  0.64    Ca    8.8      24 Dec 2023 02:38  Phos  3.1     12-24  Mg     2.5     12-24    TPro  6.1  /  Alb  1.8<L>  /  TBili  1.0  /  DBili  x   /  AST  39<H>  /  ALT  53  /  AlkPhos  123<H>  12-24    Creatinine Trend: 0.64<--, 0.57<--, 0.53<--    Urinalysis Basic - ( 24 Dec 2023 02:38 )    Color: x / Appearance: x / SG: x / pH: x  Gluc: 142 mg/dL / Ketone: x  / Bili: x / Urobili: x   Blood: x / Protein: x / Nitrite: x   Leuk Esterase: x / RBC: x / WBC x   Sq Epi: x / Non Sq Epi: x / Bacteria: x            MICROBIOLOGY:     RADIOLOGY:  [ ] Reviewed and interpreted by me    EKG:

## 2023-12-25 NOTE — PROGRESS NOTE ADULT - SUBJECTIVE AND OBJECTIVE BOX
Neurology Progress Note    S: Patient seen and examined.       MEDICATIONS:    acetaminophen     Tablet .. 650 milliGRAM(s) Oral every 6 hours PRN  amLODIPine   Tablet 10 milliGRAM(s) Oral daily  aspirin  chewable 81 milliGRAM(s) Oral daily  atorvastatin 80 milliGRAM(s) Oral at bedtime  bisacodyl Suppository 10 milliGRAM(s) Rectal daily PRN  chlorhexidine 0.12% Liquid 15 milliLiter(s) Oral Mucosa every 12 hours  chlorhexidine 2% Cloths 1 Application(s) Topical <User Schedule>  enoxaparin Injectable 40 milliGRAM(s) SubCutaneous every 24 hours  hydrALAZINE 100 milliGRAM(s) Oral every 8 hours  influenza  Vaccine (HIGH DOSE) 0.7 milliLiter(s) IntraMuscular once  polyethylene glycol 3350 17 Gram(s) Oral daily  scopolamine 1 mG/72 Hr(s) Patch 1 Patch Transdermal every 72 hours  senna 2 Tablet(s) Oral at bedtime      LABS:                          10.5   25.03 )-----------( 324      ( 24 Dec 2023 02:38 )             33.7     12-24    149<H>  |  110<H>  |  25<H>  ----------------------------<  142<H>  3.0<L>   |  36<H>  |  0.64    Ca    8.8      24 Dec 2023 02:38  Phos  3.1     12-24  Mg     2.5     12-24    TPro  6.1  /  Alb  1.8<L>  /  TBili  1.0  /  DBili  x   /  AST  39<H>  /  ALT  53  /  AlkPhos  123<H>  12-24    CAPILLARY BLOOD GLUCOSE          Urinalysis Basic - ( 24 Dec 2023 02:38 )    Color: x / Appearance: x / SG: x / pH: x  Gluc: 142 mg/dL / Ketone: x  / Bili: x / Urobili: x   Blood: x / Protein: x / Nitrite: x   Leuk Esterase: x / RBC: x / WBC x   Sq Epi: x / Non Sq Epi: x / Bacteria: x      I&O's Summary    24 Dec 2023 07:01  -  25 Dec 2023 07:00  --------------------------------------------------------  IN: 860 mL / OUT: 1156 mL / NET: -296 mL    25 Dec 2023 07:01  -  25 Dec 2023 13:19  --------------------------------------------------------  IN: 190 mL / OUT: 0 mL / NET: 190 mL      Vital Signs Last 24 Hrs  T(C): 37.1 (25 Dec 2023 11:25), Max: 38.3 (25 Dec 2023 04:00)  T(F): 98.8 (25 Dec 2023 11:25), Max: 100.9 (25 Dec 2023 04:00)  HR: 92 (25 Dec 2023 12:00) (79 - 103)  BP: 121/51 (25 Dec 2023 12:00) (120/57 - 149/54)  BP(mean): 72 (25 Dec 2023 12:00) (69 - 82)  RR: 34 (25 Dec 2023 12:00) (18 - 34)  SpO2: 92% (25 Dec 2023 12:00) (91% - 98%)    Parameters below as of 24 Dec 2023 20:00  Patient On (Oxygen Delivery Method): ventilator            Neurological Exam:  Mental Status: Intubated, not sedated. some spont eye opening. No verbal output, does not follow commands.   Cranial Nerves: pupils very sluggish R, L near fixed, no  corneals, no VOR, intermittent eyelid fluttering no facial asymmetry , weak cough/gag  Motor: dec tone throughout, no spont movemnt noted   Sensation: UE extensor posturing, LE triple flexion left toe up    I personally reviewed the below data/images/labs:      < from: CT Head No Cont (10.22.19 @ 15:57) >  IMPRESSION:    1)  chronic ischemic changes in both hemispheres with volume loss. There   are no new infarct as compared to prior CT. This may be further assessed   with MR imaging.  2)  no hemorrhage or mass identified.      LABS:                      < end of copied text >  < from: CT Angio Head w/ IV Cont (10.22.19 @ 15:57) >  IMPRESSION    1. There is intimal thickening and calcified plaque in both carotid   bifurcations in the neck, but without significant stenosis. No evidence   of carotid vertebral occlusion or dissection.  2. Intracranially, there are atherosclerotic changes in the anterior and   posterior circulation, but without significant stenosis or occlusion.    < end of copied text >  < from: CT Brain Stroke Protocol (10.26.23 @ 04:37) >  IMPRESSION:  No evidence of acute intracranial hemorrhage, mass effect or large acute   territorial infarct, within limits of noncontrast CT technique.    < end of copied text >  < from: CT Angio Neck Stroke Protocol w/ IV Cont (10.26.23 @ 04:53) >  CT ANGIOGRAPHY NECK:  1.  The right internal carotid artery is occluded approximately 1.5 cm   distal to its origin, possibly due to underlying dissection.  2.  Endotracheal tube is low in position, with its tip approximate 1 cm   above the slade.  3.  Diffuse ill-defined groundglass opacity in both lungs, which may   represent pulmonary edema, infection and/or atelectasis.  4.  There is a discrete right upper lobe groundglass opacity measuring   1.9 x 1.4 cm; this may represent a focus of infection, inflammation,   edema, hemorrhage, fibrosis or malignancy.  A follow-up chest CT is   warranted in three months to assess for resolution.    CT ANGIOGRAPHY BRAIN: The right internal carotid arteries occluded.    There is no significant flow related opacification within the proximal   right middle cerebral artery.  There is mild reconstitution of flow in   some distal branch vessels of the inferior division of the right middle   cerebral artery.  The right anterior cerebral artery fills across the   anterior communicating artery.    < end of copied text >  < from: CT Brain Perfusion Maps Stroke (10.26.23 @ 04:54) >  CT PERFUSION: CT perfusion is consistent with large acute infarct in the   right middle cerebral artery vascular territory and small to moderate   amount of surrounding ischemic penumbra.  Hypoperfusion index is 0.3.    Core infarct (CBF <  30%:): 136 mL  Penumbra (Tmax >6s): 194 mL  Mismatched volume: 58 mL  Mismatched ratio: 1.4    < end of copied text >      < from: CT Head No Cont (10.22.19 @ 15:57) >  IMPRESSION:    1)  chronic ischemic changes in both hemispheres with volume loss. There   are no new infarct as compared to prior CT. This may be further assessed   with MR imaging.  2)  no hemorrhage or mass identified.    < end of copied text >  < from: CT Angio Head w/ IV Cont (10.22.19 @ 15:57) >  IMPRESSION    1. There is intimal thickening and calcified plaque in both carotid   bifurcations in the neck, but without significant stenosis. No evidence   of carotid vertebral occlusion or dissection.  2. Intracranially, there are atherosclerotic changes in the anterior and   posterior circulation, but without significant stenosis or occlusion.    < end of copied text >  < from: CT Brain Stroke Protocol (10.26.23 @ 04:37) >  IMPRESSION:  No evidence of acute intracranial hemorrhage, mass effect or large acute   territorial infarct, within limits of noncontrast CT technique.    < end of copied text >  < from: CT Angio Neck Stroke Protocol w/ IV Cont (10.26.23 @ 04:53) >  CT ANGIOGRAPHY NECK:  1.  The right internal carotid artery is occluded approximately 1.5 cm   distal to its origin, possibly due to underlying dissection.  2.  Endotracheal tube is low in position, with its tip approximate 1 cm   above the slade.  3.  Diffuse ill-defined groundglass opacity in both lungs, which may   represent pulmonary edema, infection and/or atelectasis.  4.  There is a discrete right upper lobe groundglass opacity measuring   1.9 x 1.4 cm; this may represent a focus of infection, inflammation,   edema, hemorrhage, fibrosis or malignancy.  A follow-up chest CT is   warranted in three months to assess for resolution.    CT ANGIOGRAPHY BRAIN: The right internal carotid arteries occluded.    There is no significant flow related opacification within the proximal   right middle cerebral artery.  There is mild reconstitution of flow in   some distal branch vessels of the inferior division of the right middle   cerebral artery.  The right anterior cerebral artery fills across the   anterior communicating artery.    < end of copied text >  < from: CT Brain Perfusion Maps Stroke (10.26.23 @ 04:54) >  CT PERFUSION: CT perfusion is consistent with large acute infarct in the   right middle cerebral artery vascular territory and small to moderate   amount of surrounding ischemic penumbra.  Hypoperfusion index is 0.3.    Core infarct (CBF <  30%:): 136 mL  Penumbra (Tmax >6s): 194 mL  Mismatched volume: 58 mL  Mismatched ratio: 1.4    < end of copied text >      < from: CT Head No Cont (10.22.19 @ 15:57) >  IMPRESSION:    1)  chronic ischemic changes in both hemispheres with volume loss. There   are no new infarct as compared to prior CT. This may be further assessed   with MR imaging.  2)  no hemorrhage or mass identified.    < end of copied text >  < from: CT Angio Head w/ IV Cont (10.22.19 @ 15:57) >  IMPRESSION    1. There is intimal thickening and calcified plaque in both carotid   bifurcations in the neck, but without significant stenosis. No evidence   of carotid vertebral occlusion or dissection.  2. Intracranially, there are atherosclerotic changes in the anterior and   posterior circulation, but without significant stenosis or occlusion.    < end of copied text >  < from: CT Brain Stroke Protocol (10.26.23 @ 04:37) >  IMPRESSION:  No evidence of acute intracranial hemorrhage, mass effect or large acute   territorial infarct, within limits of noncontrast CT technique.    < end of copied text >  < from: CT Angio Neck Stroke Protocol w/ IV Cont (10.26.23 @ 04:53) >  CT ANGIOGRAPHY NECK:  1.  The right internal carotid artery is occluded approximately 1.5 cm   distal to its origin, possibly due to underlying dissection.  2.  Endotracheal tube is low in position, with its tip approximate 1 cm   above the slade.  3.  Diffuse ill-defined groundglass opacity in both lungs, which may   represent pulmonary edema, infection and/or atelectasis.  4.  There is a discrete right upper lobe groundglass opacity measuring   1.9 x 1.4 cm; this may represent a focus of infection, inflammation,   edema, hemorrhage, fibrosis or malignancy.  A follow-up chest CT is   warranted in three months to assess for resolution.    CT ANGIOGRAPHY BRAIN: The right internal carotid arteries occluded.    There is no significant flow related opacification within the proximal   right middle cerebral artery.  There is mild reconstitution of flow in   some distal branch vessels of the inferior division of the right middle   cerebral artery.  The right anterior cerebral artery fills across the   anterior communicating artery.    < end of copied text >  < from: CT Brain Perfusion Maps Stroke (10.26.23 @ 04:54) >  CT PERFUSION: CT perfusion is consistent with large acute infarct in the   right middle cerebral artery vascular territory and small to moderate   amount of surrounding ischemic penumbra.  Hypoperfusion index is 0.3.    Core infarct (CBF <  30%:): 136 mL  Penumbra (Tmax >6s): 194 mL  Mismatched volume: 58 mL  Mismatched ratio: 1.4    < end of copied text >    EEG Classification / Summary:  Abnormal EEG study  1. Intermittent periodic discharges seen intermittently over the left hemisphere, sometimes with triphasic morphology  2. Focal continuous right hemispheric slowing  3. Moderate generalized background slowing    -----------------------------------------------------------------------------------------------------    Clinical Impression:  1. While the periodic pattern noted above may represent lateralized periodic discharges indicating epileptic potential in the left hemisphere, more likely these represent generalized periodic discharges with triphasic morphology, consistent with metabolic etiology, that are poorly expressed on the right due to the effects of the stroke.   2. Structural abnormality in right hemisphere  3. Moderate diffuse/multi-focal cerebral dysfunction, not specific as to etiology.    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 Significant   associated mass effect and associated herniation.      < end of copied text >  < from: CT Head No Cont (11.02.23 @ 14:30) >    Reidentified are 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 There is effacement of all of the   basilar cisterns. Effacement of the sulci in the bilateral cerebri,   compatible with increased brain edema.    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    Unsuccessful attempts were made to reach the ordering clinician via teams.      --- End of Report ---    < end of copied text >

## 2023-12-25 NOTE — PROGRESS NOTE ADULT - SUBJECTIVE AND OBJECTIVE BOX
CHIEF COMPLAINT: CVA    Interval Events: No events overnight     REVIEW OF SYSTEMS:   Unable to perform ROS due to lack of mental status       OBJECTIVE:  ICU Vital Signs Last 24 Hrs  T(C): 37.8 (25 Dec 2023 06:00), Max: 38.3 (25 Dec 2023 04:00)  T(F): 100.1 (25 Dec 2023 06:00), Max: 100.9 (25 Dec 2023 04:00)  HR: 82 (25 Dec 2023 08:00) (76 - 103)  BP: 120/57 (25 Dec 2023 08:00) (120/57 - 149/54)  BP(mean): 75 (25 Dec 2023 08:00) (69 - 82)  ABP: --  ABP(mean): --  RR: 19 (25 Dec 2023 08:00) (18 - 24)  SpO2: 91% (25 Dec 2023 08:00) (91% - 98%)    O2 Parameters below as of 24 Dec 2023 20:00  Patient On (Oxygen Delivery Method): ventilator          Mode: AC/ CMV (Assist Control/ Continuous Mandatory Ventilation), RR (machine): 12, TV (machine): 400, FiO2: 21, PEEP: 5, ITime: 1, MAP: 9, PIP: 21    12-24 @ 07:01  -  12-25 @ 07:00  --------------------------------------------------------  IN: 860 mL / OUT: 1156 mL / NET: -296 mL    12-25 @ 07:01  -  12-25 @ 11:14  --------------------------------------------------------  IN: 10 mL / OUT: 0 mL / NET: 10 mL      CAPILLARY BLOOD GLUCOSE          PHYSICAL EXAM:  GENERAL: NAD, well-groomed, well-developed  EYES: EOMI, PERRLA, conjunctiva and sclera clear  ENMT: No tonsillar erythema, exudates, or enlargement; Moist mucous membranes, Good dentition, No lesions  CHEST/LUNG: Clear to auscultation bilaterally; No rales, rhonchi, wheezing, or rubs  HEART: Regular rate and rhythm; No murmurs, rubs, or gallops  ABDOMEN: Soft, Nontender, Nondistended; Bowel sounds present  VASCULAR:  2+ Peripheral Pulses, No clubbing, cyanosis, or edema  LYMPH: No lymphadenopathy noted  SKIN: No rashes or lesions  NERVOUS SYSTEM:  no mental status     LINES:    HOSPITAL MEDICATIONS:  aspirin  chewable 81 milliGRAM(s) Oral daily  enoxaparin Injectable 40 milliGRAM(s) SubCutaneous every 24 hours      amLODIPine   Tablet 10 milliGRAM(s) Oral daily  hydrALAZINE 100 milliGRAM(s) Oral every 8 hours    atorvastatin 80 milliGRAM(s) Oral at bedtime      acetaminophen     Tablet .. 650 milliGRAM(s) Oral every 6 hours PRN  scopolamine 1 mG/72 Hr(s) Patch 1 Patch Transdermal every 72 hours    bisacodyl Suppository 10 milliGRAM(s) Rectal daily PRN  polyethylene glycol 3350 17 Gram(s) Oral daily  senna 2 Tablet(s) Oral at bedtime          influenza  Vaccine (HIGH DOSE) 0.7 milliLiter(s) IntraMuscular once    chlorhexidine 0.12% Liquid 15 milliLiter(s) Oral Mucosa every 12 hours  chlorhexidine 2% Cloths 1 Application(s) Topical <User Schedule>        LABS:                        10.5   25.03 )-----------( 324      ( 24 Dec 2023 02:38 )             33.7     Hgb Trend: 10.5<--  12-24    149<H>  |  110<H>  |  25<H>  ----------------------------<  142<H>  3.0<L>   |  36<H>  |  0.64    Ca    8.8      24 Dec 2023 02:38  Phos  3.1     12-24  Mg     2.5     12-24    TPro  6.1  /  Alb  1.8<L>  /  TBili  1.0  /  DBili  x   /  AST  39<H>  /  ALT  53  /  AlkPhos  123<H>  12-24    Creatinine Trend: 0.64<--, 0.57<--, 0.53<--    Urinalysis Basic - ( 24 Dec 2023 02:38 )    Color: x / Appearance: x / SG: x / pH: x  Gluc: 142 mg/dL / Ketone: x  / Bili: x / Urobili: x   Blood: x / Protein: x / Nitrite: x   Leuk Esterase: x / RBC: x / WBC x   Sq Epi: x / Non Sq Epi: x / Bacteria: x            MICROBIOLOGY:     RADIOLOGY:  [ ] Reviewed and interpreted by me    EKG:

## 2023-12-25 NOTE — PROGRESS NOTE ADULT - ASSESSMENT
88 year old female with a PMH of HTN, HLD and afib on xarelto who did not take medications for past 1 week prior to hospitalization here with R MCA infarct.   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  Very poor prognosis for any functional neurologic prognosis  Pending transfer to LTAC facility in [place] vs palliative wean, family now not consenting to palliative wean  Call with questions   88 year old female with a PMH of HTN, HLD and afib on xarelto who did not take medications for past 1 week prior to hospitalization here with R MCA infarct.   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  Very poor prognosis for any functional neurologic prognosis  Pending transfer to LTAC facility in [place] vs palliative wean, family now not consenting to palliative wean  Call with questions

## 2023-12-26 NOTE — PROGRESS NOTE ADULT - ASSESSMENT
88F w/ HTN, HLD, Afib. Presents w/ AMS and R gaze deviation. Pt was intubated in the ED due to poor mental status. Pt did not receive TNK. Admitted to ICU. Imaging revealed R JIMMY and MCA infarct w/ midline shift, as well as PCA infarct. Pt's mental status is poor.     #Neuro - mental status is poor, unresponsive in setting of catastrophic CVA  #CV - continue hydralazine and amlodipine; known afib, too high risk given extent of CVA to give pt therapeutic AC  #Pulm - remains intubated on minimal vent settings; does not tolerate PSV weaning and not a candidate for extubation; family would not opt for tracheostomy, although they do not want to withdraw either  #ID- noted low grade fever and leukocytosis, no plan to escalate care and do infectious workup; monitor off abx  #Renal/metabolic - renal function stable; monitor I/Os, electrolytes; on free water for hyperNa  #GI- tolerating TF, having BM, continue bowel regimen  #Heme - no active issues  #Endo - BG stable  #PPx - lovenox qd  #Dispo- remains in ICU vent dependent, unable to extubate; prognosis for neuro-cognitive recovery is poor; pt is DNR but family adamantly refusing to withdraw care and continues to keep mother intubated without any hope for recovery; we will not offer any escalation of care at this time

## 2023-12-26 NOTE — PROGRESS NOTE ADULT - ASSESSMENT
88 year old female with a PMH of HTN, HLD and afib on xarelto who did not take medications for past 1 week prior to hospitalization here with R MCA infarct.   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  Very poor prognosis for any functional neurologic prognosis  Pending transfer to LTAC facility in [place] vs palliative wean, family now not consenting to palliative wean  Call with questions   88 year old female with a PMH of HTN, HLD and afib on xarelto who did not take medications for past 1 week prior to hospitalization here with R MCA infarct.   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  Very poor prognosis for any functional neurologic prognosis  Pending transfer to LTAC facility in [place] vs palliative wean, family now not consenting to palliative wean  Call with questions

## 2023-12-26 NOTE — PROGRESS NOTE ADULT - SUBJECTIVE AND OBJECTIVE BOX
Neurology Progress Note    S: Patient seen and examined.     MEDICATIONS:    acetaminophen     Tablet .. 650 milliGRAM(s) Oral every 6 hours PRN  amLODIPine   Tablet 10 milliGRAM(s) Oral daily  aspirin  chewable 81 milliGRAM(s) Oral daily  atorvastatin 80 milliGRAM(s) Oral at bedtime  bisacodyl Suppository 10 milliGRAM(s) Rectal daily PRN  chlorhexidine 0.12% Liquid 15 milliLiter(s) Oral Mucosa every 12 hours  chlorhexidine 2% Cloths 1 Application(s) Topical <User Schedule>  enoxaparin Injectable 40 milliGRAM(s) SubCutaneous every 24 hours  hydrALAZINE 100 milliGRAM(s) Oral every 8 hours  influenza  Vaccine (HIGH DOSE) 0.7 milliLiter(s) IntraMuscular once  polyethylene glycol 3350 17 Gram(s) Oral daily  scopolamine 1 mG/72 Hr(s) Patch 1 Patch Transdermal every 72 hours  senna 2 Tablet(s) Oral at bedtime      LABS:            CAPILLARY BLOOD GLUCOSE            I&O's Summary    25 Dec 2023 07:01  -  26 Dec 2023 07:00  --------------------------------------------------------  IN: 870 mL / OUT: 1220 mL / NET: -350 mL    26 Dec 2023 07:01  -  26 Dec 2023 10:28  --------------------------------------------------------  IN: 30 mL / OUT: 60 mL / NET: -30 mL      Vital Signs Last 24 Hrs  T(C): 37.8 (26 Dec 2023 06:05), Max: 38.3 (26 Dec 2023 04:00)  T(F): 100 (26 Dec 2023 06:05), Max: 100.9 (26 Dec 2023 04:00)  HR: 76 (26 Dec 2023 09:29) (76 - 104)  BP: 126/52 (26 Dec 2023 08:00) (121/51 - 143/63)  BP(mean): 73 (26 Dec 2023 08:00) (72 - 82)  RR: 19 (26 Dec 2023 09:00) (17 - 34)  SpO2: 96% (26 Dec 2023 09:29) (92% - 100%)    Parameters below as of 25 Dec 2023 19:08  Patient On (Oxygen Delivery Method): ventilator, , AC12, FIO2 21% PEEP 5    O2 Concentration (%): 21      Neurological Exam:  Mental Status: Intubated, not sedated. some spont eye opening. No verbal output, does not follow commands.   Cranial Nerves: pupils very sluggish R, L near fixed, no  corneals, no VOR, intermittent eyelid fluttering no facial asymmetry , weak cough/gag  Motor: dec tone throughout, no spont movemnt noted   Sensation: UE extensor posturing, LE triple flexion left toe up    I personally reviewed the below data/images/labs:      < from: CT Head No Cont (10.22.19 @ 15:57) >  IMPRESSION:    1)  chronic ischemic changes in both hemispheres with volume loss. There   are no new infarct as compared to prior CT. This may be further assessed   with MR imaging.  2)  no hemorrhage or mass identified.      LABS:                      < end of copied text >  < from: CT Angio Head w/ IV Cont (10.22.19 @ 15:57) >  IMPRESSION    1. There is intimal thickening and calcified plaque in both carotid   bifurcations in the neck, but without significant stenosis. No evidence   of carotid vertebral occlusion or dissection.  2. Intracranially, there are atherosclerotic changes in the anterior and   posterior circulation, but without significant stenosis or occlusion.    < end of copied text >  < from: CT Brain Stroke Protocol (10.26.23 @ 04:37) >  IMPRESSION:  No evidence of acute intracranial hemorrhage, mass effect or large acute   territorial infarct, within limits of noncontrast CT technique.    < end of copied text >  < from: CT Angio Neck Stroke Protocol w/ IV Cont (10.26.23 @ 04:53) >  CT ANGIOGRAPHY NECK:  1.  The right internal carotid artery is occluded approximately 1.5 cm   distal to its origin, possibly due to underlying dissection.  2.  Endotracheal tube is low in position, with its tip approximate 1 cm   above the slade.  3.  Diffuse ill-defined groundglass opacity in both lungs, which may   represent pulmonary edema, infection and/or atelectasis.  4.  There is a discrete right upper lobe groundglass opacity measuring   1.9 x 1.4 cm; this may represent a focus of infection, inflammation,   edema, hemorrhage, fibrosis or malignancy.  A follow-up chest CT is   warranted in three months to assess for resolution.    CT ANGIOGRAPHY BRAIN: The right internal carotid arteries occluded.    There is no significant flow related opacification within the proximal   right middle cerebral artery.  There is mild reconstitution of flow in   some distal branch vessels of the inferior division of the right middle   cerebral artery.  The right anterior cerebral artery fills across the   anterior communicating artery.    < end of copied text >  < from: CT Brain Perfusion Maps Stroke (10.26.23 @ 04:54) >  CT PERFUSION: CT perfusion is consistent with large acute infarct in the   right middle cerebral artery vascular territory and small to moderate   amount of surrounding ischemic penumbra.  Hypoperfusion index is 0.3.    Core infarct (CBF <  30%:): 136 mL  Penumbra (Tmax >6s): 194 mL  Mismatched volume: 58 mL  Mismatched ratio: 1.4    < end of copied text >      < from: CT Head No Cont (10.22.19 @ 15:57) >  IMPRESSION:    1)  chronic ischemic changes in both hemispheres with volume loss. There   are no new infarct as compared to prior CT. This may be further assessed   with MR imaging.  2)  no hemorrhage or mass identified.    < end of copied text >  < from: CT Angio Head w/ IV Cont (10.22.19 @ 15:57) >  IMPRESSION    1. There is intimal thickening and calcified plaque in both carotid   bifurcations in the neck, but without significant stenosis. No evidence   of carotid vertebral occlusion or dissection.  2. Intracranially, there are atherosclerotic changes in the anterior and   posterior circulation, but without significant stenosis or occlusion.    < end of copied text >  < from: CT Brain Stroke Protocol (10.26.23 @ 04:37) >  IMPRESSION:  No evidence of acute intracranial hemorrhage, mass effect or large acute   territorial infarct, within limits of noncontrast CT technique.    < end of copied text >  < from: CT Angio Neck Stroke Protocol w/ IV Cont (10.26.23 @ 04:53) >  CT ANGIOGRAPHY NECK:  1.  The right internal carotid artery is occluded approximately 1.5 cm   distal to its origin, possibly due to underlying dissection.  2.  Endotracheal tube is low in position, with its tip approximate 1 cm   above the slade.  3.  Diffuse ill-defined groundglass opacity in both lungs, which may   represent pulmonary edema, infection and/or atelectasis.  4.  There is a discrete right upper lobe groundglass opacity measuring   1.9 x 1.4 cm; this may represent a focus of infection, inflammation,   edema, hemorrhage, fibrosis or malignancy.  A follow-up chest CT is   warranted in three months to assess for resolution.    CT ANGIOGRAPHY BRAIN: The right internal carotid arteries occluded.    There is no significant flow related opacification within the proximal   right middle cerebral artery.  There is mild reconstitution of flow in   some distal branch vessels of the inferior division of the right middle   cerebral artery.  The right anterior cerebral artery fills across the   anterior communicating artery.    < end of copied text >  < from: CT Brain Perfusion Maps Stroke (10.26.23 @ 04:54) >  CT PERFUSION: CT perfusion is consistent with large acute infarct in the   right middle cerebral artery vascular territory and small to moderate   amount of surrounding ischemic penumbra.  Hypoperfusion index is 0.3.    Core infarct (CBF <  30%:): 136 mL  Penumbra (Tmax >6s): 194 mL  Mismatched volume: 58 mL  Mismatched ratio: 1.4    < end of copied text >      < from: CT Head No Cont (10.22.19 @ 15:57) >  IMPRESSION:    1)  chronic ischemic changes in both hemispheres with volume loss. There   are no new infarct as compared to prior CT. This may be further assessed   with MR imaging.  2)  no hemorrhage or mass identified.    < end of copied text >  < from: CT Angio Head w/ IV Cont (10.22.19 @ 15:57) >  IMPRESSION    1. There is intimal thickening and calcified plaque in both carotid   bifurcations in the neck, but without significant stenosis. No evidence   of carotid vertebral occlusion or dissection.  2. Intracranially, there are atherosclerotic changes in the anterior and   posterior circulation, but without significant stenosis or occlusion.    < end of copied text >  < from: CT Brain Stroke Protocol (10.26.23 @ 04:37) >  IMPRESSION:  No evidence of acute intracranial hemorrhage, mass effect or large acute   territorial infarct, within limits of noncontrast CT technique.    < end of copied text >  < from: CT Angio Neck Stroke Protocol w/ IV Cont (10.26.23 @ 04:53) >  CT ANGIOGRAPHY NECK:  1.  The right internal carotid artery is occluded approximately 1.5 cm   distal to its origin, possibly due to underlying dissection.  2.  Endotracheal tube is low in position, with its tip approximate 1 cm   above the slade.  3.  Diffuse ill-defined groundglass opacity in both lungs, which may   represent pulmonary edema, infection and/or atelectasis.  4.  There is a discrete right upper lobe groundglass opacity measuring   1.9 x 1.4 cm; this may represent a focus of infection, inflammation,   edema, hemorrhage, fibrosis or malignancy.  A follow-up chest CT is   warranted in three months to assess for resolution.    CT ANGIOGRAPHY BRAIN: The right internal carotid arteries occluded.    There is no significant flow related opacification within the proximal   right middle cerebral artery.  There is mild reconstitution of flow in   some distal branch vessels of the inferior division of the right middle   cerebral artery.  The right anterior cerebral artery fills across the   anterior communicating artery.    < end of copied text >  < from: CT Brain Perfusion Maps Stroke (10.26.23 @ 04:54) >  CT PERFUSION: CT perfusion is consistent with large acute infarct in the   right middle cerebral artery vascular territory and small to moderate   amount of surrounding ischemic penumbra.  Hypoperfusion index is 0.3.    Core infarct (CBF <  30%:): 136 mL  Penumbra (Tmax >6s): 194 mL  Mismatched volume: 58 mL  Mismatched ratio: 1.4    < end of copied text >    EEG Classification / Summary:  Abnormal EEG study  1. Intermittent periodic discharges seen intermittently over the left hemisphere, sometimes with triphasic morphology  2. Focal continuous right hemispheric slowing  3. Moderate generalized background slowing    -----------------------------------------------------------------------------------------------------    Clinical Impression:  1. While the periodic pattern noted above may represent lateralized periodic discharges indicating epileptic potential in the left hemisphere, more likely these represent generalized periodic discharges with triphasic morphology, consistent with metabolic etiology, that are poorly expressed on the right due to the effects of the stroke.   2. Structural abnormality in right hemisphere  3. Moderate diffuse/multi-focal cerebral dysfunction, not specific as to etiology.    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 Significant   associated mass effect and associated herniation.      < end of copied text >  < from: CT Head No Cont (11.02.23 @ 14:30) >    Reidentified are 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 There is effacement of all of the   basilar cisterns. Effacement of the sulci in the bilateral cerebri,   compatible with increased brain edema.    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    Unsuccessful attempts were made to reach the ordering clinician via teams.      --- End of Report ---    < end of copied text >

## 2023-12-26 NOTE — PROGRESS NOTE ADULT - SUBJECTIVE AND OBJECTIVE BOX
CHIEF COMPLAINT:    Interval Events:    REVIEW OF SYSTEMS:  Constitutional: [ ] fevers [ ] chills [ ] weight loss [ ] weight gain  HEENT: [ ] dry eyes [ ] eye irritation [ ] postnasal drip [ ] nasal congestion  CV: [ ] chest pain [ ] orthopnea [ ] palpitations [ ] murmur  Resp: [ ] cough [ ] shortness of breath [ ] dyspnea [ ] wheezing [ ] sputum [ ] hemoptysis  GI: [ ] nausea [ ] vomiting [ ] diarrhea [ ] constipation [ ] abd pain [ ] dysphagia   : [ ] dysuria [ ] nocturia [ ] hematuria [ ] increased urinary frequency  Musculoskeletal: [ ] back pain [ ] myalgias [ ] arthralgias [ ] fracture  Skin: [ ] rash [ ] itch  Neurological: [ ] headache [ ] dizziness [ ] syncope [ ] weakness [ ] numbness  Hematologic/Lymphatic: [ ] anemia [ ] bleeding problem  Allergic/Immunologic: [ ] itchy eyes [ ] nasal discharge [ ] hives [ ] angioedema  [ ] All other systems negative  [ ] Unable to assess ROS because ________    OBJECTIVE:  ICU Vital Signs Last 24 Hrs  T(C): 37.8 (26 Dec 2023 06:05), Max: 38.3 (26 Dec 2023 04:00)  T(F): 100 (26 Dec 2023 06:05), Max: 100.9 (26 Dec 2023 04:00)  HR: 94 (26 Dec 2023 07:03) (79 - 104)  BP: 136/54 (26 Dec 2023 04:00) (120/57 - 143/63)  BP(mean): 74 (26 Dec 2023 04:00) (72 - 82)  ABP: --  ABP(mean): --  RR: 17 (26 Dec 2023 07:03) (17 - 34)  SpO2: 96% (26 Dec 2023 07:03) (91% - 100%)    O2 Parameters below as of 25 Dec 2023 19:08  Patient On (Oxygen Delivery Method): ventilator, , AC12, FIO2 21% PEEP 5    O2 Concentration (%): 21      Mode: AC/ CMV (Assist Control/ Continuous Mandatory Ventilation), RR (machine): 12, TV (machine): 400, FiO2: 21, PEEP: 5, ITime: 0.8, MAP: 8, PIP: 18    12-25 @ 07:01  -  12-26 @ 07:00  --------------------------------------------------------  IN: 870 mL / OUT: 1220 mL / NET: -350 mL      CAPILLARY BLOOD GLUCOSE          PHYSICAL EXAM:  General:   HEENT:   Neck:   Respiratory:   Cardiovascular:   Abdomen:   Extremities:   Skin:   Neurological:  Psychiatry:    LINES:    HOSPITAL MEDICATIONS:  MEDICATIONS  (STANDING):  amLODIPine   Tablet 10 milliGRAM(s) Oral daily  aspirin  chewable 81 milliGRAM(s) Oral daily  atorvastatin 80 milliGRAM(s) Oral at bedtime  chlorhexidine 0.12% Liquid 15 milliLiter(s) Oral Mucosa every 12 hours  chlorhexidine 2% Cloths 1 Application(s) Topical <User Schedule>  enoxaparin Injectable 40 milliGRAM(s) SubCutaneous every 24 hours  hydrALAZINE 100 milliGRAM(s) Oral every 8 hours  influenza  Vaccine (HIGH DOSE) 0.7 milliLiter(s) IntraMuscular once  polyethylene glycol 3350 17 Gram(s) Oral daily  scopolamine 1 mG/72 Hr(s) Patch 1 Patch Transdermal every 72 hours  senna 2 Tablet(s) Oral at bedtime    MEDICATIONS  (PRN):  acetaminophen     Tablet .. 650 milliGRAM(s) Oral every 6 hours PRN Temp greater or equal to 38C (100.4F)  bisacodyl Suppository 10 milliGRAM(s) Rectal daily PRN Constipation      LABS:    Hgb Trend: 10.5<--                  MICROBIOLOGY:     RADIOLOGY:  [ ] Reviewed and interpreted by me CHIEF COMPLAINT:    Interval Events:  Tmax 100.9    REVIEW OF SYSTEMS:  [x ] Unable to assess ROS because intubated/no mental status    OBJECTIVE:  ICU Vital Signs Last 24 Hrs  T(C): 37.8 (26 Dec 2023 06:05), Max: 38.3 (26 Dec 2023 04:00)  T(F): 100 (26 Dec 2023 06:05), Max: 100.9 (26 Dec 2023 04:00)  HR: 94 (26 Dec 2023 07:03) (79 - 104)  BP: 136/54 (26 Dec 2023 04:00) (120/57 - 143/63)  BP(mean): 74 (26 Dec 2023 04:00) (72 - 82)  ABP: --  ABP(mean): --  RR: 17 (26 Dec 2023 07:03) (17 - 34)  SpO2: 96% (26 Dec 2023 07:03) (91% - 100%)    O2 Parameters below as of 25 Dec 2023 19:08  Patient On (Oxygen Delivery Method): ventilator, , AC12, FIO2 21% PEEP 5    O2 Concentration (%): 21      Mode: AC/ CMV (Assist Control/ Continuous Mandatory Ventilation), RR (machine): 12, TV (machine): 400, FiO2: 21, PEEP: 5, ITime: 0.8, MAP: 8, PIP: 18    12-25 @ 07:01  -  12-26 @ 07:00  --------------------------------------------------------  IN: 870 mL / OUT: 1220 mL / NET: -350 mL      CAPILLARY BLOOD GLUCOSE          PHYSICAL EXAM:  General: NAD, chronically ill appearing  HEENT: ETT and NGT in place  Neck: supple  Respiratory: mechanical BS  Cardiovascular: s1s2 RRR  Abdomen: soft, non tender, non distended  Extremities: warm, no edema or clubbing  Skin: intact  Neurological: unable to assess  Psychiatry: unable to assess    LINES:  [facility]    HOSPITAL MEDICATIONS:  MEDICATIONS  (STANDING):  amLODIPine   Tablet 10 milliGRAM(s) Oral daily  aspirin  chewable 81 milliGRAM(s) Oral daily  atorvastatin 80 milliGRAM(s) Oral at bedtime  chlorhexidine 0.12% Liquid 15 milliLiter(s) Oral Mucosa every 12 hours  chlorhexidine 2% Cloths 1 Application(s) Topical <User Schedule>  enoxaparin Injectable 40 milliGRAM(s) SubCutaneous every 24 hours  hydrALAZINE 100 milliGRAM(s) Oral every 8 hours  influenza  Vaccine (HIGH DOSE) 0.7 milliLiter(s) IntraMuscular once  polyethylene glycol 3350 17 Gram(s) Oral daily  scopolamine 1 mG/72 Hr(s) Patch 1 Patch Transdermal every 72 hours  senna 2 Tablet(s) Oral at bedtime    MEDICATIONS  (PRN):  acetaminophen     Tablet .. 650 milliGRAM(s) Oral every 6 hours PRN Temp greater or equal to 38C (100.4F)  bisacodyl Suppository 10 milliGRAM(s) Rectal daily PRN Constipation      LABS:    Hgb Trend: 10.5<--                  MICROBIOLOGY:     RADIOLOGY:  [ ] Reviewed and interpreted by me CHIEF COMPLAINT:    Interval Events:  Tmax 100.9    REVIEW OF SYSTEMS:  [x ] Unable to assess ROS because intubated/no mental status    OBJECTIVE:  ICU Vital Signs Last 24 Hrs  T(C): 37.8 (26 Dec 2023 06:05), Max: 38.3 (26 Dec 2023 04:00)  T(F): 100 (26 Dec 2023 06:05), Max: 100.9 (26 Dec 2023 04:00)  HR: 94 (26 Dec 2023 07:03) (79 - 104)  BP: 136/54 (26 Dec 2023 04:00) (120/57 - 143/63)  BP(mean): 74 (26 Dec 2023 04:00) (72 - 82)  ABP: --  ABP(mean): --  RR: 17 (26 Dec 2023 07:03) (17 - 34)  SpO2: 96% (26 Dec 2023 07:03) (91% - 100%)    O2 Parameters below as of 25 Dec 2023 19:08  Patient On (Oxygen Delivery Method): ventilator, , AC12, FIO2 21% PEEP 5    O2 Concentration (%): 21      Mode: AC/ CMV (Assist Control/ Continuous Mandatory Ventilation), RR (machine): 12, TV (machine): 400, FiO2: 21, PEEP: 5, ITime: 0.8, MAP: 8, PIP: 18    12-25 @ 07:01  -  12-26 @ 07:00  --------------------------------------------------------  IN: 870 mL / OUT: 1220 mL / NET: -350 mL      CAPILLARY BLOOD GLUCOSE          PHYSICAL EXAM:  General: NAD, chronically ill appearing  HEENT: ETT and NGT in place  Neck: supple  Respiratory: mechanical BS  Cardiovascular: s1s2 RRR  Abdomen: soft, non tender, non distended  Extremities: warm, no edema or clubbing  Skin: intact  Neurological: unable to assess  Psychiatry: unable to assess    LINES:  [facility]    HOSPITAL MEDICATIONS:  MEDICATIONS  (STANDING):  amLODIPine   Tablet 10 milliGRAM(s) Oral daily  aspirin  chewable 81 milliGRAM(s) Oral daily  atorvastatin 80 milliGRAM(s) Oral at bedtime  chlorhexidine 0.12% Liquid 15 milliLiter(s) Oral Mucosa every 12 hours  chlorhexidine 2% Cloths 1 Application(s) Topical <User Schedule>  enoxaparin Injectable 40 milliGRAM(s) SubCutaneous every 24 hours  hydrALAZINE 100 milliGRAM(s) Oral every 8 hours  influenza  Vaccine (HIGH DOSE) 0.7 milliLiter(s) IntraMuscular once  polyethylene glycol 3350 17 Gram(s) Oral daily  scopolamine 1 mG/72 Hr(s) Patch 1 Patch Transdermal every 72 hours  senna 2 Tablet(s) Oral at bedtime    MEDICATIONS  (PRN):  acetaminophen     Tablet .. 650 milliGRAM(s) Oral every 6 hours PRN Temp greater or equal to 38C (100.4F)  bisacodyl Suppository 10 milliGRAM(s) Rectal daily PRN Constipation      LABS:    Hgb Trend: 10.5<--                  MICROBIOLOGY:     RADIOLOGY:  [ ] Reviewed and interpreted by me

## 2023-12-27 NOTE — PROGRESS NOTE ADULT - SUBJECTIVE AND OBJECTIVE BOX
CHIEF COMPLAINT:    Interval Events:    REVIEW OF SYSTEMS:  Constitutional: [ ] fevers [ ] chills [ ] weight loss [ ] weight gain  HEENT: [ ] dry eyes [ ] eye irritation [ ] postnasal drip [ ] nasal congestion  CV: [ ] chest pain [ ] orthopnea [ ] palpitations [ ] murmur  Resp: [ ] cough [ ] shortness of breath [ ] dyspnea [ ] wheezing [ ] sputum [ ] hemoptysis  GI: [ ] nausea [ ] vomiting [ ] diarrhea [ ] constipation [ ] abd pain [ ] dysphagia   : [ ] dysuria [ ] nocturia [ ] hematuria [ ] increased urinary frequency  Musculoskeletal: [ ] back pain [ ] myalgias [ ] arthralgias [ ] fracture  Skin: [ ] rash [ ] itch  Neurological: [ ] headache [ ] dizziness [ ] syncope [ ] weakness [ ] numbness  Hematologic/Lymphatic: [ ] anemia [ ] bleeding problem  Allergic/Immunologic: [ ] itchy eyes [ ] nasal discharge [ ] hives [ ] angioedema  [ ] All other systems negative  [ ] Unable to assess ROS because ________    OBJECTIVE:  ICU Vital Signs Last 24 Hrs  T(C): 37.3 (27 Dec 2023 04:00), Max: 37.6 (26 Dec 2023 23:36)  T(F): 99.1 (27 Dec 2023 04:00), Max: 99.7 (26 Dec 2023 23:36)  HR: 90 (27 Dec 2023 07:13) (76 - 107)  BP: 140/65 (27 Dec 2023 04:00) (125/55 - 142/66)  BP(mean): 85 (27 Dec 2023 04:00) (77 - 89)  ABP: --  ABP(mean): --  RR: 17 (27 Dec 2023 07:13) (17 - 42)  SpO2: 96% (27 Dec 2023 07:13) (95% - 100%)    O2 Parameters below as of 26 Dec 2023 19:12  Patient On (Oxygen Delivery Method): ventilator,  AC 12 PEEP 5 FIO2 21%    O2 Concentration (%): 21      Mode: AC/ CMV (Assist Control/ Continuous Mandatory Ventilation), RR (machine): 12, TV (machine): 400, FiO2: 21, PEEP: 5, ITime: 0.8, MAP: 8, PIP: 18    12-26 @ 07:01  -  12-27 @ 07:00  --------------------------------------------------------  IN: 900 mL / OUT: 1220 mL / NET: -320 mL      CAPILLARY BLOOD GLUCOSE          PHYSICAL EXAM:  General:   HEENT:   Neck:   Respiratory:   Cardiovascular:   Abdomen:   Extremities:   Skin:   Neurological:  Psychiatry:    LINES:    HOSPITAL MEDICATIONS:  MEDICATIONS  (STANDING):  amLODIPine   Tablet 10 milliGRAM(s) Oral daily  aspirin  chewable 81 milliGRAM(s) Oral daily  atorvastatin 80 milliGRAM(s) Oral at bedtime  chlorhexidine 0.12% Liquid 15 milliLiter(s) Oral Mucosa every 12 hours  chlorhexidine 2% Cloths 1 Application(s) Topical <User Schedule>  enoxaparin Injectable 40 milliGRAM(s) SubCutaneous every 24 hours  hydrALAZINE 100 milliGRAM(s) Oral every 8 hours  influenza  Vaccine (HIGH DOSE) 0.7 milliLiter(s) IntraMuscular once  polyethylene glycol 3350 17 Gram(s) Oral daily  scopolamine 1 mG/72 Hr(s) Patch 1 Patch Transdermal every 72 hours  senna 2 Tablet(s) Oral at bedtime    MEDICATIONS  (PRN):  acetaminophen     Tablet .. 650 milliGRAM(s) Oral every 6 hours PRN Temp greater or equal to 38C (100.4F)  bisacodyl Suppository 10 milliGRAM(s) Rectal daily PRN Constipation      LABS:    Hgb Trend: 10.5<--                  MICROBIOLOGY:     RADIOLOGY:  [ ] Reviewed and interpreted by me CHIEF COMPLAINT:    Interval Events:  no o/n events    REVIEW OF SYSTEMS:  [x ] Unable to assess ROS because intubated/no mental status    OBJECTIVE:  ICU Vital Signs Last 24 Hrs  T(C): 37.3 (27 Dec 2023 04:00), Max: 37.6 (26 Dec 2023 23:36)  T(F): 99.1 (27 Dec 2023 04:00), Max: 99.7 (26 Dec 2023 23:36)  HR: 90 (27 Dec 2023 07:13) (76 - 107)  BP: 140/65 (27 Dec 2023 04:00) (125/55 - 142/66)  BP(mean): 85 (27 Dec 2023 04:00) (77 - 89)  ABP: --  ABP(mean): --  RR: 17 (27 Dec 2023 07:13) (17 - 42)  SpO2: 96% (27 Dec 2023 07:13) (95% - 100%)    O2 Parameters below as of 26 Dec 2023 19:12  Patient On (Oxygen Delivery Method): ventilator,  AC 12 PEEP 5 FIO2 21%    O2 Concentration (%): 21      Mode: AC/ CMV (Assist Control/ Continuous Mandatory Ventilation), RR (machine): 12, TV (machine): 400, FiO2: 21, PEEP: 5, ITime: 0.8, MAP: 8, PIP: 18    12-26 @ 07:01  -  12-27 @ 07:00  --------------------------------------------------------  IN: 900 mL / OUT: 1220 mL / NET: -320 mL      CAPILLARY BLOOD GLUCOSE          PHYSICAL EXAM:  General: NAD, chronically ill appearing  HEENT: ETT and NGT in place  Neck: supple  Respiratory: mechanical BS  Cardiovascular: s1s2 RRR  Abdomen: soft, non tender, non distended  Extremities: warm, no edema or clubbing  Skin: intact  Neurological: unable to assess  Psychiatry: unable to assess    LINES:  [facility]    HOSPITAL MEDICATIONS:  MEDICATIONS  (STANDING):  amLODIPine   Tablet 10 milliGRAM(s) Oral daily  aspirin  chewable 81 milliGRAM(s) Oral daily  atorvastatin 80 milliGRAM(s) Oral at bedtime  chlorhexidine 0.12% Liquid 15 milliLiter(s) Oral Mucosa every 12 hours  chlorhexidine 2% Cloths 1 Application(s) Topical <User Schedule>  enoxaparin Injectable 40 milliGRAM(s) SubCutaneous every 24 hours  hydrALAZINE 100 milliGRAM(s) Oral every 8 hours  influenza  Vaccine (HIGH DOSE) 0.7 milliLiter(s) IntraMuscular once  polyethylene glycol 3350 17 Gram(s) Oral daily  scopolamine 1 mG/72 Hr(s) Patch 1 Patch Transdermal every 72 hours  senna 2 Tablet(s) Oral at bedtime    MEDICATIONS  (PRN):  acetaminophen     Tablet .. 650 milliGRAM(s) Oral every 6 hours PRN Temp greater or equal to 38C (100.4F)  bisacodyl Suppository 10 milliGRAM(s) Rectal daily PRN Constipation      LABS:    Hgb Trend: 10.5<--                  MICROBIOLOGY:     RADIOLOGY:  [ ] Reviewed and interpreted by me CHIEF COMPLAINT:    Interval Events:  no o/n events    REVIEW OF SYSTEMS:  [x ] Unable to assess ROS because intubated/no mental status    OBJECTIVE:  ICU Vital Signs Last 24 Hrs  T(C): 37.3 (27 Dec 2023 04:00), Max: 37.6 (26 Dec 2023 23:36)  T(F): 99.1 (27 Dec 2023 04:00), Max: 99.7 (26 Dec 2023 23:36)  HR: 90 (27 Dec 2023 07:13) (76 - 107)  BP: 140/65 (27 Dec 2023 04:00) (125/55 - 142/66)  BP(mean): 85 (27 Dec 2023 04:00) (77 - 89)  ABP: --  ABP(mean): --  RR: 17 (27 Dec 2023 07:13) (17 - 42)  SpO2: 96% (27 Dec 2023 07:13) (95% - 100%)    O2 Parameters below as of 26 Dec 2023 19:12  Patient On (Oxygen Delivery Method): ventilator,  AC 12 PEEP 5 FIO2 21%    O2 Concentration (%): 21      Mode: AC/ CMV (Assist Control/ Continuous Mandatory Ventilation), RR (machine): 12, TV (machine): 400, FiO2: 21, PEEP: 5, ITime: 0.8, MAP: 8, PIP: 18    12-26 @ 07:01  -  12-27 @ 07:00  --------------------------------------------------------  IN: 900 mL / OUT: 1220 mL / NET: -320 mL      CAPILLARY BLOOD GLUCOSE          PHYSICAL EXAM:  General: NAD, chronically ill appearing  HEENT: ETT and NGT in place  Neck: supple  Respiratory: mechanical BS  Cardiovascular: s1s2 RRR  Abdomen: soft, non tender, non distended  Extremities: warm, no edema or clubbing  Skin: intact  Neurological: unable to assess  Psychiatry: unable to assess    LINES:  [facility]    HOSPITAL MEDICATIONS:  MEDICATIONS  (STANDING):  amLODIPine   Tablet 10 milliGRAM(s) Oral daily  aspirin  chewable 81 milliGRAM(s) Oral daily  atorvastatin 80 milliGRAM(s) Oral at bedtime  chlorhexidine 0.12% Liquid 15 milliLiter(s) Oral Mucosa every 12 hours  chlorhexidine 2% Cloths 1 Application(s) Topical <User Schedule>  enoxaparin Injectable 40 milliGRAM(s) SubCutaneous every 24 hours  hydrALAZINE 100 milliGRAM(s) Oral every 8 hours  influenza  Vaccine (HIGH DOSE) 0.7 milliLiter(s) IntraMuscular once  polyethylene glycol 3350 17 Gram(s) Oral daily  scopolamine 1 mG/72 Hr(s) Patch 1 Patch Transdermal every 72 hours  senna 2 Tablet(s) Oral at bedtime    MEDICATIONS  (PRN):  acetaminophen     Tablet .. 650 milliGRAM(s) Oral every 6 hours PRN Temp greater or equal to 38C (100.4F)  bisacodyl Suppository 10 milliGRAM(s) Rectal daily PRN Constipation      LABS:    Hgb Trend: 10.5<--                  MICROBIOLOGY:     RADIOLOGY:  [ ] Reviewed and interpreted by me

## 2023-12-27 NOTE — PROGRESS NOTE ADULT - SUBJECTIVE AND OBJECTIVE BOX
Neurology Progress Note    S: Patient seen and examined.     MEDICATIONS:    acetaminophen     Tablet .. 650 milliGRAM(s) Oral every 6 hours PRN  amLODIPine   Tablet 10 milliGRAM(s) Oral daily  aspirin  chewable 81 milliGRAM(s) Oral daily  atorvastatin 80 milliGRAM(s) Oral at bedtime  bisacodyl Suppository 10 milliGRAM(s) Rectal daily PRN  chlorhexidine 0.12% Liquid 15 milliLiter(s) Oral Mucosa every 12 hours  chlorhexidine 2% Cloths 1 Application(s) Topical <User Schedule>  enoxaparin Injectable 40 milliGRAM(s) SubCutaneous every 24 hours  hydrALAZINE 100 milliGRAM(s) Oral every 8 hours  influenza  Vaccine (HIGH DOSE) 0.7 milliLiter(s) IntraMuscular once  polyethylene glycol 3350 17 Gram(s) Oral daily  scopolamine 1 mG/72 Hr(s) Patch 1 Patch Transdermal every 72 hours  senna 2 Tablet(s) Oral at bedtime      LABS:            CAPILLARY BLOOD GLUCOSE            I&O's Summary    26 Dec 2023 07:01  -  27 Dec 2023 07:00  --------------------------------------------------------  IN: 900 mL / OUT: 1220 mL / NET: -320 mL    27 Dec 2023 07:01  -  27 Dec 2023 12:29  --------------------------------------------------------  IN: 30 mL / OUT: 70 mL / NET: -40 mL      Vital Signs Last 24 Hrs  T(C): 37.8 (27 Dec 2023 11:56), Max: 37.8 (27 Dec 2023 11:56)  T(F): 100.1 (27 Dec 2023 11:56), Max: 100.1 (27 Dec 2023 11:56)  HR: 97 (27 Dec 2023 12:05) (77 - 101)  BP: 131/51 (27 Dec 2023 08:00) (127/65 - 142/66)  BP(mean): 74 (27 Dec 2023 08:00) (74 - 89)  RR: 22 (27 Dec 2023 08:00) (17 - 42)  SpO2: 94% (27 Dec 2023 12:05) (91% - 100%)    Parameters below as of 26 Dec 2023 19:12  Patient On (Oxygen Delivery Method): ventilator,  AC 12 PEEP 5 FIO2 21%    O2 Concentration (%): 21    Neurological Exam:  Mental Status: Intubated, not sedated. some spont eye opening. No verbal output, does not follow commands.   Cranial Nerves: pupils very sluggish R, L near fixed, no  corneals, no VOR, intermittent eyelid fluttering no facial asymmetry , weak cough/gag  Motor: dec tone throughout, no spont movemnt noted   Sensation: UE extensor posturing, LE triple flexion left toe up    I personally reviewed the below data/images/labs:      < from: CT Head No Cont (10.22.19 @ 15:57) >  IMPRESSION:    1)  chronic ischemic changes in both hemispheres with volume loss. There   are no new infarct as compared to prior CT. This may be further assessed   with MR imaging.  2)  no hemorrhage or mass identified.      LABS:                      < end of copied text >  < from: CT Angio Head w/ IV Cont (10.22.19 @ 15:57) >  IMPRESSION    1. There is intimal thickening and calcified plaque in both carotid   bifurcations in the neck, but without significant stenosis. No evidence   of carotid vertebral occlusion or dissection.  2. Intracranially, there are atherosclerotic changes in the anterior and   posterior circulation, but without significant stenosis or occlusion.    < end of copied text >  < from: CT Brain Stroke Protocol (10.26.23 @ 04:37) >  IMPRESSION:  No evidence of acute intracranial hemorrhage, mass effect or large acute   territorial infarct, within limits of noncontrast CT technique.    < end of copied text >  < from: CT Angio Neck Stroke Protocol w/ IV Cont (10.26.23 @ 04:53) >  CT ANGIOGRAPHY NECK:  1.  The right internal carotid artery is occluded approximately 1.5 cm   distal to its origin, possibly due to underlying dissection.  2.  Endotracheal tube is low in position, with its tip approximate 1 cm   above the slade.  3.  Diffuse ill-defined groundglass opacity in both lungs, which may   represent pulmonary edema, infection and/or atelectasis.  4.  There is a discrete right upper lobe groundglass opacity measuring   1.9 x 1.4 cm; this may represent a focus of infection, inflammation,   edema, hemorrhage, fibrosis or malignancy.  A follow-up chest CT is   warranted in three months to assess for resolution.    CT ANGIOGRAPHY BRAIN: The right internal carotid arteries occluded.    There is no significant flow related opacification within the proximal   right middle cerebral artery.  There is mild reconstitution of flow in   some distal branch vessels of the inferior division of the right middle   cerebral artery.  The right anterior cerebral artery fills across the   anterior communicating artery.    < end of copied text >  < from: CT Brain Perfusion Maps Stroke (10.26.23 @ 04:54) >  CT PERFUSION: CT perfusion is consistent with large acute infarct in the   right middle cerebral artery vascular territory and small to moderate   amount of surrounding ischemic penumbra.  Hypoperfusion index is 0.3.    Core infarct (CBF <  30%:): 136 mL  Penumbra (Tmax >6s): 194 mL  Mismatched volume: 58 mL  Mismatched ratio: 1.4    < end of copied text >      < from: CT Head No Cont (10.22.19 @ 15:57) >  IMPRESSION:    1)  chronic ischemic changes in both hemispheres with volume loss. There   are no new infarct as compared to prior CT. This may be further assessed   with MR imaging.  2)  no hemorrhage or mass identified.    < end of copied text >  < from: CT Angio Head w/ IV Cont (10.22.19 @ 15:57) >  IMPRESSION    1. There is intimal thickening and calcified plaque in both carotid   bifurcations in the neck, but without significant stenosis. No evidence   of carotid vertebral occlusion or dissection.  2. Intracranially, there are atherosclerotic changes in the anterior and   posterior circulation, but without significant stenosis or occlusion.    < end of copied text >  < from: CT Brain Stroke Protocol (10.26.23 @ 04:37) >  IMPRESSION:  No evidence of acute intracranial hemorrhage, mass effect or large acute   territorial infarct, within limits of noncontrast CT technique.    < end of copied text >  < from: CT Angio Neck Stroke Protocol w/ IV Cont (10.26.23 @ 04:53) >  CT ANGIOGRAPHY NECK:  1.  The right internal carotid artery is occluded approximately 1.5 cm   distal to its origin, possibly due to underlying dissection.  2.  Endotracheal tube is low in position, with its tip approximate 1 cm   above the slade.  3.  Diffuse ill-defined groundglass opacity in both lungs, which may   represent pulmonary edema, infection and/or atelectasis.  4.  There is a discrete right upper lobe groundglass opacity measuring   1.9 x 1.4 cm; this may represent a focus of infection, inflammation,   edema, hemorrhage, fibrosis or malignancy.  A follow-up chest CT is   warranted in three months to assess for resolution.    CT ANGIOGRAPHY BRAIN: The right internal carotid arteries occluded.    There is no significant flow related opacification within the proximal   right middle cerebral artery.  There is mild reconstitution of flow in   some distal branch vessels of the inferior division of the right middle   cerebral artery.  The right anterior cerebral artery fills across the   anterior communicating artery.    < end of copied text >  < from: CT Brain Perfusion Maps Stroke (10.26.23 @ 04:54) >  CT PERFUSION: CT perfusion is consistent with large acute infarct in the   right middle cerebral artery vascular territory and small to moderate   amount of surrounding ischemic penumbra.  Hypoperfusion index is 0.3.    Core infarct (CBF <  30%:): 136 mL  Penumbra (Tmax >6s): 194 mL  Mismatched volume: 58 mL  Mismatched ratio: 1.4    < end of copied text >      < from: CT Head No Cont (10.22.19 @ 15:57) >  IMPRESSION:    1)  chronic ischemic changes in both hemispheres with volume loss. There   are no new infarct as compared to prior CT. This may be further assessed   with MR imaging.  2)  no hemorrhage or mass identified.    < end of copied text >  < from: CT Angio Head w/ IV Cont (10.22.19 @ 15:57) >  IMPRESSION    1. There is intimal thickening and calcified plaque in both carotid   bifurcations in the neck, but without significant stenosis. No evidence   of carotid vertebral occlusion or dissection.  2. Intracranially, there are atherosclerotic changes in the anterior and   posterior circulation, but without significant stenosis or occlusion.    < end of copied text >  < from: CT Brain Stroke Protocol (10.26.23 @ 04:37) >  IMPRESSION:  No evidence of acute intracranial hemorrhage, mass effect or large acute   territorial infarct, within limits of noncontrast CT technique.    < end of copied text >  < from: CT Angio Neck Stroke Protocol w/ IV Cont (10.26.23 @ 04:53) >  CT ANGIOGRAPHY NECK:  1.  The right internal carotid artery is occluded approximately 1.5 cm   distal to its origin, possibly due to underlying dissection.  2.  Endotracheal tube is low in position, with its tip approximate 1 cm   above the slade.  3.  Diffuse ill-defined groundglass opacity in both lungs, which may   represent pulmonary edema, infection and/or atelectasis.  4.  There is a discrete right upper lobe groundglass opacity measuring   1.9 x 1.4 cm; this may represent a focus of infection, inflammation,   edema, hemorrhage, fibrosis or malignancy.  A follow-up chest CT is   warranted in three months to assess for resolution.    CT ANGIOGRAPHY BRAIN: The right internal carotid arteries occluded.    There is no significant flow related opacification within the proximal   right middle cerebral artery.  There is mild reconstitution of flow in   some distal branch vessels of the inferior division of the right middle   cerebral artery.  The right anterior cerebral artery fills across the   anterior communicating artery.    < end of copied text >  < from: CT Brain Perfusion Maps Stroke (10.26.23 @ 04:54) >  CT PERFUSION: CT perfusion is consistent with large acute infarct in the   right middle cerebral artery vascular territory and small to moderate   amount of surrounding ischemic penumbra.  Hypoperfusion index is 0.3.    Core infarct (CBF <  30%:): 136 mL  Penumbra (Tmax >6s): 194 mL  Mismatched volume: 58 mL  Mismatched ratio: 1.4    < end of copied text >    EEG Classification / Summary:  Abnormal EEG study  1. Intermittent periodic discharges seen intermittently over the left hemisphere, sometimes with triphasic morphology  2. Focal continuous right hemispheric slowing  3. Moderate generalized background slowing    -----------------------------------------------------------------------------------------------------    Clinical Impression:  1. While the periodic pattern noted above may represent lateralized periodic discharges indicating epileptic potential in the left hemisphere, more likely these represent generalized periodic discharges with triphasic morphology, consistent with metabolic etiology, that are poorly expressed on the right due to the effects of the stroke.   2. Structural abnormality in right hemisphere  3. Moderate diffuse/multi-focal cerebral dysfunction, not specific as to etiology.    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 Significant   associated mass effect and associated herniation.      < end of copied text >  < from: CT Head No Cont (11.02.23 @ 14:30) >    Reidentified are 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 There is effacement of all of the   basilar cisterns. Effacement of the sulci in the bilateral cerebri,   compatible with increased brain edema.    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    Unsuccessful attempts were made to reach the ordering clinician via teams.      --- End of Report ---    < end of copied text >

## 2023-12-27 NOTE — PROGRESS NOTE ADULT - ASSESSMENT
88 year old female with a PMH of HTN, HLD and afib on xarelto who did not take medications for past 1 week prior to hospitalization here with R MCA infarct.   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  Very poor prognosis for any functional neurologic prognosis  Pending transfer to LTAC facility in [place] vs palliative wean, family now not consenting to palliative wean  Call with questions   88 year old female with a PMH of HTN, HLD and afib on xarelto who did not take medications for past 1 week prior to hospitalization here with R MCA infarct.   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  Very poor prognosis for any functional neurologic prognosis  Pending transfer to LTAC facility in [place] vs palliative wean, family now not consenting to palliative wean  Call with questions

## 2023-12-28 NOTE — PROGRESS NOTE ADULT - SUBJECTIVE AND OBJECTIVE BOX
CHIEF COMPLAINT:    Interval Events:    REVIEW OF SYSTEMS:  Constitutional: [ ] fevers [ ] chills [ ] weight loss [ ] weight gain  HEENT: [ ] dry eyes [ ] eye irritation [ ] postnasal drip [ ] nasal congestion  CV: [ ] chest pain [ ] orthopnea [ ] palpitations [ ] murmur  Resp: [ ] cough [ ] shortness of breath [ ] dyspnea [ ] wheezing [ ] sputum [ ] hemoptysis  GI: [ ] nausea [ ] vomiting [ ] diarrhea [ ] constipation [ ] abd pain [ ] dysphagia   : [ ] dysuria [ ] nocturia [ ] hematuria [ ] increased urinary frequency  Musculoskeletal: [ ] back pain [ ] myalgias [ ] arthralgias [ ] fracture  Skin: [ ] rash [ ] itch  Neurological: [ ] headache [ ] dizziness [ ] syncope [ ] weakness [ ] numbness  Hematologic/Lymphatic: [ ] anemia [ ] bleeding problem  Allergic/Immunologic: [ ] itchy eyes [ ] nasal discharge [ ] hives [ ] angioedema  [ ] All other systems negative  [ ] Unable to assess ROS because ________    OBJECTIVE:  ICU Vital Signs Last 24 Hrs  T(C): 37.6 (28 Dec 2023 04:00), Max: 38.1 (27 Dec 2023 15:52)  T(F): 99.6 (28 Dec 2023 04:00), Max: 100.5 (27 Dec 2023 15:52)  HR: 97 (28 Dec 2023 07:14) (86 - 112)  BP: 134/48 (28 Dec 2023 04:00) (118/48 - 134/48)  BP(mean): 70 (28 Dec 2023 04:00) (68 - 77)  ABP: --  ABP(mean): --  RR: 21 (28 Dec 2023 07:14) (17 - 29)  SpO2: 95% (28 Dec 2023 05:38) (85% - 96%)      Mode: AC/ CMV (Assist Control/ Continuous Mandatory Ventilation), RR (machine): 12, TV (machine): 400, FiO2: 21, PEEP: 5, ITime: 0.8, MAP: 8, PIP: 28    12-27 @ 07:01  -  12-28 @ 07:00  --------------------------------------------------------  IN: 840 mL / OUT: 1675 mL / NET: -835 mL      CAPILLARY BLOOD GLUCOSE          PHYSICAL EXAM:  General:   HEENT:   Neck:   Respiratory:   Cardiovascular:   Abdomen:   Extremities:   Skin:   Neurological:  Psychiatry:    LINES:    HOSPITAL MEDICATIONS:  MEDICATIONS  (STANDING):  amLODIPine   Tablet 10 milliGRAM(s) Oral daily  aspirin  chewable 81 milliGRAM(s) Oral daily  atorvastatin 80 milliGRAM(s) Oral at bedtime  chlorhexidine 0.12% Liquid 15 milliLiter(s) Oral Mucosa every 12 hours  chlorhexidine 2% Cloths 1 Application(s) Topical <User Schedule>  enoxaparin Injectable 40 milliGRAM(s) SubCutaneous every 24 hours  hydrALAZINE 100 milliGRAM(s) Oral every 8 hours  influenza  Vaccine (HIGH DOSE) 0.7 milliLiter(s) IntraMuscular once  polyethylene glycol 3350 17 Gram(s) Oral daily  scopolamine 1 mG/72 Hr(s) Patch 1 Patch Transdermal every 72 hours  senna 2 Tablet(s) Oral at bedtime    MEDICATIONS  (PRN):  acetaminophen     Tablet .. 650 milliGRAM(s) Oral every 6 hours PRN Temp greater or equal to 38C (100.4F)  bisacodyl Suppository 10 milliGRAM(s) Rectal daily PRN Constipation      LABS:    Hgb Trend: 10.5<--                  MICROBIOLOGY:     RADIOLOGY:  [ ] Reviewed and interpreted by me CHIEF COMPLAINT:    Interval Events:  no o/n events    REVIEW OF SYSTEMS:  [x ] Unable to assess ROS because intubated/poor mental status    OBJECTIVE:  ICU Vital Signs Last 24 Hrs  T(C): 37.6 (28 Dec 2023 04:00), Max: 38.1 (27 Dec 2023 15:52)  T(F): 99.6 (28 Dec 2023 04:00), Max: 100.5 (27 Dec 2023 15:52)  HR: 97 (28 Dec 2023 07:14) (86 - 112)  BP: 134/48 (28 Dec 2023 04:00) (118/48 - 134/48)  BP(mean): 70 (28 Dec 2023 04:00) (68 - 77)  ABP: --  ABP(mean): --  RR: 21 (28 Dec 2023 07:14) (17 - 29)  SpO2: 95% (28 Dec 2023 05:38) (85% - 96%)      Mode: AC/ CMV (Assist Control/ Continuous Mandatory Ventilation), RR (machine): 12, TV (machine): 400, FiO2: 21, PEEP: 5, ITime: 0.8, MAP: 8, PIP: 28    12-27 @ 07:01  -  12-28 @ 07:00  --------------------------------------------------------  IN: 840 mL / OUT: 1675 mL / NET: -835 mL      CAPILLARY BLOOD GLUCOSE          PHYSICAL EXAM:  General: NAD, chronically ill appearing  HEENT: ETT and NGT in place  Neck: supple  Respiratory: mechanical BS  Cardiovascular: s1s2 RRR  Abdomen: soft, non tender, non distended  Extremities: warm, no edema or clubbing  Skin: intact  Neurological: unable to assess  Psychiatry: unable to assess    LINES:  [facility]    HOSPITAL MEDICATIONS:  MEDICATIONS  (STANDING):  amLODIPine   Tablet 10 milliGRAM(s) Oral daily  aspirin  chewable 81 milliGRAM(s) Oral daily  atorvastatin 80 milliGRAM(s) Oral at bedtime  chlorhexidine 0.12% Liquid 15 milliLiter(s) Oral Mucosa every 12 hours  chlorhexidine 2% Cloths 1 Application(s) Topical <User Schedule>  enoxaparin Injectable 40 milliGRAM(s) SubCutaneous every 24 hours  hydrALAZINE 100 milliGRAM(s) Oral every 8 hours  influenza  Vaccine (HIGH DOSE) 0.7 milliLiter(s) IntraMuscular once  polyethylene glycol 3350 17 Gram(s) Oral daily  scopolamine 1 mG/72 Hr(s) Patch 1 Patch Transdermal every 72 hours  senna 2 Tablet(s) Oral at bedtime    MEDICATIONS  (PRN):  acetaminophen     Tablet .. 650 milliGRAM(s) Oral every 6 hours PRN Temp greater or equal to 38C (100.4F)  bisacodyl Suppository 10 milliGRAM(s) Rectal daily PRN Constipation      LABS:    Hgb Trend: 10.5<--                  MICROBIOLOGY:     RADIOLOGY:  [ ] Reviewed and interpreted by me CHIEF COMPLAINT:    Interval Events:  no o/n events    REVIEW OF SYSTEMS:  [x ] Unable to assess ROS because intubated/poor mental status    OBJECTIVE:  ICU Vital Signs Last 24 Hrs  T(C): 37.6 (28 Dec 2023 04:00), Max: 38.1 (27 Dec 2023 15:52)  T(F): 99.6 (28 Dec 2023 04:00), Max: 100.5 (27 Dec 2023 15:52)  HR: 97 (28 Dec 2023 07:14) (86 - 112)  BP: 134/48 (28 Dec 2023 04:00) (118/48 - 134/48)  BP(mean): 70 (28 Dec 2023 04:00) (68 - 77)  ABP: --  ABP(mean): --  RR: 21 (28 Dec 2023 07:14) (17 - 29)  SpO2: 95% (28 Dec 2023 05:38) (85% - 96%)      Mode: AC/ CMV (Assist Control/ Continuous Mandatory Ventilation), RR (machine): 12, TV (machine): 400, FiO2: 21, PEEP: 5, ITime: 0.8, MAP: 8, PIP: 28    12-27 @ 07:01  -  12-28 @ 07:00  --------------------------------------------------------  IN: 840 mL / OUT: 1675 mL / NET: -835 mL      CAPILLARY BLOOD GLUCOSE          PHYSICAL EXAM:  General: NAD, chronically ill appearing  HEENT: ETT and NGT in place  Neck: supple  Respiratory: mechanical BS  Cardiovascular: s1s2 RRR  Abdomen: soft, non tender, non distended  Extremities: warm, no edema or clubbing  Skin: intact  Neurological: unable to assess  Psychiatry: unable to assess    LINES:  [facility]    HOSPITAL MEDICATIONS:  MEDICATIONS  (STANDING):  amLODIPine   Tablet 10 milliGRAM(s) Oral daily  aspirin  chewable 81 milliGRAM(s) Oral daily  atorvastatin 80 milliGRAM(s) Oral at bedtime  chlorhexidine 0.12% Liquid 15 milliLiter(s) Oral Mucosa every 12 hours  chlorhexidine 2% Cloths 1 Application(s) Topical <User Schedule>  enoxaparin Injectable 40 milliGRAM(s) SubCutaneous every 24 hours  hydrALAZINE 100 milliGRAM(s) Oral every 8 hours  influenza  Vaccine (HIGH DOSE) 0.7 milliLiter(s) IntraMuscular once  polyethylene glycol 3350 17 Gram(s) Oral daily  scopolamine 1 mG/72 Hr(s) Patch 1 Patch Transdermal every 72 hours  senna 2 Tablet(s) Oral at bedtime    MEDICATIONS  (PRN):  acetaminophen     Tablet .. 650 milliGRAM(s) Oral every 6 hours PRN Temp greater or equal to 38C (100.4F)  bisacodyl Suppository 10 milliGRAM(s) Rectal daily PRN Constipation      LABS:    Hgb Trend: 10.5<--                  MICROBIOLOGY:     RADIOLOGY:  [ ] Reviewed and interpreted by me

## 2023-12-28 NOTE — PROGRESS NOTE ADULT - ASSESSMENT
88 year old female with a PMH of HTN, HLD and afib on xarelto who did not take medications for past 1 week prior to hospitalization here with R MCA infarct.   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  Very poor prognosis for any functional neurologic prognosis  Pending transfer to LTAC facility in [place] vs palliative wean, family now not consenting to palliative wean  Call with questions   88 year old female with a PMH of HTN, HLD and afib on xarelto who did not take medications for past 1 week prior to hospitalization here with R MCA infarct.   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  Very poor prognosis for any functional neurologic prognosis  Pending transfer to LTAC facility in [place] vs palliative wean, family now not consenting to palliative wean  Call with questions

## 2023-12-28 NOTE — PROGRESS NOTE ADULT - ASSESSMENT
88F w/ HTN, HLD, Afib. Presents w/ AMS and R gaze deviation. Pt was intubated in the ED due to poor mental status. Pt did not receive TNK. Admitted to ICU. Imaging revealed R JIMMY and MCA infarct w/ midline shift, as well as PCA infarct. Pt's mental status is poor.     #Neuro - mental status is poor, unresponsive in setting of catastrophic CVA  #CV - continue hydralazine and amlodipine; known afib, too high risk given extent of CVA to give pt therapeutic AC  #Pulm - remains intubated on minimal vent settings; does not tolerate PSV weaning and not a candidate for extubation; family would not opt for tracheostomy, although they do not want to withdraw either  #ID- noted low grade fever and leukocytosis, no plan to escalate care and do infectious workup, treat fever with tylenol as needed; monitor off abx  #Renal/metabolic - renal function stable on last labs; monitor I/Os, electrolytes; on free water   #GI- tolerating TF, having BM, continue bowel regimen  #Heme - no active issues  #Endo - BG stable  #PPx - lovenox qd  #Dispo- remains in ICU vent dependent, unable to extubate; prognosis for neuro-cognitive recovery is poor; pt is DNR but family adamantly refusing to withdraw care and continues to keep mother intubated without any hope for recovery; we will not offer any escalation of care at this time

## 2023-12-28 NOTE — CHART NOTE - NSCHARTNOTEFT_GEN_A_CORE
Pt is on vent, pt s/p vomiting, on OGT feeding c Jevity 1.2 @ 10 ml/hr x 24 hours=240 ml, 288 calories, 13 grams protein.  Per chart review, pt c poor prognosis, DNR, family refusing withdraw of care, no escalation of care noted.  RD to remain available for nutrition intervention as indicated.

## 2023-12-29 NOTE — PROGRESS NOTE ADULT - SUBJECTIVE AND OBJECTIVE BOX
CHIEF COMPLAINT:    Interval Events:    REVIEW OF SYSTEMS:  Constitutional: [ ] fevers [ ] chills [ ] weight loss [ ] weight gain  HEENT: [ ] dry eyes [ ] eye irritation [ ] postnasal drip [ ] nasal congestion  CV: [ ] chest pain [ ] orthopnea [ ] palpitations [ ] murmur  Resp: [ ] cough [ ] shortness of breath [ ] dyspnea [ ] wheezing [ ] sputum [ ] hemoptysis  GI: [ ] nausea [ ] vomiting [ ] diarrhea [ ] constipation [ ] abd pain [ ] dysphagia   : [ ] dysuria [ ] nocturia [ ] hematuria [ ] increased urinary frequency  Musculoskeletal: [ ] back pain [ ] myalgias [ ] arthralgias [ ] fracture  Skin: [ ] rash [ ] itch  Neurological: [ ] headache [ ] dizziness [ ] syncope [ ] weakness [ ] numbness  Hematologic/Lymphatic: [ ] anemia [ ] bleeding problem  Allergic/Immunologic: [ ] itchy eyes [ ] nasal discharge [ ] hives [ ] angioedema  [ ] All other systems negative  [ ] Unable to assess ROS because ________    OBJECTIVE:  ICU Vital Signs Last 24 Hrs  T(C): 36.4 (29 Dec 2023 04:00), Max: 38.4 (28 Dec 2023 12:00)  T(F): 97.6 (29 Dec 2023 04:00), Max: 101.2 (28 Dec 2023 13:32)  HR: 86 (29 Dec 2023 07:34) (78 - 110)  BP: 129/50 (29 Dec 2023 04:00) (112/53 - 129/50)  BP(mean): 73 (29 Dec 2023 04:00) (68 - 73)  ABP: --  ABP(mean): --  RR: 21 (29 Dec 2023 07:34) (14 - 25)  SpO2: 96% (29 Dec 2023 07:34) (86% - 98%)    O2 Parameters below as of 29 Dec 2023 04:00  Patient On (Oxygen Delivery Method): ventilator          Mode: AC/ CMV (Assist Control/ Continuous Mandatory Ventilation), RR (machine): 12, TV (machine): 400, FiO2: 21, PEEP: 5, ITime: 0.8, MAP: 9, PIP: 25    12-28 @ 07:01  -  12-29 @ 07:00  --------------------------------------------------------  IN: 840 mL / OUT: 790 mL / NET: 50 mL      CAPILLARY BLOOD GLUCOSE          PHYSICAL EXAM:  General:   HEENT:   Neck:   Respiratory:   Cardiovascular:   Abdomen:   Extremities:   Skin:   Neurological:  Psychiatry:    LINES:    HOSPITAL MEDICATIONS:  MEDICATIONS  (STANDING):  amLODIPine   Tablet 10 milliGRAM(s) Oral daily  aspirin  chewable 81 milliGRAM(s) Oral daily  atorvastatin 80 milliGRAM(s) Oral at bedtime  chlorhexidine 0.12% Liquid 15 milliLiter(s) Oral Mucosa every 12 hours  chlorhexidine 2% Cloths 1 Application(s) Topical <User Schedule>  enoxaparin Injectable 40 milliGRAM(s) SubCutaneous every 24 hours  hydrALAZINE 100 milliGRAM(s) Oral every 8 hours  influenza  Vaccine (HIGH DOSE) 0.7 milliLiter(s) IntraMuscular once  polyethylene glycol 3350 17 Gram(s) Oral daily  scopolamine 1 mG/72 Hr(s) Patch 1 Patch Transdermal every 72 hours  senna 2 Tablet(s) Oral at bedtime    MEDICATIONS  (PRN):  acetaminophen     Tablet .. 650 milliGRAM(s) Oral every 6 hours PRN Temp greater or equal to 38C (100.4F)  bisacodyl Suppository 10 milliGRAM(s) Rectal daily PRN Constipation      LABS:    Hgb Trend: 10.5<--                  MICROBIOLOGY:     RADIOLOGY:  [ ] Reviewed and interpreted by me CHIEF COMPLAINT:    Interval Events:  no o/n events    REVIEW OF SYSTEMS:  [x ] Unable to assess ROS because intubated/poor mental status    OBJECTIVE:  ICU Vital Signs Last 24 Hrs  T(C): 36.4 (29 Dec 2023 04:00), Max: 38.4 (28 Dec 2023 12:00)  T(F): 97.6 (29 Dec 2023 04:00), Max: 101.2 (28 Dec 2023 13:32)  HR: 86 (29 Dec 2023 07:34) (78 - 110)  BP: 129/50 (29 Dec 2023 04:00) (112/53 - 129/50)  BP(mean): 73 (29 Dec 2023 04:00) (68 - 73)  ABP: --  ABP(mean): --  RR: 21 (29 Dec 2023 07:34) (14 - 25)  SpO2: 96% (29 Dec 2023 07:34) (86% - 98%)    O2 Parameters below as of 29 Dec 2023 04:00  Patient On (Oxygen Delivery Method): ventilator          Mode: AC/ CMV (Assist Control/ Continuous Mandatory Ventilation), RR (machine): 12, TV (machine): 400, FiO2: 21, PEEP: 5, ITime: 0.8, MAP: 9, PIP: 25    12-28 @ 07:01  -  12-29 @ 07:00  --------------------------------------------------------  IN: 840 mL / OUT: 790 mL / NET: 50 mL      CAPILLARY BLOOD GLUCOSE          PHYSICAL EXAM:  General: NAD, chronically ill appearing  HEENT: ETT and NGT in place  Neck: supple  Respiratory: mechanical BS  Cardiovascular: s1s2 RRR  Abdomen: soft, non tender, +distension  Extremities: warm, no edema or clubbing  Skin: intact  Neurological: unable to assess  Psychiatry: unable to assess    LINES:  [facility]    HOSPITAL MEDICATIONS:  MEDICATIONS  (STANDING):  amLODIPine   Tablet 10 milliGRAM(s) Oral daily  aspirin  chewable 81 milliGRAM(s) Oral daily  atorvastatin 80 milliGRAM(s) Oral at bedtime  chlorhexidine 0.12% Liquid 15 milliLiter(s) Oral Mucosa every 12 hours  chlorhexidine 2% Cloths 1 Application(s) Topical <User Schedule>  enoxaparin Injectable 40 milliGRAM(s) SubCutaneous every 24 hours  hydrALAZINE 100 milliGRAM(s) Oral every 8 hours  influenza  Vaccine (HIGH DOSE) 0.7 milliLiter(s) IntraMuscular once  polyethylene glycol 3350 17 Gram(s) Oral daily  scopolamine 1 mG/72 Hr(s) Patch 1 Patch Transdermal every 72 hours  senna 2 Tablet(s) Oral at bedtime    MEDICATIONS  (PRN):  acetaminophen     Tablet .. 650 milliGRAM(s) Oral every 6 hours PRN Temp greater or equal to 38C (100.4F)  bisacodyl Suppository 10 milliGRAM(s) Rectal daily PRN Constipation      LABS:    Hgb Trend: 10.5<--                  MICROBIOLOGY:     RADIOLOGY:  [ ] Reviewed and interpreted by me CHIEF COMPLAINT:    Interval Events:  no o/n events    REVIEW OF SYSTEMS:  [x ] Unable to assess ROS because intubated/poor mental status    OBJECTIVE:  ICU Vital Signs Last 24 Hrs  T(C): 36.4 (29 Dec 2023 04:00), Max: 38.4 (28 Dec 2023 12:00)  T(F): 97.6 (29 Dec 2023 04:00), Max: 101.2 (28 Dec 2023 13:32)  HR: 86 (29 Dec 2023 07:34) (78 - 110)  BP: 129/50 (29 Dec 2023 04:00) (112/53 - 129/50)  BP(mean): 73 (29 Dec 2023 04:00) (68 - 73)  ABP: --  ABP(mean): --  RR: 21 (29 Dec 2023 07:34) (14 - 25)  SpO2: 96% (29 Dec 2023 07:34) (86% - 98%)    O2 Parameters below as of 29 Dec 2023 04:00  Patient On (Oxygen Delivery Method): ventilator          Mode: AC/ CMV (Assist Control/ Continuous Mandatory Ventilation), RR (machine): 12, TV (machine): 400, FiO2: 21, PEEP: 5, ITime: 0.8, MAP: 9, PIP: 25    12-28 @ 07:01  -  12-29 @ 07:00  --------------------------------------------------------  IN: 840 mL / OUT: 790 mL / NET: 50 mL      CAPILLARY BLOOD GLUCOSE          PHYSICAL EXAM:  General: NAD, chronically ill appearing  HEENT: ETT and NGT in place  Neck: supple  Respiratory: mechanical BS  Cardiovascular: s1s2 RRR  Abdomen: soft, non tender, +distension  Extremities: warm, no edema or clubbing  Skin: intact  Neurological: unable to assess  Psychiatry: unable to assess    LINES:  [facility]    HOSPITAL MEDICATIONS:  MEDICATIONS  (STANDING):  amLODIPine   Tablet 10 milliGRAM(s) Oral daily  aspirin  chewable 81 milliGRAM(s) Oral daily  atorvastatin 80 milliGRAM(s) Oral at bedtime  chlorhexidine 0.12% Liquid 15 milliLiter(s) Oral Mucosa every 12 hours  chlorhexidine 2% Cloths 1 Application(s) Topical <User Schedule>  enoxaparin Injectable 40 milliGRAM(s) SubCutaneous every 24 hours  hydrALAZINE 100 milliGRAM(s) Oral every 8 hours  influenza  Vaccine (HIGH DOSE) 0.7 milliLiter(s) IntraMuscular once  polyethylene glycol 3350 17 Gram(s) Oral daily  scopolamine 1 mG/72 Hr(s) Patch 1 Patch Transdermal every 72 hours  senna 2 Tablet(s) Oral at bedtime    MEDICATIONS  (PRN):  acetaminophen     Tablet .. 650 milliGRAM(s) Oral every 6 hours PRN Temp greater or equal to 38C (100.4F)  bisacodyl Suppository 10 milliGRAM(s) Rectal daily PRN Constipation      LABS:    Hgb Trend: 10.5<--                  MICROBIOLOGY:     RADIOLOGY:  [ ] Reviewed and interpreted by me

## 2023-12-29 NOTE — PROGRESS NOTE ADULT - SUBJECTIVE AND OBJECTIVE BOX
Neurology Progress Note    S: Patient seen and examined.     MEDICATIONS:    acetaminophen     Tablet .. 650 milliGRAM(s) Oral every 6 hours PRN  amLODIPine   Tablet 10 milliGRAM(s) Oral daily  aspirin  chewable 81 milliGRAM(s) Oral daily  atorvastatin 80 milliGRAM(s) Oral at bedtime  bisacodyl Suppository 10 milliGRAM(s) Rectal daily PRN  chlorhexidine 0.12% Liquid 15 milliLiter(s) Oral Mucosa every 12 hours  chlorhexidine 2% Cloths 1 Application(s) Topical <User Schedule>  enoxaparin Injectable 40 milliGRAM(s) SubCutaneous every 24 hours  hydrALAZINE 100 milliGRAM(s) Oral every 8 hours  influenza  Vaccine (HIGH DOSE) 0.7 milliLiter(s) IntraMuscular once  polyethylene glycol 3350 17 Gram(s) Oral daily  scopolamine 1 mG/72 Hr(s) Patch 1 Patch Transdermal every 72 hours  senna 2 Tablet(s) Oral at bedtime      LABS:            CAPILLARY BLOOD GLUCOSE            I&O's Summary    28 Dec 2023 07:01  -  29 Dec 2023 07:00  --------------------------------------------------------  IN: 840 mL / OUT: 790 mL / NET: 50 mL      Vital Signs Last 24 Hrs  T(C): 36.7 (29 Dec 2023 08:11), Max: 38.4 (28 Dec 2023 13:32)  T(F): 98 (29 Dec 2023 08:11), Max: 101.2 (28 Dec 2023 13:32)  HR: 90 (29 Dec 2023 12:00) (78 - 110)  BP: 111/49 (29 Dec 2023 12:00) (111/49 - 129/50)  BP(mean): 68 (29 Dec 2023 12:00) (68 - 100)  RR: 23 (29 Dec 2023 12:00) (14 - 25)  SpO2: 87% (29 Dec 2023 12:00) (86% - 99%)    Parameters below as of 29 Dec 2023 04:00  Patient On (Oxygen Delivery Method): ventilator        Neurological Exam:  Mental Status: Intubated, not sedated. some spont eye opening. No verbal output, does not follow commands.   Cranial Nerves: pupils very sluggish R, L near fixed, no  corneals, no VOR, intermittent eyelid fluttering no facial asymmetry , weak cough/gag  Motor: dec tone throughout, no spont movemnt noted   Sensation: UE extensor posturing, LE triple flexion left toe up    I personally reviewed the below data/images/labs:      < from: CT Head No Cont (10.22.19 @ 15:57) >  IMPRESSION:    1)  chronic ischemic changes in both hemispheres with volume loss. There   are no new infarct as compared to prior CT. This may be further assessed   with MR imaging.  2)  no hemorrhage or mass identified.      LABS:                      < end of copied text >  < from: CT Angio Head w/ IV Cont (10.22.19 @ 15:57) >  IMPRESSION    1. There is intimal thickening and calcified plaque in both carotid   bifurcations in the neck, but without significant stenosis. No evidence   of carotid vertebral occlusion or dissection.  2. Intracranially, there are atherosclerotic changes in the anterior and   posterior circulation, but without significant stenosis or occlusion.    < end of copied text >  < from: CT Brain Stroke Protocol (10.26.23 @ 04:37) >  IMPRESSION:  No evidence of acute intracranial hemorrhage, mass effect or large acute   territorial infarct, within limits of noncontrast CT technique.    < end of copied text >  < from: CT Angio Neck Stroke Protocol w/ IV Cont (10.26.23 @ 04:53) >  CT ANGIOGRAPHY NECK:  1.  The right internal carotid artery is occluded approximately 1.5 cm   distal to its origin, possibly due to underlying dissection.  2.  Endotracheal tube is low in position, with its tip approximate 1 cm   above the slade.  3.  Diffuse ill-defined groundglass opacity in both lungs, which may   represent pulmonary edema, infection and/or atelectasis.  4.  There is a discrete right upper lobe groundglass opacity measuring   1.9 x 1.4 cm; this may represent a focus of infection, inflammation,   edema, hemorrhage, fibrosis or malignancy.  A follow-up chest CT is   warranted in three months to assess for resolution.    CT ANGIOGRAPHY BRAIN: The right internal carotid arteries occluded.    There is no significant flow related opacification within the proximal   right middle cerebral artery.  There is mild reconstitution of flow in   some distal branch vessels of the inferior division of the right middle   cerebral artery.  The right anterior cerebral artery fills across the   anterior communicating artery.    < end of copied text >  < from: CT Brain Perfusion Maps Stroke (10.26.23 @ 04:54) >  CT PERFUSION: CT perfusion is consistent with large acute infarct in the   right middle cerebral artery vascular territory and small to moderate   amount of surrounding ischemic penumbra.  Hypoperfusion index is 0.3.    Core infarct (CBF <  30%:): 136 mL  Penumbra (Tmax >6s): 194 mL  Mismatched volume: 58 mL  Mismatched ratio: 1.4    < end of copied text >      < from: CT Head No Cont (10.22.19 @ 15:57) >  IMPRESSION:    1)  chronic ischemic changes in both hemispheres with volume loss. There   are no new infarct as compared to prior CT. This may be further assessed   with MR imaging.  2)  no hemorrhage or mass identified.    < end of copied text >  < from: CT Angio Head w/ IV Cont (10.22.19 @ 15:57) >  IMPRESSION    1. There is intimal thickening and calcified plaque in both carotid   bifurcations in the neck, but without significant stenosis. No evidence   of carotid vertebral occlusion or dissection.  2. Intracranially, there are atherosclerotic changes in the anterior and   posterior circulation, but without significant stenosis or occlusion.    < end of copied text >  < from: CT Brain Stroke Protocol (10.26.23 @ 04:37) >  IMPRESSION:  No evidence of acute intracranial hemorrhage, mass effect or large acute   territorial infarct, within limits of noncontrast CT technique.    < end of copied text >  < from: CT Angio Neck Stroke Protocol w/ IV Cont (10.26.23 @ 04:53) >  CT ANGIOGRAPHY NECK:  1.  The right internal carotid artery is occluded approximately 1.5 cm   distal to its origin, possibly due to underlying dissection.  2.  Endotracheal tube is low in position, with its tip approximate 1 cm   above the slade.  3.  Diffuse ill-defined groundglass opacity in both lungs, which may   represent pulmonary edema, infection and/or atelectasis.  4.  There is a discrete right upper lobe groundglass opacity measuring   1.9 x 1.4 cm; this may represent a focus of infection, inflammation,   edema, hemorrhage, fibrosis or malignancy.  A follow-up chest CT is   warranted in three months to assess for resolution.    CT ANGIOGRAPHY BRAIN: The right internal carotid arteries occluded.    There is no significant flow related opacification within the proximal   right middle cerebral artery.  There is mild reconstitution of flow in   some distal branch vessels of the inferior division of the right middle   cerebral artery.  The right anterior cerebral artery fills across the   anterior communicating artery.    < end of copied text >  < from: CT Brain Perfusion Maps Stroke (10.26.23 @ 04:54) >  CT PERFUSION: CT perfusion is consistent with large acute infarct in the   right middle cerebral artery vascular territory and small to moderate   amount of surrounding ischemic penumbra.  Hypoperfusion index is 0.3.    Core infarct (CBF <  30%:): 136 mL  Penumbra (Tmax >6s): 194 mL  Mismatched volume: 58 mL  Mismatched ratio: 1.4    < end of copied text >      < from: CT Head No Cont (10.22.19 @ 15:57) >  IMPRESSION:    1)  chronic ischemic changes in both hemispheres with volume loss. There   are no new infarct as compared to prior CT. This may be further assessed   with MR imaging.  2)  no hemorrhage or mass identified.    < end of copied text >  < from: CT Angio Head w/ IV Cont (10.22.19 @ 15:57) >  IMPRESSION    1. There is intimal thickening and calcified plaque in both carotid   bifurcations in the neck, but without significant stenosis. No evidence   of carotid vertebral occlusion or dissection.  2. Intracranially, there are atherosclerotic changes in the anterior and   posterior circulation, but without significant stenosis or occlusion.    < end of copied text >  < from: CT Brain Stroke Protocol (10.26.23 @ 04:37) >  IMPRESSION:  No evidence of acute intracranial hemorrhage, mass effect or large acute   territorial infarct, within limits of noncontrast CT technique.    < end of copied text >  < from: CT Angio Neck Stroke Protocol w/ IV Cont (10.26.23 @ 04:53) >  CT ANGIOGRAPHY NECK:  1.  The right internal carotid artery is occluded approximately 1.5 cm   distal to its origin, possibly due to underlying dissection.  2.  Endotracheal tube is low in position, with its tip approximate 1 cm   above the slade.  3.  Diffuse ill-defined groundglass opacity in both lungs, which may   represent pulmonary edema, infection and/or atelectasis.  4.  There is a discrete right upper lobe groundglass opacity measuring   1.9 x 1.4 cm; this may represent a focus of infection, inflammation,   edema, hemorrhage, fibrosis or malignancy.  A follow-up chest CT is   warranted in three months to assess for resolution.    CT ANGIOGRAPHY BRAIN: The right internal carotid arteries occluded.    There is no significant flow related opacification within the proximal   right middle cerebral artery.  There is mild reconstitution of flow in   some distal branch vessels of the inferior division of the right middle   cerebral artery.  The right anterior cerebral artery fills across the   anterior communicating artery.    < end of copied text >  < from: CT Brain Perfusion Maps Stroke (10.26.23 @ 04:54) >  CT PERFUSION: CT perfusion is consistent with large acute infarct in the   right middle cerebral artery vascular territory and small to moderate   amount of surrounding ischemic penumbra.  Hypoperfusion index is 0.3.    Core infarct (CBF <  30%:): 136 mL  Penumbra (Tmax >6s): 194 mL  Mismatched volume: 58 mL  Mismatched ratio: 1.4    < end of copied text >    EEG Classification / Summary:  Abnormal EEG study  1. Intermittent periodic discharges seen intermittently over the left hemisphere, sometimes with triphasic morphology  2. Focal continuous right hemispheric slowing  3. Moderate generalized background slowing    -----------------------------------------------------------------------------------------------------    Clinical Impression:  1. While the periodic pattern noted above may represent lateralized periodic discharges indicating epileptic potential in the left hemisphere, more likely these represent generalized periodic discharges with triphasic morphology, consistent with metabolic etiology, that are poorly expressed on the right due to the effects of the stroke.   2. Structural abnormality in right hemisphere  3. Moderate diffuse/multi-focal cerebral dysfunction, not specific as to etiology.    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 Significant   associated mass effect and associated herniation.      < end of copied text >  < from: CT Head No Cont (11.02.23 @ 14:30) >    Reidentified are 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 There is effacement of all of the   basilar cisterns. Effacement of the sulci in the bilateral cerebri,   compatible with increased brain edema.    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    Unsuccessful attempts were made to reach the ordering clinician via teams.      --- End of Report ---    < end of copied text >

## 2023-12-29 NOTE — PROGRESS NOTE ADULT - ASSESSMENT
88 year old female with a PMH of HTN, HLD and afib on xarelto who did not take medications for past 1 week prior to hospitalization here with R MCA infarct.   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  Very poor prognosis for any functional neurologic prognosis  Pending transfer to LTAC facility in [place] vs palliative wean, family now not consenting to palliative wean  Call with questions   88 year old female with a PMH of HTN, HLD and afib on xarelto who did not take medications for past 1 week prior to hospitalization here with R MCA infarct.   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  Very poor prognosis for any functional neurologic prognosis  Pending transfer to LTAC facility in [place] vs palliative wean, family now not consenting to palliative wean  Call with questions

## 2023-12-30 NOTE — PROGRESS NOTE ADULT - SUBJECTIVE AND OBJECTIVE BOX
INTERVAL HPI/OVERNIGHT EVENTS:   HPI:  Ms. Longoria is an 88 year old female with a PMH of HTN, HLD and afib on xarelto who presented to [facility] earlier this morning after she was found altered, nonverbal and with right gaze deviation when she woke up. At baseline pt is ambulatory and verbal. Pt required intubation in ED for airway protection given poor MS. NIHSS calculated as 30 at that time. Pt not a candidate for TNK or transfer for intervention per stroke neurologist. She remains intubated on nicardipine gtt. ICU team consulted. (26 Oct 2023 05:50)      CENTRAL LINE: [ ] YES [ ] NO  LOCATION:   DATE INSERTED:  REMOVE: [ ] YES [ ] NO  EXPLAIN:    PATEL: [ ] YES [ ] NO    DATE INSERTED:  REMOVE:  [ ] YES [ ] NO  EXPLAIN:    A-LINE:  [ ] YES [ ] NO  LOCATION:   DATE INSERTED:  REMOVE:  [ ] YES [ ] NO  EXPLAIN:    PAST MEDICAL & SURGICAL HISTORY:  HTN (hypertension)      Elevated cholesterol      Atrial fibrillation      Hyperlipidemia          REVIEW OF SYSTEMS:    Negative ROS aside from HPI/Interval events above.    ICU Vital Signs Last 24 Hrs  T(C): 36.1 (30 Dec 2023 08:00), Max: 39 (29 Dec 2023 18:00)  T(F): 97 (30 Dec 2023 08:00), Max: 102.2 (29 Dec 2023 18:00)  HR: 78 (30 Dec 2023 09:15) (77 - 98)  BP: 98/45 (30 Dec 2023 08:00) (98/45 - 130/57)  BP(mean): 62 (30 Dec 2023 08:00) (57 - 76)  ABP: --  ABP(mean): --  RR: 21 (30 Dec 2023 08:00) (16 - 25)  SpO2: 84% (30 Dec 2023 08:00) (82% - 99%)    O2 Parameters below as of 30 Dec 2023 07:10  Patient On (Oxygen Delivery Method): ventilator, ETT: ,RR12, PEEP5, FIO2 21%                I&O's Detail    29 Dec 2023 07:01  -  30 Dec 2023 07:00  --------------------------------------------------------  IN:    Enteral Tube Flush: 160 mL    Free Water: 300 mL    Jevity 1.2: 40 mL  Total IN: 500 mL    OUT:    Indwelling Catheter - Urethral (mL): 80 mL    Nasogastric/Oral tube (mL): 150 mL    Rectal Tube (mL): 550 mL  Total OUT: 780 mL    Total NET: -280 mL          Mode: AC/ CMV (Assist Control/ Continuous Mandatory Ventilation)  RR (machine): 12  TV (machine): 400  FiO2: 21  PEEP: 5  ITime: 1  MAP: 9  PIP: 22    CAPILLARY BLOOD GLUCOSE            PHYSICAL EXAM:    General: NAD, chronically ill appearing  HEENT: ETT and NGT in place  Neck: supple  Respiratory: mechanical BS  Cardiovascular: s1s2 RRR  Abdomen: soft, non tender, +distension  Extremities: warm, no edema or clubbing  Skin: intact  Neurological: unable to assess  Psychiatry: unable to assess     INTERVAL HPI/OVERNIGHT EVENTS:   HPI:  Ms. Longoria is an 88 year old female with a PMH of HTN, HLD and afib on xarelto who presented to [facility] earlier this morning after she was found altered, nonverbal and with right gaze deviation when she woke up. At baseline pt is ambulatory and verbal. Pt required intubation in ED for airway protection given poor MS. NIHSS calculated as 30 at that time. Pt not a candidate for TNK or transfer for intervention per stroke neurologist. She remains intubated on nicardipine gtt. ICU team consulted. (26 Oct 2023 05:50)      CENTRAL LINE: [ ] YES [ ] NO  LOCATION:   DATE INSERTED:  REMOVE: [ ] YES [ ] NO  EXPLAIN:    PATEL: [ ] YES [ ] NO    DATE INSERTED:  REMOVE:  [ ] YES [ ] NO  EXPLAIN:    A-LINE:  [ ] YES [ ] NO  LOCATION:   DATE INSERTED:  REMOVE:  [ ] YES [ ] NO  EXPLAIN:    PAST MEDICAL & SURGICAL HISTORY:  HTN (hypertension)      Elevated cholesterol      Atrial fibrillation      Hyperlipidemia          REVIEW OF SYSTEMS:    Negative ROS aside from HPI/Interval events above.    ICU Vital Signs Last 24 Hrs  T(C): 36.1 (30 Dec 2023 08:00), Max: 39 (29 Dec 2023 18:00)  T(F): 97 (30 Dec 2023 08:00), Max: 102.2 (29 Dec 2023 18:00)  HR: 78 (30 Dec 2023 09:15) (77 - 98)  BP: 98/45 (30 Dec 2023 08:00) (98/45 - 130/57)  BP(mean): 62 (30 Dec 2023 08:00) (57 - 76)  ABP: --  ABP(mean): --  RR: 21 (30 Dec 2023 08:00) (16 - 25)  SpO2: 84% (30 Dec 2023 08:00) (82% - 99%)    O2 Parameters below as of 30 Dec 2023 07:10  Patient On (Oxygen Delivery Method): ventilator, ETT: ,RR12, PEEP5, FIO2 21%                I&O's Detail    29 Dec 2023 07:01  -  30 Dec 2023 07:00  --------------------------------------------------------  IN:    Enteral Tube Flush: 160 mL    Free Water: 300 mL    Jevity 1.2: 40 mL  Total IN: 500 mL    OUT:    Indwelling Catheter - Urethral (mL): 80 mL    Nasogastric/Oral tube (mL): 150 mL    Rectal Tube (mL): 550 mL  Total OUT: 780 mL    Total NET: -280 mL          Mode: AC/ CMV (Assist Control/ Continuous Mandatory Ventilation)  RR (machine): 12  TV (machine): 400  FiO2: 21  PEEP: 5  ITime: 1  MAP: 9  PIP: 22    CAPILLARY BLOOD GLUCOSE            PHYSICAL EXAM:    General: NAD, chronically ill appearing  HEENT: ETT and NGT in place  Neck: supple  Respiratory: mechanical BS  Cardiovascular: s1s2 RRR  Abdomen: soft, non tender, +distension  Extremities: warm, no edema or clubbing  Skin: intact  Neurological: unable to assess  Psychiatry: unable to assess

## 2023-12-30 NOTE — PROGRESS NOTE ADULT - ASSESSMENT
88F w/ HTN, HLD, Afib. Presents w/ AMS and R gaze deviation. Pt was intubated in the ED due to poor mental status. Pt did not receive TNK. Admitted to ICU. Imaging revealed R JIMMY and MCA infarct w/ midline shift, as well as PCA infarct. Pt's mental status is poor at best 2/2 catastrophic neurologic insult.    Clinically has not improved since last evaluation / examination by myself. Care plan and management remains unchanged for weeks/months with no resolution in sight given the unrealistic expectation of the patient's family members. we remain at a standstill in terms of plan of care for the patient moving forward. No escalation in care. We have nothing more to offer the family and/or patient from an ICU perspective.

## 2023-12-30 NOTE — PROGRESS NOTE ADULT - SUBJECTIVE AND OBJECTIVE BOX
Neurology Progress Note    S: Patient seen and examined.     MEDICATIONS:    acetaminophen     Tablet .. 650 milliGRAM(s) Oral every 6 hours PRN  amLODIPine   Tablet 10 milliGRAM(s) Oral daily  aspirin  chewable 81 milliGRAM(s) Oral daily  atorvastatin 80 milliGRAM(s) Oral at bedtime  bisacodyl Suppository 10 milliGRAM(s) Rectal daily PRN  chlorhexidine 0.12% Liquid 15 milliLiter(s) Oral Mucosa every 12 hours  chlorhexidine 2% Cloths 1 Application(s) Topical <User Schedule>  enoxaparin Injectable 40 milliGRAM(s) SubCutaneous every 24 hours  hydrALAZINE 100 milliGRAM(s) Oral every 8 hours  influenza  Vaccine (HIGH DOSE) 0.7 milliLiter(s) IntraMuscular once  polyethylene glycol 3350 17 Gram(s) Oral daily  scopolamine 1 mG/72 Hr(s) Patch 1 Patch Transdermal every 72 hours  senna 2 Tablet(s) Oral at bedtime      LABS:            CAPILLARY BLOOD GLUCOSE            I&O's Summary    29 Dec 2023 07:01  -  30 Dec 2023 07:00  --------------------------------------------------------  IN: 500 mL / OUT: 780 mL / NET: -280 mL    30 Dec 2023 07:01  -  30 Dec 2023 21:03  --------------------------------------------------------  IN: 150 mL / OUT: 365 mL / NET: -215 mL      Vital Signs Last 24 Hrs  T(C): 36.3 (30 Dec 2023 16:00), Max: 36.4 (30 Dec 2023 00:00)  T(F): 97.3 (30 Dec 2023 16:00), Max: 97.6 (30 Dec 2023 00:00)  HR: 75 (30 Dec 2023 20:00) (75 - 86)  BP: 101/50 (30 Dec 2023 20:00) (97/52 - 130/57)  BP(mean): 66 (30 Dec 2023 20:00) (62 - 76)  RR: 20 (30 Dec 2023 20:00) (16 - 23)  SpO2: 84% (30 Dec 2023 08:00) (84% - 99%)    Parameters below as of 30 Dec 2023 07:10  Patient On (Oxygen Delivery Method): ventilator, ETT: ,RR12, PEEP5, FIO2 21%            Neurological Exam:  Mental Status: Intubated, not sedated. some spont eye opening. No verbal output, does not follow commands.   Cranial Nerves: pupils very sluggish R, L near fixed, no  corneals, no VOR, intermittent eyelid fluttering no facial asymmetry , weak cough/gag  Motor: dec tone throughout, no spont movemnt noted   Sensation: UE extensor posturing, LE triple flexion left toe up    I personally reviewed the below data/images/labs:      < from: CT Head No Cont (10.22.19 @ 15:57) >  IMPRESSION:    1)  chronic ischemic changes in both hemispheres with volume loss. There   are no new infarct as compared to prior CT. This may be further assessed   with MR imaging.  2)  no hemorrhage or mass identified.      LABS:                      < end of copied text >  < from: CT Angio Head w/ IV Cont (10.22.19 @ 15:57) >  IMPRESSION    1. There is intimal thickening and calcified plaque in both carotid   bifurcations in the neck, but without significant stenosis. No evidence   of carotid vertebral occlusion or dissection.  2. Intracranially, there are atherosclerotic changes in the anterior and   posterior circulation, but without significant stenosis or occlusion.    < end of copied text >  < from: CT Brain Stroke Protocol (10.26.23 @ 04:37) >  IMPRESSION:  No evidence of acute intracranial hemorrhage, mass effect or large acute   territorial infarct, within limits of noncontrast CT technique.    < end of copied text >  < from: CT Angio Neck Stroke Protocol w/ IV Cont (10.26.23 @ 04:53) >  CT ANGIOGRAPHY NECK:  1.  The right internal carotid artery is occluded approximately 1.5 cm   distal to its origin, possibly due to underlying dissection.  2.  Endotracheal tube is low in position, with its tip approximate 1 cm   above the slade.  3.  Diffuse ill-defined groundglass opacity in both lungs, which may   represent pulmonary edema, infection and/or atelectasis.  4.  There is a discrete right upper lobe groundglass opacity measuring   1.9 x 1.4 cm; this may represent a focus of infection, inflammation,   edema, hemorrhage, fibrosis or malignancy.  A follow-up chest CT is   warranted in three months to assess for resolution.    CT ANGIOGRAPHY BRAIN: The right internal carotid arteries occluded.    There is no significant flow related opacification within the proximal   right middle cerebral artery.  There is mild reconstitution of flow in   some distal branch vessels of the inferior division of the right middle   cerebral artery.  The right anterior cerebral artery fills across the   anterior communicating artery.    < end of copied text >  < from: CT Brain Perfusion Maps Stroke (10.26.23 @ 04:54) >  CT PERFUSION: CT perfusion is consistent with large acute infarct in the   right middle cerebral artery vascular territory and small to moderate   amount of surrounding ischemic penumbra.  Hypoperfusion index is 0.3.    Core infarct (CBF <  30%:): 136 mL  Penumbra (Tmax >6s): 194 mL  Mismatched volume: 58 mL  Mismatched ratio: 1.4    < end of copied text >      < from: CT Head No Cont (10.22.19 @ 15:57) >  IMPRESSION:    1)  chronic ischemic changes in both hemispheres with volume loss. There   are no new infarct as compared to prior CT. This may be further assessed   with MR imaging.  2)  no hemorrhage or mass identified.    < end of copied text >  < from: CT Angio Head w/ IV Cont (10.22.19 @ 15:57) >  IMPRESSION    1. There is intimal thickening and calcified plaque in both carotid   bifurcations in the neck, but without significant stenosis. No evidence   of carotid vertebral occlusion or dissection.  2. Intracranially, there are atherosclerotic changes in the anterior and   posterior circulation, but without significant stenosis or occlusion.    < end of copied text >  < from: CT Brain Stroke Protocol (10.26.23 @ 04:37) >  IMPRESSION:  No evidence of acute intracranial hemorrhage, mass effect or large acute   territorial infarct, within limits of noncontrast CT technique.    < end of copied text >  < from: CT Angio Neck Stroke Protocol w/ IV Cont (10.26.23 @ 04:53) >  CT ANGIOGRAPHY NECK:  1.  The right internal carotid artery is occluded approximately 1.5 cm   distal to its origin, possibly due to underlying dissection.  2.  Endotracheal tube is low in position, with its tip approximate 1 cm   above the slade.  3.  Diffuse ill-defined groundglass opacity in both lungs, which may   represent pulmonary edema, infection and/or atelectasis.  4.  There is a discrete right upper lobe groundglass opacity measuring   1.9 x 1.4 cm; this may represent a focus of infection, inflammation,   edema, hemorrhage, fibrosis or malignancy.  A follow-up chest CT is   warranted in three months to assess for resolution.    CT ANGIOGRAPHY BRAIN: The right internal carotid arteries occluded.    There is no significant flow related opacification within the proximal   right middle cerebral artery.  There is mild reconstitution of flow in   some distal branch vessels of the inferior division of the right middle   cerebral artery.  The right anterior cerebral artery fills across the   anterior communicating artery.    < end of copied text >  < from: CT Brain Perfusion Maps Stroke (10.26.23 @ 04:54) >  CT PERFUSION: CT perfusion is consistent with large acute infarct in the   right middle cerebral artery vascular territory and small to moderate   amount of surrounding ischemic penumbra.  Hypoperfusion index is 0.3.    Core infarct (CBF <  30%:): 136 mL  Penumbra (Tmax >6s): 194 mL  Mismatched volume: 58 mL  Mismatched ratio: 1.4    < end of copied text >      < from: CT Head No Cont (10.22.19 @ 15:57) >  IMPRESSION:    1)  chronic ischemic changes in both hemispheres with volume loss. There   are no new infarct as compared to prior CT. This may be further assessed   with MR imaging.  2)  no hemorrhage or mass identified.    < end of copied text >  < from: CT Angio Head w/ IV Cont (10.22.19 @ 15:57) >  IMPRESSION    1. There is intimal thickening and calcified plaque in both carotid   bifurcations in the neck, but without significant stenosis. No evidence   of carotid vertebral occlusion or dissection.  2. Intracranially, there are atherosclerotic changes in the anterior and   posterior circulation, but without significant stenosis or occlusion.    < end of copied text >  < from: CT Brain Stroke Protocol (10.26.23 @ 04:37) >  IMPRESSION:  No evidence of acute intracranial hemorrhage, mass effect or large acute   territorial infarct, within limits of noncontrast CT technique.    < end of copied text >  < from: CT Angio Neck Stroke Protocol w/ IV Cont (10.26.23 @ 04:53) >  CT ANGIOGRAPHY NECK:  1.  The right internal carotid artery is occluded approximately 1.5 cm   distal to its origin, possibly due to underlying dissection.  2.  Endotracheal tube is low in position, with its tip approximate 1 cm   above the slade.  3.  Diffuse ill-defined groundglass opacity in both lungs, which may   represent pulmonary edema, infection and/or atelectasis.  4.  There is a discrete right upper lobe groundglass opacity measuring   1.9 x 1.4 cm; this may represent a focus of infection, inflammation,   edema, hemorrhage, fibrosis or malignancy.  A follow-up chest CT is   warranted in three months to assess for resolution.    CT ANGIOGRAPHY BRAIN: The right internal carotid arteries occluded.    There is no significant flow related opacification within the proximal   right middle cerebral artery.  There is mild reconstitution of flow in   some distal branch vessels of the inferior division of the right middle   cerebral artery.  The right anterior cerebral artery fills across the   anterior communicating artery.    < end of copied text >  < from: CT Brain Perfusion Maps Stroke (10.26.23 @ 04:54) >  CT PERFUSION: CT perfusion is consistent with large acute infarct in the   right middle cerebral artery vascular territory and small to moderate   amount of surrounding ischemic penumbra.  Hypoperfusion index is 0.3.    Core infarct (CBF <  30%:): 136 mL  Penumbra (Tmax >6s): 194 mL  Mismatched volume: 58 mL  Mismatched ratio: 1.4    < end of copied text >    EEG Classification / Summary:  Abnormal EEG study  1. Intermittent periodic discharges seen intermittently over the left hemisphere, sometimes with triphasic morphology  2. Focal continuous right hemispheric slowing  3. Moderate generalized background slowing    -----------------------------------------------------------------------------------------------------    Clinical Impression:  1. While the periodic pattern noted above may represent lateralized periodic discharges indicating epileptic potential in the left hemisphere, more likely these represent generalized periodic discharges with triphasic morphology, consistent with metabolic etiology, that are poorly expressed on the right due to the effects of the stroke.   2. Structural abnormality in right hemisphere  3. Moderate diffuse/multi-focal cerebral dysfunction, not specific as to etiology.    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 Significant   associated mass effect and associated herniation.      < end of copied text >  < from: CT Head No Cont (11.02.23 @ 14:30) >    Reidentified are 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 There is effacement of all of the   basilar cisterns. Effacement of the sulci in the bilateral cerebri,   compatible with increased brain edema.    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    Unsuccessful attempts were made to reach the ordering clinician via teams.      --- End of Report ---    < end of copied text >

## 2023-12-30 NOTE — PROGRESS NOTE ADULT - ASSESSMENT
88 year old female with a PMH of HTN, HLD and afib on xarelto who did not take medications for past 1 week prior to hospitalization here with R MCA infarct.   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

## 2023-12-31 NOTE — PROGRESS NOTE ADULT - SUBJECTIVE AND OBJECTIVE BOX
INTERVAL HPI/OVERNIGHT EVENTS:   HPI:  Ms. Longoria is an 88 year old female with a PMH of HTN, HLD and afib on xarelto who presented to [facility] earlier this morning after she was found altered, nonverbal and with right gaze deviation when she woke up. At baseline pt is ambulatory and verbal. Pt required intubation in ED for airway protection given poor MS. NIHSS calculated as 30 at that time. Pt not a candidate for TNK or transfer for intervention per stroke neurologist. She remains intubated on nicardipine gtt. ICU team consulted. (26 Oct 2023 05:50)      CENTRAL LINE: [ ] YES [ ] NO  LOCATION:   DATE INSERTED:  REMOVE: [ ] YES [ ] NO  EXPLAIN:    PATEL: [ ] YES [ ] NO    DATE INSERTED:  REMOVE:  [ ] YES [ ] NO  EXPLAIN:    A-LINE:  [ ] YES [ ] NO  LOCATION:   DATE INSERTED:  REMOVE:  [ ] YES [ ] NO  EXPLAIN:    PAST MEDICAL & SURGICAL HISTORY:  HTN (hypertension)      Elevated cholesterol      Atrial fibrillation      Hyperlipidemia          REVIEW OF SYSTEMS:    Negative ROS aside from HPI/Interval events above.    ICU Vital Signs Last 24 Hrs  T(C): 36.6 (31 Dec 2023 04:00), Max: 36.6 (31 Dec 2023 04:00)  T(F): 97.8 (31 Dec 2023 04:00), Max: 97.8 (31 Dec 2023 04:00)  HR: 77 (31 Dec 2023 08:45) (75 - 83)  BP: 128/68 (31 Dec 2023 08:00) (97/52 - 128/68)  BP(mean): 84 (31 Dec 2023 08:00) (65 - 84)  ABP: --  ABP(mean): --  RR: 17 (31 Dec 2023 08:00) (15 - 23)  SpO2: 99% (31 Dec 2023 08:45) (90% - 100%)            I&O's Detail    30 Dec 2023 07:01  -  31 Dec 2023 07:00  --------------------------------------------------------  IN:    Free Water: 450 mL  Total IN: 450 mL    OUT:    Indwelling Catheter - Urethral (mL): 35 mL    Rectal Tube (mL): 350 mL  Total OUT: 385 mL    Total NET: 65 mL          Mode: AC/ CMV (Assist Control/ Continuous Mandatory Ventilation)  RR (machine): 12  TV (machine): 400  FiO2: 21  PEEP: 5  ITime: 0.8  MAP: 7  PIP: 17    CAPILLARY BLOOD GLUCOSE      PHYSICAL EXAM:    General: NAD, chronically ill appearing  HEENT: ETT and NGT in place  Neck: supple  Respiratory: mechanical BS  Cardiovascular: s1s2 RRR  Abdomen: soft, non tender, +distension  Extremities: warm, no edema or clubbing  Skin: intact  Neurological: unable to assess  Psychiatry: unable to assess INTERVAL HPI/OVERNIGHT EVENTS:   HPI:  Ms. Longoria is an 88 year old female with a PMH of HTN, HLD and afib on xarelto who presented to [facility] earlier this morning after she was found altered, nonverbal and with right gaze deviation when she woke up. At baseline pt is ambulatory and verbal. Pt required intubation in ED for airway protection given poor MS. NIHSS calculated as 30 at that time. Pt not a candidate for TNK or transfer for intervention per stroke neurologist. She remains intubated on nicardipine gtt. ICU team consulted. (26 Oct 2023 05:50)      CENTRAL LINE: [ ] YES [ ] NO  LOCATION:   DATE INSERTED:  REMOVE: [ ] YES [ ] NO  EXPLAIN:    PATEL: [ ] YES [ ] NO    DATE INSERTED:  REMOVE:  [ ] YES [ ] NO  EXPLAIN:    A-LINE:  [ ] YES [ ] NO  LOCATION:   DATE INSERTED:  REMOVE:  [ ] YES [ ] NO  EXPLAIN:    PAST MEDICAL & SURGICAL HISTORY:  HTN (hypertension)      Elevated cholesterol      Atrial fibrillation      Hyperlipidemia          REVIEW OF SYSTEMS:    Negative ROS aside from HPI/Interval events above.    ICU Vital Signs Last 24 Hrs  T(C): 36.6 (31 Dec 2023 04:00), Max: 36.6 (31 Dec 2023 04:00)  T(F): 97.8 (31 Dec 2023 04:00), Max: 97.8 (31 Dec 2023 04:00)  HR: 77 (31 Dec 2023 08:45) (75 - 83)  BP: 128/68 (31 Dec 2023 08:00) (97/52 - 128/68)  BP(mean): 84 (31 Dec 2023 08:00) (65 - 84)  ABP: --  ABP(mean): --  RR: 17 (31 Dec 2023 08:00) (15 - 23)  SpO2: 99% (31 Dec 2023 08:45) (90% - 100%)            I&O's Detail    30 Dec 2023 07:01  -  31 Dec 2023 07:00  --------------------------------------------------------  IN:    Free Water: 450 mL  Total IN: 450 mL    OUT:    Indwelling Catheter - Urethral (mL): 35 mL    Rectal Tube (mL): 350 mL  Total OUT: 385 mL    Total NET: 65 mL          Mode: AC/ CMV (Assist Control/ Continuous Mandatory Ventilation)  RR (machine): 12  TV (machine): 400  FiO2: 21  PEEP: 5  ITime: 0.8  MAP: 7  PIP: 17    CAPILLARY BLOOD GLUCOSE      PHYSICAL EXAM:    General: NAD, chronically ill appearing  HEENT: ETT and NGT in place  Neck: supple  Respiratory: mechanical BS  Cardiovascular: s1s2 RRR  Abdomen: soft, non tender, +distension  Extremities: warm, no edema or clubbing  Skin: intact  Neurological: unable to assess  Psychiatry: unable to assess

## 2024-01-01 LAB
ALBUMIN SERPL ELPH-MCNC: 1.5 G/DL — LOW (ref 3.3–5)
ALBUMIN SERPL ELPH-MCNC: 1.5 G/DL — LOW (ref 3.3–5)
ALP SERPL-CCNC: 110 U/L — SIGNIFICANT CHANGE UP (ref 40–120)
ALP SERPL-CCNC: 110 U/L — SIGNIFICANT CHANGE UP (ref 40–120)
ALT FLD-CCNC: 30 U/L — SIGNIFICANT CHANGE UP (ref 12–78)
ALT FLD-CCNC: 30 U/L — SIGNIFICANT CHANGE UP (ref 12–78)
ANION GAP SERPL CALC-SCNC: 9 MMOL/L — SIGNIFICANT CHANGE UP (ref 5–17)
ANION GAP SERPL CALC-SCNC: 9 MMOL/L — SIGNIFICANT CHANGE UP (ref 5–17)
AST SERPL-CCNC: 66 U/L — HIGH (ref 15–37)
AST SERPL-CCNC: 66 U/L — HIGH (ref 15–37)
BASOPHILS # BLD AUTO: 0.02 K/UL — SIGNIFICANT CHANGE UP (ref 0–0.2)
BASOPHILS # BLD AUTO: 0.02 K/UL — SIGNIFICANT CHANGE UP (ref 0–0.2)
BASOPHILS NFR BLD AUTO: 0.1 % — SIGNIFICANT CHANGE UP (ref 0–2)
BASOPHILS NFR BLD AUTO: 0.1 % — SIGNIFICANT CHANGE UP (ref 0–2)
BILIRUB SERPL-MCNC: 0.6 MG/DL — SIGNIFICANT CHANGE UP (ref 0.2–1.2)
BILIRUB SERPL-MCNC: 0.6 MG/DL — SIGNIFICANT CHANGE UP (ref 0.2–1.2)
BUN SERPL-MCNC: 68 MG/DL — HIGH (ref 7–23)
BUN SERPL-MCNC: 68 MG/DL — HIGH (ref 7–23)
CALCIUM SERPL-MCNC: 8.1 MG/DL — LOW (ref 8.5–10.1)
CALCIUM SERPL-MCNC: 8.1 MG/DL — LOW (ref 8.5–10.1)
CHLORIDE SERPL-SCNC: 103 MMOL/L — SIGNIFICANT CHANGE UP (ref 96–108)
CHLORIDE SERPL-SCNC: 103 MMOL/L — SIGNIFICANT CHANGE UP (ref 96–108)
CO2 SERPL-SCNC: 28 MMOL/L — SIGNIFICANT CHANGE UP (ref 22–31)
CO2 SERPL-SCNC: 28 MMOL/L — SIGNIFICANT CHANGE UP (ref 22–31)
CREAT SERPL-MCNC: 3.47 MG/DL — HIGH (ref 0.5–1.3)
CREAT SERPL-MCNC: 3.47 MG/DL — HIGH (ref 0.5–1.3)
EGFR: 12 ML/MIN/1.73M2 — LOW
EGFR: 12 ML/MIN/1.73M2 — LOW
EOSINOPHIL # BLD AUTO: 0.03 K/UL — SIGNIFICANT CHANGE UP (ref 0–0.5)
EOSINOPHIL # BLD AUTO: 0.03 K/UL — SIGNIFICANT CHANGE UP (ref 0–0.5)
EOSINOPHIL NFR BLD AUTO: 0.2 % — SIGNIFICANT CHANGE UP (ref 0–6)
EOSINOPHIL NFR BLD AUTO: 0.2 % — SIGNIFICANT CHANGE UP (ref 0–6)
GLUCOSE SERPL-MCNC: 136 MG/DL — HIGH (ref 70–99)
GLUCOSE SERPL-MCNC: 136 MG/DL — HIGH (ref 70–99)
HCT VFR BLD CALC: 25.7 % — LOW (ref 34.5–45)
HCT VFR BLD CALC: 25.7 % — LOW (ref 34.5–45)
HGB BLD-MCNC: 8.3 G/DL — LOW (ref 11.5–15.5)
HGB BLD-MCNC: 8.3 G/DL — LOW (ref 11.5–15.5)
IMM GRANULOCYTES NFR BLD AUTO: 0.5 % — SIGNIFICANT CHANGE UP (ref 0–0.9)
IMM GRANULOCYTES NFR BLD AUTO: 0.5 % — SIGNIFICANT CHANGE UP (ref 0–0.9)
LYMPHOCYTES # BLD AUTO: 1.25 K/UL — SIGNIFICANT CHANGE UP (ref 1–3.3)
LYMPHOCYTES # BLD AUTO: 1.25 K/UL — SIGNIFICANT CHANGE UP (ref 1–3.3)
LYMPHOCYTES # BLD AUTO: 9.1 % — LOW (ref 13–44)
LYMPHOCYTES # BLD AUTO: 9.1 % — LOW (ref 13–44)
MAGNESIUM SERPL-MCNC: 2.7 MG/DL — HIGH (ref 1.6–2.6)
MAGNESIUM SERPL-MCNC: 2.7 MG/DL — HIGH (ref 1.6–2.6)
MCHC RBC-ENTMCNC: 25.5 PG — LOW (ref 27–34)
MCHC RBC-ENTMCNC: 25.5 PG — LOW (ref 27–34)
MCHC RBC-ENTMCNC: 32.3 G/DL — SIGNIFICANT CHANGE UP (ref 32–36)
MCHC RBC-ENTMCNC: 32.3 G/DL — SIGNIFICANT CHANGE UP (ref 32–36)
MCV RBC AUTO: 79.1 FL — LOW (ref 80–100)
MCV RBC AUTO: 79.1 FL — LOW (ref 80–100)
MONOCYTES # BLD AUTO: 0.4 K/UL — SIGNIFICANT CHANGE UP (ref 0–0.9)
MONOCYTES # BLD AUTO: 0.4 K/UL — SIGNIFICANT CHANGE UP (ref 0–0.9)
MONOCYTES NFR BLD AUTO: 2.9 % — SIGNIFICANT CHANGE UP (ref 2–14)
MONOCYTES NFR BLD AUTO: 2.9 % — SIGNIFICANT CHANGE UP (ref 2–14)
NEUTROPHILS # BLD AUTO: 11.95 K/UL — HIGH (ref 1.8–7.4)
NEUTROPHILS # BLD AUTO: 11.95 K/UL — HIGH (ref 1.8–7.4)
NEUTROPHILS NFR BLD AUTO: 87.2 % — HIGH (ref 43–77)
NEUTROPHILS NFR BLD AUTO: 87.2 % — HIGH (ref 43–77)
NRBC # BLD: 0 /100 WBCS — SIGNIFICANT CHANGE UP (ref 0–0)
NRBC # BLD: 0 /100 WBCS — SIGNIFICANT CHANGE UP (ref 0–0)
PHOSPHATE SERPL-MCNC: 4.7 MG/DL — HIGH (ref 2.5–4.5)
PHOSPHATE SERPL-MCNC: 4.7 MG/DL — HIGH (ref 2.5–4.5)
PLATELET # BLD AUTO: 360 K/UL — SIGNIFICANT CHANGE UP (ref 150–400)
PLATELET # BLD AUTO: 360 K/UL — SIGNIFICANT CHANGE UP (ref 150–400)
POTASSIUM SERPL-MCNC: 3 MMOL/L — LOW (ref 3.5–5.3)
POTASSIUM SERPL-MCNC: 3 MMOL/L — LOW (ref 3.5–5.3)
POTASSIUM SERPL-SCNC: 3 MMOL/L — LOW (ref 3.5–5.3)
POTASSIUM SERPL-SCNC: 3 MMOL/L — LOW (ref 3.5–5.3)
PROT SERPL-MCNC: 6.6 GM/DL — SIGNIFICANT CHANGE UP (ref 6–8.3)
PROT SERPL-MCNC: 6.6 GM/DL — SIGNIFICANT CHANGE UP (ref 6–8.3)
RBC # BLD: 3.25 M/UL — LOW (ref 3.8–5.2)
RBC # BLD: 3.25 M/UL — LOW (ref 3.8–5.2)
RBC # FLD: 19.2 % — HIGH (ref 10.3–14.5)
RBC # FLD: 19.2 % — HIGH (ref 10.3–14.5)
SODIUM SERPL-SCNC: 140 MMOL/L — SIGNIFICANT CHANGE UP (ref 135–145)
SODIUM SERPL-SCNC: 140 MMOL/L — SIGNIFICANT CHANGE UP (ref 135–145)
WBC # BLD: 13.72 K/UL — HIGH (ref 3.8–10.5)
WBC # BLD: 13.72 K/UL — HIGH (ref 3.8–10.5)
WBC # FLD AUTO: 13.72 K/UL — HIGH (ref 3.8–10.5)
WBC # FLD AUTO: 13.72 K/UL — HIGH (ref 3.8–10.5)

## 2024-01-01 PROCEDURE — 99291 CRITICAL CARE FIRST HOUR: CPT

## 2024-01-01 PROCEDURE — 99232 SBSQ HOSP IP/OBS MODERATE 35: CPT | Mod: FS

## 2024-01-01 PROCEDURE — 99497 ADVNCD CARE PLAN 30 MIN: CPT

## 2024-01-01 PROCEDURE — 99233 SBSQ HOSP IP/OBS HIGH 50: CPT

## 2024-01-01 RX ORDER — MORPHINE SULFATE 50 MG/1
2 CAPSULE, EXTENDED RELEASE ORAL EVERY 4 HOURS
Refills: 0 | Status: DISCONTINUED | OUTPATIENT
Start: 2024-01-01 | End: 2024-01-01

## 2024-01-01 RX ORDER — ROBINUL 0.2 MG/ML
0.2 INJECTION INTRAMUSCULAR; INTRAVENOUS EVERY 6 HOURS
Refills: 0 | Status: DISCONTINUED | OUTPATIENT
Start: 2024-01-01 | End: 2024-01-01

## 2024-01-01 RX ORDER — POTASSIUM CHLORIDE 20 MEQ
40 PACKET (EA) ORAL ONCE
Refills: 0 | Status: COMPLETED | OUTPATIENT
Start: 2024-01-01 | End: 2024-01-01

## 2024-01-01 RX ORDER — POTASSIUM CHLORIDE 20 MEQ
10 PACKET (EA) ORAL
Refills: 0 | Status: DISCONTINUED | OUTPATIENT
Start: 2024-01-01 | End: 2024-01-01

## 2024-01-01 RX ADMIN — CHLORHEXIDINE GLUCONATE 15 MILLILITER(S): 213 SOLUTION TOPICAL at 17:31

## 2024-01-01 RX ADMIN — ENOXAPARIN SODIUM 40 MILLIGRAM(S): 100 INJECTION SUBCUTANEOUS at 00:21

## 2024-01-01 RX ADMIN — SCOPALAMINE 1 PATCH: 1 PATCH, EXTENDED RELEASE TRANSDERMAL at 07:11

## 2024-01-01 RX ADMIN — SCOPALAMINE 1 PATCH: 1 PATCH, EXTENDED RELEASE TRANSDERMAL at 08:00

## 2024-01-01 RX ADMIN — SCOPALAMINE 1 PATCH: 1 PATCH, EXTENDED RELEASE TRANSDERMAL at 19:31

## 2024-01-01 RX ADMIN — SCOPALAMINE 1 PATCH: 1 PATCH, EXTENDED RELEASE TRANSDERMAL at 07:40

## 2024-01-01 RX ADMIN — Medication 81 MILLIGRAM(S): at 11:16

## 2024-01-01 RX ADMIN — AMLODIPINE BESYLATE 10 MILLIGRAM(S): 2.5 TABLET ORAL at 05:55

## 2024-01-01 RX ADMIN — SCOPALAMINE 1 PATCH: 1 PATCH, EXTENDED RELEASE TRANSDERMAL at 08:11

## 2024-01-01 RX ADMIN — CHLORHEXIDINE GLUCONATE 1 APPLICATION(S): 213 SOLUTION TOPICAL at 05:25

## 2024-01-01 RX ADMIN — AMLODIPINE BESYLATE 10 MILLIGRAM(S): 2.5 TABLET ORAL at 05:10

## 2024-01-01 RX ADMIN — CHLORHEXIDINE GLUCONATE 1 APPLICATION(S): 213 SOLUTION TOPICAL at 05:09

## 2024-01-01 RX ADMIN — CHLORHEXIDINE GLUCONATE 15 MILLILITER(S): 213 SOLUTION TOPICAL at 05:10

## 2024-01-01 RX ADMIN — CHLORHEXIDINE GLUCONATE 1 APPLICATION(S): 213 SOLUTION TOPICAL at 06:39

## 2024-01-01 RX ADMIN — ATORVASTATIN CALCIUM 80 MILLIGRAM(S): 80 TABLET, FILM COATED ORAL at 21:53

## 2024-01-01 RX ADMIN — SCOPALAMINE 1 PATCH: 1 PATCH, EXTENDED RELEASE TRANSDERMAL at 19:06

## 2024-01-01 RX ADMIN — POLYETHYLENE GLYCOL 3350 17 GRAM(S): 17 POWDER, FOR SOLUTION ORAL at 12:59

## 2024-01-01 RX ADMIN — CHLORHEXIDINE GLUCONATE 1 APPLICATION(S): 213 SOLUTION TOPICAL at 07:20

## 2024-01-01 RX ADMIN — CHLORHEXIDINE GLUCONATE 1 APPLICATION(S): 213 SOLUTION TOPICAL at 06:10

## 2024-01-01 RX ADMIN — ENOXAPARIN SODIUM 40 MILLIGRAM(S): 100 INJECTION SUBCUTANEOUS at 21:40

## 2024-01-01 RX ADMIN — SCOPALAMINE 1 PATCH: 1 PATCH, EXTENDED RELEASE TRANSDERMAL at 08:02

## 2024-01-01 RX ADMIN — SCOPALAMINE 1 PATCH: 1 PATCH, EXTENDED RELEASE TRANSDERMAL at 07:00

## 2024-01-01 RX ADMIN — AMLODIPINE BESYLATE 10 MILLIGRAM(S): 2.5 TABLET ORAL at 05:15

## 2024-01-01 RX ADMIN — SENNA PLUS 2 TABLET(S): 8.6 TABLET ORAL at 21:40

## 2024-01-01 RX ADMIN — ENOXAPARIN SODIUM 40 MILLIGRAM(S): 100 INJECTION SUBCUTANEOUS at 21:13

## 2024-01-01 RX ADMIN — Medication 81 MILLIGRAM(S): at 11:51

## 2024-01-01 RX ADMIN — CHLORHEXIDINE GLUCONATE 15 MILLILITER(S): 213 SOLUTION TOPICAL at 05:37

## 2024-01-01 RX ADMIN — CHLORHEXIDINE GLUCONATE 1 APPLICATION(S): 213 SOLUTION TOPICAL at 05:10

## 2024-01-01 RX ADMIN — CHLORHEXIDINE GLUCONATE 15 MILLILITER(S): 213 SOLUTION TOPICAL at 05:17

## 2024-01-01 RX ADMIN — SENNA PLUS 2 TABLET(S): 8.6 TABLET ORAL at 21:54

## 2024-01-01 RX ADMIN — CHLORHEXIDINE GLUCONATE 15 MILLILITER(S): 213 SOLUTION TOPICAL at 17:03

## 2024-01-01 RX ADMIN — SENNA PLUS 2 TABLET(S): 8.6 TABLET ORAL at 21:13

## 2024-01-01 RX ADMIN — ROBINUL 0.2 MILLIGRAM(S): 0.2 INJECTION INTRAMUSCULAR; INTRAVENOUS at 11:33

## 2024-01-01 RX ADMIN — SCOPALAMINE 1 PATCH: 1 PATCH, EXTENDED RELEASE TRANSDERMAL at 19:15

## 2024-01-01 RX ADMIN — SCOPALAMINE 1 PATCH: 1 PATCH, EXTENDED RELEASE TRANSDERMAL at 14:00

## 2024-01-01 RX ADMIN — CHLORHEXIDINE GLUCONATE 1 APPLICATION(S): 213 SOLUTION TOPICAL at 05:13

## 2024-01-01 RX ADMIN — CHLORHEXIDINE GLUCONATE 1 APPLICATION(S): 213 SOLUTION TOPICAL at 04:13

## 2024-01-01 RX ADMIN — CHLORHEXIDINE GLUCONATE 15 MILLILITER(S): 213 SOLUTION TOPICAL at 17:09

## 2024-01-01 RX ADMIN — AMLODIPINE BESYLATE 10 MILLIGRAM(S): 2.5 TABLET ORAL at 06:20

## 2024-01-01 RX ADMIN — CHLORHEXIDINE GLUCONATE 15 MILLILITER(S): 213 SOLUTION TOPICAL at 18:29

## 2024-01-01 RX ADMIN — SCOPALAMINE 1 PATCH: 1 PATCH, EXTENDED RELEASE TRANSDERMAL at 07:23

## 2024-01-01 RX ADMIN — POLYETHYLENE GLYCOL 3350 17 GRAM(S): 17 POWDER, FOR SOLUTION ORAL at 11:27

## 2024-01-01 RX ADMIN — SCOPALAMINE 1 PATCH: 1 PATCH, EXTENDED RELEASE TRANSDERMAL at 19:21

## 2024-01-01 RX ADMIN — ENOXAPARIN SODIUM 40 MILLIGRAM(S): 100 INJECTION SUBCUTANEOUS at 21:43

## 2024-01-01 RX ADMIN — SCOPALAMINE 1 PATCH: 1 PATCH, EXTENDED RELEASE TRANSDERMAL at 07:18

## 2024-01-01 RX ADMIN — CHLORHEXIDINE GLUCONATE 15 MILLILITER(S): 213 SOLUTION TOPICAL at 17:27

## 2024-01-01 RX ADMIN — Medication 100 MILLIGRAM(S): at 14:32

## 2024-01-01 RX ADMIN — CHLORHEXIDINE GLUCONATE 1 APPLICATION(S): 213 SOLUTION TOPICAL at 05:23

## 2024-01-01 RX ADMIN — ATORVASTATIN CALCIUM 80 MILLIGRAM(S): 80 TABLET, FILM COATED ORAL at 21:12

## 2024-01-01 RX ADMIN — CHLORHEXIDINE GLUCONATE 15 MILLILITER(S): 213 SOLUTION TOPICAL at 05:55

## 2024-01-01 RX ADMIN — Medication 81 MILLIGRAM(S): at 11:30

## 2024-01-01 RX ADMIN — Medication 81 MILLIGRAM(S): at 11:24

## 2024-01-01 RX ADMIN — SENNA PLUS 2 TABLET(S): 8.6 TABLET ORAL at 21:43

## 2024-01-01 RX ADMIN — CHLORHEXIDINE GLUCONATE 15 MILLILITER(S): 213 SOLUTION TOPICAL at 06:39

## 2024-01-01 RX ADMIN — ATORVASTATIN CALCIUM 80 MILLIGRAM(S): 80 TABLET, FILM COATED ORAL at 21:40

## 2024-01-01 RX ADMIN — SCOPALAMINE 1 PATCH: 1 PATCH, EXTENDED RELEASE TRANSDERMAL at 07:24

## 2024-01-01 RX ADMIN — SCOPALAMINE 1 PATCH: 1 PATCH, EXTENDED RELEASE TRANSDERMAL at 11:18

## 2024-01-01 RX ADMIN — CHLORHEXIDINE GLUCONATE 15 MILLILITER(S): 213 SOLUTION TOPICAL at 05:09

## 2024-01-01 RX ADMIN — SCOPALAMINE 1 PATCH: 1 PATCH, EXTENDED RELEASE TRANSDERMAL at 15:42

## 2024-01-01 RX ADMIN — POLYETHYLENE GLYCOL 3350 17 GRAM(S): 17 POWDER, FOR SOLUTION ORAL at 11:30

## 2024-01-01 RX ADMIN — ENOXAPARIN SODIUM 40 MILLIGRAM(S): 100 INJECTION SUBCUTANEOUS at 21:12

## 2024-01-01 RX ADMIN — Medication 40 MILLIEQUIVALENT(S): at 22:32

## 2024-01-01 RX ADMIN — SCOPALAMINE 1 PATCH: 1 PATCH, EXTENDED RELEASE TRANSDERMAL at 19:58

## 2024-01-01 RX ADMIN — Medication 81 MILLIGRAM(S): at 11:14

## 2024-01-01 RX ADMIN — SENNA PLUS 2 TABLET(S): 8.6 TABLET ORAL at 23:14

## 2024-01-01 RX ADMIN — ATORVASTATIN CALCIUM 80 MILLIGRAM(S): 80 TABLET, FILM COATED ORAL at 00:20

## 2024-01-01 RX ADMIN — SCOPALAMINE 1 PATCH: 1 PATCH, EXTENDED RELEASE TRANSDERMAL at 19:22

## 2024-01-01 RX ADMIN — ATORVASTATIN CALCIUM 80 MILLIGRAM(S): 80 TABLET, FILM COATED ORAL at 21:43

## 2024-01-01 RX ADMIN — SCOPALAMINE 1 PATCH: 1 PATCH, EXTENDED RELEASE TRANSDERMAL at 19:30

## 2024-01-01 RX ADMIN — CHLORHEXIDINE GLUCONATE 1 APPLICATION(S): 213 SOLUTION TOPICAL at 05:17

## 2024-01-01 RX ADMIN — SCOPALAMINE 1 PATCH: 1 PATCH, EXTENDED RELEASE TRANSDERMAL at 15:47

## 2024-01-01 RX ADMIN — SENNA PLUS 2 TABLET(S): 8.6 TABLET ORAL at 21:53

## 2024-01-01 RX ADMIN — Medication 81 MILLIGRAM(S): at 12:59

## 2024-01-01 RX ADMIN — SENNA PLUS 2 TABLET(S): 8.6 TABLET ORAL at 21:12

## 2024-01-01 RX ADMIN — SCOPALAMINE 1 PATCH: 1 PATCH, EXTENDED RELEASE TRANSDERMAL at 14:32

## 2024-01-01 RX ADMIN — CHLORHEXIDINE GLUCONATE 1 APPLICATION(S): 213 SOLUTION TOPICAL at 04:30

## 2024-01-01 RX ADMIN — CHLORHEXIDINE GLUCONATE 15 MILLILITER(S): 213 SOLUTION TOPICAL at 05:15

## 2024-01-01 RX ADMIN — SCOPALAMINE 1 PATCH: 1 PATCH, EXTENDED RELEASE TRANSDERMAL at 20:35

## 2024-01-01 RX ADMIN — ATORVASTATIN CALCIUM 80 MILLIGRAM(S): 80 TABLET, FILM COATED ORAL at 21:54

## 2024-01-01 RX ADMIN — CHLORHEXIDINE GLUCONATE 15 MILLILITER(S): 213 SOLUTION TOPICAL at 05:13

## 2024-01-01 RX ADMIN — ENOXAPARIN SODIUM 40 MILLIGRAM(S): 100 INJECTION SUBCUTANEOUS at 21:53

## 2024-01-01 RX ADMIN — CHLORHEXIDINE GLUCONATE 15 MILLILITER(S): 213 SOLUTION TOPICAL at 17:41

## 2024-01-01 RX ADMIN — Medication 81 MILLIGRAM(S): at 11:27

## 2024-01-01 RX ADMIN — POLYETHYLENE GLYCOL 3350 17 GRAM(S): 17 POWDER, FOR SOLUTION ORAL at 11:51

## 2024-01-01 RX ADMIN — ENOXAPARIN SODIUM 40 MILLIGRAM(S): 100 INJECTION SUBCUTANEOUS at 21:54

## 2024-01-01 RX ADMIN — SCOPALAMINE 1 PATCH: 1 PATCH, EXTENDED RELEASE TRANSDERMAL at 07:15

## 2024-01-01 RX ADMIN — SCOPALAMINE 1 PATCH: 1 PATCH, EXTENDED RELEASE TRANSDERMAL at 13:33

## 2024-01-01 NOTE — PROGRESS NOTE ADULT - SUBJECTIVE AND OBJECTIVE BOX
Neurology Progress Note    S: Patient seen and examined.     MEDICATIONS:    acetaminophen     Tablet .. 650 milliGRAM(s) Oral every 6 hours PRN  amLODIPine   Tablet 10 milliGRAM(s) Oral daily  aspirin  chewable 81 milliGRAM(s) Oral daily  atorvastatin 80 milliGRAM(s) Oral at bedtime  bisacodyl Suppository 10 milliGRAM(s) Rectal daily PRN  chlorhexidine 0.12% Liquid 15 milliLiter(s) Oral Mucosa every 12 hours  chlorhexidine 2% Cloths 1 Application(s) Topical <User Schedule>  enoxaparin Injectable 40 milliGRAM(s) SubCutaneous every 24 hours  hydrALAZINE 100 milliGRAM(s) Oral every 8 hours  influenza  Vaccine (HIGH DOSE) 0.7 milliLiter(s) IntraMuscular once  polyethylene glycol 3350 17 Gram(s) Oral daily  scopolamine 1 mG/72 Hr(s) Patch 1 Patch Transdermal every 72 hours  senna 2 Tablet(s) Oral at bedtime      LABS:            CAPILLARY BLOOD GLUCOSE            I&O's Summary    31 Dec 2023 07:01  -  01 Jan 2024 07:00  --------------------------------------------------------  IN: 800 mL / OUT: 585 mL / NET: 215 mL    01 Jan 2024 07:01  -  01 Jan 2024 10:31  --------------------------------------------------------  IN: 10 mL / OUT: 0 mL / NET: 10 mL      Vital Signs Last 24 Hrs  T(C): 36.7 (01 Jan 2024 08:00), Max: 36.8 (01 Jan 2024 04:00)  T(F): 98.1 (01 Jan 2024 08:00), Max: 98.2 (01 Jan 2024 04:00)  HR: 70 (01 Jan 2024 08:36) (65 - 80)  BP: 117/51 (01 Jan 2024 08:00) (104/48 - 118/46)  BP(mean): 67 (01 Jan 2024 08:00) (65 - 76)  RR: 20 (01 Jan 2024 08:00) (16 - 25)  SpO2: 95% (01 Jan 2024 08:36) (92% - 99%)        Neurological Exam:  Mental Status: Intubated, not sedated. some spont eye opening. No verbal output, does not follow commands.   Cranial Nerves: pupils very sluggish R, L near fixed, no  corneals, no VOR, intermittent eyelid fluttering no facial asymmetry , weak cough/gag  Motor: dec tone throughout, no spont movemnt noted   Sensation: UE extensor posturing, LE triple flexion left toe up    I personally reviewed the below data/images/labs:      < from: CT Head No Cont (10.22.19 @ 15:57) >  IMPRESSION:    1)  chronic ischemic changes in both hemispheres with volume loss. There   are no new infarct as compared to prior CT. This may be further assessed   with MR imaging.  2)  no hemorrhage or mass identified.      LABS:                      < end of copied text >  < from: CT Angio Head w/ IV Cont (10.22.19 @ 15:57) >  IMPRESSION    1. There is intimal thickening and calcified plaque in both carotid   bifurcations in the neck, but without significant stenosis. No evidence   of carotid vertebral occlusion or dissection.  2. Intracranially, there are atherosclerotic changes in the anterior and   posterior circulation, but without significant stenosis or occlusion.    < end of copied text >  < from: CT Brain Stroke Protocol (10.26.23 @ 04:37) >  IMPRESSION:  No evidence of acute intracranial hemorrhage, mass effect or large acute   territorial infarct, within limits of noncontrast CT technique.    < end of copied text >  < from: CT Angio Neck Stroke Protocol w/ IV Cont (10.26.23 @ 04:53) >  CT ANGIOGRAPHY NECK:  1.  The right internal carotid artery is occluded approximately 1.5 cm   distal to its origin, possibly due to underlying dissection.  2.  Endotracheal tube is low in position, with its tip approximate 1 cm   above the slade.  3.  Diffuse ill-defined groundglass opacity in both lungs, which may   represent pulmonary edema, infection and/or atelectasis.  4.  There is a discrete right upper lobe groundglass opacity measuring   1.9 x 1.4 cm; this may represent a focus of infection, inflammation,   edema, hemorrhage, fibrosis or malignancy.  A follow-up chest CT is   warranted in three months to assess for resolution.    CT ANGIOGRAPHY BRAIN: The right internal carotid arteries occluded.    There is no significant flow related opacification within the proximal   right middle cerebral artery.  There is mild reconstitution of flow in   some distal branch vessels of the inferior division of the right middle   cerebral artery.  The right anterior cerebral artery fills across the   anterior communicating artery.    < end of copied text >  < from: CT Brain Perfusion Maps Stroke (10.26.23 @ 04:54) >  CT PERFUSION: CT perfusion is consistent with large acute infarct in the   right middle cerebral artery vascular territory and small to moderate   amount of surrounding ischemic penumbra.  Hypoperfusion index is 0.3.    Core infarct (CBF <  30%:): 136 mL  Penumbra (Tmax >6s): 194 mL  Mismatched volume: 58 mL  Mismatched ratio: 1.4    < end of copied text >      < from: CT Head No Cont (10.22.19 @ 15:57) >  IMPRESSION:    1)  chronic ischemic changes in both hemispheres with volume loss. There   are no new infarct as compared to prior CT. This may be further assessed   with MR imaging.  2)  no hemorrhage or mass identified.    < end of copied text >  < from: CT Angio Head w/ IV Cont (10.22.19 @ 15:57) >  IMPRESSION    1. There is intimal thickening and calcified plaque in both carotid   bifurcations in the neck, but without significant stenosis. No evidence   of carotid vertebral occlusion or dissection.  2. Intracranially, there are atherosclerotic changes in the anterior and   posterior circulation, but without significant stenosis or occlusion.    < end of copied text >  < from: CT Brain Stroke Protocol (10.26.23 @ 04:37) >  IMPRESSION:  No evidence of acute intracranial hemorrhage, mass effect or large acute   territorial infarct, within limits of noncontrast CT technique.    < end of copied text >  < from: CT Angio Neck Stroke Protocol w/ IV Cont (10.26.23 @ 04:53) >  CT ANGIOGRAPHY NECK:  1.  The right internal carotid artery is occluded approximately 1.5 cm   distal to its origin, possibly due to underlying dissection.  2.  Endotracheal tube is low in position, with its tip approximate 1 cm   above the slade.  3.  Diffuse ill-defined groundglass opacity in both lungs, which may   represent pulmonary edema, infection and/or atelectasis.  4.  There is a discrete right upper lobe groundglass opacity measuring   1.9 x 1.4 cm; this may represent a focus of infection, inflammation,   edema, hemorrhage, fibrosis or malignancy.  A follow-up chest CT is   warranted in three months to assess for resolution.    CT ANGIOGRAPHY BRAIN: The right internal carotid arteries occluded.    There is no significant flow related opacification within the proximal   right middle cerebral artery.  There is mild reconstitution of flow in   some distal branch vessels of the inferior division of the right middle   cerebral artery.  The right anterior cerebral artery fills across the   anterior communicating artery.    < end of copied text >  < from: CT Brain Perfusion Maps Stroke (10.26.23 @ 04:54) >  CT PERFUSION: CT perfusion is consistent with large acute infarct in the   right middle cerebral artery vascular territory and small to moderate   amount of surrounding ischemic penumbra.  Hypoperfusion index is 0.3.    Core infarct (CBF <  30%:): 136 mL  Penumbra (Tmax >6s): 194 mL  Mismatched volume: 58 mL  Mismatched ratio: 1.4    < end of copied text >      < from: CT Head No Cont (10.22.19 @ 15:57) >  IMPRESSION:    1)  chronic ischemic changes in both hemispheres with volume loss. There   are no new infarct as compared to prior CT. This may be further assessed   with MR imaging.  2)  no hemorrhage or mass identified.    < end of copied text >  < from: CT Angio Head w/ IV Cont (10.22.19 @ 15:57) >  IMPRESSION    1. There is intimal thickening and calcified plaque in both carotid   bifurcations in the neck, but without significant stenosis. No evidence   of carotid vertebral occlusion or dissection.  2. Intracranially, there are atherosclerotic changes in the anterior and   posterior circulation, but without significant stenosis or occlusion.    < end of copied text >  < from: CT Brain Stroke Protocol (10.26.23 @ 04:37) >  IMPRESSION:  No evidence of acute intracranial hemorrhage, mass effect or large acute   territorial infarct, within limits of noncontrast CT technique.    < end of copied text >  < from: CT Angio Neck Stroke Protocol w/ IV Cont (10.26.23 @ 04:53) >  CT ANGIOGRAPHY NECK:  1.  The right internal carotid artery is occluded approximately 1.5 cm   distal to its origin, possibly due to underlying dissection.  2.  Endotracheal tube is low in position, with its tip approximate 1 cm   above the slade.  3.  Diffuse ill-defined groundglass opacity in both lungs, which may   represent pulmonary edema, infection and/or atelectasis.  4.  There is a discrete right upper lobe groundglass opacity measuring   1.9 x 1.4 cm; this may represent a focus of infection, inflammation,   edema, hemorrhage, fibrosis or malignancy.  A follow-up chest CT is   warranted in three months to assess for resolution.    CT ANGIOGRAPHY BRAIN: The right internal carotid arteries occluded.    There is no significant flow related opacification within the proximal   right middle cerebral artery.  There is mild reconstitution of flow in   some distal branch vessels of the inferior division of the right middle   cerebral artery.  The right anterior cerebral artery fills across the   anterior communicating artery.    < end of copied text >  < from: CT Brain Perfusion Maps Stroke (10.26.23 @ 04:54) >  CT PERFUSION: CT perfusion is consistent with large acute infarct in the   right middle cerebral artery vascular territory and small to moderate   amount of surrounding ischemic penumbra.  Hypoperfusion index is 0.3.    Core infarct (CBF <  30%:): 136 mL  Penumbra (Tmax >6s): 194 mL  Mismatched volume: 58 mL  Mismatched ratio: 1.4    < end of copied text >    EEG Classification / Summary:  Abnormal EEG study  1. Intermittent periodic discharges seen intermittently over the left hemisphere, sometimes with triphasic morphology  2. Focal continuous right hemispheric slowing  3. Moderate generalized background slowing    -----------------------------------------------------------------------------------------------------    Clinical Impression:  1. While the periodic pattern noted above may represent lateralized periodic discharges indicating epileptic potential in the left hemisphere, more likely these represent generalized periodic discharges with triphasic morphology, consistent with metabolic etiology, that are poorly expressed on the right due to the effects of the stroke.   2. Structural abnormality in right hemisphere  3. Moderate diffuse/multi-focal cerebral dysfunction, not specific as to etiology.    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 Significant   associated mass effect and associated herniation.      < end of copied text >  < from: CT Head No Cont (11.02.23 @ 14:30) >    Reidentified are 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 There is effacement of all of the   basilar cisterns. Effacement of the sulci in the bilateral cerebri,   compatible with increased brain edema.    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    Unsuccessful attempts were made to reach the ordering clinician via teams.      --- End of Report ---    < end of copied text >

## 2024-01-01 NOTE — PROGRESS NOTE ADULT - ASSESSMENT
88F w/ HTN, HLD, Afib. Presents w/ AMS and R gaze deviation. Pt was intubated in the ED due to poor mental status. Pt did not receive TNK. Admitted to ICU. Imaging revealed R JIMMY and MCA infarct w/ midline shift, as well as PCA infarct. Pt's mental status is poor at best 2/2 catastrophic neurologic insult.    Clinically has not improved since last evaluation / examination by myself. Care plan and management remains unchanged for weeks/months with no resolution in sight given the unrealistic expectation of the patient's family members. we remain at a standstill in terms of plan of care for the patient moving forward. No escalation in care. We have nothing more to offer the family and/or patient from an ICU perspective. Send labs tomorrow.

## 2024-01-01 NOTE — PROGRESS NOTE ADULT - SUBJECTIVE AND OBJECTIVE BOX
INTERVAL HPI/OVERNIGHT EVENTS:   HPI:  Ms. Longoria is an 88 year old female with a PMH of HTN, HLD and afib on xarelto who presented to [facility] earlier this morning after she was found altered, nonverbal and with right gaze deviation when she woke up. At baseline pt is ambulatory and verbal. Pt required intubation in ED for airway protection given poor MS. NIHSS calculated as 30 at that time. Pt not a candidate for TNK or transfer for intervention per stroke neurologist. She remains intubated on nicardipine gtt. ICU team consulted. (26 Oct 2023 05:50)      CENTRAL LINE: [ ] YES [ ] NO  LOCATION:   DATE INSERTED:  REMOVE: [ ] YES [ ] NO  EXPLAIN:    PATEL: [ ] YES [ ] NO    DATE INSERTED:  REMOVE:  [ ] YES [ ] NO  EXPLAIN:    A-LINE:  [ ] YES [ ] NO  LOCATION:   DATE INSERTED:  REMOVE:  [ ] YES [ ] NO  EXPLAIN:    PAST MEDICAL & SURGICAL HISTORY:  HTN (hypertension)      Elevated cholesterol      Atrial fibrillation      Hyperlipidemia          REVIEW OF SYSTEMS:    Negative ROS aside from HPI/Interval events above.    ICU Vital Signs Last 24 Hrs  T(C): 36.7 (01 Jan 2024 08:00), Max: 36.8 (01 Jan 2024 04:00)  T(F): 98.1 (01 Jan 2024 08:00), Max: 98.2 (01 Jan 2024 04:00)  HR: 73 (01 Jan 2024 11:45) (65 - 79)  BP: 117/51 (01 Jan 2024 08:00) (104/48 - 118/46)  BP(mean): 67 (01 Jan 2024 08:00) (65 - 76)  ABP: --  ABP(mean): --  RR: 20 (01 Jan 2024 08:00) (16 - 25)  SpO2: 97% (01 Jan 2024 11:45) (92% - 99%)            I&O's Detail    31 Dec 2023 07:01  -  01 Jan 2024 07:00  --------------------------------------------------------  IN:    Enteral Tube Flush: 50 mL    Free Water: 600 mL    Jevity 1.2: 150 mL  Total IN: 800 mL    OUT:    Indwelling Catheter - Urethral (mL): 185 mL    Rectal Tube (mL): 400 mL  Total OUT: 585 mL    Total NET: 215 mL      01 Jan 2024 07:01  -  01 Jan 2024 12:35  --------------------------------------------------------  IN:    Enteral Tube Flush: 150 mL    Jevity 1.2: 40 mL  Total IN: 190 mL    OUT:    Indwelling Catheter - Urethral (mL): 25 mL  Total OUT: 25 mL    Total NET: 165 mL          Mode: AC/ CMV (Assist Control/ Continuous Mandatory Ventilation)  RR (machine): 12  TV (machine): 400  FiO2: 21  PEEP: 5  ITime: 1  MAP: 8  PIP: 18    CAPILLARY BLOOD GLUCOSE            PHYSICAL EXAM:    General: NAD, chronically ill appearing  HEENT: ETT and NGT in place  Neck: supple  Respiratory: mechanical BS  Cardiovascular: s1s2 RRR  Abdomen: soft, non tender, +distension  Extremities: warm, no edema or clubbing  Skin: intact  Neurological: unable to assess  Psychiatry: unable to assess       INTERVAL HPI/OVERNIGHT EVENTS:   HPI:  Ms. Longoria is an 88 year old female with a PMH of HTN, HLD and afib on xarelto who presented to [facility] earlier this morning after she was found altered, nonverbal and with right gaze deviation when she woke up. At baseline pt is ambulatory and verbal. Pt required intubation in ED for airway protection given poor MS. NIHSS calculated as 30 at that time. Pt not a candidate for TNK or transfer for intervention per stroke neurologist. She remains intubated on nicardipine gtt. ICU team consulted. (26 Oct 2023 05:50)      CENTRAL LINE: [ ] YES [ ] NO  LOCATION:   DATE INSERTED:  REMOVE: [ ] YES [ ] NO  EXPLAIN:    PATEL: [ ] YES [ ] NO    DATE INSERTED:  REMOVE:  [ ] YES [ ] NO  EXPLAIN:    A-LINE:  [ ] YES [ ] NO  LOCATION:   DATE INSERTED:  REMOVE:  [ ] YES [ ] NO  EXPLAIN:    PAST MEDICAL & SURGICAL HISTORY:  HTN (hypertension)      Elevated cholesterol      Atrial fibrillation      Hyperlipidemia          REVIEW OF SYSTEMS:    Negative ROS aside from HPI/Interval events above.    ICU Vital Signs Last 24 Hrs  T(C): 36.7 (01 Jan 2024 08:00), Max: 36.8 (01 Jan 2024 04:00)  T(F): 98.1 (01 Jan 2024 08:00), Max: 98.2 (01 Jan 2024 04:00)  HR: 73 (01 Jan 2024 11:45) (65 - 79)  BP: 117/51 (01 Jan 2024 08:00) (104/48 - 118/46)  BP(mean): 67 (01 Jan 2024 08:00) (65 - 76)  ABP: --  ABP(mean): --  RR: 20 (01 Jan 2024 08:00) (16 - 25)  SpO2: 97% (01 Jan 2024 11:45) (92% - 99%)            I&O's Detail    31 Dec 2023 07:01  -  01 Jan 2024 07:00  --------------------------------------------------------  IN:    Enteral Tube Flush: 50 mL    Free Water: 600 mL    Jevity 1.2: 150 mL  Total IN: 800 mL    OUT:    Indwelling Catheter - Urethral (mL): 185 mL    Rectal Tube (mL): 400 mL  Total OUT: 585 mL    Total NET: 215 mL      01 Jan 2024 07:01  -  01 Jan 2024 12:35  --------------------------------------------------------  IN:    Enteral Tube Flush: 150 mL    Jevity 1.2: 40 mL  Total IN: 190 mL    OUT:    Indwelling Catheter - Urethral (mL): 25 mL  Total OUT: 25 mL    Total NET: 165 mL          Mode: AC/ CMV (Assist Control/ Continuous Mandatory Ventilation)  RR (machine): 12  TV (machine): 400  FiO2: 21  PEEP: 5  ITime: 1  MAP: 8  PIP: 18    CAPILLARY BLOOD GLUCOSE            PHYSICAL EXAM:    General: NAD, chronically ill appearing  HEENT: ETT and NGT in place  Neck: supple  Respiratory: mechanical BS  Cardiovascular: s1s2 RRR  Abdomen: soft, non tender, +distension  Extremities: warm, no edema or clubbing  Skin: intact  Neurological: unable to assess  Psychiatry: unable to assess

## 2024-01-02 NOTE — PROGRESS NOTE ADULT - SUBJECTIVE AND OBJECTIVE BOX
HPI:  Ms. Longoria is an 88 year old female with a PMH of HTN, HLD and afib on xarelto who presented to [facility] earlier this morning after she was found altered, nonverbal and with right gaze deviation when she woke up. At baseline pt is ambulatory and verbal. Pt required intubation in ED for airway protection given poor MS. NIHSS calculated as 30 at that time. Pt not a candidate for TNK or transfer for intervention per stroke neurologist. She remains intubated on nicardipine gtt. ICU team consulted. (26 Oct 2023 05:50)      24 hr events: No acute events    ## ROS:  [x ] unable to obtain    ## Vitals  ICU Vital Signs Last 24 Hrs  T(C): 35.7 (02 Jan 2024 12:00), Max: 37.1 (01 Jan 2024 23:44)  T(F): 96.2 (02 Jan 2024 12:00), Max: 98.8 (01 Jan 2024 23:44)  HR: 64 (02 Jan 2024 12:22) (64 - 82)  BP: 113/57 (02 Jan 2024 12:00) (105/49 - 125/64)  BP(mean): 73 (02 Jan 2024 12:00) (66 - 85)  ABP: --  ABP(mean): --  RR: 19 (02 Jan 2024 12:00) (14 - 24)  SpO2: 99% (02 Jan 2024 12:22) (95% - 100%)    O2 Parameters below as of 02 Jan 2024 12:00  Patient On (Oxygen Delivery Method): ventilator            ## Physical Exam:  Gen: Elderly female lying in bed, NAD, intubated  HEENT: NC/AT sclerae anicteric. ET tube in place  Resp: Mechanical breath sounds b/l  CV: S1, S2  Abd: Soft, + BS  Ext: WWP  Neuro: Unarousable      ## Vent Data  Mode: AC/ CMV (Assist Control/ Continuous Mandatory Ventilation)  RR (machine): 12  TV (machine): 400  FiO2: 21  PEEP: 5  ITime: 0.8  MAP: 8  PIP: 19      ## Labs:  Chem:  01-02    140  |  103  |  68<H>  ----------------------------<  136<H>  3.0<L>   |  28  |  3.47<H>    Ca    8.1<L>      02 Jan 2024 02:53  Phos  4.7     01-02  Mg     2.7     01-02    TPro  6.6  /  Alb  1.5<L>  /  TBili  0.6  /  DBili  x   /  AST  66<H>  /  ALT  30  /  AlkPhos  110  01-02    LIVER FUNCTIONS - ( 02 Jan 2024 02:53 )  Alb: 1.5 g/dL / Pro: 6.6 gm/dL / ALK PHOS: 110 U/L / ALT: 30 U/L / AST: 66 U/L / GGT: x           CBC:                        8.3    13.72 )-----------( 360      ( 02 Jan 2024 02:53 )             25.7     Coags:      Urinalysis Basic - ( 02 Jan 2024 02:53 )    Color: x / Appearance: x / SG: x / pH: x  Gluc: 136 mg/dL / Ketone: x  / Bili: x / Urobili: x   Blood: x / Protein: x / Nitrite: x   Leuk Esterase: x / RBC: x / WBC x   Sq Epi: x / Non Sq Epi: x / Bacteria: x        ## Cardiac        ## Blood Gas      #I/Os  I&O's Detail    01 Jan 2024 07:01  -  02 Jan 2024 07:00  --------------------------------------------------------  IN:    Enteral Tube Flush: 30 mL    Free Water: 600 mL    Jevity 1.2: 220 mL  Total IN: 850 mL    OUT:    Indwelling Catheter - Urethral (mL): 390 mL    Rectal Tube (mL): 150 mL  Total OUT: 540 mL    Total NET: 310 mL      02 Jan 2024 07:01  -  02 Jan 2024 13:06  --------------------------------------------------------  IN:    Enteral Tube Flush: 150 mL    Jevity 1.2: 60 mL  Total IN: 210 mL    OUT:    Indwelling Catheter - Urethral (mL): 85 mL    Rectal Tube (mL): 0 mL  Total OUT: 85 mL    Total NET: 125 mL          ## Imaging:    ## Medications:  MEDICATIONS  (STANDING):  amLODIPine   Tablet 10 milliGRAM(s) Oral daily  aspirin  chewable 81 milliGRAM(s) Oral daily  atorvastatin 80 milliGRAM(s) Oral at bedtime  chlorhexidine 0.12% Liquid 15 milliLiter(s) Oral Mucosa every 12 hours  chlorhexidine 2% Cloths 1 Application(s) Topical <User Schedule>  enoxaparin Injectable 40 milliGRAM(s) SubCutaneous every 24 hours  hydrALAZINE 100 milliGRAM(s) Oral every 8 hours  influenza  Vaccine (HIGH DOSE) 0.7 milliLiter(s) IntraMuscular once  polyethylene glycol 3350 17 Gram(s) Oral daily  scopolamine 1 mG/72 Hr(s) Patch 1 Patch Transdermal every 72 hours  senna 2 Tablet(s) Oral at bedtime    MEDICATIONS  (PRN):  acetaminophen     Tablet .. 650 milliGRAM(s) Oral every 6 hours PRN Temp greater or equal to 38C (100.4F)  bisacodyl Suppository 10 milliGRAM(s) Rectal daily PRN Constipation       HPI:  Ms. Longoria is an 88 year old female with a PMH of HTN, HLD and afib on xarelto who presented to [facility] earlier this morning after she was found altered, nonverbal and with right gaze deviation when she woke up. At baseline pt is ambulatory and verbal. Pt required intubation in ED for airway protection given poor MS. NIHSS calculated as 30 at that time. Pt not a candidate for TNK or transfer for intervention per stroke neurologist. She remains intubated on nicardipine gtt. ICU team consulted. (26 Oct 2023 05:50)      24 hr events: No acute events    ## ROS:  [x ] unable to obtain    ## Vitals  ICU Vital Signs Last 24 Hrs  T(C): 35.7 (02 Jan 2024 12:00), Max: 37.1 (01 Jan 2024 23:44)  T(F): 96.2 (02 Jan 2024 12:00), Max: 98.8 (01 Jan 2024 23:44)  HR: 64 (02 Jan 2024 12:22) (64 - 82)  BP: 113/57 (02 Jan 2024 12:00) (105/49 - 125/64)  BP(mean): 73 (02 Jan 2024 12:00) (66 - 85)  ABP: --  ABP(mean): --  RR: 19 (02 Jan 2024 12:00) (14 - 24)  SpO2: 99% (02 Jan 2024 12:22) (95% - 100%)    O2 Parameters below as of 02 Jan 2024 12:00  Patient On (Oxygen Delivery Method): ventilator            ## Physical Exam:  Gen: Elderly female lying in bed, NAD, intubated  HEENT: NC/AT sclerae anicteric. ET tube in place  Resp: Mechanical breath sounds b/l  CV: S1, S2  Abd: Soft, + BS  Ext: WWP  Neuro: Unarousable      ## Vent Data  Mode: AC/ CMV (Assist Control/ Continuous Mandatory Ventilation)  RR (machine): 12  TV (machine): 400  FiO2: 21  PEEP: 5  ITime: 0.8  MAP: 8  PIP: 19      ## Labs:  Chem:  01-02    140  |  103  |  68<H>  ----------------------------<  136<H>  3.0<L>   |  28  |  3.47<H>    Ca    8.1<L>      02 Jan 2024 02:53  Phos  4.7     01-02  Mg     2.7     01-02    TPro  6.6  /  Alb  1.5<L>  /  TBili  0.6  /  DBili  x   /  AST  66<H>  /  ALT  30  /  AlkPhos  110  01-02    LIVER FUNCTIONS - ( 02 Jan 2024 02:53 )  Alb: 1.5 g/dL / Pro: 6.6 gm/dL / ALK PHOS: 110 U/L / ALT: 30 U/L / AST: 66 U/L / GGT: x           CBC:                        8.3    13.72 )-----------( 360      ( 02 Jan 2024 02:53 )             25.7     Coags:      Urinalysis Basic - ( 02 Jan 2024 02:53 )    Color: x / Appearance: x / SG: x / pH: x  Gluc: 136 mg/dL / Ketone: x  / Bili: x / Urobili: x   Blood: x / Protein: x / Nitrite: x   Leuk Esterase: x / RBC: x / WBC x   Sq Epi: x / Non Sq Epi: x / Bacteria: x        ## Cardiac        ## Blood Gas      #I/Os  I&O's Detail    01 Jan 2024 07:01  -  02 Jan 2024 07:00  --------------------------------------------------------  IN:    Enteral Tube Flush: 30 mL    Free Water: 600 mL    Jevity 1.2: 220 mL  Total IN: 850 mL    OUT:    Indwelling Catheter - Urethral (mL): 390 mL    Rectal Tube (mL): 150 mL  Total OUT: 540 mL    Total NET: 310 mL      02 Jan 2024 07:01  -  02 Jan 2024 13:06  --------------------------------------------------------  IN:    Enteral Tube Flush: 150 mL    Jevity 1.2: 60 mL  Total IN: 210 mL    OUT:    Indwelling Catheter - Urethral (mL): 85 mL    Rectal Tube (mL): 0 mL  Total OUT: 85 mL    Total NET: 125 mL          ## Imaging:    ## Medications:  MEDICATIONS  (STANDING):  amLODIPine   Tablet 10 milliGRAM(s) Oral daily  aspirin  chewable 81 milliGRAM(s) Oral daily  atorvastatin 80 milliGRAM(s) Oral at bedtime  chlorhexidine 0.12% Liquid 15 milliLiter(s) Oral Mucosa every 12 hours  chlorhexidine 2% Cloths 1 Application(s) Topical <User Schedule>  enoxaparin Injectable 40 milliGRAM(s) SubCutaneous every 24 hours  hydrALAZINE 100 milliGRAM(s) Oral every 8 hours  influenza  Vaccine (HIGH DOSE) 0.7 milliLiter(s) IntraMuscular once  polyethylene glycol 3350 17 Gram(s) Oral daily  scopolamine 1 mG/72 Hr(s) Patch 1 Patch Transdermal every 72 hours  senna 2 Tablet(s) Oral at bedtime    MEDICATIONS  (PRN):  acetaminophen     Tablet .. 650 milliGRAM(s) Oral every 6 hours PRN Temp greater or equal to 38C (100.4F)  bisacodyl Suppository 10 milliGRAM(s) Rectal daily PRN Constipation

## 2024-01-02 NOTE — PROGRESS NOTE ADULT - SUBJECTIVE AND OBJECTIVE BOX
Neurology Progress Note    S: Patient seen and examined.     MEDICATIONS:    acetaminophen     Tablet .. 650 milliGRAM(s) Oral every 6 hours PRN  amLODIPine   Tablet 10 milliGRAM(s) Oral daily  aspirin  chewable 81 milliGRAM(s) Oral daily  atorvastatin 80 milliGRAM(s) Oral at bedtime  bisacodyl Suppository 10 milliGRAM(s) Rectal daily PRN  chlorhexidine 0.12% Liquid 15 milliLiter(s) Oral Mucosa every 12 hours  chlorhexidine 2% Cloths 1 Application(s) Topical <User Schedule>  enoxaparin Injectable 40 milliGRAM(s) SubCutaneous every 24 hours  hydrALAZINE 100 milliGRAM(s) Oral every 8 hours  influenza  Vaccine (HIGH DOSE) 0.7 milliLiter(s) IntraMuscular once  polyethylene glycol 3350 17 Gram(s) Oral daily  scopolamine 1 mG/72 Hr(s) Patch 1 Patch Transdermal every 72 hours  senna 2 Tablet(s) Oral at bedtime      LABS:                          8.3    13.72 )-----------( 360      ( 02 Jan 2024 02:53 )             25.7     01-02    140  |  103  |  68<H>  ----------------------------<  136<H>  3.0<L>   |  28  |  3.47<H>    Ca    8.1<L>      02 Jan 2024 02:53  Phos  4.7     01-02  Mg     2.7     01-02    TPro  6.6  /  Alb  1.5<L>  /  TBili  0.6  /  DBili  x   /  AST  66<H>  /  ALT  30  /  AlkPhos  110  01-02    CAPILLARY BLOOD GLUCOSE          Urinalysis Basic - ( 02 Jan 2024 02:53 )    Color: x / Appearance: x / SG: x / pH: x  Gluc: 136 mg/dL / Ketone: x  / Bili: x / Urobili: x   Blood: x / Protein: x / Nitrite: x   Leuk Esterase: x / RBC: x / WBC x   Sq Epi: x / Non Sq Epi: x / Bacteria: x      I&O's Summary    01 Jan 2024 07:01  -  02 Jan 2024 07:00  --------------------------------------------------------  IN: 850 mL / OUT: 540 mL / NET: 310 mL    02 Jan 2024 07:01  -  02 Jan 2024 10:32  --------------------------------------------------------  IN: 30 mL / OUT: 30 mL / NET: 0 mL      Vital Signs Last 24 Hrs  T(C): 35.8 (02 Jan 2024 08:00), Max: 37.1 (01 Jan 2024 23:44)  T(F): 96.5 (02 Jan 2024 08:00), Max: 98.8 (01 Jan 2024 23:44)  HR: 66 (02 Jan 2024 09:33) (66 - 82)  BP: 113/55 (02 Jan 2024 08:00) (105/49 - 125/64)  BP(mean): 72 (02 Jan 2024 08:00) (66 - 85)  RR: 23 (02 Jan 2024 08:00) (14 - 24)  SpO2: 97% (02 Jan 2024 09:33) (95% - 100%)    Parameters below as of 02 Jan 2024 08:00  Patient On (Oxygen Delivery Method): ventilator            Neurological Exam:  Mental Status: Intubated, not sedated. some spont eye opening. No verbal output, does not follow commands.   Cranial Nerves: pupils very sluggish R, L near fixed, no  corneals, no VOR, intermittent eyelid fluttering no facial asymmetry , weak cough/gag  Motor: dec tone throughout, no spont movemnt noted   Sensation: UE extensor posturing, LE triple flexion left toe up    I personally reviewed the below data/images/labs:      < from: CT Head No Cont (10.22.19 @ 15:57) >  IMPRESSION:    1)  chronic ischemic changes in both hemispheres with volume loss. There   are no new infarct as compared to prior CT. This may be further assessed   with MR imaging.  2)  no hemorrhage or mass identified.      LABS:                      < end of copied text >  < from: CT Angio Head w/ IV Cont (10.22.19 @ 15:57) >  IMPRESSION    1. There is intimal thickening and calcified plaque in both carotid   bifurcations in the neck, but without significant stenosis. No evidence   of carotid vertebral occlusion or dissection.  2. Intracranially, there are atherosclerotic changes in the anterior and   posterior circulation, but without significant stenosis or occlusion.    < end of copied text >  < from: CT Brain Stroke Protocol (10.26.23 @ 04:37) >  IMPRESSION:  No evidence of acute intracranial hemorrhage, mass effect or large acute   territorial infarct, within limits of noncontrast CT technique.    < end of copied text >  < from: CT Angio Neck Stroke Protocol w/ IV Cont (10.26.23 @ 04:53) >  CT ANGIOGRAPHY NECK:  1.  The right internal carotid artery is occluded approximately 1.5 cm   distal to its origin, possibly due to underlying dissection.  2.  Endotracheal tube is low in position, with its tip approximate 1 cm   above the slade.  3.  Diffuse ill-defined groundglass opacity in both lungs, which may   represent pulmonary edema, infection and/or atelectasis.  4.  There is a discrete right upper lobe groundglass opacity measuring   1.9 x 1.4 cm; this may represent a focus of infection, inflammation,   edema, hemorrhage, fibrosis or malignancy.  A follow-up chest CT is   warranted in three months to assess for resolution.    CT ANGIOGRAPHY BRAIN: The right internal carotid arteries occluded.    There is no significant flow related opacification within the proximal   right middle cerebral artery.  There is mild reconstitution of flow in   some distal branch vessels of the inferior division of the right middle   cerebral artery.  The right anterior cerebral artery fills across the   anterior communicating artery.    < end of copied text >  < from: CT Brain Perfusion Maps Stroke (10.26.23 @ 04:54) >  CT PERFUSION: CT perfusion is consistent with large acute infarct in the   right middle cerebral artery vascular territory and small to moderate   amount of surrounding ischemic penumbra.  Hypoperfusion index is 0.3.    Core infarct (CBF <  30%:): 136 mL  Penumbra (Tmax >6s): 194 mL  Mismatched volume: 58 mL  Mismatched ratio: 1.4    < end of copied text >      < from: CT Head No Cont (10.22.19 @ 15:57) >  IMPRESSION:    1)  chronic ischemic changes in both hemispheres with volume loss. There   are no new infarct as compared to prior CT. This may be further assessed   with MR imaging.  2)  no hemorrhage or mass identified.    < end of copied text >  < from: CT Angio Head w/ IV Cont (10.22.19 @ 15:57) >  IMPRESSION    1. There is intimal thickening and calcified plaque in both carotid   bifurcations in the neck, but without significant stenosis. No evidence   of carotid vertebral occlusion or dissection.  2. Intracranially, there are atherosclerotic changes in the anterior and   posterior circulation, but without significant stenosis or occlusion.    < end of copied text >  < from: CT Brain Stroke Protocol (10.26.23 @ 04:37) >  IMPRESSION:  No evidence of acute intracranial hemorrhage, mass effect or large acute   territorial infarct, within limits of noncontrast CT technique.    < end of copied text >  < from: CT Angio Neck Stroke Protocol w/ IV Cont (10.26.23 @ 04:53) >  CT ANGIOGRAPHY NECK:  1.  The right internal carotid artery is occluded approximately 1.5 cm   distal to its origin, possibly due to underlying dissection.  2.  Endotracheal tube is low in position, with its tip approximate 1 cm   above the slade.  3.  Diffuse ill-defined groundglass opacity in both lungs, which may   represent pulmonary edema, infection and/or atelectasis.  4.  There is a discrete right upper lobe groundglass opacity measuring   1.9 x 1.4 cm; this may represent a focus of infection, inflammation,   edema, hemorrhage, fibrosis or malignancy.  A follow-up chest CT is   warranted in three months to assess for resolution.    CT ANGIOGRAPHY BRAIN: The right internal carotid arteries occluded.    There is no significant flow related opacification within the proximal   right middle cerebral artery.  There is mild reconstitution of flow in   some distal branch vessels of the inferior division of the right middle   cerebral artery.  The right anterior cerebral artery fills across the   anterior communicating artery.    < end of copied text >  < from: CT Brain Perfusion Maps Stroke (10.26.23 @ 04:54) >  CT PERFUSION: CT perfusion is consistent with large acute infarct in the   right middle cerebral artery vascular territory and small to moderate   amount of surrounding ischemic penumbra.  Hypoperfusion index is 0.3.    Core infarct (CBF <  30%:): 136 mL  Penumbra (Tmax >6s): 194 mL  Mismatched volume: 58 mL  Mismatched ratio: 1.4    < end of copied text >      < from: CT Head No Cont (10.22.19 @ 15:57) >  IMPRESSION:    1)  chronic ischemic changes in both hemispheres with volume loss. There   are no new infarct as compared to prior CT. This may be further assessed   with MR imaging.  2)  no hemorrhage or mass identified.    < end of copied text >  < from: CT Angio Head w/ IV Cont (10.22.19 @ 15:57) >  IMPRESSION    1. There is intimal thickening and calcified plaque in both carotid   bifurcations in the neck, but without significant stenosis. No evidence   of carotid vertebral occlusion or dissection.  2. Intracranially, there are atherosclerotic changes in the anterior and   posterior circulation, but without significant stenosis or occlusion.    < end of copied text >  < from: CT Brain Stroke Protocol (10.26.23 @ 04:37) >  IMPRESSION:  No evidence of acute intracranial hemorrhage, mass effect or large acute   territorial infarct, within limits of noncontrast CT technique.    < end of copied text >  < from: CT Angio Neck Stroke Protocol w/ IV Cont (10.26.23 @ 04:53) >  CT ANGIOGRAPHY NECK:  1.  The right internal carotid artery is occluded approximately 1.5 cm   distal to its origin, possibly due to underlying dissection.  2.  Endotracheal tube is low in position, with its tip approximate 1 cm   above the slade.  3.  Diffuse ill-defined groundglass opacity in both lungs, which may   represent pulmonary edema, infection and/or atelectasis.  4.  There is a discrete right upper lobe groundglass opacity measuring   1.9 x 1.4 cm; this may represent a focus of infection, inflammation,   edema, hemorrhage, fibrosis or malignancy.  A follow-up chest CT is   warranted in three months to assess for resolution.    CT ANGIOGRAPHY BRAIN: The right internal carotid arteries occluded.    There is no significant flow related opacification within the proximal   right middle cerebral artery.  There is mild reconstitution of flow in   some distal branch vessels of the inferior division of the right middle   cerebral artery.  The right anterior cerebral artery fills across the   anterior communicating artery.    < end of copied text >  < from: CT Brain Perfusion Maps Stroke (10.26.23 @ 04:54) >  CT PERFUSION: CT perfusion is consistent with large acute infarct in the   right middle cerebral artery vascular territory and small to moderate   amount of surrounding ischemic penumbra.  Hypoperfusion index is 0.3.    Core infarct (CBF <  30%:): 136 mL  Penumbra (Tmax >6s): 194 mL  Mismatched volume: 58 mL  Mismatched ratio: 1.4    < end of copied text >    EEG Classification / Summary:  Abnormal EEG study  1. Intermittent periodic discharges seen intermittently over the left hemisphere, sometimes with triphasic morphology  2. Focal continuous right hemispheric slowing  3. Moderate generalized background slowing    -----------------------------------------------------------------------------------------------------    Clinical Impression:  1. While the periodic pattern noted above may represent lateralized periodic discharges indicating epileptic potential in the left hemisphere, more likely these represent generalized periodic discharges with triphasic morphology, consistent with metabolic etiology, that are poorly expressed on the right due to the effects of the stroke.   2. Structural abnormality in right hemisphere  3. Moderate diffuse/multi-focal cerebral dysfunction, not specific as to etiology.    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 Significant   associated mass effect and associated herniation.      < end of copied text >  < from: CT Head No Cont (11.02.23 @ 14:30) >    Reidentified are 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 There is effacement of all of the   basilar cisterns. Effacement of the sulci in the bilateral cerebri,   compatible with increased brain edema.    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    Unsuccessful attempts were made to reach the ordering clinician via teams.      --- End of Report ---    < end of copied text >

## 2024-01-03 NOTE — PROGRESS NOTE ADULT - SUBJECTIVE AND OBJECTIVE BOX
Neurology Progress Note    S: Patient seen and examined.     MEDICATIONS:    acetaminophen     Tablet .. 650 milliGRAM(s) Oral every 6 hours PRN  amLODIPine   Tablet 10 milliGRAM(s) Oral daily  aspirin  chewable 81 milliGRAM(s) Oral daily  atorvastatin 80 milliGRAM(s) Oral at bedtime  bisacodyl Suppository 10 milliGRAM(s) Rectal daily PRN  chlorhexidine 0.12% Liquid 15 milliLiter(s) Oral Mucosa every 12 hours  chlorhexidine 2% Cloths 1 Application(s) Topical <User Schedule>  enoxaparin Injectable 40 milliGRAM(s) SubCutaneous every 24 hours  hydrALAZINE 100 milliGRAM(s) Oral every 8 hours  influenza  Vaccine (HIGH DOSE) 0.7 milliLiter(s) IntraMuscular once  polyethylene glycol 3350 17 Gram(s) Oral daily  scopolamine 1 mG/72 Hr(s) Patch 1 Patch Transdermal every 72 hours  senna 2 Tablet(s) Oral at bedtime      LABS:                          8.3    13.72 )-----------( 360      ( 02 Jan 2024 02:53 )             25.7     01-02    140  |  103  |  68<H>  ----------------------------<  136<H>  3.0<L>   |  28  |  3.47<H>    Ca    8.1<L>      02 Jan 2024 02:53  Phos  4.7     01-02  Mg     2.7     01-02    TPro  6.6  /  Alb  1.5<L>  /  TBili  0.6  /  DBili  x   /  AST  66<H>  /  ALT  30  /  AlkPhos  110  01-02    CAPILLARY BLOOD GLUCOSE          Urinalysis Basic - ( 02 Jan 2024 02:53 )    Color: x / Appearance: x / SG: x / pH: x  Gluc: 136 mg/dL / Ketone: x  / Bili: x / Urobili: x   Blood: x / Protein: x / Nitrite: x   Leuk Esterase: x / RBC: x / WBC x   Sq Epi: x / Non Sq Epi: x / Bacteria: x      I&O's Summary    02 Jan 2024 07:01  -  03 Jan 2024 07:00  --------------------------------------------------------  IN: 890 mL / OUT: 640 mL / NET: 250 mL    03 Jan 2024 07:01  -  03 Jan 2024 14:07  --------------------------------------------------------  IN: 40 mL / OUT: 0 mL / NET: 40 mL      Vital Signs Last 24 Hrs  T(C): 36.3 (03 Jan 2024 08:00), Max: 36.3 (03 Jan 2024 08:00)  T(F): 97.3 (03 Jan 2024 08:00), Max: 97.3 (03 Jan 2024 08:00)  HR: 66 (03 Jan 2024 13:28) (55 - 76)  BP: 107/51 (03 Jan 2024 12:00) (105/56 - 119/54)  BP(mean): 68 (03 Jan 2024 12:00) (64 - 78)  RR: 27 (03 Jan 2024 12:00) (15 - 27)  SpO2: 99% (03 Jan 2024 13:28) (97% - 100%)    Parameters below as of 02 Jan 2024 20:00  Patient On (Oxygen Delivery Method): ventilator,  RR 12 Peep 5    O2 Concentration (%): 21    Neurological Exam:  Mental Status: Intubated, not sedated. some spont eye opening. No verbal output, does not follow commands.   Cranial Nerves: pupils very sluggish R, L near fixed, no  corneals, no VOR, intermittent eyelid fluttering no facial asymmetry , weak cough/gag  Motor: dec tone throughout, no spont movemnt noted   Sensation: UE extensor posturing, LE triple flexion left toe up    I personally reviewed the below data/images/labs:      < from: CT Head No Cont (10.22.19 @ 15:57) >  IMPRESSION:    1)  chronic ischemic changes in both hemispheres with volume loss. There   are no new infarct as compared to prior CT. This may be further assessed   with MR imaging.  2)  no hemorrhage or mass identified.      LABS:                      < end of copied text >  < from: CT Angio Head w/ IV Cont (10.22.19 @ 15:57) >  IMPRESSION    1. There is intimal thickening and calcified plaque in both carotid   bifurcations in the neck, but without significant stenosis. No evidence   of carotid vertebral occlusion or dissection.  2. Intracranially, there are atherosclerotic changes in the anterior and   posterior circulation, but without significant stenosis or occlusion.    < end of copied text >  < from: CT Brain Stroke Protocol (10.26.23 @ 04:37) >  IMPRESSION:  No evidence of acute intracranial hemorrhage, mass effect or large acute   territorial infarct, within limits of noncontrast CT technique.    < end of copied text >  < from: CT Angio Neck Stroke Protocol w/ IV Cont (10.26.23 @ 04:53) >  CT ANGIOGRAPHY NECK:  1.  The right internal carotid artery is occluded approximately 1.5 cm   distal to its origin, possibly due to underlying dissection.  2.  Endotracheal tube is low in position, with its tip approximate 1 cm   above the slade.  3.  Diffuse ill-defined groundglass opacity in both lungs, which may   represent pulmonary edema, infection and/or atelectasis.  4.  There is a discrete right upper lobe groundglass opacity measuring   1.9 x 1.4 cm; this may represent a focus of infection, inflammation,   edema, hemorrhage, fibrosis or malignancy.  A follow-up chest CT is   warranted in three months to assess for resolution.    CT ANGIOGRAPHY BRAIN: The right internal carotid arteries occluded.    There is no significant flow related opacification within the proximal   right middle cerebral artery.  There is mild reconstitution of flow in   some distal branch vessels of the inferior division of the right middle   cerebral artery.  The right anterior cerebral artery fills across the   anterior communicating artery.    < end of copied text >  < from: CT Brain Perfusion Maps Stroke (10.26.23 @ 04:54) >  CT PERFUSION: CT perfusion is consistent with large acute infarct in the   right middle cerebral artery vascular territory and small to moderate   amount of surrounding ischemic penumbra.  Hypoperfusion index is 0.3.    Core infarct (CBF <  30%:): 136 mL  Penumbra (Tmax >6s): 194 mL  Mismatched volume: 58 mL  Mismatched ratio: 1.4    < end of copied text >      < from: CT Head No Cont (10.22.19 @ 15:57) >  IMPRESSION:    1)  chronic ischemic changes in both hemispheres with volume loss. There   are no new infarct as compared to prior CT. This may be further assessed   with MR imaging.  2)  no hemorrhage or mass identified.    < end of copied text >  < from: CT Angio Head w/ IV Cont (10.22.19 @ 15:57) >  IMPRESSION    1. There is intimal thickening and calcified plaque in both carotid   bifurcations in the neck, but without significant stenosis. No evidence   of carotid vertebral occlusion or dissection.  2. Intracranially, there are atherosclerotic changes in the anterior and   posterior circulation, but without significant stenosis or occlusion.    < end of copied text >  < from: CT Brain Stroke Protocol (10.26.23 @ 04:37) >  IMPRESSION:  No evidence of acute intracranial hemorrhage, mass effect or large acute   territorial infarct, within limits of noncontrast CT technique.    < end of copied text >  < from: CT Angio Neck Stroke Protocol w/ IV Cont (10.26.23 @ 04:53) >  CT ANGIOGRAPHY NECK:  1.  The right internal carotid artery is occluded approximately 1.5 cm   distal to its origin, possibly due to underlying dissection.  2.  Endotracheal tube is low in position, with its tip approximate 1 cm   above the slade.  3.  Diffuse ill-defined groundglass opacity in both lungs, which may   represent pulmonary edema, infection and/or atelectasis.  4.  There is a discrete right upper lobe groundglass opacity measuring   1.9 x 1.4 cm; this may represent a focus of infection, inflammation,   edema, hemorrhage, fibrosis or malignancy.  A follow-up chest CT is   warranted in three months to assess for resolution.    CT ANGIOGRAPHY BRAIN: The right internal carotid arteries occluded.    There is no significant flow related opacification within the proximal   right middle cerebral artery.  There is mild reconstitution of flow in   some distal branch vessels of the inferior division of the right middle   cerebral artery.  The right anterior cerebral artery fills across the   anterior communicating artery.    < end of copied text >  < from: CT Brain Perfusion Maps Stroke (10.26.23 @ 04:54) >  CT PERFUSION: CT perfusion is consistent with large acute infarct in the   right middle cerebral artery vascular territory and small to moderate   amount of surrounding ischemic penumbra.  Hypoperfusion index is 0.3.    Core infarct (CBF <  30%:): 136 mL  Penumbra (Tmax >6s): 194 mL  Mismatched volume: 58 mL  Mismatched ratio: 1.4    < end of copied text >      < from: CT Head No Cont (10.22.19 @ 15:57) >  IMPRESSION:    1)  chronic ischemic changes in both hemispheres with volume loss. There   are no new infarct as compared to prior CT. This may be further assessed   with MR imaging.  2)  no hemorrhage or mass identified.    < end of copied text >  < from: CT Angio Head w/ IV Cont (10.22.19 @ 15:57) >  IMPRESSION    1. There is intimal thickening and calcified plaque in both carotid   bifurcations in the neck, but without significant stenosis. No evidence   of carotid vertebral occlusion or dissection.  2. Intracranially, there are atherosclerotic changes in the anterior and   posterior circulation, but without significant stenosis or occlusion.    < end of copied text >  < from: CT Brain Stroke Protocol (10.26.23 @ 04:37) >  IMPRESSION:  No evidence of acute intracranial hemorrhage, mass effect or large acute   territorial infarct, within limits of noncontrast CT technique.    < end of copied text >  < from: CT Angio Neck Stroke Protocol w/ IV Cont (10.26.23 @ 04:53) >  CT ANGIOGRAPHY NECK:  1.  The right internal carotid artery is occluded approximately 1.5 cm   distal to its origin, possibly due to underlying dissection.  2.  Endotracheal tube is low in position, with its tip approximate 1 cm   above the slade.  3.  Diffuse ill-defined groundglass opacity in both lungs, which may   represent pulmonary edema, infection and/or atelectasis.  4.  There is a discrete right upper lobe groundglass opacity measuring   1.9 x 1.4 cm; this may represent a focus of infection, inflammation,   edema, hemorrhage, fibrosis or malignancy.  A follow-up chest CT is   warranted in three months to assess for resolution.    CT ANGIOGRAPHY BRAIN: The right internal carotid arteries occluded.    There is no significant flow related opacification within the proximal   right middle cerebral artery.  There is mild reconstitution of flow in   some distal branch vessels of the inferior division of the right middle   cerebral artery.  The right anterior cerebral artery fills across the   anterior communicating artery.    < end of copied text >  < from: CT Brain Perfusion Maps Stroke (10.26.23 @ 04:54) >  CT PERFUSION: CT perfusion is consistent with large acute infarct in the   right middle cerebral artery vascular territory and small to moderate   amount of surrounding ischemic penumbra.  Hypoperfusion index is 0.3.    Core infarct (CBF <  30%:): 136 mL  Penumbra (Tmax >6s): 194 mL  Mismatched volume: 58 mL  Mismatched ratio: 1.4    < end of copied text >    EEG Classification / Summary:  Abnormal EEG study  1. Intermittent periodic discharges seen intermittently over the left hemisphere, sometimes with triphasic morphology  2. Focal continuous right hemispheric slowing  3. Moderate generalized background slowing    -----------------------------------------------------------------------------------------------------    Clinical Impression:  1. While the periodic pattern noted above may represent lateralized periodic discharges indicating epileptic potential in the left hemisphere, more likely these represent generalized periodic discharges with triphasic morphology, consistent with metabolic etiology, that are poorly expressed on the right due to the effects of the stroke.   2. Structural abnormality in right hemisphere  3. Moderate diffuse/multi-focal cerebral dysfunction, not specific as to etiology.    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 Significant   associated mass effect and associated herniation.      < end of copied text >  < from: CT Head No Cont (11.02.23 @ 14:30) >    Reidentified are 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 There is effacement of all of the   basilar cisterns. Effacement of the sulci in the bilateral cerebri,   compatible with increased brain edema.    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    Unsuccessful attempts were made to reach the ordering clinician via teams.      --- End of Report ---    < end of copied text >

## 2024-01-03 NOTE — PROGRESS NOTE ADULT - SUBJECTIVE AND OBJECTIVE BOX
HPI:  Ms. Longoria is an 88 year old female with a PMH of HTN, HLD and afib on xarelto who presented to [facility] earlier this morning after she was found altered, nonverbal and with right gaze deviation when she woke up. At baseline pt is ambulatory and verbal. Pt required intubation in ED for airway protection given poor MS. NIHSS calculated as 30 at that time. Pt not a candidate for TNK or transfer for intervention per stroke neurologist. She remains intubated on nicardipine gtt. ICU team consulted. (26 Oct 2023 05:50)      24 hr events: No acute events    ## ROS:  [x ] unable to obtain      ## Vitals  ICU Vital Signs Last 24 Hrs  T(C): 36.1 (03 Jan 2024 04:00), Max: 36.1 (03 Jan 2024 04:00)  T(F): 96.9 (03 Jan 2024 04:00), Max: 96.9 (03 Jan 2024 04:00)  HR: 62 (03 Jan 2024 06:10) (55 - 76)  BP: 116/59 (03 Jan 2024 06:10) (105/56 - 119/54)  BP(mean): 78 (03 Jan 2024 06:10) (64 - 78)  ABP: --  ABP(mean): --  RR: 19 (03 Jan 2024 04:00) (15 - 23)  SpO2: 98% (03 Jan 2024 05:07) (97% - 100%)    O2 Parameters below as of 02 Jan 2024 20:00  Patient On (Oxygen Delivery Method): ventilator,  RR 12 Peep 5    O2 Concentration (%): 21        ## Physical Exam:  Gen: Elderly female lying in bed, NAD, intubated  HEENT: NC/AT sclerae anicteric. ET tube in place  Resp: Mechanical breath sounds b/l  CV: S1, S2  Abd: Soft, + BS  Ext: WWP  Neuro: Unarousable    ## Vent Data  Mode: AC/ CMV (Assist Control/ Continuous Mandatory Ventilation)  RR (machine): 12  TV (machine): 400  FiO2: 21  PEEP: 5  ITime: 0.8  MAP: 9  PIP: 17      ## Labs:  Chem:  01-02    140  |  103  |  68<H>  ----------------------------<  136<H>  3.0<L>   |  28  |  3.47<H>    Ca    8.1<L>      02 Jan 2024 02:53  Phos  4.7     01-02  Mg     2.7     01-02    TPro  6.6  /  Alb  1.5<L>  /  TBili  0.6  /  DBili  x   /  AST  66<H>  /  ALT  30  /  AlkPhos  110  01-02    LIVER FUNCTIONS - ( 02 Jan 2024 02:53 )  Alb: 1.5 g/dL / Pro: 6.6 gm/dL / ALK PHOS: 110 U/L / ALT: 30 U/L / AST: 66 U/L / GGT: x           CBC:                        8.3    13.72 )-----------( 360      ( 02 Jan 2024 02:53 )             25.7     Coags:      Urinalysis Basic - ( 02 Jan 2024 02:53 )    Color: x / Appearance: x / SG: x / pH: x  Gluc: 136 mg/dL / Ketone: x  / Bili: x / Urobili: x   Blood: x / Protein: x / Nitrite: x   Leuk Esterase: x / RBC: x / WBC x   Sq Epi: x / Non Sq Epi: x / Bacteria: x        ## Cardiac        ## Blood Gas      #I/Os  I&O's Detail    02 Jan 2024 07:01  -  03 Jan 2024 07:00  --------------------------------------------------------  IN:    Enteral Tube Flush: 350 mL    Free Water: 300 mL    Jevity 1.2: 240 mL  Total IN: 890 mL    OUT:    Indwelling Catheter - Urethral (mL): 490 mL    Rectal Tube (mL): 150 mL  Total OUT: 640 mL    Total NET: 250 mL          ## Imaging:    ## Medications:  MEDICATIONS  (STANDING):  amLODIPine   Tablet 10 milliGRAM(s) Oral daily  aspirin  chewable 81 milliGRAM(s) Oral daily  atorvastatin 80 milliGRAM(s) Oral at bedtime  chlorhexidine 0.12% Liquid 15 milliLiter(s) Oral Mucosa every 12 hours  chlorhexidine 2% Cloths 1 Application(s) Topical <User Schedule>  enoxaparin Injectable 40 milliGRAM(s) SubCutaneous every 24 hours  hydrALAZINE 100 milliGRAM(s) Oral every 8 hours  influenza  Vaccine (HIGH DOSE) 0.7 milliLiter(s) IntraMuscular once  polyethylene glycol 3350 17 Gram(s) Oral daily  scopolamine 1 mG/72 Hr(s) Patch 1 Patch Transdermal every 72 hours  senna 2 Tablet(s) Oral at bedtime    MEDICATIONS  (PRN):  acetaminophen     Tablet .. 650 milliGRAM(s) Oral every 6 hours PRN Temp greater or equal to 38C (100.4F)  bisacodyl Suppository 10 milliGRAM(s) Rectal daily PRN Constipation       HPI:  Ms. Longoria is an 88 year old female with a PMH of HTN, HLD and afib on xarelto who presented to [facility] earlier this morning after she was found altered, nonverbal and with right gaze deviation when she woke up. At baseline pt is ambulatory and verbal. Pt required intubation in ED for airway protection given poor MS. NIHSS calculated as 30 at that time. Pt not a candidate for TNK or transfer for intervention per stroke neurologist. She remains intubated on nicardipine gtt. ICU team consulted. (26 Oct 2023 05:50)      24 hr events: No acute events    ## ROS:  [x ] unable to obtain      ## Vitals  ICU Vital Signs Last 24 Hrs  T(C): 36.1 (03 Jan 2024 04:00), Max: 36.1 (03 Jan 2024 04:00)  T(F): 96.9 (03 Jan 2024 04:00), Max: 96.9 (03 Jan 2024 04:00)  HR: 62 (03 Jan 2024 06:10) (55 - 76)  BP: 116/59 (03 Jan 2024 06:10) (105/56 - 119/54)  BP(mean): 78 (03 Jan 2024 06:10) (64 - 78)  ABP: --  ABP(mean): --  RR: 19 (03 Jan 2024 04:00) (15 - 23)  SpO2: 98% (03 Jan 2024 05:07) (97% - 100%)    O2 Parameters below as of 02 Jan 2024 20:00  Patient On (Oxygen Delivery Method): ventilator,  RR 12 Peep 5    O2 Concentration (%): 21        ## Physical Exam:  Gen: Elderly female lying in bed, NAD, intubated  HEENT: NC/AT sclerae anicteric. ET tube in place  Resp: Mechanical breath sounds b/l  CV: S1, S2  Abd: Soft, + BS  Ext: WWP  Neuro: Unarousable    ## Vent Data  Mode: AC/ CMV (Assist Control/ Continuous Mandatory Ventilation)  RR (machine): 12  TV (machine): 400  FiO2: 21  PEEP: 5  ITime: 0.8  MAP: 9  PIP: 17      ## Labs:  Chem:  01-02    140  |  103  |  68<H>  ----------------------------<  136<H>  3.0<L>   |  28  |  3.47<H>    Ca    8.1<L>      02 Jan 2024 02:53  Phos  4.7     01-02  Mg     2.7     01-02    TPro  6.6  /  Alb  1.5<L>  /  TBili  0.6  /  DBili  x   /  AST  66<H>  /  ALT  30  /  AlkPhos  110  01-02    LIVER FUNCTIONS - ( 02 Jan 2024 02:53 )  Alb: 1.5 g/dL / Pro: 6.6 gm/dL / ALK PHOS: 110 U/L / ALT: 30 U/L / AST: 66 U/L / GGT: x           CBC:                        8.3    13.72 )-----------( 360      ( 02 Jan 2024 02:53 )             25.7     Coags:      Urinalysis Basic - ( 02 Jan 2024 02:53 )    Color: x / Appearance: x / SG: x / pH: x  Gluc: 136 mg/dL / Ketone: x  / Bili: x / Urobili: x   Blood: x / Protein: x / Nitrite: x   Leuk Esterase: x / RBC: x / WBC x   Sq Epi: x / Non Sq Epi: x / Bacteria: x        ## Cardiac        ## Blood Gas      #I/Os  I&O's Detail    02 Jan 2024 07:01  -  03 Jan 2024 07:00  --------------------------------------------------------  IN:    Enteral Tube Flush: 350 mL    Free Water: 300 mL    Jevity 1.2: 240 mL  Total IN: 890 mL    OUT:    Indwelling Catheter - Urethral (mL): 490 mL    Rectal Tube (mL): 150 mL  Total OUT: 640 mL    Total NET: 250 mL          ## Imaging:    ## Medications:  MEDICATIONS  (STANDING):  amLODIPine   Tablet 10 milliGRAM(s) Oral daily  aspirin  chewable 81 milliGRAM(s) Oral daily  atorvastatin 80 milliGRAM(s) Oral at bedtime  chlorhexidine 0.12% Liquid 15 milliLiter(s) Oral Mucosa every 12 hours  chlorhexidine 2% Cloths 1 Application(s) Topical <User Schedule>  enoxaparin Injectable 40 milliGRAM(s) SubCutaneous every 24 hours  hydrALAZINE 100 milliGRAM(s) Oral every 8 hours  influenza  Vaccine (HIGH DOSE) 0.7 milliLiter(s) IntraMuscular once  polyethylene glycol 3350 17 Gram(s) Oral daily  scopolamine 1 mG/72 Hr(s) Patch 1 Patch Transdermal every 72 hours  senna 2 Tablet(s) Oral at bedtime    MEDICATIONS  (PRN):  acetaminophen     Tablet .. 650 milliGRAM(s) Oral every 6 hours PRN Temp greater or equal to 38C (100.4F)  bisacodyl Suppository 10 milliGRAM(s) Rectal daily PRN Constipation

## 2024-01-03 NOTE — PROGRESS NOTE ADULT - ASSESSMENT
87 y/o F w/hypertension, HLD and Afib on AC admitted for acute CVA, not a candidate for TNK or endovascular intervention. Intubated for respiratory failure in setting of stroke. CT chest with pulmonary nodule.    - ASA/Statin  - Continue mechanical ventilation, wean as tolerated (mental status is barrier to extubation)  - Monitor off abx  - DVT prophylaxis  - Repeat CT scan as outpatient for follow up of nodule if patient improves  - GOC discussions - family does not want trach, but not consenting to palliative wean. Ethics involved.       I have personally provided 35 minutes of attending critical care time excluding procedures.   89 y/o F w/hypertension, HLD and Afib on AC admitted for acute CVA, not a candidate for TNK or endovascular intervention. Intubated for respiratory failure in setting of stroke. CT chest with pulmonary nodule.    - ASA/Statin  - Continue mechanical ventilation, wean as tolerated (mental status is barrier to extubation)  - Monitor off abx  - DVT prophylaxis  - Repeat CT scan as outpatient for follow up of nodule if patient improves  - GOC discussions - family does not want trach, but not consenting to palliative wean. Ethics involved.       I have personally provided 35 minutes of attending critical care time excluding procedures.

## 2024-01-04 NOTE — PROGRESS NOTE ADULT - SUBJECTIVE AND OBJECTIVE BOX
HPI:  Ms. Longoria is an 88 year old female with a PMH of HTN, HLD and afib on xarelto who presented to [facility] earlier this morning after she was found altered, nonverbal and with right gaze deviation when she woke up. At baseline pt is ambulatory and verbal. Pt required intubation in ED for airway protection given poor MS. NIHSS calculated as 30 at that time. Pt not a candidate for TNK or transfer for intervention per stroke neurologist. She remains intubated on nicardipine gtt. ICU team consulted. (26 Oct 2023 05:50)      24 hr events: No acute events.     ## ROS:  [x ] unable to obtain      ## Vitals  ICU Vital Signs Last 24 Hrs  T(C): 36.1 (04 Jan 2024 04:00), Max: 36.1 (04 Jan 2024 04:00)  T(F): 97 (04 Jan 2024 04:00), Max: 97 (04 Jan 2024 04:00)  HR: 68 (04 Jan 2024 08:00) (56 - 72)  BP: 117/55 (04 Jan 2024 08:00) (96/49 - 119/47)  BP(mean): 72 (04 Jan 2024 08:00) (65 - 72)  ABP: --  ABP(mean): --  RR: 18 (04 Jan 2024 08:00) (15 - 27)  SpO2: 99% (04 Jan 2024 08:00) (98% - 100%)    O2 Parameters below as of 04 Jan 2024 08:00  Patient On (Oxygen Delivery Method): ventilator, Tv-400, AC-12, PEEP-5    O2 Concentration (%): 21        ## Physical Exam:  Gen: Elderly female lying in bed, NAD, intubated  HEENT: NC/AT sclerae anicteric. ET tube in place  Resp: Mechanical breath sounds b/l  CV: S1, S2  Abd: Soft, + BS  Ext: WWP  Neuro: Unarousable    ## Vent Data  Mode: AC/ CMV (Assist Control/ Continuous Mandatory Ventilation)  RR (machine): 12  TV (machine): 400  FiO2: 21  PEEP: 5  ITime: 1  MAP: 10  PIP: 19      ## Labs:  Chem:          CBC:    Coags:          ## Cardiac        ## Blood Gas      #I/Os  I&O's Detail    03 Jan 2024 07:01  -  04 Jan 2024 07:00  --------------------------------------------------------  IN:    Enteral Tube Flush: 50 mL    Free Water: 600 mL    Jevity 1.2: 240 mL  Total IN: 890 mL    OUT:    Indwelling Catheter - Urethral (mL): 775 mL    Rectal Tube (mL): 50 mL  Total OUT: 825 mL    Total NET: 65 mL      04 Jan 2024 07:01  -  04 Jan 2024 09:14  --------------------------------------------------------  IN:    Enteral Tube Flush: 50 mL    Jevity 1.2: 10 mL  Total IN: 60 mL    OUT:    Indwelling Catheter - Urethral (mL): 80 mL  Total OUT: 80 mL    Total NET: -20 mL          ## Imaging:    ## Medications:  MEDICATIONS  (STANDING):  amLODIPine   Tablet 10 milliGRAM(s) Oral daily  aspirin  chewable 81 milliGRAM(s) Oral daily  atorvastatin 80 milliGRAM(s) Oral at bedtime  chlorhexidine 0.12% Liquid 15 milliLiter(s) Oral Mucosa every 12 hours  chlorhexidine 2% Cloths 1 Application(s) Topical <User Schedule>  enoxaparin Injectable 40 milliGRAM(s) SubCutaneous every 24 hours  hydrALAZINE 100 milliGRAM(s) Oral every 8 hours  influenza  Vaccine (HIGH DOSE) 0.7 milliLiter(s) IntraMuscular once  polyethylene glycol 3350 17 Gram(s) Oral daily  scopolamine 1 mG/72 Hr(s) Patch 1 Patch Transdermal every 72 hours  senna 2 Tablet(s) Oral at bedtime    MEDICATIONS  (PRN):  acetaminophen     Tablet .. 650 milliGRAM(s) Oral every 6 hours PRN Temp greater or equal to 38C (100.4F)  bisacodyl Suppository 10 milliGRAM(s) Rectal daily PRN Constipation       HPI:  Ms. Longoria is an 88 year old female with a PMH of HTN, HLD and afib on xarelto who presented to [facility] earlier this morning after she was found altered, nonverbal and with right gaze deviation when she woke up. At baseline pt is ambulatory and verbal. Pt required intubation in ED for airway protection given poor MS. NIHSS calculated as 30 at that time. Pt not a candidate for TNK or transfer for intervention per stroke neurologist. She remains intubated on nicardipine gtt. ICU team consulted. (26 Oct 2023 05:50)      24 hr events: No acute events.     ## ROS:  [x ] unable to obtain      ## Vitals  ICU Vital Signs Last 24 Hrs  T(C): 36.1 (04 Jan 2024 04:00), Max: 36.1 (04 Jan 2024 04:00)  T(F): 97 (04 Jan 2024 04:00), Max: 97 (04 Jan 2024 04:00)  HR: 68 (04 Jan 2024 08:00) (56 - 72)  BP: 117/55 (04 Jan 2024 08:00) (96/49 - 119/47)  BP(mean): 72 (04 Jan 2024 08:00) (65 - 72)  ABP: --  ABP(mean): --  RR: 18 (04 Jan 2024 08:00) (15 - 27)  SpO2: 99% (04 Jan 2024 08:00) (98% - 100%)    O2 Parameters below as of 04 Jan 2024 08:00  Patient On (Oxygen Delivery Method): ventilator, Tv-400, AC-12, PEEP-5    O2 Concentration (%): 21        ## Physical Exam:  Gen: Elderly female lying in bed, NAD, intubated  HEENT: NC/AT sclerae anicteric. ET tube in place  Resp: Mechanical breath sounds b/l  CV: S1, S2  Abd: Soft, + BS  Ext: WWP  Neuro: Unarousable    ## Vent Data  Mode: AC/ CMV (Assist Control/ Continuous Mandatory Ventilation)  RR (machine): 12  TV (machine): 400  FiO2: 21  PEEP: 5  ITime: 1  MAP: 10  PIP: 19      ## Labs:  Chem:          CBC:    Coags:          ## Cardiac        ## Blood Gas      #I/Os  I&O's Detail    03 Jan 2024 07:01  -  04 Jan 2024 07:00  --------------------------------------------------------  IN:    Enteral Tube Flush: 50 mL    Free Water: 600 mL    Jevity 1.2: 240 mL  Total IN: 890 mL    OUT:    Indwelling Catheter - Urethral (mL): 775 mL    Rectal Tube (mL): 50 mL  Total OUT: 825 mL    Total NET: 65 mL      04 Jan 2024 07:01  -  04 Jan 2024 09:14  --------------------------------------------------------  IN:    Enteral Tube Flush: 50 mL    Jevity 1.2: 10 mL  Total IN: 60 mL    OUT:    Indwelling Catheter - Urethral (mL): 80 mL  Total OUT: 80 mL    Total NET: -20 mL          ## Imaging:    ## Medications:  MEDICATIONS  (STANDING):  amLODIPine   Tablet 10 milliGRAM(s) Oral daily  aspirin  chewable 81 milliGRAM(s) Oral daily  atorvastatin 80 milliGRAM(s) Oral at bedtime  chlorhexidine 0.12% Liquid 15 milliLiter(s) Oral Mucosa every 12 hours  chlorhexidine 2% Cloths 1 Application(s) Topical <User Schedule>  enoxaparin Injectable 40 milliGRAM(s) SubCutaneous every 24 hours  hydrALAZINE 100 milliGRAM(s) Oral every 8 hours  influenza  Vaccine (HIGH DOSE) 0.7 milliLiter(s) IntraMuscular once  polyethylene glycol 3350 17 Gram(s) Oral daily  scopolamine 1 mG/72 Hr(s) Patch 1 Patch Transdermal every 72 hours  senna 2 Tablet(s) Oral at bedtime    MEDICATIONS  (PRN):  acetaminophen     Tablet .. 650 milliGRAM(s) Oral every 6 hours PRN Temp greater or equal to 38C (100.4F)  bisacodyl Suppository 10 milliGRAM(s) Rectal daily PRN Constipation

## 2024-01-04 NOTE — PROGRESS NOTE ADULT - SUBJECTIVE AND OBJECTIVE BOX
Neurology Progress Note    S: Patient seen and examined.     MEDICATIONS:    acetaminophen     Tablet .. 650 milliGRAM(s) Oral every 6 hours PRN  amLODIPine   Tablet 10 milliGRAM(s) Oral daily  aspirin  chewable 81 milliGRAM(s) Oral daily  atorvastatin 80 milliGRAM(s) Oral at bedtime  bisacodyl Suppository 10 milliGRAM(s) Rectal daily PRN  chlorhexidine 0.12% Liquid 15 milliLiter(s) Oral Mucosa every 12 hours  chlorhexidine 2% Cloths 1 Application(s) Topical <User Schedule>  enoxaparin Injectable 40 milliGRAM(s) SubCutaneous every 24 hours  hydrALAZINE 100 milliGRAM(s) Oral every 8 hours  influenza  Vaccine (HIGH DOSE) 0.7 milliLiter(s) IntraMuscular once  polyethylene glycol 3350 17 Gram(s) Oral daily  scopolamine 1 mG/72 Hr(s) Patch 1 Patch Transdermal every 72 hours  senna 2 Tablet(s) Oral at bedtime      LABS:            CAPILLARY BLOOD GLUCOSE            I&O's Summary    03 Jan 2024 07:01  -  04 Jan 2024 07:00  --------------------------------------------------------  IN: 890 mL / OUT: 825 mL / NET: 65 mL    04 Jan 2024 07:01  -  04 Jan 2024 14:28  --------------------------------------------------------  IN: 360 mL / OUT: 280 mL / NET: 80 mL      Vital Signs Last 24 Hrs  T(C): 36.2 (04 Jan 2024 12:00), Max: 36.2 (04 Jan 2024 12:00)  T(F): 97.2 (04 Jan 2024 12:00), Max: 97.2 (04 Jan 2024 12:00)  HR: 65 (04 Jan 2024 13:10) (56 - 72)  BP: 104/65 (04 Jan 2024 13:10) (96/49 - 119/47)  BP(mean): 77 (04 Jan 2024 13:10) (65 - 78)  RR: 17 (04 Jan 2024 13:10) (15 - 22)  SpO2: 98% (04 Jan 2024 13:10) (98% - 100%)    Parameters below as of 04 Jan 2024 08:00  Patient On (Oxygen Delivery Method): ventilator, Tv-400, AC-12, PEEP-5    O2 Concentration (%): 21      Neurological Exam:  Mental Status: Intubated, not sedated. some spont eye opening. No verbal output, does not follow commands.   Cranial Nerves: pupils very sluggish R, L near fixed, no  corneals, no VOR, intermittent eyelid fluttering no facial asymmetry , weak cough/gag  Motor: dec tone throughout, no spont movemnt noted   Sensation: UE extensor posturing, LE triple flexion left toe up    I personally reviewed the below data/images/labs:      < from: CT Head No Cont (10.22.19 @ 15:57) >  IMPRESSION:    1)  chronic ischemic changes in both hemispheres with volume loss. There   are no new infarct as compared to prior CT. This may be further assessed   with MR imaging.  2)  no hemorrhage or mass identified.      LABS:                      < end of copied text >  < from: CT Angio Head w/ IV Cont (10.22.19 @ 15:57) >  IMPRESSION    1. There is intimal thickening and calcified plaque in both carotid   bifurcations in the neck, but without significant stenosis. No evidence   of carotid vertebral occlusion or dissection.  2. Intracranially, there are atherosclerotic changes in the anterior and   posterior circulation, but without significant stenosis or occlusion.    < end of copied text >  < from: CT Brain Stroke Protocol (10.26.23 @ 04:37) >  IMPRESSION:  No evidence of acute intracranial hemorrhage, mass effect or large acute   territorial infarct, within limits of noncontrast CT technique.    < end of copied text >  < from: CT Angio Neck Stroke Protocol w/ IV Cont (10.26.23 @ 04:53) >  CT ANGIOGRAPHY NECK:  1.  The right internal carotid artery is occluded approximately 1.5 cm   distal to its origin, possibly due to underlying dissection.  2.  Endotracheal tube is low in position, with its tip approximate 1 cm   above the slade.  3.  Diffuse ill-defined groundglass opacity in both lungs, which may   represent pulmonary edema, infection and/or atelectasis.  4.  There is a discrete right upper lobe groundglass opacity measuring   1.9 x 1.4 cm; this may represent a focus of infection, inflammation,   edema, hemorrhage, fibrosis or malignancy.  A follow-up chest CT is   warranted in three months to assess for resolution.    CT ANGIOGRAPHY BRAIN: The right internal carotid arteries occluded.    There is no significant flow related opacification within the proximal   right middle cerebral artery.  There is mild reconstitution of flow in   some distal branch vessels of the inferior division of the right middle   cerebral artery.  The right anterior cerebral artery fills across the   anterior communicating artery.    < end of copied text >  < from: CT Brain Perfusion Maps Stroke (10.26.23 @ 04:54) >  CT PERFUSION: CT perfusion is consistent with large acute infarct in the   right middle cerebral artery vascular territory and small to moderate   amount of surrounding ischemic penumbra.  Hypoperfusion index is 0.3.    Core infarct (CBF <  30%:): 136 mL  Penumbra (Tmax >6s): 194 mL  Mismatched volume: 58 mL  Mismatched ratio: 1.4    < end of copied text >      < from: CT Head No Cont (10.22.19 @ 15:57) >  IMPRESSION:    1)  chronic ischemic changes in both hemispheres with volume loss. There   are no new infarct as compared to prior CT. This may be further assessed   with MR imaging.  2)  no hemorrhage or mass identified.    < end of copied text >  < from: CT Angio Head w/ IV Cont (10.22.19 @ 15:57) >  IMPRESSION    1. There is intimal thickening and calcified plaque in both carotid   bifurcations in the neck, but without significant stenosis. No evidence   of carotid vertebral occlusion or dissection.  2. Intracranially, there are atherosclerotic changes in the anterior and   posterior circulation, but without significant stenosis or occlusion.    < end of copied text >  < from: CT Brain Stroke Protocol (10.26.23 @ 04:37) >  IMPRESSION:  No evidence of acute intracranial hemorrhage, mass effect or large acute   territorial infarct, within limits of noncontrast CT technique.    < end of copied text >  < from: CT Angio Neck Stroke Protocol w/ IV Cont (10.26.23 @ 04:53) >  CT ANGIOGRAPHY NECK:  1.  The right internal carotid artery is occluded approximately 1.5 cm   distal to its origin, possibly due to underlying dissection.  2.  Endotracheal tube is low in position, with its tip approximate 1 cm   above the slade.  3.  Diffuse ill-defined groundglass opacity in both lungs, which may   represent pulmonary edema, infection and/or atelectasis.  4.  There is a discrete right upper lobe groundglass opacity measuring   1.9 x 1.4 cm; this may represent a focus of infection, inflammation,   edema, hemorrhage, fibrosis or malignancy.  A follow-up chest CT is   warranted in three months to assess for resolution.    CT ANGIOGRAPHY BRAIN: The right internal carotid arteries occluded.    There is no significant flow related opacification within the proximal   right middle cerebral artery.  There is mild reconstitution of flow in   some distal branch vessels of the inferior division of the right middle   cerebral artery.  The right anterior cerebral artery fills across the   anterior communicating artery.    < end of copied text >  < from: CT Brain Perfusion Maps Stroke (10.26.23 @ 04:54) >  CT PERFUSION: CT perfusion is consistent with large acute infarct in the   right middle cerebral artery vascular territory and small to moderate   amount of surrounding ischemic penumbra.  Hypoperfusion index is 0.3.    Core infarct (CBF <  30%:): 136 mL  Penumbra (Tmax >6s): 194 mL  Mismatched volume: 58 mL  Mismatched ratio: 1.4    < end of copied text >      < from: CT Head No Cont (10.22.19 @ 15:57) >  IMPRESSION:    1)  chronic ischemic changes in both hemispheres with volume loss. There   are no new infarct as compared to prior CT. This may be further assessed   with MR imaging.  2)  no hemorrhage or mass identified.    < end of copied text >  < from: CT Angio Head w/ IV Cont (10.22.19 @ 15:57) >  IMPRESSION    1. There is intimal thickening and calcified plaque in both carotid   bifurcations in the neck, but without significant stenosis. No evidence   of carotid vertebral occlusion or dissection.  2. Intracranially, there are atherosclerotic changes in the anterior and   posterior circulation, but without significant stenosis or occlusion.    < end of copied text >  < from: CT Brain Stroke Protocol (10.26.23 @ 04:37) >  IMPRESSION:  No evidence of acute intracranial hemorrhage, mass effect or large acute   territorial infarct, within limits of noncontrast CT technique.    < end of copied text >  < from: CT Angio Neck Stroke Protocol w/ IV Cont (10.26.23 @ 04:53) >  CT ANGIOGRAPHY NECK:  1.  The right internal carotid artery is occluded approximately 1.5 cm   distal to its origin, possibly due to underlying dissection.  2.  Endotracheal tube is low in position, with its tip approximate 1 cm   above the slade.  3.  Diffuse ill-defined groundglass opacity in both lungs, which may   represent pulmonary edema, infection and/or atelectasis.  4.  There is a discrete right upper lobe groundglass opacity measuring   1.9 x 1.4 cm; this may represent a focus of infection, inflammation,   edema, hemorrhage, fibrosis or malignancy.  A follow-up chest CT is   warranted in three months to assess for resolution.    CT ANGIOGRAPHY BRAIN: The right internal carotid arteries occluded.    There is no significant flow related opacification within the proximal   right middle cerebral artery.  There is mild reconstitution of flow in   some distal branch vessels of the inferior division of the right middle   cerebral artery.  The right anterior cerebral artery fills across the   anterior communicating artery.    < end of copied text >  < from: CT Brain Perfusion Maps Stroke (10.26.23 @ 04:54) >  CT PERFUSION: CT perfusion is consistent with large acute infarct in the   right middle cerebral artery vascular territory and small to moderate   amount of surrounding ischemic penumbra.  Hypoperfusion index is 0.3.    Core infarct (CBF <  30%:): 136 mL  Penumbra (Tmax >6s): 194 mL  Mismatched volume: 58 mL  Mismatched ratio: 1.4    < end of copied text >    EEG Classification / Summary:  Abnormal EEG study  1. Intermittent periodic discharges seen intermittently over the left hemisphere, sometimes with triphasic morphology  2. Focal continuous right hemispheric slowing  3. Moderate generalized background slowing    -----------------------------------------------------------------------------------------------------    Clinical Impression:  1. While the periodic pattern noted above may represent lateralized periodic discharges indicating epileptic potential in the left hemisphere, more likely these represent generalized periodic discharges with triphasic morphology, consistent with metabolic etiology, that are poorly expressed on the right due to the effects of the stroke.   2. Structural abnormality in right hemisphere  3. Moderate diffuse/multi-focal cerebral dysfunction, not specific as to etiology.    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 Significant   associated mass effect and associated herniation.      < end of copied text >  < from: CT Head No Cont (11.02.23 @ 14:30) >    Reidentified are 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 There is effacement of all of the   basilar cisterns. Effacement of the sulci in the bilateral cerebri,   compatible with increased brain edema.    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    Unsuccessful attempts were made to reach the ordering clinician via teams.      --- End of Report ---    < end of copied text >

## 2024-01-05 NOTE — PROGRESS NOTE ADULT - SUBJECTIVE AND OBJECTIVE BOX
CHIEF COMPLAINT: CVA    Interval Events: NO acute events     REVIEW OF SYSTEMS:    [ x] Unable to perform ROS due to lack of mental status       OBJECTIVE:  ICU Vital Signs Last 24 Hrs  T(C): 35.9 (05 Jan 2024 16:02), Max: 36.2 (05 Jan 2024 04:00)  T(F): 96.6 (05 Jan 2024 16:02), Max: 97.2 (05 Jan 2024 04:00)  HR: 59 (05 Jan 2024 16:13) (57 - 71)  BP: 104/62 (05 Jan 2024 16:00) (104/62 - 120/58)  BP(mean): 75 (05 Jan 2024 16:00) (72 - 76)  ABP: --  ABP(mean): --  RR: 16 (05 Jan 2024 16:00) (16 - 22)  SpO2: 100% (05 Jan 2024 16:13) (100% - 100%)    O2 Parameters below as of 05 Jan 2024 04:00  Patient On (Oxygen Delivery Method): ventilator          Mode: AC/ CMV (Assist Control/ Continuous Mandatory Ventilation), RR (machine): 12, TV (machine): 400, FiO2: 21, PEEP: 5, ITime: 1, MAP: 8, PIP: 22    01-04 @ 07:01  -  01-05 @ 07:00  --------------------------------------------------------  IN: 1120 mL / OUT: 1050 mL / NET: 70 mL    01-05 @ 07:01 - 01-05 @ 16:50  --------------------------------------------------------  IN: 400 mL / OUT: 225 mL / NET: 175 mL      CAPILLARY BLOOD GLUCOSE          PHYSICAL EXAM:  GENERAL: NAD, well-groomed, well-developed  EYES: EOMI, PERRLA, conjunctiva and sclera clear  ENMT: Et tube in place   CHEST/LUNG: Clear to auscultation bilaterally; No rales, rhonchi, wheezing, or rubs  HEART: Regular rate and rhythm; No murmurs, rubs, or gallops  ABDOMEN: Soft, Nontender, Nondistended; Bowel sounds present  VASCULAR:  2+ Peripheral Pulses, No clubbing, cyanosis, or edema  LYMPH: No lymphadenopathy noted  SKIN: No rashes or lesions  NERVOUS SYSTEM:  Alert & Oriented X0      HOSPITAL MEDICATIONS:  aspirin  chewable 81 milliGRAM(s) Oral daily  enoxaparin Injectable 40 milliGRAM(s) SubCutaneous every 24 hours      amLODIPine   Tablet 10 milliGRAM(s) Oral daily  hydrALAZINE 100 milliGRAM(s) Oral every 8 hours    atorvastatin 80 milliGRAM(s) Oral at bedtime      acetaminophen     Tablet .. 650 milliGRAM(s) Oral every 6 hours PRN  scopolamine 1 mG/72 Hr(s) Patch 1 Patch Transdermal every 72 hours    bisacodyl Suppository 10 milliGRAM(s) Rectal daily PRN  polyethylene glycol 3350 17 Gram(s) Oral daily  senna 2 Tablet(s) Oral at bedtime          influenza  Vaccine (HIGH DOSE) 0.7 milliLiter(s) IntraMuscular once    chlorhexidine 0.12% Liquid 15 milliLiter(s) Oral Mucosa every 12 hours  chlorhexidine 2% Cloths 1 Application(s) Topical <User Schedule>        LABS:    Hgb Trend: 8.3<--        Creatinine Trend: 3.47<--, 0.64<--            MICROBIOLOGY:     RADIOLOGY:  [ ] Reviewed and interpreted by me    EKG:

## 2024-01-05 NOTE — PROGRESS NOTE ADULT - SUBJECTIVE AND OBJECTIVE BOX
Neurology Progress Note    S: Patient seen and examined.     MEDICATIONS:    acetaminophen     Tablet .. 650 milliGRAM(s) Oral every 6 hours PRN  amLODIPine   Tablet 10 milliGRAM(s) Oral daily  aspirin  chewable 81 milliGRAM(s) Oral daily  atorvastatin 80 milliGRAM(s) Oral at bedtime  bisacodyl Suppository 10 milliGRAM(s) Rectal daily PRN  chlorhexidine 0.12% Liquid 15 milliLiter(s) Oral Mucosa every 12 hours  chlorhexidine 2% Cloths 1 Application(s) Topical <User Schedule>  enoxaparin Injectable 40 milliGRAM(s) SubCutaneous every 24 hours  hydrALAZINE 100 milliGRAM(s) Oral every 8 hours  influenza  Vaccine (HIGH DOSE) 0.7 milliLiter(s) IntraMuscular once  polyethylene glycol 3350 17 Gram(s) Oral daily  scopolamine 1 mG/72 Hr(s) Patch 1 Patch Transdermal every 72 hours  senna 2 Tablet(s) Oral at bedtime      LABS:            CAPILLARY BLOOD GLUCOSE            I&O's Summary    04 Jan 2024 07:01  -  05 Jan 2024 07:00  --------------------------------------------------------  IN: 1120 mL / OUT: 1050 mL / NET: 70 mL    05 Jan 2024 07:01  -  05 Jan 2024 10:34  --------------------------------------------------------  IN: 40 mL / OUT: 0 mL / NET: 40 mL      Vital Signs Last 24 Hrs  T(C): 36 (05 Jan 2024 08:00), Max: 36.3 (04 Jan 2024 16:00)  T(F): 96.8 (05 Jan 2024 08:00), Max: 97.4 (04 Jan 2024 16:00)  HR: 63 (05 Jan 2024 08:28) (58 - 71)  BP: 109/59 (05 Jan 2024 08:00) (99/48 - 122/55)  BP(mean): 75 (05 Jan 2024 08:00) (64 - 78)  RR: 19 (05 Jan 2024 08:00) (15 - 22)  SpO2: 100% (05 Jan 2024 08:28) (98% - 100%)    Parameters below as of 05 Jan 2024 04:00  Patient On (Oxygen Delivery Method): ventilator            Neurological Exam:  Mental Status: Intubated, not sedated. some spont eye opening. No verbal output, does not follow commands.   Cranial Nerves: pupils very sluggish R, L near fixed, no  corneals, no VOR, intermittent eyelid fluttering no facial asymmetry , weak cough/gag  Motor: dec tone throughout, no spont movemnt noted   Sensation: UE extensor posturing, LE triple flexion left toe up    I personally reviewed the below data/images/labs:      < from: CT Head No Cont (10.22.19 @ 15:57) >  IMPRESSION:    1)  chronic ischemic changes in both hemispheres with volume loss. There   are no new infarct as compared to prior CT. This may be further assessed   with MR imaging.  2)  no hemorrhage or mass identified.      LABS:                      < end of copied text >  < from: CT Angio Head w/ IV Cont (10.22.19 @ 15:57) >  IMPRESSION    1. There is intimal thickening and calcified plaque in both carotid   bifurcations in the neck, but without significant stenosis. No evidence   of carotid vertebral occlusion or dissection.  2. Intracranially, there are atherosclerotic changes in the anterior and   posterior circulation, but without significant stenosis or occlusion.    < end of copied text >  < from: CT Brain Stroke Protocol (10.26.23 @ 04:37) >  IMPRESSION:  No evidence of acute intracranial hemorrhage, mass effect or large acute   territorial infarct, within limits of noncontrast CT technique.    < end of copied text >  < from: CT Angio Neck Stroke Protocol w/ IV Cont (10.26.23 @ 04:53) >  CT ANGIOGRAPHY NECK:  1.  The right internal carotid artery is occluded approximately 1.5 cm   distal to its origin, possibly due to underlying dissection.  2.  Endotracheal tube is low in position, with its tip approximate 1 cm   above the slade.  3.  Diffuse ill-defined groundglass opacity in both lungs, which may   represent pulmonary edema, infection and/or atelectasis.  4.  There is a discrete right upper lobe groundglass opacity measuring   1.9 x 1.4 cm; this may represent a focus of infection, inflammation,   edema, hemorrhage, fibrosis or malignancy.  A follow-up chest CT is   warranted in three months to assess for resolution.    CT ANGIOGRAPHY BRAIN: The right internal carotid arteries occluded.    There is no significant flow related opacification within the proximal   right middle cerebral artery.  There is mild reconstitution of flow in   some distal branch vessels of the inferior division of the right middle   cerebral artery.  The right anterior cerebral artery fills across the   anterior communicating artery.    < end of copied text >  < from: CT Brain Perfusion Maps Stroke (10.26.23 @ 04:54) >  CT PERFUSION: CT perfusion is consistent with large acute infarct in the   right middle cerebral artery vascular territory and small to moderate   amount of surrounding ischemic penumbra.  Hypoperfusion index is 0.3.    Core infarct (CBF <  30%:): 136 mL  Penumbra (Tmax >6s): 194 mL  Mismatched volume: 58 mL  Mismatched ratio: 1.4    < end of copied text >      < from: CT Head No Cont (10.22.19 @ 15:57) >  IMPRESSION:    1)  chronic ischemic changes in both hemispheres with volume loss. There   are no new infarct as compared to prior CT. This may be further assessed   with MR imaging.  2)  no hemorrhage or mass identified.    < end of copied text >  < from: CT Angio Head w/ IV Cont (10.22.19 @ 15:57) >  IMPRESSION    1. There is intimal thickening and calcified plaque in both carotid   bifurcations in the neck, but without significant stenosis. No evidence   of carotid vertebral occlusion or dissection.  2. Intracranially, there are atherosclerotic changes in the anterior and   posterior circulation, but without significant stenosis or occlusion.    < end of copied text >  < from: CT Brain Stroke Protocol (10.26.23 @ 04:37) >  IMPRESSION:  No evidence of acute intracranial hemorrhage, mass effect or large acute   territorial infarct, within limits of noncontrast CT technique.    < end of copied text >  < from: CT Angio Neck Stroke Protocol w/ IV Cont (10.26.23 @ 04:53) >  CT ANGIOGRAPHY NECK:  1.  The right internal carotid artery is occluded approximately 1.5 cm   distal to its origin, possibly due to underlying dissection.  2.  Endotracheal tube is low in position, with its tip approximate 1 cm   above the slade.  3.  Diffuse ill-defined groundglass opacity in both lungs, which may   represent pulmonary edema, infection and/or atelectasis.  4.  There is a discrete right upper lobe groundglass opacity measuring   1.9 x 1.4 cm; this may represent a focus of infection, inflammation,   edema, hemorrhage, fibrosis or malignancy.  A follow-up chest CT is   warranted in three months to assess for resolution.    CT ANGIOGRAPHY BRAIN: The right internal carotid arteries occluded.    There is no significant flow related opacification within the proximal   right middle cerebral artery.  There is mild reconstitution of flow in   some distal branch vessels of the inferior division of the right middle   cerebral artery.  The right anterior cerebral artery fills across the   anterior communicating artery.    < end of copied text >  < from: CT Brain Perfusion Maps Stroke (10.26.23 @ 04:54) >  CT PERFUSION: CT perfusion is consistent with large acute infarct in the   right middle cerebral artery vascular territory and small to moderate   amount of surrounding ischemic penumbra.  Hypoperfusion index is 0.3.    Core infarct (CBF <  30%:): 136 mL  Penumbra (Tmax >6s): 194 mL  Mismatched volume: 58 mL  Mismatched ratio: 1.4    < end of copied text >      < from: CT Head No Cont (10.22.19 @ 15:57) >  IMPRESSION:    1)  chronic ischemic changes in both hemispheres with volume loss. There   are no new infarct as compared to prior CT. This may be further assessed   with MR imaging.  2)  no hemorrhage or mass identified.    < end of copied text >  < from: CT Angio Head w/ IV Cont (10.22.19 @ 15:57) >  IMPRESSION    1. There is intimal thickening and calcified plaque in both carotid   bifurcations in the neck, but without significant stenosis. No evidence   of carotid vertebral occlusion or dissection.  2. Intracranially, there are atherosclerotic changes in the anterior and   posterior circulation, but without significant stenosis or occlusion.    < end of copied text >  < from: CT Brain Stroke Protocol (10.26.23 @ 04:37) >  IMPRESSION:  No evidence of acute intracranial hemorrhage, mass effect or large acute   territorial infarct, within limits of noncontrast CT technique.    < end of copied text >  < from: CT Angio Neck Stroke Protocol w/ IV Cont (10.26.23 @ 04:53) >  CT ANGIOGRAPHY NECK:  1.  The right internal carotid artery is occluded approximately 1.5 cm   distal to its origin, possibly due to underlying dissection.  2.  Endotracheal tube is low in position, with its tip approximate 1 cm   above the slade.  3.  Diffuse ill-defined groundglass opacity in both lungs, which may   represent pulmonary edema, infection and/or atelectasis.  4.  There is a discrete right upper lobe groundglass opacity measuring   1.9 x 1.4 cm; this may represent a focus of infection, inflammation,   edema, hemorrhage, fibrosis or malignancy.  A follow-up chest CT is   warranted in three months to assess for resolution.    CT ANGIOGRAPHY BRAIN: The right internal carotid arteries occluded.    There is no significant flow related opacification within the proximal   right middle cerebral artery.  There is mild reconstitution of flow in   some distal branch vessels of the inferior division of the right middle   cerebral artery.  The right anterior cerebral artery fills across the   anterior communicating artery.    < end of copied text >  < from: CT Brain Perfusion Maps Stroke (10.26.23 @ 04:54) >  CT PERFUSION: CT perfusion is consistent with large acute infarct in the   right middle cerebral artery vascular territory and small to moderate   amount of surrounding ischemic penumbra.  Hypoperfusion index is 0.3.    Core infarct (CBF <  30%:): 136 mL  Penumbra (Tmax >6s): 194 mL  Mismatched volume: 58 mL  Mismatched ratio: 1.4    < end of copied text >    EEG Classification / Summary:  Abnormal EEG study  1. Intermittent periodic discharges seen intermittently over the left hemisphere, sometimes with triphasic morphology  2. Focal continuous right hemispheric slowing  3. Moderate generalized background slowing    -----------------------------------------------------------------------------------------------------    Clinical Impression:  1. While the periodic pattern noted above may represent lateralized periodic discharges indicating epileptic potential in the left hemisphere, more likely these represent generalized periodic discharges with triphasic morphology, consistent with metabolic etiology, that are poorly expressed on the right due to the effects of the stroke.   2. Structural abnormality in right hemisphere  3. Moderate diffuse/multi-focal cerebral dysfunction, not specific as to etiology.    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 Significant   associated mass effect and associated herniation.      < end of copied text >  < from: CT Head No Cont (11.02.23 @ 14:30) >    Reidentified are 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 There is effacement of all of the   basilar cisterns. Effacement of the sulci in the bilateral cerebri,   compatible with increased brain edema.    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    Unsuccessful attempts were made to reach the ordering clinician via teams.      --- End of Report ---    < end of copied text >   Neurology Progress Note    S: Patient seen and examined.     MEDICATIONS:    acetaminophen     Tablet .. 650 milliGRAM(s) Oral every 6 hours PRN  amLODIPine   Tablet 10 milliGRAM(s) Oral daily  aspirin  chewable 81 milliGRAM(s) Oral daily  atorvastatin 80 milliGRAM(s) Oral at bedtime  bisacodyl Suppository 10 milliGRAM(s) Rectal daily PRN  chlorhexidine 0.12% Liquid 15 milliLiter(s) Oral Mucosa every 12 hours  chlorhexidine 2% Cloths 1 Application(s) Topical <User Schedule>  enoxaparin Injectable 40 milliGRAM(s) SubCutaneous every 24 hours  hydrALAZINE 100 milliGRAM(s) Oral every 8 hours  influenza  Vaccine (HIGH DOSE) 0.7 milliLiter(s) IntraMuscular once  polyethylene glycol 3350 17 Gram(s) Oral daily  scopolamine 1 mG/72 Hr(s) Patch 1 Patch Transdermal every 72 hours  senna 2 Tablet(s) Oral at bedtime      LABS:            CAPILLARY BLOOD GLUCOSE            I&O's Summary    04 Jan 2024 07:01  -  05 Jan 2024 07:00  --------------------------------------------------------  IN: 1120 mL / OUT: 1050 mL / NET: 70 mL    05 Jan 2024 07:01  -  05 Jan 2024 10:34  --------------------------------------------------------  IN: 40 mL / OUT: 0 mL / NET: 40 mL      Vital Signs Last 24 Hrs  T(C): 36 (05 Jan 2024 08:00), Max: 36.3 (04 Jan 2024 16:00)  T(F): 96.8 (05 Jan 2024 08:00), Max: 97.4 (04 Jan 2024 16:00)  HR: 63 (05 Jan 2024 08:28) (58 - 71)  BP: 109/59 (05 Jan 2024 08:00) (99/48 - 122/55)  BP(mean): 75 (05 Jan 2024 08:00) (64 - 78)  RR: 19 (05 Jan 2024 08:00) (15 - 22)  SpO2: 100% (05 Jan 2024 08:28) (98% - 100%)    Parameters below as of 05 Jan 2024 04:00  Patient On (Oxygen Delivery Method): ventilator            Neurological Exam:  Mental Status: Intubated, not sedated. some spont eye opening. No verbal output, does not follow commands.   Cranial Nerves: pupils very sluggish R, L near fixed, no  corneals, no VOR, intermittent eyelid fluttering no facial asymmetry , weak cough/gag  Motor: dec tone throughout, no spont movemnt noted   Sensation: UE extensor posturing, LE triple flexion left toe up    I personally reviewed the below data/images/labs:      < from: CT Head No Cont (10.22.19 @ 15:57) >  IMPRESSION:    1)  chronic ischemic changes in both hemispheres with volume loss. There   are no new infarct as compared to prior CT. This may be further assessed   with MR imaging.  2)  no hemorrhage or mass identified.      LABS:                      < end of copied text >  < from: CT Angio Head w/ IV Cont (10.22.19 @ 15:57) >  IMPRESSION    1. There is intimal thickening and calcified plaque in both carotid   bifurcations in the neck, but without significant stenosis. No evidence   of carotid vertebral occlusion or dissection.  2. Intracranially, there are atherosclerotic changes in the anterior and   posterior circulation, but without significant stenosis or occlusion.    < end of copied text >  < from: CT Brain Stroke Protocol (10.26.23 @ 04:37) >  IMPRESSION:  No evidence of acute intracranial hemorrhage, mass effect or large acute   territorial infarct, within limits of noncontrast CT technique.    < end of copied text >  < from: CT Angio Neck Stroke Protocol w/ IV Cont (10.26.23 @ 04:53) >  CT ANGIOGRAPHY NECK:  1.  The right internal carotid artery is occluded approximately 1.5 cm   distal to its origin, possibly due to underlying dissection.  2.  Endotracheal tube is low in position, with its tip approximate 1 cm   above the slade.  3.  Diffuse ill-defined groundglass opacity in both lungs, which may   represent pulmonary edema, infection and/or atelectasis.  4.  There is a discrete right upper lobe groundglass opacity measuring   1.9 x 1.4 cm; this may represent a focus of infection, inflammation,   edema, hemorrhage, fibrosis or malignancy.  A follow-up chest CT is   warranted in three months to assess for resolution.    CT ANGIOGRAPHY BRAIN: The right internal carotid arteries occluded.    There is no significant flow related opacification within the proximal   right middle cerebral artery.  There is mild reconstitution of flow in   some distal branch vessels of the inferior division of the right middle   cerebral artery.  The right anterior cerebral artery fills across the   anterior communicating artery.    < end of copied text >  < from: CT Brain Perfusion Maps Stroke (10.26.23 @ 04:54) >  CT PERFUSION: CT perfusion is consistent with large acute infarct in the   right middle cerebral artery vascular territory and small to moderate   amount of surrounding ischemic penumbra.  Hypoperfusion index is 0.3.    Core infarct (CBF <  30%:): 136 mL  Penumbra (Tmax >6s): 194 mL  Mismatched volume: 58 mL  Mismatched ratio: 1.4    < end of copied text >      < from: CT Head No Cont (10.22.19 @ 15:57) >  IMPRESSION:    1)  chronic ischemic changes in both hemispheres with volume loss. There   are no new infarct as compared to prior CT. This may be further assessed   with MR imaging.  2)  no hemorrhage or mass identified.    < end of copied text >  < from: CT Angio Head w/ IV Cont (10.22.19 @ 15:57) >  IMPRESSION    1. There is intimal thickening and calcified plaque in both carotid   bifurcations in the neck, but without significant stenosis. No evidence   of carotid vertebral occlusion or dissection.  2. Intracranially, there are atherosclerotic changes in the anterior and   posterior circulation, but without significant stenosis or occlusion.    < end of copied text >  < from: CT Brain Stroke Protocol (10.26.23 @ 04:37) >  IMPRESSION:  No evidence of acute intracranial hemorrhage, mass effect or large acute   territorial infarct, within limits of noncontrast CT technique.    < end of copied text >  < from: CT Angio Neck Stroke Protocol w/ IV Cont (10.26.23 @ 04:53) >  CT ANGIOGRAPHY NECK:  1.  The right internal carotid artery is occluded approximately 1.5 cm   distal to its origin, possibly due to underlying dissection.  2.  Endotracheal tube is low in position, with its tip approximate 1 cm   above the salde.  3.  Diffuse ill-defined groundglass opacity in both lungs, which may   represent pulmonary edema, infection and/or atelectasis.  4.  There is a discrete right upper lobe groundglass opacity measuring   1.9 x 1.4 cm; this may represent a focus of infection, inflammation,   edema, hemorrhage, fibrosis or malignancy.  A follow-up chest CT is   warranted in three months to assess for resolution.    CT ANGIOGRAPHY BRAIN: The right internal carotid arteries occluded.    There is no significant flow related opacification within the proximal   right middle cerebral artery.  There is mild reconstitution of flow in   some distal branch vessels of the inferior division of the right middle   cerebral artery.  The right anterior cerebral artery fills across the   anterior communicating artery.    < end of copied text >  < from: CT Brain Perfusion Maps Stroke (10.26.23 @ 04:54) >  CT PERFUSION: CT perfusion is consistent with large acute infarct in the   right middle cerebral artery vascular territory and small to moderate   amount of surrounding ischemic penumbra.  Hypoperfusion index is 0.3.    Core infarct (CBF <  30%:): 136 mL  Penumbra (Tmax >6s): 194 mL  Mismatched volume: 58 mL  Mismatched ratio: 1.4    < end of copied text >      < from: CT Head No Cont (10.22.19 @ 15:57) >  IMPRESSION:    1)  chronic ischemic changes in both hemispheres with volume loss. There   are no new infarct as compared to prior CT. This may be further assessed   with MR imaging.  2)  no hemorrhage or mass identified.    < end of copied text >  < from: CT Angio Head w/ IV Cont (10.22.19 @ 15:57) >  IMPRESSION    1. There is intimal thickening and calcified plaque in both carotid   bifurcations in the neck, but without significant stenosis. No evidence   of carotid vertebral occlusion or dissection.  2. Intracranially, there are atherosclerotic changes in the anterior and   posterior circulation, but without significant stenosis or occlusion.    < end of copied text >  < from: CT Brain Stroke Protocol (10.26.23 @ 04:37) >  IMPRESSION:  No evidence of acute intracranial hemorrhage, mass effect or large acute   territorial infarct, within limits of noncontrast CT technique.    < end of copied text >  < from: CT Angio Neck Stroke Protocol w/ IV Cont (10.26.23 @ 04:53) >  CT ANGIOGRAPHY NECK:  1.  The right internal carotid artery is occluded approximately 1.5 cm   distal to its origin, possibly due to underlying dissection.  2.  Endotracheal tube is low in position, with its tip approximate 1 cm   above the slade.  3.  Diffuse ill-defined groundglass opacity in both lungs, which may   represent pulmonary edema, infection and/or atelectasis.  4.  There is a discrete right upper lobe groundglass opacity measuring   1.9 x 1.4 cm; this may represent a focus of infection, inflammation,   edema, hemorrhage, fibrosis or malignancy.  A follow-up chest CT is   warranted in three months to assess for resolution.    CT ANGIOGRAPHY BRAIN: The right internal carotid arteries occluded.    There is no significant flow related opacification within the proximal   right middle cerebral artery.  There is mild reconstitution of flow in   some distal branch vessels of the inferior division of the right middle   cerebral artery.  The right anterior cerebral artery fills across the   anterior communicating artery.    < end of copied text >  < from: CT Brain Perfusion Maps Stroke (10.26.23 @ 04:54) >  CT PERFUSION: CT perfusion is consistent with large acute infarct in the   right middle cerebral artery vascular territory and small to moderate   amount of surrounding ischemic penumbra.  Hypoperfusion index is 0.3.    Core infarct (CBF <  30%:): 136 mL  Penumbra (Tmax >6s): 194 mL  Mismatched volume: 58 mL  Mismatched ratio: 1.4    < end of copied text >    EEG Classification / Summary:  Abnormal EEG study  1. Intermittent periodic discharges seen intermittently over the left hemisphere, sometimes with triphasic morphology  2. Focal continuous right hemispheric slowing  3. Moderate generalized background slowing    -----------------------------------------------------------------------------------------------------    Clinical Impression:  1. While the periodic pattern noted above may represent lateralized periodic discharges indicating epileptic potential in the left hemisphere, more likely these represent generalized periodic discharges with triphasic morphology, consistent with metabolic etiology, that are poorly expressed on the right due to the effects of the stroke.   2. Structural abnormality in right hemisphere  3. Moderate diffuse/multi-focal cerebral dysfunction, not specific as to etiology.    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 Significant   associated mass effect and associated herniation.      < end of copied text >  < from: CT Head No Cont (11.02.23 @ 14:30) >    Reidentified are 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 There is effacement of all of the   basilar cisterns. Effacement of the sulci in the bilateral cerebri,   compatible with increased brain edema.    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    Unsuccessful attempts were made to reach the ordering clinician via teams.      --- End of Report ---    < end of copied text >

## 2024-01-05 NOTE — PROGRESS NOTE ADULT - CRITICAL CARE ATTENDING COMMENT
Patient examined and case reviewed in detail on bedside rounds. Frequent bedside visits with therapy change today.  Prognosis guarded.

## 2024-01-06 NOTE — PROGRESS NOTE ADULT - SUBJECTIVE AND OBJECTIVE BOX
Neurology Progress Note    S: Patient seen and examined.     MEDICATIONS:    acetaminophen     Tablet .. 650 milliGRAM(s) Oral every 6 hours PRN  amLODIPine   Tablet 10 milliGRAM(s) Oral daily  aspirin  chewable 81 milliGRAM(s) Oral daily  atorvastatin 80 milliGRAM(s) Oral at bedtime  bisacodyl Suppository 10 milliGRAM(s) Rectal daily PRN  chlorhexidine 0.12% Liquid 15 milliLiter(s) Oral Mucosa every 12 hours  chlorhexidine 2% Cloths 1 Application(s) Topical <User Schedule>  enoxaparin Injectable 40 milliGRAM(s) SubCutaneous every 24 hours  hydrALAZINE 100 milliGRAM(s) Oral every 8 hours  influenza  Vaccine (HIGH DOSE) 0.7 milliLiter(s) IntraMuscular once  polyethylene glycol 3350 17 Gram(s) Oral daily  scopolamine 1 mG/72 Hr(s) Patch 1 Patch Transdermal every 72 hours  senna 2 Tablet(s) Oral at bedtime      LABS:            CAPILLARY BLOOD GLUCOSE            I&O's Summary    05 Jan 2024 07:01  -  06 Jan 2024 07:00  --------------------------------------------------------  IN: 870 mL / OUT: 950 mL / NET: -80 mL    06 Jan 2024 07:01  -  06 Jan 2024 20:57  --------------------------------------------------------  IN: 450 mL / OUT: 595 mL / NET: -145 mL      Vital Signs Last 24 Hrs  T(C): 35.8 (06 Jan 2024 20:00), Max: 36.1 (06 Jan 2024 12:00)  T(F): 96.5 (06 Jan 2024 20:00), Max: 97 (06 Jan 2024 12:00)  HR: 69 (06 Jan 2024 20:00) (55 - 71)  BP: 119/65 (06 Jan 2024 20:00) (106/63 - 131/67)  BP(mean): 82 (06 Jan 2024 20:00) (74 - 84)  RR: 25 (06 Jan 2024 20:00) (12 - 25)  SpO2: 99% (06 Jan 2024 16:11) (99% - 100%)    Parameters below as of 06 Jan 2024 07:00  Patient On (Oxygen Delivery Method): ventilator, AC 12 400 21% PEEP 5          Neurological Exam:  Mental Status: Intubated, not sedated. some spont eye opening. No verbal output, does not follow commands.   Cranial Nerves: pupils very sluggish R, L near fixed, no  corneals, no VOR, intermittent eyelid fluttering no facial asymmetry , weak cough/gag  Motor: dec tone throughout, no spont movemnt noted   Sensation: UE extensor posturing, LE triple flexion left toe up    I personally reviewed the below data/images/labs:      < from: CT Head No Cont (10.22.19 @ 15:57) >  IMPRESSION:    1)  chronic ischemic changes in both hemispheres with volume loss. There   are no new infarct as compared to prior CT. This may be further assessed   with MR imaging.  2)  no hemorrhage or mass identified.      LABS:                      < end of copied text >  < from: CT Angio Head w/ IV Cont (10.22.19 @ 15:57) >  IMPRESSION    1. There is intimal thickening and calcified plaque in both carotid   bifurcations in the neck, but without significant stenosis. No evidence   of carotid vertebral occlusion or dissection.  2. Intracranially, there are atherosclerotic changes in the anterior and   posterior circulation, but without significant stenosis or occlusion.    < end of copied text >  < from: CT Brain Stroke Protocol (10.26.23 @ 04:37) >  IMPRESSION:  No evidence of acute intracranial hemorrhage, mass effect or large acute   territorial infarct, within limits of noncontrast CT technique.    < end of copied text >  < from: CT Angio Neck Stroke Protocol w/ IV Cont (10.26.23 @ 04:53) >  CT ANGIOGRAPHY NECK:  1.  The right internal carotid artery is occluded approximately 1.5 cm   distal to its origin, possibly due to underlying dissection.  2.  Endotracheal tube is low in position, with its tip approximate 1 cm   above the slade.  3.  Diffuse ill-defined groundglass opacity in both lungs, which may   represent pulmonary edema, infection and/or atelectasis.  4.  There is a discrete right upper lobe groundglass opacity measuring   1.9 x 1.4 cm; this may represent a focus of infection, inflammation,   edema, hemorrhage, fibrosis or malignancy.  A follow-up chest CT is   warranted in three months to assess for resolution.    CT ANGIOGRAPHY BRAIN: The right internal carotid arteries occluded.    There is no significant flow related opacification within the proximal   right middle cerebral artery.  There is mild reconstitution of flow in   some distal branch vessels of the inferior division of the right middle   cerebral artery.  The right anterior cerebral artery fills across the   anterior communicating artery.    < end of copied text >  < from: CT Brain Perfusion Maps Stroke (10.26.23 @ 04:54) >  CT PERFUSION: CT perfusion is consistent with large acute infarct in the   right middle cerebral artery vascular territory and small to moderate   amount of surrounding ischemic penumbra.  Hypoperfusion index is 0.3.    Core infarct (CBF <  30%:): 136 mL  Penumbra (Tmax >6s): 194 mL  Mismatched volume: 58 mL  Mismatched ratio: 1.4    < end of copied text >      < from: CT Head No Cont (10.22.19 @ 15:57) >  IMPRESSION:    1)  chronic ischemic changes in both hemispheres with volume loss. There   are no new infarct as compared to prior CT. This may be further assessed   with MR imaging.  2)  no hemorrhage or mass identified.    < end of copied text >  < from: CT Angio Head w/ IV Cont (10.22.19 @ 15:57) >  IMPRESSION    1. There is intimal thickening and calcified plaque in both carotid   bifurcations in the neck, but without significant stenosis. No evidence   of carotid vertebral occlusion or dissection.  2. Intracranially, there are atherosclerotic changes in the anterior and   posterior circulation, but without significant stenosis or occlusion.    < end of copied text >  < from: CT Brain Stroke Protocol (10.26.23 @ 04:37) >  IMPRESSION:  No evidence of acute intracranial hemorrhage, mass effect or large acute   territorial infarct, within limits of noncontrast CT technique.    < end of copied text >  < from: CT Angio Neck Stroke Protocol w/ IV Cont (10.26.23 @ 04:53) >  CT ANGIOGRAPHY NECK:  1.  The right internal carotid artery is occluded approximately 1.5 cm   distal to its origin, possibly due to underlying dissection.  2.  Endotracheal tube is low in position, with its tip approximate 1 cm   above the slade.  3.  Diffuse ill-defined groundglass opacity in both lungs, which may   represent pulmonary edema, infection and/or atelectasis.  4.  There is a discrete right upper lobe groundglass opacity measuring   1.9 x 1.4 cm; this may represent a focus of infection, inflammation,   edema, hemorrhage, fibrosis or malignancy.  A follow-up chest CT is   warranted in three months to assess for resolution.    CT ANGIOGRAPHY BRAIN: The right internal carotid arteries occluded.    There is no significant flow related opacification within the proximal   right middle cerebral artery.  There is mild reconstitution of flow in   some distal branch vessels of the inferior division of the right middle   cerebral artery.  The right anterior cerebral artery fills across the   anterior communicating artery.    < end of copied text >  < from: CT Brain Perfusion Maps Stroke (10.26.23 @ 04:54) >  CT PERFUSION: CT perfusion is consistent with large acute infarct in the   right middle cerebral artery vascular territory and small to moderate   amount of surrounding ischemic penumbra.  Hypoperfusion index is 0.3.    Core infarct (CBF <  30%:): 136 mL  Penumbra (Tmax >6s): 194 mL  Mismatched volume: 58 mL  Mismatched ratio: 1.4    < end of copied text >      < from: CT Head No Cont (10.22.19 @ 15:57) >  IMPRESSION:    1)  chronic ischemic changes in both hemispheres with volume loss. There   are no new infarct as compared to prior CT. This may be further assessed   with MR imaging.  2)  no hemorrhage or mass identified.    < end of copied text >  < from: CT Angio Head w/ IV Cont (10.22.19 @ 15:57) >  IMPRESSION    1. There is intimal thickening and calcified plaque in both carotid   bifurcations in the neck, but without significant stenosis. No evidence   of carotid vertebral occlusion or dissection.  2. Intracranially, there are atherosclerotic changes in the anterior and   posterior circulation, but without significant stenosis or occlusion.    < end of copied text >  < from: CT Brain Stroke Protocol (10.26.23 @ 04:37) >  IMPRESSION:  No evidence of acute intracranial hemorrhage, mass effect or large acute   territorial infarct, within limits of noncontrast CT technique.    < end of copied text >  < from: CT Angio Neck Stroke Protocol w/ IV Cont (10.26.23 @ 04:53) >  CT ANGIOGRAPHY NECK:  1.  The right internal carotid artery is occluded approximately 1.5 cm   distal to its origin, possibly due to underlying dissection.  2.  Endotracheal tube is low in position, with its tip approximate 1 cm   above the slade.  3.  Diffuse ill-defined groundglass opacity in both lungs, which may   represent pulmonary edema, infection and/or atelectasis.  4.  There is a discrete right upper lobe groundglass opacity measuring   1.9 x 1.4 cm; this may represent a focus of infection, inflammation,   edema, hemorrhage, fibrosis or malignancy.  A follow-up chest CT is   warranted in three months to assess for resolution.    CT ANGIOGRAPHY BRAIN: The right internal carotid arteries occluded.    There is no significant flow related opacification within the proximal   right middle cerebral artery.  There is mild reconstitution of flow in   some distal branch vessels of the inferior division of the right middle   cerebral artery.  The right anterior cerebral artery fills across the   anterior communicating artery.    < end of copied text >  < from: CT Brain Perfusion Maps Stroke (10.26.23 @ 04:54) >  CT PERFUSION: CT perfusion is consistent with large acute infarct in the   right middle cerebral artery vascular territory and small to moderate   amount of surrounding ischemic penumbra.  Hypoperfusion index is 0.3.    Core infarct (CBF <  30%:): 136 mL  Penumbra (Tmax >6s): 194 mL  Mismatched volume: 58 mL  Mismatched ratio: 1.4    < end of copied text >    EEG Classification / Summary:  Abnormal EEG study  1. Intermittent periodic discharges seen intermittently over the left hemisphere, sometimes with triphasic morphology  2. Focal continuous right hemispheric slowing  3. Moderate generalized background slowing    -----------------------------------------------------------------------------------------------------    Clinical Impression:  1. While the periodic pattern noted above may represent lateralized periodic discharges indicating epileptic potential in the left hemisphere, more likely these represent generalized periodic discharges with triphasic morphology, consistent with metabolic etiology, that are poorly expressed on the right due to the effects of the stroke.   2. Structural abnormality in right hemisphere  3. Moderate diffuse/multi-focal cerebral dysfunction, not specific as to etiology.    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 Significant   associated mass effect and associated herniation.      < end of copied text >  < from: CT Head No Cont (11.02.23 @ 14:30) >    Reidentified are 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 There is effacement of all of the   basilar cisterns. Effacement of the sulci in the bilateral cerebri,   compatible with increased brain edema.    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    Unsuccessful attempts were made to reach the ordering clinician via teams.      --- End of Report ---    < end of copied text >

## 2024-01-06 NOTE — PROGRESS NOTE ADULT - CRITICAL CARE ATTENDING COMMENT
Patient examined and case reviewed in detail on bedside rounds. Frequent bedside visits with therapy change today.  Prognosis poor

## 2024-01-06 NOTE — PROGRESS NOTE ADULT - SUBJECTIVE AND OBJECTIVE BOX
CHIEF COMPLAINT:    Interval Events:    REVIEW OF SYSTEMS:  CONSTITUTIONAL: No fever, chills, night sweats, or fatigue  EYES: No eye pain, visual disturbances, or discharge  ENMT:  No difficulty hearing, tinnitus, vertigo; No sinus or throat pain  NECK: No pain or stiffness  BREASTS: No pain, masses, or nipple discharge  RESPIRATORY: No cough, wheezing, or hemoptysis; No shortness of breath  CARDIOVASCULAR: No chest pain, palpitations, dizziness, or leg swelling  GASTROINTESTINAL: No abdominal or epigastric pain. No nausea, vomiting, or hematemesis; No diarrhea or constipation. No melena or hematochezia.  GENITOURINARY: No dysuria, frequency, hematuria, or incontinence  NEUROLOGICAL: No headaches, memory loss, loss of strength, numbness, or tremors  SKIN: No itching, burning, rashes, or lesions   LYMPH NODES: No enlarged glands  ENDOCRINE: No heat or cold intolerance; No hair loss  MUSCULOSKELETAL: No joint pain or swelling; No muscle, back, or extremity pain  PSYCHIATRIC: No depression, anxiety, mood swings, or difficulty sleeping  HEME/LYMPH: No easy bruising, or bleeding gums  ALLERY AND IMMUNOLOGIC: No hives or eczema    [ ] Unable to perform ROS due to ____      OBJECTIVE:  ICU Vital Signs Last 24 Hrs  T(C): 36.1 (06 Jan 2024 12:00), Max: 36.1 (06 Jan 2024 12:00)  T(F): 97 (06 Jan 2024 12:00), Max: 97 (06 Jan 2024 12:00)  HR: 60 (06 Jan 2024 12:00) (56 - 70)  BP: 117/56 (06 Jan 2024 12:00) (104/62 - 131/67)  BP(mean): 74 (06 Jan 2024 12:00) (72 - 84)  ABP: --  ABP(mean): --  RR: 12 (06 Jan 2024 12:00) (12 - 20)  SpO2: 100% (06 Jan 2024 08:38) (99% - 100%)    O2 Parameters below as of 06 Jan 2024 07:00  Patient On (Oxygen Delivery Method): ventilator, AC 12 400 21% PEEP 5          Mode: AC/ CMV (Assist Control/ Continuous Mandatory Ventilation), RR (machine): 12, TV (machine): 400, FiO2: 21, PEEP: 5, ITime: 1, MAP: 8, PIP: 17    01-05 @ 07:01 - 01-06 @ 07:00  --------------------------------------------------------  IN: 870 mL / OUT: 950 mL / NET: -80 mL    01-06 @ 07:01 - 01-06 @ 12:28  --------------------------------------------------------  IN: 50 mL / OUT: 245 mL / NET: -195 mL      CAPILLARY BLOOD GLUCOSE          PHYSICAL EXAM:  GENERAL: NAD, well-groomed, well-developed  HEAD:  Atraumatic, Normocephalic  EYES: EOMI, PERRLA, conjunctiva and sclera clear  ENMT: No tonsillar erythema, exudates, or enlargement; Moist mucous membranes, Good dentition, No lesions  NECK: Supple, No JVD, Normal thyroid  CHEST/LUNG: Clear to auscultation bilaterally; No rales, rhonchi, wheezing, or rubs  HEART: Regular rate and rhythm; No murmurs, rubs, or gallops  ABDOMEN: Soft, Nontender, Nondistended; Bowel sounds present  VASCULAR:  2+ Peripheral Pulses, No clubbing, cyanosis, or edema  LYMPH: No lymphadenopathy noted  SKIN: No rashes or lesions  NERVOUS SYSTEM:  Alert & Oriented X3, Good concentration; Motor Strength 5/5 B/L upper and lower extremities; DTRs 2+ intact and symmetric    LINES:    HOSPITAL MEDICATIONS:  aspirin  chewable 81 milliGRAM(s) Oral daily  enoxaparin Injectable 40 milliGRAM(s) SubCutaneous every 24 hours      amLODIPine   Tablet 10 milliGRAM(s) Oral daily  hydrALAZINE 100 milliGRAM(s) Oral every 8 hours    atorvastatin 80 milliGRAM(s) Oral at bedtime      acetaminophen     Tablet .. 650 milliGRAM(s) Oral every 6 hours PRN  scopolamine 1 mG/72 Hr(s) Patch 1 Patch Transdermal every 72 hours    bisacodyl Suppository 10 milliGRAM(s) Rectal daily PRN  polyethylene glycol 3350 17 Gram(s) Oral daily  senna 2 Tablet(s) Oral at bedtime          influenza  Vaccine (HIGH DOSE) 0.7 milliLiter(s) IntraMuscular once    chlorhexidine 0.12% Liquid 15 milliLiter(s) Oral Mucosa every 12 hours  chlorhexidine 2% Cloths 1 Application(s) Topical <User Schedule>        LABS:    Hgb Trend: 8.3<--        Creatinine Trend: 3.47<--, 0.64<--            MICROBIOLOGY:     RADIOLOGY:  [ ] Reviewed and interpreted by me    EKG: CHIEF COMPLAINT: CVA    Interval Events: No acute events     REVIEW OF SYSTEMS:      [ x] Unable to perform ROS due to lack of mental status       OBJECTIVE:  ICU Vital Signs Last 24 Hrs  T(C): 36.1 (06 Jan 2024 12:00), Max: 36.1 (06 Jan 2024 12:00)  T(F): 97 (06 Jan 2024 12:00), Max: 97 (06 Jan 2024 12:00)  HR: 60 (06 Jan 2024 12:00) (56 - 70)  BP: 117/56 (06 Jan 2024 12:00) (104/62 - 131/67)  BP(mean): 74 (06 Jan 2024 12:00) (72 - 84)  ABP: --  ABP(mean): --  RR: 12 (06 Jan 2024 12:00) (12 - 20)  SpO2: 100% (06 Jan 2024 08:38) (99% - 100%)    O2 Parameters below as of 06 Jan 2024 07:00  Patient On (Oxygen Delivery Method): ventilator, AC 12 400 21% PEEP 5          Mode: AC/ CMV (Assist Control/ Continuous Mandatory Ventilation), RR (machine): 12, TV (machine): 400, FiO2: 21, PEEP: 5, ITime: 1, MAP: 8, PIP: 17    01-05 @ 07:01  -  01-06 @ 07:00  --------------------------------------------------------  IN: 870 mL / OUT: 950 mL / NET: -80 mL    01-06 @ 07:01  -  01-06 @ 12:28  --------------------------------------------------------  IN: 50 mL / OUT: 245 mL / NET: -195 mL      CAPILLARY BLOOD GLUCOSE          PHYSICAL EXAM:  GENERAL: NAD, well-groomed, well-developed  EYES: EOMI, PERRLA, conjunctiva and sclera clear  ENMT: ET tube in placement   CHEST/LUNG: Clear to auscultation bilaterally; No rales, rhonchi, wheezing, or rubs  HEART: Regular rate and rhythm; No murmurs, rubs, or gallops  ABDOMEN: Soft, Nontender, Nondistended; Bowel sounds present  VASCULAR:  2+ Peripheral Pulses, No clubbing, cyanosis, or edema  LYMPH: No lymphadenopathy noted  SKIN: No rashes or lesions  NERVOUS SYSTEM: no mental status     LINES:    HOSPITAL MEDICATIONS:  aspirin  chewable 81 milliGRAM(s) Oral daily  enoxaparin Injectable 40 milliGRAM(s) SubCutaneous every 24 hours      amLODIPine   Tablet 10 milliGRAM(s) Oral daily  hydrALAZINE 100 milliGRAM(s) Oral every 8 hours    atorvastatin 80 milliGRAM(s) Oral at bedtime      acetaminophen     Tablet .. 650 milliGRAM(s) Oral every 6 hours PRN  scopolamine 1 mG/72 Hr(s) Patch 1 Patch Transdermal every 72 hours    bisacodyl Suppository 10 milliGRAM(s) Rectal daily PRN  polyethylene glycol 3350 17 Gram(s) Oral daily  senna 2 Tablet(s) Oral at bedtime          influenza  Vaccine (HIGH DOSE) 0.7 milliLiter(s) IntraMuscular once    chlorhexidine 0.12% Liquid 15 milliLiter(s) Oral Mucosa every 12 hours  chlorhexidine 2% Cloths 1 Application(s) Topical <User Schedule>        LABS:    Hgb Trend: 8.3<--        Creatinine Trend: 3.47<--, 0.64<--            MICROBIOLOGY:     RADIOLOGY:  [ ] Reviewed and interpreted by me    EKG:

## 2024-01-06 NOTE — PROGRESS NOTE ADULT - ASSESSMENT
87 y/o F w/hypertension, HLD and Afib on AC admitted for acute CVA, not a candidate for TNK or endovascular intervention. Intubated for respiratory failure in setting of stroke. CT chest with pulmonary nodule.    - ASA/Statin  - Continue mechanical ventilation, wean as tolerated (mental status is barrier to extubation)  - Monitor off abx  - DVT prophylaxis  - Repeat CT scan as outpatient for follow up of nodule if patient improves  - GOC discussions - family does not want trach, but not consenting to palliative wean. Ethics involved.

## 2024-01-07 NOTE — PROGRESS NOTE ADULT - SUBJECTIVE AND OBJECTIVE BOX
Neurology Progress Note    S: Patient seen and examined.     Medications: MEDICATIONS  (STANDING):  amLODIPine   Tablet 10 milliGRAM(s) Oral daily  aspirin  chewable 81 milliGRAM(s) Oral daily  atorvastatin 80 milliGRAM(s) Oral at bedtime  chlorhexidine 0.12% Liquid 15 milliLiter(s) Oral Mucosa every 12 hours  chlorhexidine 2% Cloths 1 Application(s) Topical <User Schedule>  enoxaparin Injectable 40 milliGRAM(s) SubCutaneous every 24 hours  hydrALAZINE 100 milliGRAM(s) Oral every 8 hours  influenza  Vaccine (HIGH DOSE) 0.7 milliLiter(s) IntraMuscular once  polyethylene glycol 3350 17 Gram(s) Oral daily  scopolamine 1 mG/72 Hr(s) Patch 1 Patch Transdermal every 72 hours  senna 2 Tablet(s) Oral at bedtime    MEDICATIONS  (PRN):  acetaminophen     Tablet .. 650 milliGRAM(s) Oral every 6 hours PRN Temp greater or equal to 38C (100.4F)  bisacodyl Suppository 10 milliGRAM(s) Rectal daily PRN Constipation       Vitals:  Vital Signs Last 24 Hrs  T(C): 36.3 (07 Jan 2024 20:00), Max: 36.5 (07 Jan 2024 16:00)  T(F): 97.3 (07 Jan 2024 20:00), Max: 97.7 (07 Jan 2024 16:00)  HR: 71 (07 Jan 2024 20:00) (54 - 72)  BP: 126/60 (07 Jan 2024 20:00) (109/56 - 126/60)  BP(mean): 81 (07 Jan 2024 20:00) (72 - 81)  RR: 14 (07 Jan 2024 20:00) (13 - 19)  SpO2: 100% (07 Jan 2024 16:30) (99% - 100%)      LABS:    No new labs      Neurological Exam:  Mental Status: Intubated, not sedated. some spont eye opening. No verbal output, does not follow commands.   Cranial Nerves: pupils very sluggish R, L near fixed, no  corneals, no VOR, intermittent eyelid fluttering no facial asymmetry , weak cough/gag  Motor: dec tone throughout, no spont movemnt noted   Sensation: UE extensor posturing, LE triple flexion left toe up    I personally reviewed the below data/images/labs:      LABS:                    < from: CT Head No Cont (10.22.19 @ 15:57) >  IMPRESSION:    1)  chronic ischemic changes in both hemispheres with volume loss. There   are no new infarct as compared to prior CT. This may be further assessed   with MR imaging.  2)  no hemorrhage or mass identified.      LABS:                      < end of copied text >  < from: CT Angio Head w/ IV Cont (10.22.19 @ 15:57) >  IMPRESSION    1. There is intimal thickening and calcified plaque in both carotid   bifurcations in the neck, but without significant stenosis. No evidence   of carotid vertebral occlusion or dissection.  2. Intracranially, there are atherosclerotic changes in the anterior and   posterior circulation, but without significant stenosis or occlusion.    < end of copied text >  < from: CT Brain Stroke Protocol (10.26.23 @ 04:37) >  IMPRESSION:  No evidence of acute intracranial hemorrhage, mass effect or large acute   territorial infarct, within limits of noncontrast CT technique.    < end of copied text >  < from: CT Angio Neck Stroke Protocol w/ IV Cont (10.26.23 @ 04:53) >  CT ANGIOGRAPHY NECK:  1.  The right internal carotid artery is occluded approximately 1.5 cm   distal to its origin, possibly due to underlying dissection.  2.  Endotracheal tube is low in position, with its tip approximate 1 cm   above the slade.  3.  Diffuse ill-defined groundglass opacity in both lungs, which may   represent pulmonary edema, infection and/or atelectasis.  4.  There is a discrete right upper lobe groundglass opacity measuring   1.9 x 1.4 cm; this may represent a focus of infection, inflammation,   edema, hemorrhage, fibrosis or malignancy.  A follow-up chest CT is   warranted in three months to assess for resolution.    CT ANGIOGRAPHY BRAIN: The right internal carotid arteries occluded.    There is no significant flow related opacification within the proximal   right middle cerebral artery.  There is mild reconstitution of flow in   some distal branch vessels of the inferior division of the right middle   cerebral artery.  The right anterior cerebral artery fills across the   anterior communicating artery.    < end of copied text >  < from: CT Brain Perfusion Maps Stroke (10.26.23 @ 04:54) >  CT PERFUSION: CT perfusion is consistent with large acute infarct in the   right middle cerebral artery vascular territory and small to moderate   amount of surrounding ischemic penumbra.  Hypoperfusion index is 0.3.    Core infarct (CBF <  30%:): 136 mL  Penumbra (Tmax >6s): 194 mL  Mismatched volume: 58 mL  Mismatched ratio: 1.4    < end of copied text >      < from: CT Head No Cont (10.22.19 @ 15:57) >  IMPRESSION:    1)  chronic ischemic changes in both hemispheres with volume loss. There   are no new infarct as compared to prior CT. This may be further assessed   with MR imaging.  2)  no hemorrhage or mass identified.    < end of copied text >  < from: CT Angio Head w/ IV Cont (10.22.19 @ 15:57) >  IMPRESSION    1. There is intimal thickening and calcified plaque in both carotid   bifurcations in the neck, but without significant stenosis. No evidence   of carotid vertebral occlusion or dissection.  2. Intracranially, there are atherosclerotic changes in the anterior and   posterior circulation, but without significant stenosis or occlusion.    < end of copied text >  < from: CT Brain Stroke Protocol (10.26.23 @ 04:37) >  IMPRESSION:  No evidence of acute intracranial hemorrhage, mass effect or large acute   territorial infarct, within limits of noncontrast CT technique.    < end of copied text >  < from: CT Angio Neck Stroke Protocol w/ IV Cont (10.26.23 @ 04:53) >  CT ANGIOGRAPHY NECK:  1.  The right internal carotid artery is occluded approximately 1.5 cm   distal to its origin, possibly due to underlying dissection.  2.  Endotracheal tube is low in position, with its tip approximate 1 cm   above the slade.  3.  Diffuse ill-defined groundglass opacity in both lungs, which may   represent pulmonary edema, infection and/or atelectasis.  4.  There is a discrete right upper lobe groundglass opacity measuring   1.9 x 1.4 cm; this may represent a focus of infection, inflammation,   edema, hemorrhage, fibrosis or malignancy.  A follow-up chest CT is   warranted in three months to assess for resolution.    CT ANGIOGRAPHY BRAIN: The right internal carotid arteries occluded.    There is no significant flow related opacification within the proximal   right middle cerebral artery.  There is mild reconstitution of flow in   some distal branch vessels of the inferior division of the right middle   cerebral artery.  The right anterior cerebral artery fills across the   anterior communicating artery.    < end of copied text >  < from: CT Brain Perfusion Maps Stroke (10.26.23 @ 04:54) >  CT PERFUSION: CT perfusion is consistent with large acute infarct in the   right middle cerebral artery vascular territory and small to moderate   amount of surrounding ischemic penumbra.  Hypoperfusion index is 0.3.    Core infarct (CBF <  30%:): 136 mL  Penumbra (Tmax >6s): 194 mL  Mismatched volume: 58 mL  Mismatched ratio: 1.4    < end of copied text >      < from: CT Head No Cont (10.22.19 @ 15:57) >  IMPRESSION:    1)  chronic ischemic changes in both hemispheres with volume loss. There   are no new infarct as compared to prior CT. This may be further assessed   with MR imaging.  2)  no hemorrhage or mass identified.    < end of copied text >  < from: CT Angio Head w/ IV Cont (10.22.19 @ 15:57) >  IMPRESSION    1. There is intimal thickening and calcified plaque in both carotid   bifurcations in the neck, but without significant stenosis. No evidence   of carotid vertebral occlusion or dissection.  2. Intracranially, there are atherosclerotic changes in the anterior and   posterior circulation, but without significant stenosis or occlusion.    < end of copied text >  < from: CT Brain Stroke Protocol (10.26.23 @ 04:37) >  IMPRESSION:  No evidence of acute intracranial hemorrhage, mass effect or large acute   territorial infarct, within limits of noncontrast CT technique.    < end of copied text >  < from: CT Angio Neck Stroke Protocol w/ IV Cont (10.26.23 @ 04:53) >  CT ANGIOGRAPHY NECK:  1.  The right internal carotid artery is occluded approximately 1.5 cm   distal to its origin, possibly due to underlying dissection.  2.  Endotracheal tube is low in position, with its tip approximate 1 cm   above the slade.  3.  Diffuse ill-defined groundglass opacity in both lungs, which may   represent pulmonary edema, infection and/or atelectasis.  4.  There is a discrete right upper lobe groundglass opacity measuring   1.9 x 1.4 cm; this may represent a focus of infection, inflammation,   edema, hemorrhage, fibrosis or malignancy.  A follow-up chest CT is   warranted in three months to assess for resolution.    CT ANGIOGRAPHY BRAIN: The right internal carotid arteries occluded.    There is no significant flow related opacification within the proximal   right middle cerebral artery.  There is mild reconstitution of flow in   some distal branch vessels of the inferior division of the right middle   cerebral artery.  The right anterior cerebral artery fills across the   anterior communicating artery.    < end of copied text >  < from: CT Brain Perfusion Maps Stroke (10.26.23 @ 04:54) >  CT PERFUSION: CT perfusion is consistent with large acute infarct in the   right middle cerebral artery vascular territory and small to moderate   amount of surrounding ischemic penumbra.  Hypoperfusion index is 0.3.    Core infarct (CBF <  30%:): 136 mL  Penumbra (Tmax >6s): 194 mL  Mismatched volume: 58 mL  Mismatched ratio: 1.4    < end of copied text >    EEG Classification / Summary:  Abnormal EEG study  1. Intermittent periodic discharges seen intermittently over the left hemisphere, sometimes with triphasic morphology  2. Focal continuous right hemispheric slowing  3. Moderate generalized background slowing    -----------------------------------------------------------------------------------------------------    Clinical Impression:  1. While the periodic pattern noted above may represent lateralized periodic discharges indicating epileptic potential in the left hemisphere, more likely these represent generalized periodic discharges with triphasic morphology, consistent with metabolic etiology, that are poorly expressed on the right due to the effects of the stroke.   2. Structural abnormality in right hemisphere  3. Moderate diffuse/multi-focal cerebral dysfunction, not specific as to etiology.    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 Significant   associated mass effect and associated herniation.      < end of copied text >  < from: CT Head No Cont (11.02.23 @ 14:30) >    Reidentified are 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 There is effacement of all of the   basilar cisterns. Effacement of the sulci in the bilateral cerebri,   compatible with increased brain edema.    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    Unsuccessful attempts were made to reach the ordering clinician via teams.      --- End of Report ---    < end of copied text >

## 2024-01-07 NOTE — PROGRESS NOTE ADULT - ASSESSMENT
87 y/o F w/hypertension, HLD and Afib on AC admitted for acute CVA, not a candidate for TNK or endovascular intervention. Intubated for respiratory failure in setting of stroke. CT chest with pulmonary nodule.    - ASA/Statin  - Continue mechanical ventilation, wean as tolerated (mental status is barrier to extubation)  - Monitor off abx  - DVT prophylaxis  - GOC discussions - family does not want trach, but not consenting to palliative wean. Ethics involved.

## 2024-01-07 NOTE — PROGRESS NOTE ADULT - SUBJECTIVE AND OBJECTIVE BOX
CHIEF COMPLAINT: CVA    Interval Events: No acute events    REVIEW OF SYSTEMS:   Unable to perform ROS due to AMS      OBJECTIVE:  ICU Vital Signs Last 24 Hrs  T(C): 36 (07 Jan 2024 12:00), Max: 36 (07 Jan 2024 04:00)  T(F): 96.8 (07 Jan 2024 12:00), Max: 96.8 (07 Jan 2024 04:00)  HR: 61 (07 Jan 2024 12:34) (54 - 72)  BP: 113/57 (07 Jan 2024 12:00) (106/63 - 123/61)  BP(mean): 73 (07 Jan 2024 12:00) (72 - 82)  ABP: --  ABP(mean): --  RR: 18 (07 Jan 2024 12:00) (13 - 25)  SpO2: 100% (07 Jan 2024 12:34) (99% - 100%)      Mode: AC/ CMV (Assist Control/ Continuous Mandatory Ventilation), RR (machine): 12, TV (machine): 400, FiO2: 21, PEEP: 5, ITime: 0.8, MAP: 8, PIP: 18    01-06 @ 07:01 - 01-07 @ 07:00  --------------------------------------------------------  IN: 860 mL / OUT: 1345 mL / NET: -485 mL    01-07 @ 07:01 - 01-07 @ 14:24  --------------------------------------------------------  IN: 190 mL / OUT: 280 mL / NET: -90 mL      CAPILLARY BLOOD GLUCOSE          PHYSICAL EXAM:  GENERAL: NAD, well-groomed, well-developed  EYES: EOMI, PERRLA, conjunctiva and sclera clear  ENMT: Et tube in place   CHEST/LUNG: Clear to auscultation bilaterally; No rales, rhonchi, wheezing, or rubs  HEART: Regular rate and rhythm; No murmurs, rubs, or gallops  ABDOMEN: Soft, Nontender, Nondistended; Bowel sounds present  VASCULAR:  2+ Peripheral Pulses, No clubbing, cyanosis, or edema  LYMPH: No lymphadenopathy noted  SKIN: No rashes or lesions  NERVOUS SYSTEM:  Alert & Oriented X0    LINES:    HOSPITAL MEDICATIONS:  aspirin  chewable 81 milliGRAM(s) Oral daily  enoxaparin Injectable 40 milliGRAM(s) SubCutaneous every 24 hours      amLODIPine   Tablet 10 milliGRAM(s) Oral daily  hydrALAZINE 100 milliGRAM(s) Oral every 8 hours    atorvastatin 80 milliGRAM(s) Oral at bedtime      acetaminophen     Tablet .. 650 milliGRAM(s) Oral every 6 hours PRN  scopolamine 1 mG/72 Hr(s) Patch 1 Patch Transdermal every 72 hours    bisacodyl Suppository 10 milliGRAM(s) Rectal daily PRN  polyethylene glycol 3350 17 Gram(s) Oral daily  senna 2 Tablet(s) Oral at bedtime          influenza  Vaccine (HIGH DOSE) 0.7 milliLiter(s) IntraMuscular once    chlorhexidine 0.12% Liquid 15 milliLiter(s) Oral Mucosa every 12 hours  chlorhexidine 2% Cloths 1 Application(s) Topical <User Schedule>        LABS:    Hgb Trend: 8.3<--        Creatinine Trend: 3.47<--, 0.64<--            MICROBIOLOGY:     RADIOLOGY:  [ ] Reviewed and interpreted by me    EKG:

## 2024-01-08 NOTE — CONSULT NOTE ADULT - CONSULT REQUESTED DATE/TIME
14-Nov-2023 10:00
30-Oct-2023 10:01
08-Jan-2024 10:00
14-Nov-2023 10:09
26-Oct-2023 07:15
14-Nov-2023 10:00
28-Oct-2023 23:53

## 2024-01-08 NOTE — CONSULT NOTE ADULT - SUBJECTIVE AND OBJECTIVE BOX
88 year old female with altered mental status, non verbal, and right gaze deviation. Past medical history of hypertension, hyperlipidemia, atrial fibrillation on anticoagulation.      11/2/2023 CT scan of the head revealed-    "Reidentified are large subacute MCA and JIMMY infarcts throughout the right frontal parietal and temporal lobes and right basalganglia, with severe right to left midline shift and severe mass effect upon the ventricular system, not significantly changed. There is effacement of all of the basilar cisterns. Effacement of the sulci in the bilateral cerebri, compatible with increased brain edema. There is hypoattenuation within the right occipital lobe, markedly worsened as compared to the prior exam, compatible with evolution of an acute right PCA infarct."    Goals of care conversations have resulted in DNR, but family has indicated that they are uncomfortable with withdrawing life sustaining measures, and know that the patient would not want to exist with a tracheostomy and PEG.  Some of the language the adult children are using is "we don't want to be considered murderers. We are leaving this in God's hands. Let he stay on the ventilator until God takes her."      Follow up to previous Ethics consults as family came to ICU for decision making.     Prognosis Estimate (survival in hrs, days, wks, mos, yrs):  days to weeks  Patient Decision-Making Capacity NO -- devastating CVA  Patient Aware of: Diagnosis: NO  Name of medical decision-maker should patient lack capacity (relationship):  Nayla Thomas [phone]  Nickie [phone], Otoniel Banks  Role: Health Care Agent Legal Surrogate Contact #(s)   Other Decision-Maker (i.e., HCA or Surrogate) Aware of: Diagnosis: Yes	 Prognosis:yes   Other Stake-Holders-    Evidence of Patient’s Preference of Life-Sustaining Treatment (Written or Oral):    Resuscitation status: DNR: YES  DNI: currently intubated- decision to be determined this follow up consult.     DISCUSSIONS 1/8/2024 [id number] met with patient’s adult children Nickie Thomas, Nayla Thomas and Otoniel Banks at patient’s bedside. In attendance were ASYA Ernst, Medical Ethics, Chaplain Fitzpatrick D. Park, RN They were updated by the team and all questions answered regarding patient’s status. Ethics sat with the three and they gave a life story that was filled with meaning, and emotions. They have suffered 4 losses of close family since Halloween and are struggling with their mother’s current condition. All three remarked that their mother would not want to live the rest of her days in a nursing home, and all three agree that would not be an option. Discussed comfort care including artificial nutrition and hydration, with Otoniel stating “she would rather eat by mouth.” Redirected Otoniel to the patient’s unconscious state, and that the patient would have to participate in order to eat by mouth. He verbalized understanding. Nayla talked about his mother’s ability to cook, and how much she taught him over the years. He became teary eyed and emotional support provided. Over the course of this conversation all three adult children opted for palliative extubation with DNI, and comfort care. Otoniel asked if they had to sign it, and Ethics assured them that verbal was fine. He expressed gratitude and they have feelings of guilt.  MOLST updated, and support provided to the family.    88 year old female with altered mental status, non verbal, and right gaze deviation. Past medical history of hypertension, hyperlipidemia, atrial fibrillation on anticoagulation.      11/2/2023 CT scan of the head revealed-    "Reidentified are large subacute MCA and JIMMY infarcts throughout the right frontal parietal and temporal lobes and right basalganglia, with severe right to left midline shift and severe mass effect upon the ventricular system, not significantly changed. There is effacement of all of the basilar cisterns. Effacement of the sulci in the bilateral cerebri, compatible with increased brain edema. There is hypoattenuation within the right occipital lobe, markedly worsened as compared to the prior exam, compatible with evolution of an acute right PCA infarct."    Goals of care conversations have resulted in DNR, but family has indicated that they are uncomfortable with withdrawing life sustaining measures, and know that the patient would not want to exist with a tracheostomy and PEG.  Some of the language the adult children are using is "we don't want to be considered murderers. We are leaving this in God's hands. Let he stay on the ventilator until God takes her."      Follow up to previous Ethics consults as family came to ICU for decision making.     Prognosis Estimate (survival in hrs, days, wks, mos, yrs):  days to weeks  Patient Decision-Making Capacity NO -- devastating CVA  Patient Aware of: Diagnosis: NO  Name of medical decision-maker should patient lack capacity (relationship):  Nayla Thomas [phone]  Nickie [phone], Otoniel Banks  Role: Health Care Agent Legal Surrogate Contact #(s)   Other Decision-Maker (i.e., HCA or Surrogate) Aware of: Diagnosis: Yes	 Prognosis:yes   Other Stake-Holders-    Evidence of Patient’s Preference of Life-Sustaining Treatment (Written or Oral):    Resuscitation status: DNR: YES  DNI: currently intubated- decision to be determined this follow up consult.     DISCUSSIONS 1/8/2024 [id number] met with patient’s adult children Nickie Thomas, Nayla Thomas and Otoniel Banks at patient’s bedside. In attendance were ASYA Ernst, Medical Ethics, Chaplain Fitzpatrick D. Park, RN They were updated by the team and all questions answered regarding patient’s status. Ethics sat with the three and they gave a life story that was filled with meaning, and emotions. They have suffered 4 losses of close family since Halloween and are struggling with their mother’s current condition. All three remarked that their mother would not want to live the rest of her days in a nursing home, and all three agree that would not be an option. Discussed comfort care including artificial nutrition and hydration, with Otoniel stating “she would rather eat by mouth.” Redirected Otoniel to the patient’s unconscious state, and that the patient would have to participate in order to eat by mouth. He verbalized understanding. Nayla talked about his mother’s ability to cook, and how much she taught him over the years. He became teary eyed and emotional support provided. Over the course of this conversation all three adult children opted for palliative extubation with DNI, and comfort care. Otoniel asked if they had to sign it, and Ethics assured them that verbal was fine. He expressed gratitude and they have feelings of guilt.  MOLST updated, and support provided to the family.

## 2024-01-08 NOTE — AIRWAY REMOVAL NOTE  ADULT & PEDS - ARTIFICAL AIRWAY REMOVAL COMMENTS
Pt was extubated as a physician ordered to a 2LPM/NC, Pt  tolerated well, HR 82bpm, %. Kamilla Leyva RN was present at extubation time, Pt vitals in continuous monitor.

## 2024-01-08 NOTE — PROGRESS NOTE ADULT - ASSESSMENT
87 y/o F w/hypertension, HLD and Afib on AC admitted for acute CVA, not a candidate for TNK or endovascular intervention. Intubated for respiratory failure in setting of stroke. CT chest with pulmonary nodule.    Appreciate ethics team continued efforts to advocate for the patient.  Currently family has agreed to honor her wishes and she is now DNR/I with palliative extubation today    - ASA/Statin  - Continue mechanical ventilation, wean as tolerated (mental status is barrier to extubation)  - Monitor off abx  - DVT prophylaxis

## 2024-01-08 NOTE — PROGRESS NOTE ADULT - ASSESSMENT
88 year old female with a PMH of HTN, HLD and afib on xarelto who did not take medications for past 1 week prior to hospitalization here with R MCA infarct.   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

## 2024-01-08 NOTE — CONSULT NOTE ADULT - CONSULT REASON
FOLLOW UP TO INITIAL AND FIRST FOLLOW UP OF 11/14 AND 11/17 2023
Follow up to initial consultation of 11/14
AMS
Stroke
goals of care discussion in setting of acute ischemic stroke
88 year old female intubated after devastating stroke whose family is struggling with trach and peg vs withdrawal of life sustaining treatment
FOLLOW UP TO INITIAL CONSULT FOR A 88 year old female intubated after devastating stroke whose family is struggling with trach and peg vs withdrawal of life sustaining treatment

## 2024-01-08 NOTE — PROGRESS NOTE ADULT - SUBJECTIVE AND OBJECTIVE BOX
Neurology Progress Note    S: Patient seen and examined.     MEDICATIONS:    acetaminophen     Tablet .. 650 milliGRAM(s) Oral every 6 hours PRN  amLODIPine   Tablet 10 milliGRAM(s) Oral daily  aspirin  chewable 81 milliGRAM(s) Oral daily  atorvastatin 80 milliGRAM(s) Oral at bedtime  bisacodyl Suppository 10 milliGRAM(s) Rectal daily PRN  chlorhexidine 0.12% Liquid 15 milliLiter(s) Oral Mucosa every 12 hours  chlorhexidine 2% Cloths 1 Application(s) Topical <User Schedule>  enoxaparin Injectable 40 milliGRAM(s) SubCutaneous every 24 hours  hydrALAZINE 100 milliGRAM(s) Oral every 8 hours  influenza  Vaccine (HIGH DOSE) 0.7 milliLiter(s) IntraMuscular once  polyethylene glycol 3350 17 Gram(s) Oral daily  scopolamine 1 mG/72 Hr(s) Patch 1 Patch Transdermal every 72 hours  senna 2 Tablet(s) Oral at bedtime      LABS:            CAPILLARY BLOOD GLUCOSE            I&O's Summary    07 Jan 2024 07:01  -  08 Jan 2024 07:00  --------------------------------------------------------  IN: 540 mL / OUT: 1020 mL / NET: -480 mL    08 Jan 2024 07:01  -  08 Jan 2024 10:59  --------------------------------------------------------  IN: 20 mL / OUT: 60 mL / NET: -40 mL      Vital Signs Last 24 Hrs  T(C): 36.3 (08 Jan 2024 05:33), Max: 36.6 (08 Jan 2024 00:00)  T(F): 97.3 (08 Jan 2024 05:33), Max: 97.9 (08 Jan 2024 00:00)  HR: 53 (08 Jan 2024 09:10) (50 - 71)  BP: 130/54 (08 Jan 2024 04:00) (113/57 - 130/54)  BP(mean): 77 (08 Jan 2024 04:00) (73 - 81)  RR: 15 (08 Jan 2024 07:30) (14 - 19)  SpO2: 100% (08 Jan 2024 09:10) (96% - 100%)          No new labs      Neurological Exam:  Mental Status: Intubated, not sedated. some spont eye opening. No verbal output, does not follow commands.   Cranial Nerves: pupils very sluggish R, L near fixed, no  corneals, no VOR, intermittent eyelid fluttering no facial asymmetry , weak cough/gag  Motor: dec tone throughout, no spont movemnt noted   Sensation: UE extensor posturing, LE triple flexion left toe up    I personally reviewed the below data/images/labs:      LABS:                    < from: CT Head No Cont (10.22.19 @ 15:57) >  IMPRESSION:    1)  chronic ischemic changes in both hemispheres with volume loss. There   are no new infarct as compared to prior CT. This may be further assessed   with MR imaging.  2)  no hemorrhage or mass identified.      LABS:                      < end of copied text >  < from: CT Angio Head w/ IV Cont (10.22.19 @ 15:57) >  IMPRESSION    1. There is intimal thickening and calcified plaque in both carotid   bifurcations in the neck, but without significant stenosis. No evidence   of carotid vertebral occlusion or dissection.  2. Intracranially, there are atherosclerotic changes in the anterior and   posterior circulation, but without significant stenosis or occlusion.    < end of copied text >  < from: CT Brain Stroke Protocol (10.26.23 @ 04:37) >  IMPRESSION:  No evidence of acute intracranial hemorrhage, mass effect or large acute   territorial infarct, within limits of noncontrast CT technique.    < end of copied text >  < from: CT Angio Neck Stroke Protocol w/ IV Cont (10.26.23 @ 04:53) >  CT ANGIOGRAPHY NECK:  1.  The right internal carotid artery is occluded approximately 1.5 cm   distal to its origin, possibly due to underlying dissection.  2.  Endotracheal tube is low in position, with its tip approximate 1 cm   above the slade.  3.  Diffuse ill-defined groundglass opacity in both lungs, which may   represent pulmonary edema, infection and/or atelectasis.  4.  There is a discrete right upper lobe groundglass opacity measuring   1.9 x 1.4 cm; this may represent a focus of infection, inflammation,   edema, hemorrhage, fibrosis or malignancy.  A follow-up chest CT is   warranted in three months to assess for resolution.    CT ANGIOGRAPHY BRAIN: The right internal carotid arteries occluded.    There is no significant flow related opacification within the proximal   right middle cerebral artery.  There is mild reconstitution of flow in   some distal branch vessels of the inferior division of the right middle   cerebral artery.  The right anterior cerebral artery fills across the   anterior communicating artery.    < end of copied text >  < from: CT Brain Perfusion Maps Stroke (10.26.23 @ 04:54) >  CT PERFUSION: CT perfusion is consistent with large acute infarct in the   right middle cerebral artery vascular territory and small to moderate   amount of surrounding ischemic penumbra.  Hypoperfusion index is 0.3.    Core infarct (CBF <  30%:): 136 mL  Penumbra (Tmax >6s): 194 mL  Mismatched volume: 58 mL  Mismatched ratio: 1.4    < end of copied text >      < from: CT Head No Cont (10.22.19 @ 15:57) >  IMPRESSION:    1)  chronic ischemic changes in both hemispheres with volume loss. There   are no new infarct as compared to prior CT. This may be further assessed   with MR imaging.  2)  no hemorrhage or mass identified.    < end of copied text >  < from: CT Angio Head w/ IV Cont (10.22.19 @ 15:57) >  IMPRESSION    1. There is intimal thickening and calcified plaque in both carotid   bifurcations in the neck, but without significant stenosis. No evidence   of carotid vertebral occlusion or dissection.  2. Intracranially, there are atherosclerotic changes in the anterior and   posterior circulation, but without significant stenosis or occlusion.    < end of copied text >  < from: CT Brain Stroke Protocol (10.26.23 @ 04:37) >  IMPRESSION:  No evidence of acute intracranial hemorrhage, mass effect or large acute   territorial infarct, within limits of noncontrast CT technique.    < end of copied text >  < from: CT Angio Neck Stroke Protocol w/ IV Cont (10.26.23 @ 04:53) >  CT ANGIOGRAPHY NECK:  1.  The right internal carotid artery is occluded approximately 1.5 cm   distal to its origin, possibly due to underlying dissection.  2.  Endotracheal tube is low in position, with its tip approximate 1 cm   above the slade.  3.  Diffuse ill-defined groundglass opacity in both lungs, which may   represent pulmonary edema, infection and/or atelectasis.  4.  There is a discrete right upper lobe groundglass opacity measuring   1.9 x 1.4 cm; this may represent a focus of infection, inflammation,   edema, hemorrhage, fibrosis or malignancy.  A follow-up chest CT is   warranted in three months to assess for resolution.    CT ANGIOGRAPHY BRAIN: The right internal carotid arteries occluded.    There is no significant flow related opacification within the proximal   right middle cerebral artery.  There is mild reconstitution of flow in   some distal branch vessels of the inferior division of the right middle   cerebral artery.  The right anterior cerebral artery fills across the   anterior communicating artery.    < end of copied text >  < from: CT Brain Perfusion Maps Stroke (10.26.23 @ 04:54) >  CT PERFUSION: CT perfusion is consistent with large acute infarct in the   right middle cerebral artery vascular territory and small to moderate   amount of surrounding ischemic penumbra.  Hypoperfusion index is 0.3.    Core infarct (CBF <  30%:): 136 mL  Penumbra (Tmax >6s): 194 mL  Mismatched volume: 58 mL  Mismatched ratio: 1.4    < end of copied text >      < from: CT Head No Cont (10.22.19 @ 15:57) >  IMPRESSION:    1)  chronic ischemic changes in both hemispheres with volume loss. There   are no new infarct as compared to prior CT. This may be further assessed   with MR imaging.  2)  no hemorrhage or mass identified.    < end of copied text >  < from: CT Angio Head w/ IV Cont (10.22.19 @ 15:57) >  IMPRESSION    1. There is intimal thickening and calcified plaque in both carotid   bifurcations in the neck, but without significant stenosis. No evidence   of carotid vertebral occlusion or dissection.  2. Intracranially, there are atherosclerotic changes in the anterior and   posterior circulation, but without significant stenosis or occlusion.    < end of copied text >  < from: CT Brain Stroke Protocol (10.26.23 @ 04:37) >  IMPRESSION:  No evidence of acute intracranial hemorrhage, mass effect or large acute   territorial infarct, within limits of noncontrast CT technique.    < end of copied text >  < from: CT Angio Neck Stroke Protocol w/ IV Cont (10.26.23 @ 04:53) >  CT ANGIOGRAPHY NECK:  1.  The right internal carotid artery is occluded approximately 1.5 cm   distal to its origin, possibly due to underlying dissection.  2.  Endotracheal tube is low in position, with its tip approximate 1 cm   above the slade.  3.  Diffuse ill-defined groundglass opacity in both lungs, which may   represent pulmonary edema, infection and/or atelectasis.  4.  There is a discrete right upper lobe groundglass opacity measuring   1.9 x 1.4 cm; this may represent a focus of infection, inflammation,   edema, hemorrhage, fibrosis or malignancy.  A follow-up chest CT is   warranted in three months to assess for resolution.    CT ANGIOGRAPHY BRAIN: The right internal carotid arteries occluded.    There is no significant flow related opacification within the proximal   right middle cerebral artery.  There is mild reconstitution of flow in   some distal branch vessels of the inferior division of the right middle   cerebral artery.  The right anterior cerebral artery fills across the   anterior communicating artery.    < end of copied text >  < from: CT Brain Perfusion Maps Stroke (10.26.23 @ 04:54) >  CT PERFUSION: CT perfusion is consistent with large acute infarct in the   right middle cerebral artery vascular territory and small to moderate   amount of surrounding ischemic penumbra.  Hypoperfusion index is 0.3.    Core infarct (CBF <  30%:): 136 mL  Penumbra (Tmax >6s): 194 mL  Mismatched volume: 58 mL  Mismatched ratio: 1.4    < end of copied text >    EEG Classification / Summary:  Abnormal EEG study  1. Intermittent periodic discharges seen intermittently over the left hemisphere, sometimes with triphasic morphology  2. Focal continuous right hemispheric slowing  3. Moderate generalized background slowing    -----------------------------------------------------------------------------------------------------    Clinical Impression:  1. While the periodic pattern noted above may represent lateralized periodic discharges indicating epileptic potential in the left hemisphere, more likely these represent generalized periodic discharges with triphasic morphology, consistent with metabolic etiology, that are poorly expressed on the right due to the effects of the stroke.   2. Structural abnormality in right hemisphere  3. Moderate diffuse/multi-focal cerebral dysfunction, not specific as to etiology.    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 Significant   associated mass effect and associated herniation.      < end of copied text >  < from: CT Head No Cont (11.02.23 @ 14:30) >    Reidentified are 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 There is effacement of all of the   basilar cisterns. Effacement of the sulci in the bilateral cerebri,   compatible with increased brain edema.    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    Unsuccessful attempts were made to reach the ordering clinician via teams.      --- End of Report ---    < end of copied text >

## 2024-01-08 NOTE — PROGRESS NOTE ADULT - SUBJECTIVE AND OBJECTIVE BOX
CHIEF COMPLAINT:Patient is a 88y old  Female who presents with a chief complaint of Ischemic Stroke, Poor MS requiring Intubation (08 Jan 2024 12:17)        Interval Events:    REVIEW OF SYSTEMS:  [x] All other systems negative  [ ] Unable to assess ROS because ________    OBJECTIVE:  ICU Vital Signs Last 24 Hrs  T(C): 36.6 (08 Jan 2024 15:29), Max: 36.6 (08 Jan 2024 00:00)  T(F): 97.8 (08 Jan 2024 15:29), Max: 97.9 (08 Jan 2024 00:00)  HR: 77 (08 Jan 2024 16:00) (50 - 82)  BP: 131/91 (08 Jan 2024 16:00) (116/55 - 131/91)  BP(mean): 105 (08 Jan 2024 16:00) (74 - 105)  ABP: --  ABP(mean): --  RR: 24 (08 Jan 2024 16:00) (14 - 30)  SpO2: 100% (08 Jan 2024 16:00) (96% - 100%)    O2 Parameters below as of 08 Jan 2024 11:59    O2 Flow (L/min): 2  O2 Concentration (%): 28      Mode: AC/ CMV (Assist Control/ Continuous Mandatory Ventilation), RR (machine): 12, TV (machine): 400, FiO2: 21, PEEP: 5, ITime: 1, MAP: 7, PIP: 17    01-07 @ 07:01 - 01-08 @ 07:00  --------------------------------------------------------  IN: 540 mL / OUT: 1020 mL / NET: -480 mL    01-08 @ 07:01  -  01-08 @ 17:50  --------------------------------------------------------  IN: 20 mL / OUT: 60 mL / NET: -40 mL      CAPILLARY BLOOD GLUCOSE          PHYSICAL EXAM:  GENERAL: NAD, well-groomed, well-developed  EYES: EOMI, PERRLA, conjunctiva and sclera clear  ENMT: Et tube in place   CHEST/LUNG: Clear to auscultation bilaterally; No rales, rhonchi, wheezing, or rubs  HEART: Regular rate and rhythm; No murmurs, rubs, or gallops  ABDOMEN: Soft, Nontender, Nondistended; Bowel sounds present  VASCULAR:  2+ Peripheral Pulses, No clubbing, cyanosis, or edema  LYMPH: No lymphadenopathy noted  SKIN: No rashes or lesions  NERVOUS SYSTEM:  Alert & Oriented X0    LINES:    HOSPITAL MEDICATIONS:  Standing Meds:  amLODIPine   Tablet 10 milliGRAM(s) Oral daily  aspirin  chewable 81 milliGRAM(s) Oral daily  atorvastatin 80 milliGRAM(s) Oral at bedtime  chlorhexidine 2% Cloths 1 Application(s) Topical <User Schedule>  enoxaparin Injectable 40 milliGRAM(s) SubCutaneous every 24 hours  hydrALAZINE 100 milliGRAM(s) Oral every 8 hours  influenza  Vaccine (HIGH DOSE) 0.7 milliLiter(s) IntraMuscular once  polyethylene glycol 3350 17 Gram(s) Oral daily  scopolamine 1 mG/72 Hr(s) Patch 1 Patch Transdermal every 72 hours  senna 2 Tablet(s) Oral at bedtime      PRN Meds:  acetaminophen     Tablet .. 650 milliGRAM(s) Oral every 6 hours PRN  bisacodyl Suppository 10 milliGRAM(s) Rectal daily PRN  glycopyrrolate Injectable 0.2 milliGRAM(s) IV Push every 6 hours PRN  morphine  - Injectable 2 milliGRAM(s) IV Push every 4 hours PRN      LABS:    Hgb Trend: 8.3<--        Creatinine Trend: 3.47<--, 0.64<--            MICROBIOLOGY:     RADIOLOGY:  [ ] Reviewed and interpreted by me    EKG:

## 2024-01-08 NOTE — CONSULT NOTE ADULT - ASSESSMENT
BIOETHICS ANALYSIS    The central ethical issue that exists in this case is (A) the patient’s autonomy versus (B) the providers’ desire for beneficence and non-maleficence. Autonomy centers on letting patients with capacity decide what is best for their health after being fully informed of the options available, as well as the risks and benefits of those options. Non-maleficence focuses on the clinician’s desire to do no harm. Similarly, beneficence focuses on the clinicians’ desire to do what is best for their patient, requiring that health care providers consider what is best for the patient given the individual patient’s unique circumstances.  In this particular case, the patient is without capacity due to a devastating stroke and her adult children have now opted for palliative extubation with comfort measures.     Thank you for this challenging consult and for including Ethics in this patient's care.     Breana Ernst D.Min., RN-BSN, [facility]  Medical Ethics   [phone]    CASE REPORT WRITTEN BY ANKITA WHITING, BSN-RN, [facility]     MORE THAN 50% OF THE TIME OF THIS CONSULTATION WAS SPENT IN COORDINATION OF CARE OF PATIENT    REFERENCES  ASYA Burt., VIJAY Brown, KARLA Jovel, CHELY Dangelo, & Kierra, ANKITA FRANCOIS. (2016). Shared decision making in intensive care units: an American College of Critical Care Medicine and American Thoracic Society policy statement. Critical care medicine, 44(1), 188.       BIOETHICS ANALYSIS    The central ethical issue that exists in this case is (A) the patient’s autonomy versus (B) the providers’ desire for beneficence and non-maleficence. Autonomy centers on letting patients with capacity decide what is best for their health after being fully informed of the options available, as well as the risks and benefits of those options. Non-maleficence focuses on the clinician’s desire to do no harm. Similarly, beneficence focuses on the clinicians’ desire to do what is best for their patient, requiring that health care providers consider what is best for the patient given the individual patient’s unique circumstances.  In this particular case, the patient is without capacity due to a devastating stroke and her adult children have now opted for palliative extubation with comfort measures.     Thank you for this challenging consult and for including Ethics in this patient's care.     Breana Ernst D.Min., RN-BSN, [facility]  Medical Ethics   [phone]    CASE REPORT WRITTEN BY ANKITA WHITING, BSN-RN, [facility]     MORE THAN 50% OF THE TIME OF THIS CONSULTATION WAS SPENT IN COORDINATION OF CARE OF PATIENT    REFERENCES  ASYA Burt., VIJAY Brown, KARLA Jovel, CHELY Dangelo, & Kierra, ANKITA FRANCOIS. (2016). Shared decision making in intensive care units: an American College of Critical Care Medicine and American Thoracic Society policy statement. Critical care medicine, 44(1), 188.

## 2024-01-09 NOTE — PROGRESS NOTE ADULT - SUBJECTIVE AND OBJECTIVE BOX
CHIEF COMPLAINT:Patient is a 88y old  Female who presents with a chief complaint of Ischemic Stroke, Poor MS requiring Intubation (08 Jan 2024 17:50)        Interval Events:  Patient made comfort care only    REVIEW OF SYSTEMS:  [ ] All other systems negative  [ ] Unable to assess ROS because ________unresponsive    OBJECTIVE:  ICU Vital Signs Last 24 Hrs  T(C): 35.6 (09 Jan 2024 08:00), Max: 36.6 (08 Jan 2024 15:29)  T(F): 96 (09 Jan 2024 08:00), Max: 97.8 (08 Jan 2024 15:29)  HR: 54 (09 Jan 2024 12:00) (53 - 77)  BP: 129/60 (09 Jan 2024 12:00) (118/64 - 131/91)  BP(mean): 80 (09 Jan 2024 12:00) (78 - 105)  ABP: --  ABP(mean): --  RR: 19 (09 Jan 2024 12:00) (19 - 26)  SpO2: 97% (09 Jan 2024 12:00) (94% - 100%)          01-08 @ 07:01  -  01-09 @ 07:00  --------------------------------------------------------  IN: 20 mL / OUT: 60 mL / NET: -40 mL      CAPILLARY BLOOD GLUCOSE          PHYSICAL EXAM:  GENERAL: NAD, well-groomed, well-developed  EYES: EOMI, PERRLA, conjunctiva and sclera clear  ENMT: Et tube in place   CHEST/LUNG: Clear to auscultation bilaterally; No rales, rhonchi, wheezing, or rubs  HEART: Regular rate and rhythm; No murmurs, rubs, or gallops  ABDOMEN: Soft, Nontender, Nondistended; Bowel sounds present  VASCULAR:  2+ Peripheral Pulses, No clubbing, cyanosis, or edema  LYMPH: No lymphadenopathy noted  SKIN: No rashes or lesions  NERVOUS SYSTEM:  Alert & Oriented X0    LINES:    HOSPITAL MEDICATIONS:  Standing Meds:  chlorhexidine 2% Cloths 1 Application(s) Topical <User Schedule>  scopolamine 1 mG/72 Hr(s) Patch 1 Patch Transdermal every 72 hours      PRN Meds:  glycopyrrolate Injectable 0.2 milliGRAM(s) IV Push every 6 hours PRN  morphine  - Injectable 2 milliGRAM(s) IV Push every 4 hours PRN      LABS:    Hgb Trend:         Creatinine Trend: 3.47<--, 0.64<--            MICROBIOLOGY:     RADIOLOGY:  [ ] Reviewed and interpreted by me    EKG:

## 2024-01-09 NOTE — PROGRESS NOTE ADULT - ASSESSMENT
87 y/o F w/hypertension, HLD and Afib on AC admitted for acute CVA, not a candidate for TNK or endovascular intervention. Intubated for respiratory failure in setting of stroke. CT chest with pulmonary nodule.    Appreciate ethics team continued efforts to advocate for the patient.  Currently family has agreed to honor her wishes and she is now DNR/I with palliative extubation on 01/08/2024  Comfort care only with Robinul and morphine doing well  Plan for hospice referral

## 2024-01-10 NOTE — PROGRESS NOTE ADULT - ASSESSMENT
88 year old female with a PMH of HTN, HLD and afib on xarelto who did not take medications for past 1 week prior to hospitalization here with progressing  R JIMMY/MCA infarct  likely cardioembolic from atrial fibrillation.  Patient with long hospital course pending decision regarding palliative extubation vs. trach/PEG.  Family elected for palliative extubation 1/8/24. Palliative care consulted for assistance with decision making.

## 2024-01-10 NOTE — PROGRESS NOTE ADULT - SUBJECTIVE AND OBJECTIVE BOX
SUBJECTIVE AND OBJECTIVE: Patient s/p palliative extubation on 1/8, unreposnsive, appears comfortable on exam  INTERVAL HPI/OVERNIGHT EVENTS:    DNR on chart: yes  Allergies    No Known Allergies    Intolerances    MEDICATIONS  (STANDING):  chlorhexidine 2% Cloths 1 Application(s) Topical <User Schedule>  scopolamine 1 mG/72 Hr(s) Patch 1 Patch Transdermal every 72 hours    MEDICATIONS  (PRN):  glycopyrrolate Injectable 0.2 milliGRAM(s) IV Push every 6 hours PRN secretions  morphine  - Injectable 2 milliGRAM(s) IV Push every 4 hours PRN pain/SOB      ITEMS UNCHECKED ARE NOT PRESENT    PRESENT SYMPTOMS: [x ]Unable to obtain due to poor mentation   Source if other than patient:  [ ]Family   [ ]Team     Pain:  [ ]yes [ ]no  QOL impact -   Location -                    Aggravating factors -  Quality -  Radiation -  Timing-  Severity (0-10 scale):  Minimal acceptable level (0-10 scale):     Dyspnea:                           [ ]Mild [ ]Moderate [ ]Severe  Anxiety:                             [ ]Mild [ ]Moderate [ ]Severe  Fatigue:                             [ ]Mild [ ]Moderate [ ]Severe  Nausea:                             [ ]Mild [ ]Moderate [ ]Severe  Loss of appetite:              [ ]Mild [ ]Moderate [ ]Severe  Constipation:                    [ ]Mild [ ]Moderate [ ]Severe    PAIN AD Score:	0  [email or web address] (Ctrl + left click to view)    Other Symptoms:  [ ]All other review of systems negative     Palliative Performance Status Version 2:       10  %      [email or web address]  PHYSICAL EXAM:  Vital Signs Last 24 Hrs  T(C): 35.7 (10 Jay 2024 12:00), Max: 36.1 (10 Jay 2024 07:05)  T(F): 96.2 (10 Jay 2024 12:00), Max: 97 (10 Jay 2024 07:05)  HR: 63 (10 Jay 2024 12:00) (61 - 74)  BP: 126/74 (10 Jay 2024 12:00) (120/63 - 148/63)  BP(mean): 88 (10 Jay 2024 12:00) (80 - 88)  RR: 27 (10 Jay 2024 12:00) (20 - 27)  SpO2: 99% (10 Jay 2024 12:00) (92% - 99%)    Parameters below as of 09 Jan 2024 20:00  Patient On (Oxygen Delivery Method): room air     I&O's Summary    09 Jan 2024 07:01  -  10 Jay 2024 07:00  --------------------------------------------------------  IN: 0 mL / OUT: 0 mL / NET: 0 mL    10 Jay 2024 07:01  -  10 Jay 2024 14:31  --------------------------------------------------------  IN: 0 mL / OUT: 50 mL / NET: -50 mL       GENERAL:  [ ]Alert  [ ]Oriented x   [ ]Lethargic  [ ]Cachexia  [ x]Unarousable  [ ]Verbal  [ x]Non-Verbal  Behavioral:   [ ]Anxiety  [ ]Delirium [ ]Agitation [ ]Other  HEENT:  [x ]Normal   [ ]Dry mouth   [ ]ET Tube/Trach  [ ]Oral lesions  PULMONARY:   [ ]Clear [ ]Tachypnea  [ ]Audible excessive secretions   [ ]Rhonchi        [ ]Right [ ]Left [ ]Bilateral  [ ]Crackles        [ ]Right [ ]Left [ ]Bilateral  [ ]Wheezing     [ ]Right [ ]Left [ ]Bilateral  [ ]Diminished BS [ ] Right [ ]Left [ ]Bilateral  CARDIOVASCULAR:    [ ]Regular [ x]Irregular [ ]Tachy  [ ]Bryce [ ]Murmur [ ]Other  GASTROINTESTINAL:  [ ]Soft  [ x]Distended   [ ]+BS  [ ]Non tender [ ]Tender  [ ]PEG [ ]OGT/ NGT   Last BM:  1/9/24  GENITOURINARY:  [ ]Normal [ x]Incontinent   [ ]Oliguria/Anuria   [ ]Castaneda  MUSCULOSKELETAL:   [ ]Normal   [ ]Weakness  [x ]Bed/Wheelchair bound [ ]Edema  NEUROLOGIC:   [ ]No focal deficits  [ x] Cognitive impairment  [ ] Dysphagia [ ]Dysarthria [ ] Paresis [ ]Other   SKIN:   [ ]Normal  [ ]Rash   [x ]Pressure ulcer(s) sacrum unstageable [ ]y [ ]n present on admission    CRITICAL CARE:  [ ]Shock Present  [ ]Septic [ ]Cardiogenic [ ]Neurologic [ ]Hypovolemic  [ ]Vasopressors [ ]Inotropes  [ ]Respiratory failure present [ ]Mechanical Ventilation [ ]Non-invasive ventilatory support [ ]High-Flow  [ ]Acute  [ ]Chronic [ ]Hypoxic  [ ]Hypercarbic [ ]Other  [ ]Other organ failure     LABS: none new    RADIOLOGY & ADDITIONAL STUDIES:   < from: CT Head No Cont (11.02.23 @ 14:30) >  IMPRESSION:  Reidentified are 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 There is effacement of all of the   basilar cisterns. Effacement of the sulci in the bilateral cerebri,   compatible with increased brain edema.  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  Unsuccessful attempts were made to reach the ordering clinician via teams.  -- End of Report ---  < end of copied text >    Protein Calorie Malnutrition Present: [ ]mild [ ]moderate [ ]severe [ ]underweight [ ]morbid obesity  https://www.andeal.org/vault/2440/web/files/ONC/Table_Clinical%20Characteristics%20to%20Document%20Malnutrition-White%20JV%20et%20al%019165.pdf    Height (cm): 162.6 (10-26-23 @ 03:59)  Weight (kg): 63.1 (10-26-23 @ 06:50)  BMI (kg/m2): 23.9 (10-26-23 @ 06:50)    [ ]PPSV2 < or = 30%  [ ]significant weight loss [ ]poor nutritional intake [ ]anasarca    [ ]Artificial Nutrition    REFERRALS:   [ ]Chaplaincy  [ ]Hospice  [ ]Child Life  [ ]Social Work  [ ]Case management [ ]Holistic Therapy     Goals of Care Document:     SUBJECTIVE AND OBJECTIVE: Patient s/p palliative extubation on 1/8, unreposnsive, appears comfortable on exam  INTERVAL HPI/OVERNIGHT EVENTS:    DNR on chart: yes  Allergies    No Known Allergies    Intolerances    MEDICATIONS  (STANDING):  chlorhexidine 2% Cloths 1 Application(s) Topical <User Schedule>  scopolamine 1 mG/72 Hr(s) Patch 1 Patch Transdermal every 72 hours    MEDICATIONS  (PRN):  glycopyrrolate Injectable 0.2 milliGRAM(s) IV Push every 6 hours PRN secretions  morphine  - Injectable 2 milliGRAM(s) IV Push every 4 hours PRN pain/SOB      ITEMS UNCHECKED ARE NOT PRESENT    PRESENT SYMPTOMS: [x ]Unable to obtain due to poor mentation   Source if other than patient:  [ ]Family   [ ]Team     Pain:  [ ]yes [ ]no  QOL impact -   Location -                    Aggravating factors -  Quality -  Radiation -  Timing-  Severity (0-10 scale):  Minimal acceptable level (0-10 scale):     Dyspnea:                           [ ]Mild [ ]Moderate [ ]Severe  Anxiety:                             [ ]Mild [ ]Moderate [ ]Severe  Fatigue:                             [ ]Mild [ ]Moderate [ ]Severe  Nausea:                             [ ]Mild [ ]Moderate [ ]Severe  Loss of appetite:              [ ]Mild [ ]Moderate [ ]Severe  Constipation:                    [ ]Mild [ ]Moderate [ ]Severe    PAIN AD Score:	0  [email or web address]/[email or web address] (Ctrl + left click to view)    Other Symptoms:  [ ]All other review of systems negative     Palliative Performance Status Version 2:       10  %      [email or web address]_[email or web address]  PHYSICAL EXAM:  Vital Signs Last 24 Hrs  T(C): 35.7 (10 Jay 2024 12:00), Max: 36.1 (10 Jay 2024 07:05)  T(F): 96.2 (10 Jay 2024 12:00), Max: 97 (10 Jay 2024 07:05)  HR: 63 (10 Jay 2024 12:00) (61 - 74)  BP: 126/74 (10 Jay 2024 12:00) (120/63 - 148/63)  BP(mean): 88 (10 Jay 2024 12:00) (80 - 88)  RR: 27 (10 Jay 2024 12:00) (20 - 27)  SpO2: 99% (10 Jay 2024 12:00) (92% - 99%)    Parameters below as of 09 Jan 2024 20:00  Patient On (Oxygen Delivery Method): room air     I&O's Summary    09 Jan 2024 07:01  -  10 Jay 2024 07:00  --------------------------------------------------------  IN: 0 mL / OUT: 0 mL / NET: 0 mL    10 Jay 2024 07:01  -  10 Jay 2024 14:31  --------------------------------------------------------  IN: 0 mL / OUT: 50 mL / NET: -50 mL       GENERAL:  [ ]Alert  [ ]Oriented x   [ ]Lethargic  [ ]Cachexia  [ x]Unarousable  [ ]Verbal  [ x]Non-Verbal  Behavioral:   [ ]Anxiety  [ ]Delirium [ ]Agitation [ ]Other  HEENT:  [x ]Normal   [ ]Dry mouth   [ ]ET Tube/Trach  [ ]Oral lesions  PULMONARY:   [ ]Clear [ ]Tachypnea  [ ]Audible excessive secretions   [ ]Rhonchi        [ ]Right [ ]Left [ ]Bilateral  [ ]Crackles        [ ]Right [ ]Left [ ]Bilateral  [ ]Wheezing     [ ]Right [ ]Left [ ]Bilateral  [ ]Diminished BS [ ] Right [ ]Left [ ]Bilateral  CARDIOVASCULAR:    [ ]Regular [ x]Irregular [ ]Tachy  [ ]Bryce [ ]Murmur [ ]Other  GASTROINTESTINAL:  [ ]Soft  [ x]Distended   [ ]+BS  [ ]Non tender [ ]Tender  [ ]PEG [ ]OGT/ NGT   Last BM:  1/9/24  GENITOURINARY:  [ ]Normal [ x]Incontinent   [ ]Oliguria/Anuria   [ ]Castaneda  MUSCULOSKELETAL:   [ ]Normal   [ ]Weakness  [x ]Bed/Wheelchair bound [ ]Edema  NEUROLOGIC:   [ ]No focal deficits  [ x] Cognitive impairment  [ ] Dysphagia [ ]Dysarthria [ ] Paresis [ ]Other   SKIN:   [ ]Normal  [ ]Rash   [x ]Pressure ulcer(s) sacrum unstageable [ ]y [ ]n present on admission    CRITICAL CARE:  [ ]Shock Present  [ ]Septic [ ]Cardiogenic [ ]Neurologic [ ]Hypovolemic  [ ]Vasopressors [ ]Inotropes  [ ]Respiratory failure present [ ]Mechanical Ventilation [ ]Non-invasive ventilatory support [ ]High-Flow  [ ]Acute  [ ]Chronic [ ]Hypoxic  [ ]Hypercarbic [ ]Other  [ ]Other organ failure     LABS: none new    RADIOLOGY & ADDITIONAL STUDIES:   < from: CT Head No Cont (11.02.23 @ 14:30) >  IMPRESSION:  Reidentified are 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 There is effacement of all of the   basilar cisterns. Effacement of the sulci in the bilateral cerebri,   compatible with increased brain edema.  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  Unsuccessful attempts were made to reach the ordering clinician via teams.  -- End of Report ---  < end of copied text >    Protein Calorie Malnutrition Present: [ ]mild [ ]moderate [ ]severe [ ]underweight [ ]morbid obesity  https://www.andeal.org/vault/2440/web/files/ONC/Table_Clinical%20Characteristics%20to%20Document%20Malnutrition-White%20JV%20et%20al%299438.pdf    Height (cm): 162.6 (10-26-23 @ 03:59)  Weight (kg): 63.1 (10-26-23 @ 06:50)  BMI (kg/m2): 23.9 (10-26-23 @ 06:50)    [ ]PPSV2 < or = 30%  [ ]significant weight loss [ ]poor nutritional intake [ ]anasarca    [ ]Artificial Nutrition    REFERRALS:   [ ]Chaplaincy  [ ]Hospice  [ ]Child Life  [ ]Social Work  [ ]Case management [ ]Holistic Therapy     Goals of Care Document:

## 2024-01-10 NOTE — PROGRESS NOTE ADULT - SUBJECTIVE AND OBJECTIVE BOX
CHIEF COMPLAINT:Patient is a 88y old  Female who presents with a chief complaint of Ischemic Stroke, Poor MS requiring Intubation (10 Jay 2024 14:30)        Interval Events:    REVIEW OF SYSTEMS:  [ ] All other systems negative  [x] Unable to assess ROS because ________severe injury from strokes    OBJECTIVE:  ICU Vital Signs Last 24 Hrs  T(C): 35.2 (10 Jay 2024 20:00), Max: 36.1 (10 Jay 2024 07:05)  T(F): 95.3 (10 Jay 2024 20:00), Max: 97 (10 Jay 2024 07:05)  HR: 75 (10 Jay 2024 20:00) (63 - 75)  BP: 129/56 (10 Jay 2024 20:00) (120/63 - 148/63)  BP(mean): 79 (10 Jay 2024 20:00) (79 - 96)  ABP: --  ABP(mean): --  RR: 23 (10 Jay 2024 20:00) (20 - 27)  SpO2: 95% (10 Jay 2024 20:00) (92% - 99%)    O2 Parameters below as of 10 Jay 2024 20:00  Patient On (Oxygen Delivery Method): room air              01-09 @ 07:01  -  01-10 @ 07:00  --------------------------------------------------------  IN: 0 mL / OUT: 0 mL / NET: 0 mL    01-10 @ 07:01  -  01-10 @ 21:23  --------------------------------------------------------  IN: 0 mL / OUT: 100 mL / NET: -100 mL      CAPILLARY BLOOD GLUCOSE          PHYSICAL EXAM: exam defered.  Patient is comfort care and is comfortable on general exam. Breeathing room air in no apperent distress.   General:   HEENT:   Lymph Nodes:  Neck:   Respiratory:   Cardiovascular:   Abdomen:   Extremities:   Skin:   Neurological:  Psychiatry:    LINES:    HOSPITAL MEDICATIONS:  Standing Meds:  chlorhexidine 2% Cloths 1 Application(s) Topical <User Schedule>  scopolamine 1 mG/72 Hr(s) Patch 1 Patch Transdermal every 72 hours      PRN Meds:  glycopyrrolate Injectable 0.2 milliGRAM(s) IV Push every 6 hours PRN  morphine  - Injectable 2 milliGRAM(s) IV Push every 4 hours PRN      LABS:    Hgb Trend:         Creatinine Trend: 3.47<--, 0.64<--            MICROBIOLOGY:     RADIOLOGY:  [ ] Reviewed and interpreted by me    EKG:

## 2024-01-10 NOTE — PROGRESS NOTE ADULT - PROBLEM SELECTOR PLAN 4
Patient s/p palliative extubation on 1/8/24, does not appear appropriate for inpatient hospice at this time.  Called and left VM for family [phone].  Options would include NH with hospice vs. home with hospice depending on family's preference.  MOLST on file with DNR/I and ongoing comfort measures.  Palliative remains available as needed.      Discussed with ICU team and MARIEL Hartmann Patient s/p palliative extubation on 1/8/24, does not appear appropriate for inpatient hospice at this time.  Called and left VM for family [phone].  Options would include NH with hospice vs. home with hospice depending on family's preference.  MOLST on file with DNR/I and ongoing comfort measures.  Palliative remains available as needed.      Discussed with ICU team and MARIEL Hartmann

## 2024-01-10 NOTE — PROGRESS NOTE ADULT - SUBJECTIVE AND OBJECTIVE BOX
Neurology Progress Note    S: Patient seen and examined.     Medications: MEDICATIONS  (STANDING):  chlorhexidine 2% Cloths 1 Application(s) Topical <User Schedule>  scopolamine 1 mG/72 Hr(s) Patch 1 Patch Transdermal every 72 hours    MEDICATIONS  (PRN):  glycopyrrolate Injectable 0.2 milliGRAM(s) IV Push every 6 hours PRN secretions  morphine  - Injectable 2 milliGRAM(s) IV Push every 4 hours PRN pain/SOB       Vitals:  Vital Signs Last 24 Hrs  T(C): 35.3 (10 Jay 2024 04:00), Max: 35.3 (10 Jay 2024 04:00)  T(F): 95.5 (10 Jay 2024 04:00), Max: 95.5 (10 Jay 2024 04:00)  HR: 71 (10 Jay 2024 08:00) (54 - 74)  BP: 120/63 (10 Jay 2024 08:00) (120/63 - 148/63)  BP(mean): 81 (10 Jay 2024 08:00) (80 - 87)  RR: 22 (10 Jay 2024 08:00) (19 - 24)  SpO2: 98% (10 Jay 2024 08:00) (92% - 98%)    Parameters below as of 09 Jan 2024 20:00  Patient On (Oxygen Delivery Method): room air        Neurological Exam:  Mental Status: Intubated, not sedated. some spont eye opening. No verbal output, does not follow commands.   Cranial Nerves: pupils very sluggish R, L near fixed, no  corneals, no VOR, intermittent eyelid fluttering no facial asymmetry , weak cough/gag  Motor: dec tone throughout, no spont movemnt noted   Sensation: UE extensor posturing, LE triple flexion left toe up    I personally reviewed the below data/images/labs:      LABS:      No new labs        < from: CT Head No Cont (10.22.19 @ 15:57) >  IMPRESSION:    1)  chronic ischemic changes in both hemispheres with volume loss. There   are no new infarct as compared to prior CT. This may be further assessed   with MR imaging.  2)  no hemorrhage or mass identified.      LABS:                      < end of copied text >  < from: CT Angio Head w/ IV Cont (10.22.19 @ 15:57) >  IMPRESSION    1. There is intimal thickening and calcified plaque in both carotid   bifurcations in the neck, but without significant stenosis. No evidence   of carotid vertebral occlusion or dissection.  2. Intracranially, there are atherosclerotic changes in the anterior and   posterior circulation, but without significant stenosis or occlusion.    < end of copied text >  < from: CT Brain Stroke Protocol (10.26.23 @ 04:37) >  IMPRESSION:  No evidence of acute intracranial hemorrhage, mass effect or large acute   territorial infarct, within limits of noncontrast CT technique.    < end of copied text >  < from: CT Angio Neck Stroke Protocol w/ IV Cont (10.26.23 @ 04:53) >  CT ANGIOGRAPHY NECK:  1.  The right internal carotid artery is occluded approximately 1.5 cm   distal to its origin, possibly due to underlying dissection.  2.  Endotracheal tube is low in position, with its tip approximate 1 cm   above the slade.  3.  Diffuse ill-defined groundglass opacity in both lungs, which may   represent pulmonary edema, infection and/or atelectasis.  4.  There is a discrete right upper lobe groundglass opacity measuring   1.9 x 1.4 cm; this may represent a focus of infection, inflammation,   edema, hemorrhage, fibrosis or malignancy.  A follow-up chest CT is   warranted in three months to assess for resolution.    CT ANGIOGRAPHY BRAIN: The right internal carotid arteries occluded.    There is no significant flow related opacification within the proximal   right middle cerebral artery.  There is mild reconstitution of flow in   some distal branch vessels of the inferior division of the right middle   cerebral artery.  The right anterior cerebral artery fills across the   anterior communicating artery.    < end of copied text >  < from: CT Brain Perfusion Maps Stroke (10.26.23 @ 04:54) >  CT PERFUSION: CT perfusion is consistent with large acute infarct in the   right middle cerebral artery vascular territory and small to moderate   amount of surrounding ischemic penumbra.  Hypoperfusion index is 0.3.    Core infarct (CBF <  30%:): 136 mL  Penumbra (Tmax >6s): 194 mL  Mismatched volume: 58 mL  Mismatched ratio: 1.4    < end of copied text >      < from: CT Head No Cont (10.22.19 @ 15:57) >  IMPRESSION:    1)  chronic ischemic changes in both hemispheres with volume loss. There   are no new infarct as compared to prior CT. This may be further assessed   with MR imaging.  2)  no hemorrhage or mass identified.    < end of copied text >  < from: CT Angio Head w/ IV Cont (10.22.19 @ 15:57) >  IMPRESSION    1. There is intimal thickening and calcified plaque in both carotid   bifurcations in the neck, but without significant stenosis. No evidence   of carotid vertebral occlusion or dissection.  2. Intracranially, there are atherosclerotic changes in the anterior and   posterior circulation, but without significant stenosis or occlusion.    < end of copied text >  < from: CT Brain Stroke Protocol (10.26.23 @ 04:37) >  IMPRESSION:  No evidence of acute intracranial hemorrhage, mass effect or large acute   territorial infarct, within limits of noncontrast CT technique.    < end of copied text >  < from: CT Angio Neck Stroke Protocol w/ IV Cont (10.26.23 @ 04:53) >  CT ANGIOGRAPHY NECK:  1.  The right internal carotid artery is occluded approximately 1.5 cm   distal to its origin, possibly due to underlying dissection.  2.  Endotracheal tube is low in position, with its tip approximate 1 cm   above the slade.  3.  Diffuse ill-defined groundglass opacity in both lungs, which may   represent pulmonary edema, infection and/or atelectasis.  4.  There is a discrete right upper lobe groundglass opacity measuring   1.9 x 1.4 cm; this may represent a focus of infection, inflammation,   edema, hemorrhage, fibrosis or malignancy.  A follow-up chest CT is   warranted in three months to assess for resolution.    CT ANGIOGRAPHY BRAIN: The right internal carotid arteries occluded.    There is no significant flow related opacification within the proximal   right middle cerebral artery.  There is mild reconstitution of flow in   some distal branch vessels of the inferior division of the right middle   cerebral artery.  The right anterior cerebral artery fills across the   anterior communicating artery.    < end of copied text >  < from: CT Brain Perfusion Maps Stroke (10.26.23 @ 04:54) >  CT PERFUSION: CT perfusion is consistent with large acute infarct in the   right middle cerebral artery vascular territory and small to moderate   amount of surrounding ischemic penumbra.  Hypoperfusion index is 0.3.    Core infarct (CBF <  30%:): 136 mL  Penumbra (Tmax >6s): 194 mL  Mismatched volume: 58 mL  Mismatched ratio: 1.4    < end of copied text >      < from: CT Head No Cont (10.22.19 @ 15:57) >  IMPRESSION:    1)  chronic ischemic changes in both hemispheres with volume loss. There   are no new infarct as compared to prior CT. This may be further assessed   with MR imaging.  2)  no hemorrhage or mass identified.    < end of copied text >  < from: CT Angio Head w/ IV Cont (10.22.19 @ 15:57) >  IMPRESSION    1. There is intimal thickening and calcified plaque in both carotid   bifurcations in the neck, but without significant stenosis. No evidence   of carotid vertebral occlusion or dissection.  2. Intracranially, there are atherosclerotic changes in the anterior and   posterior circulation, but without significant stenosis or occlusion.    < end of copied text >  < from: CT Brain Stroke Protocol (10.26.23 @ 04:37) >  IMPRESSION:  No evidence of acute intracranial hemorrhage, mass effect or large acute   territorial infarct, within limits of noncontrast CT technique.    < end of copied text >  < from: CT Angio Neck Stroke Protocol w/ IV Cont (10.26.23 @ 04:53) >  CT ANGIOGRAPHY NECK:  1.  The right internal carotid artery is occluded approximately 1.5 cm   distal to its origin, possibly due to underlying dissection.  2.  Endotracheal tube is low in position, with its tip approximate 1 cm   above the slade.  3.  Diffuse ill-defined groundglass opacity in both lungs, which may   represent pulmonary edema, infection and/or atelectasis.  4.  There is a discrete right upper lobe groundglass opacity measuring   1.9 x 1.4 cm; this may represent a focus of infection, inflammation,   edema, hemorrhage, fibrosis or malignancy.  A follow-up chest CT is   warranted in three months to assess for resolution.    CT ANGIOGRAPHY BRAIN: The right internal carotid arteries occluded.    There is no significant flow related opacification within the proximal   right middle cerebral artery.  There is mild reconstitution of flow in   some distal branch vessels of the inferior division of the right middle   cerebral artery.  The right anterior cerebral artery fills across the   anterior communicating artery.    < end of copied text >  < from: CT Brain Perfusion Maps Stroke (10.26.23 @ 04:54) >  CT PERFUSION: CT perfusion is consistent with large acute infarct in the   right middle cerebral artery vascular territory and small to moderate   amount of surrounding ischemic penumbra.  Hypoperfusion index is 0.3.    Core infarct (CBF <  30%:): 136 mL  Penumbra (Tmax >6s): 194 mL  Mismatched volume: 58 mL  Mismatched ratio: 1.4    < end of copied text >    EEG Classification / Summary:  Abnormal EEG study  1. Intermittent periodic discharges seen intermittently over the left hemisphere, sometimes with triphasic morphology  2. Focal continuous right hemispheric slowing  3. Moderate generalized background slowing    -----------------------------------------------------------------------------------------------------    Clinical Impression:  1. While the periodic pattern noted above may represent lateralized periodic discharges indicating epileptic potential in the left hemisphere, more likely these represent generalized periodic discharges with triphasic morphology, consistent with metabolic etiology, that are poorly expressed on the right due to the effects of the stroke.   2. Structural abnormality in right hemisphere  3. Moderate diffuse/multi-focal cerebral dysfunction, not specific as to etiology.    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 Significant   associated mass effect and associated herniation.      < end of copied text >  < from: CT Head No Cont (11.02.23 @ 14:30) >    Reidentified are 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 There is effacement of all of the   basilar cisterns. Effacement of the sulci in the bilateral cerebri,   compatible with increased brain edema.    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    Unsuccessful attempts were made to reach the ordering clinician via teams.      --- End of Report ---    < end of copied text >

## 2024-01-10 NOTE — PROGRESS NOTE ADULT - CRITICAL CARE SERVICES PROVIDED
Patient is critically ill, requiring critical care services.

## 2024-01-10 NOTE — CHART NOTE - NSCHARTNOTEFT_GEN_A_CORE
89 y/o F w/hypertension, HLD and Afib on AC admitted for acute CVA, not a candidate for TNK or endovascular intervention. Intubated for respiratory failure in setting of acute stroke.     - Appreciate ethics team continued efforts to advocate for the patient.    - Currently family has agreed to honor her wishes and she is now DNR/I s/p palliative extubation on 01/08  - No escalation of care  - Comfort care only with Robinul and morphine doing well  - Palliative reconsulted for plan for hospice referral     *ok to downgrade to Medicine service. Discussed with ICU attending MD Linares. Sign out to hospitalist MD.

## 2024-01-10 NOTE — PROGRESS NOTE ADULT - ASSESSMENT
89 y/o F w/hypertension, HLD and Afib on AC admitted for acute CVA, not a candidate for TNK or endovascular intervention. Intubated for respiratory failure in setting of stroke. CT chest with pulmonary nodule.    Appreciate ethics team continued efforts to advocate for the patient.  Currently family has agreed to honor her wishes and she is now DNR/I with palliative extubation on 01/08/2024  Comfort care only with Robinul and morphine doing well  Plan for hospice referral, downgrade from ICU               87 y/o F w/hypertension, HLD and Afib on AC admitted for acute CVA, not a candidate for TNK or endovascular intervention. Intubated for respiratory failure in setting of stroke. CT chest with pulmonary nodule.    Appreciate ethics team continued efforts to advocate for the patient.  Currently family has agreed to honor her wishes and she is now DNR/I with palliative extubation on 01/08/2024  Comfort care only with Robinul and morphine doing well  Plan for hospice referral, downgrade from ICU

## 2024-01-11 NOTE — PROGRESS NOTE ADULT - TIME BILLING
Evaluation of patient, review of medical records and collaboration with care team and family
patient exam, chart review, coordination of care w team, family
Evaluation of patient, review of medical records and collaboration with care team and family

## 2024-01-11 NOTE — PROGRESS NOTE ADULT - CONVERSATION DETAILS
Met with patient's daughter Nickie at bedside, she indicated that there are 5 siblings in total who make decisions together. Discussed patient's overall poor chance for any meaningful neuro recovery, advised to consider what type of quality of life would be most acceptable to patient. She inquired how long she could be kept orally intubated, discussed not advisable past 14 d given increased risk of complications and discomfort. She stated her siblings have also been inquiring if anything can be done for the brain swelling, discussed it is a consequence of extensive infarct and deemed not a candidate for intervention on admission. She stated that the siblings will be meeting later to discuss further and wish to see how the repeat scan looks. Support provided. Contact info provided. Palliative will follow.
Spoke with patient's daughter, Nickie and other family member at bedside. They had previously inquired about NGT and discussed it is not indicated with the primary team and are accepting of that.  Discussed at this juncture, plan would be to send patient home with the support of hospice or to a NH with hospice as she does not have an inpatient hospice need.  Family to discuss their preference and discuss further with FAUSTO MCKEON on file with DNR/I/No feeding tube/comfort measures/No HD.
Met with Eldest son Otoniel, as well as Nayla and Nickie Thomas regarding further GOC. Readdressed new CT findings, poor overall prognosis, poor chance for any meaningful neuro recovery given extent of strokes. Discussed treatment options re: palliative extubation vs trach/peg depending on what type of quality of life would be most important to their mother as patient will remain dependent for care, will not be the person she was previously. They expressed that it is a difficult decision and they have been discussing but not yet ready to decide. They stated that they have discussed that patient had always previously told them that she would want to die "naturally and with dignity" and they are in agreement they would prefer to allow for natural death if her heart stops, would not want chest compressions or CPR. Support provided.     MIKE drafted for DNR. Continue all other medical treatments at this time. Palliative will follow.
Met w son Otoniel at bedside regarding further GOC. LAZARUS on file for DNR. He expressed that his family remains divided in how to proceed. They feel that she would want a natural death but are not comfortable removing her from the ventilator, do not want to feel like "murderers." He inquired if her heart could stop while being on the ventilator, advised while she may die on the ventilator, she may also linger for some time as the ventilator is keeping her artificially alive and not allowing for natural death. He stated that the options are difficult and they continue their discussions as a family. Support provided. offered a family meeting w their siblings, he expressed that they will continue to talk as a family. He stated that they are [employer], place their trust in God.  support offered, he is agreeable, Father Emir notified, he will visit in AM when son is back in the hospital.   Palliative will follow.

## 2024-01-11 NOTE — PROGRESS NOTE ADULT - PROBLEM SELECTOR PLAN 4
Patient s/p palliative extubation on 1/8/24, does not appear appropriate for inpatient hospice at this time, appears comfortable on exam.  Discharge options would include NH with hospice vs. home with hospice depending on family's preference which was discussed with family at bedside.  MOLST on file with DNR/I and ongoing comfort measures.  Palliative remains available as needed.      Discussed with Dr. Toribio and Jodi FLOYD

## 2024-01-11 NOTE — PROGRESS NOTE ADULT - CONVERSATION/DISCUSSION
Diagnosis/Prognosis/MOLST Discussed
Diagnosis/Prognosis
Diagnosis/Prognosis
Diagnosis/Prognosis/Treatment Options

## 2024-01-11 NOTE — PROGRESS NOTE ADULT - ASSESSMENT
89 y/o F w/hypertension, HLD and Afib on AC admitted for massive  CVA with herniation. was not a candidate for TNK or endovascular intervention and has been Intubated for respiratory failure in setting of acute stroke and was palliatively extubated. She is breathing on her own but remains unresponsive Family electing for for comfort care. Had long discussion with the family regarding the poor prognosis. Updated palliative care.          87 y/o F w/hypertension, HLD and Afib on AC admitted for massive  CVA with herniation. was not a candidate for TNK or endovascular intervention and has been Intubated for respiratory failure in setting of acute stroke and was palliatively extubated. She is breathing on her own but remains unresponsive Family electing for for comfort care. Had long discussion with the family regarding the poor prognosis. Updated palliative care.

## 2024-01-11 NOTE — CHART NOTE - NSCHARTNOTESELECT_GEN_ALL_CORE
Expiration/Event Note
Nutrition Services
Transfer Note
GOC/Event Note
Nutrition Services

## 2024-01-11 NOTE — PROGRESS NOTE ADULT - SUBJECTIVE AND OBJECTIVE BOX
Patient is a 88y old  Female who presents with a chief complaint of Ischemic Stroke, Poor MS requiring Intubation (11 Jan 2024 12:51)      INTERVAL HPI/OVERNIGHT EVENTS: none.     MEDICATIONS  (STANDING):  chlorhexidine 2% Cloths 1 Application(s) Topical <User Schedule>  scopolamine 1 mG/72 Hr(s) Patch 1 Patch Transdermal every 72 hours    MEDICATIONS  (PRN):  glycopyrrolate Injectable 0.2 milliGRAM(s) IV Push every 6 hours PRN secretions  morphine  - Injectable 2 milliGRAM(s) IV Push every 4 hours PRN pain/SOB      Allergies    No Known Allergies    Intolerances        REVIEW OF SYSTEMS:  cannot give       Vital Signs Last 24 Hrs  T(C): 35.6 (11 Jan 2024 05:22), Max: 35.6 (10 Jay 2024 19:05)  T(F): 96.1 (11 Jan 2024 05:22), Max: 96.1 (10 Jay 2024 19:05)  HR: 44 (11 Jan 2024 11:10) (44 - 75)  BP: 149/78 (11 Jan 2024 11:10) (129/56 - 154/67)  BP(mean): 79 (10 Jay 2024 20:00) (79 - 96)  RR: 18 (11 Jan 2024 11:10) (18 - 24)  SpO2: 100% (11 Jan 2024 11:10) (95% - 100%)    Parameters below as of 11 Jan 2024 11:10  Patient On (Oxygen Delivery Method): room air        PHYSICAL EXAM:    NERVOUS SYSTEM:  obtunded, does not respond to external stimuli    CHEST/LUNG: CTABL; No rales, rhonchi, wheezing, or rubs  HEART: Regular rate and rhythm; No murmurs, rubs, or gallops  ABDOMEN: Soft, Nontender, Nondistended; Bowel sounds present  EXTREMITIES:  2+ Peripheral Pulses, No clubbing, cyanosis, or edema  LYMPH: No lymphadenopathy noted      LABS:

## 2024-01-11 NOTE — CHART NOTE - NSCHARTNOTEFT_GEN_A_CORE
Provider called to bedside, per RN " I think patient has passed"  Patient is unresponsive to verbal/noxious stimuli.  Pupils fixed and dilated. No spontaneous breathing. No palpable carotid/radial pulse. No heart sounds. No breath sounds.  Time of Death: 16:03  Attending Dr. Toribio notified.  Attempt made to notify family. There is no answer at this time. Andi Fu [phone] Provider called to bedside, per RN " I think patient has passed"  Patient is unresponsive to verbal/noxious stimuli.  Pupils fixed and dilated. No spontaneous breathing. No palpable carotid/radial pulse. No heart sounds. No breath sounds.  Time of Death: 16:03  Attending Dr. Toribio notified.  Attempt made to notify family. There is no answer at this time. Andi Fu [phone]

## 2024-01-11 NOTE — PROGRESS NOTE ADULT - SUBJECTIVE AND OBJECTIVE BOX
SUBJECTIVE AND OBJECTIVE: Patient resting in bed, calm, family at bedside  INTERVAL HPI/OVERNIGHT EVENTS:    DNR on chart: yes  Allergies    No Known Allergies    Intolerances    MEDICATIONS  (STANDING):  chlorhexidine 2% Cloths 1 Application(s) Topical <User Schedule>  scopolamine 1 mG/72 Hr(s) Patch 1 Patch Transdermal every 72 hours    MEDICATIONS  (PRN):  glycopyrrolate Injectable 0.2 milliGRAM(s) IV Push every 6 hours PRN secretions  morphine  - Injectable 2 milliGRAM(s) IV Push every 4 hours PRN pain/SOB      ITEMS UNCHECKED ARE NOT PRESENT    PRESENT SYMPTOMS: [ x]Unable to obtain due to poor mentation   Source if other than patient:  [ ]Family   [ ]Team     Pain:  [ ]yes [ ]no  QOL impact -   Location -                    Aggravating factors -  Quality -  Radiation -  Timing-  Severity (0-10 scale):  Minimal acceptable level (0-10 scale):     Dyspnea:                           [ ]Mild [ ]Moderate [ ]Severe  Anxiety:                             [ ]Mild [ ]Moderate [ ]Severe  Fatigue:                             [ ]Mild [ ]Moderate [ ]Severe  Nausea:                             [ ]Mild [ ]Moderate [ ]Severe  Loss of appetite:              [ ]Mild [ ]Moderate [ ]Severe  Constipation:                    [ ]Mild [ ]Moderate [ ]Severe    PAIN AD Score:	0  [email or web address] (Ctrl + left click to view)    Other Symptoms:  [ ]All other review of systems negative     Palliative Performance Status Version 2:        10 %      [email or web address]  PHYSICAL EXAM:  Vital Signs Last 24 Hrs  T(C): 35.6 (11 Jan 2024 05:22), Max: 35.6 (10 Jay 2024 19:05)  T(F): 96.1 (11 Jan 2024 05:22), Max: 96.1 (10 Jay 2024 19:05)  HR: 44 (11 Jan 2024 11:10) (44 - 75)  BP: 149/78 (11 Jan 2024 11:10) (129/56 - 154/67)  BP(mean): 79 (10 Jay 2024 20:00) (79 - 96)  RR: 18 (11 Jan 2024 11:10) (18 - 24)  SpO2: 100% (11 Jan 2024 11:10) (95% - 100%)    Parameters below as of 11 Jan 2024 11:10  Patient On (Oxygen Delivery Method): room air     I&O's Summary    10 Jya 2024 07:01  -  11 Jan 2024 07:00  --------------------------------------------------------  IN: 0 mL / OUT: 100 mL / NET: -100 mL       GENERAL:  [ ]Alert  [ ]Oriented x   [ ]Lethargic  [ ]Cachexia  [ x]Unarousable  [ ]Verbal  [x ]Non-Verbal  Behavioral:   [ ]Anxiety  [ ]Delirium [ ]Agitation [ ]Other  HEENT:  [ ]Normal   [ ]Dry mouth   [ ]ET Tube/Trach  [ ]Oral lesions  PULMONARY:   [ ]Clear [ ]Tachypnea  [ ]Audible excessive secretions   [ ]Rhonchi        [ ]Right [ ]Left [ ]Bilateral  [ ]Crackles        [ ]Right [ ]Left [ ]Bilateral  [ ]Wheezing     [ ]Right [ ]Left [ ]Bilateral  [x ]Diminished BS [ ] Right [ ]Left [x ]Bilateral  CARDIOVASCULAR:    [ ]Regular [x]Irregular [ ]Tachy  [ ]Bryce [ ]Murmur [ ]Other  GASTROINTESTINAL:  [x ]Soft  [ ]Distended   [ ]+BS  [ ]Non tender [ ]Tender  [ ]PEG [ ]OGT/ NGT   Last BM:  1/11/24  GENITOURINARY:  [ ]Normal [x]Incontinent   [ ]Oliguria/Anuria   [ ]Castaneda  MUSCULOSKELETAL:   [ ]Normal   [ ]Weakness  [x]Bed/Wheelchair bound [ ]Edema  NEUROLOGIC:   [ ]No focal deficits  [x] Cognitive impairment  [ ] Dysphagia [ ]Dysarthria [ ] Paresis [ ]Other   SKIN:   [ ]Normal  [ ]Rash   [ xPressure ulcer(s) sacrum unstageable    CRITICAL CARE:  [ ]Shock Present  [ ]Septic [ ]Cardiogenic [ ]Neurologic [ ]Hypovolemic  [ ]Vasopressors [ ]Inotropes  [ ]Respiratory failure present [ ]Mechanical Ventilation [ ]Non-invasive ventilatory support [ ]High-Flow  [ ]Acute  [ ]Chronic [ ]Hypoxic  [ ]Hypercarbic [ ]Other  [ ]Other organ failure     LABS: none new    RADIOLOGY & ADDITIONAL STUDIES: none new    Protein Calorie Malnutrition Present: [ ]mild [ ]moderate [ ]severe [ ]underweight [ ]morbid obesity  https://www.andeal.org/vault/2440/web/files/ONC/Table_Clinical%20Characteristics%20to%20Document%20Malnutrition-White%20JV%20et%20al%202012.pdf    Height (cm): 162.6 (10-26-23 @ 03:59)  Weight (kg): 63.1 (10-26-23 @ 06:50)  BMI (kg/m2): 23.9 (10-26-23 @ 06:50)    [ ]PPSV2 < or = 30%  [ ]significant weight loss [ ]poor nutritional intake [ ]anasarca    [ ]Artificial Nutrition    REFERRALS:   [ ]Chaplaincy  [ ]Hospice  [ ]Child Life  [ ]Social Work  [ ]Case management [ ]Holistic Therapy     Goals of Care Document:     SUBJECTIVE AND OBJECTIVE: Patient resting in bed, calm, family at bedside  INTERVAL HPI/OVERNIGHT EVENTS:    DNR on chart: yes  Allergies    No Known Allergies    Intolerances    MEDICATIONS  (STANDING):  chlorhexidine 2% Cloths 1 Application(s) Topical <User Schedule>  scopolamine 1 mG/72 Hr(s) Patch 1 Patch Transdermal every 72 hours    MEDICATIONS  (PRN):  glycopyrrolate Injectable 0.2 milliGRAM(s) IV Push every 6 hours PRN secretions  morphine  - Injectable 2 milliGRAM(s) IV Push every 4 hours PRN pain/SOB      ITEMS UNCHECKED ARE NOT PRESENT    PRESENT SYMPTOMS: [ x]Unable to obtain due to poor mentation   Source if other than patient:  [ ]Family   [ ]Team     Pain:  [ ]yes [ ]no  QOL impact -   Location -                    Aggravating factors -  Quality -  Radiation -  Timing-  Severity (0-10 scale):  Minimal acceptable level (0-10 scale):     Dyspnea:                           [ ]Mild [ ]Moderate [ ]Severe  Anxiety:                             [ ]Mild [ ]Moderate [ ]Severe  Fatigue:                             [ ]Mild [ ]Moderate [ ]Severe  Nausea:                             [ ]Mild [ ]Moderate [ ]Severe  Loss of appetite:              [ ]Mild [ ]Moderate [ ]Severe  Constipation:                    [ ]Mild [ ]Moderate [ ]Severe    PAIN AD Score:	0  [email or web address] (Ctrl + left click to view)    Other Symptoms:  [ ]All other review of systems negative     Palliative Performance Status Version 2:        10 %      [email or web address]  PHYSICAL EXAM:  Vital Signs Last 24 Hrs  T(C): 35.6 (11 Jan 2024 05:22), Max: 35.6 (10 Jay 2024 19:05)  T(F): 96.1 (11 Jan 2024 05:22), Max: 96.1 (10 Jay 2024 19:05)  HR: 44 (11 Jan 2024 11:10) (44 - 75)  BP: 149/78 (11 Jan 2024 11:10) (129/56 - 154/67)  BP(mean): 79 (10 Jay 2024 20:00) (79 - 96)  RR: 18 (11 Jan 2024 11:10) (18 - 24)  SpO2: 100% (11 Jan 2024 11:10) (95% - 100%)    Parameters below as of 11 Jan 2024 11:10  Patient On (Oxygen Delivery Method): room air     I&O's Summary    10 Jay 2024 07:01  -  11 Jan 2024 07:00  --------------------------------------------------------  IN: 0 mL / OUT: 100 mL / NET: -100 mL       GENERAL:  [ ]Alert  [ ]Oriented x   [ ]Lethargic  [ ]Cachexia  [ x]Unarousable  [ ]Verbal  [x ]Non-Verbal  Behavioral:   [ ]Anxiety  [ ]Delirium [ ]Agitation [ ]Other  HEENT:  [ ]Normal   [ ]Dry mouth   [ ]ET Tube/Trach  [ ]Oral lesions  PULMONARY:   [ ]Clear [ ]Tachypnea  [ ]Audible excessive secretions   [ ]Rhonchi        [ ]Right [ ]Left [ ]Bilateral  [ ]Crackles        [ ]Right [ ]Left [ ]Bilateral  [ ]Wheezing     [ ]Right [ ]Left [ ]Bilateral  [x ]Diminished BS [ ] Right [ ]Left [x ]Bilateral  CARDIOVASCULAR:    [ ]Regular [x]Irregular [ ]Tachy  [ ]Bryce [ ]Murmur [ ]Other  GASTROINTESTINAL:  [x ]Soft  [ ]Distended   [ ]+BS  [ ]Non tender [ ]Tender  [ ]PEG [ ]OGT/ NGT   Last BM:  1/11/24  GENITOURINARY:  [ ]Normal [x]Incontinent   [ ]Oliguria/Anuria   [ ]Castaneda  MUSCULOSKELETAL:   [ ]Normal   [ ]Weakness  [x]Bed/Wheelchair bound [ ]Edema  NEUROLOGIC:   [ ]No focal deficits  [x] Cognitive impairment  [ ] Dysphagia [ ]Dysarthria [ ] Paresis [ ]Other   SKIN:   [ ]Normal  [ ]Rash   [ xPressure ulcer(s) sacrum unstageable    CRITICAL CARE:  [ ]Shock Present  [ ]Septic [ ]Cardiogenic [ ]Neurologic [ ]Hypovolemic  [ ]Vasopressors [ ]Inotropes  [ ]Respiratory failure present [ ]Mechanical Ventilation [ ]Non-invasive ventilatory support [ ]High-Flow  [ ]Acute  [ ]Chronic [ ]Hypoxic  [ ]Hypercarbic [ ]Other  [ ]Other organ failure     LABS: none new    RADIOLOGY & ADDITIONAL STUDIES: none new    Protein Calorie Malnutrition Present: [ ]mild [ ]moderate [ ]severe [ ]underweight [ ]morbid obesity  https://www.andeal.org/vault/2440/web/files/ONC/Table_Clinical%20Characteristics%20to%20Document%20Malnutrition-White%20JV%20et%20al%202012.pdf    Height (cm): 162.6 (10-26-23 @ 03:59)  Weight (kg): 63.1 (10-26-23 @ 06:50)  BMI (kg/m2): 23.9 (10-26-23 @ 06:50)    [ ]PPSV2 < or = 30%  [ ]significant weight loss [ ]poor nutritional intake [ ]anasarca    [ ]Artificial Nutrition    REFERRALS:   [ ]Chaplaincy  [ ]Hospice  [ ]Child Life  [ ]Social Work  [ ]Case management [ ]Holistic Therapy     Goals of Care Document:

## 2024-01-11 NOTE — PROGRESS NOTE ADULT - PROBLEM SELECTOR PLAN 2
intubated for airway protection, SBT per ICU team, unweanable due to poor mental status
s/p palliative extubation, no increased work of breathing noted, morphine PRN for dyspnea, no doses taken, glyco on board 1 dose in 24 hours, scop patch added, HOBE with help with managing oral secretions, and provide oral hygiene
intubated for airway protection, SBT per ICU team, unweanable due to poor mental status
s/p palliative extubation, no increased work of breathing noted, morphine PRN for dyspnea, no doses taken, glyco on board 1 dose in 24 hours, scop patch added, HOBE with help with managing oral secretions, and provide oral hygiene

## 2024-01-11 NOTE — PROGRESS NOTE ADULT - PROBLEM SELECTOR PROBLEM 1
Acute right MCA stroke

## 2024-01-11 NOTE — PROGRESS NOTE ADULT - PROBLEM SELECTOR PROBLEM 3
Palliative care encounter
Atrial fibrillation
Atrial fibrillation

## 2024-01-11 NOTE — PROGRESS NOTE ADULT - PROBLEM SELECTOR PLAN 1
CTA with large R MCA infarct,  R ICA occlusion, poss cardioembolic from AF, off AC x1 wk prior to admission   not amenable to thrombectomy   Rpt CT 10/30 w progressing  R JIMMY and MCA territory infarct with significant mass effect and associated herniation Neuro following  Poor overall prognosis  CT 11/2 with new R PCA infarct, family aware
CTA with large R MCA infarct,  R ICA occlusion, poss cardioembolic from AF, off AC x1 wk prior to admission   not amenable to thrombectomy   Rpt CT 10/30 w progressing  R JIMMY and MCA territory infarct with significant mass effect and associated herniation Neuro following  Poor overall prognosis  CT 11/2 with new R PCA infarct, family aware
CTA with large R MCA infarct,  R ICA occlusion, poss cardioembolic from AF, off AC x1 wk prior to admission   not amenable to thrombectomy   Rpt CT 10/30 w progressing  R JIMMY and MCA territory infarct with significant mass effect and associated herniation Neuro following  Poor overall prognosis
CTA with large R MCA infarct,  R ICA occlusion, poss cardioembolic from AF, off AC x1 wk prior to admission   not amenable to thrombectomy   Rpt CT 10/30 w progressing  R JIMMY and MCA territory infarct with significant mass effect and associated herniation Neuro following  Poor overall prognosis  CT 11/2 with new R PCA infarct, family aware
CTA with large R MCA infarct,  R ICA occlusion, poss cardioembolic from AF, off AC x1 wk prior to admission   not amenable to thrombectomy   Rpt CT 10/30 w progressing  R JIMMY and MCA territory infarct with significant mass effect and associated herniation Neuro following  Poor overall prognosis  CT 11/2 with new R PCA infarct, family aware  remains poorly responsive
CTA with large R MCA infarct,  R ICA occlusion, poss cardioembolic from AF, off AC x1 wk prior to admission   not amenable to thrombectomy   Rpt CT 10/30 w progressing  R JIMMY and MCA territory infarct with significant mass effect and associated herniation Neuro following  Poor overall prognosis  CT 11/2 with new R PCA infarct, family aware  remains poorly responsive.
CTA with large R MCA infarct,  R ICA occlusion, poss cardioembolic from AF, off AC x1 wk prior to admission   not amenable to thrombectomy   Rpt CT 10/30 w progressing  R JIMMY and MCA territory infarct with significant mass effect and associated herniation Neuro following  Poor overall prognosis  CT 11/2 with new R PCA infarct, family aware  remains poorly responsive.

## 2024-01-11 NOTE — PROGRESS NOTE ADULT - REASON FOR ADMISSION
Ischemic Stroke, Poor MS requiring Intubation

## 2024-01-11 NOTE — PROGRESS NOTE ADULT - PROBLEM SELECTOR PLAN 3
[facility] discussion as above. There are 5 children who make decisions together, they continue their family discussions. MOLST on file for DNR.   Spoke w eldest son Otoniel today 11/6 at bedside, family continues to discuss, remain undecided, support provided.   Palliative will follow.
There are 5 children who make decisions together, they continue their family discussions. Palliative will follow.
[facility] discussion as above. There are 5 children who make decisions together, they continue their family discussions. MOLST on file for DNR.   Spoke w eldest son Otoniel today 11/8 at bedside, family continues to discuss, remain undecided, support provided.   Palliative will follow.
[facility] discussion as above. There are 5 children who make decisions together, they continue their family discussions. Now DNR, MOLST drafted and placed in chart. Palliative will follow.
[facility] discussion as above. There are 5 children who make decisions together, they continue their family discussions. LAZARUS[facility] on file for DNR.      Spoke w daughter Nickie today via phone regarding further GOC, made aware about SW referral for LTAC option since they could not make a decision about trach or palliative extubation, very limited options for orally intubated patients. She expressed that they had spoken w "Breana from [employer]" yesterday, are discussing about making a decision but would not tell me what the discussion was. She expressed that they would not want the patient transferred to another facility. Discussed w Breana Ernst who stated she spoke to son briefly, however, the family came to no further decisions and would not commit to a family meeting.
remains in Atrial fib, rate controlled
remains in Atrial fib, rate controlled

## 2024-01-11 NOTE — DISCHARGE NOTE FOR THE EXPIRED PATIENT - HOSPITAL COURSE
87 y/o F w/hypertension, HLD and Afib on AC admitted for massive  CVA with herniation. was not a candidate for TNK or endovascular intervention and has been Intubated for respiratory failure in setting of acute stroke and was palliatively extubated. She is breathing on her own but remained unresponsive. Family elected  for comfort care. Palliative followed. Family aware of poor prognosis.

## 2024-01-11 NOTE — PROGRESS NOTE ADULT - PROVIDER SPECIALTY LIST ADULT
Critical Care
[facility]
Neurology
Critical Care
[facility]
Neurology
Critical Care
Neurology
Critical Care
Hospitalist
Neurology
Palliative Care
Critical Care
Neurology
Critical Care
Neurology
Critical Care
Neurology
Critical Care
Critical Care
Neurology
Critical Care
Neurology
Critical Care
Neurology
Palliative Care
Critical Care
Palliative Care

## 2024-01-22 DIAGNOSIS — I63.131 CEREBRAL INFARCTION DUE TO EMBOLISM OF RIGHT CAROTID ARTERY: ICD-10-CM

## 2024-01-22 DIAGNOSIS — Z91.128 PATIENT'S INTENTIONAL UNDERDOSING OF MEDICATION REGIMEN FOR OTHER REASON: ICD-10-CM

## 2024-01-22 DIAGNOSIS — I48.91 UNSPECIFIED ATRIAL FIBRILLATION: ICD-10-CM

## 2024-01-22 DIAGNOSIS — Z53.09 PROCEDURE AND TREATMENT NOT CARRIED OUT BECAUSE OF OTHER CONTRAINDICATION: ICD-10-CM

## 2024-01-22 DIAGNOSIS — G93.5 COMPRESSION OF BRAIN: ICD-10-CM

## 2024-01-22 DIAGNOSIS — R91.1 SOLITARY PULMONARY NODULE: ICD-10-CM

## 2024-01-22 DIAGNOSIS — J96.01 ACUTE RESPIRATORY FAILURE WITH HYPOXIA: ICD-10-CM

## 2024-01-22 DIAGNOSIS — N17.9 ACUTE KIDNEY FAILURE, UNSPECIFIED: ICD-10-CM

## 2024-01-22 DIAGNOSIS — Z99.11 DEPENDENCE ON RESPIRATOR [VENTILATOR] STATUS: ICD-10-CM

## 2024-01-22 DIAGNOSIS — G93.6 CEREBRAL EDEMA: ICD-10-CM

## 2024-01-22 DIAGNOSIS — Z66 DO NOT RESUSCITATE: ICD-10-CM

## 2024-01-22 DIAGNOSIS — R47.01 APHASIA: ICD-10-CM

## 2024-01-22 DIAGNOSIS — Z75.1 PERSON AWAITING ADMISSION TO ADEQUATE FACILITY ELSEWHERE: ICD-10-CM

## 2024-01-22 DIAGNOSIS — T45.516A UNDERDOSING OF ANTICOAGULANTS, INITIAL ENCOUNTER: ICD-10-CM

## 2024-01-22 DIAGNOSIS — R64 CACHEXIA: ICD-10-CM

## 2024-01-22 DIAGNOSIS — R00.1 BRADYCARDIA, UNSPECIFIED: ICD-10-CM

## 2024-01-22 DIAGNOSIS — G93.49 OTHER ENCEPHALOPATHY: ICD-10-CM

## 2024-01-22 DIAGNOSIS — R29.730 NIHSS SCORE 30: ICD-10-CM

## 2024-01-22 DIAGNOSIS — Z91.148 PATIENT'S OTHER NONCOMPLIANCE WITH MEDICATION REGIMEN FOR OTHER REASON: ICD-10-CM

## 2024-01-22 DIAGNOSIS — L89.150 PRESSURE ULCER OF SACRAL REGION, UNSTAGEABLE: ICD-10-CM

## 2024-01-22 DIAGNOSIS — R41.82 ALTERED MENTAL STATUS, UNSPECIFIED: ICD-10-CM

## 2024-01-22 DIAGNOSIS — Z51.5 ENCOUNTER FOR PALLIATIVE CARE: ICD-10-CM

## 2024-01-22 DIAGNOSIS — J69.0 PNEUMONITIS DUE TO INHALATION OF FOOD AND VOMIT: ICD-10-CM

## 2024-01-22 DIAGNOSIS — R50.9 FEVER, UNSPECIFIED: ICD-10-CM

## 2024-01-22 DIAGNOSIS — E78.5 HYPERLIPIDEMIA, UNSPECIFIED: ICD-10-CM

## 2024-01-22 DIAGNOSIS — Z79.82 LONG TERM (CURRENT) USE OF ASPIRIN: ICD-10-CM

## 2024-01-22 DIAGNOSIS — I10 ESSENTIAL (PRIMARY) HYPERTENSION: ICD-10-CM

## 2024-01-22 DIAGNOSIS — Z79.01 LONG TERM (CURRENT) USE OF ANTICOAGULANTS: ICD-10-CM

## 2024-01-22 DIAGNOSIS — I63.431 CEREBRAL INFARCTION DUE TO EMBOLISM OF RIGHT POSTERIOR CEREBRAL ARTERY: ICD-10-CM

## 2024-01-22 DIAGNOSIS — D72.829 ELEVATED WHITE BLOOD CELL COUNT, UNSPECIFIED: ICD-10-CM

## 2024-01-22 DIAGNOSIS — Z11.52 ENCOUNTER FOR SCREENING FOR COVID-19: ICD-10-CM

## 2024-02-24 NOTE — DISCHARGE NOTE ADULT - CARE PROVIDER_API CALL
No Gabrielle Lamb), Neurology  [address], [place] [id number]  Phone: [phone]  Fax: [phone]    Kike Navarrete), Cardiology  [address] [place] [address]  Phone: [phone]  Fax: [phone]    PCP,   Follow with PCP Dr. Hyde x 3 days  Phone: (   )    -  Fax: (   )    -
